# Patient Record
Sex: FEMALE | Race: BLACK OR AFRICAN AMERICAN | Employment: OTHER | URBAN - METROPOLITAN AREA
[De-identification: names, ages, dates, MRNs, and addresses within clinical notes are randomized per-mention and may not be internally consistent; named-entity substitution may affect disease eponyms.]

---

## 2017-06-27 ENCOUNTER — HOSPITAL ENCOUNTER (OUTPATIENT)
Dept: NUCLEAR MEDICINE | Age: 72
Discharge: HOME | End: 2017-06-27
Attending: INTERNAL MEDICINE
Payer: MEDICARE

## 2017-06-27 DIAGNOSIS — E03.9 ACQUIRED HYPOTHYROIDISM: ICD-10-CM

## 2017-06-27 PROCEDURE — 78014 THYROID IMAGING W/BLOOD FLOW: CPT

## 2017-06-28 ENCOUNTER — HOSPITAL ENCOUNTER (OUTPATIENT)
Dept: NUCLEAR MEDICINE | Age: 72
Discharge: HOME | End: 2017-06-28
Attending: INTERNAL MEDICINE
Payer: MEDICARE

## 2017-06-28 PROCEDURE — 78014 THYROID IMAGING W/BLOOD FLOW: CPT

## 2017-07-21 RX ORDER — LORAZEPAM 0.5 MG/1
0.5 TABLET ORAL
Qty: 90 TAB | Refills: 0 | Status: SHIPPED | OUTPATIENT
Start: 2017-07-21 | End: 2018-02-15 | Stop reason: SDUPTHER

## 2017-07-21 RX ORDER — TRAMADOL HYDROCHLORIDE 50 MG/1
50 TABLET ORAL AS NEEDED
Qty: 360 TAB | Refills: 0 | Status: SHIPPED | OUTPATIENT
Start: 2017-07-21 | End: 2017-07-24 | Stop reason: SDUPTHER

## 2017-07-24 RX ORDER — TRAMADOL HYDROCHLORIDE 50 MG/1
50 TABLET ORAL 4 TIMES DAILY
Qty: 360 TAB | Refills: 0 | Status: SHIPPED | OUTPATIENT
Start: 2017-07-24 | End: 2017-08-11 | Stop reason: SDUPTHER

## 2017-07-24 NOTE — TELEPHONE ENCOUNTER
Requested Prescriptions     Pending Prescriptions Disp Refills    traMADol (ULTRAM) 50 mg tablet 360 Tab 0     Sig: Take 1 Tab by mouth four (4) times daily. Max Daily Amount: 200 mg.  Indications: Pain       Last Refill: 5/12/17  Last visit:5/12/17

## 2017-08-11 RX ORDER — TRAMADOL HYDROCHLORIDE 50 MG/1
50 TABLET ORAL 4 TIMES DAILY
Qty: 360 TAB | Refills: 0 | Status: SHIPPED | OUTPATIENT
Start: 2017-08-11 | End: 2018-02-15 | Stop reason: SDUPTHER

## 2017-08-11 NOTE — TELEPHONE ENCOUNTER
Last Refill: 5/12/17  Last visit:5/12/17    Requested Prescriptions     Pending Prescriptions Disp Refills    traMADol (ULTRAM) 50 mg tablet 360 Tab 0     Sig: Take 1 Tab by mouth four (4) times daily. Max Daily Amount: 200 mg.  Indications: Pain

## 2017-08-17 ENCOUNTER — TELEPHONE ANTICOAG (OUTPATIENT)
Dept: INTERNAL MEDICINE CLINIC | Age: 72
End: 2017-08-17

## 2017-08-17 ENCOUNTER — LAB ONLY (OUTPATIENT)
Dept: INTERNAL MEDICINE CLINIC | Age: 72
End: 2017-08-17

## 2017-08-17 DIAGNOSIS — I48.0 PAF (PAROXYSMAL ATRIAL FIBRILLATION) (HCC): Primary | ICD-10-CM

## 2017-08-17 LAB
INR BLD: 3.7
PT POC: 46.2 SECONDS
VALID INTERNAL CONTROL?: YES

## 2017-08-17 NOTE — PROGRESS NOTES
Ms. Edith Andrade is here today for anticoagulation monitoring for the diagnosis of Atrial Fibrillation. Her INR goal is 2-3 and her current Coumadin dose is 2mg tabs- 1 1/2 tabs everyday, except take 2 tabs wed & sun. Today's findings include an INR of 3.7 (normal INR range 0.8-1.2). Considering Ms. Cuello's past history, todays findings, and per the coumadin policy/protocol, Ms. Buddy Holland was instructed to take Coumadin as follows,  Hold coumadin for 2 days, then restart current plan. Abby Mora She was also instructed to schedule an appointment in 4 weeks prior to leaving for an INR check. A full discussion of the nature of anticoagulants has been carried out. A full discussion of the need for frequent and regular monitoring, precise dosage adjustment and compliance was stressed. Side effects of potential bleeding were discussed and Ms. Buddy Holland was instructed to call [phone] if there are any signs of abnormal bleeding. Ms. Buddy Holland was instructed to avoid any OTC items containing aspirin or ibuprofen and prior to starting any new OTC products to consult with her physician or pharmacist to ensure no drug interactions are present. Ms. Buddy Holland was instructed to avoid any major changes in her general diet and to avoid alcohol consumption. .    Ms. Buddy Holland was provided a literature booklet, \"Treatment with Warfarin (Coumadin)\", that includes topics on understanding coumadin therapy, drug interaction considerations, vitamin K and coumadin use, interactions with foods and supplements containing vitamin K, and the use of herbal products. Ms. Buddy Holland verbalized her understanding of all instructions and will call the office with any questions, concerns, or signs of abnormal bleeding or blood clot.

## 2017-08-24 RX ORDER — HUMAN INSULIN 100 [IU]/ML
INJECTION, SUSPENSION SUBCUTANEOUS
Qty: 80 ML | Refills: 2 | Status: SHIPPED | OUTPATIENT
Start: 2017-08-24 | End: 2018-05-21 | Stop reason: SDUPTHER

## 2017-08-24 NOTE — TELEPHONE ENCOUNTER
Requested Prescriptions     Pending Prescriptions Disp Refills    NOVOLIN N 100 unit/mL injection [Pharmacy Med Name: Wilda FRIEDMAN INSULIN 100U/ML] 80 mL 2     Sig: INJECT 44 UNITS UNDER THE SKIN IN THE MORNING AND 36 UNITS IN THE EVENING       Last Refill: 5/17/17  Last visit:5/12/17

## 2017-09-08 RX ORDER — NAPROXEN SODIUM 220 MG
1 TABLET ORAL 2 TIMES DAILY
Qty: 180 SYRINGE | Refills: 3 | Status: SHIPPED | COMMUNITY
Start: 2017-09-08 | End: 2020-06-18 | Stop reason: SDUPTHER

## 2017-09-08 RX ORDER — CAPTOPRIL 25 MG/1
25 TABLET ORAL 2 TIMES DAILY
Qty: 180 TAB | Refills: 3 | Status: SHIPPED | COMMUNITY
Start: 2017-09-08 | End: 2018-06-15 | Stop reason: SDUPTHER

## 2017-09-21 ENCOUNTER — LAB ONLY (OUTPATIENT)
Dept: INTERNAL MEDICINE CLINIC | Age: 72
End: 2017-09-21

## 2017-09-21 DIAGNOSIS — I48.0 PAF (PAROXYSMAL ATRIAL FIBRILLATION) (HCC): Primary | ICD-10-CM

## 2017-09-21 LAB
INR BLD: 2.5
PT POC: 32.7 SECONDS
VALID INTERNAL CONTROL?: YES

## 2017-09-22 ENCOUNTER — TELEPHONE ANTICOAG (OUTPATIENT)
Dept: INTERNAL MEDICINE CLINIC | Age: 72
End: 2017-09-22

## 2017-09-22 DIAGNOSIS — I48.0 PAF (PAROXYSMAL ATRIAL FIBRILLATION) (HCC): Primary | ICD-10-CM

## 2017-09-22 NOTE — PROGRESS NOTES
Ms. Adelso Ferguson is here today for anticoagulation monitoring for the diagnosis of Atrial Fibrillation. Her INR goal is 2.0-3.0 and her current Coumadin dose is 4mg on sundays, 3mg all other days. .     Today's findings include an INR of 2.5 (normal INR range 0.8-1.2). inure    Considering Ms. Cuello's past history, todays findings, and per the coumadin policy/protocol, Ms. Jena Olmedo was instructed to take Coumadin as follows,  Continue same regimen. She was also instructed to schedule an appointment in 4 weeks prior to leaving for an INR check. A full discussion of the nature of anticoagulants has been carried out. A full discussion of the need for frequent and regular monitoring, precise dosage adjustment and compliance was stressed. Side effects of potential bleeding were discussed and Ms. Jena Olmedo was instructed to call [phone] if there are any signs of abnormal bleeding. Ms. Jena Olmedo was instructed to avoid any OTC items containing aspirin or ibuprofen and prior to starting any new OTC products to consult with her physician or pharmacist to ensure no drug interactions are present. Ms. Jena Olmedo was instructed to avoid any major changes in her general diet and to avoid alcohol consumption. .    Ms. Jena Olmedo was provided a literature booklet, \"Treatment with Warfarin (Coumadin)\", that includes topics on understanding coumadin therapy, drug interaction considerations, vitamin K and coumadin use, interactions with foods and supplements containing vitamin K, and the use of herbal products. Ms. Jena Olmedo verbalized her understanding of all instructions and will call the office with any questions, concerns, or signs of abnormal bleeding or blood clot.

## 2017-10-13 PROBLEM — L97.909 STASIS ULCER OF LOWER EXTREMITY (HCC): Status: ACTIVE | Noted: 2017-10-13

## 2017-10-13 PROBLEM — E78.5 DYSLIPIDEMIA: Status: ACTIVE | Noted: 2017-10-13

## 2017-10-13 PROBLEM — H40.9 GLAUCOMA: Status: ACTIVE | Noted: 2017-10-13

## 2017-10-13 PROBLEM — I63.9 CVA (CEREBRAL VASCULAR ACCIDENT) (HCC): Status: ACTIVE | Noted: 2017-10-13

## 2017-10-13 PROBLEM — R92.1 BREAST CALCIFICATION, LEFT: Status: ACTIVE | Noted: 2017-10-13

## 2017-10-13 PROBLEM — F41.9 ANXIETY: Status: ACTIVE | Noted: 2017-10-13

## 2017-10-13 PROBLEM — Z79.899 LONG-TERM USE OF HIGH-RISK MEDICATION: Status: ACTIVE | Noted: 2017-10-13

## 2017-10-13 PROBLEM — E05.90 HYPERTHYROIDISM: Status: ACTIVE | Noted: 2017-10-13

## 2017-10-13 PROBLEM — I83.009 STASIS ULCER OF LOWER EXTREMITY (HCC): Status: ACTIVE | Noted: 2017-10-13

## 2017-10-13 PROBLEM — I89.0 LYMPHEDEMA OF BOTH LOWER EXTREMITIES: Status: ACTIVE | Noted: 2017-10-13

## 2017-10-13 PROBLEM — M19.90 DJD (DEGENERATIVE JOINT DISEASE): Status: ACTIVE | Noted: 2017-10-13

## 2017-10-13 PROBLEM — L03.115 CELLULITIS OF BOTH LOWER EXTREMITIES: Status: ACTIVE | Noted: 2017-10-13

## 2017-10-13 PROBLEM — I48.91 ATRIAL FIBRILLATION (HCC): Status: ACTIVE | Noted: 2017-10-13

## 2017-10-13 PROBLEM — G89.4 CHRONIC PAIN SYNDROME: Status: ACTIVE | Noted: 2017-10-13

## 2017-10-13 PROBLEM — Z79.01 ANTICOAGULANT LONG-TERM USE: Status: ACTIVE | Noted: 2017-10-13

## 2017-10-13 PROBLEM — L03.116 CELLULITIS OF BOTH LOWER EXTREMITIES: Status: ACTIVE | Noted: 2017-10-13

## 2017-10-13 RX ORDER — LATANOPROST 50 UG/ML
1 SOLUTION/ DROPS OPHTHALMIC
COMMUNITY

## 2017-10-13 RX ORDER — WARFARIN 2 MG/1
2 TABLET ORAL DAILY
COMMUNITY
End: 2018-05-29 | Stop reason: SDUPTHER

## 2017-10-23 ENCOUNTER — LAB ONLY (OUTPATIENT)
Dept: INTERNAL MEDICINE CLINIC | Age: 72
End: 2017-10-23

## 2017-10-23 ENCOUNTER — TELEPHONE ANTICOAG (OUTPATIENT)
Dept: INTERNAL MEDICINE CLINIC | Age: 72
End: 2017-10-23

## 2017-10-23 DIAGNOSIS — I48.0 PAF (PAROXYSMAL ATRIAL FIBRILLATION) (HCC): Primary | ICD-10-CM

## 2017-10-23 LAB
INR BLD: 3.1
INR, EXTERNAL: 3.1
PT POC: 41.2 SECONDS
VALID INTERNAL CONTROL?: YES

## 2017-10-23 NOTE — PROGRESS NOTES
Ms. Jimenez Becker is here today for anticoagulation monitoring for the diagnosis of Atrial Fibrillation. Her INR goal is 2.0-3.0 and her current Coumadin dose is 3 mg sun and wed 4 mg all otherdays. Today's findings include an INR of 3.1     Considering Ms. Cuello's past history, todays findings, and per the coumadin policy/protocol, Ms. Roscoe Zimmerman was instructed to take Coumadin as follows,  No change. She was also instructed to schedule an appointment in 4 weeks prior to leaving for an INR check. A full discussion of the nature of anticoagulants has been carried out. A full discussion of the need for frequent and regular monitoring, precise dosage adjustment and compliance was stressed. Side effects of potential bleeding were discussed and Ms. Roscoe Zimmerman was instructed to call [phone] if there are any signs of abnormal bleeding. Ms. Roscoe Zimmerman was instructed to avoid any OTC items containing aspirin or ibuprofen and prior to starting any new OTC products to consult with her physician or pharmacist to ensure no drug interactions are present. Ms. Rosceo Zimmerman was instructed to avoid any major changes in her general diet and to avoid alcohol consumption. .    Ms. Roscoe Zimmerman was provided a literature booklet, \"Treatment with Warfarin (Coumadin)\", that includes topics on understanding coumadin therapy, drug interaction considerations, vitamin K and coumadin use, interactions with foods and supplements containing vitamin K, and the use of herbal products. Ms. Roscoe Zimmerman verbalized her understanding of all instructions and will call the office with any questions, concerns, or signs of abnormal bleeding or blood clot.

## 2017-11-15 ENCOUNTER — OFFICE VISIT (OUTPATIENT)
Dept: INTERNAL MEDICINE CLINIC | Age: 72
End: 2017-11-15

## 2017-11-15 VITALS
SYSTOLIC BLOOD PRESSURE: 126 MMHG | HEIGHT: 64 IN | OXYGEN SATURATION: 96 % | DIASTOLIC BLOOD PRESSURE: 80 MMHG | WEIGHT: 207 LBS | BODY MASS INDEX: 35.34 KG/M2 | HEART RATE: 72 BPM

## 2017-11-15 DIAGNOSIS — E03.9 ACQUIRED HYPOTHYROIDISM: ICD-10-CM

## 2017-11-15 DIAGNOSIS — Z23 ENCOUNTER FOR IMMUNIZATION: ICD-10-CM

## 2017-11-15 DIAGNOSIS — E11.3299 TYPE 2 DIABETES MELLITUS WITH BACKGROUND RETINOPATHY (HCC): Primary | ICD-10-CM

## 2017-11-15 DIAGNOSIS — N39.0 URINARY TRACT INFECTION WITHOUT HEMATURIA, SITE UNSPECIFIED: ICD-10-CM

## 2017-11-15 DIAGNOSIS — Z79.899 LONG-TERM USE OF HIGH-RISK MEDICATION: ICD-10-CM

## 2017-11-15 DIAGNOSIS — Z79.01 ANTICOAGULANT LONG-TERM USE: ICD-10-CM

## 2017-11-15 DIAGNOSIS — I10 ESSENTIAL HYPERTENSION, BENIGN: ICD-10-CM

## 2017-11-15 DIAGNOSIS — I63.9 CEREBROVASCULAR ACCIDENT (CVA), UNSPECIFIED MECHANISM (HCC): Chronic | ICD-10-CM

## 2017-11-15 DIAGNOSIS — E78.5 DYSLIPIDEMIA: ICD-10-CM

## 2017-11-15 DIAGNOSIS — Z79.891 CHRONIC PRESCRIPTION OPIATE USE: ICD-10-CM

## 2017-11-15 DIAGNOSIS — I48.0 PAF (PAROXYSMAL ATRIAL FIBRILLATION) (HCC): ICD-10-CM

## 2017-11-15 PROBLEM — I48.91 ATRIAL FIBRILLATION (HCC): Chronic | Status: ACTIVE | Noted: 2017-10-13

## 2017-11-15 LAB
ALBUMIN SERPL-MCNC: 3.6 G/DL (ref 3.9–5.4)
ALKALINE PHOS POC: 185 U/L (ref 38–126)
ALT SERPL-CCNC: 29 U/L (ref 9–52)
AST SERPL-CCNC: 23 U/L (ref 14–36)
BACTERIA UA POCT, BACTPOCT: ABNORMAL
BILIRUB UR QL STRIP: NEGATIVE
BUN BLD-MCNC: 17 MG/DL (ref 7–17)
CALCIUM BLD-MCNC: 9.4 MG/DL (ref 8.4–10.2)
CASTS UA POCT: ABNORMAL
CHLORIDE BLD-SCNC: 103 MMOL/L (ref 98–107)
CHOLEST SERPL-MCNC: 111 MG/DL (ref 0–200)
CK (CPK) POC: 119 U/L (ref 30–135)
CLUE CELLS, CLUEPOCT: NEGATIVE
CO2 POC: 27 MMOL/L (ref 22–32)
CREAT BLD-MCNC: 0.7 MG/DL (ref 0.7–1.2)
CRYSTALS UA POCT, CRYSPOCT: NEGATIVE
EGFR (POC): 86
EPITHELIAL CELLS POCT: ABNORMAL
GLUCOSE POC: 294 MG/DL (ref 65–105)
GLUCOSE UR-MCNC: ABNORMAL MG/DL
GRAN# POC: 2.8 K/UL (ref 2–7.8)
GRAN% POC: 61.5 % (ref 37–92)
HBA1C MFR BLD HPLC: 9 % (ref 4.5–5.7)
HCT VFR BLD CALC: 39.4 % (ref 37–51)
HDLC SERPL-MCNC: 26 MG/DL (ref 35–130)
HGB BLD-MCNC: 13.5 G/DL (ref 12–18)
INR BLD: 2.2
KETONES P FAST UR STRIP-MCNC: ABNORMAL MG/DL
LDL CHOLESTEROL POC: 66 MG/DL (ref 0–130)
LY# POC: 1.4 K/UL (ref 0.6–4.1)
LY% POC: 32.4 % (ref 10–58.5)
MCH RBC QN: 30.2 PG (ref 26–32)
MCHC RBC-ENTMCNC: 34.2 G/DL (ref 30–36)
MCV RBC: 88 FL (ref 80–97)
MICROALBUMIN UR TEST STR-MCNC: 50 MG/L (ref 0–20)
MID #, POC: 0.2 K/UL (ref 0–1.8)
MID% POC: 6.1 % (ref 0.1–24)
MUCUS UA POCT, MUCPOCT: ABNORMAL
PH UR STRIP: 6 [PH] (ref 5–7)
PLATELET # BLD: 199 K/UL (ref 140–440)
POTASSIUM SERPL-SCNC: 4.6 MMOL/L (ref 3.6–5)
PROT SERPL-MCNC: 8.4 G/DL (ref 6.3–8.2)
PROT UR QL STRIP: ABNORMAL
PT POC: 29.3 SECONDS
RBC # BLD: 4.45 M/UL (ref 4.2–6.3)
RBC UA POCT, RBCPOCT: ABNORMAL
SODIUM SERPL-SCNC: 143 MMOL/L (ref 137–145)
SP GR UR STRIP: 1.02 (ref 1.01–1.02)
T4 FREE SERPL-MCNC: 1.88 NG/DL (ref 0.71–1.85)
TCHOL/HDL RATIO (POC): 4.3 (ref 0–4)
TOTAL BILIRUBIN POC: 0.6 MG/DL (ref 0.2–1.3)
TRICH UA POCT, TRICHPOC: NEGATIVE
TRIGL SERPL-MCNC: 95 MG/DL (ref 0–200)
TSH BLD-ACNC: 0 UIU/ML (ref 0.4–4.2)
UA UROBILINOGEN AMB POC: NORMAL (ref 0.2–1)
URINALYSIS CLARITY POC: ABNORMAL
URINALYSIS COLOR POC: ABNORMAL
URINE BLOOD POC: ABNORMAL
URINE CULT COMMENT, POCT: ABNORMAL
URINE LEUKOCYTES POC: ABNORMAL
URINE NITRITES POC: NEGATIVE
VALID INTERNAL CONTROL?: YES
VLDLC SERPL CALC-MCNC: 19 MG/DL
WBC # BLD: 4.4 K/UL (ref 4.1–10.9)
WBC UA POCT, WBCPOCT: ABNORMAL
YEAST UA POCT, YEASTPOC: NEGATIVE

## 2017-11-15 RX ORDER — CLONIDINE HYDROCHLORIDE 0.1 MG/1
0.2 TABLET ORAL
COMMUNITY
End: 2018-01-07

## 2017-11-15 RX ORDER — NALOXONE HYDROCHLORIDE 4 MG/.1ML
SPRAY NASAL
Qty: 1 EACH | Refills: 0 | Status: ON HOLD | OUTPATIENT
Start: 2017-11-15 | End: 2022-07-29

## 2017-11-15 RX ORDER — CLONIDINE HYDROCHLORIDE 0.1 MG/1
0.1 TABLET ORAL
COMMUNITY
End: 2018-01-07

## 2017-11-15 NOTE — MR AVS SNAPSHOT
Visit Information Date & Time Provider Department Dept. Phone Encounter #  
 11/15/2017 11:00 AM Vince MinMaurice 84 [phone] [id number] Follow-up Instructions Return in about 3 months (around 2/15/2018). Your Appointments 11/27/2017 11:00 AM  
LAB with LAB ONLY  
[facility] ([facility]) Appt Note: pt  
 [address] [id number] [address] [facility] [address] Upcoming Health Maintenance Date Due HEMOGLOBIN A1C Q6M 1945 FOOT EXAM Q1 4/8/1954 MICROALBUMIN Q1 4/8/1954 EYE EXAM RETINAL OR DILATED Q1 4/8/1954 DTaP/Tdap/Td series (1 - Tdap) 4/8/1965 FOBT Q 1 YEAR AGE 50-75 4/8/1994 ZOSTER VACCINE AGE 60> 2/8/2004 GLAUCOMA SCREENING Q2Y 4/8/2009 OSTEOPOROSIS SCREENING (DEXA) 4/8/2009 MEDICARE YEARLY EXAM 4/8/2009 LIPID PANEL Q1 8/19/2013 BREAST CANCER SCRN MAMMOGRAM 11/4/2016 Influenza Age 5 to Adult 8/1/2017 Allergies as of 11/15/2017  Review Complete On: 11/15/2017 By: Vince Min MD  
  
 Severity Noted Reaction Type Reactions Betadine Prepstick  07/20/2010    Rash Current Immunizations  Reviewed on 8/8/2013 Name Date Influenza High Dose Vaccine PF  Incomplete Pneumococcal Conjugate (PCV-13) 11/12/2015 Pneumococcal Polysaccharide (PPSV-23) 10/1/2014 Not reviewed this visit You Were Diagnosed With   
  
 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 
ICD-9-CM: 401.1 Acquired hypothyroidism     ICD-10-CM: E03.9 ICD-9-CM: 359. 9 Dyslipidemia     ICD-10-CM: E78.5 ICD-9-CM: 272.4 PAF (paroxysmal atrial fibrillation) (HCC)     ICD-10-CM: I48.0 ICD-9-CM: 427.31   
 Long-term use of high-risk medication     ICD-10-CM: Z79.899 ICD-9-CM: V58.69 Anticoagulant long-term use     ICD-10-CM: Z79.01 
ICD-9-CM: V58.61 Cerebrovascular accident (CVA), unspecified mechanism ([facility]     ICD-10-CM: I63.9 ICD-9-CM: 434.91 Chronic prescription opiate use     ICD-10-CM: K44.797 ICD-9-CM: V58.69 Encounter for immunization     ICD-10-CM: S30 ICD-9-CM: V03.89 Vitals BP Pulse Height(growth percentile) Weight(growth percentile) SpO2 BMI  
 126/80 (BP 1 Location: Right arm, BP Patient Position: Sitting) 72 5' 4\" (1.626 m) 207 lb (93.9 kg) 96% 35.53 kg/m2 OB Status Smoking Status Hysterectomy Never Smoker BMI and BSA Data Body Mass Index Body Surface Area 35.53 kg/m 2 2.06 m 2 Preferred Pharmacy Pharmacy Name Phone [facility] [facility]  [place] [phone] Your Updated Medication List  
  
   
This list is accurate as of: 11/15/17 11:50 AM.  Always use your most recent med list. amLODIPine 10 mg tablet Commonly known as:  Mcneal Del Take 10 mg by mouth daily. aspirin 325 mg tablet Commonly known as:  ASPIRIN Take 1 Tab by mouth daily. captopril 25 mg tablet Commonly known as:  CAPOTEN Take 1 Tab by mouth two (2) times a day. * cloNIDine HCl 0.1 mg tablet Commonly known as:  CATAPRES Take 0.2 mg by mouth every morning. * cloNIDine HCl 0.1 mg tablet Commonly known as:  CATAPRES Take 0.1 mg by mouth nightly. COREG 12.5 mg tablet Generic drug:  carvedilol Take 12.5 mg by mouth two (2) times daily (with meals). COUMADIN 2 mg tablet Generic drug:  warfarin Take 2 mg by mouth daily. Indications: 1 1/2 tabs mon, tues, thurs, sat, and 2 tabs wed, sun Insulin Syringe-Needle U-100 0.5 mL 31 gauge x 5/16 Syrg Commonly known as:  BD INSULIN SYRINGE ULT-FINE II  
 1 Each by Does Not Apply route two (2) times a day. LASIX 40 mg tablet Generic drug:  furosemide Take 40 mg by mouth daily. LORazepam 0.5 mg tablet Commonly known as:  ATIVAN Take 1 Tab by mouth nightly as needed. Max Daily Amount: 0.5 mg.  
  
 naloxone 4 mg/actuation nasal spray Commonly known as:  [employer] Use 1 spray intranasally into 1 nostril. Indications: OPIATE-INDUCED RESPIRATORY DEPRESSION, Opioid Toxicity NovoLIN N 100 unit/mL injection Generic drug:  insulin NPH INJECT 44 UNITS UNDER THE SKIN IN THE MORNING AND 36 UNITS IN THE EVENING  
  
 pravastatin 80 mg tablet Commonly known as:  PRAVACHOL Take 1 Tab by mouth nightly. traMADol 50 mg tablet Commonly known as:  ULTRAM  
Take 1 Tab by mouth four (4) times daily. Max Daily Amount: 200 mg. Indications: Pain XALATAN 0.005 % ophthalmic solution Generic drug:  latanoprost  
Administer 1 Drop to both eyes nightly. * Notice: This list has 2 medication(s) that are the same as other medications prescribed for you. Read the directions carefully, and ask your doctor or other care provider to review them with you. Prescriptions Sent to Pharmacy Refills  
 naloxone (NARCAN) 4 mg/actuation nasal spray 0 Sig: Use 1 spray intranasally into 1 nostril. Indications: OPIATE-INDUCED RESPIRATORY DEPRESSION, Opioid Toxicity Class: Normal  
 Pharmacy: [facility] [address] #: [phone] We Performed the Following ADMIN INFLUENZA VIRUS VAC [ HCPCS] AMB POC CK (CPK) [20723 CPT(R)] AMB POC COMPLETE CBC,AUTOMATED ENTER [id number] CPT(R)] AMB POC COMPREHENSIVE METABOLIC PANEL [41014 CPT(R)] AMB POC HEMOGLOBIN A1C [37407 CPT(R)] AMB POC LIPID PROFILE [19280 CPT(R)] AMB POC PT/INR [93236 CPT(R)] AMB POC T4, FREE [id number] CPT(R)] AMB POC TSH [63787 CPT(R)] AMB POC URINALYSIS DIP STICK AUTO W/ MICRO  [28489 CPT(R)] AMB POC URINE, MICROALBUMIN, SEMIQUANT (1 RESULT) [93829 CPT(R)] INFLUENZA VIRUS VACCINE, HIGH DOSE SEASONAL, PRESERVATIVE FREE [76968 CPT(R)] MT COLLECTION VENOUS BLOOD,VENIPUNCTURE [id number] CPT(R)] Ambrocio Law 13 (MW) [[id number] Custom] Follow-up Instructions Return in about 3 months (around 2/15/2018). Patient Instructions Atrial Fibrillation: Care Instructions Your Care Instructions Atrial fibrillation is an irregular and often fast heartbeat. Treating this condition is important for several reasons. It can cause blood clots, which can travel from your heart to your brain and cause a stroke. If you have a fast heartbeat, you may feel lightheaded, dizzy, and weak. An irregular heartbeat can also increase your risk for heart failure. Atrial fibrillation is often the result of another heart condition, such as high blood pressure or coronary artery disease. Making changes to improve your heart condition will help you stay healthy and active. Follow-up care is a key part of your treatment and safety. Be sure to make and go to all appointments, and call your doctor if you are having problems. It's also a good idea to know your test results and keep a list of the medicines you take. How can you care for yourself at home? Medicines ? · Take your medicines exactly as prescribed. Call your doctor if you think you are having a problem with your medicine. You will get more details on the specific medicines your doctor prescribes. ? · If your doctor has given you a blood thinner to prevent a stroke, be sure you get instructions about how to take your medicine safely. Blood thinners can cause serious bleeding problems. ? · Do not take any vitamins, over-the-counter drugs, or herbal products without talking to your doctor first. ? Lifestyle changes ? · Do not smoke. Smoking can increase your chance of a stroke and heart attack. If you need help quitting, talk to your doctor about stop-smoking programs and medicines. These can increase your chances of quitting for good. ? · Eat a heart-healthy diet. ? · Stay at a healthy weight. Lose weight if you need to.  
? · Limit alcohol to 2 drinks a day for men and 1 drink a day for women. Too much alcohol can cause health problems. ? · Avoid colds and flu. Get a pneumococcal vaccine shot. If you have had one before, ask your doctor whether you need another dose. Get a flu shot every year. If you must be around people with colds or flu, wash your hands often. Activity ? · If your doctor recommends it, get more exercise. Walking is a good choice. Bit by bit, increase the amount you walk every day. Try for at least 30 minutes on most days of the week. You also may want to swim, bike, or do other activities. Your doctor may suggest that you join a cardiac rehabilitation program so that you can have help increasing your physical activity safely. ? · Start light exercise if your doctor says it is okay. Even a small amount will help you get stronger, have more energy, and manage stress. Walking is an easy way to get exercise. Start out by walking a little more than you did in the hospital. Gradually increase the amount you walk. ? · When you exercise, watch for signs that your heart is working too hard. You are pushing too hard if you cannot talk while you are exercising. If you become short of breath or dizzy or have chest pain, sit down and rest immediately. ? · Check your pulse regularly. Place two fingers on the artery at the palm side of your wrist, in line with your thumb. If your heartbeat seems uneven or fast, talk to your doctor. When should you call for help? Call 911 anytime you think you may need emergency care. For example, call if: 
? · You have symptoms of a heart attack. These may include: ¨ Chest pain or pressure, or a strange feeling in the chest. 
¨ Sweating. ¨ Shortness of breath. ¨ Nausea or vomiting. ¨ Pain, pressure, or a strange feeling in the back, neck, jaw, or upper belly or in one or both shoulders or arms. ¨ Lightheadedness or sudden weakness. ¨ A fast or irregular heartbeat. After you call 911, the  may tell you to chew 1 adult-strength or 2 to 4 low-dose aspirin. Wait for an ambulance. Do not try to drive yourself. ? · You have symptoms of a stroke. These may include: 
¨ Sudden numbness, tingling, weakness, or loss of movement in your face, arm, or leg, especially on only one side of your body. ¨ Sudden vision changes. ¨ Sudden trouble speaking. ¨ Sudden confusion or trouble understanding simple statements. ¨ Sudden problems with walking or balance. ¨ A sudden, severe headache that is different from past headaches. ? · You passed out (lost consciousness). ?Call your doctor now or seek immediate medical care if: 
? · You have new or increased shortness of breath. ? · You feel dizzy or lightheaded, or you feel like you may faint. ? · Your heart rate becomes irregular. ? · You can feel your heart flutter in your chest or skip heartbeats. Tell your doctor if these symptoms are new or worse. ? Watch closely for changes in your health, and be sure to contact your doctor if you have any problems. Where can you learn more? Go to http://aly-robert.info/. Enter U020 in the search box to learn more about \"Atrial Fibrillation: Care Instructions. \" Current as of: September 21, 2016 Content Version: 11.4 © 4280-6525 [employer]. Care instructions adapted under license by [employer] (which disclaims liability or warranty for this information).  If you have questions about a medical condition or this instruction, always ask your healthcare professional. [employer] disclaims any warranty or liability for your use of this information. Vaccine Information Statement Influenza (Flu) Vaccine (Inactivated or Recombinant): What you need to know Many Vaccine Information Statements are available in Sinhala and other languages. See www.immunize.org/vis Hojas de Información Sobre Vacunas están disponibles en Español y en muchos otros idiomas. Visite www.immunize.org/vis 1. Why get vaccinated? Influenza (flu) is a contagious disease that spreads around the [facility] every year, usually between October and May. Flu is caused by influenza viruses, and is spread mainly by coughing, sneezing, and close contact. Anyone can get flu. Flu strikes suddenly and can last several days. Symptoms vary by age, but can include: 
 fever/chills  sore throat  muscle aches  fatigue  cough  headache  runny or stuffy nose Flu can also lead to pneumonia and blood infections, and cause diarrhea and seizures in children. If you have a medical condition, such as heart or lung disease, flu can make it worse. Flu is more dangerous for some people. Infants and young children, people 72years of age and older, pregnant women, and people with certain health conditions or a weakened immune system are at greatest risk. Each year thousands of people in the [facility] die from flu, and many more are hospitalized. Flu vaccine can: 
 keep you from getting flu, 
 make flu less severe if you do get it, and 
 keep you from spreading flu to your family and other people. 2. Inactivated and recombinant flu vaccines A dose of flu vaccine is recommended every flu season. Children 6 months through 6years of age may need two doses during the same flu season. Everyone else needs only one dose each flu season.   
 
 
Some inactivated flu vaccines contain a very small amount of a mercury-based preservative called thimerosal. Studies have not shown thimerosal in vaccines to be harmful, but flu vaccines that do not contain thimerosal are available. There is no live flu virus in flu shots. They cannot cause the flu. There are many flu viruses, and they are always changing. Each year a new flu vaccine is made to protect against three or four viruses that are likely to cause disease in the upcoming flu season. But even when the vaccine doesnt exactly match these viruses, it may still provide some protection Flu vaccine cannot prevent: 
 flu that is caused by a virus not covered by the vaccine, or 
 illnesses that look like flu but are not. It takes about 2 weeks for protection to develop after vaccination, and protection lasts through the flu season. 3. Some people should not get this vaccine Tell the person who is giving you the vaccine:  If you have any severe, life-threatening allergies. If you ever had a life-threatening allergic reaction after a dose of flu vaccine, or have a severe allergy to any part of this vaccine, you may be advised not to get vaccinated. Most, but not all, types of flu vaccine contain a small amount of egg protein.  If you ever had Guillain-Barré Syndrome (also called GBS). Some people with a history of GBS should not get this vaccine. This should be discussed with your doctor.  If you are not feeling well. It is usually okay to get flu vaccine when you have a mild illness, but you might be asked to come back when you feel better. 4. Risks of a vaccine reaction With any medicine, including vaccines, there is a chance of reactions. These are usually mild and go away on their own, but serious reactions are also possible. Most people who get a flu shot do not have any problems with it. Minor problems following a flu shot include:  
 soreness, redness, or swelling where the shot was given  hoarseness  sore, red or itchy eyes  cough  fever  aches  headache  itching  fatigue If these problems occur, they usually begin soon after the shot and last 1 or 2 days. More serious problems following a flu shot can include the following:  There may be a small increased risk of Guillain-Barré Syndrome (GBS) after inactivated flu vaccine. This risk has been estimated at 1 or 2 additional cases per million people vaccinated. This is much lower than the risk of severe complications from flu, which can be prevented by flu vaccine.  Young children who get the flu shot along with pneumococcal vaccine (PCV13) and/or DTaP vaccine at the same time might be slightly more likely to have a seizure caused by fever. Ask your doctor for more information. Tell your doctor if a child who is getting flu vaccine has ever had a seizure. Problems that could happen after any injected vaccine:  People sometimes faint after a medical procedure, including vaccination. Sitting or lying down for about 15 minutes can help prevent fainting, and injuries caused by a fall. Tell your doctor if you feel dizzy, or have vision changes or ringing in the ears.  Some people get severe pain in the shoulder and have difficulty moving the arm where a shot was given. This happens very rarely.  Any medication can cause a severe allergic reaction. Such reactions from a vaccine are very rare, estimated at about 1 in a million doses, and would happen within a few minutes to a few hours after the vaccination. As with any medicine, there is a very remote chance of a vaccine causing a serious injury or death. The safety of vaccines is always being monitored. For more information, visit: www.cdc.gov/vaccinesafety/ 
 
5. What if there is a serious reaction? What should I look for?  Look for anything that concerns you, such as signs of a severe allergic reaction, very high fever, or unusual behavior.  
 
Signs of a severe allergic reaction can include hives, swelling of the face and throat, difficulty breathing, a fast heartbeat, dizziness, and weakness  usually within a few minutes to a few hours after the vaccination. What should I do?  If you think it is a severe allergic reaction or other emergency that cant wait, call 9-1-1 and get the person to the nearest hospital. Otherwise, call your doctor.  Reactions should be reported to the Vaccine Adverse Event Reporting System (VAERS). Your doctor should file this report, or you can do it yourself through  the VAERS web site at www.vaers. [facility].gov, or by calling 8-318.563.9851. VAERS does not give medical advice. 6. The National Vaccine Injury Compensation Program 
 
The [facility] Vaccine Injury Compensation Program (VICP) is a federal program that was created to compensate people who may have been injured by certain vaccines. Persons who believe they may have been injured by a vaccine can learn about the program and about filing a claim by calling 2-809.701.9780 or visiting the [address] website at www.[facility].gov/vaccinecompensation. There is a time limit to file a claim for compensation. 7. How can I learn more?  Ask your healthcare provider. He or she can give you the vaccine package insert or suggest other sources of information.  Call your local or state health department.  Contact the Centers for Disease Control and Prevention (CDC): 
- Call 2-538.705.1269 (1-800-CDC-INFO) or 
- Visit CDCs website at www.cdc.gov/flu Vaccine Information Statement Inactivated Influenza Vaccine 8/7/2015 
[address] SANAM Armando  [id number] [facility] and [employer] Centers for Disease Control and Prevention Office Use Only Introducing [facility]! Roge Burger introduces [employer] patient portal. Now you can access parts of your medical record, email your doctor's office, and request medication refills online. 1. In your internet browser, go to [email or web address] 2. Click on the First Time User? Click Here link in the Sign In box. You will see the New Member Sign Up page. 3. Enter your [facility] Access Code exactly as it appears below. You will not need to use this code after youve completed the sign-up process. If you do not sign up before the expiration date, you must request a new code. · [facility] Access Code: [email or web address] Expires: 12/20/2017  9:58 AM 
 
4. Enter the last four digits of your Social Security Number (xxxx) and Date of Birth (mm/dd/yyyy) as indicated and click Submit. You will be taken to the next sign-up page. 5. Create a [facility] ID. This will be your [facility] login ID and cannot be changed, so think of one that is secure and easy to remember. 6. Create a [facility] password. You can change your password at any time. 7. Enter your Password Reset Question and Answer. This can be used at a later time if you forget your password. 8. Enter your e-mail address. You will receive e-mail notification when new information is available in [facility]. 9. Click Sign Up. You can now view and download portions of your medical record. 10. Click the Download Summary menu link to download a portable copy of your medical information. If you have questions, please visit the Frequently Asked Questions section of the [employer] website. Remember, [facility] is NOT to be used for urgent needs. For medical emergencies, dial 911. Now available from your iPhone and Android! Please provide this summary of care documentation to your next provider. Your primary care clinician is listed as TIGRE Block 21. If you have any questions after today's visit, please call [phone].

## 2017-11-15 NOTE — PATIENT INSTRUCTIONS
Atrial Fibrillation: Care Instructions  Your Care Instructions    Atrial fibrillation is an irregular and often fast heartbeat. Treating this condition is important for several reasons. It can cause blood clots, which can travel from your heart to your brain and cause a stroke. If you have a fast heartbeat, you may feel lightheaded, dizzy, and weak. An irregular heartbeat can also increase your risk for heart failure. Atrial fibrillation is often the result of another heart condition, such as high blood pressure or coronary artery disease. Making changes to improve your heart condition will help you stay healthy and active. Follow-up care is a key part of your treatment and safety. Be sure to make and go to all appointments, and call your doctor if you are having problems. It's also a good idea to know your test results and keep a list of the medicines you take. How can you care for yourself at home? Medicines  ? · Take your medicines exactly as prescribed. Call your doctor if you think you are having a problem with your medicine. You will get more details on the specific medicines your doctor prescribes. ? · If your doctor has given you a blood thinner to prevent a stroke, be sure you get instructions about how to take your medicine safely. Blood thinners can cause serious bleeding problems. ? · Do not take any vitamins, over-the-counter drugs, or herbal products without talking to your doctor first.   ? Lifestyle changes  ? · Do not smoke. Smoking can increase your chance of a stroke and heart attack. If you need help quitting, talk to your doctor about stop-smoking programs and medicines. These can increase your chances of quitting for good. ? · Eat a heart-healthy diet. ? · Stay at a healthy weight. Lose weight if you need to.   ? · Limit alcohol to 2 drinks a day for men and 1 drink a day for women. Too much alcohol can cause health problems. ? · Avoid colds and flu.  Get a pneumococcal vaccine shot. If you have had one before, ask your doctor whether you need another dose. Get a flu shot every year. If you must be around people with colds or flu, wash your hands often. Activity  ? · If your doctor recommends it, get more exercise. Walking is a good choice. Bit by bit, increase the amount you walk every day. Try for at least 30 minutes on most days of the week. You also may want to swim, bike, or do other activities. Your doctor may suggest that you join a cardiac rehabilitation program so that you can have help increasing your physical activity safely. ? · Start light exercise if your doctor says it is okay. Even a small amount will help you get stronger, have more energy, and manage stress. Walking is an easy way to get exercise. Start out by walking a little more than you did in the hospital. Gradually increase the amount you walk. ? · When you exercise, watch for signs that your heart is working too hard. You are pushing too hard if you cannot talk while you are exercising. If you become short of breath or dizzy or have chest pain, sit down and rest immediately. ? · Check your pulse regularly. Place two fingers on the artery at the palm side of your wrist, in line with your thumb. If your heartbeat seems uneven or fast, talk to your doctor. When should you call for help? Call 911 anytime you think you may need emergency care. For example, call if:  ? · You have symptoms of a heart attack. These may include:  ¨ Chest pain or pressure, or a strange feeling in the chest.  ¨ Sweating. ¨ Shortness of breath. ¨ Nausea or vomiting. ¨ Pain, pressure, or a strange feeling in the back, neck, jaw, or upper belly or in one or both shoulders or arms. ¨ Lightheadedness or sudden weakness. ¨ A fast or irregular heartbeat. After you call 911, the  may tell you to chew 1 adult-strength or 2 to 4 low-dose aspirin. Wait for an ambulance. Do not try to drive yourself.    ? · You have symptoms of a stroke. These may include:  ¨ Sudden numbness, tingling, weakness, or loss of movement in your face, arm, or leg, especially on only one side of your body. ¨ Sudden vision changes. ¨ Sudden trouble speaking. ¨ Sudden confusion or trouble understanding simple statements. ¨ Sudden problems with walking or balance. ¨ A sudden, severe headache that is different from past headaches. ? · You passed out (lost consciousness). ?Call your doctor now or seek immediate medical care if:  ? · You have new or increased shortness of breath. ? · You feel dizzy or lightheaded, or you feel like you may faint. ? · Your heart rate becomes irregular. ? · You can feel your heart flutter in your chest or skip heartbeats. Tell your doctor if these symptoms are new or worse. ? Watch closely for changes in your health, and be sure to contact your doctor if you have any problems. Where can you learn more? Go to http://alyAPImetricsrobert.info/. Enter U020 in the search box to learn more about \"Atrial Fibrillation: Care Instructions. \"  Current as of: September 21, 2016  Content Version: 11.4  © 0243-2501 [employer]. Care instructions adapted under license by [employer] (which disclaims liability or warranty for this information). If you have questions about a medical condition or this instruction, always ask your healthcare professional. [address] any warranty or liability for your use of this information. Vaccine Information Statement    Influenza (Flu) Vaccine (Inactivated or Recombinant): What you need to know    Many Vaccine Information Statements are available in [place] and other languages. See www.immunize.org/vis  Hojas de Información Sobre Vacunas están disponibles en Español y en muchos otros idiomas. Visite www.immunize.org/vis    1. Why get vaccinated?     Influenza (flu) is a contagious disease that spreads around the [place] every year, usually between October and May. Flu is caused by influenza viruses, and is spread mainly by coughing, sneezing, and close contact. Anyone can get flu. Flu strikes suddenly and can last several days. Symptoms vary by age, but can include:   fever/chills   sore throat   muscle aches   fatigue   cough   headache    runny or stuffy nose    Flu can also lead to pneumonia and blood infections, and cause diarrhea and seizures in children. If you have a medical condition, such as heart or lung disease, flu can make it worse. Flu is more dangerous for some people. Infants and young children, people 72years of age and older, pregnant women, and people with certain health conditions or a weakened immune system are at greatest risk. Each year thousands of people in the [facility] die from flu, and many more are hospitalized. Flu vaccine can:   keep you from getting flu,   make flu less severe if you do get it, and   keep you from spreading flu to your family and other people. 2. Inactivated and recombinant flu vaccines    A dose of flu vaccine is recommended every flu season. Children 6 months through 6years of age may need two doses during the same flu season. Everyone else needs only one dose each flu season. Some inactivated flu vaccines contain a very small amount of a mercury-based preservative called thimerosal. Studies have not shown thimerosal in vaccines to be harmful, but flu vaccines that do not contain thimerosal are available. There is no live flu virus in flu shots. They cannot cause the flu. There are many flu viruses, and they are always changing. Each year a new flu vaccine is made to protect against three or four viruses that are likely to cause disease in the upcoming flu season.  But even when the vaccine doesnt exactly match these viruses, it may still provide some protection    Flu vaccine cannot prevent:   flu that is caused by a virus not covered by the vaccine, or   illnesses that look like flu but are not. It takes about 2 weeks for protection to develop after vaccination, and protection lasts through the flu season. 3. Some people should not get this vaccine    Tell the person who is giving you the vaccine:     If you have any severe, life-threatening allergies. If you ever had a life-threatening allergic reaction after a dose of flu vaccine, or have a severe allergy to any part of this vaccine, you may be advised not to get vaccinated. Most, but not all, types of flu vaccine contain a small amount of egg protein.  If you ever had Guillain-Barré Syndrome (also called GBS). Some people with a history of GBS should not get this vaccine. This should be discussed with your doctor.  If you are not feeling well. It is usually okay to get flu vaccine when you have a mild illness, but you might be asked to come back when you feel better. 4. Risks of a vaccine reaction    With any medicine, including vaccines, there is a chance of reactions. These are usually mild and go away on their own, but serious reactions are also possible. Most people who get a flu shot do not have any problems with it. Minor problems following a flu shot include:    soreness, redness, or swelling where the shot was given     hoarseness   sore, red or itchy eyes   cough   fever   aches   headache   itching   fatigue  If these problems occur, they usually begin soon after the shot and last 1 or 2 days. More serious problems following a flu shot can include the following:     There may be a small increased risk of Guillain-Barré Syndrome (GBS) after inactivated flu vaccine. This risk has been estimated at 1 or 2 additional cases per million people vaccinated. This is much lower than the risk of severe complications from flu, which can be prevented by flu vaccine.        Young children who get the flu shot along with pneumococcal vaccine (PCV13) and/or DTaP vaccine at the same time might be slightly more likely to have a seizure caused by fever. Ask your doctor for more information. Tell your doctor if a child who is getting flu vaccine has ever had a seizure. Problems that could happen after any injected vaccine:      People sometimes faint after a medical procedure, including vaccination. Sitting or lying down for about 15 minutes can help prevent fainting, and injuries caused by a fall. Tell your doctor if you feel dizzy, or have vision changes or ringing in the ears.  Some people get severe pain in the shoulder and have difficulty moving the arm where a shot was given. This happens very rarely.  Any medication can cause a severe allergic reaction. Such reactions from a vaccine are very rare, estimated at about 1 in a million doses, and would happen within a few minutes to a few hours after the vaccination. As with any medicine, there is a very remote chance of a vaccine causing a serious injury or death. The safety of vaccines is always being monitored. For more information, visit: www.cdc.gov/vaccinesafety/    5. What if there is a serious reaction? What should I look for?  Look for anything that concerns you, such as signs of a severe allergic reaction, very high fever, or unusual behavior. Signs of a severe allergic reaction can include hives, swelling of the face and throat, difficulty breathing, a fast heartbeat, dizziness, and weakness - usually within a few minutes to a few hours after the vaccination. What should I do?  If you think it is a severe allergic reaction or other emergency that cant wait, call 9-1-1 and get the person to the nearest hospital. Otherwise, call your doctor.  Reactions should be reported to the Vaccine Adverse Event Reporting System (VAERS). Your doctor should file this report, or you can do it yourself through  the VAERS web site at www.vaers. hhs.gov, or by calling 6-868-930-001-241-9468. [facility] does not give medical advice. 6. The National Vaccine Injury Compensation Program    The [facility] Vaccine Injury Compensation Program (VICP) is a federal program that was created to compensate people who may have been injured by certain vaccines. Persons who believe they may have been injured by a vaccine can learn about the program and about filing a claim by calling 6-898.932.3094 or visiting the [employer] website at www.[facility].gov/vaccinecompensation. There is a time limit to file a claim for compensation. 7. How can I learn more?  Ask your healthcare provider. He or she can give you the vaccine package insert or suggest other sources of information.  Call your local or state health department.  Contact the Centers for Disease Control and Prevention (CDC):  - Call 5-697.898.4641 (1-800-CDC-INFO) or  - Visit CDCs website at www.cdc.gov/flu    Vaccine Information Statement   Inactivated Influenza Vaccine   8/7/2015  42 SANAM Agustin [id number]    Department of Health and Human Services  Centers for Disease Control and Prevention    Office Use Only

## 2017-11-15 NOTE — PROGRESS NOTES
Romero Fulton is a 67 y.o. female presenting for Diabetes (6 mo fu)  . 1. Have you been to the ER, urgent care clinic since your last visit? Hospitalized since your last visit? No    2. Have you seen or consulted any other health care providers outside of the [address] [place] [address] since your last visit? Include any pap smears or colon screening. No    Fall Risk Assessment, last 12 mths 11/15/2017   Able to walk? Yes   Fall in past 12 months? No         Abuse Screening Questionnaire 11/15/2017   Do you ever feel afraid of your partner? N   Are you in a relationship with someone who physically or mentally threatens you? N   Is it safe for you to go home?  Y       PHQ over the last two weeks 11/15/2017   Little interest or pleasure in doing things Not at all   Feeling down, depressed or hopeless Not at all   Total Score PHQ 2 0       Medications Discontinued During This Encounter   Medication Reason    insulin NPH (NOVOLIN N) 100 unit/mL injection

## 2017-11-15 NOTE — PROGRESS NOTES
This note will not be viewable in [address] Leonid De La Torre is a 67 y.o. female and presents with Diabetes (6 mo fu)  . Subjective:  Mrs. Nicholas Horton presents to the office today in follow-up of multiple medical problems. Patient has type 2 diabetes mellitus for which she is on Novolin and insulin. She takes her insulin twice daily. She is not checking her blood sugars. She denies hypoglycemia. She has a history of background retinopathy and is seen by an eye specialist on a yearly basis. She denies numbness or burning in her feet. She has hypertension managed on clonidine captopril, amlodipine and Lasix and Coreg. She denies any dizziness, lower extremity edema, muscle cramping, headaches, numbness, tingling. She has had a previous stroke in the past and is hemiplegic on her left side. Patient has paroxysmal atrial fibrillation. Rate has been controlled with Coreg. She is on Coumadin anticoagulation for stroke prevention and is compliant with her monthly protimes. Last INR was 3.1. She has had no new stroke symptoms and denies any bleeding problems. Her dyslipidemia is currently managed on pravastatin therapy. She denies muscle soreness or GI upset. She does have a history of ASCVD but denies exertional chest pains or claudication. She has hypothyroidism currently on thyroid replacement. She denies heat or cold intolerance, changes in skin or hair, her weight has been stable. Patient has chronic pain in the left side of her body related to her previous stroke. She takes tramadol on a chronic basisonce or twice daily. She has had no problems with her medication as far as sedation and there is been no tolerance to the medication over time. The patient does have a benzodiazepine that she has available to take for anxiety but she tries to limit this. The patient is in need of an influenza vaccination. She is up-to-date on her pneumococcal vaccinations.     Past Medical History: Diagnosis Date    Anticoagulant long-term use 10/13/2017    Anxiety 10/13/2017    Atrial fibrillation ([facility] 10/13/2017    Breast calcification, left 10/13/2017    CAD (coronary artery disease)     Cellulitis of both lower extremities 10/13/2017    Chronic kidney disease     kidney stones    Chronic pain syndrome 10/13/2017    Chronic prescription opiate use 11/15/2017    CVA (cerebral vascular accident) ([facility] 10/13/2017    Diabetes ([facility]     DJD (degenerative joint disease) 10/13/2017    Dyslipidemia 10/13/2017    Glaucoma 10/13/2017    Hypertension     Hyperthyroidism 10/13/2017    Long-term use of high-risk medication 10/13/2017    Stasis ulcer of lower extremity ([address] 10/13/2017    Stroke ([facility]     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 ([facility] 8/18/2012    retinopathy      Past Surgical History:   Procedure Laterality Date    CARDIAC SURG PROCEDURE UNLIST      cagb    HX GYN      hysterectomty    HX ORTHOPAEDIC      back surgery     Allergies   Allergen Reactions    Betadine Prepstick Rash     Current Outpatient Prescriptions   Medication Sig Dispense Refill    cloNIDine HCl (CATAPRES) 0.1 mg tablet Take 0.2 mg by mouth every morning.  cloNIDine HCl (CATAPRES) 0.1 mg tablet Take 0.1 mg by mouth nightly.  naloxone (NARCAN) 4 mg/actuation nasal spray Use 1 spray intranasally into 1 nostril. Indications: OPIATE-INDUCED RESPIRATORY DEPRESSION, Opioid Toxicity 1 Each 0    warfarin (COUMADIN) 2 mg tablet Take 2 mg by mouth daily. Indications: 1 1/2 tabs mon, tues, thurs, sat, and 2 tabs wed, sun      latanoprost (XALATAN) 0.005 % ophthalmic solution Administer 1 Drop to both eyes nightly.  captopril (CAPOTEN) 25 mg tablet Take 1 Tab by mouth two (2) times a day. 180 Tab 3    Insulin Syringe-Needle U-100 (BD INSULIN SYRINGE ULT-FINE II) 0.5 mL 31 gauge x 5/16 syrg 1 Each by Does Not Apply route two (2) times a day.  180 Syringe 3    NOVOLIN N 100 unit/mL injection INJECT 44 UNITS UNDER THE SKIN IN THE MORNING AND 36 UNITS IN THE EVENING (Patient taking differently: INJECT 45 UNITS UNDER THE SKIN IN THE MORNING AND 40 UNITS IN THE EVENING) 80 mL 2    traMADol (ULTRAM) 50 mg tablet Take 1 Tab by mouth four (4) times daily. Max Daily Amount: 200 mg. Indications: Pain 360 Tab 0    LORazepam (ATIVAN) 0.5 mg tablet Take 1 Tab by mouth nightly as needed. Max Daily Amount: 0.5 mg. 90 Tab 0    amLODIPine (NORVASC) 10 mg tablet Take 10 mg by mouth daily.  aspirin (ASPIRIN) 325 mg tablet Take 1 Tab by mouth daily.  pravastatin (PRAVACHOL) 80 mg tablet Take 1 Tab by mouth nightly. 30 Tab 0    furosemide (LASIX) 40 mg tablet Take 40 mg by mouth daily.  carvedilol (COREG) 12.5 mg tablet Take 12.5 mg by mouth two (2) times daily (with meals).        Social History     Social History    Marital status:      Spouse name: N/A    Number of children: N/A    Years of education: N/A     Social History Main Topics    Smoking status: Never Smoker    Smokeless tobacco: Never Used    Alcohol use No    Drug use: No    Sexual activity: Not Asked     Other Topics Concern    None     Social History Narrative     Family History   Problem Relation Age of Onset    Heart Disease Father        Health Maintenance   Topic Date Due    HEMOGLOBIN A1C Q6M  1945    FOOT EXAM Q1  04/08/1954    MICROALBUMIN Q1  04/08/1954    EYE EXAM RETINAL OR DILATED Q1  04/08/1954    DTaP/Tdap/Td series (1 - Tdap) 04/08/1965    FOBT Q 1 YEAR AGE 50-75  04/08/1994    ZOSTER VACCINE AGE 60>  02/08/2004    GLAUCOMA SCREENING Q2Y  04/08/2009    OSTEOPOROSIS SCREENING (DEXA)  04/08/2009    MEDICARE YEARLY EXAM  04/08/2009    LIPID PANEL Q1  08/19/2013    BREAST CANCER SCRN MAMMOGRAM  11/04/2016    Influenza Age 9 to Adult  08/01/2017    Pneumococcal 65+ Low/Medium Risk  Completed        Review of Systems  Constitutional: negative for fevers, chills, anorexia and weight loss  Eyes:   negative for visual disturbance and irritation  ENT:   negative for tinnitus,sore throat,nasal congestion,ear pain,hoarseness  Respiratory:  negative for cough, hemoptysis, dyspnea,wheezing  CV:   negative for chest pain, palpitations, lower extremity edema  GI:   negative for nausea, vomiting, diarrhea, abdominal pain,melena  Endo:               negative for polyuria,polydipsia,polyphagia,heat intolerance  Genitourinary: negative for frequency, dysuria and hematuria  Integumentary: negative for rash and pruritus  Hematologic:  negative for easy bruising and gum/nose bleeding  Musculoskel: negative for myalgias, arthralgias, back pain, muscle weakness, joint pain  Neurological:  negative for headaches, dizziness, vertigo, memory problems. Positive for chronic left-sided weakness and gait abnormality requiring wheelchair. Behavl/Psych: negative for feelings of anxiety, depression, mood changes  ROS otherwise negative      Objective:  Visit Vitals    /80 (BP 1 Location: Right arm, BP Patient Position: Sitting)    Pulse 72    Ht 5' 4\" (1.626 m)    Wt 207 lb (93.9 kg)    SpO2 96%    BMI 35.53 kg/m2     Body mass index is 35.53 kg/(m^2). Physical Exam:   General appearance - alert, well appearing, and in no distress  Mental status - alert, oriented to person, place, and time  EYE-NCIK, EOMI,conjunctiva normal bilaterally, lids normal  ENT-ENT exam normal, no neck nodes or sinus tenderness  Nose - normal and patent, no erythema,  Or discharge   Mouth - mucous membranes moist, pharynx normal without lesions  Neck - supple, no significant adenopathy or bruit  Chest - clear to auscultation, no wheezes, rales or rhonchi.   Heart - normal rate, regular rhythm, normal S1, S2, no murmurs, rubs, clicks or gallops   Abdomen - soft, nontender, nondistended, no masses or organomegaly  Lymph- no adenopathy palpable  Ext-peripheral pulses normal, no pedal edema, no clubbing or cyanosis  Skin-Warm and dry. no hyperpigmentation, vitiligo, or suspicious lesions  Neuro -alert, oriented, normal speech, chronic left hemiplegia present without change  Assessment/Plan:  Diagnoses and all orders for this visit:    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 ([facility]  -     AMB POC HEMOGLOBIN A1C  -     [place] COLLECTION VENOUS BLOOD,VENIPUNCTURE  -     AMB POC URINE, MICROALBUMIN, SEMIQUANT (1 RESULT)    Essential hypertension, benign  -     AMB POC COMPLETE CBC,AUTOMATED ENTER  -     AMB POC COMPREHENSIVE METABOLIC PANEL  -     AMB POC URINALYSIS DIP STICK AUTO W/ MICRO     Acquired hypothyroidism  -     AMB POC T4, FREE  -     AMB POC TSH    Dyslipidemia  -     AMB POC HEMOGLOBIN A1C  -     AMB POC LIPID PROFILE    PAF (paroxysmal atrial fibrillation) (HCC)    Long-term use of high-risk medication  -     AMB POC CK (CPK)    Anticoagulant long-term use  -     AMB POC PT/INR    Cerebrovascular accident (CVA), unspecified mechanism (HCC)    Chronic prescription opiate use  -     TOXASSURE SELECT 13 (MW)  -     naloxone (NARCAN) 4 mg/actuation nasal spray; Use 1 spray intranasally into 1 nostril. Indications: OPIATE-INDUCED RESPIRATORY DEPRESSION, Opioid Toxicity, Normal, Disp-1 Each, R-0    Encounter for immunization  -     Influenza virus vaccine ([address]) 65 years and older ([address])  -     Administration fee () for Medicare insured patients        Other instructions: The patient's medications are reviewed and reconciled. No change in her current medical regimen is made. A prescription for Narcan is given due to the usage of tramadol and lorazepam.    A prudent, no added salt diet is encouraged    Await results of multiple labs    High-dose influenza vaccination is given    Narcotics contract previously presented to patient reviewed    Narcon prescription given    Urine drug screen obtained. [place] prescription drug management program was accessed during the office visit and appears appropriate. Morphine milligram equivalents (MME) = 20.    45 minute treatment today as a result of extra time explaining to patient recent laws governing opiate prescriptions, reviewing signed opiate contract, obtaining urine drug screen and accessing the pharmacy management program.    The patient understands she will need to be seen every 3 months to continue  their opiate refills. Follow-up Disposition:  Return in about 3 months (around 2/15/2018). I have reviewed with the patient details of the assessment and plan and all questions were answered. Relevent patient education was performed. The most recent lab findings were reviewed with the patient. An After Visit Summary was printed and given to the patient.     Ingrid Edward MD

## 2017-11-17 LAB — BACTERIA UR CULT: ABNORMAL

## 2017-11-21 LAB — DRUGS UR: NORMAL

## 2017-11-22 ENCOUNTER — TELEPHONE (OUTPATIENT)
Dept: INTERNAL MEDICINE CLINIC | Age: 72
End: 2017-11-22

## 2017-11-22 ENCOUNTER — TELEPHONE ANTICOAG (OUTPATIENT)
Dept: INTERNAL MEDICINE CLINIC | Age: 72
End: 2017-11-22

## 2017-11-22 DIAGNOSIS — I48.0 PAF (PAROXYSMAL ATRIAL FIBRILLATION) (HCC): Primary | ICD-10-CM

## 2017-11-22 RX ORDER — CIPROFLOXACIN 250 MG/1
250 TABLET, FILM COATED ORAL 2 TIMES DAILY
Qty: 14 TAB | Refills: 0 | Status: SHIPPED | OUTPATIENT
Start: 2017-11-22 | End: 2017-11-29

## 2017-11-22 RX ORDER — FUROSEMIDE 40 MG/1
TABLET ORAL
Qty: 90 TAB | Refills: 2 | Status: SHIPPED | OUTPATIENT
Start: 2017-11-22 | End: 2018-08-19 | Stop reason: SDUPTHER

## 2017-11-22 NOTE — PROGRESS NOTES
Has UTI - will send rx for cipro to her pharmacy  INR 2.2 - no change in coumadin. Recheck PT 1 month   with A1c of 9.0 - increase humulin N to 44 units BID and recheck FBS 2 weeks  Thyroid level high - make sure she is not taking any thyroid medication at home.  Needs nuclear medicine thyroid scan and uptake scheduled if she is agreeable to having this done

## 2017-11-22 NOTE — TELEPHONE ENCOUNTER
Patient states she stopped taking her thyroid medication because she ran out and she thought she was told to stop it.

## 2017-11-22 NOTE — TELEPHONE ENCOUNTER
----- Message from Amara Alonso MD sent at 11/22/2017 11:44 AM EST -----  Has UTI - will send rx for cipro to her pharmacy  INR 2.2 - no change in coumadin. Recheck PT 1 month   with A1c of 9.0 - increase humulin N to 44 units BID and recheck FBS 2 weeks  Thyroid level high - make sure she is not taking any thyroid medication at home.  Needs nuclear medicine thyroid scan and uptake scheduled if she is agreeable to having this done

## 2017-11-22 NOTE — PROGRESS NOTES
Patient notified of lab results, states she thought she was supposed to stop her thyroid med so she stopped she hasn't been taking it.

## 2017-11-22 NOTE — PROGRESS NOTES
Anticoagulation Episode Summary     Current INR goal 2.0-3.0   Next INR check 12/20/2017   INR from last check 2.2 (11/15/2017)   Weekly max dose    Target end date Indefinite   INR check location    Preferred lab    Send INR reminders to     Indications   PAF [facility]oxysmal [facility] fibrillation) ([facility] (Primary) [I48.0]           Comments          Anticoagulation Care Providers     Provider Role Specialty Phone number    Isreal Farai MD Responsible Internal Medicine [phone]        Patient was advised to continue same dose and re check protime in 1 month

## 2017-11-24 NOTE — TELEPHONE ENCOUNTER
Thyroid levels high - if she has been off it for some time and hasn't seen an endocrinologist, we need to schedule her to have this evaluated

## 2017-12-06 ENCOUNTER — LAB ONLY (OUTPATIENT)
Dept: INTERNAL MEDICINE CLINIC | Age: 72
End: 2017-12-06

## 2017-12-06 DIAGNOSIS — E11.3299 TYPE 2 DIABETES MELLITUS WITH BACKGROUND RETINOPATHY (HCC): Primary | Chronic | ICD-10-CM

## 2017-12-06 LAB — GLUCOSE POC: 94 MG/DL (ref 65–105)

## 2017-12-06 RX ORDER — PRAVASTATIN SODIUM 80 MG/1
80 TABLET ORAL
Qty: 90 TAB | Refills: 3 | Status: SHIPPED | COMMUNITY
Start: 2017-12-06 | End: 2018-11-13 | Stop reason: SDUPTHER

## 2017-12-06 NOTE — TELEPHONE ENCOUNTER
Requested Prescriptions     Pending Prescriptions Disp Refills    pravastatin (PRAVACHOL) 80 mg tablet 90 Tab 3     Sig: Take 1 Tab by mouth nightly.

## 2017-12-07 NOTE — PROGRESS NOTES
Fasting blood sugar was 94. No change in insulin dose.   Recheck fasting blood sugar and A1c in 3 months

## 2017-12-11 RX ORDER — CARVEDILOL 12.5 MG/1
TABLET ORAL
Qty: 180 TAB | Refills: 1 | Status: SHIPPED | OUTPATIENT
Start: 2017-12-11 | End: 2018-05-22 | Stop reason: SDUPTHER

## 2017-12-11 NOTE — TELEPHONE ENCOUNTER
Requested Prescriptions     Pending Prescriptions Disp Refills    carvedilol (COREG) 12.5 mg tablet [Pharmacy Med Name: CARVEDILOL TABS 12.5MG] 180 Tab 1     Sig: TAKE 1 TABLET TWICE A DAY       Last Refill: 9-13-17  Last visit:11/15/2017

## 2017-12-20 ENCOUNTER — LAB ONLY (OUTPATIENT)
Dept: INTERNAL MEDICINE CLINIC | Age: 72
End: 2017-12-20

## 2017-12-20 DIAGNOSIS — I48.91 ATRIAL FIBRILLATION, UNSPECIFIED TYPE (HCC): Primary | ICD-10-CM

## 2017-12-20 LAB
INR BLD: 2.3
INR, EXTERNAL: 2.3
INR, EXTERNAL: 2.3
PT POC: 31 SECONDS
VALID INTERNAL CONTROL?: YES

## 2017-12-21 ENCOUNTER — TELEPHONE ANTICOAG (OUTPATIENT)
Dept: INTERNAL MEDICINE CLINIC | Age: 72
End: 2017-12-21

## 2017-12-21 DIAGNOSIS — I48.0 PAF (PAROXYSMAL ATRIAL FIBRILLATION) (HCC): Primary | ICD-10-CM

## 2017-12-21 NOTE — PROGRESS NOTES
Anticoagulation Episode Summary     Current INR goal 2.0-3.0   Next INR check 1/22/2018   INR from last check 2.3 (12/20/2017)   Weekly max dose    Target end date Indefinite   INR check location    Preferred lab    Send INR reminders to     Indications   [facility] (Primary) [I48.0]           Comments          Anticoagulation Care Providers     Provider Role Specialty Phone number    Susan Greenfield MD Responsible Internal Medicine [phone]        Patient advised no change in coumadin and re check PT in 1 month

## 2018-01-07 ENCOUNTER — HOSPITAL ENCOUNTER (EMERGENCY)
Age: 73
Discharge: HOME | End: 2018-01-07
Attending: EMERGENCY MEDICINE
Payer: MEDICARE

## 2018-01-07 VITALS
WEIGHT: 205 LBS | HEIGHT: 64 IN | RESPIRATION RATE: 16 BRPM | SYSTOLIC BLOOD PRESSURE: 154 MMHG | DIASTOLIC BLOOD PRESSURE: 72 MMHG | BODY MASS INDEX: 35 KG/M2 | OXYGEN SATURATION: 98 %

## 2018-01-07 DIAGNOSIS — E16.2 HYPOGLYCEMIA: Primary | ICD-10-CM

## 2018-01-07 LAB
ANION GAP BLD CALC-SCNC: 16 MMOL/L (ref 5–15)
APPEARANCE UR: CLEAR
BACTERIA URNS QL MICRO: NEGATIVE /HPF
BILIRUB UR QL: NEGATIVE
BUN BLD-MCNC: 21 MG/DL (ref 9–20)
CA-I BLD-MCNC: 1.02 MMOL/L (ref 1.12–1.32)
CHLORIDE BLD-SCNC: 101 MMOL/L (ref 98–107)
CO2 BLD-SCNC: 28 MMOL/L (ref 21–32)
COLOR UR: ABNORMAL
CREAT BLD-MCNC: 0.7 MG/DL (ref 0.6–1.3)
EPITH CASTS URNS QL MICRO: ABNORMAL /LPF
GLUCOSE BLD STRIP.AUTO-MCNC: 120 MG/DL (ref 65–100)
GLUCOSE BLD-MCNC: 128 MG/DL (ref 65–100)
GLUCOSE UR STRIP.AUTO-MCNC: NEGATIVE MG/DL
HCT VFR BLD CALC: 42 % (ref 35–47)
HGB BLD-MCNC: 14.3 GM/DL (ref 11.5–16)
HGB UR QL STRIP: ABNORMAL
KETONES UR QL STRIP.AUTO: NEGATIVE MG/DL
LEUKOCYTE ESTERASE UR QL STRIP.AUTO: ABNORMAL
NITRITE UR QL STRIP.AUTO: NEGATIVE
PH UR STRIP: 8 [PH] (ref 5–8)
POTASSIUM BLD-SCNC: 5.3 MMOL/L (ref 3.5–5.1)
PROT UR STRIP-MCNC: 30 MG/DL
RBC #/AREA URNS HPF: ABNORMAL /HPF (ref 0–5)
SERVICE CMNT-IMP: ABNORMAL
SERVICE CMNT-IMP: ABNORMAL
SODIUM BLD-SCNC: 140 MMOL/L (ref 136–145)
SP GR UR REFRACTOMETRY: 1.01 (ref 1–1.03)
UA: UC IF INDICATED,UAUC: ABNORMAL
UROBILINOGEN UR QL STRIP.AUTO: 1 EU/DL (ref 0.2–1)
WBC URNS QL MICRO: ABNORMAL /HPF (ref 0–4)

## 2018-01-07 PROCEDURE — 80047 BASIC METABLC PNL IONIZED CA: CPT

## 2018-01-07 PROCEDURE — 99285 EMERGENCY DEPT VISIT HI MDM: CPT

## 2018-01-07 PROCEDURE — 82962 GLUCOSE BLOOD TEST: CPT

## 2018-01-07 PROCEDURE — 81001 URINALYSIS AUTO W/SCOPE: CPT | Performed by: EMERGENCY MEDICINE

## 2018-01-07 NOTE — ED TRIAGE NOTES
Pt found unresponive at home, family called EMS. Upon arrival pt BS 41. Pt only withdrawing from pain. 25 grams of D50 given. IV to (R) hand.

## 2018-01-07 NOTE — ED NOTES
Pt became alert and oriented x 4 upon transferring to bed. assisted on to bedpan. Family at bedside. Pt manages own insulin. Pt does not check her BS, reports she has a phobia of needles.

## 2018-01-07 NOTE — ED PROVIDER NOTES
EMERGENCY DEPARTMENT HISTORY AND PHYSICAL EXAM      Date: 1/7/2018  Patient Name: Dong Kennedy    History of Presenting Illness     Chief Complaint   Patient presents with   Vandana Mckeon     pt is a known diabetic, pt found lethargic/unrepsonsive. upon EMS arrival bs was 39    Low Blood Sugar       History Provided By: Patient, EMS and pt's family    HPI: Dong Kennedy, 67 y.o. female with PMHx significant for HTN, HLD, DM, CAD, afib, and CKD, presents via EMS to the ED for evaluation after being found unresponsive by her family PTA. Per family, the patient was found murmuring and mumbling upon EMS arrival. EMS states that they evaluated her BG, and it was 42. EMS states that the patient received half an amp of D50 en route, and is now communicative. The patient reports that she takes 45 units of Humalog in the morning and 40 units of Humalog at night. She notes that she ate a late breakfast this morning, but she denies eating lunch. She notes a history of chronic UTIs. She denies any recent changes to her medications since 11/24/2017 or recent illnesses. She specifically denies urinary symptoms, nausea, vomiting, diarrhea, chest pain, abdominal pain, or other complaints at this time. The patient's history is limited at this time secondary to her recent unresponsive episode. PCP: Valdemar Holder MD    Current Outpatient Prescriptions   Medication Sig Dispense Refill    carvedilol (COREG) 12.5 mg tablet TAKE 1 TABLET TWICE A  Tab 1    pravastatin (PRAVACHOL) 80 mg tablet Take 1 Tab by mouth nightly. 90 Tab 3    furosemide (LASIX) 40 mg tablet TAKE 1 TABLET DAILY 90 Tab 2    naloxone (NARCAN) 4 mg/actuation nasal spray Use 1 spray intranasally into 1 nostril. Indications: OPIATE-INDUCED RESPIRATORY DEPRESSION, Opioid Toxicity 1 Each 0    warfarin (COUMADIN) 2 mg tablet Take 2 mg by mouth daily.  Indications: 1 1/2 tabs mon, tues, wed,thurs, sat, and 2 tabs wed, sun      latanoprost (XALATAN) 0.005 % ophthalmic solution Administer 1 Drop to both eyes nightly.  captopril (CAPOTEN) 25 mg tablet Take 1 Tab by mouth two (2) times a day. 180 Tab 3    Insulin Syringe-Needle U-100 (BD INSULIN SYRINGE ULT-FINE II) 0.5 mL 31 gauge x 5/16 syrg 1 Each by Does Not Apply route two (2) times a day. 180 Syringe 3    NOVOLIN N 100 unit/mL injection INJECT 44 UNITS UNDER THE SKIN IN THE MORNING AND 36 UNITS IN THE EVENING (Patient taking differently: INJECT 45 UNITS UNDER THE SKIN IN THE MORNING AND 40 UNITS IN THE EVENING) 80 mL 2    traMADol (ULTRAM) 50 mg tablet Take 1 Tab by mouth four (4) times daily. Max Daily Amount: 200 mg. Indications: Pain 360 Tab 0    LORazepam (ATIVAN) 0.5 mg tablet Take 1 Tab by mouth nightly as needed. Max Daily Amount: 0.5 mg. 90 Tab 0    amLODIPine (NORVASC) 10 mg tablet Take 10 mg by mouth daily.  aspirin (ASPIRIN) 325 mg tablet Take 1 Tab by mouth daily.          Past History     Past Medical History:  Past Medical History:   Diagnosis Date    Anticoagulant long-term use 10/13/2017    Anxiety 10/13/2017    Atrial fibrillation ([address] 10/13/2017    Breast calcification, left 10/13/2017    CAD (coronary artery disease)     Cellulitis of both lower extremities 10/13/2017    Chronic kidney disease     kidney stones    Chronic pain syndrome 10/13/2017    Chronic prescription opiate use 11/15/2017    CVA (cerebral vascular accident) ([address] 10/13/2017    Diabetes ([address]     DJD (degenerative joint disease) 10/13/2017    Dyslipidemia 10/13/2017    Glaucoma 10/13/2017    Hypertension     Hyperthyroidism 10/13/2017    Long-term use of high-risk medication 10/13/2017    Stasis ulcer of lower extremity ([address] 10/13/2017    Stroke ([address]     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 ([address] 8/18/2012    retinopathy        Past Surgical History:  Past Surgical History:   Procedure Laterality Date    CARDIAC SURG PROCEDURE UNLIST      cagb    HX GYN hysterectomty    HX ORTHOPAEDIC      back surgery       Family History:  Family History   Problem Relation Age of Onset    Heart Disease Father        Social History:  Social History   Substance Use Topics    Smoking status: Never Smoker    Smokeless tobacco: Never Used    Alcohol use No       Allergies: Allergies   Allergen Reactions    Betadine Prepstick Rash       Review of Systems   Review of Systems   Unable to perform ROS: Patient unresponsive   The pt was found unresponsive by family PTA. Physical Exam   Physical Exam   Constitutional: She is oriented to person, place, and time. She appears well-developed and well-nourished. HENT:   Head: Normocephalic and atraumatic. Mouth/Throat: Mucous membranes are normal.   Eyes: EOM are normal. Pupils are equal, round, and reactive to light. Neck: Normal range of motion. No JVD present. No tracheal deviation present. Cardiovascular: Normal rate, regular rhythm, normal heart sounds and intact distal pulses. Exam reveals no gallop and no friction rub. No murmur heard. Pulmonary/Chest: Effort normal and breath sounds normal. No stridor. She has no wheezes. She has no rales. Abdominal: Soft. Bowel sounds are normal. She exhibits no distension and no mass. There is no tenderness. There is no guarding. Musculoskeletal: Normal range of motion. She exhibits no edema or tenderness. Neurological: She is alert and oriented to person, place, and time. Skin: Skin is warm and dry. No rash noted. Psychiatric: She has a normal mood and affect.  Her behavior is normal. Judgment and thought content normal.       Diagnostic Study Results   Labs -     Recent Results (from the past 12 hour(s))   URINALYSIS W/ REFLEX CULTURE    Collection Time: 01/07/18  6:27 PM   Result Value Ref Range    Color YELLOW/STRAW      Appearance CLEAR CLEAR      Specific gravity 1.009 1.003 - 1.030      pH (UA) 8.0 5.0 - 8.0      Protein 30 (A) NEG mg/dL    Glucose NEGATIVE  NEG mg/dL    Ketone NEGATIVE  NEG mg/dL    Bilirubin NEGATIVE  NEG      Blood LARGE (A) NEG      Urobilinogen 1.0 0.2 - 1.0 EU/dL    Nitrites NEGATIVE  NEG      Leukocyte Esterase SMALL (A) NEG      WBC 0-4 0 - 4 /hpf    RBC  0 - 5 /hpf    Epithelial cells MODERATE (A) FEW /lpf    Bacteria NEGATIVE  NEG /hpf    UA:UC IF INDICATED CULTURE NOT INDICATED BY UA RESULT CNI     POC CHEM8    Collection Time: 01/07/18  6:39 PM   Result Value Ref Range    Calcium, ionized (POC) 1.02 (L) 1.12 - 1.32 MMOL/L    Sodium (POC) 140 136 - 145 MMOL/L    Potassium (POC) 5.3 (H) 3.5 - 5.1 MMOL/L    Chloride (POC) 101 98 - 107 MMOL/L    CO2 (POC) 28 21 - 32 MMOL/L    Anion gap (POC) 16 (H) 5 - 15 mmol/L    Glucose (POC) 128 (H) 65 - 100 MG/DL    BUN (POC) 21 (H) 9 - 20 MG/DL    Creatinine (POC) 0.7 0.6 - 1.3 MG/DL    GFRAA, POC >60 >60 ml/min/1.73m2    GFRNA, POC >60 >60 ml/min/1.73m2    Hemoglobin (POC) 14.3 11.5 - 16.0 GM/DL    Hematocrit (POC) 42 35.0 - 47.0 %    Comment Comment Not Indicated. GLUCOSE, POC    Collection Time: 01/07/18  6:51 PM   Result Value Ref Range    Glucose (POC) 120 (H) 65 - 100 mg/dL    Performed by TIGRE Tom [address] Making   I am the first provider for this patient. I reviewed the vital signs, available nursing notes, past medical history, past surgical history, family history and social history. Vital Signs-Reviewed the patient's vital signs.   Patient Vitals for the past 12 hrs:   Temp Resp BP SpO2   01/07/18 1915 - - 154/72 98 %   01/07/18 1900 - - 153/60 97 %   01/07/18 1856 - - - 97 %   01/07/18 1847 - - 141/61 94 %   01/07/18 1830 - - 151/51 97 %   01/07/18 1815 - - 162/60 98 %   01/07/18 1800 - - 146/52 98 %   01/07/18 1755 - - - 99 %   01/07/18 1745 - - 177/69 97 %   01/07/18 1730 - - 164/63 98 %   01/07/18 1719 - 16 - -   01/07/18 1715 - - 154/78 98 %   01/07/18 1708 - - - 98 %   01/07/18 1700 - - 150/62 97 %   01/07/18 1650 - - 145/67 -       Records Reviewed: Nursing Notes, Old Medical Records and Previous Laboratory Studies    Provider Notes (Medical Decision Making):   DDx: hypoglycemia, decreased oral intake, electrolyte abnormality, increased exogenous insulin    ED Course:   Initial assessment performed. The patients presenting problems have been discussed, and they are in agreement with the care plan formulated and outlined with them. I have encouraged them to ask questions as they arise throughout their visit. Progress Note:  6:53 PM  The patient's BG is 120 at this time. Pt's lab work is negative int he ED. Pending UA at this time. Written by Zaria Cortes ED Scribe, as dictated by Maine Preciado DO. Progress Note:  7:30 PM  The patient was re-evaluated, and she states that she is ready for discharge. Pt was recommended to eat regular meals and evaluate her BG frequently. Pt's UA is negative. Written by MICAELA Bullockibe, as dictated by Maine Preciado DO. Critical Care Time: 0 minutes    Discharge Note:  7:16 PM  The pt is ready for discharge. The pt's signs, symptoms, diagnosis, and discharge instructions have been discussed and pt has conveyed their understanding. The pt is to follow up as recommended or return to ER should their symptoms worsen. Plan has been discussed and pt is in agreement. PLAN:  1. Current Discharge Medication List        2. Follow-up Information     Follow up With Details [id number] Saqib Santana MD   [address] [place] [address]  [phone]      [facility] EMERGENCY DEPT  As needed, If symptoms worsen [address] [facility] [address]  [phone]        Return to ED if worse     Diagnosis     Clinical Impression:   1. Hypoglycemia        Attestations: This note is prepared by Zaria Cortes, acting as a Scribe for Maine Preciado DO.     Maine Preciado DO: The scribe's documentation has been prepared under my direction and personally reviewed by me in its entirety. I confirm that the notes above accurately reflects all work, treatment, procedures, and medical decision making performed by me.

## 2018-01-08 NOTE — DISCHARGE INSTRUCTIONS
Learning About Low Blood Sugar (Hypoglycemia) in Diabetes  What is low blood sugar (hypoglycemia)? Hypoglycemia means that your blood sugar is low and your body (especially your brain) is not getting enough fuel. If you have diabetes, your blood sugar can go too low if you take too much of some diabetes medicines. It can also go too low if you miss a meal. And it can happen if you exercise too hard without eating enough food. Some medicines used to treat other health problems can cause low blood sugar too. What are the symptoms? Symptoms of low blood sugar can start quickly. It may take just 10 to 15 minutes. If you have had diabetes for many years, you may not realize that your blood sugar is low until it drops very low. · If your blood sugar level drops below 70 (mild low blood sugar), you may feel tired, anxious, dizzy, weak, shaky, or sweaty. You may have a fast heartbeat or blurry vision. · If your blood sugar level continues to drop (usually below 40), your behavior may change. You may feel more irritable. You may find it hard to concentrate or talk. And you may feel unsteady when you stand or walk. You may become too weak or confused to eat something with sugar to raise your blood sugar level. · If your blood sugar level drops very low (usually below 20), you may pass out (lose consciousness). Or you may have a seizure or stroke. If you have symptoms of severe low blood sugar, you need to get medical care right away. If you had a low blood sugar level during the night, you may wake up tired or with a headache. Or you may sweat so much during the night that your pajamas or sheets are damp when you wake up. How is low blood sugar treated? You can treat low blood sugar by eating or drinking something that has 15 grams of carbohydrate. These should be quick-sugar foods.  Check your blood sugar level again 15 minutes after having a quick-sugar food to make sure your level is getting back to your target range. Here are examples of quick-sugar foods that have 15 grams of carbohydrate:  · 3 to 4 glucose tablets  · 1 tube of glucose gel  · Hard candy (such as 3 Jolly Ranchers or 5 to 7 Life Savers)  · 1 tablespoon honey  · 2 tablespoons of raisins  · ½ cup to ¾ cup (4 to 6 ounces) of fruit juice or regular (not diet) soda  · 1 tablespoon of sugar  · 1 cup of fat-free milk  If you have problems with severe low blood sugar, someone else may have to give you a shot of glucagon. This is a hormone that raises blood sugar levels quickly. How can you prevent low blood sugar? You can take steps to prevent low blood sugar. · Follow your treatment plan. Take your insulin or other diabetes medicine exactly as your doctor prescribed it. Talk with your doctor if you're having low blood sugar often. Your medicine may need to be adjusted if it's causing your low blood sugar. · Check your blood sugar levels often. This helps you find early changes before an emergency happens. · Keep a quick-sugar food with you in case your blood sugar level drops low. · Eat small meals more often so that you don't get too hungry between meals. Don't skip meals. · Balance extra exercise with eating more. Check your blood sugar and learn how it changes after exercise. If your blood sugar stays at a normal level, you may not need to eat after you exercise. · Limit how much alcohol you drink. Alcohol can make low blood sugar go even lower. Don't drink alcohol if you have problems recognizing the early signs of low blood sugar. · Keep a diary of your symptoms. This helps you learn when changes in your body may signal low blood sugar. And keep track of how often you have low blood sugar, including when you last ate and what you ate. This will help you learn what causes your blood sugar to drop. · Learn about diabetes and low blood sugar.  Support groups or a diabetes education center can help you understand how medicines, diet, and exercise affect your blood sugar levels. Since low blood sugar levels can quickly become an emergency, be sure to wear medical alert jewelry, such as a medical alert bracelet. This is to let people know you have diabetes so they can get help for you. You can buy this at most drugstores. And make sure your family, friends, and coworkers know the symptoms of low blood sugar. Teach them what to do to get your sugar level up. Follow-up care is a key part of your treatment and safety. Be sure to make and go to all appointments, and call your doctor if you are having problems. It's also a good idea to know your test results and keep a list of the medicines you take. Where can you learn more? Go to http://aly-robert.info/. Enter M418 in the search box to learn more about \"Learning About Low Blood Sugar (Hypoglycemia) in Diabetes. \"  Current as of: March 13, 2017  Content Version: 11.4  © 0119-7375 Healthwise, Incorporated. Care instructions adapted under license by [employer] (which disclaims liability or warranty for this information). If you have questions about a medical condition or this instruction, always ask your healthcare professional. [address] any warranty or liability for your use of this information.

## 2018-01-10 DIAGNOSIS — E11.3299 DIABETES MELLITUS WITH BACKGROUND RETINOPATHY (HCC): Primary | ICD-10-CM

## 2018-01-11 RX ORDER — INSULIN PUMP SYRINGE, 3 ML
EACH MISCELLANEOUS
Qty: 1 KIT | Refills: 0 | Status: ON HOLD | OUTPATIENT
Start: 2018-01-11 | End: 2022-07-29

## 2018-01-11 RX ORDER — LANCETS 28 GAUGE
EACH MISCELLANEOUS
Qty: 100 LANCET | Refills: 3 | Status: SHIPPED | OUTPATIENT
Start: 2018-01-11 | End: 2020-10-05 | Stop reason: SDUPTHER

## 2018-01-11 NOTE — TELEPHONE ENCOUNTER
Diagnosis needs to be on script      Requested Prescriptions     Pending Prescriptions Disp Refills    Blood-Glucose Meter (FREESTYLE FREEDOM LITE) monitoring kit 1 Kit 0     Sig: Daily blood sugar checks    lancets (FREESTYLE LANCETS) 28 gauge misc 100 Lancet 3     Sig: Daily blood sugar checks

## 2018-02-15 ENCOUNTER — TELEPHONE ANTICOAG (OUTPATIENT)
Dept: INTERNAL MEDICINE CLINIC | Age: 73
End: 2018-02-15

## 2018-02-15 ENCOUNTER — OFFICE VISIT (OUTPATIENT)
Dept: INTERNAL MEDICINE CLINIC | Age: 73
End: 2018-02-15

## 2018-02-15 VITALS
DIASTOLIC BLOOD PRESSURE: 64 MMHG | HEART RATE: 68 BPM | SYSTOLIC BLOOD PRESSURE: 118 MMHG | HEIGHT: 64 IN | OXYGEN SATURATION: 94 %

## 2018-02-15 DIAGNOSIS — I63.9 CEREBROVASCULAR ACCIDENT (CVA), UNSPECIFIED MECHANISM (HCC): Primary | Chronic | ICD-10-CM

## 2018-02-15 DIAGNOSIS — Z79.891 CHRONIC PRESCRIPTION OPIATE USE: Chronic | ICD-10-CM

## 2018-02-15 DIAGNOSIS — I48.0 PAF (PAROXYSMAL ATRIAL FIBRILLATION) (HCC): Primary | ICD-10-CM

## 2018-02-15 DIAGNOSIS — Z79.01 ANTICOAGULANT LONG-TERM USE: Chronic | ICD-10-CM

## 2018-02-15 DIAGNOSIS — F41.9 ANXIETY: ICD-10-CM

## 2018-02-15 DIAGNOSIS — G89.4 CHRONIC PAIN SYNDROME: ICD-10-CM

## 2018-02-15 DIAGNOSIS — I48.0 PAF (PAROXYSMAL ATRIAL FIBRILLATION) (HCC): Chronic | ICD-10-CM

## 2018-02-15 LAB
INR BLD: 2.6
INR, EXTERNAL: 2.6
PT POC: 34.6 SECONDS
VALID INTERNAL CONTROL?: YES

## 2018-02-15 RX ORDER — TRAMADOL HYDROCHLORIDE 50 MG/1
50 TABLET ORAL 4 TIMES DAILY
Qty: 360 TAB | Refills: 0 | Status: SHIPPED | OUTPATIENT
Start: 2018-02-15 | End: 2018-05-15 | Stop reason: SDUPTHER

## 2018-02-15 RX ORDER — LORAZEPAM 0.5 MG/1
0.5 TABLET ORAL
Qty: 90 TAB | Refills: 0 | Status: SHIPPED | OUTPATIENT
Start: 2018-02-15 | End: 2018-05-15 | Stop reason: SDUPTHER

## 2018-02-15 NOTE — PROGRESS NOTES
Anticoagulation Episode Summary     Current INR goal 2.0-3.0   Next INR check 3/15/2018   INR from last check 2.6 (2/15/2018)   Weekly max dose    Target end date Indefinite   INR check location    Preferred lab    Send INR reminders to     Indications   PAF (paroxysmal atrial fibrillation) ([facility] (Primary) [I48.0]           Comments          Anticoagulation Care Providers     Provider Role Specialty Phone number    Eamon Dudley MD Responsible Internal Medicine [phone]        Patient notified no changes in her coumadin and advised to re check her protime in 1 month

## 2018-02-15 NOTE — MR AVS SNAPSHOT
[address] [place] [address] 70 [address] [address] [phone] Patient: Olamide Cantrell MRN: [id number] :1945 Visit Information Date & Time Provider Department Dept. Phone Encounter #  
 2/15/2018 11:00 AM Maurice Palacios [id number] [phone] [id number] Follow-up Instructions Return in about 3 months (around 5/15/2018). Upcoming Health Maintenance Date Due  
 FOOT EXAM Q1 1955 EYE EXAM RETINAL OR DILATED Q1 1954 DTaP/Tdap/Td series (1 - Tdap) 1965 FOBT Q 1 YEAR AGE 50-75 1994 ZOSTER VACCINE AGE 60> 2004 GLAUCOMA SCREENING Q2Y 2009 OSTEOPOROSIS SCREENING (DEXA) 2009 MEDICARE YEARLY EXAM 2009 BREAST CANCER SCRN MAMMOGRAM 2016 HEMOGLOBIN A1C Q6M 5/15/2018 MICROALBUMIN Q1 11/15/2018 LIPID PANEL Q1 11/15/2018 Allergies as of 2/15/2018  Review Complete On: 2/15/2018 By: Umm Swann MD  
  
 Severity Noted Reaction Type Reactions Betadine Prepstick  2010    Rash Current Immunizations  Reviewed on 2013 Name Date Influenza High Dose Vaccine PF 11/15/2017 Pneumococcal Conjugate (PCV-13) 2015 Pneumococcal Polysaccharide (PPSV-23) 10/1/2014 Not reviewed this visit You Were Diagnosed With   
  
 Codes Comments Cerebrovascular accident (CVA), unspecified mechanism ([facility]    -  Primary ICD-10-CM: I63.9 ICD-9-CM: 434.91 Chronic pain syndrome     ICD-10-CM: G89.4 ICD-9-CM: 338. 4 Chronic prescription opiate use     ICD-10-CM: Y49.549 ICD-9-CM: V58.69 PAF (paroxysmal atrial fibrillation) (HCC)     ICD-10-CM: I48.0 ICD-9-CM: 427.31 Anticoagulant long-term use     ICD-10-CM: Z79.01 
ICD-9-CM: V58.61 Anxiety     ICD-10-CM: F41.9 ICD-9-CM: 300.00 Vitals BP Pulse Height(growth percentile) SpO2 OB Status Smoking Status 118/64 (BP 1 Location: Right arm, BP Patient Position: Sitting) 68 5' 4\" (1.626 m) 94% Hysterectomy Never Smoker Preferred Pharmacy Pharmacy Name Phone [facility] [address], [address] [place] [address] Avenue [phone] Your Updated Medication List  
  
   
This list is accurate as of: 2/15/18 11:25 AM.  Always use your most recent med list. amLODIPine 10 mg tablet Commonly known as:  Shaka Free Take 10 mg by mouth daily. aspirin 325 mg tablet Commonly known as:  ASPIRIN Take 1 Tab by mouth daily. Blood-Glucose Meter monitoring kit Commonly known as:  FREESTYLE FREEDOM LITE Daily blood sugar checks  
  
 captopril 25 mg tablet Commonly known as:  CAPOTEN Take 1 Tab by mouth two (2) times a day. carvedilol 12.5 mg tablet Commonly known as:  COREG  
TAKE 1 TABLET TWICE A DAY  
  
 COUMADIN 2 mg tablet Generic drug:  warfarin Take 2 mg by mouth daily. Indications: 1 1/2 tabs mon, tues, wed,thurs, sat, and 2 tabs wed, sun  
  
 furosemide 40 mg tablet Commonly known as:  LASIX TAKE 1 TABLET DAILY  
  
 glucose blood VI test strips strip Commonly known as:  FREESTYLE TEST  
by Does Not Apply route daily. Use once daily for daily blood glucose checks Insulin Syringe-Needle U-100 0.5 mL 31 gauge x 5/16 Syrg Commonly known as:  BD INSULIN SYRINGE ULT-FINE II  
1 Each by Does Not Apply route two (2) times a day. lancets 28 gauge Misc Commonly known as:  FREESTYLE LANCETS Daily blood sugar checks LORazepam 0.5 mg tablet Commonly known as:  ATIVAN Take 1 Tab by mouth nightly as needed. Max Daily Amount: 0.5 mg.  
  
 naloxone 4 mg/actuation nasal spray Commonly known as:  [employer] Use 1 spray intranasally into 1 nostril. Indications: OPIATE-INDUCED RESPIRATORY DEPRESSION, Opioid Toxicity NovoLIN N NPH U-100 Insulin 100 unit/mL injection Generic drug:  insulin NPH  
 INJECT 44 UNITS UNDER THE SKIN IN THE MORNING AND 36 UNITS IN THE EVENING  
  
 pravastatin 80 mg tablet Commonly known as:  PRAVACHOL Take 1 Tab by mouth nightly. traMADol 50 mg tablet Commonly known as:  ULTRAM  
Take 1 Tab by mouth four (4) times daily. Max Daily Amount: 200 mg. Indications: Pain XALATAN 0.005 % ophthalmic solution Generic drug:  latanoprost  
Administer 1 Drop to both eyes nightly. Prescriptions Printed Refills  
 traMADol (ULTRAM) 50 mg tablet 0 Sig: Take 1 Tab by mouth four (4) times daily. Max Daily Amount: 200 mg. Indications: Pain Class: Print Route: Oral  
 LORazepam (ATIVAN) 0.5 mg tablet 0 Sig: Take 1 Tab by mouth nightly as needed. Max Daily Amount: 0.5 mg.  
 Class: Print Route: Oral  
  
We Performed the Following AMB POC PT/INR [87620 CPT(R)] COLLECTION VENOUS BLOOD,VENIPUNCTURE [id number] CPT(R)] Follow-up Instructions Return in about 3 months (around 5/15/2018). Introducing [facility]! Laverne Bass introduces RadioFrame patient portal. Now you can access parts of your medical record, email your doctor's office, and request medication refills online. 1. In your internet browser, go to [email or web address] 2. Click on the First Time User? Click Here link in the Sign In box. You will see the New Member Sign Up page. 3. Enter your RadioFrame Access Code exactly as it appears below. You will not need to use this code after youve completed the sign-up process. If you do not sign up before the expiration date, you must request a new code. · [employer] Access Code: [email or web address] Expires: 3/20/2018 11:05 AM 
 
4. Enter the last four digits of your Social Security Number (xxxx) and Date of Birth (mm/dd/yyyy) as indicated and click Submit. You will be taken to the next sign-up page. 5. Create a RadioFrame ID.  This will be your RadioFrame login ID and cannot be changed, so think of one that is secure and easy to remember. 6. Create a Activity Rocket password. You can change your password at any time. 7. Enter your Password Reset Question and Answer. This can be used at a later time if you forget your password. 8. Enter your e-mail address. You will receive e-mail notification when new information is available in [address] 9. Click Sign Up. You can now view and download portions of your medical record. 10. Click the Download Summary menu link to download a portable copy of your medical information. If you have questions, please visit the Frequently Asked Questions section of the Activity R[employer] website. Remember, Activity Rocket is NOT to be used for urgent needs. For medical emergencies, dial 911. Now available from your iPhone and Android! Please provide this summary of care documentation to your next provider. Your primary care clinician is listed as TIGRE Gonzales. If you have any questions after today's visit, please call [phone].

## 2018-02-15 NOTE — PROGRESS NOTES
This note will not be viewable in [address] Christiano Herndon is a 67 y.o. female and presents with Follow-up (3 mo fu)  . Subjective:  Mrs. Jaci Garvin returns to the office today in follow-up of her chronic pain syndrome. Patient remains on tramadol for this taking it up to 4 times a day. Most of the time she takes very few pills. Her pain as a result of a previous stroke for which she is hemiplegic on the left side. The pain is mostly localized in the legs. The patient had pain controlled with anti-inflammatory medication however had to be on Coumadin anticoagulation due to atrial fibrillation and a stroke. She now uses tramadol for the discomfort. The patient remains effective in regards to controlling her discomfort. In addition she does have pain and anxiety for which she takes as needed lorazepam but very sparingly. She denies any excessive fatigue or lethargy taking the medication.     Past Medical History:   Diagnosis Date    Anticoagulant long-term use 10/13/2017    Anxiety 10/13/2017    Atrial fibrillation ([address] 10/13/2017    Breast calcification, left 10/13/2017    CAD (coronary artery disease)     Cellulitis of both lower extremities 10/13/2017    Chronic kidney disease     kidney stones    Chronic pain syndrome 10/13/2017    Chronic prescription opiate use 11/15/2017    CVA (cerebral vascular accident) ([address] 10/13/2017    Diabetes ([address]     DJD (degenerative joint disease) 10/13/2017    Dyslipidemia 10/13/2017    Glaucoma 10/13/2017    Hypertension     Hyperthyroidism 10/13/2017    Long-term use of high-risk medication 10/13/2017    Stasis ulcer of lower extremity ([address] 10/13/2017    Stroke ([address]     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 ([address] 8/18/2012    retinopathy      Past Surgical History:   Procedure Laterality Date    CARDIAC SURG PROCEDURE UNLIST      cagb    HX GYN      hysterectomty    HX ORTHOPAEDIC      back surgery     Allergies   Allergen Reactions  Betadine Prepstick Rash     Current Outpatient Prescriptions   Medication Sig Dispense Refill    traMADol (ULTRAM) 50 mg tablet Take 1 Tab by mouth four (4) times daily. Max Daily Amount: 200 mg. Indications: Pain 360 Tab 0    LORazepam (ATIVAN) 0.5 mg tablet Take 1 Tab by mouth nightly as needed. Max Daily Amount: 0.5 mg. 90 Tab 0    Blood-Glucose Meter (FREESTYLE FREEDOM LITE) monitoring kit Daily blood sugar checks 1 Kit 0    lancets (FREESTYLE LANCETS) 28 gauge misc Daily blood sugar checks 100 Lancet 3    glucose blood VI test strips (FREESTYLE TEST) strip by Does Not Apply route daily. Use once daily for daily blood glucose checks 100 Strip 3    carvedilol (COREG) 12.5 mg tablet TAKE 1 TABLET TWICE A  Tab 1    pravastatin (PRAVACHOL) 80 mg tablet Take 1 Tab by mouth nightly. 90 Tab 3    furosemide (LASIX) 40 mg tablet TAKE 1 TABLET DAILY 90 Tab 2    naloxone (NARCAN) 4 mg/actuation nasal spray Use 1 spray intranasally into 1 nostril. Indications: OPIATE-INDUCED RESPIRATORY DEPRESSION, Opioid Toxicity 1 Each 0    warfarin (COUMADIN) 2 mg tablet Take 2 mg by mouth daily. Indications: 1 1/2 tabs mon, tues, wed,thurs, sat, and 2 tabs wed, sun      latanoprost (XALATAN) 0.005 % ophthalmic solution Administer 1 Drop to both eyes nightly.  captopril (CAPOTEN) 25 mg tablet Take 1 Tab by mouth two (2) times a day. 180 Tab 3    Insulin Syringe-Needle U-100 (BD INSULIN SYRINGE ULT-FINE II) 0.5 mL 31 gauge x 5/16 syrg 1 Each by Does Not Apply route two (2) times a day. 180 Syringe 3    NOVOLIN N 100 unit/mL injection INJECT 44 UNITS UNDER THE SKIN IN THE MORNING AND 36 UNITS IN THE EVENING (Patient taking differently: INJECT 45 UNITS UNDER THE SKIN IN THE MORNING AND 40 UNITS IN THE EVENING) 80 mL 2    amLODIPine (NORVASC) 10 mg tablet Take 10 mg by mouth daily.  aspirin (ASPIRIN) 325 mg tablet Take 1 Tab by mouth daily.        Social History     Social History    Marital status:      Spouse name: N/A    Number of children: N/A    Years of education: N/A     Social History Main Topics    Smoking status: Never Smoker    Smokeless tobacco: Never Used    Alcohol use No    Drug use: No    Sexual activity: Not Asked     Other Topics Concern    None     Social History Narrative     Family History   Problem Relation Age of Onset    Heart Disease Father        Health Maintenance   Topic Date Due    FOOT EXAM Q1  04/08/1955    EYE EXAM RETINAL OR DILATED Q1  [birth year]08/1955    DTaP/Tdap/Td series (1 - Tdap) 04/08/1965    FOBT Q 1 YEAR AGE 50-75  04/08/1994    ZOSTER VACCINE AGE 60>  02/08/2004    GLAUCOMA SCREENING Q2Y  04/08/2009    OSTEOPOROSIS SCREENING (DEXA)  04/08/2009    MEDICARE YEARLY EXAM  04/08/2009    BREAST CANCER SCRN MAMMOGRAM  11/04/2016    HEMOGLOBIN A1C Q6M  05/15/2018    MICROALBUMIN Q1  11/15/2018    LIPID PANEL Q1  11/15/2018    Pneumococcal 65+ Low/Medium Risk  Completed    Influenza Age 5 to Adult  Completed        Review of Systems  Constitutional: negative for fevers, chills, anorexia and weight loss  Eyes:   negative for visual disturbance and irritation  ENT:   negative for tinnitus,sore throat,nasal congestion,ear pain,hoarseness  Respiratory:  negative for cough, hemoptysis, dyspnea,wheezing  CV:   negative for chest pain, palpitations, lower extremity edema  GI:   negative for nausea, vomiting, diarrhea, abdominal pain,melena  Endo:               negative for polyuria,polydipsia,polyphagia,heat intolerance  Genitourinary: negative for frequency, dysuria and hematuria  Integumentary: negative for rash and pruritus  Hematologic:  negative for easy bruising and gum/nose bleeding  Musculoskel: Positive for chronic left sided muscular pain  Neurological:  Positive for left-sided weakness   Behavl/Psych: negative for feelings of anxiety, depression, mood changes  ROS otherwise negative      Objective:  Visit Vitals    /64 (BP 1 Location: Right arm, BP Patient Position: Sitting)    Pulse 68    Ht 5' 4\" (1.626 m)    SpO2 94%     There is no height or weight on file to calculate BMI. Physical Exam:   General appearance - alert, well appearing, and in no distress  Mental status - alert, oriented to person, place, and time  EYE-NICK, EOMI,conjunctiva normal bilaterally, lids normal  ENT-ENT exam normal, no neck nodes or sinus tenderness  Nose - normal and patent, no erythema,  Or discharge   Mouth - mucous membranes moist, pharynx normal without lesions  Neck - supple, no significant adenopathy or bruit  Chest - clear to auscultation, no wheezes, rales or rhonchi. Heart - normal rate, regular rhythm, normal S1, S2, no murmurs, rubs, clicks or gallops   Abdomen - soft, nontender, nondistended, no masses or organomegaly  Lymph- no adenopathy palpable  Ext-peripheral pulses normal, no pedal edema, no clubbing or cyanosis  Skin-Warm and dry. no hyperpigmentation, vitiligo, or suspicious lesions  Neuro -alert, oriented, normal speech. She is hemiplegic on the left side    Assessment/Plan:  Diagnoses and all orders for this visit:    Cerebrovascular accident (CVA), unspecified mechanism ([facility]    Chronic pain syndrome  -     traMADol (ULTRAM) 50 mg tablet; Take 1 Tab by mouth four (4) times daily. Max Daily Amount: 200 mg. Indications: Pain, Print, Disp-360 Tab, R-0    Chronic prescription opiate use    PAF (paroxysmal atrial fibrillation) ([facility])    Anticoagulant long-term use  -     AMB POC PT/INR  -     COLLECTION VENOUS BLOOD,VENIPUNCTURE    Anxiety  -     LORazepam (ATIVAN) 0.5 mg tablet; Take 1 Tab by mouth nightly as needed. Max Daily Amount: 0.5 mg., Print, Disp-90 Tab, R-0        Other instructions: The patient's medications are reviewed and reconciled. No change in her current medical regimen is made.   The patient is doing well on tramadol and cannot take anti-inflammatory medication for her pain due to her chronic Coumadin anticoagulation. Patient is overdue for follow-up pro time today. Narcotics contract previously presented to patient reviewed    Urine drug screen reviewed. [place] prescription drug management program was accessed during the office visit and appears appropriate. Morphine milligram equivalents (MME) = 20.    25 minute treatment today as a result of extra time explaining to patient recent laws governing opiate prescriptions, reviewing signed opiate contract, reviewing urine drug screen and accessing the pharmacy management program.    The patient understands she will need to be seen every 3 months to continue  their opiate refills. Follow-up Disposition:  Return in about 3 months (around 5/15/2018). I have reviewed with the patient details of the assessment and plan and all questions were answered. Relevent patient education was performed. The most recent lab findings were reviewed with the patient. An After Visit Summary was printed and given to the patient.     Maribel Carvajal MD

## 2018-02-15 NOTE — PATIENT INSTRUCTIONS
Atrial Fibrillation: Care Instructions  Your Care Instructions    Atrial fibrillation is an irregular and often fast heartbeat. Treating this condition is important for several reasons. It can cause blood clots, which can travel from your heart to your brain and cause a stroke. If you have a fast heartbeat, you may feel lightheaded, dizzy, and weak. An irregular heartbeat can also increase your risk for heart failure. Atrial fibrillation is often the result of another heart condition, such as high blood pressure or coronary artery disease. Making changes to improve your heart condition will help you stay healthy and active. Follow-up care is a key part of your treatment and safety. Be sure to make and go to all appointments, and call your doctor if you are having problems. It's also a good idea to know your test results and keep a list of the medicines you take. How can you care for yourself at home? Medicines  ? · Take your medicines exactly as prescribed. Call your doctor if you think you are having a problem with your medicine. You will get more details on the specific medicines your doctor prescribes. ? · If your doctor has given you a blood thinner to prevent a stroke, be sure you get instructions about how to take your medicine safely. Blood thinners can cause serious bleeding problems. ? · Do not take any vitamins, over-the-counter drugs, or herbal products without talking to your doctor first.   ? Lifestyle changes  ? · Do not smoke. Smoking can increase your chance of a stroke and heart attack. If you need help quitting, talk to your doctor about stop-smoking programs and medicines. These can increase your chances of quitting for good. ? · Eat a heart-healthy diet. ? · Stay at a healthy weight. Lose weight if you need to.   ? · Limit alcohol to 2 drinks a day for men and 1 drink a day for women. Too much alcohol can cause health problems. ? · Avoid colds and flu.  Get a pneumococcal vaccine shot. If you have had one before, ask your doctor whether you need another dose. Get a flu shot every year. If you must be around people with colds or flu, wash your hands often. Activity  ? · If your doctor recommends it, get more exercise. Walking is a good choice. Bit by bit, increase the amount you walk every day. Try for at least 30 minutes on most days of the week. You also may want to swim, bike, or do other activities. Your doctor may suggest that you join a cardiac rehabilitation program so that you can have help increasing your physical activity safely. ? · Start light exercise if your doctor says it is okay. Even a small amount will help you get stronger, have more energy, and manage stress. Walking is an easy way to get exercise. Start out by walking a little more than you did in the hospital. Gradually increase the amount you walk. ? · When you exercise, watch for signs that your heart is working too hard. You are pushing too hard if you cannot talk while you are exercising. If you become short of breath or dizzy or have chest pain, sit down and rest immediately. ? · Check your pulse regularly. Place two fingers on the artery at the palm side of your wrist, in line with your thumb. If your heartbeat seems uneven or fast, talk to your doctor. When should you call for help? Call 911 anytime you think you may need emergency care. For example, call if:  ? · You have symptoms of a heart attack. These may include:  ¨ Chest pain or pressure, or a strange feeling in the chest.  ¨ Sweating. ¨ Shortness of breath. ¨ Nausea or vomiting. ¨ Pain, pressure, or a strange feeling in the back, neck, jaw, or upper belly or in one or both shoulders or arms. ¨ Lightheadedness or sudden weakness. ¨ A fast or irregular heartbeat. After you call 911, the  may tell you to chew 1 adult-strength or 2 to 4 low-dose aspirin. Wait for an ambulance. Do not try to drive yourself.    ? · You have symptoms of a stroke. These may include:  ¨ Sudden numbness, tingling, weakness, or loss of movement in your face, arm, or leg, especially on only one side of your body. ¨ Sudden vision changes. ¨ Sudden trouble speaking. ¨ Sudden confusion or trouble understanding simple statements. ¨ Sudden problems with walking or balance. ¨ A sudden, severe headache that is different from past headaches. ? · You passed out (lost consciousness). ?Call your doctor now or seek immediate medical care if:  ? · You have new or increased shortness of breath. ? · You feel dizzy or lightheaded, or you feel like you may faint. ? · Your heart rate becomes irregular. ? · You can feel your heart flutter in your chest or skip heartbeats. Tell your doctor if these symptoms are new or worse. ? Watch closely for changes in your health, and be sure to contact your doctor if you have any problems. Where can you learn more? Go to http://aly-robert.info/. Enter U020 in the search box to learn more about \"Atrial Fibrillation: Care Instructions. \"  Current as of: September 21, 2016  Content Version: 11.4  © 5146-4648 [employer]. Care instructions adapted under license by [employer] (which disclaims liability or warranty for this information). If you have questions about a medical condition or this instruction, always ask your healthcare professional. [address] any warranty or liability for your use of this information.

## 2018-02-15 NOTE — PROGRESS NOTES
Christiano Herndon is a 67 y.o. female presenting for Follow-up (3 mo fu)  . 1. Have you been to the ER, urgent care clinic since your last visit? Hospitalized since your last visit? No    2. Have you seen or consulted any other health care providers outside of the [place] [id number] since your last visit? Include any pap smears or colon screening. No    Fall Risk Assessment, last 12 mths 11/15/2017   Able to walk? Yes   Fall in past 12 months? No         Abuse Screening Questionnaire 11/15/2017   Do you ever feel afraid of your partner? N   Are you in a relationship with someone who physically or mentally threatens you? N   Is it safe for you to go home? Y       PHQ over the last two weeks 11/15/2017   Little interest or pleasure in doing things Not at all   Feeling down, depressed or hopeless Not at all   Total Score PHQ 2 0       There are no discontinued medications.

## 2018-04-02 RX ORDER — CLONIDINE HYDROCHLORIDE 0.1 MG/1
TABLET ORAL
Qty: 270 TAB | Refills: 2 | Status: SHIPPED | OUTPATIENT
Start: 2018-04-02 | End: 2018-12-31 | Stop reason: SDUPTHER

## 2018-04-10 RX ORDER — AMLODIPINE BESYLATE 10 MG/1
TABLET ORAL
Qty: 90 TAB | Refills: 2 | Status: SHIPPED | OUTPATIENT
Start: 2018-04-10 | End: 2020-02-10 | Stop reason: SDUPTHER

## 2018-05-09 LAB
CREATININE, EXTERNAL: 1.03
HBA1C MFR BLD HPLC: 7.3 %
LDL-C, EXTERNAL: 70

## 2018-05-15 ENCOUNTER — TELEPHONE ANTICOAG (OUTPATIENT)
Dept: INTERNAL MEDICINE CLINIC | Age: 73
End: 2018-05-15

## 2018-05-15 ENCOUNTER — OFFICE VISIT (OUTPATIENT)
Dept: INTERNAL MEDICINE CLINIC | Age: 73
End: 2018-05-15

## 2018-05-15 VITALS
DIASTOLIC BLOOD PRESSURE: 70 MMHG | SYSTOLIC BLOOD PRESSURE: 130 MMHG | OXYGEN SATURATION: 97 % | BODY MASS INDEX: 35 KG/M2 | WEIGHT: 205 LBS | HEIGHT: 64 IN | HEART RATE: 74 BPM

## 2018-05-15 DIAGNOSIS — E11.3299 TYPE 2 DIABETES MELLITUS WITH BACKGROUND RETINOPATHY (HCC): Chronic | ICD-10-CM

## 2018-05-15 DIAGNOSIS — I63.9 CEREBROVASCULAR ACCIDENT (CVA), UNSPECIFIED MECHANISM (HCC): Chronic | ICD-10-CM

## 2018-05-15 DIAGNOSIS — E03.9 ACQUIRED HYPOTHYROIDISM: Chronic | ICD-10-CM

## 2018-05-15 DIAGNOSIS — I10 ESSENTIAL HYPERTENSION, BENIGN: Primary | Chronic | ICD-10-CM

## 2018-05-15 DIAGNOSIS — I48.0 PAF (PAROXYSMAL ATRIAL FIBRILLATION) (HCC): Primary | ICD-10-CM

## 2018-05-15 DIAGNOSIS — Z79.01 ANTICOAGULANT LONG-TERM USE: Chronic | ICD-10-CM

## 2018-05-15 DIAGNOSIS — Z79.891 CHRONIC PRESCRIPTION OPIATE USE: Chronic | ICD-10-CM

## 2018-05-15 DIAGNOSIS — G89.4 CHRONIC PAIN SYNDROME: ICD-10-CM

## 2018-05-15 DIAGNOSIS — Z79.899 LONG-TERM USE OF HIGH-RISK MEDICATION: Chronic | ICD-10-CM

## 2018-05-15 DIAGNOSIS — F41.9 ANXIETY: ICD-10-CM

## 2018-05-15 DIAGNOSIS — E78.5 DYSLIPIDEMIA: Chronic | ICD-10-CM

## 2018-05-15 DIAGNOSIS — I48.0 PAF (PAROXYSMAL ATRIAL FIBRILLATION) (HCC): Chronic | ICD-10-CM

## 2018-05-15 LAB
INR BLD: 1.7
PT POC: 22.2 SECONDS
VALID INTERNAL CONTROL?: YES

## 2018-05-15 RX ORDER — TRAMADOL HYDROCHLORIDE 50 MG/1
50 TABLET ORAL 4 TIMES DAILY
Qty: 360 TAB | Refills: 0 | Status: SHIPPED | OUTPATIENT
Start: 2018-05-15 | End: 2018-08-16 | Stop reason: ALTCHOICE

## 2018-05-15 RX ORDER — ERGOCALCIFEROL 1.25 MG/1
50000 CAPSULE ORAL
COMMUNITY

## 2018-05-15 RX ORDER — METHIMAZOLE 10 MG/1
TABLET ORAL DAILY
COMMUNITY
End: 2021-06-09 | Stop reason: ALTCHOICE

## 2018-05-15 RX ORDER — LORAZEPAM 0.5 MG/1
0.5 TABLET ORAL
Qty: 90 TAB | Refills: 0 | Status: SHIPPED | OUTPATIENT
Start: 2018-05-15 | End: 2018-08-07 | Stop reason: SDUPTHER

## 2018-05-15 NOTE — PROGRESS NOTES
This note will not be viewable in [address] Diya Fisher is a 75 y.o. female and presents with Follow-up (6 mo fu)  . Subjective:  Mrs. Stefano Fleischer returns to the office today in 6 month follow-up of multiple medical problems including her chronic prescription opiate management. 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 She is currently on Novolin N 44 units in the morning and 36 units in the evening. She checks her blood sugars once daily with average fasting blood sugar around 117. She does have her eyes checked on a yearly basis as she does suffer with retinopathy. She denies burning paresthesias of her feet and is had no hypoglycemia. Her blood pressure is currently managed on amlodipine, clonidine, carvedilol, Lasix, captopril. Her blood pressure has been hard to control. She tries to follow a no added salt diet. She has had no headaches, numbness, tingling or new focal neurological problems. She has a dyslipidemia on pravastatin therapy. She denies muscle soreness or GI upset. She does have underlying ASCVD but denies any exertional chest pains or claudication. She has paroxysmal atrial fibrillation for which she is on Coumadin anticoagulation. She does return monthly for pro times. She has had no bleeding problems, palpitations or new strokelike symptoms. She has hyperthyroidism for which she has been seen by endocrinology. She is currently on methimazole. She denies any symptoms of thyroid excess. The patient is status post CVA with left hemiplegia. She does have chronic pain on the left side of her body related to this and takes tramadol 4 times a day for this. She is unable to take anti-inflammatory medication due to her anticoagulation. The tramadol continues to work effectively without side effect.   Also has a result of her CVA she has had subsequent anxiety for which he takes lorazepam.  She takes a small amount which helps her to calm down and sleep. The medication has not lost its effectiveness and she is denies any excessive sedation. Past Medical History:   Diagnosis Date    Anticoagulant long-term use 10/13/2017    Anxiety 10/13/2017    Atrial fibrillation ([address] 10/13/2017    Breast calcification, left 10/13/2017    CAD (coronary artery disease)     Cellulitis of both lower extremities 10/13/2017    Chronic kidney disease     kidney stones    Chronic pain syndrome 10/13/2017    Chronic prescription opiate use 11/15/2017    CVA (cerebral vascular accident) ([address] 10/13/2017    Diabetes ([address]     DJD (degenerative joint disease) 10/13/2017    Dyslipidemia 10/13/2017    Glaucoma 10/13/2017    Hypertension     Hyperthyroidism 10/13/2017    Long-term use of high-risk medication 10/13/2017    Stasis ulcer of lower extremity ([address] 10/13/2017    Stroke [facility]     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 ([facility] 8/18/2012    retinopathy      Past Surgical History:   Procedure Laterality Date    CARDIAC SURG PROCEDURE UNLIST      cagb    HX GYN      hysterectomty    HX ORTHOPAEDIC      back surgery     Allergies   Allergen Reactions    Betadine Prepstick Rash     Current Outpatient Prescriptions   Medication Sig Dispense Refill    ergocalciferol (VITAMIN D2) 50,000 unit capsule Take 50,000 Units by mouth every seven (7) days.  methIMAzole (TAPAZOLE) 10 mg tablet Take  by mouth daily.  traMADol (ULTRAM) 50 mg tablet Take 1 Tab by mouth four (4) times daily. Max Daily Amount: 200 mg. Indications: Pain 360 Tab 0    LORazepam (ATIVAN) 0.5 mg tablet Take 1 Tab by mouth nightly as needed.  Max Daily Amount: 0.5 mg. 90 Tab 0    amLODIPine (NORVASC) 10 mg tablet TAKE 1 TABLET DAILY 90 Tab 2    cloNIDine HCl (CATAPRES) 0.1 mg tablet TAKE 1 TABLET THREE TIMES A  Tab 2    Blood-Glucose Meter (FREESTYLE FREEDOM LITE) monitoring kit Daily blood sugar checks 1 Kit 0    lancets (FREESTYLE LANCETS) 28 gauge misc Daily blood sugar checks 100 Lancet 3    glucose blood VI test strips (FREESTYLE TEST) strip by Does Not Apply route daily. Use once daily for daily blood glucose checks 100 Strip 3    carvedilol (COREG) 12.5 mg tablet TAKE 1 TABLET TWICE A  Tab 1    pravastatin (PRAVACHOL) 80 mg tablet Take 1 Tab by mouth nightly. 90 Tab 3    furosemide (LASIX) 40 mg tablet TAKE 1 TABLET DAILY 90 Tab 2    naloxone (NARCAN) 4 mg/actuation nasal spray Use 1 spray intranasally into 1 nostril. Indications: OPIATE-INDUCED RESPIRATORY DEPRESSION, Opioid Toxicity 1 Each 0    warfarin (COUMADIN) 2 mg tablet Take 2 mg by mouth daily. Indications: 1 1/2 tabs mon, tues, wed,thurs, sat, and 2 tabs wed, sun      latanoprost (XALATAN) 0.005 % ophthalmic solution Administer 1 Drop to both eyes nightly.  captopril (CAPOTEN) 25 mg tablet Take 1 Tab by mouth two (2) times a day. 180 Tab 3    Insulin Syringe-Needle U-100 (BD INSULIN SYRINGE ULT-FINE II) 0.5 mL 31 gauge x 5/16 syrg 1 Each by Does Not Apply route two (2) times a day. 180 Syringe 3    NOVOLIN N 100 unit/mL injection INJECT 44 UNITS UNDER THE SKIN IN THE MORNING AND 36 UNITS IN THE EVENING (Patient taking differently: INJECT 45 UNITS UNDER THE SKIN IN THE MORNING AND 40 UNITS IN THE EVENING) 80 mL 2    aspirin (ASPIRIN) 325 mg tablet Take 1 Tab by mouth daily.        Social History     Social History    Marital status:      Spouse name: N/A    Number of children: N/A    Years of education: N/A     Social History Main Topics    Smoking status: Never Smoker    Smokeless tobacco: Never Used    Alcohol use No    Drug use: No    Sexual activity: Not Asked     Other Topics Concern    None     Social History Narrative     Family History   Problem Relation Age of Onset    Heart Disease Father        Health Maintenance   Topic Date Due    FOOT EXAM Q1  04/08/1955    EYE EXAM RETINAL OR DILATED Q1  04/08/1954    DTaP/Tdap/Td series (1 - Tdap) 04/08/1965    FOBT Q 1 YEAR AGE 50-75  04/08/1994    ZOSTER VACCINE AGE 60>  02/08/2004    GLAUCOMA SCREENING Q2Y  04/08/2009    Bone Densitometry (Dexa) Screening  04/08/2009    BREAST CANCER SCRN MAMMOGRAM  11/04/2016    MEDICARE YEARLY EXAM  03/14/2018    Influenza Age 9 to Adult  08/01/2018    HEMOGLOBIN A1C Q6M  09/26/2018    MICROALBUMIN Q1  11/15/2018    LIPID PANEL Q1  03/26/2019    Pneumococcal 65+ Low/Medium Risk  Completed        Review of Systems  Constitutional: negative for fevers, chills, anorexia and weight loss  Eyes:   negative for visual disturbance and irritation  ENT:   negative for tinnitus,sore throat,nasal congestion,ear pain,hoarseness  Respiratory:  negative for cough, hemoptysis, dyspnea,wheezing  CV:   negative for chest pain, palpitations, lower extremity edema  GI:   negative for nausea, vomiting, diarrhea, abdominal pain,melena  Endo:               negative for polyuria,polydipsia,polyphagia,heat intolerance  Genitourinary: negative for frequency, dysuria and hematuria  Integumentary: negative for rash and pruritus  Hematologic:  negative for easy bruising and gum/nose bleeding  Musculoskel: negative for myalgias, arthralgias, back pain, muscle weakness, joint pain  Neurological:  Positive for left-sided weakness and inability to ambulate  Behavl/Psych: negative for feelings of anxiety, depression, mood changes  ROS otherwise negative      Objective:  Visit Vitals    /70 (BP 1 Location: Right arm, BP Patient Position: Sitting)    Pulse 74    Ht 5' 4\" (1.626 m)    Wt 205 lb (93 kg)    SpO2 97%    BMI 35.19 kg/m2     Body mass index is 35.19 kg/(m^2).     Physical Exam:   General appearance - alert, well appearing, and in no distress  Mental status - alert, oriented to person, place, and time  EYE-NICK, EOMI,conjunctiva normal bilaterally, lids normal  ENT-ENT exam normal, no neck nodes or sinus tenderness  Nose - normal and patent, no erythema,  Or discharge Mouth - mucous membranes moist, pharynx normal without lesions  Neck - supple, no significant adenopathy or bruit  Chest - clear to auscultation, no wheezes, rales or rhonchi. Heart - normal rate, regular rhythm, normal S1, S2, no murmurs, rubs, clicks or gallops   Abdomen - soft, nontender, nondistended, no masses or organomegaly  Lymph- no adenopathy palpable  Ext-peripheral pulses normal, no pedal edema, no clubbing or cyanosis  Skin-Warm and dry. no hyperpigmentation, vitiligo, or suspicious lesions  Neuro -alert, oriented, normal speech. Left hemiplegia is present and she is unable to ambulate. Assessment/Plan:  Diagnoses and all orders for this visit:    Essential hypertension, benign    Dyslipidemia    Long-term use of high-risk medication    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 (HCC)    PAF (paroxysmal atrial fibrillation) (HCC)    Anticoagulant long-term use  -     AMB POC PT/INR    Acquired hypothyroidism    Cerebrovascular accident (CVA), unspecified mechanism (HCC)    Chronic pain syndrome  -     traMADol (ULTRAM) 50 mg tablet; Take 1 Tab by mouth four (4) times daily. Max Daily Amount: 200 mg. Indications: Pain, Print, Disp-360 Tab, R-0    Chronic prescription opiate use  -     TOXASSURE SELECT 13 (MW)    Anxiety  -     LORazepam (ATIVAN) 0.5 mg tablet; Take 1 Tab by mouth nightly as needed. Max Daily Amount: 0.5 mg., Print, Disp-90 Tab, R-0        Other instructions: The patient's medications are reviewed and reconciled. No change in her current medical regimen is made.     Body mass index is 35 and dietary counseling along with printed patient education is given    Continue to check blood sugars once daily    Continued endocrinology follow-up of her hyperthyroidism    Her monthly pro time is obtained    Narcotics contract previously presented to patient reviewed    Urine drug screen is obtained today    [place] prescription drug management program was accessed during the office visit and appears appropriate. Morphine milligram equivalents (MME) = 20. In addition to tramadol, she has access to benzodiazepine for treatment of anxiety and Narcan prescription has previously been given    45 minute treatment today as a result of review of all medical problems and extra time explaining to patient recent laws governing opiate prescriptions, reviewing signed opiate contract, obtaining urine drug screen and accessing the pharmacy management program.    The patient understands she will need to be seen every 3 months to continue  their opiate refills. Follow-up Disposition:  Return in about 3 months (around 8/15/2018). I have reviewed with the patient details of the assessment and plan and all questions were answered. Relevent patient education was performed. The most recent lab findings were reviewed with the patient. An After Visit Summary was printed and given to the patient.     Louisa Graves MD

## 2018-05-15 NOTE — PROGRESS NOTES
Moncho Dunham is a 75 y.o. female presenting for Follow-up (6 mo fu)  . 1. Have you been to the ER, urgent care clinic since your last visit? Hospitalized since your last visit? No    2. Have you seen or consulted any other health care providers outside of the [address] [place] [address] since your last visit? Include any pap smears or colon screening. Yes When: 5/11/2018 Where: Dr. Elida Mackenzie Reason for visit: thyroid follow up    Fall Risk Assessment, last 12 mths 5/15/2018   Able to walk? No   Fall in past 12 months? -         Abuse Screening Questionnaire 5/15/2018   Do you ever feel afraid of your partner? N   Are you in a relationship with someone who physically or mentally threatens you? N   Is it safe for you to go home? Y       PHQ over the last two weeks 5/15/2018   Little interest or pleasure in doing things Not at all   Feeling down, depressed or hopeless Not at all   Total Score PHQ 2 0       There are no discontinued medications.

## 2018-05-15 NOTE — PROGRESS NOTES
INR was 1.7. Take extra 2.5 mg of Coumadin today and then resume usual regimen.   Recheck pro time in 1 month

## 2018-05-15 NOTE — MR AVS SNAPSHOT
[facility] 
 
 
 [address] [address] [phone] Patient: Evan Krishna MRN: [id number] :1945 Visit Information Date & Time Provider Department Dept. Phone Encounter #  
 5/15/2018 11:00 AM Maurice Valdez [phone] [id number] Follow-up Instructions Return in about 3 months (around 8/15/2018). Your Appointments 2018 11:00 AM  
FOLLOW UP 10 with MD Maurice Valdez 84 ([facility]) Appt Note: 6 mo fu; 6 mo fu  
 [address] [id number] [address] [facility] [address] Upcoming Health Maintenance Date Due  
 FOOT EXAM Q1 1955 EYE EXAM RETINAL OR DILATED Q1 1954 DTaP/Tdap/Td series (1 - Tdap) 1965 FOBT Q 1 YEAR AGE 50-75 1994 ZOSTER VACCINE AGE 60> 2004 GLAUCOMA SCREENING Q2Y 2009 Bone Densitometry (Dexa) Screening 2009 BREAST CANCER SCRN MAMMOGRAM 2016 MEDICARE YEARLY EXAM 3/14/2018 Influenza Age 5 to Adult 2018 HEMOGLOBIN A1C Q6M 2018 MICROALBUMIN Q1 11/15/2018 LIPID PANEL Q1 3/26/2019 Allergies as of 5/15/2018  Review Complete On: 5/15/2018 By: Natanael Littlejohn MD  
  
 Severity Noted Reaction Type Reactions Betadine Prepstick  2010    Rash Current Immunizations  Reviewed on 2013 Name Date Influenza High Dose Vaccine PF 11/15/2017 Pneumococcal Conjugate (PCV-13) 2015 Pneumococcal Polysaccharide (PPSV-23) 10/1/2014 Not reviewed this visit You Were Diagnosed With   
  
 Codes Comments Essential hypertension, benign    -  Primary ICD-10-CM: I10 
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   
 PAF (paroxysmal atrial fibrillation) (HCC)     ICD-10-CM: I48.0 ICD-9-CM: 427.31 Anticoagulant long-term use     ICD-10-CM: Z79.01 
ICD-9-CM: V58.61 Acquired hypothyroidism     ICD-10-CM: E03.9 ICD-9-CM: 244.9 Cerebrovascular accident (CVA), unspecified mechanism ([facility]     ICD-10-CM: I63.9 ICD-9-CM: 434.91 Chronic pain syndrome     ICD-10-CM: G89.4 ICD-9-CM: 338. 4 Chronic prescription opiate use     ICD-10-CM: R68.013 ICD-9-CM: V58.69 Anxiety     ICD-10-CM: F41.9 ICD-9-CM: 300.00 Vitals BP Pulse Height(growth percentile) Weight(growth percentile) SpO2 BMI  
 130/70 (BP 1 Location: Right arm, BP Patient Position: Sitting) 74 5' 4\" (1.626 m) 205 lb (93 kg) 97% 35.19 kg/m2 OB Status Smoking Status Hysterectomy Never Smoker BMI and BSA Data Body Mass Index Body Surface Area  
 35.19 kg/m 2 2.05 m 2 Preferred Pharmacy Pharmacy Name Phone [facility]nard, [facility] [phone] Your Updated Medication List  
  
   
This list is accurate as of 5/15/18 12:51 PM.  Always use your most recent med list. amLODIPine 10 mg tablet Commonly known as:  Burke Shield TAKE 1 TABLET DAILY  
  
 aspirin 325 mg tablet Commonly known as:  ASPIRIN Take 1 Tab by mouth daily. Blood-Glucose Meter monitoring kit Commonly known as:  FREESTYLE FREEDOM LITE Daily blood sugar checks  
  
 captopril 25 mg tablet Commonly known as:  CAPOTEN Take 1 Tab by mouth two (2) times a day. carvedilol 12.5 mg tablet Commonly known as:  COREG  
TAKE 1 TABLET TWICE A DAY  
  
 cloNIDine HCl 0.1 mg tablet Commonly known as:  CATAPRES  
TAKE 1 TABLET THREE TIMES A DAY  
  
 COUMADIN 2 mg tablet Generic drug:  warfarin Take 2 mg by mouth daily. Indications: 1 1/2 tabs mon, tues, wed,thurs, sat, and 2 tabs wed, sun  
  
 furosemide 40 mg tablet Commonly known as:  LASIX TAKE 1 TABLET DAILY  
  
 glucose blood VI test strips strip Commonly known as:  FREESTYLE TEST  
by Does Not Apply route daily. Use once daily for daily blood glucose checks Insulin Syringe-Needle U-100 0.5 mL 31 gauge x 5/16 Syrg Commonly known as:  BD INSULIN SYRINGE ULT-FINE II  
1 Each by Does Not Apply route two (2) times a day. lancets 28 gauge Misc Commonly known as:  FREESTYLE LANCETS Daily blood sugar checks LORazepam 0.5 mg tablet Commonly known as:  ATIVAN Take 1 Tab by mouth nightly as needed. Max Daily Amount: 0.5 mg.  
  
 methIMAzole 10 mg tablet Commonly known as:  TAPAZOLE Take  by mouth daily. naloxone 4 mg/actuation nasal spray Commonly known as:  [employer] Use 1 spray intranasally into 1 nostril. Indications: OPIATE-INDUCED RESPIRATORY DEPRESSION, Opioid Toxicity NovoLIN N NPH U-100 Insulin 100 unit/mL injection Generic drug:  insulin NPH INJECT 44 UNITS UNDER THE SKIN IN THE MORNING AND 36 UNITS IN THE EVENING  
  
 pravastatin 80 mg tablet Commonly known as:  PRAVACHOL Take 1 Tab by mouth nightly. traMADol 50 mg tablet Commonly known as:  ULTRAM  
Take 1 Tab by mouth four (4) times daily. Max Daily Amount: 200 mg. Indications: Pain VITAMIN D2 50,000 unit capsule Generic drug:  ergocalciferol Take 50,000 Units by mouth every seven (7) days. XALATAN 0.005 % ophthalmic solution Generic drug:  latanoprost  
Administer 1 Drop to both eyes nightly. Prescriptions Printed Refills  
 traMADol (ULTRAM) 50 mg tablet 0 Sig: Take 1 Tab by mouth four (4) times daily. Max Daily Amount: 200 mg. Indications: Pain Class: Print  Route: Oral  
 LORazepam (ATIVAN) 0.5 mg tablet 0  
 Sig: Take 1 Tab by mouth nightly as needed. Max Daily Amount: 0.5 mg.  
 Class: Print Route: Oral  
  
We Performed the Following AMB POC PT/INR [34650 CPT(R)] [address] (MW) [[id number] Custom] Follow-up Instructions Return in about 3 months (around 8/15/2018). Patient Instructions Learning About Cutting Calories How do calories affect your weight? Food gives your body energy. Energy from the food you eat is measured in calories. This energy keeps your heart beating, your brain active, and your muscles working. Your body needs a certain number of calories each day. After your body uses the calories it needs, it stores extra calories as fat. To lose weight safely, you have to eat fewer calories while eating in a healthy way. How many calories do you need each day? The more active you are, the more calories you need. When you are less active, you need fewer calories. How many calories you need each day also depends on several things, including your age and whether you are male or female. Here are some general guidelines for adults: 
· Less active women and older adults need 1,600 to 2,000 calories each day. · Active women and less active men need 2,000 to 2,400 calories each day. · Active men need 2,400 to 3,000 calories each day. How can you cut calories and eat healthy meals? Whole grains, vegetables and fruits, and dried beans are good lower-calorie foods. They give you lots of nutrients and fiber. And they fill you up. Sweets, energy drinks, and soda pop are high in calories. They give you few nutrients and no fiber. Try to limit soda pop, fruit juice, and energy drinks. Drink water instead. Some fats can be part of a healthy diet. But cutting back on fats from highly processed foods like fast foods and many snack foods is a good way to lower the calories in your diet.  Also, use smaller amounts of fats like butter, margarine, salad dressing, and mayonnaise. Add fresh garlic, lemon, or herbs to your meals to add flavor without adding fat. Meats and dairy products can be a big source of hidden fats. Try to choose lean or low-fat versions of these products. Fat-free cookies, candies, chips, and frozen treats can still be high in sugar and calories. Some fat-free foods have more calories than regular ones. Eat fat-free treats in moderation, as you would other foods. If your favorite foods are high in fat, salt, sugar, or calories, limit how often you eat them. Eat smaller servings, or look for healthy substitutes. Fill up on fruits, vegetables, and whole grains. Eating at home · Use meat as a side dish instead of as the main part of your meal. 
· Try main dishes that use whole wheat pasta, brown rice, dried beans, or vegetables. · Find ways to cook with little or no fat, such as broiling, steaming, or grilling. · Use cooking spray instead of oil. If you use oil, use a monounsaturated oil, such as canola or olive oil. · Trim fat from meats before you cook them. · Drain off fat after you brown the meat or while you roast it. · Chill soups and stews after you cook them. Then skim the fat off the top after it hardens. Eating out · Order foods that are broiled or poached rather than fried or breaded. · Cut back on the amount of butter or margarine that you use on bread. · Order sauces, gravies, and salad dressings on the side, and use only a little. · When you order pasta, choose tomato sauce rather than cream sauce. · Ask for salsa with your baked potato instead of sour cream, butter, cheese, or ward. · Order meals in a small size instead of upgrading to a large. · Share an entree, or take part of your food home to eat as another meal. 
· Share appetizers and desserts. Where can you learn more? Go to http://fabiana.info/. Enter [id number] in the search box to learn more about \"Learning About Cutting Calories. \" Current as of: May 12, 2017 Content Version: 11.4 © 5568-8654 Healthwise, [employer]. Care instructions adapted under license by [employer] (which disclaims liability or warranty for this information). If you have questions about a medical condition or this instruction, always ask your healthcare professional. [address] any warranty or liability for your use of this information. Introducing [facility]! Javi Bridges introduces Bownty patient portal. Now you can access parts of your medical record, email your doctor's office, and request medication refills online. 1. In your internet browser, go to [email or web address] 2. Click on the First Time User? Click Here link in the Sign In box. You will see the New Member Sign Up page. 3. Enter your Bownty Access Code exactly as it appears below. You will not need to use this code after youve completed the sign-up process. If you do not sign up before the expiration date, you must request a new code. · Bownty Access Code: [email or web address] Expires: 8/13/2018 11:22 AM 
 
4. Enter the last four digits of your Social Security Number (xxxx) and Date of Birth (mm/dd/yyyy) as indicated and click Submit. You will be taken to the next sign-up page. 5. Create a Bownty ID. This will be your Bownty login ID and cannot be changed, so think of one that is secure and easy to remember. 6. Create a Bownty password. You can change your password at any time. 7. Enter your Password Reset Question and Answer. This can be used at a later time if you forget your password. 8. Enter your e-mail address. You will receive e-mail notification when new information is available in [address] 9. Click Sign Up. You can now view and download portions of your medical record. 10. Click the Download Summary menu link to download a portable copy of your medical information. If you have questions, please visit the Frequently Asked Questions section of the Yones website. Remember, Yones is NOT to be used for urgent needs. For medical emergencies, dial 911. Now available from your iPhone and Android! Please provide this summary of care documentation to your next provider. Your primary care clinician is listed as TIGRE Gonzales. If you have any questions after today's visit, please call [phone].

## 2018-05-15 NOTE — PATIENT INSTRUCTIONS

## 2018-05-15 NOTE — PROGRESS NOTES
Anticoagulation Summary as of 5/15/2018     INR goal 2.0-3.0   Today's INR 1.7! Warfarin maintenance plan 4 mg (2 mg x 2) on Sun, Wed; 3 mg (2 mg x 1.5) all other days   Weekly warfarin total 23 mg   Plan last modified Marlyn Boogie (8/17/2017)   Next INR check 6/15/2018   Target end date Indefinite    Indications   PAF (paroxysmal atrial fibrillation) ([facility] (Primary) [I48.0]         Anticoagulation Episode Summary     INR check location     Preferred lab     Send INR reminders to     Comments       Anticoagulation Care Providers     Provider Role Specialty Phone number    Isabela Delgado MD Responsible Internal Medicine [phone]      Patient advised to take an extra 2.5mg of coumadin today, then resume usual regimen. Recheck 1 month.

## 2018-05-20 LAB — DRUGS UR: NORMAL

## 2018-05-21 ENCOUNTER — TELEPHONE (OUTPATIENT)
Dept: INTERNAL MEDICINE CLINIC | Age: 73
End: 2018-05-21

## 2018-05-21 DIAGNOSIS — R92.1 BREAST CALCIFICATIONS: Primary | ICD-10-CM

## 2018-05-21 RX ORDER — HUMAN INSULIN 100 [IU]/ML
INJECTION, SUSPENSION SUBCUTANEOUS
Qty: 80 ML | Refills: 2 | Status: SHIPPED | OUTPATIENT
Start: 2018-05-21 | End: 2019-02-15 | Stop reason: SDUPTHER

## 2018-05-21 NOTE — TELEPHONE ENCOUNTER
Phoned patient to advise she was due a mammogram and she states she will need a diagnostic mammogram done at [facility] due to past breast calcifications. Need an order for this.

## 2018-05-22 RX ORDER — CARVEDILOL 12.5 MG/1
TABLET ORAL
Qty: 180 TAB | Refills: 1 | Status: SHIPPED | OUTPATIENT
Start: 2018-05-22 | End: 2019-11-04 | Stop reason: SDUPTHER

## 2018-05-29 RX ORDER — WARFARIN 2 MG/1
TABLET ORAL
Qty: 135 TAB | Refills: 3 | Status: SHIPPED | OUTPATIENT
Start: 2018-05-29 | End: 2019-06-19 | Stop reason: SDUPTHER

## 2018-05-30 DIAGNOSIS — G89.4 CHRONIC PAIN SYNDROME: ICD-10-CM

## 2018-05-30 DIAGNOSIS — F41.9 ANXIETY: ICD-10-CM

## 2018-05-30 NOTE — TELEPHONE ENCOUNTER
A user error has taken place: encounter opened in error, closed for administrative reasons. Med order that was sent was recently done on 5/15/18. Not due for refill on Tramadol or Lorazapam at this time.

## 2018-06-05 ENCOUNTER — HOSPITAL ENCOUNTER (OUTPATIENT)
Dept: MAMMOGRAPHY | Age: 73
Discharge: HOME | End: 2018-06-05
Attending: INTERNAL MEDICINE
Payer: MEDICARE

## 2018-06-05 DIAGNOSIS — R92.1 BREAST CALCIFICATIONS: ICD-10-CM

## 2018-06-05 PROCEDURE — 77066 DX MAMMO INCL CAD BI: CPT

## 2018-06-15 RX ORDER — CAPTOPRIL 25 MG/1
TABLET ORAL
Qty: 180 TAB | Refills: 3 | Status: SHIPPED | OUTPATIENT
Start: 2018-06-15 | End: 2019-06-10 | Stop reason: SDUPTHER

## 2018-07-09 ENCOUNTER — LAB ONLY (OUTPATIENT)
Dept: INTERNAL MEDICINE CLINIC | Age: 73
End: 2018-07-09

## 2018-07-09 DIAGNOSIS — I48.0 PAF (PAROXYSMAL ATRIAL FIBRILLATION) (HCC): Primary | Chronic | ICD-10-CM

## 2018-07-10 ENCOUNTER — TELEPHONE ANTICOAG (OUTPATIENT)
Dept: INTERNAL MEDICINE CLINIC | Age: 73
End: 2018-07-10

## 2018-07-10 DIAGNOSIS — I48.0 PAF (PAROXYSMAL ATRIAL FIBRILLATION) (HCC): Primary | ICD-10-CM

## 2018-07-10 LAB
INR PPP: 1.5 (ref 0.8–1.2)
PROTHROMBIN TIME: 15.2 SEC (ref 9.1–12)

## 2018-07-10 NOTE — PROGRESS NOTES
Anticoagulation Summary as of 7/10/2018     INR goal 2.0-3.0   Today's INR 1.5! (7/9/2018)   Warfarin maintenance plan 4 mg (2 mg x 2) on Sun, Wed; 3 mg (2 mg x 1.5) all other days   Weekly warfarin total 23 mg   Plan last modified Christie Shayan (8/17/2017)   Next INR check 8/6/2018   Target end date Indefinite    Indications   PAF (paroxysmal atrial fibrillation) ([facility] [address] (Primary) [I48.0]         Anticoagulation Episode Summary     INR check location     Preferred lab     Send INR reminders to     Comments       Anticoagulation Care Providers     Provider Role Specialty Phone number    Amara Alonso MD Responsible Internal Medicine [phone]      Patient advised to take an additional coumadin today, then resume usual regimen, Recheck PT 1 month.

## 2018-07-10 NOTE — PROGRESS NOTES
INR is 1.5. Take extra 5 mg of Coumadin today and then resume usual regimen.   Recheck pro time 1 month

## 2018-08-07 DIAGNOSIS — F41.9 ANXIETY: ICD-10-CM

## 2018-08-07 RX ORDER — LORAZEPAM 0.5 MG/1
0.5 TABLET ORAL
Qty: 90 TAB | Refills: 0 | Status: SHIPPED | OUTPATIENT
Start: 2018-08-07 | End: 2022-01-26 | Stop reason: ALTCHOICE

## 2018-08-07 NOTE — TELEPHONE ENCOUNTER
Pharmacy on file verified  Requested Prescriptions     Pending Prescriptions Disp Refills    LORazepam (ATIVAN) 0.5 mg tablet 90 Tab 0     Sig: Take 1 Tab by mouth nightly as needed. Max Daily Amount: 0.5 mg.      LOV 5/15/2018  NOV 8/16/2018

## 2018-08-16 ENCOUNTER — OFFICE VISIT (OUTPATIENT)
Dept: INTERNAL MEDICINE CLINIC | Age: 73
End: 2018-08-16

## 2018-08-16 VITALS
DIASTOLIC BLOOD PRESSURE: 72 MMHG | SYSTOLIC BLOOD PRESSURE: 122 MMHG | WEIGHT: 205 LBS | OXYGEN SATURATION: 96 % | HEART RATE: 69 BPM | BODY MASS INDEX: 35 KG/M2 | HEIGHT: 64 IN

## 2018-08-16 DIAGNOSIS — Z79.01 ANTICOAGULANT LONG-TERM USE: Chronic | ICD-10-CM

## 2018-08-16 DIAGNOSIS — I63.9 CEREBROVASCULAR ACCIDENT (CVA), UNSPECIFIED MECHANISM (HCC): Chronic | ICD-10-CM

## 2018-08-16 DIAGNOSIS — E11.3299 TYPE 2 DIABETES MELLITUS WITH BACKGROUND RETINOPATHY (HCC): Chronic | ICD-10-CM

## 2018-08-16 DIAGNOSIS — I10 ESSENTIAL HYPERTENSION, BENIGN: Primary | Chronic | ICD-10-CM

## 2018-08-16 DIAGNOSIS — E03.9 ACQUIRED HYPOTHYROIDISM: Chronic | ICD-10-CM

## 2018-08-16 DIAGNOSIS — Z79.899 LONG-TERM USE OF HIGH-RISK MEDICATION: Chronic | ICD-10-CM

## 2018-08-16 DIAGNOSIS — I48.0 PAF (PAROXYSMAL ATRIAL FIBRILLATION) (HCC): Chronic | ICD-10-CM

## 2018-08-16 DIAGNOSIS — E78.5 DYSLIPIDEMIA: Chronic | ICD-10-CM

## 2018-08-16 LAB
INR BLD: 1.7
PT POC: 23.1 SECONDS
VALID INTERNAL CONTROL?: YES

## 2018-08-16 NOTE — MR AVS SNAPSHOT
[address] [id number] [phone] Patient: Trista Araya MRN: [id number] :1945 Visit Information Date & Time Provider Department Dept. Phone Encounter #  
 2018 11:00 AM Andria Cleveland [phone] [id number] Follow-up Instructions Return in about 3 months (around 2018). Upcoming Health Maintenance Date Due  
 FOOT EXAM Q1 1955 EYE EXAM RETINAL OR DILATED Q1 1954 DTaP/Tdap/Td series (1 - Tdap) 1965 FOBT Q 1 YEAR AGE 50-75 1994 ZOSTER VACCINE AGE 60> 2004 GLAUCOMA SCREENING Q2Y 2009 Bone Densitometry (Dexa) Screening 2009 MEDICARE YEARLY EXAM 3/14/2018 Influenza Age 5 to Adult 2018 HEMOGLOBIN A1C Q6M 2018 MICROALBUMIN Q1 11/15/2018 LIPID PANEL Q1 3/26/2019 BREAST CANCER SCRN MAMMOGRAM 2020 Allergies as of 2018  Review Complete On: 2018 By: Andria Cleveland MD  
  
 Severity Noted Reaction Type Reactions Betadine Prepstick  2010    Rash Current Immunizations  Reviewed on 2013 Name Date Influenza High Dose Vaccine PF 11/15/2017 Pneumococcal Conjugate (PCV-13) 2015 Pneumococcal Polysaccharide (PPSV-23) 10/1/2014 Not reviewed this visit You Were Diagnosed With   
  
 Codes Comments Essential hypertension, benign    -  Primary ICD-10-CM: I10 
ICD-9-CM: 401.1 Dyslipidemia     ICD-10-CM: E78.5 ICD-9-CM: 272.4 Long-term use of high-risk medication     ICD-10-CM: Z79.899 ICD-9-CM: V58.69 Acquired hypothyroidism     ICD-10-CM: E03.9 ICD-9-CM: 772. 9 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 ([facility] [address]     ICD-10-CM: G53.1407 ICD-9-CM: 250.50, 362.01   
 PAF (paroxysmal atrial fibrillation) (HCC)     ICD-10-CM: I48.0 ICD-9-CM: 427.31  Anticoagulant long-term use     ICD-10-CM: Z79.01 
 ICD-9-CM: V58.61 Cerebrovascular accident (CVA), unspecified mechanism ([facility] [address]     ICD-10-CM: I63.9 ICD-9-CM: 434.91 Vitals BP Pulse Height(growth percentile) Weight(growth percentile) SpO2 BMI  
 122/72 (BP 1 Location: Left arm, BP Patient Position: Sitting) 69 5' 4\" (1.626 m) 205 lb (93 kg) 96% 35.19 kg/m2 OB Status Smoking Status Hysterectomy Never Smoker BMI and BSA Data Body Mass Index Body Surface Area  
 35.19 kg/m 2 2.05 m 2 Preferred Pharmacy Pharmacy Name Phone Oneyda Maynard, [facility] [phone] Your Updated Medication List  
  
   
This list is accurate as of 8/16/18 11:29 AM.  Always use your most recent med list. amLODIPine 10 mg tablet Commonly known as:  Elyn Coffer TAKE 1 TABLET DAILY  
  
 aspirin 325 mg tablet Commonly known as:  ASPIRIN Take 1 Tab by mouth daily. Blood-Glucose Meter monitoring kit Commonly known as:  FREESTYLE FREEDOM LITE Daily blood sugar checks  
  
 captopril 25 mg tablet Commonly known as:  CAPOTEN  
TAKE 1 TABLET TWICE A DAY  
  
 carvedilol 12.5 mg tablet Commonly known as:  COREG  
TAKE 1 TABLET TWICE A DAY  
  
 cloNIDine HCl 0.1 mg tablet Commonly known as:  CATAPRES  
TAKE 1 TABLET THREE TIMES A DAY  
  
 furosemide 40 mg tablet Commonly known as:  LASIX TAKE 1 TABLET DAILY  
  
 glucose blood VI test strips strip Commonly known as:  FREESTYLE TEST  
by Does Not Apply route daily. Use once daily for daily blood glucose checks Insulin Syringe-Needle U-100 0.5 mL 31 gauge x 5/16 Syrg Commonly known as:  BD INSULIN SYRINGE ULT-FINE II  
1 Each by Does Not Apply route two (2) times a day. lancets 28 gauge Misc Commonly known as:  FREESTYLE LANCETS Daily blood sugar checks LORazepam 0.5 mg tablet Commonly known as:  ATIVAN Take 1 Tab by mouth nightly as needed.  Max Daily Amount: 0.5 mg.  
  
 methIMAzole 10 mg tablet Commonly known as:  TAPAZOLE Take  by mouth daily. naloxone 4 mg/actuation nasal spray Commonly known as:  [employer] Use 1 spray intranasally into 1 nostril. Indications: OPIATE-INDUCED RESPIRATORY DEPRESSION, Opioid Toxicity NovoLIN N NPH U-100 Insulin 100 unit/mL injection Generic drug:  insulin NPH INJECT 44 UNITS UNDER THE SKIN IN THE MORNING AND 36 UNITS IN THE EVENING  
  
 pravastatin 80 mg tablet Commonly known as:  PRAVACHOL Take 1 Tab by mouth nightly. VITAMIN D2 50,000 unit capsule Generic drug:  ergocalciferol Take 50,000 Units by mouth every seven (7) days. warfarin 2 mg tablet Commonly known as:  COUMADIN  
TAKE ONE AND ONE-HALF TABLETS DAILY FOR FIVE DAYS A WEEK AND 2 TABLETS ON WEDNESDAY AND SUNDAY  
  
 XALATAN 0.005 % ophthalmic solution Generic drug:  latanoprost  
Administer 1 Drop to both eyes nightly. We Performed the Following COLLECTION VENOUS BLOOD,VENIPUNCTURE V114932 CPT(R)] PROTHROMBIN TIME + INR [49474 CPT(R)] Follow-up Instructions Return in about 3 months (around 11/16/2018). Patient Instructions Advance Care Planning: Care Instructions Your Care Instructions It can be hard to live with an illness that cannot be cured. But if your health is getting worse, you may want to make decisions about end-of-life care. Planning for the end of your life does not mean that you are giving up. It is a way to make sure that your wishes are met. Clearly stating your wishes can make it easier for your loved ones. Making plans while you are still able may also ease your mind and make your final days less stressful and more meaningful. Follow-up care is a key part of your treatment and safety. Be sure to make and go to all appointments, and call your doctor if you are having problems. It's also a good idea to know your test results and keep a list of the medicines you take. What can you do to plan for the end of life? · You can bring these issues up with your doctor. You do not need to wait until your doctor starts the conversation. You might start with \"I would not be willing to live with . Rudy Quintero \" When you complete this sentence it helps your doctor understand your wishes. · Talk openly and honestly with your doctor. This is the best way to understand the decisions you will need to make as your health changes. Know that you can always change your mind. · Ask your doctor about commonly used life-support measures. These include tube feedings, breathing machines, and fluids given through a vein (IV). Understanding these treatments will help you decide whether you want them. · You may choose to have these life-supporting treatments for a limited time. This allows a trial period to see whether they will help you. You may also decide that you want your doctor to take only certain measures to keep you alive. It is important to spell out these conditions so that your doctor and family understand them. · Talk to your doctor about how long you are likely to live. He or she may be able to give you an idea of what usually happens with your specific illness. · Think about preparing papers that state your wishes. This way there will not be any confusion about what you want. You can change your instructions at any time. Which papers should you prepare? Advance directives are legal papers that tell doctors how you want to be cared for at the end of your life. You do not need a  to write these papers. Ask your doctor or your state health department for information on how to write your advance directives. They may have the forms for each of these types of papers. Make sure your doctor has a copy of these on file, and give a copy to a family member or close friend. · Consider a do-not-resuscitate order (DNR).  This order asks that no extra treatments be done if your heart stops or you stop breathing. Extra treatments may include cardiopulmonary resuscitation (CPR), electrical shock to restart your heart, or a machine to breathe for you. If you decide to have a DNR order, ask your doctor to explain and write it. Place the order in your home where everyone can easily see it. · Consider a living will. A living will explains your wishes about life support and other treatments at the end of your life if you become unable to speak for yourself. Living baker tell doctors to use or not use treatments that would keep you alive. You must have one or two witnesses or a notary present when you sign this form. · Consider a durable power of  for health care. This allows you to name a person to make decisions about your care if you are not able to. Most people ask a close friend or family member. Talk to this person about the kinds of treatments you want and those that you do not want. Make sure this person understands your wishes. These legal papers are simple to change. Tell your doctor what you want to change, and ask him or her to make a note in your medical file. Give your family updated copies of the papers. Where can you learn more? Go to http://aly-robert.info/. Enter P184 in the search box to learn more about \"Advance Care Planning: Care Instructions. \" Current as of: October 6, 2017 Content Version: 11.7 © 8593-4134 [employer]. Care instructions adapted under license by [employer] (which disclaims liability or warranty for this information). If you have questions about a medical condition or this instruction, always ask your healthcare professional. [address] any warranty or liability for your use of this information. Introducing [facility]!    
 Justine Guzman introduces [employer] patient portal. Now you can access parts of your medical record, email your doctor's office, and request medication refills online. 1. In your internet browser, go to [email or web address] 2. Click on the First Time User? Click Here link in the Sign In box. You will see the New Member Sign Up page. 3. Enter your [employer] Access Code exactly as it appears below. You will not need to use this code after youve completed the sign-up process. If you do not sign up before the expiration date, you must request a new code. · [employer] Access Code: [email or web address] Expires: 11/14/2018 10:50 AM 
 
4. Enter the last four digits of your Social Security Number (xxxx) and Date of Birth (mm/dd/yyyy) as indicated and click Submit. You will be taken to the next sign-up page. 5. Create a [employer] ID. This will be your [employer] login ID and cannot be changed, so think of one that is secure and easy to remember. 6. Create a [employer] password. You can change your password at any time. 7. Enter your Password Reset Question and Answer. This can be used at a later time if you forget your password. 8. Enter your e-mail address. You will receive e-mail notification when new information is available in [address] 9. Click Sign Up. You can now view and download portions of your medical record. 10. Click the Download Summary menu link to download a portable copy of your medical information. If you have questions, please visit the Frequently Asked Questions section of the [employer] website. Remember, [employer] is NOT to be used for urgent needs. For medical emergencies, dial 911. Now available from your iPhone and Android! Please provide this summary of care documentation to your next provider. Your primary care clinician is listed as TIGRE Gonzales. If you have any questions after today's visit, please call [phone].

## 2018-08-16 NOTE — PROGRESS NOTES
This note will not be viewable in [address] Tristen Crawford is a 75 y.o. female and presents with Diabetes (6 mo fu)  . Subjective:  Mrs. Roscoe Zimmerman presents to the office today in follow-up of multiple medical problems. The patient has type 2 diabetes mellitus with retinopathy. The patient is followed by Dr. Dimas Vilchis for this. She sees her every 3 months. She is currently on Novolin N insulin. The patient just saw Nessa Cabrera recently and had laboratory studies done which we have not yet received a report. The patient is seen on an every 6 month basis by Dr. Nato Dsouza and has every 6 month for examinations by Dr. Ana Peña. The patient denies hypoglycemia and she denies burning paresthesias of her feet. Blood pressure is currently managed on captopril, carvedilol, amlodipine, clonidine and Lasix. She denies any dizziness, weakness, muscle cramping. She does have some chronic lower extremity edema and is had cellulitis and ulcerations of her legs. She denies any headaches, numbness, tingling or focal neurological problems. She has hypercholesterolemia currently on statin therapy. The patient does have a history of ASCVD but denies exertional chest pains or claudication. She has had no muscle soreness or GI upset related to her medication. She is on Tapazole for thyroid disorder followed by her endocrinologist.  She recently had studies done which are pending. Paroxysmal atrial fibrillation is managed with her carvedilol. The patient is also on Coumadin anticoagulation and is due for follow-up pro time. She denies any bleeding problems related to her anticoagulation and is had no problems with any recurrent strokelike symptoms.     Past Medical History:   Diagnosis Date    Anticoagulant long-term use 10/13/2017    Anxiety 10/13/2017    Atrial fibrillation ([address] 10/13/2017    Breast calcification, left 10/13/2017    CAD (coronary artery disease)     Cellulitis of both lower extremities 10/13/2017  Chronic kidney disease     kidney stones    Chronic pain syndrome 10/13/2017    Chronic prescription opiate use 11/15/2017    CVA (cerebral vascular accident) ([facility] [address] 10/13/2017    [facility]     DJD (degenerative joint disease) 10/13/2017    Dyslipidemia 10/13/2017    Glaucoma 10/13/2017    Hypertension     Hyperthyroidism 10/13/2017    Long-term use of high-risk medication 10/13/2017    Stasis ulcer of lower extremity ([facility] [address] 10/13/2017    Stroke [facility]     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 ([facility] 8/18/2012    retinopathy      Past Surgical History:   Procedure Laterality Date    CARDIAC SURG PROCEDURE UNLIST      cagb    HX GYN      hysterectomty    HX ORTHOPAEDIC      back surgery     Allergies   Allergen Reactions    Betadine Prepstick Rash     Current Outpatient Prescriptions   Medication Sig Dispense Refill    LORazepam (ATIVAN) 0.5 mg tablet Take 1 Tab by mouth nightly as needed. Max Daily Amount: 0.5 mg. 90 Tab 0    captopril (CAPOTEN) 25 mg tablet TAKE 1 TABLET TWICE A  Tab 3    warfarin (COUMADIN) 2 mg tablet TAKE ONE AND ONE-HALF TABLETS DAILY FOR FIVE DAYS A WEEK AND 2 TABLETS ON WEDNESDAY AND SUNDAY 135 Tab 3    carvedilol (COREG) 12.5 mg tablet TAKE 1 TABLET TWICE A  Tab 1    NOVOLIN N NPH U-100 INSULIN 100 unit/mL injection INJECT 44 UNITS UNDER THE SKIN IN THE MORNING AND 36 UNITS IN THE EVENING (Patient taking differently: INJECT 41 UNITS UNDER THE SKIN IN THE MORNING AND 33 UNITS IN THE EVENING) 80 mL 2    ergocalciferol (VITAMIN D2) 50,000 unit capsule Take 50,000 Units by mouth every seven (7) days.  methIMAzole (TAPAZOLE) 10 mg tablet Take  by mouth daily.       amLODIPine (NORVASC) 10 mg tablet TAKE 1 TABLET DAILY 90 Tab 2    cloNIDine HCl (CATAPRES) 0.1 mg tablet TAKE 1 TABLET THREE TIMES A  Tab 2    Blood-Glucose Meter (FREESTYLE FREEDOM LITE) monitoring kit Daily blood sugar checks 1 Kit 0    lancets (FREESTYLE LANCETS) 28 gauge misc Daily blood sugar checks 100 Lancet 3    glucose blood VI test strips (FREESTYLE TEST) strip by Does Not Apply route daily. Use once daily for daily blood glucose checks 100 Strip 3    pravastatin (PRAVACHOL) 80 mg tablet Take 1 Tab by mouth nightly. 90 Tab 3    furosemide (LASIX) 40 mg tablet TAKE 1 TABLET DAILY 90 Tab 2    naloxone (NARCAN) 4 mg/actuation nasal spray Use 1 spray intranasally into 1 nostril. Indications: OPIATE-INDUCED RESPIRATORY DEPRESSION, Opioid Toxicity 1 Each 0    latanoprost (XALATAN) 0.005 % ophthalmic solution Administer 1 Drop to both eyes nightly.  Insulin Syringe-Needle U-100 (BD INSULIN SYRINGE ULT-FINE II) 0.5 mL 31 gauge x 5/16 syrg 1 Each by Does Not Apply route two (2) times a day. 180 Syringe 3    aspirin (ASPIRIN) 325 mg tablet Take 1 Tab by mouth daily.        Social History     Social History    Marital status:      Spouse name: N/A    Number of children: N/A    Years of education: N/A     Social History Main Topics    Smoking status: Never Smoker    Smokeless tobacco: Never Used    Alcohol use No    Drug use: No    Sexual activity: Not Asked     Other Topics Concern    None     Social History Narrative     Family History   Problem Relation Age of Onset    Heart Disease Father        Health Maintenance   Topic Date Due    FOOT EXAM Q1  04/08/1955    EYE EXAM RETINAL OR DILATED Q1  [birth year]08/1955    DTaP/Tdap/Td series (1 - Tdap) 04/08/1965    FOBT Q 1 YEAR AGE 50-75  04/08/1994    ZOSTER VACCINE AGE 60>  02/08/2004    GLAUCOMA SCREENING Q2Y  04/08/2009    Bone Densitometry (Dexa) Screening  04/08/2009    MEDICARE YEARLY EXAM  03/14/2018    Influenza Age 9 to Adult  08/01/2018    HEMOGLOBIN A1C Q6M  09/26/2018    MICROALBUMIN Q1  11/15/2018    LIPID PANEL Q1  03/26/2019    BREAST CANCER SCRN MAMMOGRAM  06/05/2020    Pneumococcal 65+ Low/Medium Risk  Completed        Review of Systems  Constitutional: negative for fevers, chills, anorexia and weight loss  Eyes:   negative for visual disturbance and irritation  ENT:   negative for tinnitus,sore throat,nasal congestion,ear pain,hoarseness  Respiratory:  negative for cough, hemoptysis, dyspnea,wheezing  CV:   negative for chest pain, palpitations, lower extremity edema  GI:   negative for nausea, vomiting, diarrhea, abdominal pain,melena  Endo:               negative for polyuria,polydipsia,polyphagia,heat intolerance  Genitourinary: negative for frequency, dysuria and hematuria  Integumentary: negative for rash and pruritus  Hematologic:  negative for easy bruising and gum/nose bleeding  Musculoskel: negative for myalgias, arthralgias, back pain, muscle weakness, joint pain  Neurological:  Positive for residual left-sided weakness related to previous stroke Behavl/Psych: negative for feelings of anxiety, depression, mood changes  ROS otherwise negative      Objective:  Visit Vitals    /72 (BP 1 Location: Left arm, BP Patient Position: Sitting)    Pulse 69    Ht 5' 4\" (1.626 m)    Wt 205 lb (93 kg)    SpO2 96%    BMI 35.19 kg/m2     Body mass index is 35.19 kg/(m^2). Physical Exam:   General appearance - alert, well appearing, and in no distress  Mental status - alert, oriented to person, place, and time  EYE-NICK, EOMI,conjunctiva normal bilaterally, lids normal  ENT-ENT exam normal, no neck nodes or sinus tenderness  Nose - normal and patent, no erythema,  Or discharge   Mouth - mucous membranes moist, pharynx normal without lesions  Neck - supple, no significant adenopathy or bruit  Chest - clear to auscultation, no wheezes, rales or rhonchi. Heart - normal rate, regular rhythm, normal S1, S2, no murmurs, rubs, clicks or gallops   Abdomen - soft, nontender, nondistended, no masses or organomegaly  Lymph- no adenopathy palpable  Ext-tonic lymphedema of both lower extremities. Pulses in the ankles were not felt  Skin-Warm and dry.  no hyperpigmentation, vitiligo, or suspicious lesions  Neuro -alert, oriented, normal speech. She does have residual hemiparesis on the left    Assessment/Plan:  Diagnoses and all orders for this visit:    Essential hypertension, benign    Dyslipidemia    Long-term use of high-risk medication    Acquired hypothyroidism    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 ([facility] [address]    PAF (paroxysmal atrial fibrillation) (HCC)    Anticoagulant long-term use  -     COLLECTION VENOUS BLOOD,VENIPUNCTURE  -     PROTHROMBIN TIME + INR    Cerebrovascular accident (CVA), unspecified mechanism ([address]        Other instructions: The patient's medications are reviewed and reconciled. No change in her current medical regimen is made. No added salt prudent diet is encouraged    Advanced care planning discussion occurred today    I have requested that she have copies of office notes from her eye specialist and foot specialist forwarded to us. We await results of her recent labs from her endocrinologist's office. Routine pro time is done today due to her chronic Coumadin usage    Follow-up for Medicare wellness check in 3 months    Follow-up Disposition:  Return in about 3 months (around 11/16/2018). I have reviewed with the patient details of the assessment and plan and all questions were answered. Relevent patient education was performed. The most recent lab findings were reviewed with the patient. An After Visit Summary was printed and given to the patient.     Ingrid Edward MD

## 2018-08-16 NOTE — PROGRESS NOTES
Nik Bernal is a 75 y.o. female presenting for Diabetes (6 mo fu)  . 1. Have you been to the ER, urgent care clinic since your last visit? Hospitalized since your last visit? No    2. Have you seen or consulted any other health care providers outside of the [facility] since your last visit? Include any pap smears or colon screening. Yes When: Yesterday Where: Dr Mary Craft Reason for visit: diabetic follow up. Fall Risk Assessment, last 12 mths 5/15/2018   Able to walk? No   Fall in past 12 months? -         Abuse Screening Questionnaire 5/15/2018   Do you ever feel afraid of your partner? N   Are you in a relationship with someone who physically or mentally threatens you? N   Is it safe for you to go home? Y       PHQ over the last two weeks 5/15/2018   Little interest or pleasure in doing things Not at all   Feeling down, depressed, irritable, or hopeless Not at all   Total Score PHQ 2 0       There are no discontinued medications.

## 2018-08-16 NOTE — PATIENT INSTRUCTIONS
Advance Care Planning: Care Instructions  Your Care Instructions    It can be hard to live with an illness that cannot be cured. But if your health is getting worse, you may want to make decisions about end-of-life care. Planning for the end of your life does not mean that you are giving up. It is a way to make sure that your wishes are met. Clearly stating your wishes can make it easier for your loved ones. Making plans while you are still able may also ease your mind and make your final days less stressful and more meaningful. Follow-up care is a key part of your treatment and safety. Be sure to make and go to all appointments, and call your doctor if you are having problems. It's also a good idea to know your test results and keep a list of the medicines you take. What can you do to plan for the end of life? · You can bring these issues up with your doctor. You do not need to wait until your doctor starts the conversation. You might start with \"I would not be willing to live with . [facility] \" When you complete this sentence it helps your doctor understand your wishes. · Talk openly and honestly with your doctor. This is the best way to understand the decisions you will need to make as your health changes. Know that you can always change your mind. · Ask your doctor about commonly used life-support measures. These include tube feedings, breathing machines, and fluids given through a vein (IV). Understanding these treatments will help you decide whether you want them. · You may choose to have these life-supporting treatments for a limited time. This allows a trial period to see whether they will help you. You may also decide that you want your doctor to take only certain measures to keep you alive. It is important to spell out these conditions so that your doctor and family understand them. · Talk to your doctor about how long you are likely to live.  He or she may be able to give you an idea of what usually happens with your specific illness. · Think about preparing papers that state your wishes. This way there will not be any confusion about what you want. You can change your instructions at any time. Which papers should you prepare? Advance directives are legal papers that tell doctors how you want to be cared for at the end of your life. You do not need a  to write these papers. Ask your doctor or your state health department for information on how to write your advance directives. They may have the forms for each of these types of papers. Make sure your doctor has a copy of these on file, and give a copy to a family member or close friend. · Consider a do-not-resuscitate order (DNR). This order asks that no extra treatments be done if your heart stops or you stop breathing. Extra treatments may include cardiopulmonary resuscitation (CPR), electrical shock to restart your heart, or a machine to breathe for you. If you decide to have a DNR order, ask your doctor to explain and write it. Place the order in your home where everyone can easily see it. · Consider a living will. A living will explains your wishes about life support and other treatments at the end of your life if you become unable to speak for yourself. Living baker tell doctors to use or not use treatments that would keep you alive. You must have one or two witnesses or a notary present when you sign this form. · Consider a durable power of  for health care. This allows you to name a person to make decisions about your care if you are not able to. Most people ask a close friend or family member. Talk to this person about the kinds of treatments you want and those that you do not want. Make sure this person understands your wishes. These legal papers are simple to change. Tell your doctor what you want to change, and ask him or her to make a note in your medical file. Give your family updated copies of the papers.   Where can you learn more?  Go to http://aly-robert.info/. Enter P184 in the search box to learn more about \"Advance Care Planning: Care Instructions. \"  Current as of: October 6, 2017  Content Version: 11.7  © 9217-5230 [employer]. Care instructions adapted under license by [employer] (which disclaims liability or warranty for this information). If you have questions about a medical condition or this instruction, always ask your healthcare professional. [address] any warranty or liability for your use of this information.

## 2018-08-17 ENCOUNTER — TELEPHONE ANTICOAG (OUTPATIENT)
Dept: INTERNAL MEDICINE CLINIC | Age: 73
End: 2018-08-17

## 2018-08-17 DIAGNOSIS — I48.0 PAF (PAROXYSMAL ATRIAL FIBRILLATION) (HCC): Primary | ICD-10-CM

## 2018-08-17 NOTE — TELEPHONE ENCOUNTER
Requested Prescriptions     Pending Prescriptions Disp Refills    flash glucose scanning reader (FREESTYLE JOSE LUIS READER) misc 1 Device 0     Sig: Use to check blood sugars daily  DX: E11.9    flash glucose sensor (FREESTYLE JOSE LUIS SENSOR) kit 1 Device 0     Sig: Use to check blood sugars daily  DX: E11.9   Patient called requesting to try this new device to check blood sugars.

## 2018-08-17 NOTE — PROGRESS NOTES
Anticoagulation Summary as of 8/17/2018     INR goal 2.0-3.0   Today's INR 1.7! (8/16/2018)   Warfarin maintenance plan 4 mg (2 mg x 2) on Sun, Wed; 3 mg (2 mg x 1.5) all other days   Weekly warfarin total 23 mg   Plan last modified Maryfrances Cord (8/17/2017)   Next INR check 9/17/2018   Target end date Indefinite    Indications   PAF (paroxysmal atrial fibrillation) ([facility] [address] (Primary) [I48.0]         Anticoagulation Episode Summary     INR check location     Preferred lab     Send INR reminders to     Comments       Anticoagulation Care Providers     Provider Role Specialty Phone number    Esa Barragan MD Responsible Internal Medicine [phone]      Patient advised to take an extra 2.5mg of coumadin today, then resume usual regimen and recheck PT in 1 month.

## 2018-08-19 RX ORDER — FUROSEMIDE 40 MG/1
TABLET ORAL
Qty: 90 TAB | Refills: 2 | Status: SHIPPED | OUTPATIENT
Start: 2018-08-19 | End: 2019-06-27 | Stop reason: SDUPTHER

## 2018-09-21 ENCOUNTER — LAB ONLY (OUTPATIENT)
Dept: INTERNAL MEDICINE CLINIC | Age: 73
End: 2018-09-21

## 2018-09-21 DIAGNOSIS — I48.0 PAF (PAROXYSMAL ATRIAL FIBRILLATION) (HCC): Primary | Chronic | ICD-10-CM

## 2018-09-21 LAB
INR BLD: 2
PT POC: 27 SECONDS
VALID INTERNAL CONTROL?: YES

## 2018-09-24 ENCOUNTER — TELEPHONE ANTICOAG (OUTPATIENT)
Dept: INTERNAL MEDICINE CLINIC | Age: 73
End: 2018-09-24

## 2018-09-24 DIAGNOSIS — I48.0 PAF (PAROXYSMAL ATRIAL FIBRILLATION) (HCC): Primary | ICD-10-CM

## 2018-09-24 NOTE — PROGRESS NOTES
Anticoagulation Episode Summary     Current INR goal 2.0-3.0   Next INR check 10/24/2018   INR from last check 2.0 (9/21/2018)   Weekly max warfarin dose    Target end date Indefinite   INR check location    Preferred lab    Send INR reminders to     Indications   PA[facility] (Primary) [I48.0]           Comments          Anticoagulation Care Providers     Provider Role Specialty Phone number    Bing Lemon MD Responsible Internal Medicine [phone]        Patient notified no change in coumadin dose and advised to re check protime in 1 month

## 2018-10-18 ENCOUNTER — LAB ONLY (OUTPATIENT)
Dept: INTERNAL MEDICINE CLINIC | Age: 73
End: 2018-10-18

## 2018-10-18 DIAGNOSIS — I48.0 PAF (PAROXYSMAL ATRIAL FIBRILLATION) (HCC): Primary | Chronic | ICD-10-CM

## 2018-10-18 LAB
INR BLD: 1.6
PT POC: 21.6 SECONDS
VALID INTERNAL CONTROL?: YES

## 2018-10-19 ENCOUNTER — TELEPHONE ANTICOAG (OUTPATIENT)
Dept: INTERNAL MEDICINE CLINIC | Age: 73
End: 2018-10-19

## 2018-10-19 DIAGNOSIS — I48.0 PAF (PAROXYSMAL ATRIAL FIBRILLATION) (HCC): Primary | ICD-10-CM

## 2018-10-19 NOTE — PROGRESS NOTES
Anticoagulation Episode Summary     Current INR goal:   2.0-3.0   TTR:   51.0 % (1.1 y)   Next INR check:   11/19/2018   INR from last check:   1.6! (10/18/2018)   Weekly max warfarin dose:      Target end date: Indefinite   INR check location:      Preferred lab:      Send INR reminders to:        Indications    PAF (paroxysmal atrial fibrillation) ([facility] [address] (Primary) [I48.0]           Comments:            Anticoagulation Care Providers     Provider Role Specialty Phone number    Jefferson Norman MD [facility] Internal Medicine [phone]        Patient advised to take an extra 2 mg of coumadin today and then resume usual dose and re check protime in 1 month

## 2018-10-19 NOTE — PROGRESS NOTES
Patient notified of lab results patient states she only has 2 mg tablets and they are to tiny to cut into a quarter she will take an extra 2 mg today instead of 2.5mg and re check in 1 month

## 2018-11-13 RX ORDER — PRAVASTATIN SODIUM 80 MG/1
TABLET ORAL
Qty: 90 TAB | Refills: 3 | Status: SHIPPED | OUTPATIENT
Start: 2018-11-13 | End: 2020-01-03

## 2018-11-21 ENCOUNTER — OFFICE VISIT (OUTPATIENT)
Dept: INTERNAL MEDICINE CLINIC | Age: 73
End: 2018-11-21

## 2018-11-21 VITALS
SYSTOLIC BLOOD PRESSURE: 110 MMHG | HEART RATE: 67 BPM | BODY MASS INDEX: 35 KG/M2 | DIASTOLIC BLOOD PRESSURE: 80 MMHG | OXYGEN SATURATION: 96 % | HEIGHT: 64 IN | WEIGHT: 205 LBS

## 2018-11-21 DIAGNOSIS — Z12.11 COLON CANCER SCREENING: ICD-10-CM

## 2018-11-21 DIAGNOSIS — Z00.00 INITIAL MEDICARE ANNUAL WELLNESS VISIT: Primary | ICD-10-CM

## 2018-11-21 DIAGNOSIS — Z23 ENCOUNTER FOR IMMUNIZATION: ICD-10-CM

## 2018-11-21 DIAGNOSIS — Z79.01 ANTICOAGULANT LONG-TERM USE: Chronic | ICD-10-CM

## 2018-11-21 NOTE — PROGRESS NOTES
Chief Complaint   Patient presents with    Annual Wellness Visit       Depression Risk Factor Screening:     PHQ over the last two weeks 11/21/2018   Little interest or pleasure in doing things Several days   Feeling down, depressed, irritable, or hopeless Several days   Total Score PHQ 2 2       Functional Ability and Level of Safety:     Activities of Daily Living  ADL Assessment 11/21/2018   Getting from bed to chair No Help Needed   Getting dressed Help Needed   Bathing or showering Help Needed   Walk across the room (includes cane/walker) Help Needed   Using the telphone No Help Needed   Taking your medications No Help Needed   Preparing meals Help Needed   Managing money (expenses/bills) Help Needed   Moderately strenuous housework (laundry) Help Needed   Shopping for personal items (toiletries/medicines) Help Needed   Shopping for groceries Help Needed   Driving Help Needed   Climbing a flight of stairs Help Needed   Getting to places beyond walking distances Help Needed       Fall Risk  Fall Risk Assessment, last 12 mths 11/21/2018   Able to walk? No   Fall in past 12 months? -       Abuse Screen  Abuse Screening Questionnaire 5/15/2018   Do you ever feel afraid of your partner? N   Are you in a relationship with someone who physically or mentally threatens you? N   Is it safe for you to go home?  Y         Patient Care Team   Patient Care Team:  Mason You MD as PCP - General (Internal Medicine)  Rachel Sun DPM (Podiatry)  Jessica Frey MD (Endocrinology)

## 2018-11-21 NOTE — PROGRESS NOTES
This is an Initial Medicare Annual Wellness Exam (AWV) (Performed 12 months after IPPE or effective date of Medicare Part B enrollment, Once in a lifetime)    I have reviewed the patient's medical history in detail and updated the computerized patient record. History     Past Medical History:   Diagnosis Date    Anticoagulant long-term use 10/13/2017    Anxiety 10/13/2017    Atrial fibrillation ([address] 10/13/2017    Breast calcification, left 10/13/2017    CAD (coronary artery disease)     Cellulitis of both lower extremities 10/13/2017    Chronic kidney disease     kidney stones    Chronic pain syndrome 10/13/2017    Chronic prescription opiate use 11/15/2017    CVA (cerebral vascular accident) ([address] 10/13/2017    Diabetes ([address]     DJD (degenerative joint disease) 10/13/2017    Dyslipidemia 10/13/2017    Glaucoma 10/13/2017    Hypertension     Hyperthyroidism 10/13/2017    Long-term use of high-risk medication 10/13/2017    Stasis ulcer of lower extremity ([address] 10/13/2017    Stroke ([address]     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 ([address] 8/18/2012    retinopathy       Past Surgical History:   Procedure Laterality Date    CARDIAC SURG PROCEDURE UNLIST      cagb    HX GYN      hysterectomty    HX ORTHOPAEDIC      back surgery     Current Outpatient Medications   Medication Sig Dispense Refill    pravastatin (PRAVACHOL) 80 mg tablet TAKE 1 TABLET NIGHTLY 90 Tab 3    furosemide (LASIX) 40 mg tablet TAKE 1 TABLET DAILY 90 Tab 2    flash glucose scanning reader (FREESTYLE JOSE LUIS READER) [facility] Use to check blood sugars daily  DX: E11.9 1 Device 0    flash glucose sensor (FREESTYLE JOSE LUIS SENSOR) kit Use to check blood sugars daily  DX: E11.9 1 Device 0    LORazepam (ATIVAN) 0.5 mg tablet Take 1 Tab by mouth nightly as needed.  Max Daily Amount: 0.5 mg. 90 Tab 0    captopril (CAPOTEN) 25 mg tablet TAKE 1 TABLET TWICE A  Tab 3    warfarin (COUMADIN) 2 mg tablet TAKE ONE AND ONE-HALF TABLETS DAILY FOR FIVE DAYS A WEEK AND 2 TABLETS ON WEDNESDAY AND SUNDAY 135 Tab 3    carvedilol (COREG) 12.5 mg tablet TAKE 1 TABLET TWICE A  Tab 1    NOVOLIN N NPH U-100 INSULIN 100 unit/mL injection INJECT 44 UNITS UNDER THE SKIN IN THE MORNING AND 36 UNITS IN THE EVENING (Patient taking differently: INJECT 41 UNITS UNDER THE SKIN IN THE MORNING AND 33 UNITS IN THE EVENING) 80 mL 2    ergocalciferol (VITAMIN D2) 50,000 unit capsule Take 50,000 Units by mouth every seven (7) days.  methIMAzole (TAPAZOLE) 10 mg tablet Take  by mouth daily.  amLODIPine (NORVASC) 10 mg tablet TAKE 1 TABLET DAILY 90 Tab 2    cloNIDine HCl (CATAPRES) 0.1 mg tablet TAKE 1 TABLET THREE TIMES A  Tab 2    Blood-Glucose Meter (FREESTYLE FREEDOM LITE) monitoring kit Daily blood sugar checks 1 Kit 0    lancets (FREESTYLE LANCETS) 28 gauge misc Daily blood sugar checks 100 Lancet 3    glucose blood VI test strips (FREESTYLE TEST) strip by Does Not Apply route daily. Use once daily for daily blood glucose checks 100 Strip 3    naloxone (NARCAN) 4 mg/actuation nasal spray Use 1 spray intranasally into 1 nostril. Indications: OPIATE-INDUCED RESPIRATORY DEPRESSION, Opioid Toxicity 1 Each 0    latanoprost (XALATAN) 0.005 % ophthalmic solution Administer 1 Drop to both eyes nightly.  Insulin Syringe-Needle U-100 (BD INSULIN SYRINGE ULT-FINE II) 0.5 mL 31 gauge x 5/16 syrg 1 Each by Does Not Apply route two (2) times a day. 180 Syringe 3    aspirin (ASPIRIN) 325 mg tablet Take 1 Tab by mouth daily.        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 ([facility] E11.3299    Essential hypertension, benign I10    Acquired hypothyroidism E03.9    ASCVD (arteriosclerotic cardiovascular disease) I25.10    PAF (paroxysmal atrial fibrillation) (HCC) I48.0    Vertebral artery aneurysm (HCC) I72.6    Cellulitis and abscess of leg, except foot L03.119, L02.419    Glaucoma H40.9    Hyperthyroidism E05.90    Chronic pain syndrome G89.4    Anticoagulant long-term use Z79.01    Lymphedema of both lower extremities I89.0    Long-term use of high-risk medication Z79.899    Dyslipidemia E78.5    Stasis ulcer of lower extremity (HCC) I83.009, L97.909    Anxiety F41.9    Cellulitis of both lower extremities L03.115, L03. 116    Breast calcification, left R92.1    CVA (cerebral vascular accident) ([facility] I63.9    DJD (degenerative joint disease) M19.90    Chronic prescription opiate use Z79.891       Depression Risk Factor Screening:     PHQ over the last two weeks 11/21/2018   Little interest or pleasure in doing things Several days   Feeling down, depressed, irritable, or hopeless Several days   Total Score PHQ 2 2     Alcohol Risk Factor Screening: You do not drink alcohol or very rarely. Functional Ability and Level of Safety:     Hearing Loss  Hearing is good. Activities of Daily Living  The home contains: handrails and grab bars  Patient needs help with:  bathing    Fall Risk  Fall Risk Assessment, last 12 mths 11/21/2018   Able to walk? No   Fall in past 12 months?  -       Abuse Screen  Patient is not abused    Cognitive Screening   Evaluation of Cognitive Function:  Has your family/caregiver stated any concerns about your memory: no  Normal    Patient Care Team   Patient Care Team:  Calos Potter MD as PCP - General (Internal Medicine)  Karla Fountain DPM (Podiatry)  Semaj Fontenot MD (Endocrinology)    Assessment/Plan   Education and counseling provided:  Are appropriate based on today's review and evaluation  Medical nutrition therapy for individuals with diabetes or renal disease    Diagnoses and all orders for this visit:    1. Initial Medicare annual wellness visit    2. Colon cancer screening    3. Anticoagulant long-term use    4. Encounter for immunization  -     ADMIN INFLUENZA VIRUS VAC  -     INFLUENZA VACCINE INACTIVATED (IIV), SUBUNIT, ADJUVANTED, IM         Health Maintenance Due   Topic Date Due    DTaP/Tdap/Td series (1 - Tdap) [birth year]/1965    Shingrix Vaccine Age 50> (1 of 2) 04/08/1994    FOBT Q 1 YEAR AGE 50-75  04/08/1994    Bone Densitometry (Dexa) Screening  04/08/2009    MEDICARE YEARLY EXAM  03/14/2018    Influenza Age 9 to Adult  08/01/2018    HEMOGLOBIN A1C Q6M  11/09/2018    MICROALBUMIN Q1  11/15/2018     The patient's medications were reviewed and reconciled. No change in her current medical regimen is made. Body mass index is 35.19 and dietary counseling along with printed patient education was given    Health maintenance issues were reviewed. Hemoglobin A1c and microalbumin will be obtained at her next scheduled appointment in 3 months. The patient states that she has had a bone density test done in the past and we will look for this record. She is in need of colorectal cancer screening and because of her anticoagulation we will have her perform the cologuard test.    Age-appropriate vaccinations were reviewed and she will receive an influenza vaccination today. I have recommended shingles and Tdap vaccination as well.     A PT/INR will be obtained today as she is due for this monthly    Follow-up in 3 months

## 2018-11-21 NOTE — PATIENT INSTRUCTIONS
The best way to stay healthy is to live a healthy lifestyle. A healthy lifestyle includes regular exercise, eating a well-balanced diet, keeping a healthy weight and not smoking. Regular physical exams and screening tests are another important way to take care of yourself. Preventive exams provided by health care providers can find health problems early when treatment works best and can keep you from getting certain diseases or illnesses. Preventive services include exams, lab tests, screenings, shots, monitoring and information to help you take care of your own health. All people over 65 should have a pneumonia shot. Pneumonia shots are usually only needed once in a lifetime unless your doctor decides differently. In addition to your physical exam, some screening tests are recommended: 
 
All people over 65 should have a yearly flu shot. People over 65 are at medium to high risk for Hepatitis B. Three shots are needed for complete protection. Bone mass measurement (dexa scan) is recommended every two years. Diabetes Mellitus screening is recommended every year. Glaucoma is an eye disease caused by high pressure in the eye. An eye exam is recommended every year. Cardiovascular screening tests that check your cholesterol and other blood fat (lipid) levels are recommended every five years. Colorectal Cancer screening tests help to find pre-cancerous polyps (growths in the colon) so they can be removed before they turn into cancer. Tests ordered for screening depend on your personal and family history risk factors. Prostate Cancer Screening (annually up to age 76) Screening for breast cancer is recommended yearly with a Mammogram. 
 
Screening for cervical and vaginal cancer is recommended with a pelvic and Pap test every two years.  However if you have had an abnormal pap in the past  three years or at high risk for cervical or vaginal cancer Medicare will cover a pap test and a pelvic exam every year. Here is a list of your current Health Maintenance items with a due date: 
Health Maintenance Due Topic Date Due  
 DTaP/Tdap/Td  (1 - Tdap) 04/08/1965  Shingles Vaccine (1 of 2) 04/08/1994  Stool testing for trace blood  04/08/1994  Bone Mineral Density   04/08/2009 [facility] Annual Well Visit  03/14/2018  Flu Vaccine  08/01/2018  Hemoglobin A1C    11/09/2018  Albumin Urine Test  11/15/2018 Medicare Wellness Visit, Female The best way to live healthy is to have a lifestyle where you eat a well-balanced diet, exercise regularly, limit alcohol use, and quit all forms of tobacco/nicotine, if applicable. Regular preventive services are another way to keep healthy. Preventive services (vaccines, screening tests, monitoring & exams) can help personalize your care plan, which helps you manage your own care. Screening tests can find health problems at the earliest stages, when they are easiest to treat. [facility] follows the current, evidence-based guidelines published by the [facility] States Giancarlo Norma (USPSTF) when recommending preventive services for our patients. Because we follow these guidelines, sometimes recommendations change over time as research supports it. (For example, mammograms used to be recommended annually. Even though Medicare will still pay for an annual mammogram, the newer guidelines recommend a mammogram every two years for women of average risk.) Of course, you and your doctor may decide to screen more often for some diseases, based on your risk and your health status. Preventive services for you include: - Medicare offers their members a free annual wellness visit, which is time for you and your primary care provider to discuss and plan for your preventive service needs.  Take advantage of this benefit every year! 
-All adults over the age of 72 should receive the recommended pneumonia vaccines. Current USPSTF guidelines recommend a series of two vaccines for the best pneumonia protection.  
-All adults should have a flu vaccine yearly and a tetanus vaccine every 10 years. All adults age 61 and older should receive a shingles vaccine once in their lifetime.   
-A bone mass density test is recommended when a woman turns 65 to screen for osteoporosis. This test is only recommended one time, as a screening. Some providers will use this same test as a disease monitoring tool if you already have osteoporosis. -All adults age 38-68 who are overweight should have a diabetes screening test once every three years.  
-Other screening tests and preventive services for persons with diabetes include: an eye exam to screen for diabetic retinopathy, a kidney function test, a foot exam, and stricter control over your cholesterol.  
-Cardiovascular screening for adults with routine risk involves an electrocardiogram (ECG) at intervals determined by your doctor.  
-Colorectal cancer screenings should be done for adults age 54-65 with no increased risk factors for colorectal cancer. There are a number of acceptable methods of screening for this type of cancer. Each test has its own benefits and drawbacks. Discuss with your doctor what is most appropriate for you during your annual wellness visit. The different tests include: colonoscopy (considered the best screening method), a fecal occult blood test, a fecal DNA test, and sigmoidoscopy. -Breast cancer screenings are recommended every other year for women of normal risk, age 54-69. 
-Cervical cancer screenings for women over age 72 are only recommended with certain risk factors.  
-All adults born between [facility] should be screened once for Hepatitis C. Here is a list of your current Health Maintenance items (your personalized list of preventive services) with a due date: 
Health Maintenance Due Topic Date Due  
  DTaP/Tdap/Td  (1 - Tdap) 04/08/1965  Shingles Vaccine (1 of 2) 04/08/1994  Stool testing for trace blood  04/08/1994  Bone Mineral Density   04/08/2009 [employer] Annual Well Visit  03/14/2018  Flu Vaccine  08/01/2018  Hemoglobin A1C    11/09/2018  Albumin Urine Test  11/15/2018 Vaccine Information Statement Influenza (Flu) Vaccine (Inactivated or Recombinant): What you need to know Many Vaccine Information Statements are available in [place] and other languages. See www.immunize.org/vis Hojas de Información Sobre Vacunas están disponibles en Español y en muchos otros idiomas. Visite www.immunize.org/vis 1. Why get vaccinated? Influenza (flu) is a contagious disease that spreads around the [place] every year, usually between October and May. Flu is caused by influenza viruses, and is spread mainly by coughing, sneezing, and close contact. Anyone can get flu. Flu strikes suddenly and can last several days. Symptoms vary by age, but can include: 
 fever/chills  sore throat  muscle aches  fatigue  cough  headache  runny or stuffy nose Flu can also lead to pneumonia and blood infections, and cause diarrhea and seizures in children. If you have a medical condition, such as heart or lung disease, flu can make it worse. Flu is more dangerous for some people. Infants and young children, people 72years of age and older, pregnant women, and people with certain health conditions or a weakened immune system are at greatest risk. Each year thousands of people in the [facility] die from flu, and many more are hospitalized. Flu vaccine can: 
 keep you from getting flu, 
 make flu less severe if you do get it, and 
 keep you from spreading flu to your family and other people. 2. Inactivated and recombinant flu vaccines A dose of flu vaccine is recommended every flu season.  Children 6 months through 6years of age may need two doses during the same flu season. Everyone else needs only one dose each flu season. Some inactivated flu vaccines contain a very small amount of a mercury-based preservative called thimerosal. Studies have not shown thimerosal in vaccines to be harmful, but flu vaccines that do not contain thimerosal are available. There is no live flu virus in flu shots. They cannot cause the flu. There are many flu viruses, and they are always changing. Each year a new flu vaccine is made to protect against three or four viruses that are likely to cause disease in the upcoming flu season. But even when the vaccine doesnt exactly match these viruses, it may still provide some protection Flu vaccine cannot prevent: 
 flu that is caused by a virus not covered by the vaccine, or 
 illnesses that look like flu but are not. It takes about 2 weeks for protection to develop after vaccination, and protection lasts through the flu season. 3. Some people should not get this vaccine Tell the person who is giving you the vaccine:  If you have any severe, life-threatening allergies. If you ever had a life-threatening allergic reaction after a dose of flu vaccine, or have a severe allergy to any part of this vaccine, you may be advised not to get vaccinated. Most, but not all, types of flu vaccine contain a small amount of egg protein.  If you ever had Guillain-Barré Syndrome (also called GBS). Some people with a history of GBS should not get this vaccine. This should be discussed with your doctor.  If you are not feeling well. It is usually okay to get flu vaccine when you have a mild illness, but you might be asked to come back when you feel better. 4. Risks of a vaccine reaction With any medicine, including vaccines, there is a chance of reactions. These are usually mild and go away on their own, but serious reactions are also possible. Most people who get a flu shot do not have any problems with it. Minor problems following a flu shot include:  
 soreness, redness, or swelling where the shot was given  hoarseness  sore, red or itchy eyes  cough  fever  aches  headache  itching  fatigue If these problems occur, they usually begin soon after the shot and last 1 or 2 days. More serious problems following a flu shot can include the following:  There may be a small increased risk of Guillain-Barré Syndrome (GBS) after inactivated flu vaccine. This risk has been estimated at 1 or 2 additional cases per million people vaccinated. This is much lower than the risk of severe complications from flu, which can be prevented by flu vaccine.  Young children who get the flu shot along with pneumococcal vaccine (PCV13) and/or DTaP vaccine at the same time might be slightly more likely to have a seizure caused by fever. Ask your doctor for more information. Tell your doctor if a child who is getting flu vaccine has ever had a seizure. Problems that could happen after any injected vaccine:  People sometimes faint after a medical procedure, including vaccination. Sitting or lying down for about 15 minutes can help prevent fainting, and injuries caused by a fall. Tell your doctor if you feel dizzy, or have vision changes or ringing in the ears.  Some people get severe pain in the shoulder and have difficulty moving the arm where a shot was given. This happens very rarely.  Any medication can cause a severe allergic reaction. Such reactions from a vaccine are very rare, estimated at about 1 in a million doses, and would happen within a few minutes to a few hours after the vaccination. As with any medicine, there is a very remote chance of a vaccine causing a serious injury or death. The safety of vaccines is always being monitored.  For more information, visit: www.cdc.gov/vaccinesafety/ 
 
 5. What if there is a serious reaction? What should I look for?  Look for anything that concerns you, such as signs of a severe allergic reaction, very high fever, or unusual behavior. Signs of a severe allergic reaction can include hives, swelling of the face and throat, difficulty breathing, a fast heartbeat, dizziness, and weakness  usually within a few minutes to a few hours after the vaccination. What should I do?  If you think it is a severe allergic reaction or other emergency that cant wait, call 9-1-1 and get the person to the nearest hospital. Otherwise, call your doctor.  Reactions should be reported to the Vaccine Adverse Event Reporting System (VAERS). Your doctor should file this report, or you can do it yourself through  the VAERS web site at www.vaers. [facility].gov, or by calling 1-514.384.8018. VAERS does not give medical advice. 6. The National Vaccine Injury Compensation Program 
 
The [facility] Vaccine Injury Compensation Program (VICP) is a federal program that was created to compensate people who may have been injured by certain vaccines. Persons who believe they may have been injured by a vaccine can learn about the program and about filing a claim by calling 5-989.869.1604 or visiting the [facility] [employer] website at www.[facility].gov/vaccinecompensation. There is a time limit to file a claim for compensation. 7. How can I learn more?  Ask your healthcare provider. He or she can give you the vaccine package insert or suggest other sources of information.  Call your local or state health department.  Contact the Centers for Disease Control and Prevention (CDC): 
- Call 9-964.403.4495 (1-800-CDC-INFO) or 
- Visit CDCs website at www.cdc.gov/flu Vaccine Information Statement Inactivated Influenza Vaccine 8/7/2015 
42 UGodron Herrmann [id number] Department of [facility] and [employer] Centers for Disease Control and Prevention Office Use Only Learning About Cutting Calories How do calories affect your weight? Food gives your body energy. Energy from the food you eat is measured in calories. This energy keeps your heart beating, your brain active, and your muscles working. Your body needs a certain number of calories each day. After your body uses the calories it needs, it stores extra calories as fat. To lose weight safely, you have to eat fewer calories while eating in a healthy way. How many calories do you need each day? The more active you are, the more calories you need. When you are less active, you need fewer calories. How many calories you need each day also depends on several things, including your age and whether you are male or female. Here are some general guidelines for adults: 
· Less active women and older adults need 1,600 to 2,000 calories each day. · Active women and less active men need 2,000 to 2,400 calories each day. · Active men need 2,400 to 3,000 calories each day. How can you cut calories and eat healthy meals? Whole grains, vegetables and fruits, and dried beans are good lower-calorie foods. They give you lots of nutrients and fiber. And they fill you up. Sweets, energy drinks, and soda pop are high in calories. They give you few nutrients and no fiber. Try to limit soda pop, fruit juice, and energy drinks. Drink water instead. Some fats can be part of a healthy diet. But cutting back on fats from highly processed foods like fast foods and many snack foods is a good way to lower the calories in your diet. Also, use smaller amounts of fats like butter, margarine, salad dressing, and mayonnaise. Add fresh garlic, lemon, or herbs to your meals to add flavor without adding fat. Meats and dairy products can be a big source of hidden fats. Try to choose lean or low-fat versions of these products.  
Fat-free cookies, candies, chips, and frozen treats can still be high in sugar and calories. Some fat-free foods have more calories than regular ones. Eat fat-free treats in moderation, as you would other foods. If your favorite foods are high in fat, salt, sugar, or calories, limit how often you eat them. Eat smaller servings, or look for healthy substitutes. Fill up on fruits, vegetables, and whole grains. Eating at home · Use meat as a side dish instead of as the main part of your meal. 
· Try main dishes that use whole wheat pasta, brown rice, dried beans, or vegetables. · Find ways to cook with little or no fat, such as broiling, steaming, or grilling. · Use cooking spray instead of oil. If you use oil, use a monounsaturated oil, such as canola or olive oil. · Trim fat from meats before you cook them. · Drain off fat after you brown the meat or while you roast it. · Chill soups and stews after you cook them. Then skim the fat off the top after it hardens. Eating out · Order foods that are broiled or poached rather than fried or breaded. · Cut back on the amount of butter or margarine that you use on bread. · Order sauces, gravies, and salad dressings on the side, and use only a little. · When you order pasta, choose tomato sauce rather than cream sauce. · Ask for salsa with your baked potato instead of sour cream, butter, cheese, or ward. · Order meals in a small size instead of upgrading to a large. · Share an entree, or take part of your food home to eat as another meal. 
· Share appetizers and desserts. Where can you learn more? Go to http://aly-robert.info/. Enter 99 [id number] in the search box to learn more about \"Learning About Cutting Calories. \" Current as of: March 29, 2018 Content Version: 11.8 © 3699-9368 [employer]wise[employer]. Care instructions adapted under license by [employer] (which disclaims liability or warranty for this information).  If you have questions about a medical condition or this instruction, always ask your healthcare professional. [place] [address] any warranty or liability for your use of this information.

## 2018-11-22 LAB
INR PPP: 1.5 (ref 0.8–1.2)
PROTHROMBIN TIME: 15.6 SEC (ref 9.1–12)

## 2018-11-28 ENCOUNTER — TELEPHONE ANTICOAG (OUTPATIENT)
Dept: INTERNAL MEDICINE CLINIC | Age: 73
End: 2018-11-28

## 2018-11-28 DIAGNOSIS — I48.0 PAF (PAROXYSMAL ATRIAL FIBRILLATION) (HCC): Primary | ICD-10-CM

## 2018-11-28 NOTE — PROGRESS NOTES
Anticoagulation Episode Summary     Current INR goal:   2.0-3.0   TTR:   47.2 % (1.2 y)   Next INR check:   12/28/2018   INR from last check:   1.5! (11/21/2018)   Weekly max warfarin dose:      Target end date: Indefinite   INR check location:      Preferred lab:      Send INR reminders to:        Indications    PAF (paroxysmal atrial fibrillation) ([facility] [address] (Primary) [I48.0]           Comments:            Anticoagulation Care Providers     Provider Role Specialty Phone number    Anthony Miner MD [facility] [phone]        Patient advised to take an extra 5 mg of coumadin today and then resume usual dose and recheck protime in 1 month

## 2018-12-12 ENCOUNTER — TELEPHONE (OUTPATIENT)
Dept: INTERNAL MEDICINE CLINIC | Age: 73
End: 2018-12-12

## 2018-12-12 DIAGNOSIS — R19.5 POSITIVE COLORECTAL CANCER SCREENING USING COLOGUARD TEST: Primary | ICD-10-CM

## 2018-12-12 LAB — COLOGUARD TEST, EXTERNAL: POSITIVE

## 2018-12-12 NOTE — TELEPHONE ENCOUNTER
Patient notified of positive cologuard result.    Needs a referral to a GI specialist   Teri Holstein to schedule

## 2018-12-31 RX ORDER — CLONIDINE HYDROCHLORIDE 0.1 MG/1
TABLET ORAL
Qty: 270 TAB | Refills: 2 | Status: SHIPPED | OUTPATIENT
Start: 2018-12-31 | End: 2019-09-27 | Stop reason: SDUPTHER

## 2019-01-30 ENCOUNTER — APPOINTMENT (OUTPATIENT)
Dept: CT IMAGING | Age: 74
DRG: 871 | End: 2019-01-30
Attending: EMERGENCY MEDICINE
Payer: MEDICARE

## 2019-01-30 ENCOUNTER — HOSPITAL ENCOUNTER (INPATIENT)
Age: 74
LOS: 5 days | Discharge: HOME HEALTH | DRG: 871 | End: 2019-02-04
Attending: EMERGENCY MEDICINE | Admitting: INTERNAL MEDICINE
Payer: MEDICARE

## 2019-01-30 ENCOUNTER — APPOINTMENT (OUTPATIENT)
Dept: GENERAL RADIOLOGY | Age: 74
DRG: 871 | End: 2019-01-30
Attending: EMERGENCY MEDICINE
Payer: MEDICARE

## 2019-01-30 DIAGNOSIS — E11.3299 TYPE 2 DIABETES MELLITUS WITH BACKGROUND RETINOPATHY (HCC): Chronic | ICD-10-CM

## 2019-01-30 DIAGNOSIS — E03.2 HYPOTHYROIDISM DUE TO MEDICATION: ICD-10-CM

## 2019-01-30 DIAGNOSIS — N39.0 URINARY TRACT INFECTION WITHOUT HEMATURIA, SITE UNSPECIFIED: ICD-10-CM

## 2019-01-30 DIAGNOSIS — N30.00 ACUTE CYSTITIS WITHOUT HEMATURIA: ICD-10-CM

## 2019-01-30 DIAGNOSIS — I10 ESSENTIAL HYPERTENSION, BENIGN: Chronic | ICD-10-CM

## 2019-01-30 DIAGNOSIS — E03.9 ACQUIRED HYPOTHYROIDISM: Chronic | ICD-10-CM

## 2019-01-30 DIAGNOSIS — E05.90 HYPERTHYROIDISM: ICD-10-CM

## 2019-01-30 DIAGNOSIS — T68.XXXA HYPOTHERMIA, INITIAL ENCOUNTER: Primary | ICD-10-CM

## 2019-01-30 DIAGNOSIS — E16.2 HYPOGLYCEMIA: ICD-10-CM

## 2019-01-30 DIAGNOSIS — A41.9 SEPSIS, DUE TO UNSPECIFIED ORGANISM: ICD-10-CM

## 2019-01-30 DIAGNOSIS — G93.41 METABOLIC ENCEPHALOPATHY: ICD-10-CM

## 2019-01-30 LAB
ALBUMIN SERPL-MCNC: 3.9 G/DL (ref 3.5–5)
ALBUMIN/GLOB SERPL: 0.6 {RATIO} (ref 1.1–2.2)
ALP SERPL-CCNC: 94 U/L (ref 45–117)
ALT SERPL-CCNC: 31 U/L (ref 12–78)
ANION GAP SERPL CALC-SCNC: 6 MMOL/L (ref 5–15)
APPEARANCE UR: ABNORMAL
APTT PPP: 29.9 SEC (ref 22.1–32)
AST SERPL-CCNC: 33 U/L (ref 15–37)
BACTERIA URNS QL MICRO: ABNORMAL /HPF
BASOPHILS # BLD: 0 K/UL (ref 0–0.1)
BASOPHILS NFR BLD: 0 % (ref 0–1)
BILIRUB SERPL-MCNC: 0.5 MG/DL (ref 0.2–1)
BILIRUB UR QL: NEGATIVE
BUN SERPL-MCNC: 14 MG/DL (ref 6–20)
BUN/CREAT SERPL: 13 (ref 12–20)
CALCIUM SERPL-MCNC: 8.9 MG/DL (ref 8.5–10.1)
CHLORIDE SERPL-SCNC: 104 MMOL/L (ref 97–108)
CO2 SERPL-SCNC: 27 MMOL/L (ref 21–32)
COLOR UR: ABNORMAL
CREAT SERPL-MCNC: 1.05 MG/DL (ref 0.55–1.02)
DIFFERENTIAL METHOD BLD: ABNORMAL
EOSINOPHIL # BLD: 0.1 K/UL (ref 0–0.4)
EOSINOPHIL NFR BLD: 1 % (ref 0–7)
EPITH CASTS URNS QL MICRO: ABNORMAL /LPF
ERYTHROCYTE [DISTWIDTH] IN BLOOD BY AUTOMATED COUNT: 12.2 % (ref 11.5–14.5)
GLOBULIN SER CALC-MCNC: 6.7 G/DL (ref 2–4)
GLUCOSE BLD STRIP.AUTO-MCNC: 112 MG/DL (ref 65–100)
GLUCOSE BLD STRIP.AUTO-MCNC: 128 MG/DL (ref 65–100)
GLUCOSE BLD STRIP.AUTO-MCNC: 144 MG/DL (ref 65–100)
GLUCOSE BLD STRIP.AUTO-MCNC: 189 MG/DL (ref 65–100)
GLUCOSE BLD STRIP.AUTO-MCNC: 64 MG/DL (ref 65–100)
GLUCOSE BLD STRIP.AUTO-MCNC: 64 MG/DL (ref 65–100)
GLUCOSE BLD STRIP.AUTO-MCNC: 75 MG/DL (ref 65–100)
GLUCOSE BLD STRIP.AUTO-MCNC: 75 MG/DL (ref 65–100)
GLUCOSE BLD STRIP.AUTO-MCNC: 76 MG/DL (ref 65–100)
GLUCOSE BLD STRIP.AUTO-MCNC: 76 MG/DL (ref 65–100)
GLUCOSE SERPL-MCNC: 94 MG/DL (ref 65–100)
GLUCOSE UR STRIP.AUTO-MCNC: 250 MG/DL
HCT VFR BLD AUTO: 49.1 % (ref 35–47)
HGB BLD-MCNC: 17.1 G/DL (ref 11.5–16)
HGB UR QL STRIP: NEGATIVE
HYALINE CASTS URNS QL MICRO: ABNORMAL /LPF (ref 0–5)
IMM GRANULOCYTES # BLD AUTO: 0 K/UL (ref 0–0.04)
IMM GRANULOCYTES NFR BLD AUTO: 0 % (ref 0–0.5)
INR PPP: 1.5 (ref 0.9–1.1)
KETONES UR QL STRIP.AUTO: NEGATIVE MG/DL
LACTATE BLD-SCNC: 1.33 MMOL/L (ref 0.4–2)
LEUKOCYTE ESTERASE UR QL STRIP.AUTO: ABNORMAL
LYMPHOCYTES # BLD: 1.1 K/UL (ref 0.8–3.5)
LYMPHOCYTES NFR BLD: 15 % (ref 12–49)
MAGNESIUM SERPL-MCNC: 2.2 MG/DL (ref 1.6–2.4)
MCH RBC QN AUTO: 32.6 PG (ref 26–34)
MCHC RBC AUTO-ENTMCNC: 34.8 G/DL (ref 30–36.5)
MCV RBC AUTO: 93.7 FL (ref 80–99)
MONOCYTES # BLD: 0.2 K/UL (ref 0–1)
MONOCYTES NFR BLD: 3 % (ref 5–13)
NEUTS SEG # BLD: 5.6 K/UL (ref 1.8–8)
NEUTS SEG NFR BLD: 81 % (ref 32–75)
NITRITE UR QL STRIP.AUTO: NEGATIVE
NRBC # BLD: 0 K/UL (ref 0–0.01)
NRBC BLD-RTO: 0 PER 100 WBC
PH UR STRIP: 6.5 [PH] (ref 5–8)
PLATELET # BLD AUTO: 144 K/UL (ref 150–400)
PMV BLD AUTO: 11.8 FL (ref 8.9–12.9)
POTASSIUM SERPL-SCNC: 3.6 MMOL/L (ref 3.5–5.1)
PROT SERPL-MCNC: 10.6 G/DL (ref 6.4–8.2)
PROT UR STRIP-MCNC: NEGATIVE MG/DL
PROTHROMBIN TIME: 14.6 SEC (ref 9–11.1)
RBC # BLD AUTO: 5.24 M/UL (ref 3.8–5.2)
RBC #/AREA URNS HPF: ABNORMAL /HPF (ref 0–5)
RBC MORPH BLD: ABNORMAL
SERVICE CMNT-IMP: ABNORMAL
SERVICE CMNT-IMP: NORMAL
SODIUM SERPL-SCNC: 137 MMOL/L (ref 136–145)
SP GR UR REFRACTOMETRY: 1.01 (ref 1–1.03)
T4 FREE SERPL-MCNC: 0.6 NG/DL (ref 0.8–1.5)
THERAPEUTIC RANGE,PTTT: NORMAL SECS (ref 58–77)
TROPONIN I SERPL-MCNC: <0.05 NG/ML
TSH SERPL DL<=0.05 MIU/L-ACNC: 12.8 UIU/ML (ref 0.36–3.74)
UA: UC IF INDICATED,UAUC: ABNORMAL
UROBILINOGEN UR QL STRIP.AUTO: 1 EU/DL (ref 0.2–1)
WBC # BLD AUTO: 7 K/UL (ref 3.6–11)
WBC URNS QL MICRO: ABNORMAL /HPF (ref 0–4)

## 2019-01-30 PROCEDURE — 82962 GLUCOSE BLOOD TEST: CPT

## 2019-01-30 PROCEDURE — 93005 ELECTROCARDIOGRAM TRACING: CPT

## 2019-01-30 PROCEDURE — 84443 ASSAY THYROID STIM HORMONE: CPT

## 2019-01-30 PROCEDURE — 83605 ASSAY OF LACTIC ACID: CPT

## 2019-01-30 PROCEDURE — 71045 X-RAY EXAM CHEST 1 VIEW: CPT

## 2019-01-30 PROCEDURE — 70450 CT HEAD/BRAIN W/O DYE: CPT

## 2019-01-30 PROCEDURE — 84480 ASSAY TRIIODOTHYRONINE (T3): CPT

## 2019-01-30 PROCEDURE — 85610 PROTHROMBIN TIME: CPT

## 2019-01-30 PROCEDURE — 83735 ASSAY OF MAGNESIUM: CPT

## 2019-01-30 PROCEDURE — 87077 CULTURE AEROBIC IDENTIFY: CPT

## 2019-01-30 PROCEDURE — 80053 COMPREHEN METABOLIC PANEL: CPT

## 2019-01-30 PROCEDURE — 85730 THROMBOPLASTIN TIME PARTIAL: CPT

## 2019-01-30 PROCEDURE — 87086 URINE CULTURE/COLONY COUNT: CPT

## 2019-01-30 PROCEDURE — 36415 COLL VENOUS BLD VENIPUNCTURE: CPT

## 2019-01-30 PROCEDURE — 74011250637 HC RX REV CODE- 250/637: Performed by: INTERNAL MEDICINE

## 2019-01-30 PROCEDURE — 87186 SC STD MICRODIL/AGAR DIL: CPT

## 2019-01-30 PROCEDURE — 74011250636 HC RX REV CODE- 250/636: Performed by: EMERGENCY MEDICINE

## 2019-01-30 PROCEDURE — 99285 EMERGENCY DEPT VISIT HI MDM: CPT

## 2019-01-30 PROCEDURE — 87040 BLOOD CULTURE FOR BACTERIA: CPT

## 2019-01-30 PROCEDURE — 81001 URINALYSIS AUTO W/SCOPE: CPT

## 2019-01-30 PROCEDURE — 94762 N-INVAS EAR/PLS OXIMTRY CONT: CPT

## 2019-01-30 PROCEDURE — 65660000000 HC RM CCU STEPDOWN

## 2019-01-30 PROCEDURE — 74011000258 HC RX REV CODE- 258: Performed by: EMERGENCY MEDICINE

## 2019-01-30 PROCEDURE — 84484 ASSAY OF TROPONIN QUANT: CPT

## 2019-01-30 PROCEDURE — 84439 ASSAY OF FREE THYROXINE: CPT

## 2019-01-30 PROCEDURE — 85025 COMPLETE CBC W/AUTO DIFF WBC: CPT

## 2019-01-30 RX ORDER — LORAZEPAM 0.5 MG/1
0.5 TABLET ORAL
Status: DISCONTINUED | OUTPATIENT
Start: 2019-01-30 | End: 2019-02-04 | Stop reason: HOSPADM

## 2019-01-30 RX ORDER — TRAMADOL HYDROCHLORIDE 50 MG/1
50 TABLET ORAL
COMMUNITY
End: 2021-06-09 | Stop reason: ALTCHOICE

## 2019-01-30 RX ORDER — DEXTROSE 50 % IN WATER (D50W) INTRAVENOUS SYRINGE
12.5-25 AS NEEDED
Status: DISCONTINUED | OUTPATIENT
Start: 2019-01-30 | End: 2019-02-04 | Stop reason: HOSPADM

## 2019-01-30 RX ORDER — CLONIDINE HYDROCHLORIDE 0.1 MG/1
0.1 TABLET ORAL 3 TIMES DAILY
Status: DISCONTINUED | OUTPATIENT
Start: 2019-01-30 | End: 2019-02-04 | Stop reason: HOSPADM

## 2019-01-30 RX ORDER — PRAVASTATIN SODIUM 40 MG/1
80 TABLET ORAL
Status: DISCONTINUED | OUTPATIENT
Start: 2019-01-30 | End: 2019-02-04 | Stop reason: HOSPADM

## 2019-01-30 RX ORDER — ACETAMINOPHEN 325 MG/1
650 TABLET ORAL
Status: DISCONTINUED | OUTPATIENT
Start: 2019-01-30 | End: 2019-02-04 | Stop reason: HOSPADM

## 2019-01-30 RX ORDER — INSULIN LISPRO 100 [IU]/ML
INJECTION, SOLUTION INTRAVENOUS; SUBCUTANEOUS
Status: DISCONTINUED | OUTPATIENT
Start: 2019-01-30 | End: 2019-02-04 | Stop reason: HOSPADM

## 2019-01-30 RX ORDER — DEXTROSE 50 % IN WATER (D50W) INTRAVENOUS SYRINGE
Status: DISPENSED
Start: 2019-01-30 | End: 2019-01-31

## 2019-01-30 RX ORDER — MAGNESIUM SULFATE 100 %
4 CRYSTALS MISCELLANEOUS AS NEEDED
Status: DISCONTINUED | OUTPATIENT
Start: 2019-01-30 | End: 2019-01-30 | Stop reason: SDUPTHER

## 2019-01-30 RX ORDER — ONDANSETRON 2 MG/ML
4 INJECTION INTRAMUSCULAR; INTRAVENOUS
Status: DISCONTINUED | OUTPATIENT
Start: 2019-01-30 | End: 2019-02-04 | Stop reason: HOSPADM

## 2019-01-30 RX ORDER — HYDRALAZINE HYDROCHLORIDE 20 MG/ML
10 INJECTION INTRAMUSCULAR; INTRAVENOUS
Status: DISCONTINUED | OUTPATIENT
Start: 2019-01-30 | End: 2019-02-04 | Stop reason: HOSPADM

## 2019-01-30 RX ORDER — DEXTROSE 50 % IN WATER (D50W) INTRAVENOUS SYRINGE
12.5-25 AS NEEDED
Status: DISCONTINUED | OUTPATIENT
Start: 2019-01-30 | End: 2019-01-30 | Stop reason: SDUPTHER

## 2019-01-30 RX ORDER — SODIUM CHLORIDE 0.9 % (FLUSH) 0.9 %
5-40 SYRINGE (ML) INJECTION EVERY 8 HOURS
Status: DISCONTINUED | OUTPATIENT
Start: 2019-01-30 | End: 2019-02-04 | Stop reason: HOSPADM

## 2019-01-30 RX ORDER — AMLODIPINE BESYLATE 5 MG/1
10 TABLET ORAL DAILY
Status: DISCONTINUED | OUTPATIENT
Start: 2019-01-31 | End: 2019-02-04 | Stop reason: HOSPADM

## 2019-01-30 RX ORDER — ASPIRIN 325 MG
325 TABLET ORAL DAILY
Status: DISCONTINUED | OUTPATIENT
Start: 2019-01-31 | End: 2019-02-04 | Stop reason: HOSPADM

## 2019-01-30 RX ORDER — LATANOPROST 50 UG/ML
1 SOLUTION/ DROPS OPHTHALMIC
Status: DISCONTINUED | OUTPATIENT
Start: 2019-01-30 | End: 2019-02-04 | Stop reason: HOSPADM

## 2019-01-30 RX ORDER — MAGNESIUM SULFATE 100 %
4 CRYSTALS MISCELLANEOUS AS NEEDED
Status: DISCONTINUED | OUTPATIENT
Start: 2019-01-30 | End: 2019-02-04 | Stop reason: HOSPADM

## 2019-01-30 RX ORDER — SODIUM CHLORIDE 9 MG/ML
75 INJECTION, SOLUTION INTRAVENOUS CONTINUOUS
Status: ACTIVE | OUTPATIENT
Start: 2019-01-30 | End: 2019-01-30

## 2019-01-30 RX ORDER — SODIUM CHLORIDE 0.9 % (FLUSH) 0.9 %
5-40 SYRINGE (ML) INJECTION AS NEEDED
Status: DISCONTINUED | OUTPATIENT
Start: 2019-01-30 | End: 2019-02-04 | Stop reason: HOSPADM

## 2019-01-30 RX ORDER — SODIUM CHLORIDE 9 MG/ML
75 INJECTION, SOLUTION INTRAVENOUS CONTINUOUS
Status: DISCONTINUED | OUTPATIENT
Start: 2019-01-30 | End: 2019-01-30

## 2019-01-30 RX ORDER — WARFARIN 2 MG/1
6 TABLET ORAL
Status: COMPLETED | OUTPATIENT
Start: 2019-01-31 | End: 2019-01-31

## 2019-01-30 RX ADMIN — CLONIDINE HYDROCHLORIDE 0.1 MG: 0.1 TABLET ORAL at 22:42

## 2019-01-30 RX ADMIN — PRAVASTATIN SODIUM 80 MG: 40 TABLET ORAL at 22:43

## 2019-01-30 RX ADMIN — Medication 10 ML: at 22:43

## 2019-01-30 RX ADMIN — CEFTRIAXONE SODIUM 1 G: 1 INJECTION, POWDER, FOR SOLUTION INTRAMUSCULAR; INTRAVENOUS at 19:12

## 2019-01-30 NOTE — ED PROVIDER NOTES
EMERGENCY DEPARTMENT HISTORY AND PHYSICAL EXAM      Date: 1/30/2019  Patient Name: Rj Thomas    History of Presenting Illness     Chief Complaint   Patient presents with    Altered mental status     since this morning when attempting to use the bathroom, pt reports becoming very weak. reports taking her insulin but not eating recently. hx of stroke, with left sided deficits    Low Blood Sugar     BG-40  upon EMS arrival, pt given 30mg of oral glucose en route. BG-75 upon arrival to ED       History Provided By: Patient and EMS    HPI: Rj Thomas, 75 y.o. female with PMHx significant for DM, HTN, stroke, CAD, afib, anxiety, presents via EMS to the ED with cc of a low blood sugar today. EMS reports associated symptoms of an altered mental status and pt reports generalized weakness. She states she is a diabetic and had checked her sugar this morning, which was 90. She denies eating breakfast this morning as she had forgotten. Pt had went to the bathroom and was sitting on the commode when she had felt weak and was unable to get up. Family had found the pt altered and called EMS. Upon EMS arrival, the pts blood sugar was 40. They had given the pt oral glucose, which initially improved her sugar to 75, but it had dropped to 60. The pt was then given 1 amp of D50. Pt states she feels fine now and notes she typically takes 40 units of Insulin in the morning, to which she had taken 40 units this morning. Pt additionally informs of a fall x 2 days ago due to a lost of balance. She further reports a h/o 3 strokes and is on Warfarin. She denies a h/o aneurysm. Social hx: (-) tobacco, (-) alcohol, (-) illicit drugs    There are no other complaints, changes, or physical findings at this time. PCP: Rj Alfaro MD    No current facility-administered medications on file prior to encounter.       Current Outpatient Medications on File Prior to Encounter   Medication Sig Dispense Refill    flash glucose scanning reader (FREESTYLE JOSE LUIS READER) [facility] [place] to check blood sugars daily  DX: E11.9 1 Device 0    flash glucose sensor (FREESTYLE JOSE LUIS SENSOR) kit Use to check blood sugars daily  DX: E11.9 1 Device 0    warfarin (COUMADIN) 2 mg tablet TAKE ONE AND ONE-HALF TABLETS DAILY FOR FIVE DAYS A WEEK AND 2 TABLETS ON WEDNESDAY AND SUNDAY 135 Tab 3    carvedilol (COREG) 12.5 mg tablet TAKE 1 TABLET TWICE A  Tab 1    NOVOLIN N NPH U-100 INSULIN 100 unit/mL injection INJECT 44 UNITS UNDER THE SKIN IN THE MORNING AND 36 UNITS IN THE EVENING (Patient taking differently: INJECT 41 UNITS UNDER THE SKIN IN THE MORNING AND 33 UNITS IN THE EVENING) 80 mL 2    amLODIPine (NORVASC) 10 mg tablet TAKE 1 TABLET DAILY 90 Tab 2    Blood-Glucose Meter (FREESTYLE FREEDOM LITE) monitoring kit Daily blood sugar checks 1 Kit 0    lancets (FREESTYLE LANCETS) 28 gauge misc Daily blood sugar checks 100 Lancet 3    glucose blood VI test strips (FREESTYLE TEST) strip by Does Not Apply route daily. Use once daily for daily blood glucose checks 100 Strip 3    cloNIDine HCl (CATAPRES) 0.1 mg tablet TAKE 1 TABLET THREE TIMES A  Tab 2    pravastatin (PRAVACHOL) 80 mg tablet TAKE 1 TABLET NIGHTLY 90 Tab 3    furosemide (LASIX) 40 mg tablet TAKE 1 TABLET DAILY 90 Tab 2    LORazepam (ATIVAN) 0.5 mg tablet Take 1 Tab by mouth nightly as needed. Max Daily Amount: 0.5 mg. 90 Tab 0    captopril (CAPOTEN) 25 mg tablet TAKE 1 TABLET TWICE A  Tab 3    ergocalciferol (VITAMIN D2) 50,000 unit capsule Take 50,000 Units by mouth every seven (7) days.  methIMAzole (TAPAZOLE) 10 mg tablet Take  by mouth daily.  naloxone (NARCAN) 4 mg/actuation nasal spray Use 1 spray intranasally into 1 nostril. Indications: OPIATE-INDUCED RESPIRATORY DEPRESSION, Opioid Toxicity 1 Each 0    latanoprost (XALATAN) 0.005 % ophthalmic solution Administer 1 Drop to both eyes nightly.       Insulin Syringe-Needle U-100 (BD INSULIN SYRINGE ULT-FINE II) 0.5 mL 31 gauge x 5/16 syrg 1 Each by Does Not Apply route two (2) times a day. 180 Syringe 3    aspirin (ASPIRIN) 325 mg tablet Take 1 Tab by mouth daily. Past History     Past Medical History:  Past Medical History:   Diagnosis Date    Anticoagulant long-term use 10/13/2017    Anxiety 10/13/2017    Atrial fibrillation ([address] 10/13/2017    Breast calcification, left 10/13/2017    CAD (coronary artery disease)     Cellulitis of both lower extremities 10/13/2017    Chronic kidney disease     kidney stones    Chronic pain syndrome 10/13/2017    Chronic prescription opiate use 11/15/2017    CVA (cerebral vascular accident) ([address] 10/13/2017    Diabetes ([address]     DJD (degenerative joint disease) 10/13/2017    Dyslipidemia 10/13/2017    Glaucoma 10/13/2017    Hypertension     Hyperthyroidism 10/13/2017    Long-term use of high-risk medication 10/13/2017    Stasis ulcer of lower extremity ([address] 10/13/2017    Stroke ([address]     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 ([address] 8/18/2012    retinopathy        Past Surgical History:  Past Surgical History:   Procedure Laterality Date    CARDIAC SURG PROCEDURE UNLIST      cagb    HX GYN      hysterectomty    HX ORTHOPAEDIC      back surgery       Family History:  Family History   Problem Relation Age of Onset    Heart Disease Father        Social History:  Social History     Tobacco Use    Smoking status: Never Smoker    Smokeless tobacco: Never Used   Substance Use Topics    Alcohol use: No    Drug use: No       Allergies: Allergies   Allergen Reactions    Betadine Prepstick Rash       Review of Systems   Review of Systems   Constitutional: Negative for chills and fever. HENT: Negative. Negative for congestion and rhinorrhea. Respiratory: Negative. Negative for cough, chest tightness and wheezing. Cardiovascular: Negative. Negative for chest pain and palpitations. Gastrointestinal: Negative.   Negative for abdominal pain, constipation, nausea and vomiting. Endocrine: Negative. Genitourinary: Negative. Negative for decreased urine volume, flank pain, hematuria and pelvic pain. Musculoskeletal: Negative. Negative for back pain and neck pain. Skin: Negative. Negative for color change, pallor and rash. Neurological: Positive for weakness. Negative for dizziness, seizures, numbness and headaches. +AMS   Hematological: Negative. Negative for adenopathy. Psychiatric/Behavioral: Negative. All other systems reviewed and are negative. Physical Exam   Physical Exam   Constitutional: She is oriented to person, place, and time. She appears well-developed and well-nourished. No distress. HENT:   Head: Normocephalic and atraumatic. Head is without abrasion. Right Ear: No hemotympanum. Left Ear: No hemotympanum. Mouth/Throat: No oropharyngeal exudate. No signs of trauma   Eyes: Conjunctivae are normal. Pupils are equal, round, and reactive to light. Right eye exhibits no discharge. Left eye exhibits no discharge. No scleral icterus. Neck: Normal range of motion. Neck supple. No JVD present. Cardiovascular: Normal rate, regular rhythm, normal heart sounds and intact distal pulses. Exam reveals no gallop and no friction rub. No murmur heard. Pulmonary/Chest: Effort normal and breath sounds normal. No stridor. No respiratory distress. She has no wheezes. She has no rales. She exhibits no tenderness. Abdominal: Soft. Bowel sounds are normal. She exhibits no distension and no mass. There is no tenderness. There is no rebound and no guarding. Neurological: She is alert and oriented to person, place, and time. She displays normal reflexes. No cranial nerve deficit or sensory deficit. She exhibits abnormal muscle tone. Coordination normal.   Weak on right side is baseline   Skin: Skin is warm. No rash noted. She is not diaphoretic. No pallor. Vitals reviewed.     Diagnostic Study Results Labs -  Recent Results (from the past 12 hour(s))   GLUCOSE, POC    Collection Time: 01/30/19  2:40 PM   Result Value Ref Range    Glucose (POC) 75 65 - 100 mg/dL    Performed by Stacia Lam, POC    Collection Time: 01/30/19  2:40 PM   Result Value Ref Range    Glucose (POC) 75 65 - 100 mg/dL    Performed by Stacia Lam, POC    Collection Time: 01/30/19  2:54 PM   Result Value Ref Range    Glucose (POC) 76 65 - 100 mg/dL    Performed by Liya LAGOS    GLUCOSE, POC    Collection Time: 01/30/19  2:54 PM   Result Value Ref Range    Glucose (POC) 76 65 - 100 mg/dL    Performed by Liya LAGOS    GLUCOSE, POC    Collection Time: 01/30/19  3:09 PM   Result Value Ref Range    Glucose (POC) 64 (L) 65 - 100 mg/dL    Performed by Liya LAGOS    GLUCOSE, POC    Collection Time: 01/30/19  3:09 PM   Result Value Ref Range    Glucose (POC) 64 (L) 65 - 100 mg/dL    Performed by Liya LAGOS    GLUCOSE, POC    Collection Time: 01/30/19  3:44 PM   Result Value Ref Range    Glucose (POC) 144 (H) 65 - 100 mg/dL    Performed by EDWIN LAGOS    CBC WITH AUTOMATED DIFF    Collection Time: 01/30/19  4:39 PM   Result Value Ref Range    WBC 7.0 3.6 - 11.0 K/uL    RBC 5.24 (H) 3.80 - 5.20 M/uL    HGB 17.1 (H) 11.5 - 16.0 g/dL    HCT 49.1 (H) 35.0 - 47.0 %    MCV 93.7 80.0 - 99.0 FL    MCH 32.6 26.0 - 34.0 PG    MCHC 34.8 30.0 - 36.5 g/dL    RDW 12.2 11.5 - 14.5 %    PLATELET 180 (L) 919 - 400 K/uL    MPV 11.8 8.9 - 12.9 FL    NRBC 0.0 0  WBC    ABSOLUTE NRBC 0.00 0.00 - 0.01 K/uL    NEUTROPHILS 81 (H) 32 - 75 %    LYMPHOCYTES 15 12 - 49 %    MONOCYTES 3 (L) 5 - 13 %    EOSINOPHILS 1 0 - 7 %    BASOPHILS 0 0 - 1 %    IMMATURE GRANULOCYTES 0 0.0 - 0.5 %    ABS. NEUTROPHILS 5.6 1.8 - 8.0 K/UL    ABS. LYMPHOCYTES 1.1 0.8 - 3.5 K/UL    ABS. MONOCYTES 0.2 0.0 - 1.0 K/UL    ABS. EOSINOPHILS 0.1 0.0 - 0.4 K/UL    ABS. BASOPHILS 0.0 0.0 - 0.1 K/UL    ABS. IMM.  GRANS. 0.0 0.00 - 0.04 K/UL    DF AUTOMATED      RBC COMMENTS NORMOCYTIC, NORMOCHROMIC     METABOLIC PANEL, COMPREHENSIVE    Collection Time: 01/30/19  4:39 PM   Result Value Ref Range    Sodium 137 136 - 145 mmol/L    Potassium 3.6 3.5 - 5.1 mmol/L    Chloride 104 97 - 108 mmol/L    CO2 27 21 - 32 mmol/L    Anion gap 6 5 - 15 mmol/L    Glucose 94 65 - 100 mg/dL    BUN 14 6 - 20 MG/DL    Creatinine 1.05 (H) 0.55 - 1.02 MG/DL    BUN/Creatinine ratio 13 12 - 20      GFR est AA >60 >60 ml/min/1.73m2    GFR est non-AA 51 (L) >60 ml/min/1.73m2    Calcium 8.9 8.5 - 10.1 MG/DL    Bilirubin, total 0.5 0.2 - 1.0 MG/DL    ALT (SGPT) 31 12 - 78 U/L    AST (SGOT) 33 15 - 37 U/L    Alk.  phosphatase 94 45 - 117 U/L    Protein, total 10.6 (H) 6.4 - 8.2 g/dL    Albumin 3.9 3.5 - 5.0 g/dL    Globulin 6.7 (H) 2.0 - 4.0 g/dL    A-G Ratio 0.6 (L) 1.1 - 2.2     PTT    Collection Time: 01/30/19  4:39 PM   Result Value Ref Range    aPTT 29.9 22.1 - 32.0 sec    aPTT, therapeutic range     58.0 - 77.0 SECS   PROTHROMBIN TIME + INR    Collection Time: 01/30/19  4:39 PM   Result Value Ref Range    INR 1.5 (H) 0.9 - 1.1      Prothrombin time 14.6 (H) 9.0 - 11.1 sec   URINALYSIS W/ REFLEX CULTURE    Collection Time: 01/30/19  4:49 PM   Result Value Ref Range    Color YELLOW/STRAW      Appearance CLOUDY (A) CLEAR      Specific gravity 1.010 1.003 - 1.030      pH (UA) 6.5 5.0 - 8.0      Protein NEGATIVE  NEG mg/dL    Glucose 250 (A) NEG mg/dL    Ketone NEGATIVE  NEG mg/dL    Bilirubin NEGATIVE  NEG      Blood NEGATIVE  NEG      Urobilinogen 1.0 0.2 - 1.0 EU/dL    Nitrites NEGATIVE  NEG      Leukocyte Esterase MODERATE (A) NEG      WBC 10-20 0 - 4 /hpf    RBC 0-5 0 - 5 /hpf    Epithelial cells FEW FEW /lpf    Bacteria 4+ (A) NEG /hpf    UA:UC IF INDICATED URINE CULTURE ORDERED (A) CNI      Hyaline cast 0-2 0 - 5 /lpf   GLUCOSE, POC    Collection Time: 01/30/19  4:54 PM   Result Value Ref Range    Glucose (POC) 112 (H) 65 - 100 mg/dL    Performed by Sharon Johansen Collection Time: 01/30/19  5:00 PM   Result Value Ref Range    Magnesium 2.2 1.6 - 2.4 mg/dL   TROPONIN I    Collection Time: 01/30/19  5:00 PM   Result Value Ref Range    Troponin-I, Qt. <0.05 <0.05 ng/mL   POC LACTIC ACID    Collection Time: 01/30/19  5:36 PM   Result Value Ref Range    Lactic Acid (POC) 1.33 0.40 - 2.00 mmol/L       Radiologic Studies -   CT Results  (Last 48 hours)               01/30/19 1613  CT HEAD WO CONT Final result    Impression:  IMPRESSION:    1. Left greater than right chronic appearing small vessel ischemic white matter   changes. 2. No acute intracranial abnormality demonstrated. Narrative:  EXAM:  CT HEAD WO CONT   INDICATION: Patient became weak this morning while trying to use the bathroom. Patient was found by family member with altered mental status. Previous CVA with   residual left-sided deficits. May have fallen and sustained head trauma. TECHNIQUE:   Thin axial images were obtained through the calvarium and saved with standard   and bone window algorithm. Coronal and sagittal reconstructions were generated. CT dose reduction was achieved through use of a standardized protocol tailored   for this examination and automatic exposure control for dose modulation. COMPARISON: CT head 8/19/12 and MRI 8/18/12 and CT 8/18/12. FINDINGS:   Jugular and sulcal prominence consistent with disc age-related volume loss   within normal limits. Patchy areas of decreased attenuation left greater than right cerebral white   matter probably related to chronic small vessel ischemic change. No CT findings of acute cortical infarction. No evidence of acute intracranial hemorrhage, mass or abnormal extra-axial fluid   collections. Dense chronic atherosclerotic calcification is noted in distal vertebral   arteries and carotid siphons. Other structures of the skull base including   paranasal sinuses and temporal bones are unremarkable.                CXR Results (Last 48 hours)               01/30/19 1744  XR CHEST PORT Final result    Impression:  IMPRESSION: No Acute Disease. Narrative:  EXAM: Portable CXR. 1732 hours. INDICATION: hypoglycemia       FINDINGS:   The lungs are clear. Heart is normal in size. There is prior CABG. There is no   overt pulmonary edema. There is no evident pneumothorax, adenopathy or pleural   effusion. Medical Decision Making   I am the first provider for this patient. I reviewed the vital signs, available nursing notes, past medical history, past surgical history, family history and social history. Vital Signs-Reviewed the patient's vital signs. Patient Vitals for the past 12 hrs:   Temp Pulse Resp BP SpO2   01/30/19 1823 (!) 94.4 °F (34.7 °C) (!) 56 13 -- 96 %   01/30/19 1800 -- (!) 57 13 126/57 96 %   01/30/19 1733 -- 62 13 140/66 97 %   01/30/19 1654 (!) 93.7 °F (34.3 °C) 63 14 148/77 97 %   01/30/19 1627 -- 68 17 -- 97 %   01/30/19 1530 -- 64 15 150/69 98 %   01/30/19 1453 96 °F (35.6 °C) (!) 58 18 145/62 98 %     Pulse Oximetry Analysis - 98% on RA    Cardiac Monitor:   Rate: 65 bpm  Rhythm: Normal Sinus Rhythm     EKG interpretation: (Preliminary): 1542  Rhythm: sinus bradycardia; and regular . Rate (approx.): 59; Axis: normal; MD interval: normal; QRS interval: normal ; ST/T wave: normal; Other findings: LVH with repolarization abnormality, inferior infarct. Written by Merline Som, ED Scribe, as dictated by Juan Marc MD.    Records Reviewed: Nursing Notes, Old Medical Records, Previous electrocardiograms, Previous Radiology Studies and Previous Laboratory Studies    Provider Notes (Medical Decision Making):   DDx: insulin reaction, CVA, CAD, UTI, ICH    Impression/Plan: h/o insulin dependent diabetes found in bathroom confused by family.  Took 40 units of novalog insulin with a sugar of 90 but did not eat breakfast. EMS noted sugar of 40 and given oral glucose and D50 with improvement of mental status. Pt did have fall 2 days prior and is on warfarin. Will obtain labs, head CT and follow blood sugar. ED Course:   Initial assessment performed. The patients presenting problems have been discussed, and they are in agreement with the care plan formulated and outlined with them. I have encouraged them to ask questions as they arise throughout their visit. PROGRESS NOTE:  3:13 PM  Attempted to evaluate the pt. Pt was not in her stretcher. Will return shortly to evaluate the pt. PROGRESS NOTE:  5:10 PM  RN informs that the pt's rectal temperature is 93.7F. She had placed the pt on a Eriberto hugger. She states the pt is eating and her last sugar check was 112. CONSULT NOTE:   6:28 PM  Neli Rojas MD spoke with Dr. Luh Hurtado,   Specialty: Hospitalist  Discussed pt's hx, disposition, and available diagnostic and imaging results. Reviewed care plans. Consultant will evaluate pt for admission. Written by Adán Wesley, ED Scribe, as dictated by Neli Rojas MD.    PROGRESS NOTE:  6:30 PM  RN informs that rectal temperature is 94.4F. CONSULT NOTE:  6:40 PM  Neli Rojas MD spoke with Dr. Dylon Palacios,  Specialty: PCP  Discussed patient's hx, disposition, and available diagnostic and imaging results. Reviewed care plans. Consultant agrees with plans as outlined. He is aware that Dr. Dinesh Perez pt will be admitted today and will inform Dr. Chico Ochoa.     Critical Care Time:   CRITICAL CARE NOTE :    6:32 PM    IMPENDING DETERIORATION -Airway, Respiratory, Cardiovascular, CNS, Metabolic and Hepatic  ASSOCIATED RISK FACTORS - Hypotension, Shock, Hypoxia, Metabolic changes, Vascular Compromise and CNS Decompensation  MANAGEMENT- Bedside Assessment and Supervision of Care  INTERPRETATION -  Xrays, CT Scan, ECG and Blood Pressure  INTERVENTIONS - hemodynamic mngmt and Metobolic interventions  CASE REVIEW - Hospitalist, Nursing and Family  TREATMENT RESPONSE -Stable  PERFORMED BY - Self    NOTES   :    I have spent 35 minutes of critical care time involved in lab review, consultations with specialist, family decision- making, bedside attention and documentation. During this entire length of time I was immediately available to the patient . Dilia Seay MD    Disposition:  PLAN:  1. Admit    ADMIT NOTE:  6:28 PM  Patient is being admitted to the hospital by Dr. Stacy Swanson. The results of their tests and reasons for their admission have been discussed with them and/or available family. They convey agreement and understanding for the need to be admitted and for their admission diagnosis. Consultation has been made with the inpatient physician specialist for hospitalization. Diagnosis     Clinical Impression:   1. Hypothermia, initial encounter    2. Hypoglycemia    3. Urinary tract infection without hematuria, site unspecified    4. Sepsis, due to unspecified organism ([address]    5. Acute cystitis without hematuria    6. Metabolic encephalopathy    7. 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 Acquired hypothyroidism    9. Hypothyroidism due to medication        Attestations: This note is prepared by Roslyn Gutierrez, acting as Scribe for Dilia Seay MD.    Dilia Seay MD: The scribe's documentation has been prepared under my direction and personally reviewed by me in its entirety. I confirm that the note above accurately reflects all work, treatment, procedures, and medical decision making performed by me. This note will not be viewable in [address]

## 2019-01-30 NOTE — ED NOTES
Pt arrives to ED by EMS c/o hypoglycemia and AMS after pt was found by family member while going to the bathroom. A/Ox4 upon arrival to ED. pt talking in full sentences.  Pt reports taking novolin NPH am and pm.    New medication for her thyroid and vitamin D

## 2019-01-30 NOTE — ED NOTES
Pts rectal temperature 93.7 at this time. Pt placed on bear gger, dr Lala Hernandez made aware. Will obtain blood cultures and lactic acid.

## 2019-01-31 PROBLEM — G93.41 METABOLIC ENCEPHALOPATHY: Status: ACTIVE | Noted: 2019-01-31

## 2019-01-31 LAB
ANION GAP SERPL CALC-SCNC: 7 MMOL/L (ref 5–15)
ATRIAL RATE: 59 BPM
BASOPHILS # BLD: 0 K/UL (ref 0–0.1)
BASOPHILS NFR BLD: 0 % (ref 0–1)
BUN SERPL-MCNC: 11 MG/DL (ref 6–20)
BUN/CREAT SERPL: 10 (ref 12–20)
CALCIUM SERPL-MCNC: 8.7 MG/DL (ref 8.5–10.1)
CALCULATED P AXIS, ECG09: 32 DEGREES
CALCULATED R AXIS, ECG10: -9 DEGREES
CALCULATED T AXIS, ECG11: 140 DEGREES
CHLORIDE SERPL-SCNC: 101 MMOL/L (ref 97–108)
CO2 SERPL-SCNC: 26 MMOL/L (ref 21–32)
CREAT SERPL-MCNC: 1.08 MG/DL (ref 0.55–1.02)
DIAGNOSIS, 93000: NORMAL
DIFFERENTIAL METHOD BLD: NORMAL
EOSINOPHIL # BLD: 0 K/UL (ref 0–0.4)
EOSINOPHIL NFR BLD: 0 % (ref 0–7)
ERYTHROCYTE [DISTWIDTH] IN BLOOD BY AUTOMATED COUNT: 12.1 % (ref 11.5–14.5)
EST. AVERAGE GLUCOSE BLD GHB EST-MCNC: 171 MG/DL
GLUCOSE BLD STRIP.AUTO-MCNC: 261 MG/DL (ref 65–100)
GLUCOSE BLD STRIP.AUTO-MCNC: 270 MG/DL (ref 65–100)
GLUCOSE BLD STRIP.AUTO-MCNC: 286 MG/DL (ref 65–100)
GLUCOSE BLD STRIP.AUTO-MCNC: 313 MG/DL (ref 65–100)
GLUCOSE BLD STRIP.AUTO-MCNC: 335 MG/DL (ref 65–100)
GLUCOSE BLD STRIP.AUTO-MCNC: 361 MG/DL (ref 65–100)
GLUCOSE SERPL-MCNC: 284 MG/DL (ref 65–100)
HBA1C MFR BLD: 7.6 % (ref 4.2–6.3)
HCT VFR BLD AUTO: 40.1 % (ref 35–47)
HGB BLD-MCNC: 14.2 G/DL (ref 11.5–16)
IMM GRANULOCYTES # BLD AUTO: 0 K/UL (ref 0–0.04)
IMM GRANULOCYTES NFR BLD AUTO: 0 % (ref 0–0.5)
INR PPP: 1.8 (ref 0.9–1.1)
LYMPHOCYTES # BLD: 1.3 K/UL (ref 0.8–3.5)
LYMPHOCYTES NFR BLD: 22 % (ref 12–49)
MCH RBC QN AUTO: 32.2 PG (ref 26–34)
MCHC RBC AUTO-ENTMCNC: 35.4 G/DL (ref 30–36.5)
MCV RBC AUTO: 90.9 FL (ref 80–99)
MONOCYTES # BLD: 0.6 K/UL (ref 0–1)
MONOCYTES NFR BLD: 10 % (ref 5–13)
NEUTS SEG # BLD: 4 K/UL (ref 1.8–8)
NEUTS SEG NFR BLD: 68 % (ref 32–75)
NRBC # BLD: 0 K/UL (ref 0–0.01)
NRBC BLD-RTO: 0 PER 100 WBC
P-R INTERVAL, ECG05: 146 MS
PLATELET # BLD AUTO: 191 K/UL (ref 150–400)
PMV BLD AUTO: 11.4 FL (ref 8.9–12.9)
POTASSIUM SERPL-SCNC: 3.7 MMOL/L (ref 3.5–5.1)
PROTHROMBIN TIME: 18.2 SEC (ref 9–11.1)
Q-T INTERVAL, ECG07: 464 MS
QRS DURATION, ECG06: 62 MS
QTC CALCULATION (BEZET), ECG08: 459 MS
RBC # BLD AUTO: 4.41 M/UL (ref 3.8–5.2)
SERVICE CMNT-IMP: ABNORMAL
SODIUM SERPL-SCNC: 134 MMOL/L (ref 136–145)
VENTRICULAR RATE, ECG03: 59 BPM
WBC # BLD AUTO: 5.9 K/UL (ref 3.6–11)

## 2019-01-31 PROCEDURE — 74011250636 HC RX REV CODE- 250/636: Performed by: INTERNAL MEDICINE

## 2019-01-31 PROCEDURE — 97535 SELF CARE MNGMENT TRAINING: CPT

## 2019-01-31 PROCEDURE — 97165 OT EVAL LOW COMPLEX 30 MIN: CPT

## 2019-01-31 PROCEDURE — 36415 COLL VENOUS BLD VENIPUNCTURE: CPT

## 2019-01-31 PROCEDURE — 97530 THERAPEUTIC ACTIVITIES: CPT

## 2019-01-31 PROCEDURE — 74011250637 HC RX REV CODE- 250/637: Performed by: INTERNAL MEDICINE

## 2019-01-31 PROCEDURE — 77030018719 HC DRSG PTCH ANTIMIC J&J -A

## 2019-01-31 PROCEDURE — 80048 BASIC METABOLIC PNL TOTAL CA: CPT

## 2019-01-31 PROCEDURE — 74011000258 HC RX REV CODE- 258: Performed by: INTERNAL MEDICINE

## 2019-01-31 PROCEDURE — 82962 GLUCOSE BLOOD TEST: CPT

## 2019-01-31 PROCEDURE — 74011636637 HC RX REV CODE- 636/637: Performed by: INTERNAL MEDICINE

## 2019-01-31 PROCEDURE — 74011000250 HC RX REV CODE- 250: Performed by: INTERNAL MEDICINE

## 2019-01-31 PROCEDURE — 76937 US GUIDE VASCULAR ACCESS: CPT

## 2019-01-31 PROCEDURE — 65660000000 HC RM CCU STEPDOWN

## 2019-01-31 PROCEDURE — 77030020186 HC BOOT HL PROTCT SAGE -B

## 2019-01-31 PROCEDURE — 83036 HEMOGLOBIN GLYCOSYLATED A1C: CPT

## 2019-01-31 PROCEDURE — 77030018786 HC NDL GD F/USND BARD -B

## 2019-01-31 PROCEDURE — 77030038269 HC DRN EXT URIN PURWCK BARD -A

## 2019-01-31 PROCEDURE — 85610 PROTHROMBIN TIME: CPT

## 2019-01-31 PROCEDURE — C1751 CATH, INF, PER/CENT/MIDLINE: HCPCS

## 2019-01-31 PROCEDURE — 97162 PT EVAL MOD COMPLEX 30 MIN: CPT

## 2019-01-31 PROCEDURE — 85025 COMPLETE CBC W/AUTO DIFF WBC: CPT

## 2019-01-31 RX ORDER — SODIUM CHLORIDE 0.9 % (FLUSH) 0.9 %
5-40 SYRINGE (ML) INJECTION AS NEEDED
Status: DISCONTINUED | OUTPATIENT
Start: 2019-01-31 | End: 2019-02-04 | Stop reason: HOSPADM

## 2019-01-31 RX ORDER — AMMONIUM LACTATE 12 G/100G
LOTION TOPICAL DAILY
Status: DISCONTINUED | OUTPATIENT
Start: 2019-02-01 | End: 2019-02-04 | Stop reason: HOSPADM

## 2019-01-31 RX ORDER — SODIUM CHLORIDE 0.9 % (FLUSH) 0.9 %
5-40 SYRINGE (ML) INJECTION EVERY 8 HOURS
Status: DISCONTINUED | OUTPATIENT
Start: 2019-01-31 | End: 2019-02-04 | Stop reason: HOSPADM

## 2019-01-31 RX ADMIN — WARFARIN SODIUM 3 MG: 2 TABLET ORAL at 17:47

## 2019-01-31 RX ADMIN — AMLODIPINE BESYLATE 10 MG: 5 TABLET ORAL at 10:44

## 2019-01-31 RX ADMIN — CLONIDINE HYDROCHLORIDE 0.1 MG: 0.1 TABLET ORAL at 22:52

## 2019-01-31 RX ADMIN — HYDRALAZINE HYDROCHLORIDE 10 MG: 20 INJECTION INTRAMUSCULAR; INTRAVENOUS at 11:52

## 2019-01-31 RX ADMIN — CLONIDINE HYDROCHLORIDE 0.1 MG: 0.1 TABLET ORAL at 16:44

## 2019-01-31 RX ADMIN — ASPIRIN 325 MG: 325 TABLET ORAL at 10:44

## 2019-01-31 RX ADMIN — Medication 10 ML: at 22:57

## 2019-01-31 RX ADMIN — LATANOPROST 1 DROP: 50 SOLUTION OPHTHALMIC at 23:56

## 2019-01-31 RX ADMIN — Medication 10 ML: at 22:56

## 2019-01-31 RX ADMIN — Medication 10 ML: at 14:14

## 2019-01-31 RX ADMIN — INSULIN LISPRO 2 UNITS: 100 INJECTION, SOLUTION INTRAVENOUS; SUBCUTANEOUS at 22:54

## 2019-01-31 RX ADMIN — HYDRALAZINE HYDROCHLORIDE 10 MG: 20 INJECTION INTRAMUSCULAR; INTRAVENOUS at 00:39

## 2019-01-31 RX ADMIN — CEFTRIAXONE SODIUM 1 G: 1 INJECTION, POWDER, FOR SOLUTION INTRAMUSCULAR; INTRAVENOUS at 17:41

## 2019-01-31 RX ADMIN — INSULIN LISPRO 3 UNITS: 100 INJECTION, SOLUTION INTRAVENOUS; SUBCUTANEOUS at 12:58

## 2019-01-31 RX ADMIN — WARFARIN SODIUM 4 MG: 2 TABLET ORAL at 00:43

## 2019-01-31 RX ADMIN — CLONIDINE HYDROCHLORIDE 0.1 MG: 0.1 TABLET ORAL at 10:44

## 2019-01-31 RX ADMIN — PRAVASTATIN SODIUM 80 MG: 40 TABLET ORAL at 22:52

## 2019-01-31 RX ADMIN — INSULIN LISPRO 3 UNITS: 100 INJECTION, SOLUTION INTRAVENOUS; SUBCUTANEOUS at 10:44

## 2019-01-31 RX ADMIN — INSULIN LISPRO 4 UNITS: 100 INJECTION, SOLUTION INTRAVENOUS; SUBCUTANEOUS at 16:42

## 2019-01-31 NOTE — PROGRESS NOTES
Problem: Mobility Impaired (Adult and Pediatric)  Goal: *Acute Goals and Plan of Care (Insert Text)  Physical Therapy Goals  Initiated 1/31/2019  1. Patient will move from supine to sit and sit to supine , scoot up and down and roll side to side in bed with minimal assistance/contact guard assist within 7 day(s). 2.  Patient will transfer from bed to chair and chair to bed with minimal assistance/contact guard assist using the least restrictive device within 7 day(s). 3.  Patient will perform sit to stand with minimal assistance/contact guard assist within 7 day(s). 4.  Patient will ambulate with minimal assistance/contact guard assist for 45 feet with the least restrictive device within 7 day(s). physical Therapy EVALUATION  Patient: Kristen Youngblood (46 y.o. 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 She was sitting up in bedside recliner when PT arrived. She agreed to therapy eval and was cleared by her nurse. PTA she lived on the 1st floor of a 2 story home with family members. 5 steps to enter and also a ramp available. She reports walking in the house room to room distances using furniture and walls for assistance. She has walkers in home but does not use them - unlikely she can due to LUE hand contracture with increased flexor tone of the elbow and shoulder. Currently needs mod a x 2 for sit to standing from chair. Attempted to take steps from the chair but the patient resisted due to fear of falling at this time. Tried to have her hold a walker, but her L arm/hand overly pronated from the increased flexor spasticity preventing her from grasping the handle.   She was left in the chair with all needs met and reviewed calling the nurse if needed to get up from chair. Recommend IP rehab upon discharge to help patient to regain her prior level of function as a household ambulator. Patient will benefit from skilled intervention to address the above impairments. Patients rehabilitation potential is considered to be Fair  Factors which may influence rehabilitation potential include:   []         None noted  []         Mental ability/status  [x]         Medical condition  []         Home/family situation and support systems  []         Safety awareness  []         Pain tolerance/management  []         Other:      PLAN :  Recommendations and Planned Interventions:  [x]           Bed Mobility Training             [x]    Neuromuscular Re-Education  [x]           Transfer Training                   []    Orthotic/Prosthetic Training  [x]           Gait Training                         []    Modalities  [x]           Therapeutic Exercises           []    Edema Management/Control  [x]           Therapeutic Activities            [x]    Patient and Family Training/Education  []           Other (comment):    Frequency/Duration: Patient will be followed by physical therapy  5 times a week to address goals. Discharge Recommendations: Inpatient Rehab  Further Equipment Recommendations for Discharge: TBD     SUBJECTIVE:   Patient stated I' really afraid of falling again.     OBJECTIVE DATA SUMMARY:   HISTORY:    Past Medical History:   Diagnosis Date    Anticoagulant long-term use 10/13/2017    Anxiety 10/13/2017    Atrial fibrillation ([address] 10/13/2017    Breast calcification, left 10/13/2017    CAD (coronary artery disease)     Cellulitis of both lower extremities 10/13/2017    Chronic kidney disease     kidney stones    Chronic pain syndrome 10/13/2017    Chronic prescription opiate use 11/15/2017    CVA (cerebral vascular accident) ([address] 10/13/2017    Diabetes ([address]     DJD (degenerative joint disease) 10/13/2017    Dyslipidemia 10/13/2017    Glaucoma 10/13/2017    Hypertension     Hyperthyroidism 10/13/2017    Long-term use of high-risk medication 10/13/2017    Stasis ulcer of lower extremity ([facility] [address] 10/13/2017    Stroke ([facility] [address]     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 ([facility] [address] 8/18/2012    retinopathy      Past Surgical History:   Procedure Laterality Date    CARDIAC SURG PROCEDURE UNLIST      cagb    HX GYN      hysterectomty    HX ORTHOPAEDIC      back surgery     Prior Level of Function/Home Situation:  she lived on the 1st floor of a 2 story home with family members. 5 steps to enter and also a ramp available. She reports walking in the house room to room distances using furniture and walls for assistance. She has walkers in home but does not use them - unlikely she can due to LUE hand contracture with increased flexor tone of the elbow and shoulder. Personal factors and/or comorbidities impacting plan of care: medical status, good support system    Home Situation  Home Environment: Private residence  # Steps to Enter: 5(ramp at backor used primarily)  Rails to Enter: Yes  Hand Rails : Bilateral  Wheelchair Ramp: Yes  One/Two Story Residence: Two story, live on 1st floor  Living Alone: No  Support Systems: Family member(s)  Patient Expects to be Discharged to[de-identified] Private residence  Current DME Used/Available at Home: Shower chair, Walker, rollator, Walker, rolling, Other (comment), Commode, bedside(built in shower seat)  Tub or Shower Type: Shower    EXAMINATION/PRESENTATION/DECISION MAKING:   Critical Behavior:  Neurologic State: Alert  Orientation Level: Oriented X4  Cognition: Appropriate decision making, Appropriate safety awareness, Follows commands  Safety/Judgement: Awareness of environment, Good awareness of safety precautions, Insight into deficits  Hearing:   Auditory  Auditory Impairment: None  Skin:    Edema:   Range Of Motion:  AROM: Generally decreased, functional           PROM: Generally decreased, functional           Strength:    Strength: Generally decreased, functional                    Tone & Sensation:   Tone: Abnormal(Abnormal LUE; Normal RUE)              Sensation: Intact(Slightly decreased in right hand)               Coordination:  Coordination: Within functional limits(RUE WFL; LUE Grossly decreased, nonfunctional)  Vision:      Functional Mobility:  Bed Mobility:  Rolling: Moderate assistance;Maximum assistance(x2)  Supine to Sit: Moderate assistance;Maximum assistance(x2)  Sit to Supine: Moderate assistance;Maximum assistance  Scooting: Moderate assistance;Maximum assistance  Transfers:  Sit to Stand: Moderate assistance(x2)  Stand to Sit: Moderate assistance(x2)        Bed to Chair: Moderate assistance(x2)              Balance:   Sitting: Intact; With support(Uses right hand to maintain sitting balance seated EOB)  Standing: Impaired; With support  Standing - Static: Constant support  Standing - Dynamic : Poor  Ambulation/Gait Training:              Gait Description (WDL): (deferred at this time due to patient fear)                                            Functional Measure:  Barthel Index:    Bathin  Bladder: 0  Bowels: 5  Groomin  Dressin  Feedin  Mobility: 0  Stairs: 0  Toilet Use: 5  Transfer (Bed to Chair and Back): 5  Total: 25         The Barthel ADL Index: Guidelines  1. The index should be used as a record of what a patient does, not as a record of what a patient could do. 2. The main aim is to establish degree of independence from any help, physical or verbal, however minor and for whatever reason. 3. The need for supervision renders the patient not independent. 4. A patient's performance should be established using the best available evidence. Asking the patient, friends/relatives and nurses are the usual sources, but direct observation and common sense are also important. However direct testing is not needed.   5. Usually the patient's performance over the preceding 24-48 hours is important, but occasionally longer periods will be relevant. 6. Middle categories imply that the patient supplies over 50 per cent of the effort. 7. Use of aids to be independent is allowed. Fayrene Back., Barthel, D.W. (8151). Functional evaluation: the Barthel Index. [address] [facility] (14)2. MEGAN Velazquez Sa, Evy Ojeda., Jenna Dennis., [place], [address] [place] [address] (1999). Measuring the change indisability after inpatient rehabilitation; comparison of the responsiveness of the Barthel Index and Functional [place] Measure. Journal of Neurology, Neurosurgery, and Psychiatry, 66(4), 857-602. Yeyo Aranda, NGordonJ.A, MIHIR Lee, & Bianca Leal M.A. (2004.) Assessment of post-stroke quality of life in cost-effectiveness studies: The usefulness of the Barthel Index and the EuroQoL-5D. Quality of Life Research, 15, 430-91        Physical Therapy Evaluation Charge Determination   History Examination Presentation Decision-Making   HIGH Complexity :3+ comorbidities / personal factors will impact the outcome/ POC  MEDIUM Complexity : 3 Standardized tests and measures addressing body structure, function, activity limitation and / or participation in recreation  MEDIUM Complexity : Evolving with changing characteristics  Other outcome measures Barthel  HIGH       Based on the above components, the patient evaluation is determined to be of the following complexity level: MEDIUM    Pain:  Pain Scale 1: Numeric (0 - 10)  Pain Intensity 1: 0              Activity Tolerance:   Limited. Please refer to the flowsheet for vital signs taken during this treatment.   After treatment:   [x]         Patient left in no apparent distress sitting up in chair  []         Patient left in no apparent distress in bed  [x]         Call bell left within reach  [x]         Nursing notified  []         Caregiver present  [x]         Bed alarm activated    COMMUNICATION/EDUCATION:   The patients plan of care was discussed with: Occupational Therapist, Registered Nurse, Physician and . [x]         Fall prevention education was provided and the patient/caregiver indicated understanding. [x]         Patient/family have participated as able in goal setting and plan of care. [x]         Patient/family agree to work toward stated goals and plan of care. []         Patient understands intent and goals of therapy, but is neutral about his/her participation. []         Patient is unable to participate in goal setting and plan of care.     Thank you for this referral.  Kyle Sotomayor, PT   Time Calculation: 21 mins

## 2019-01-31 NOTE — ROUTINE PROCESS
Bedside, Verbal and Written shift change report given to Myah Red RN (oncoming nurse) by Gary Licona RN (offgoing nurse). Report included the following information SBAR, Kardex, MAR and Recent Results.

## 2019-01-31 NOTE — ROUTINE PROCESS
TRANSFER - IN REPORT:    Verbal report received from [facility] on [place] [place]  being received from ED for routine progression of care      Report consisted of patients Situation, Background, Assessment and   Recommendations(SBAR). Information from the following report(s) SBAR, Kardex, ED Summary, Procedure Summary, MAR, Recent Results and Cardiac Rhythm SR was reviewed with the receiving nurse. Opportunity for questions and clarification was provided. Assessment completed upon patients arrival to unit and care assumed.

## 2019-01-31 NOTE — PROGRESS NOTES
Pharmacy Daily Dosing of Warfarin    Indication: afib    Goal INR: 2-3    PTA Warfarin Dose: 3 mg all days of week except 4 mg on Wed/Sun    Concurrent anticoagulants:     Concurrent antiplatelet: asa 741 mg daily    Major Interacting Medications    Drugs that may increase INR:    Drugs that may decrease INR:     Conditions that may increase/decrease INR (CHF, C. diff, cirrhosis, thyroid disorder, hypoalbuminemia):     Labs:  Recent Labs     01/30/19  1639   INR 1.5*   HGB 17.1*   *   SGOT 33   TBILI 0.5   ALB 3.9       Impression/Plan:   · Will order warfarin 6 mg PO x 1 dose. · Daily INR  · CBC w/o diff QOD     Pharmacy will follow daily and adjust the dose as appropriate. Thanks  Javi Taylor PHARMD      [email or web address]

## 2019-01-31 NOTE — H&P
Hospitalist Admission Note    NAME: Anel Edwards   :  1945   MRN:  [id number]     Date/Time:  2019 7:21 PM    Patient PCP: Dayna Hurd MD  ______________________________________________________________________  Given the patient's current clinical presentation, I have a high level of concern for decompensation if discharged from the emergency department. Complex decision making was performed, which includes reviewing the patient's available past medical records, laboratory results, and x-ray films. My assessment of this patient's clinical condition and my plan of care is as follows. Assessment / Plan:  Sepsis, POA (hypothermia/bradycardia)  UTI  Acute encephalopathy, resolved  -UA suggestive of UTI, f/u with Ucx and Bcx obtained  -CXR neg for acute disease  -head CT significant for Left greater than right chronic appearing small vessel ischemic white matter changes. No acute intracranial abnormality demonstrated. -lactic acid wnl  -empiric rocephin  -gentle IVF  x24 hrs    Hypothermia/bradycardia  Hyperthyroidism  -could likely be related to sepsis vs hypothyroidism in the setting of recent increase in methimazole for hyperthyroidism  -will check TSH, FT4   -hold methimazole for now  -temp improving with bare hugger    Hx of CVA  Hx of PAF  HTN  CAD  -resume ASA, statin, pharmacy consulted for coumadin dosing  -cont' clonidine, amlodipine  -hold lasix while hydrating  -IV hydralazine prn    T2DM with hypoglycemia  -check A1C  -hold home insulin due to hypoglycemia with BG ~40, requiring treatment  -SSI    Chronic pain syndrome  -stable. Denies acute pain    Code Status: Full  Surrogate Decision Maker: pt's   DVT Prophylaxis: on coumadin  GI Prophylaxis: not indicated  Baseline: independent      Subjective:   CHIEF COMPLAINT: altered mental status, weakness    HISTORY OF PRESENT ILLNESS:     Barrie Garrison is a 75 y.o.    female with PMHx significant for HTN, afib, CAD, T2DM, hyperthyroidism, present to the ER for evaluation of weakness and altered mental status. Pt's mentation is back to baseline during my encounter. Per pt, she suffered a fall 2 days ago, but did not seek medical attention. She started feeling generalized weakness yesterday, and this AM, when she was on the toilet, she was unable to get up and family noted altered mental status, leading her to the ER for evaluation. Pt also states she took 41 units NPH this AM and drank chicken broth today. She denies eating appropriate meal throughout the day. Pt denies association to chest pain, sob, palpitations, n/v/d. She follows with Dr Kizzy De La Cruz for her hyperthyroidism and states her methimazole dose was increased about 4 days ago. Pt also states she has been feeling abnormally cold all week despite heat turned up high. In the ER, vitals; T96, P58, /62, SpO2 98% on RA  Labs: UA suggestive of UTI, hgb 17, , lactate wnl. INR 1.5,l trop wnl  We were asked to admit for work up and evaluation of the above problems.      Past Medical History:   Diagnosis Date    Anticoagulant long-term use 10/13/2017    Anxiety 10/13/2017    Atrial fibrillation ([address] 10/13/2017    Breast calcification, left 10/13/2017    CAD (coronary artery disease)     Cellulitis of both lower extremities 10/13/2017    Chronic kidney disease     kidney stones    Chronic pain syndrome 10/13/2017    Chronic prescription opiate use 11/15/2017    CVA (cerebral vascular accident) ([address] 10/13/2017    Diabetes ([address]     DJD (degenerative joint disease) 10/13/2017    Dyslipidemia 10/13/2017    Glaucoma 10/13/2017    Hypertension     Hyperthyroidism 10/13/2017    Long-term use of high-risk medication 10/13/2017    Stasis ulcer of lower extremity ([address] 10/13/2017    Stroke ([address]     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 ([address] 8/18/2012    retinopathy         Past Surgical History:   Procedure Laterality Date    CARDIAC SURG PROCEDURE UNLIST      cagb    HX GYN      hysterectomty    HX ORTHOPAEDIC      back surgery       Social History     Tobacco Use    Smoking status: Never Smoker    Smokeless tobacco: Never Used   Substance Use Topics    Alcohol use: No        Family History   Problem Relation Age of Onset    Heart Disease Father      Allergies   Allergen Reactions    Betadine Prepstick Rash        Prior to Admission medications    Medication Sig Start Date End Date Taking? Authorizing Provider   flash glucose scanning reader (FREESTYLE JOSE LUIS READER) [facility] Use to check blood sugars daily  DX: E11.9 8/17/18  Yes Vidhi Hayes MD   flash glucose sensor (FREESTYLE JOSE LUIS SENSOR) kit Use to check blood sugars daily  DX: E11.9 8/17/18  Yes Vidhi Hayes MD   warfarin (COUMADIN) 2 mg tablet TAKE ONE AND ONE-HALF TABLETS DAILY FOR FIVE DAYS A WEEK AND 2 TABLETS ON [facility] AND SUNDAY 5/29/18  Yes Vidhi Hayes MD   carvedilol (COREG) 12.5 mg tablet TAKE 1 TABLET TWICE A DAY 5/22/18  Yes Vidhi Hayes MD   NOVOLIN N NPH U-100 INSULIN 100 unit/mL injection INJECT 44 UNITS UNDER THE SKIN IN THE MORNING AND 36 UNITS IN THE EVENING  Patient taking differently: INJECT 41 UNITS UNDER THE SKIN IN THE MORNING AND 33 UNITS IN THE EVENING 5/21/18  Yes Vidhi Hayes MD   amLODIPine (NORVASC) 10 mg tablet TAKE 1 TABLET DAILY 4/10/18  Yes Vidhi Hayes MD   Blood-Glucose Meter (FREESTYLE FREEDOM LITE) monitoring kit Daily blood sugar checks 1/11/18  Yes Vidhi Hayes MD   lancets (FREESTYLE LANCETS) 28 gauge misc Daily blood sugar checks 1/11/18  Yes Vidhi Hayes MD   glucose blood VI test strips (FREESTYLE TEST) strip by Does Not Apply route daily.  Use once daily for daily blood glucose checks 1/10/18  Yes Vidhi Hayes MD   cloNIDine HCl (CATAPRES) 0.1 mg tablet TAKE 1 TABLET THREE TIMES A DAY 12/31/18   Vidhi Hayes MD   pravastatin (PRAVACHOL) 80 mg tablet TAKE 1 TABLET NIGHTLY 11/13/18   Presley Lozano MD   furosemide (LASIX) 40 mg tablet TAKE 1 TABLET DAILY 8/19/18   Rj Alfaro MD   LORazepam (ATIVAN) 0.5 mg tablet Take 1 Tab by mouth nightly as needed. Max Daily Amount: 0.5 mg. 8/7/18   Rj Alfaro MD   captopril (CAPOTEN) 25 mg tablet TAKE 1 TABLET TWICE A DAY 6/15/18   Rj Alfaro MD   ergocalciferol (VITAMIN D2) 50,000 unit capsule Take 50,000 Units by mouth every seven (7) days. Provider, Historical   methIMAzole (TAPAZOLE) 10 mg tablet Take  by mouth daily. Provider, Historical   naloxone (NARCAN) 4 mg/actuation nasal spray Use 1 spray intranasally into 1 nostril. Indications: OPIATE-INDUCED RESPIRATORY DEPRESSION, Opioid Toxicity 11/15/17   Rj Alfaro MD   latanoprost (XALATAN) 0.005 % ophthalmic solution Administer 1 Drop to both eyes nightly. Provider, Historical   Insulin Syringe-Needle U-100 (BD INSULIN SYRINGE ULT-FINE II) 0.5 mL 31 gauge x 5/16 syrg 1 Each by Does Not Apply route two (2) times a day. 9/8/17   Rj Alfaro MD   aspirin (ASPIRIN) 325 mg tablet Take 1 Tab by mouth daily. 8/22/12   Rj Alfaro MD       REVIEW OF SYSTEMS:     I am not able to complete the review of systems because:    The patient is intubated and sedated    The patient has altered mental status due to his acute medical problems    The patient has baseline aphasia from prior stroke(s)    The patient has baseline dementia and is not reliable historian    The patient is in acute medical distress and unable to provide information           Total of 12 systems reviewed as follows:       POSITIVE= BOLD text  Negative = text not BOLD  General:  fever, chills, sweats, generalized weakness, weight loss/gain,      loss of appetite   Eyes:    blurred vision, eye pain, loss of vision, double vision  ENT:    rhinorrhea, pharyngitis   Respiratory:   cough, sputum production, SOB, MARTINEZ, wheezing, pleuritic pain   Cardiology:   chest pain, palpitations, orthopnea, PND, edema, syncope   Gastrointestinal:  abdominal pain , N/V, diarrhea, dysphagia, constipation, bleeding   Genitourinary:  frequency, urgency, dysuria, hematuria, incontinence   Muskuloskeletal :  arthralgia, myalgia, back pain  Hematology:  easy bruising, nose or gum bleeding, lymphadenopathy   Dermatological: rash, ulceration, pruritis, color change / jaundice  Endocrine:   hot flashes or polydipsia   Neurological:  Headache, fall, dizziness, confusion, focal weakness, paresthesia,     Speech difficulties, memory loss, gait difficulty  Psychological: Feelings of anxiety, depression, agitation    Objective:   VITALS:    Visit Vitals  /57   Pulse 63   Temp (!) 94.4 °F (34.7 °C)   Resp 14   Ht 5' 4\" (1.626 m)   Wt 93 kg (205 lb)   SpO2 97%   BMI 35.19 kg/m²       PHYSICAL EXAM:    General:    Alert, cooperative, no distress, appears stated age. HEENT: Atraumatic, anicteric sclerae, pink conjunctivae     No oral ulcers, mucosa moist, throat clear  Neck:  Supple, symmetrical,  thyroid: non tender  Lungs:   CTA b/l. No wheezing or Rhonchi. No rales. Chest wall:  No tenderness. No accessory muscle use. Heart:   Regular  rhythm,  No  Murmur. No edema  Abdomen:   Soft, NT. ND  BS+  Extremities: No cyanosis. No clubbing,      Skin turgor normal, Radial dial pulse 2+. Capillary refill normal  Skin:     Not pale. Not Jaundiced  No rashes   Psych:  Not depressed. Not anxious or agitated. Neurologic: No facial asymmetry. No aphasia or slurred speech. Symmetrical strength, Sensation grossly intact.  AAOx4.     _______________________________________________________________________  Care Plan discussed with:    Comments   Patient x    Family      RN x    Care Manager                    Consultant:  x ED physician   _______________________________________________________________________  Expected  Disposition:   Home with Family HH/PT/OT/RN x   SNF/LTC    SAM[facility]    ________________________________________________________________________  TOTAL TIME:  65 Minutes    Critical Care Provided     Minutes non procedure based      Comments    x Reviewed previous records   >50% of visit spent in counseling and coordination of care x Discussion with patient and/or family and questions answered       ________________________________________________________________________  Signed: Romina Wells MD    Procedures: see electronic medical records for all procedures/Xrays and details which were not copied into this note but were reviewed prior to creation of Plan.     LAB DATA REVIEWED:    Recent Results (from the past 24 hour(s))   GLUCOSE, POC    Collection Time: 01/30/19  2:40 PM   Result Value Ref Range    Glucose (POC) 75 65 - 100 mg/dL    Performed by Stacia Lam, POC    Collection Time: 01/30/19  2:40 PM   Result Value Ref Range    Glucose (POC) 75 65 - 100 mg/dL    Performed by Stacia Lam, POC    Collection Time: 01/30/19  2:54 PM   Result Value Ref Range    Glucose (POC) 76 65 - 100 mg/dL    Performed by Stacia Lam, POC    Collection Time: 01/30/19  2:54 PM   Result Value Ref Range    Glucose (POC) 76 65 - 100 mg/dL    Performed by Stacia Lam, POC    Collection Time: 01/30/19  3:09 PM   Result Value Ref Range    Glucose (POC) 64 (L) 65 - 100 mg/dL    Performed by Stacia Lam, POC    Collection Time: 01/30/19  3:09 PM   Result Value Ref Range    Glucose (POC) 64 (L) 65 - 100 mg/dL    Performed by Stacia Lam, POC    Collection Time: 01/30/19  3:44 PM   Result Value Ref Range    Glucose (POC) 144 (H) 65 - 100 mg/dL    Performed by EDWIN LYN WITH AUTOMATED DIFF    Collection Time: 01/30/19  4:39 PM   Result Value Ref Range    WBC 7.0 3.6 - 11.0 K/uL    RBC 5.24 (H) 3.80 - 5.20 M/uL    HGB 17.1 (H) 11.5 - 16.0 g/dL    HCT 49.1 (H) 35.0 - 47.0 %    MCV 93.7 80.0 - 99.0 FL    MCH 32.6 26.0 - 34.0 PG    MCHC 34.8 30.0 - 36.5 g/dL    RDW 12.2 11.5 - 14.5 %    PLATELET 479 (L) 149 - 400 K/uL    MPV 11.8 8.9 - 12.9 FL    NRBC 0.0 0  WBC    ABSOLUTE NRBC 0.00 0.00 - 0.01 K/uL    NEUTROPHILS 81 (H) 32 - 75 %    LYMPHOCYTES 15 12 - 49 %    MONOCYTES 3 (L) 5 - 13 %    EOSINOPHILS 1 0 - 7 %    BASOPHILS 0 0 - 1 %    IMMATURE GRANULOCYTES 0 0.0 - 0.5 %    ABS. NEUTROPHILS 5.6 1.8 - 8.0 K/UL    ABS. LYMPHOCYTES 1.1 0.8 - 3.5 K/UL    ABS. MONOCYTES 0.2 0.0 - 1.0 K/UL    ABS. EOSINOPHILS 0.1 0.0 - 0.4 K/UL    ABS. BASOPHILS 0.0 0.0 - 0.1 K/UL    ABS. IMM. GRANS. 0.0 0.00 - 0.04 K/UL    DF AUTOMATED      RBC COMMENTS NORMOCYTIC, NORMOCHROMIC     METABOLIC PANEL, COMPREHENSIVE    Collection Time: 01/30/19  4:39 PM   Result Value Ref Range    Sodium 137 136 - 145 mmol/L    Potassium 3.6 3.5 - 5.1 mmol/L    Chloride 104 97 - 108 mmol/L    CO2 27 21 - 32 mmol/L    Anion gap 6 5 - 15 mmol/L    Glucose 94 65 - 100 mg/dL    BUN 14 6 - 20 MG/DL    Creatinine 1.05 (H) 0.55 - 1.02 MG/DL    BUN/Creatinine ratio 13 12 - 20      GFR est AA >60 >60 ml/min/1.73m2    GFR est non-AA 51 (L) >60 ml/min/1.73m2    Calcium 8.9 8.5 - 10.1 MG/DL    Bilirubin, total 0.5 0.2 - 1.0 MG/DL    ALT (SGPT) 31 12 - 78 U/L    AST (SGOT) 33 15 - 37 U/L    Alk.  phosphatase 94 45 - 117 U/L    Protein, total 10.6 (H) 6.4 - 8.2 g/dL    Albumin 3.9 3.5 - 5.0 g/dL    Globulin 6.7 (H) 2.0 - 4.0 g/dL    A-G Ratio 0.6 (L) 1.1 - 2.2     PTT    Collection Time: 01/30/19  4:39 PM   Result Value Ref Range    aPTT 29.9 22.1 - 32.0 sec    aPTT, therapeutic range     58.0 - 77.0 SECS   PROTHROMBIN TIME + INR    Collection Time: 01/30/19  4:39 PM   Result Value Ref Range    INR 1.5 (H) 0.9 - 1.1      Prothrombin time 14.6 (H) 9.0 - 11.1 sec   URINALYSIS W/ REFLEX CULTURE    Collection Time: 01/30/19  4:49 PM   Result Value Ref Range    Color YELLOW/STRAW      Appearance CLOUDY (A) CLEAR      Specific gravity 1.010 1.003 - 1.030 pH (UA) 6.5 5.0 - 8.0      Protein NEGATIVE  NEG mg/dL    Glucose 250 (A) NEG mg/dL    Ketone NEGATIVE  NEG mg/dL    Bilirubin NEGATIVE  NEG      Blood NEGATIVE  NEG      Urobilinogen 1.0 0.2 - 1.0 EU/dL    Nitrites NEGATIVE  NEG      Leukocyte Esterase MODERATE (A) NEG      WBC 10-20 0 - 4 /hpf    RBC 0-5 0 - 5 /hpf    Epithelial cells FEW FEW /lpf    Bacteria 4+ (A) NEG /hpf    UA:UC IF INDICATED URINE CULTURE ORDERED (A) CNI      Hyaline cast 0-2 0 - 5 /lpf   GLUCOSE, POC    Collection Time: 01/30/19  4:54 PM   Result Value Ref Range    Glucose (POC) 112 (H) 65 - 100 mg/dL    Performed by Paulo Sampson    MAGNESIUM    Collection Time: 01/30/19  5:00 PM   Result Value Ref Range    Magnesium 2.2 1.6 - 2.4 mg/dL   TROPONIN I    Collection Time: 01/30/19  5:00 PM   Result Value Ref Range    Troponin-I, Qt. <0.05 <0.05 ng/mL   POC LACTIC ACID    Collection Time: 01/30/19  5:36 PM   Result Value Ref Range    Lactic Acid (POC) 1.33 0.40 - 2.00 mmol/L

## 2019-01-31 NOTE — PROGRESS NOTES
Problem: Falls - Risk of  Goal: *Absence of Falls  Document Piyush Fall Risk and appropriate interventions in the flowsheet.   Outcome: Progressing Towards Goal  Fall Risk Interventions:  Mobility Interventions: Communicate number of staff needed for ambulation/transfer, Patient to call before getting OOB         Medication Interventions: Bed/chair exit alarm, Evaluate medications/consider consulting pharmacy    Elimination Interventions: Bed/chair exit alarm, Call light in reach, Toileting schedule/hourly rounds    History of Falls Interventions: Bed/chair exit alarm, Evaluate medications/consider consulting pharmacy

## 2019-01-31 NOTE — WOUND CARE
Wound care assessment for the legs and heels: Chart reviewed and patient assessed for her legs and heels today. Pt. Has struggled with chronic leg edema for about 6 years. I saw her back in 2013 for the same and at that time she had wounds. Today the legs are clear of any wounds and the heels are also clear. She does have some mild edema in both posterior heels and ankles with callouses that are dry and her skin on the legs is very dry. The nursing staff is preventing any problems with heels by using the heel suspension boots - Prevalon. Recommendation: Lac-Hydrin lotion to be applied to the lower legs after cleansing daily. This was ordered from the pharmacy and discussed with her nurse, Tyrell Maguire.    Sivan Devlin, RN, BSN, [employer]

## 2019-01-31 NOTE — PROGRESS NOTES
[address] [place] [address]  [phone]      Medical Progress Note      NAME: Kristen Youngblood   :  1945  MRM:  [id number]    Date/Time: 2019  5:43 AM         Subjective:     76 y/o female with history of previous stroke with left hemiplegia, DM, hyperthyroidism on MMI, chronic pain admitted with sepsis, UTI, encephalopathy, hypothermia, hypoglycemia, bradycardia and elevated TSH. Cultured and started on IV rocephin. MMI held and temp back to normal with use of Eriberto Hugger.     Patient alert with stable VS's and mentation/orientation back to normal.    Past Medical History:   Diagnosis Date    Anticoagulant long-term use 10/13/2017    Anxiety 10/13/2017    Atrial fibrillation ([address] 10/13/2017    Breast calcification, left 10/13/2017    CAD (coronary artery disease)     Cellulitis of both lower extremities 10/13/2017    Chronic kidney disease     kidney stones    Chronic pain syndrome 10/13/2017    Chronic prescription opiate use 11/15/2017    CVA (cerebral vascular accident) ([address] 10/13/2017    Diabetes ([address]     DJD (degenerative joint disease) 10/13/2017    Dyslipidemia 10/13/2017    Glaucoma 10/13/2017    Hypertension     Hyperthyroidism 10/13/2017    Long-term use of high-risk medication 10/13/2017    Stasis ulcer of lower extremity ([address] 10/13/2017    Stroke ([address]     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 ([address] 2012    retinopathy        ROS:  General: negative for fever, chills, sweats, weakness  Ear Nose and Throat: negative for rhinorrhea, pharyngitis, otalgia, tinnitus, speech or swallowing difficulties  Respiratory:  negative for cough, sputum production, SOB, wheezing, MARTINEZ, pleuritic pain  Cardiology:  negative for chest pain, palpitations, orthopnea, PND, edema, syncope   Gastrointestinal: negative for abdominal pain, N/V, dysphagia, change in bowel habits, bleeding  Genitourinary: negative for frequency, urgency, dysuria, hematuria, incontinence  Muskuloskeletal : negative for arthralgia, myalgia  Dermatological: negative for rash, ulceration, mole change, new lesion  Neurological: positive for chronic left hemiplegia  Psychological: negative for anxiety, depression, agitation  Review of systems otherwise negative         Objective:       Vitals:        Last 24hrs VS reviewed since prior progress note. Most recent are:    Visit Vitals  /77 (BP 1 Location: Right arm, BP Patient Position: At rest)   Pulse 84   Temp 98.2 °F (36.8 °C)   Resp 18   Ht 5' 4\" (1.626 m)   Wt 205 lb (93 kg)   SpO2 98%   BMI 35.19 kg/m²     SpO2 Readings from Last 6 Encounters:   01/31/19 98%   11/21/18 96%   08/16/18 96%   05/15/18 97%   02/15/18 94%   01/07/18 98%            Intake/Output Summary (Last 24 hours) at 1/31/2019 0543  Last data filed at 1/31/2019 0530  Gross per 24 hour   Intake --   Output 1000 ml   Net -1000 ml        Telemetry reviewed:   normal sinus rhythm  Tubes:   N/A  X-Ray:  Chest xray - no acute process    CT head - 1. Left greater than right chronic appearing small vessel ischemic white matter  changes. 2. No acute intracranial abnormality demonstrated    Procedures:   N/A    Exam:     General   well developed, well nourished, appears stated age, in no acute distress  EENT  Normocephalic, Atraumatic, PERRLA, EOMI, sclera clear, nares clear, pharynx normal  Respiratory   Clear To Auscultation bilaterally - no wheezes, rales, rhonchi, or crackles  Cardiology  Regular Rate and Rythmn  - no murmurs, rubs or gallops  Abdominal  Soft, non-tender, non-distended, positive bowel sounds, no hepatosplenomegaly  Extremities  No clubbing, cyanosis, or edema. Pulses intact. Skin  Normal skin turgor. No rashes or skin ulcers noted  Neurological  Chronic left hemiplegia present  Psychological  Oriented x 3. Normal affect.   Exam otherwise negative     Lab Data Reviewed: (see below)    Medications Reviewed: (see below)    ______________________________________________________________________    Medications:     Current Facility-Administered Medications   Medication Dose Route Frequency    sodium chloride (NS) flush 5-40 mL  5-40 mL IntraVENous Q8H    sodium chloride (NS) flush 5-40 mL  5-40 mL IntraVENous PRN    acetaminophen (TYLENOL) tablet 650 mg  650 mg Oral Q4H PRN    ondansetron (ZOFRAN) injection 4 mg  4 mg IntraVENous Q4H PRN    insulin lispro (HUMALOG) injection   SubCUTAneous AC&HS    glucose chewable tablet 16 g  4 Tab Oral PRN    glucagon (GLUCAGEN) injection 1 mg  1 mg IntraMUSCular PRN    cefTRIAXone (ROCEPHIN) 1 g in 0.9% sodium chloride (MBP/ADV) 50 mL  1 g IntraVENous Q24H    amLODIPine (NORVASC) tablet 10 mg  10 mg Oral DAILY    aspirin tablet 325 mg  325 mg Oral DAILY    cloNIDine HCl (CATAPRES) tablet 0.1 mg  0.1 mg Oral TID    latanoprost (XALATAN) 0.005 % ophthalmic solution 1 Drop  1 Drop Both Eyes QHS    LORazepam (ATIVAN) tablet 0.5 mg  0.5 mg Oral QHS PRN    pravastatin (PRAVACHOL) tablet 80 mg  80 mg Oral QHS    hydrALAZINE (APRESOLINE) 20 mg/mL injection 10 mg  10 mg IntraVENous Q6H PRN    dextrose (D50W) injection syrg 12.5-25 g  12.5-25 g IntraVENous PRN    WARFARIN INFORMATION NOTE (COUMADIN)   Other QPM            Lab Review:     Recent Labs     01/30/19  1639   WBC 7.0   HGB 17.1*   HCT 49.1*   *     Recent Labs     01/30/19  1700 01/30/19  1639   NA  --  137   K  --  3.6   CL  --  104   CO2  --  27   GLU  --  94   BUN  --  14   CREA  --  1.05*   CA  --  8.9   MG 2.2  --    ALB  --  3.9   TBILI  --  0.5   SGOT  --  33   ALT  --  31   INR  --  1.5*     Lab Results   Component Value Date/Time    Glucose (POC) 189 (H) 01/30/2019 09:37 PM    Glucose (POC) 128 (H) 01/30/2019 07:48 PM    Glucose (POC) 112 (H) 01/30/2019 04:54 PM    Glucose (POC) 144 (H) 01/30/2019 03:44 PM    Glucose (POC) 64 (L) 01/30/2019 03:09 PM    Glucose (POC) 64 (L) 01/30/2019 03:09 PM     No results for input(s): PH, PCO2, PO2, HCO3, FIO2 in the last 72 hours. Recent Labs     01/30/19  1639   INR 1.5*            Assessment:     Principal Problem:    Sepsis ([facility] (1/30/2019)    Active Problems:    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 ([facility] (8/18/2012)      Overview: retinopathy      Acquired hypothyroidism (8/18/2012)      Hyperthyroidism (10/13/2017)      UTI (urinary tract infection) (8/40/6461)      Metabolic encephalopathy (3/02/4476)           Plan:     Risk of deterioration: medium             1. Continue IV rocephin  2. Await culture results  3. Continue to hold MMI - normally managed by Dr Marimar Barillas  4. SSI coverage  5. PT/OT eval  6. Venous access a problem and will need PICC placement    7.  Continue coumadin - INR 1.5 yesterday                     Care Plan discussed with: Patient and Nursing Staff    Discussed:  Care Plan    Prophylaxis:  Coumadin and H2B/PPI    Disposition:  Home w/Family           ___________________________________________________    Attending Physician: Sanju Quesada MD

## 2019-01-31 NOTE — DIABETES MGMT
DTC Progress Note    Recommendations/ Comments:Please consider resuming NPH at 8 units Q12hrs. Noted pt admitted with hypoglycemia on home dose of NPH. Current hospital DM medication: humalog correction    Chart reviewed on Mon[facility] Dunham. Patient is a 75 y.o. female with known Type 2 Diabetes on NPH 41 units Qam and 33 units Qevening at home. A1c:   Lab Results   Component Value Date/Time    Hemoglobin A1c 7.6 (H) 01/31/2019 10:30 AM    Hemoglobin A1c, External 7.3 05/09/2018       Recent Glucose Results:   Lab Results   Component Value Date/Time     (H) 01/31/2019 10:30 AM    GLUCPOC 335 (H) 01/31/2019 04:02 PM    GLUCPOC 270 (H) 01/31/2019 12:51 PM    GLUCPOC 313 (H) 01/31/2019 11:14 AM        Lab Results   Component Value Date/Time    Creatinine 1.08 (H) 01/31/2019 10:30 AM     Estimated Creatinine Clearance: 50.5 mL/min (A) (based on SCr of 1.08 mg/dL (H)). Active Orders   Diet    DIET DIABETIC CONSISTENT CARB Regular        PO intake: No data found. Will continue to follow as needed.     Thank you    SHANE Lara, RN, [address]        Time spent: 5 min

## 2019-01-31 NOTE — ROUTINE PROCESS
Bedside and Verbal shift change report given to Ebony HUITRON (oncoming nurse) by Fernanda Signs nurse). Report included the following information SBAR, Kardex, Recent Results, Med Rec Status .      Zone Phone:   [phone]          Significant changes during shift:  New Admit    Patient Information      75 yr old, admitted on 1/30/19, patient of Dr. Raya Howard, admitted from home.      Problem List         Past Medical History:   Diagnosis Date    Anticoagulant long-term use 10/13/2017    Anxiety 10/13/2017    Atrial fibrillation ([address] 10/13/2017    Breast calcification, left 10/13/2017    CAD (coronary artery disease)      Cellulitis of both lower extremities 10/13/2017    Chronic kidney disease       kidney stones    Chronic pain syndrome 10/13/2017    Chronic prescription opiate use 11/15/2017    CVA (cerebral vascular accident) ([address] 10/13/2017    Diabetes ([address]      DJD (degenerative joint disease) 10/13/2017    Dyslipidemia 10/13/2017    Glaucoma 10/13/2017    Hypertension      Hyperthyroidism 10/13/2017    Long-term use of high-risk medication 10/13/2017    Stasis ulcer of lower extremity ([address] 10/13/2017    Stroke ([address]      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 ([address] 8/18/2012     retinopathy                Past Surgical History:   Procedure Laterality Date    CARDIAC SURG PROCEDURE UNLIST         cagb    HX GYN         hysterectomty    HX ORTHOPAEDIC         back surgery         Social History      Tobacco Use    Smoking status: Never Smoker    Smokeless tobacco: Never Used   Substance Use Topics    Alcohol use:  No               Family History   Problem Relation Age of Onset    Heart Disease Father             Allergies   Allergen Reactions    Betadine Prepstick Rash                Activity Status:      OOB to Chair:  No  Ambulated this shift No  Bed Rest No      Supplemental C7: (JT Applicable)      Room air          LINES AND DRAINS:      PIV        DVT prophylaxis:      DVT prophylaxis Med- Yes; Coumadin  DVT prophylaxis SCD or TAMIKA- N       Wounds: (If Applicable)      Wounds- R heel boggy   Location      Patient Safety:      Falls Score Total Score:  3  Safety Level_______  Bed Alarm On? Yes  Sitter?  No      Plan for upcoming shift: Labs, wound care, PICC insertion, PT/OT      Discharge Plan: TBD      Active Consults:  Wound care

## 2019-01-31 NOTE — PROGRESS NOTES
Occupational Therapy Goals  Initiated 1/31/2019  1. Patient will perform grooming with supervision/set-up within 7 day(s). 2.  Patient will perform bathing with minimal assistance/contact guard assist within 7 day(s). 3.  Patient will perform toileting with moderate assistance  within 7 day(s). 4.  Patient will perform toilet transfers with moderate assistance  within 7 day(s). 5.  Patient will perform all aspects of toileting with moderate assistance  within 7 day(s). 6.  Patient will participate in upper extremity therapeutic exercise/activities with modified independence for 5 minutes within 10 day(s). Occupational Therapy EVALUATION  Patient: Yuli Wallace (57 y.o. female)  Date: 1/31/2019  Primary Diagnosis: UTI (urinary tract infection)  Sepsis ([facility]       Precautions: Fall Risk;  Contact (MRSA)    ASSESSMENT :  Based on the objective data described below, the patient presents with generalized weakness, decreased functional mobility, decreased functional activity tolerance and significant LE edema d/t prolonged period of immobility following recent fall in home. Patient also presents with residual weakness and left hemiplegia from prior CVA. Patient A&Ox4 upon arrival. Patient reports living with family and that she relies on their help to complete the majority of her self-care routine at baseline. Lives on first floor of home and typically furniture walks. Reports she was furniture walking and her right hand slipped on pillow that was on a tabletop surface during recent fall. Patient verbalized anxiety regarding this recent period of immobility and required Mod reassurance in order to participate in bed mobility, sitting EOB and transferring to chair with Mod x2 assist. Mod A x2 for bed mobility/transfers. Grooming Min-Mod. Bathing Max-Total A. Recommend inpatient rehab at discharge. Patient will benefit from skilled intervention to address the above impairments.   Patients rehabilitation potential is considered to be Excellent    Factors which may influence rehabilitation potential include:   []             None noted  []             Mental ability/status  [x]             Medical condition  [x]             Home/family situation and support systems  []             Safety awareness  []             Pain tolerance/management  []             Other:      PLAN :  Recommendations and Planned Interventions:  [x]               Self Care Training                  [x]        Therapeutic Activities  [x]               Functional Mobility Training    []        Cognitive Retraining  []               Therapeutic Exercises           [x]        Endurance Activities  []               Balance Training                   []        Neuromuscular Re-Education  []               Visual/Perceptual Training     [x]   Home Safety Training  [x]               Patient Education                 []        Family Training/Education  []               Other (comment):    Frequency/Duration: Patient will be followed by occupational therapy 5 times a week to address goals. Discharge Recommendations: Inpatient Rehab (backup SNF) depending on progress  Further Equipment Recommendations for Discharge: AE for bathing/dressing/item retrieval     SUBJECTIVE:   Patient stated I just don't know how you all are Donnice Jeang get me moving again. \"    OBJECTIVE DATA SUMMARY:   HISTORY:   Past Medical History:   Diagnosis Date    Anticoagulant long-term use 10/13/2017    Anxiety 10/13/2017    Atrial fibrillation ([address] 10/13/2017    Breast calcification, left 10/13/2017    CAD (coronary artery disease)     Cellulitis of both lower extremities 10/13/2017    Chronic kidney disease     kidney stones    Chronic pain syndrome 10/13/2017    Chronic prescription opiate use 11/15/2017    CVA (cerebral vascular accident) ([address] 10/13/2017    Diabetes ([facility]     DJD (degenerative joint disease) 10/13/2017    Dyslipidemia 10/13/2017    Glaucoma 10/13/2017  Hypertension     Hyperthyroidism 10/13/2017    Long-term use of high-risk medication 10/13/2017    Stasis ulcer of lower extremity ([facility] 10/13/2017    Stroke ([facility]     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 ([facility] 8/18/2012    retinopathy      Past Surgical History:   Procedure Laterality Date    CARDIAC SURG PROCEDURE UNLIST      cagb    HX GYN      hysterectomty    HX ORTHOPAEDIC      back surgery     Expanded or extensive additional review of patient history: see assessment for details    Home Situation  Home Environment: Private residence  # Steps to Enter: 5(ramp at backdoor used primarily)  Rails to Enter: Yes  Hand Rails : Bilateral  Wheelchair Ramp: Yes  One/Two Story Residence: Two story, live on 1st floor  Living Alone: No  Support Systems: Family member(s)  Patient Expects to be Discharged to[de-identified] Private residence  Current DME Used/Available at Home: Shower chair, Walker, rollator, Walker, rolling, Other (comment), Commode, bedside(built in shower seat)  Tub or Shower Type: Shower    Hand dominance: Right    EXAMINATION OF PERFORMANCE DEFICITS:  Cognitive/Behavioral Status:  Neurologic State: Alert  Orientation Level: Oriented X4  Cognition: Appropriate decision making; Appropriate safety awareness; Follows commands  Perception: Appears intact  Perseveration: No perseveration noted  Safety/Judgement: Awareness of environment;Good awareness of safety precautions; Insight into deficits    Skin: Pressure ulcer left foot    Edema: Significant edema in BLEs    Hearing:   Auditory  Auditory Impairment: None    Range of Motion:    AROM: Generally decreased, functional  PROM: Generally decreased, functional     Strength:    Strength: Generally decreased, functional       Coordination:  Coordination: Within functional limits(RUE WFL; LUE Grossly decreased, nonfunctional)  Fine Motor Skills-Upper: Left Impaired;Right Intact    Gross Motor Skills-Upper: Left Impaired;Right Intact    Tone & Sensation:    Tone: Abnormal(Abnormal LUE; Normal RUE)  Sensation: Intact(Slightly decreased in right hand)                      Balance:  Sitting: Intact; With support(Uses right hand to maintain sitting balance seated EOB)  Standing: Impaired; With support  Standing - Static: Constant support  Standing - Dynamic : Poor    Functional Mobility and Transfers for ADLs:  Bed Mobility:  Rolling: Moderate assistance;Maximum assistance(x2)  Supine to Sit: Moderate assistance;Maximum assistance(x2)  Sit to Supine: Moderate assistance;Maximum assistance  Scooting: Moderate assistance;Maximum assistance    Transfers:  Sit to Stand: Moderate assistance(x2)  Stand to Sit: Moderate assistance(x2)  Bed to Chair: Moderate assistance(x2)  Bathroom Mobility: Moderate assistance;Maximum assistance  Toilet Transfer : Maximum assistance    ADL Assessment:  Feeding: Modified independent(Requires increased time using right hand only)    Oral Facial Hygiene/Grooming: Minimum assistance; Moderate assistance    Bathing: Maximum assistance; Total assistance    Upper Body Dressing: Moderate assistance;Maximum assistance    Lower Body Dressing: Total assistance    Toileting: Maximum assistance; Total assistance          ADL Intervention and task modifications:      See assessment above. Cognitive Retraining  Safety/Judgement: Awareness of environment;Good awareness of safety precautions; Insight into deficits    Functional Measure:    Barthel Index:    Bathin  Bladder: 0  Bowels: 5  Groomin  Dressin  Feedin  Mobility: 0  Stairs: 0  Toilet Use: 5  Transfer (Bed to Chair and Back): 5  Total: 25        The Barthel ADL Index: Guidelines  1. The index should be used as a record of what a patient does, not as a record of what a patient could do. 2. The main aim is to establish degree of independence from any help, physical or verbal, however minor and for whatever reason.   3. The need for supervision renders the patient not independent. 4. A patient's performance should be established using the best available evidence. Asking the patient, friends/relatives and nurses are the usual sources, but direct observation and common sense are also important. However direct testing is not needed. 5. Usually the patient's performance over the preceding 24-48 hours is important, but occasionally longer periods will be relevant. 6. Middle categories imply that the patient supplies over 50 per cent of the effort. 7. Use of aids to be independent is allowed. Radha Kee, Barthel, DGordonW. ([id number]). Functional evaluation: the Barthel Index. [address] [facility] (14)2. Pete Licona charlie ALEJA CorralF, Olimpia Mauricio., Kevan Sprague., Isaac, [address] (1999). Measuring the change indisability after inpatient rehabilitation; comparison of the responsiveness of the Barthel Index and Functional [place] Measure. Journal of Neurology, Neurosurgery, and Psychiatry, 66(4), 300-921. Romina Garcia, N.J.A, MIHIR Lee, & Melissa Segura MCHAPINCITO. (2004.) Assessment of post-stroke quality of life in cost-effectiveness studies: The usefulness of the Barthel Index and the EuroQoL-5D. Quality of Life Research, 15, 123-66       Occupational Therapy Evaluation Charge Determination   History Examination Decision-Making   LOW Complexity : Brief history review  LOW Complexity : 1-3 performance deficits relating to physical, cognitive , or psychosocial skils that result in activity limitations and / or participation restrictions  MEDIUM Complexity : Patient may present with comorbidities that affect occupational performnce.  Miniml to moderate modification of tasks or assistance (eg, physical or verbal ) with assesment(s) is necessary to enable patient to complete evaluation       Based on the above components, the patient evaluation is determined to be of the following complexity level: MEDIUM  Pain:  Pain Scale 1: Numeric (0 - 10)  Pain Intensity 1: 0     Activity Tolerance:   See assessment above. Please refer to the flowsheet for vital signs taken during this treatment. After treatment:   [x] Patient left in no apparent distress sitting up in chair  [] Patient left in no apparent distress in bed  [x] Call bell left within reach  [] Nursing notified  [] Caregiver present  [] Bed alarm activated    COMMUNICATION/EDUCATION:     The patients plan of care was discussed with: Physical Therapist and patient. [] Home safety education was provided and the patient/caregiver indicated understanding. [x] Patient/family have participated as able in goal setting and plan of care. [x] Patient/family agree to work toward stated goals and plan of care. [] Patient understands intent and goals of therapy, but is neutral about his/her participation. [] Patient is unable to participate in goal setting and plan of care.     Thank you for this referral.  Purvi Smith M.S. OTR  Time Calculation: 36 mins

## 2019-01-31 NOTE — PROGRESS NOTES
Problem: Falls - Risk of  Goal: *Absence of Falls  Document Piyush Fall Risk and appropriate interventions in the flowsheet.   Outcome: Progressing Towards Goal  Fall Risk Interventions:  Mobility Interventions: Communicate number of staff needed for ambulation/transfer, Patient to call before getting OOB, PT Consult for mobility concerns, PT Consult for assist device competence, Utilize walker, cane, or other assistive device         Medication Interventions: Bed/chair exit alarm, Evaluate medications/consider consulting pharmacy, Patient to call before getting OOB, Teach patient to arise slowly, Utilize gait belt for transfers/ambulation    Elimination Interventions: Bed/chair exit alarm, Call light in reach, Patient to call for help with toileting needs, Toileting schedule/hourly rounds    History of Falls Interventions: Bed/chair exit alarm, Consult care management for discharge planning, Door open when patient unattended, Evaluate medications/consider consulting pharmacy

## 2019-01-31 NOTE — PROGRESS NOTES
Midline insertion procedure note:  Pt has very limited vascular access. Procedure explained to patient along with risks and benefits. Procedure teaching completed. Pre procedure assessment done. Patient denies questions or concerns at this time. Midline information booklet left at bedside with patient. Spoke with Tyrell Maguire, primary RN and Dr. Samira Ritter regarding PICC order and patient only receiving Rocephine IV Q24hrs. All parities in agreement midline would be appropriate line for patient at this time. Midline sign over the [facility]. Maximum sterile barrier precautions observed throughout procedure. Lidocaine 1% 3ml sc injected to site prior to access the vein. Cannulated right cephalic vein using ultrasound guidance. Inserted 3 Fr. single lumen midline to right arm. Blood return verified and flushed with 20ml normal saline. Sterile dressing applied with Biopatch, StatLock and occlusive dressing as per protocol. Curos caps applied to port. Patient tolerated procedure well with minimal blood loss. Reason for access : Reliable IV access  Complications related to insertion : none  This midline to be removed on or before 3/1/19   See nursing message on [employer] for midline reminders. Midline is CT and MRI compatible. Inserted by : Chris Prader, RN [facility] / Vascular Access Nurse  Assisted by : Gema Engel RN, [facility] / Vascular Access Nurse  Catheter Length : 12 cm   External Length : 0 cm   Right arm circumference : 35 cm  Catheter occupies 21% of vein. Type of Midline: BARD  Ref#: [id number]  Lot#: [id number]  Expiration Date: 2019-11-30  Informed primary nurse Tyrell Maguire RN midline is ready for use and to hang new infusion tubing prior to use.     Chris Prader, RN [facility]   Vascular Access Nurse

## 2019-01-31 NOTE — CDMP QUERY
1.  
The [address] [place] [address] has issued a statement indicating that, \"Individuals who are overweight, obese, or morbidly obese are at an increased risk for certain medical conditions when compared to persons of normal weight. Therefore, these conditions are always clinically significant and reportable when documented by the provider. \" Review of the documentation for this patient demonstrates the clinical indicators of a BMI of 35.2 (height   of  5  4  with weight of 92.8   kg ). Please clarify if the patient has a diagnosis associated with those findings: 
_____  Obesity (BMI of 30-39. 9)  
_____ Morbid Obesity (BMI 40 or >) 
______Overweight (BMI 25-29. 9) 
_____  Other weight status (specify  status) 
_____  Unable to determine Thanks for your time. Mike Aguayo RN, BSN 
[phone]

## 2019-01-31 NOTE — PROGRESS NOTES
Marguerite at [facility] lab called and stated blood cultures with gram positive cocci in 1 of 2 bottles drawn from rt hand. Dr. Zane Timmons called , awaiting his return call.

## 2019-02-01 LAB
ANION GAP SERPL CALC-SCNC: 8 MMOL/L (ref 5–15)
BACTERIA SPEC CULT: ABNORMAL
BUN SERPL-MCNC: 14 MG/DL (ref 6–20)
BUN/CREAT SERPL: 11 (ref 12–20)
CALCIUM SERPL-MCNC: 8.1 MG/DL (ref 8.5–10.1)
CC UR VC: ABNORMAL
CHLORIDE SERPL-SCNC: 104 MMOL/L (ref 97–108)
CO2 SERPL-SCNC: 25 MMOL/L (ref 21–32)
COMMENT, HOLDF: NORMAL
CREAT SERPL-MCNC: 1.24 MG/DL (ref 0.55–1.02)
ERYTHROCYTE [DISTWIDTH] IN BLOOD BY AUTOMATED COUNT: 12.1 % (ref 11.5–14.5)
GLUCOSE BLD STRIP.AUTO-MCNC: 275 MG/DL (ref 65–100)
GLUCOSE BLD STRIP.AUTO-MCNC: 280 MG/DL (ref 65–100)
GLUCOSE BLD STRIP.AUTO-MCNC: 285 MG/DL (ref 65–100)
GLUCOSE BLD STRIP.AUTO-MCNC: 303 MG/DL (ref 65–100)
GLUCOSE SERPL-MCNC: 262 MG/DL (ref 65–100)
HCT VFR BLD AUTO: 37 % (ref 35–47)
HGB BLD-MCNC: 13.2 G/DL (ref 11.5–16)
INR PPP: 1.7 (ref 0.9–1.1)
MCH RBC QN AUTO: 32.6 PG (ref 26–34)
MCHC RBC AUTO-ENTMCNC: 35.7 G/DL (ref 30–36.5)
MCV RBC AUTO: 91.4 FL (ref 80–99)
NRBC # BLD: 0 K/UL (ref 0–0.01)
NRBC BLD-RTO: 0 PER 100 WBC
PLATELET # BLD AUTO: 174 K/UL (ref 150–400)
PMV BLD AUTO: 11.9 FL (ref 8.9–12.9)
POTASSIUM SERPL-SCNC: 3.9 MMOL/L (ref 3.5–5.1)
PROTHROMBIN TIME: 16.5 SEC (ref 9–11.1)
RBC # BLD AUTO: 4.05 M/UL (ref 3.8–5.2)
SAMPLES BEING HELD,HOLD: NORMAL
SERVICE CMNT-IMP: ABNORMAL
SODIUM SERPL-SCNC: 137 MMOL/L (ref 136–145)
T3 SERPL-MCNC: 60 NG/DL (ref 71–180)
WBC # BLD AUTO: 5.8 K/UL (ref 3.6–11)

## 2019-02-01 PROCEDURE — 82962 GLUCOSE BLOOD TEST: CPT

## 2019-02-01 PROCEDURE — 97535 SELF CARE MNGMENT TRAINING: CPT

## 2019-02-01 PROCEDURE — 85027 COMPLETE CBC AUTOMATED: CPT

## 2019-02-01 PROCEDURE — 74011636637 HC RX REV CODE- 636/637: Performed by: INTERNAL MEDICINE

## 2019-02-01 PROCEDURE — 74011250637 HC RX REV CODE- 250/637

## 2019-02-01 PROCEDURE — 74011250637 HC RX REV CODE- 250/637: Performed by: INTERNAL MEDICINE

## 2019-02-01 PROCEDURE — 74011250636 HC RX REV CODE- 250/636: Performed by: INTERNAL MEDICINE

## 2019-02-01 PROCEDURE — 36415 COLL VENOUS BLD VENIPUNCTURE: CPT

## 2019-02-01 PROCEDURE — 77030038269 HC DRN EXT URIN PURWCK BARD -A

## 2019-02-01 PROCEDURE — 97530 THERAPEUTIC ACTIVITIES: CPT

## 2019-02-01 PROCEDURE — 85610 PROTHROMBIN TIME: CPT

## 2019-02-01 PROCEDURE — 65660000000 HC RM CCU STEPDOWN

## 2019-02-01 PROCEDURE — 80048 BASIC METABOLIC PNL TOTAL CA: CPT

## 2019-02-01 PROCEDURE — 97110 THERAPEUTIC EXERCISES: CPT

## 2019-02-01 PROCEDURE — 94760 N-INVAS EAR/PLS OXIMETRY 1: CPT

## 2019-02-01 PROCEDURE — 74011000258 HC RX REV CODE- 258: Performed by: INTERNAL MEDICINE

## 2019-02-01 RX ORDER — CARVEDILOL 12.5 MG/1
12.5 TABLET ORAL 2 TIMES DAILY WITH MEALS
Status: DISCONTINUED | OUTPATIENT
Start: 2019-02-01 | End: 2019-02-04 | Stop reason: HOSPADM

## 2019-02-01 RX ADMIN — CEFTRIAXONE SODIUM 1 G: 1 INJECTION, POWDER, FOR SOLUTION INTRAMUSCULAR; INTRAVENOUS at 17:27

## 2019-02-01 RX ADMIN — CARVEDILOL 12.5 MG: 12.5 TABLET, FILM COATED ORAL at 17:27

## 2019-02-01 RX ADMIN — AMLODIPINE BESYLATE 10 MG: 5 TABLET ORAL at 09:16

## 2019-02-01 RX ADMIN — LATANOPROST 1 DROP: 50 SOLUTION OPHTHALMIC at 22:15

## 2019-02-01 RX ADMIN — ASPIRIN 325 MG: 325 TABLET ORAL at 08:25

## 2019-02-01 RX ADMIN — CARVEDILOL 12.5 MG: 12.5 TABLET, FILM COATED ORAL at 09:17

## 2019-02-01 RX ADMIN — CLONIDINE HYDROCHLORIDE 0.1 MG: 0.1 TABLET ORAL at 09:17

## 2019-02-01 RX ADMIN — INSULIN LISPRO 3 UNITS: 100 INJECTION, SOLUTION INTRAVENOUS; SUBCUTANEOUS at 17:27

## 2019-02-01 RX ADMIN — INSULIN HUMAN 20 UNITS: 100 INJECTION, SUSPENSION SUBCUTANEOUS at 08:22

## 2019-02-01 RX ADMIN — CLONIDINE HYDROCHLORIDE 0.1 MG: 0.1 TABLET ORAL at 22:11

## 2019-02-01 RX ADMIN — AMMONIUM LACTATE: 12 LOTION TOPICAL at 08:25

## 2019-02-01 RX ADMIN — INSULIN HUMAN 20 UNITS: 100 INJECTION, SUSPENSION SUBCUTANEOUS at 17:26

## 2019-02-01 RX ADMIN — Medication 10 ML: at 22:16

## 2019-02-01 RX ADMIN — CLONIDINE HYDROCHLORIDE 0.1 MG: 0.1 TABLET ORAL at 17:27

## 2019-02-01 RX ADMIN — INSULIN LISPRO 2 UNITS: 100 INJECTION, SOLUTION INTRAVENOUS; SUBCUTANEOUS at 22:08

## 2019-02-01 RX ADMIN — INSULIN LISPRO 3 UNITS: 100 INJECTION, SOLUTION INTRAVENOUS; SUBCUTANEOUS at 11:59

## 2019-02-01 RX ADMIN — INSULIN LISPRO 3 UNITS: 100 INJECTION, SOLUTION INTRAVENOUS; SUBCUTANEOUS at 08:22

## 2019-02-01 RX ADMIN — WARFARIN SODIUM 3 MG: 1 TABLET ORAL at 18:01

## 2019-02-01 RX ADMIN — Medication 10 ML: at 17:26

## 2019-02-01 RX ADMIN — PRAVASTATIN SODIUM 80 MG: 40 TABLET ORAL at 22:12

## 2019-02-01 RX ADMIN — Medication 10 ML: at 22:13

## 2019-02-01 RX ADMIN — Medication 10 ML: at 04:08

## 2019-02-01 RX ADMIN — Medication 10 ML: at 15:37

## 2019-02-01 RX ADMIN — HYDRALAZINE HYDROCHLORIDE 10 MG: 20 INJECTION INTRAMUSCULAR; INTRAVENOUS at 17:26

## 2019-02-01 NOTE — PROGRESS NOTES
[address] [place] [address]  [phone]      Medical Progress Note      NAME: Shanon Tam   :  1945  MRM:  [id number]    Date/Time: 2019  5:20 AM         Subjective:     74 y/o female with history of previous stroke with left hemiplegia, DM, hyperthyroidism on MMI, chronic pain admitted with sepsis, UTI, encephalopathy, hypothermia, hypoglycemia, bradycardia and elevated TSH. Cultured and started on IV rocephin. MMI held and temp back to normal with use of Eriberto Hugger and HR normal.Receiving IV rocephin. Urine positive for GNR's and blood culture positive for GPC. Patient alert with stable VS's and mentation/orientation back to normal. BS's 200-300's.     Past Medical History:   Diagnosis Date    Anticoagulant long-term use 10/13/2017    Anxiety 10/13/2017    Atrial fibrillation ([address] 10/13/2017    Breast calcification, left 10/13/2017    CAD (coronary artery disease)     Cellulitis of both lower extremities 10/13/2017    Chronic kidney disease     kidney stones    Chronic pain syndrome 10/13/2017    Chronic prescription opiate use 11/15/2017    CVA (cerebral vascular accident) ([address] 10/13/2017    Diabetes ([address]     DJD (degenerative joint disease) 10/13/2017    Dyslipidemia 10/13/2017    Glaucoma 10/13/2017    Hypertension     Hyperthyroidism 10/13/2017    Long-term use of high-risk medication 10/13/2017    Stasis ulcer of lower extremity ([address] 10/13/2017    Stroke ([address]     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 ([address] 2012    retinopathy        ROS:  General: negative for fever, chills, sweats, weakness  Ear Nose and Throat: negative for rhinorrhea, pharyngitis, otalgia, tinnitus, speech or swallowing difficulties  Respiratory:  negative for cough, sputum production, SOB, wheezing, MARTINEZ, pleuritic pain  Cardiology:  negative for chest pain, palpitations, orthopnea, PND, edema, syncope   Gastrointestinal: negative for abdominal pain, N/V, dysphagia, change in bowel habits, bleeding  Genitourinary: negative for frequency, urgency, dysuria, hematuria, incontinence  Muskuloskeletal : negative for arthralgia, myalgia  Dermatological: negative for rash, ulceration, mole change, new lesion  Neurological: positive for chronic left hemiplegia  Psychological: negative for anxiety, depression, agitation  Review of systems otherwise negative         Objective:       Vitals:        Last 24hrs VS reviewed since prior progress note. Most recent are:    Visit Vitals  /71   Pulse 82   Temp 98.5 °F (36.9 °C)   Resp 18   Ht 5' 4\" (1.626 m)   Wt 205 lb (93 kg)   SpO2 98%   BMI 35.19 kg/m²     SpO2 Readings from Last 6 Encounters:   02/01/19 98%   11/21/18 96%   08/16/18 96%   05/15/18 97%   02/15/18 94%   01/07/18 98%            Intake/Output Summary (Last 24 hours) at 2/1/2019 0520  Last data filed at 2/1/2019 0410  Gross per 24 hour   Intake --   Output 1650 ml   Net -1650 ml        Telemetry reviewed:   normal sinus rhythm  Tubes:   N/A  X-Ray:  Chest xray - no acute process    CT head - 1. Left greater than right chronic appearing small vessel ischemic white matter  changes. 2. No acute intracranial abnormality demonstrated    Procedures:   N/A    Exam:     General   well developed, well nourished, appears stated age, in no acute distress  EENT  Normocephalic, Atraumatic, PERRLA, EOMI, sclera clear, nares clear, pharynx normal  Respiratory   Clear To Auscultation bilaterally - no wheezes, rales, rhonchi, or crackles  Cardiology  Regular Rate and Rythmn  - no murmurs, rubs or gallops  Abdominal  Soft, non-tender, non-distended, positive bowel sounds, no hepatosplenomegaly  Extremities  No clubbing, cyanosis, or edema. Pulses intact. Skin  Normal skin turgor. No rashes or skin ulcers noted  Neurological  Chronic left hemiplegia present  Psychological  Oriented x 3. Normal affect.   Exam otherwise negative     Lab Data Reviewed: (see below)    Medications Reviewed: (see below)    ______________________________________________________________________    Medications:     Current Facility-Administered Medications   Medication Dose Route Frequency    sodium chloride (NS) flush 5-40 mL  5-40 mL IntraVENous Q8H    sodium chloride (NS) flush 5-40 mL  5-40 mL IntraVENous PRN    ammonium lactate (LAC-HYDRIN) 12 % lotion   Topical DAILY    sodium chloride (NS) flush 5-40 mL  5-40 mL IntraVENous Q8H    sodium chloride (NS) flush 5-40 mL  5-40 mL IntraVENous PRN    acetaminophen (TYLENOL) tablet 650 mg  650 mg Oral Q4H PRN    ondansetron (ZOFRAN) injection 4 mg  4 mg IntraVENous Q4H PRN    insulin lispro (HUMALOG) injection   SubCUTAneous AC&HS    glucose chewable tablet 16 g  4 Tab Oral PRN    glucagon (GLUCAGEN) injection 1 mg  1 mg IntraMUSCular PRN    cefTRIAXone (ROCEPHIN) 1 g in 0.9% sodium chloride (MBP/ADV) 50 mL  1 g IntraVENous Q24H    amLODIPine (NORVASC) tablet 10 mg  10 mg Oral DAILY    aspirin tablet 325 mg  325 mg Oral DAILY    cloNIDine HCl (CATAPRES) tablet 0.1 mg  0.1 mg Oral TID    latanoprost (XALATAN) 0.005 % ophthalmic solution 1 Drop  1 Drop Both Eyes QHS    LORazepam (ATIVAN) tablet 0.5 mg  0.5 mg Oral QHS PRN    pravastatin (PRAVACHOL) tablet 80 mg  80 mg Oral QHS    hydrALAZINE (APRESOLINE) 20 mg/mL injection 10 mg  10 mg IntraVENous Q6H PRN    dextrose (D50W) injection syrg 12.5-25 g  12.5-25 g IntraVENous PRN    WARFARIN INFORMATION NOTE (COUMADIN)   Other QPM            Lab Review:     Recent Labs     01/31/19  1030 01/30/19  1639   WBC 5.9 7.0   HGB 14.2 17.1*   HCT 40.1 49.1*    144*     Recent Labs     01/31/19  1030 01/30/19  1700 01/30/19  1639   *  --  137   K 3.7  --  3.6     --  104   CO2 26  --  27   *  --  94   BUN 11  --  14   CREA 1.08*  --  1.05*   CA 8.7  --  8.9   MG  --  2.2  --    ALB  --   --  3.9   TBILI  --   --  0.5   SGOT  --   --  33   ALT  -- --  31   INR 1.8*  --  1.5*     Lab Results   Component Value Date/Time    Glucose (POC) 286 (H) 01/31/2019 09:38 PM    Glucose (POC) 335 (H) 01/31/2019 04:02 PM    Glucose (POC) 270 (H) 01/31/2019 12:51 PM    Glucose (POC) 313 (H) 01/31/2019 11:14 AM    Glucose (POC) 361 (H) 01/31/2019 11:13 AM     No results for input(s): PH, PCO2, PO2, HCO3, FIO2 in the last 72 hours. 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 Continue IV rocephin  2. Await culture results  3. Continue to hold MMI - normally managed by Dr Kizzy De La Cruz  4. SSI coverage. BS's elevated and will restart on lower dose of novolin N  5. BP elevated and HR normal - will restart coreg  6. PT/OT    7. Continue coumadin   8. AM labs pending  9.  Note to CDMP query: Obesity POA                     Care Plan discussed with: Patient    Discussed:  Care Plan    Prophylaxis:  Coumadin and H2B/PPI    Disposition:  Home w/Family           ___________________________________________________    Attending Physician: Drea Silver MD

## 2019-02-01 NOTE — PROGRESS NOTES
* No surgery found *  * No surgery found *  Bedside and Verbal shift change report given to Gi RN (oncoming nurse) by [facility] RN (offgoing nurse).  Report included the following information SBAR, Kardex, MAR and Recent Results.     Zone Phone:   [phone]        Significant changes during shift:  none          Patient Information     Galileo Ramirez  75 y.o.  1/30/2019  2:33 PM by Radha Tadeo MD. Galileo Ramirez was admitted from Home     Problem List           Patient Active Problem List     Diagnosis Date Noted    Chronic prescription opiate use 11/15/2017       Priority: 1 - One    Metabolic encephalopathy 70/31/2514    UTI (urinary tract infection) 01/30/2019    Sepsis ([address] 01/30/2019    Glaucoma 10/13/2017    Hyperthyroidism 10/13/2017    Chronic pain syndrome 10/13/2017    Anticoagulant long-term use 10/13/2017    Lymphedema of both lower extremities 10/13/2017    Long-term use of high-risk medication 10/13/2017    Dyslipidemia 10/13/2017    Stasis ulcer of lower extremity ([address] 10/13/2017    Anxiety 10/13/2017    Cellulitis of both lower extremities 10/13/2017    Breast calcification, left 10/13/2017    CVA (cerebral vascular accident) ([address] 10/13/2017    DJD (degenerative joint disease) 10/13/2017    Cellulitis and abscess of leg, except foot 08/06/2013    Vertebral artery aneurysm ([address] 08/20/2012    CVA (cerebral infarction) 08/18/2012    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 ([address] 08/18/2012    Essential hypertension, benign 08/18/2012    Acquired hypothyroidism 08/18/2012    ASCVD (arteriosclerotic cardiovascular disease) 08/18/2012    PAF (paroxysmal atrial fibrillation) ([address] 08/18/2012           Past Medical History:   Diagnosis Date    Anticoagulant long-term use 10/13/2017    Anxiety 10/13/2017    Atrial fibrillation ([address] 10/13/2017    Breast calcification, left 10/13/2017    CAD (coronary artery disease)      Cellulitis of both lower extremities 10/13/2017    Chronic kidney disease       kidney stones    Chronic pain syndrome 10/13/2017    Chronic prescription opiate use 11/15/2017    [facility]A [facility]ce[facility]) ([facility] 10/13/2017    Diabetes ([facility]      DJD (degenerative joint disease) 10/13/2017    Dyslipidemia 10/13/2017    Glaucoma 10/13/2017    Hypertension      Hyperthyroidism 10/13/2017    Long-term use of high-risk medication 10/13/2017    Stasis ulcer of lower extremity ([facility] 10/13/2017    Stroke ([facility]      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 ([facility] 8/18/2012     retinopathy             Core Measures:     CVA: No Not applicable  CHF:No Not applicable  PNA:No Not applicable           Activity Status:     OOB to Chair yes  Ambulated this shift No   Bed Rest No     Supplemental O2: (If Applicable)     NC No  NRB No  Venti-mask No  On room air Liters/min        LINES AND DRAINS:     Midline right upper arm (right cephalic)     DVT prophylaxis:     DVT prophylaxis Med- No  DVT prophylaxis SCD or TAMIKA- No      Wounds: (If Applicable)     Wounds- No     Location none  ** but lower legs flakey- apply lac-hydin daily     Patient Safety:     Falls Score Total Score: 4  Safety Level_______  Bed Alarm On? Yes  Sitter?  No     Plan for upcoming shift: safety, PT, OT           Discharge Plan: Yes CM following, attempting to contact family to choose SNF for rehab     Active Consults:  IP CONSULT TO PRIMARY CARE PROVIDER

## 2019-02-01 NOTE — PROGRESS NOTES
Pharmacy Daily Dosing of Warfarin  Indication: Afib    Goal INR: 2-3  PTA Warfarin Dose: 3 mg all days of week except 4 mg on Wed/Sun  Concurrent anticoagulants:   Concurrent antiplatelet: asa 200 mg daily    Major Interacting Medications    Drugs that may increase INR:    Drugs that may decrease INR:     Conditions that may increase/decrease INR (CHF, C. diff, cirrhosis, thyroid disorder, hypoalbuminemia):     Labs:  Recent Labs     02/01/19  1215 01/31/19  1030 01/30/19  1639   INR 1.7* 1.8* 1.5*   HGB 13.2 14.2 17.1*    191 144*   SGOT  --   --  33   TBILI  --   --  0.5   ALB  --   --  3.9       Impression/Plan:   · Patient refused the 6 mg ordered very early 1/30, took only 4 mg, then got 3 mg as regular ordered dose on 1/31. (It seems pt is trying to force adherence  his home regimen here despite low INR)  · Will order warfarin 3 mg PO x 1 dose (as would be due per PTA scheduled anyway)  · Daily INR  · CBC w/o diff Daily (MD ordered)     Pharmacy will follow daily and adjust the dose as appropriate.     Thanks  Kevin Stuart, [facility]

## 2019-02-01 NOTE — PROGRESS NOTES
Patient refused AM lab draw. RN tried to draw labs from midline but got no blood return and told patient that she would need to try to draw blood from the other arm. Patient stated:\" I am a hard stick, that's why the doctor said to put the PICC in. Why can't you take blood from that. \"  RN told her that she had a midline,  that it was not always possible to draw blood from a midline IV, and that while hers was working for infusions, it was not returning blood. Patient said \"I don't know why they put me to all that trouble for a PICC then. \"  Patient again refused.

## 2019-02-01 NOTE — PROGRESS NOTES
Problem: Self Care Deficits Care Plan (Adult)  Goal: *Acute Goals and Plan of Care (Insert Text)  Occupational Therapy Goals  Initiated 1/31/2019  1. Patient will perform grooming with supervision/set-up within 7 day(s). 2.  Patient will perform bathing with minimal assistance/contact guard assist within 7 day(s). 3.  Patient will perform toileting with moderate assistance  within 7 day(s). 4.  Patient will perform toilet transfers with moderate assistance  within 7 day(s). 5.  Patient will perform all aspects of toileting with moderate assistance  within 7 day(s). 6.  Patient will participate in upper extremity therapeutic exercise/activities with modified independence for 5 minutes within 10 day(s). Occupational Therapy TREATMENT  Patient: Rani Petty (89 y.o. female)  Date: 2/1/2019  Diagnosis: UTI (urinary tract infection)  Sepsis ([facility] Sepsis ([facility]      Precautions: Contact, Fall(MRSA)  Chart, occupational therapy assessment, plan of care, and goals were reviewed. ASSESSMENT:  Patient was cleared for OT by nursing staff. Patient was received supine in bed and A&Ox4 on arrival. Remains Mod-Max A for bed mobility yet progressed to Mod A for rolling today. Functional activity tolerance improving over yesterday, evidenced by patient tolerating unsupported sitting EOB for 20 minutes during functional ADL task performance. Guided patient through performance of UB bathing using wash bin to cleanse her arms/hands upon patient's request, challenging Pt's unsupported and dynamic sitting balance at EOB. Facilitated PROM to left hand/digits in preparation for patient performing UB bathing and hand washing using wash bin. Patient educated on necessity of performing daily skin checks and bathing/cleansing of LUE/left hand d/t impaired sensation and significantly reduced volitional movement of the extremity d/t prior CVA. Verbalized understanding.  and affirmed she does this periodically but has not been implementing daily skin checks but plans to do so moving forward. Progression toward goals:  [x]       Improving appropriately and progressing toward goals  []       Improving slowly and progressing toward goals  []       Not making progress toward goals and plan of care will be adjusted     PLAN:  Patient continues to benefit from skilled intervention to address the above impairments. Continue treatment per established plan of care. Discharge Recommendations:  Inpatient Rehab (SNF as backup option)  Further Equipment Recommendations for Discharge:  None from OT     SUBJECTIVE:   Patient stated I still haven't had a bowel movement.     OBJECTIVE DATA SUMMARY:   Cognitive/Behavioral Status:  Neurologic State: Alert  Orientation Level: Oriented X4  Cognition: Appropriate decision making; Follows commands;Recognition of people/places  Perception: Appears intact  Perseveration: No perseveration noted  Safety/Judgement: Awareness of environment; Insight into deficits    Functional Mobility and Transfers for ADLs:  Bed Mobility:  Rolling: Moderate assistance  Supine to Sit: Moderate assistance;Maximum assistance  Sit to Supine: Moderate assistance;Maximum assistance    Balance:  Sitting: Intact; With support(Uses RUE PRN to maintain upright sitting during dyn. tasks)    ADL Intervention:     Grooming  Washing Hands: Minimum assistance; Moderate assistance    Upper Body Bathing  Bathing Assistance: Minimum assistance; Moderate assistance  Position Performed: Seated edge of bed  Cues: Verbal cues provided; Tactile cues provided     Activity Tolerance:   See assessment above. Please refer to the flowsheet for vital signs taken during this treatment.   After treatment:   [] Patient left in no apparent distress sitting up in chair  [x] Patient left in no apparent distress in bed  [x] Call bell left within reach  [x] Nursing notified  [] Caregiver present  [x] Bed alarm activated    COMMUNICATION/COLLABORATION:   The patients plan of care was discussed with: Physical Therapist, Registered Nurse and patient    Pedro Siddiqi M.S., OTR  Time Calculation: 39 mins                  Problem: Patient Education: Go to Patient Education Activity  Goal: Patient/Family Education  Outcome: Progressing Towards Goal  Patient educated on importance of maintaining available AROM and PROM in left hand via digit flexion/extension exercises daily, using right hand to assist with left hand PROM. Demonstrated understanding and provided good return demonstration. Continue to recommend IRF with SNF as backup option due to patient's improving functional activity tolerance and willingness to participate in therapy interventions.

## 2019-02-01 NOTE — PROGRESS NOTES
Reason for Admission:   Patient came to ed with low blood sugar with altered mental status and generalized weakness. She has had a previous stroke on left side. RRAT Score: 26                 Resources/supports as identified by patient/family:   Patient has supportive family at home. Top Challenges facing patient (as identified by patient/family and CM): Finances/Medication cost?     Patient states she does not have problems obtaining her medications. Transportation? She states her family transports her to appointments. Support system or lack thereof? She has supportive family. Living arrangements? She lives in two story home and they are on the first floor. She states her  and son live with her. Her son and  assists her at home. Self-care/ADLs/Cognition? Patient states she was independent prior to coming to the hospital.           Current Advanced Directive/Advance Care Plan:  Not on file. Plan for utilizing home health:    She has used home health services and [facility] in the past.                       Likelihood of readmission: Due to comorbidities--high                 Transition of Care Plan:     Discussed dcp with patient and she does not want to go to rehab or does she want to have home health this time. She states she has done this already and she knows what to do at home . She states her family will assist her. Will continue to monitor her progress. Care Management Interventions  PCP Verified by CM:  Yes  Transition of Care Consult (CM Consult): Discharge Planning  Discharge Durable Medical Equipment: (Patient has walker, wheelchair and cane at home. )  Physical Therapy Consult: Yes  Occupational Therapy Consult: Yes  Current Support Network: Lives with Spouse  Confirm Follow Up Transport: Family  Plan discussed with Pt/Family/Caregiver: Yes  Discharge Location  Discharge Placement: Home

## 2019-02-01 NOTE — PROGRESS NOTES
Problem: Mobility Impaired (Adult and Pediatric)  Goal: *Acute Goals and Plan of Care (Insert Text)  Physical Therapy Goals  Initiated 1/31/2019  1. Patient will move from supine to sit and sit to supine , scoot up and down and roll side to side in bed with minimal assistance/contact guard assist within 7 day(s). 2.  Patient will transfer from bed to chair and chair to bed with minimal assistance/contact guard assist using the least restrictive device within 7 day(s). 3.  Patient will perform sit to stand with minimal assistance/contact guard assist within 7 day(s). 4.  Patient will ambulate with minimal assistance/contact guard assist for 45 feet with the least restrictive device within 7 day(s). physical Therapy TREATMENT  Patient: Dejuan Zamudio (30 y.o. female)  Date: 2/1/2019  Diagnosis: UTI (urinary tract infection)  Sepsis ([facility] Sepsis ([facility]      Precautions: Contact, Fall(MRSA)  Chart, physical therapy assessment, plan of care and goals were reviewed. ASSESSMENT:  Patient was lying in bed when PT arrived. Agreed to work on mobility. Provided aarom and arom to all extremities in major planes of motion with good tolerance. LLE: 1/5 ankle df, L knee 3/5, L hip 3-/5; RLE grossly 4-/5. She was transferred supine to sit with mod a x 1 using log roll technique. Sit to stand needed min/mod a x 2. She was able to ambulate with mod a x 2 for 8 feet from the bed to the chair using a gait belt and bilateral arm hold assistance. Her LLE needed intermittent assistance due to L foot drop. She was very unsteady and fearful of falling. She was positioned comfortably in the bed with all needs met when the session ended. Recommend IP Rehab vs SNF depending on progress.     Progression toward goals:  []    Improving appropriately and progressing toward goals  [x]    Improving slowly and progressing toward goals  []    Not making progress toward goals and plan of care will be adjusted     PLAN:  Patient continues to benefit from skilled intervention to address the above impairments. Continue treatment per established plan of care. Discharge Recommendations:  Inpatient Rehab vs Skilled Nursing Facility  Further Equipment Recommendations for Discharge:  TBD     SUBJECTIVE:   Patient stated You guys are so good.     OBJECTIVE DATA SUMMARY:   Critical Behavior:  Neurologic State: Alert  Orientation Level: Oriented X4  Cognition: Appropriate decision making, Follows commands, Recognition of people/places  Safety/Judgement: Awareness of environment, Insight into deficits  Functional Mobility Training:  Bed Mobility:  Rolling: Moderate assistance;Assist x1  Supine to Sit: Moderate assistance;Assist x1  Sit to Supine: Moderate assistance;Maximum assistance  Scooting: Minimum assistance;Assist x1        Transfers:  Sit to Stand: Moderate assistance;Minimum assistance;Assist x2  Stand to Sit: Moderate assistance;Minimum assistance;Assist x1        Bed to Chair: Moderate assistance;Assist x2                    Balance:  Sitting: Intact; With support  Standing: Impaired  Standing - Static: Fair;Constant support  Standing - Dynamic : Poor  Ambulation/Gait Training:  Distance (ft): 8 Feet (ft)  Assistive Device: Gait belt  Ambulation - Level of Assistance: Moderate assistance;Assist x2     Gait Description (WDL): Exceptions to WDL  Gait Abnormalities: Hemiplegic;Decreased step clearance; Step to gait; Path deviations;Trunk sway increased        Base of Support: Widened     Speed/Nay: Shuffled; Slow  Step Length: Right shortened;Left shortened  Swing Pattern: Left asymmetrical              Pain:  Pain Scale 1: Numeric (0 - 10)  Pain Intensity 1: 0              Activity Tolerance:   Fair  Please refer to the flowsheet for vital signs taken during this treatment.   After treatment:   [x]    Patient left in no apparent distress sitting up in chair  []    Patient left in no apparent distress in bed  [x]    Call bell left within reach  [x] Nursing notified  []    Caregiver present  []    Bed alarm activated    COMMUNICATION/COLLABORATION:   The patients plan of care was discussed with: Occupational Therapist, Registered Nurse,  and Rehabilitation Attendant    Mishel Flores PT   Time Calculation: 24 mins

## 2019-02-01 NOTE — ROUTINE PROCESS
* No surgery found *  * No surgery found *  Bedside and Verbal shift change report given to [address] (oncoming nurse) by Michelle Griffin RN (offgoing nurse). Report included the following information SBAR, Kardex, MAR and Recent Results.     Zone Phone:   [phone]      Significant changes during shift:    1) patient refused AM lab draw      Patient Information    Anel Edwards  75 y.o.  1/30/2019  2:33 PM by Marcela Rasmussen MD. Anel Edwards was admitted from Home    Problem List    Patient Active Problem List    Diagnosis Date Noted    Chronic prescription opiate use 11/15/2017     Priority: 1 - One    Metabolic encephalopathy 68/03/0506    UTI (urinary tract infection) 01/30/2019    Sepsis ([address] 01/30/2019    Glaucoma 10/13/2017    Hyperthyroidism 10/13/2017    Chronic pain syndrome 10/13/2017    Anticoagulant long-term use 10/13/2017    Lymphedema of both lower extremities 10/13/2017    Long-term use of high-risk medication 10/13/2017    Dyslipidemia 10/13/2017    Stasis ulcer of lower extremity ([address] 10/13/2017    Anxiety 10/13/2017    Cellulitis of both lower extremities 10/13/2017    Breast calcification, left 10/13/2017    CVA (cerebral vascular accident) ([address] 10/13/2017    DJD (degenerative joint disease) 10/13/2017    Cellulitis and abscess of leg, except foot 08/06/2013    Vertebral artery aneurysm ([address] 08/20/2012    CVA (cerebral infarction) 08/18/2012    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 ([address] 08/18/2012    Essential hypertension, benign 08/18/2012    Acquired hypothyroidism 08/18/2012    ASCVD (arteriosclerotic cardiovascular disease) 08/18/2012    PAF (paroxysmal atrial fibrillation) ([address] 08/18/2012     Past Medical History:   Diagnosis Date    Anticoagulant long-term use 10/13/2017    Anxiety 10/13/2017    Atrial fibrillation ([address] 10/13/2017    Breast calcification, left 10/13/2017    CAD (coronary artery disease)     Cellulitis of both lower extremities 10/13/2017    Chronic kidney disease     kidney stones    Chronic pain syndrome 10/13/2017    Chronic prescription opiate use 11/15/2017    [facility] 10/13/2017    Diabetes ([facility]     DJD (degenerative joint disease) 10/13/2017    Dyslipidemia 10/13/2017    Glaucoma 10/13/2017    Hypertension     Hyperthyroidism 10/13/2017    Long-term use of high-risk medication 10/13/2017    Stasis ulcer of lower extremity ([facility] 10/13/2017    Stroke ([facility]     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 ([facility] 8/18/2012    retinopathy          Core Measures:    CVA: No Not applicable  CHF:No Not applicable  PNA:No Not applicable        Activity Status:    OOB to Chair No  Ambulated this shift No   Bed Rest No    Supplemental O2: (If Applicable)    NC No  NRB No  Venti-mask No  On room air Liters/min      LINES AND DRAINS:    Midline right upper arm (right cephalic)    DVT prophylaxis:    DVT prophylaxis Med- No  DVT prophylaxis SCD or TAMIKA- No     Wounds: (If Applicable)    Wounds- No    Location none  ** but lower legs flakey- apply lac-hydin daily    Patient Safety:    Falls Score Total Score: 4  Safety Level_______  Bed Alarm On? Yes  Sitter?  No    Plan for upcoming shift: safety, PT, OT        Discharge Plan: Yes CM following, attempting to contact family to choose SNF for rehab    Active Consults:  IP CONSULT TO PRIMARY CARE PROVIDER

## 2019-02-01 NOTE — PROGRESS NOTES
Cm outreached to pt's spouse to discuss pt's JANAY after PT reccommended for  IP rehab. Family member picked up phone introduced he is pt's son and he requested CM to call Gordon Derek Da tomorrow. Notified floor CM to follow up.     José Miguel Porter [facility]  ED Case Manager   Ext [phone]

## 2019-02-02 LAB
ANION GAP SERPL CALC-SCNC: 8 MMOL/L (ref 5–15)
BUN SERPL-MCNC: 18 MG/DL (ref 6–20)
BUN/CREAT SERPL: 16 (ref 12–20)
CALCIUM SERPL-MCNC: 8.4 MG/DL (ref 8.5–10.1)
CHLORIDE SERPL-SCNC: 104 MMOL/L (ref 97–108)
CO2 SERPL-SCNC: 24 MMOL/L (ref 21–32)
CREAT SERPL-MCNC: 1.13 MG/DL (ref 0.55–1.02)
ERYTHROCYTE [DISTWIDTH] IN BLOOD BY AUTOMATED COUNT: 12.1 % (ref 11.5–14.5)
GLUCOSE BLD STRIP.AUTO-MCNC: 194 MG/DL (ref 65–100)
GLUCOSE BLD STRIP.AUTO-MCNC: 242 MG/DL (ref 65–100)
GLUCOSE BLD STRIP.AUTO-MCNC: 269 MG/DL (ref 65–100)
GLUCOSE BLD STRIP.AUTO-MCNC: 291 MG/DL (ref 65–100)
GLUCOSE SERPL-MCNC: 184 MG/DL (ref 65–100)
HCT VFR BLD AUTO: 37 % (ref 35–47)
HGB BLD-MCNC: 12.9 G/DL (ref 11.5–16)
INR PPP: 1.6 (ref 0.9–1.1)
MCH RBC QN AUTO: 32.4 PG (ref 26–34)
MCHC RBC AUTO-ENTMCNC: 34.9 G/DL (ref 30–36.5)
MCV RBC AUTO: 93 FL (ref 80–99)
NRBC # BLD: 0 K/UL (ref 0–0.01)
NRBC BLD-RTO: 0 PER 100 WBC
PLATELET # BLD AUTO: 157 K/UL (ref 150–400)
PMV BLD AUTO: 11.6 FL (ref 8.9–12.9)
POTASSIUM SERPL-SCNC: 3.6 MMOL/L (ref 3.5–5.1)
PROTHROMBIN TIME: 16.4 SEC (ref 9–11.1)
RBC # BLD AUTO: 3.98 M/UL (ref 3.8–5.2)
SERVICE CMNT-IMP: ABNORMAL
SODIUM SERPL-SCNC: 136 MMOL/L (ref 136–145)
WBC # BLD AUTO: 6.8 K/UL (ref 3.6–11)

## 2019-02-02 PROCEDURE — 80048 BASIC METABOLIC PNL TOTAL CA: CPT

## 2019-02-02 PROCEDURE — 65660000000 HC RM CCU STEPDOWN

## 2019-02-02 PROCEDURE — 74011250637 HC RX REV CODE- 250/637: Performed by: INTERNAL MEDICINE

## 2019-02-02 PROCEDURE — 85610 PROTHROMBIN TIME: CPT

## 2019-02-02 PROCEDURE — 94760 N-INVAS EAR/PLS OXIMETRY 1: CPT

## 2019-02-02 PROCEDURE — 74011636637 HC RX REV CODE- 636/637: Performed by: INTERNAL MEDICINE

## 2019-02-02 PROCEDURE — 85027 COMPLETE CBC AUTOMATED: CPT

## 2019-02-02 PROCEDURE — 74011250636 HC RX REV CODE- 250/636: Performed by: INTERNAL MEDICINE

## 2019-02-02 PROCEDURE — 82962 GLUCOSE BLOOD TEST: CPT

## 2019-02-02 PROCEDURE — 36415 COLL VENOUS BLD VENIPUNCTURE: CPT

## 2019-02-02 PROCEDURE — 74011000258 HC RX REV CODE- 258: Performed by: INTERNAL MEDICINE

## 2019-02-02 RX ORDER — WARFARIN SODIUM 5 MG/1
5 TABLET ORAL ONCE
Status: COMPLETED | OUTPATIENT
Start: 2019-02-02 | End: 2019-02-02

## 2019-02-02 RX ADMIN — INSULIN HUMAN 20 UNITS: 100 INJECTION, SUSPENSION SUBCUTANEOUS at 17:20

## 2019-02-02 RX ADMIN — Medication 10 ML: at 03:19

## 2019-02-02 RX ADMIN — CARVEDILOL 12.5 MG: 12.5 TABLET, FILM COATED ORAL at 09:25

## 2019-02-02 RX ADMIN — CLONIDINE HYDROCHLORIDE 0.1 MG: 0.1 TABLET ORAL at 17:20

## 2019-02-02 RX ADMIN — LATANOPROST 1 DROP: 50 SOLUTION OPHTHALMIC at 22:12

## 2019-02-02 RX ADMIN — HYDRALAZINE HYDROCHLORIDE 10 MG: 20 INJECTION INTRAMUSCULAR; INTRAVENOUS at 12:54

## 2019-02-02 RX ADMIN — INSULIN LISPRO 2 UNITS: 100 INJECTION, SOLUTION INTRAVENOUS; SUBCUTANEOUS at 17:19

## 2019-02-02 RX ADMIN — PRAVASTATIN SODIUM 80 MG: 40 TABLET ORAL at 20:47

## 2019-02-02 RX ADMIN — CLONIDINE HYDROCHLORIDE 0.1 MG: 0.1 TABLET ORAL at 09:25

## 2019-02-02 RX ADMIN — AMLODIPINE BESYLATE 10 MG: 5 TABLET ORAL at 09:25

## 2019-02-02 RX ADMIN — CARVEDILOL 12.5 MG: 12.5 TABLET, FILM COATED ORAL at 17:20

## 2019-02-02 RX ADMIN — Medication 10 ML: at 20:48

## 2019-02-02 RX ADMIN — INSULIN HUMAN 20 UNITS: 100 INJECTION, SUSPENSION SUBCUTANEOUS at 09:26

## 2019-02-02 RX ADMIN — WARFARIN SODIUM 5 MG: 5 TABLET ORAL at 17:20

## 2019-02-02 RX ADMIN — INSULIN LISPRO 3 UNITS: 100 INJECTION, SOLUTION INTRAVENOUS; SUBCUTANEOUS at 12:00

## 2019-02-02 RX ADMIN — CLONIDINE HYDROCHLORIDE 0.1 MG: 0.1 TABLET ORAL at 20:46

## 2019-02-02 RX ADMIN — INSULIN LISPRO 2 UNITS: 100 INJECTION, SOLUTION INTRAVENOUS; SUBCUTANEOUS at 22:00

## 2019-02-02 RX ADMIN — ASPIRIN 325 MG: 325 TABLET ORAL at 09:25

## 2019-02-02 RX ADMIN — AMMONIUM LACTATE: 12 LOTION TOPICAL at 09:24

## 2019-02-02 RX ADMIN — Medication 10 ML: at 20:47

## 2019-02-02 RX ADMIN — CEFTRIAXONE SODIUM 1 G: 1 INJECTION, POWDER, FOR SOLUTION INTRAMUSCULAR; INTRAVENOUS at 17:23

## 2019-02-02 RX ADMIN — Medication 10 ML: at 12:54

## 2019-02-02 NOTE — PROGRESS NOTES
* No surgery found *  * No surgery found *  Bedside and Verbal shift change report given to Zachery  RN (oncoming nurse) by William Gaytan RN (offgoing nurse).  Report included the following information SBAR, Kardex, MAR and Recent Results.     Zone Phone:   [phone]        Significant changes during shift:  none           Patient Information     Angie Rome  75 y.o.  1/30/2019  2:33 PM by Santos Gallagher MD. Vandana Cuello was admitted from Home     Problem List               Patient Active Problem List     Diagnosis Date Noted    Chronic prescription opiate use 11/15/2017       Priority: 1 - One    Metabolic encephalopathy 78/21/6620    UTI (urinary tract infection) 01/30/2019    Sepsis ([address] 01/30/2019    Glaucoma 10/13/2017    Hyperthyroidism 10/13/2017    Chronic pain syndrome 10/13/2017    Anticoagulant long-term use 10/13/2017    Lymphedema of both lower extremities 10/13/2017    Long-term use of high-risk medication 10/13/2017    Dyslipidemia 10/13/2017    Stasis ulcer of lower extremity ([address] 10/13/2017    Anxiety 10/13/2017    Cellulitis of both lower extremities 10/13/2017    Breast calcification, left 10/13/2017    CVA (cerebral vascular accident) ([address] 10/13/2017    DJD (degenerative joint disease) 10/13/2017    Cellulitis and abscess of leg, except foot 08/06/2013    Vertebral artery aneurysm ([address] 08/20/2012    CVA (cerebral infarction) 08/18/2012    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 ([address] 08/18/2012    Essential hypertension, benign 08/18/2012    Acquired hypothyroidism 08/18/2012    ASCVD (arteriosclerotic cardiovascular disease) 08/18/2012    PAF (paroxysmal atrial fibrillation) ([address] 08/18/2012              Past Medical History:   Diagnosis Date    Anticoagulant long-term use 10/13/2017    Anxiety 10/13/2017    Atrial fibrillation ([address] 10/13/2017    Breast calcification, left 10/13/2017    CAD (coronary artery disease)      Cellulitis of both lower extremities 10/13/2017    Chronic kidney disease       kidney stones    Chronic pain syndrome 10/13/2017    Chronic prescription opiate use 11/15/2017    [facility]A (cere[facility]) [facility] 10/13/2017    Diabetes ([facility]      DJD (degenerative joint disease) 10/13/2017    Dyslipidemia 10/13/2017    Glaucoma 10/13/2017    Hypertension      Hyperthyroidism 10/13/2017    Long-term use of high-risk medication 10/13/2017    Stasis ulcer of lower extremity ([facility] 10/13/2017    Stroke ([facility]      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 ([facility] 8/18/2012     retinopathy             Core Measures:     CVA: No Not applicable  CHF:No Not applicable  PNA:No Not applicable           Activity Status:     OOB to Chair yes  Ambulated this shift No   Bed Rest No     Supplemental E3: (AA Applicable)     NC No  NRB No  Venti-mask No  On room air Liters/min        LINES AND DRAINS:     Midline right upper arm (right cephalic)     DVT prophylaxis:     DVT prophylaxis Med- No  DVT prophylaxis SCD or TAMIKA- No      Wounds: (If Applicable)     Wounds- No     Location none  ** but lower legs flakey- apply lac-hydin daily     Patient Safety:     Falls Score Total Score: 4  Safety Level_______  Bed Alarm On? Yes  Sitter? No     Plan for upcoming shift: safety, PT, OT           Discharge Plan: Yes CM following, attempting to contact family to choose SNF for rehab     Active Consults:  IP CONSULT TO PRIMARY CARE PROVIDER

## 2019-02-02 NOTE — PROGRESS NOTES
Pt. has been complaining of constipation,she tried prune juice but ineffective. Placed a call to Dr. Oc Meadows.

## 2019-02-02 NOTE — PROGRESS NOTES
[address] [place] [address]  [phone]      Medical Progress Note      NAME: Dejuan Zamudio   :  1945  MRM:  [id number]    Date/Time: 2019  9:37 AM         Subjective:     Chart reviewed and patient seen and examined this AM and D/W her nurse and all events noted. She is a 76 y/o female with history of previous stroke with left hemiplegia, DM, hyperthyroidism on MMI, chronic pain admitted with sepsis, UTI, encephalopathy, hypothermia, hypoglycemia, bradycardia and elevated TSH. On admission she was cultured and started on IV Rocephin. MMI held and temp back to normal with use of Eriberto Hugger and HR normal. Urine positive for GNR's and blood culture positive for GPC. Patient alert with stable VS's and mentation/orientation back to normal. BS's 200-300's. She denies any pain this AM. She does have some L sided weakness which she says is improving although Leg weaker than arm. She denies other neurologic c/o. There are no cardio/respiratory c/o. She has no GI/ c/o and no other c/o on complete ROS.     Past Medical History:   Diagnosis Date    Anticoagulant long-term use 10/13/2017    Anxiety 10/13/2017    Atrial fibrillation ([address] 10/13/2017    Breast calcification, left 10/13/2017    CAD (coronary artery disease)     Cellulitis of both lower extremities 10/13/2017    Chronic kidney disease     kidney stones    Chronic pain syndrome 10/13/2017    Chronic prescription opiate use 11/15/2017    CVA (cerebral vascular accident) ([address] 10/13/2017    Diabetes ([address]     DJD (degenerative joint disease) 10/13/2017    Dyslipidemia 10/13/2017    Glaucoma 10/13/2017    Hypertension     Hyperthyroidism 10/13/2017    Long-term use of high-risk medication 10/13/2017    Stasis ulcer of lower extremity ([address] 10/13/2017    Stroke ([address]     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 ([address] 2012    retinopathy        ROS:  General: negative for fever, chills, sweats, weakness   Eyes: no visual changes, neg blurred or double vision  Ear Nose and Throat: negative for rhinorrhea, pharyngitis, otalgia, tinnitus, speech or swallowing difficulties  Respiratory:  negative for cough, sputum production, SOB, wheezing, MARTINEZ, pleuritic pain  Cardiology:  negative for chest pain, palpitations, orthopnea, PND, edema, syncope   Gastrointestinal: negative for abdominal pain, N/V, dysphagia, change in bowel habits, bleeding  Genitourinary: negative for frequency, urgency, dysuria, hematuria, incontinence  Muskuloskeletal : negative for arthralgia, myalgia  Dermatological: negative for rash, ulceration, mole change, new lesion  Neurological: positive for chronic left hemiplegia  Psychological: negative for anxiety, depression, agitation  Review of systems otherwise negative         Objective:       Vitals:        Last 24hrs VS reviewed since prior progress note. Most recent are:    Visit Vitals  /73   Pulse 64   Temp 98.6 °F (37 °C)   Resp 20   Ht 5' 4\" (1.626 m)   Wt 205 lb (93 kg)   SpO2 97%   BMI 35.19 kg/m²     SpO2 Readings from Last 6 Encounters:   02/02/19 97%   11/21/18 96%   08/16/18 96%   05/15/18 97%   02/15/18 94%   01/07/18 98%            Intake/Output Summary (Last 24 hours) at 2/2/2019 4951  Last data filed at 2/2/2019 0656  Gross per 24 hour   Intake 690 ml   Output 1350 ml   Net -660 ml        Telemetry reviewed:   normal sinus rhythm  Tubes:   N/A  X-Ray:  Chest xray - no acute process    CT head - 1. Left greater than right chronic appearing small vessel ischemic white matter  changes.   2. No acute intracranial abnormality demonstrated    Procedures:   N/A    Exam:     General   well developed, well nourished, appears stated age, in no acute distress  Eyes: Anicteric  ENT:  Normocephalic, Atraumatic, PERRLA, EOMI, sclera clear, nares clear, pharynx normal  Respiratory   Clear To Auscultation bilaterally - no wheezes, rales, rhonchi, or crackles  Cardiology  Regular Rate and Rythmn  - no murmurs, rubs or gallops  Abdominal  Soft, non-tender, non-distended, positive bowel sounds, no hepatosplenomegaly  Extremities  No clubbing, cyanosis, or edema. Pulses intact. Skin  Normal skin turgor. No rashes or skin ulcers noted  Neurological  Chronic left hemiplegia present  Psychological  Oriented x 3. Normal affect.   Exam otherwise negative     Lab Data Reviewed: (see below)    Medications Reviewed: (see below)    ______________________________________________________________________    Medications:     Current Facility-Administered Medications   Medication Dose Route Frequency    carvedilol (COREG) tablet 12.5 mg  12.5 mg Oral BID WITH MEALS    insulin NPH (NOVOLIN N, HUMULIN N) injection 20 Units  20 Units SubCUTAneous ACB&D    sodium chloride (NS) flush 5-40 mL  5-40 mL IntraVENous Q8H    sodium chloride (NS) flush 5-40 mL  5-40 mL IntraVENous PRN    ammonium lactate (LAC-HYDRIN) 12 % lotion   Topical DAILY    sodium chloride (NS) flush 5-40 mL  5-40 mL IntraVENous Q8H    sodium chloride (NS) flush 5-40 mL  5-40 mL IntraVENous PRN    acetaminophen (TYLENOL) tablet 650 mg  650 mg Oral Q4H PRN    ondansetron (ZOFRAN) injection 4 mg  4 mg IntraVENous Q4H PRN    insulin lispro (HUMALOG) injection   SubCUTAneous AC&HS    glucose chewable tablet 16 g  4 Tab Oral PRN    glucagon (GLUCAGEN) injection 1 mg  1 mg IntraMUSCular PRN    cefTRIAXone (ROCEPHIN) 1 g in 0.9% sodium chloride (MBP/ADV) 50 mL  1 g IntraVENous Q24H    amLODIPine (NORVASC) tablet 10 mg  10 mg Oral DAILY    aspirin tablet 325 mg  325 mg Oral DAILY    cloNIDine HCl (CATAPRES) tablet 0.1 mg  0.1 mg Oral TID    latanoprost (XALATAN) 0.005 % ophthalmic solution 1 Drop  1 Drop Both Eyes QHS    LORazepam (ATIVAN) tablet 0.5 mg  0.5 mg Oral QHS PRN    pravastatin (PRAVACHOL) tablet 80 mg  80 mg Oral QHS    hydrALAZINE (APRESOLINE) 20 mg/mL injection 10 mg  10 mg IntraVENous Q6H PRN    dextrose (D50W) injection syrg 12.5-25 g  12.5-25 g IntraVENous PRN    WARFARIN INFORMATION NOTE (COUMADIN)   Other QPM            Lab Review:     Recent Labs     02/02/19  0320 02/01/19  1215 01/31/19  1030   WBC 6.8 5.8 5.9   HGB 12.9 13.2 14.2   HCT 37.0 37.0 40.1    174 191     Recent Labs     02/02/19  0320 02/01/19  1215 01/31/19  1030 01/30/19  1700 01/30/19  1639    137 134*  --  137   K 3.6 3.9 3.7  --  3.6    104 101  --  104   CO2 24 25 26  --  27   * 262* 284*  --  94   BUN 18 14 11  --  14   CREA 1.13* 1.24* 1.08*  --  1.05*   CA 8.4* 8.1* 8.7  --  8.9   MG  --   --   --  2.2  --    ALB  --   --   --   --  3.9   TBILI  --   --   --   --  0.5   SGOT  --   --   --   --  33   ALT  --   --   --   --  31   INR 1.6* 1.7* 1.8*  --  1.5*     Lab Results   Component Value Date/Time    Glucose (POC) 194 (H) 02/02/2019 06:35 AM    Glucose (POC) 303 (H) 02/01/2019 09:30 PM    Glucose (POC) 275 (H) 02/01/2019 04:39 PM    Glucose (POC) 285 (H) 02/01/2019 11:17 AM    Glucose (POC) 280 (H) 02/01/2019 06:47 AM     No results for input(s): PH, PCO2, PO2, HCO3, FIO2 in the last 72 hours. Recent Labs     02/02/19  0320 02/01/19  1215 01/31/19  1030   INR 1.6* 1.7* 1.8*            Assessment:     Principal Problem:    Sepsis ([facility] (1/30/2019)    Active Problems:    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 ([facility] (8/18/2012)      Overview: retinopathy      Acquired hypothyroidism (8/18/2012)      Hyperthyroidism (10/13/2017)      UTI (urinary tract infection) (0/03/2111)      Metabolic encephalopathy (0/89/8774)           Plan:     Risk of deterioration: medium             1. Continue IV Rocephin  2. Await culture results  3. Continue to hold MMI - normally managed by Dr Enoc Zhang  4. SSI coverage. BS's elevated and restarted on lower dose of novolin N  5. BP elevated and HR normal - so restarted coreg and BP improved  6. PT/OT    7. Continue Coumadin   8.  Follow labs, K 3.6 follow  9.  Note to CDMP query: Obesity POA                     Care Plan discussed with: Patient    Discussed:  Care Plan    Prophylaxis:  Coumadin and H2B/PPI    Disposition:  Home w/Family           ___________________________________________________    Attending Physician: Davi Bryan MD

## 2019-02-02 NOTE — ROUTINE PROCESS
* No surgery found *  * No surgery found *  Bedside and Verbal shift change report given to [address] (oncoming nurse) by Damion Villagomez RN (offgoing nurse). Report included the following information SBAR, Kardex, MAR and Recent Results.     [facility] Phone:   [phone]      Significant changes during shift:  none        Patient Information    Pearl Araujo  75 y.o.  1/30/2019  2:33 PM by Natalee Newman MD. Pearl Araujo was admitted from Home    Problem List    Patient Active Problem List    Diagnosis Date Noted    Chronic prescription opiate use 11/15/2017     Priority: 1 - One    Metabolic encephalopathy 84/65/9417    UTI (urinary tract infection) 01/30/2019    Sepsis ([address] 01/30/2019    Glaucoma 10/13/2017    Hyperthyroidism 10/13/2017    Chronic pain syndrome 10/13/2017    Anticoagulant long-term use 10/13/2017    Lymphedema of both lower extremities 10/13/2017    Long-term use of high-risk medication 10/13/2017    Dyslipidemia 10/13/2017    Stasis ulcer of lower extremity ([address] 10/13/2017    Anxiety 10/13/2017    Cellulitis of both lower extremities 10/13/2017    Breast calcification, left 10/13/2017    CVA (cerebral vascular accident) ([address] 10/13/2017    DJD (degenerative joint disease) 10/13/2017    Cellulitis and abscess of leg, except foot 08/06/2013    Vertebral artery aneurysm ([address] 08/20/2012    CVA (cerebral infarction) 08/18/2012    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 ([address] 08/18/2012    Essential hypertension, benign 08/18/2012    Acquired hypothyroidism 08/18/2012    ASCVD (arteriosclerotic cardiovascular disease) 08/18/2012    PAF (paroxysmal atrial fibrillation) ([address] 08/18/2012     Past Medical History:   Diagnosis Date    Anticoagulant long-term use 10/13/2017    Anxiety 10/13/2017    Atrial fibrillation ([address] 10/13/2017    Breast calcification, left 10/13/2017    CAD (coronary artery disease)     Cellulitis of both lower extremities 10/13/2017    Chronic kidney disease kidney stones    Chronic pain syndrome 10/13/2017    Chronic prescription opiate use 11/15/2017    [facility] 10/13/2017    Diabetes ([facility]     DJD (degenerative joint disease) 10/13/2017    Dyslipidemia 10/13/2017    Glaucoma 10/13/2017    Hypertension     Hyperthyroidism 10/13/2017    Long-term use of high-risk medication 10/13/2017    Stasis ulcer of lower extremity ([facility] 10/13/2017    Stroke ([facility]     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 ([facility] 8/18/2012    retinopathy          Core Measures:    CVA: No Not applicable  CHF:No Not applicable  PNA:No Not applicable    Activity Status:    OOB to Chair Yes  Ambulated this shift No   Bed Rest No    Supplemental O2: (If Applicable)    NC No  NRB No  Venti-mask No  On room air Liters/min      LINES AND DRAINS:  Midline  purewick    DVT prophylaxis:    DVT prophylaxis Med- Yes  DVT prophylaxis SCD or TAMIKA- No     Wounds: (If Applicable)    Wounds- No    Location none     Patient Safety:    Falls Score Total Score: 4  Safety Level_______  Bed Alarm On? Yes  Sitter?  No    Plan for upcoming shift: antibiotics, PT, OT, safety        Discharge Plan: Yes CM following, patient states she does not want rehab or home health    Active Consults:  IP CONSULT TO PRIMARY CARE PROVIDER

## 2019-02-02 NOTE — PROGRESS NOTES
Pharmacy Daily Dosing of Warfarin  Indication: Afib    Goal INR: 2-3  PTA Warfarin Dose: 3 mg all days of week except 4 mg on Wed/Sun  Concurrent anticoagulants:   Concurrent antiplatelet: asa 218 mg daily    Major Interacting Medications    Drugs that may increase INR:    Drugs that may decrease INR:     Conditions that may increase/decrease INR (CHF, C. diff, cirrhosis, thyroid disorder, hypoalbuminemia):     Recent Labs     02/02/19  0320 02/01/19  1215 01/31/19  1030 01/30/19  1639   INR 1.6* 1.7* 1.8* 1.5*   HGB 12.9 13.2 14.2 17.1*    174 191 144*   SGOT  --   --   --  33   TBILI  --   --   --  0.5   ALB  --   --   --  3.9       Impression/Plan:   · Patient took 3 mg last night. · Give 5 mg tonight. Pharmacy will follow daily and adjust the dose as appropriate. Thanks  Gerardo Ortiz PHARMD      [email or web address]. pdf

## 2019-02-03 LAB
ANION GAP SERPL CALC-SCNC: 8 MMOL/L (ref 5–15)
BACTERIA SPEC CULT: ABNORMAL
BACTERIA SPEC CULT: ABNORMAL
BACTERIA SPEC CULT: NORMAL
BACTERIA SPEC CULT: NORMAL
BUN SERPL-MCNC: 15 MG/DL (ref 6–20)
BUN/CREAT SERPL: 15 (ref 12–20)
CALCIUM SERPL-MCNC: 8.2 MG/DL (ref 8.5–10.1)
CHLORIDE SERPL-SCNC: 104 MMOL/L (ref 97–108)
CO2 SERPL-SCNC: 25 MMOL/L (ref 21–32)
CREAT SERPL-MCNC: 1.03 MG/DL (ref 0.55–1.02)
ERYTHROCYTE [DISTWIDTH] IN BLOOD BY AUTOMATED COUNT: 12.1 % (ref 11.5–14.5)
GLUCOSE BLD STRIP.AUTO-MCNC: 155 MG/DL (ref 65–100)
GLUCOSE BLD STRIP.AUTO-MCNC: 170 MG/DL (ref 65–100)
GLUCOSE BLD STRIP.AUTO-MCNC: 186 MG/DL (ref 65–100)
GLUCOSE BLD STRIP.AUTO-MCNC: 248 MG/DL (ref 65–100)
GLUCOSE SERPL-MCNC: 247 MG/DL (ref 65–100)
HCT VFR BLD AUTO: 36.2 % (ref 35–47)
HGB BLD-MCNC: 12.9 G/DL (ref 11.5–16)
INR PPP: 1.6 (ref 0.9–1.1)
MCH RBC QN AUTO: 32.8 PG (ref 26–34)
MCHC RBC AUTO-ENTMCNC: 35.6 G/DL (ref 30–36.5)
MCV RBC AUTO: 92.1 FL (ref 80–99)
NRBC # BLD: 0 K/UL (ref 0–0.01)
NRBC BLD-RTO: 0 PER 100 WBC
PLATELET # BLD AUTO: 159 K/UL (ref 150–400)
PMV BLD AUTO: 11.6 FL (ref 8.9–12.9)
POTASSIUM SERPL-SCNC: 3.9 MMOL/L (ref 3.5–5.1)
PROTHROMBIN TIME: 16 SEC (ref 9–11.1)
RBC # BLD AUTO: 3.93 M/UL (ref 3.8–5.2)
SERVICE CMNT-IMP: ABNORMAL
SERVICE CMNT-IMP: NORMAL
SODIUM SERPL-SCNC: 137 MMOL/L (ref 136–145)
WBC # BLD AUTO: 6.2 K/UL (ref 3.6–11)

## 2019-02-03 PROCEDURE — 85027 COMPLETE CBC AUTOMATED: CPT

## 2019-02-03 PROCEDURE — 74011250637 HC RX REV CODE- 250/637: Performed by: INTERNAL MEDICINE

## 2019-02-03 PROCEDURE — 85610 PROTHROMBIN TIME: CPT

## 2019-02-03 PROCEDURE — 74011000258 HC RX REV CODE- 258: Performed by: INTERNAL MEDICINE

## 2019-02-03 PROCEDURE — 36415 COLL VENOUS BLD VENIPUNCTURE: CPT

## 2019-02-03 PROCEDURE — 74011250636 HC RX REV CODE- 250/636: Performed by: INTERNAL MEDICINE

## 2019-02-03 PROCEDURE — 74011636637 HC RX REV CODE- 636/637: Performed by: INTERNAL MEDICINE

## 2019-02-03 PROCEDURE — 82962 GLUCOSE BLOOD TEST: CPT

## 2019-02-03 PROCEDURE — 80048 BASIC METABOLIC PNL TOTAL CA: CPT

## 2019-02-03 PROCEDURE — 94760 N-INVAS EAR/PLS OXIMETRY 1: CPT

## 2019-02-03 PROCEDURE — 65660000000 HC RM CCU STEPDOWN

## 2019-02-03 PROCEDURE — 77030038269 HC DRN EXT URIN PURWCK BARD -A

## 2019-02-03 RX ORDER — WARFARIN SODIUM 5 MG/1
5 TABLET ORAL ONCE
Status: COMPLETED | OUTPATIENT
Start: 2019-02-03 | End: 2019-02-03

## 2019-02-03 RX ORDER — POLYETHYLENE GLYCOL 3350 17 G/17G
17 POWDER, FOR SOLUTION ORAL DAILY
Status: DISCONTINUED | OUTPATIENT
Start: 2019-02-03 | End: 2019-02-04 | Stop reason: HOSPADM

## 2019-02-03 RX ADMIN — INSULIN LISPRO 2 UNITS: 100 INJECTION, SOLUTION INTRAVENOUS; SUBCUTANEOUS at 12:03

## 2019-02-03 RX ADMIN — CARVEDILOL 12.5 MG: 12.5 TABLET, FILM COATED ORAL at 18:05

## 2019-02-03 RX ADMIN — POLYETHYLENE GLYCOL 3350 17 G: 17 POWDER, FOR SOLUTION ORAL at 14:44

## 2019-02-03 RX ADMIN — AMLODIPINE BESYLATE 10 MG: 5 TABLET ORAL at 08:40

## 2019-02-03 RX ADMIN — PRAVASTATIN SODIUM 80 MG: 40 TABLET ORAL at 21:00

## 2019-02-03 RX ADMIN — CLONIDINE HYDROCHLORIDE 0.1 MG: 0.1 TABLET ORAL at 08:40

## 2019-02-03 RX ADMIN — Medication 10 ML: at 12:16

## 2019-02-03 RX ADMIN — ASPIRIN 325 MG: 325 TABLET ORAL at 08:40

## 2019-02-03 RX ADMIN — WARFARIN SODIUM 5 MG: 5 TABLET ORAL at 18:05

## 2019-02-03 RX ADMIN — CEFTRIAXONE SODIUM 1 G: 1 INJECTION, POWDER, FOR SOLUTION INTRAMUSCULAR; INTRAVENOUS at 18:04

## 2019-02-03 RX ADMIN — INSULIN HUMAN 20 UNITS: 100 INJECTION, SUSPENSION SUBCUTANEOUS at 08:39

## 2019-02-03 RX ADMIN — LATANOPROST 1 DROP: 50 SOLUTION OPHTHALMIC at 21:03

## 2019-02-03 RX ADMIN — CARVEDILOL 12.5 MG: 12.5 TABLET, FILM COATED ORAL at 08:40

## 2019-02-03 RX ADMIN — ONDANSETRON 4 MG: 2 INJECTION INTRAMUSCULAR; INTRAVENOUS at 12:15

## 2019-02-03 RX ADMIN — CLONIDINE HYDROCHLORIDE 0.1 MG: 0.1 TABLET ORAL at 18:05

## 2019-02-03 RX ADMIN — INSULIN HUMAN 28 UNITS: 100 INJECTION, SUSPENSION SUBCUTANEOUS at 18:07

## 2019-02-03 RX ADMIN — Medication 10 ML: at 21:01

## 2019-02-03 RX ADMIN — CLONIDINE HYDROCHLORIDE 0.1 MG: 0.1 TABLET ORAL at 21:01

## 2019-02-03 RX ADMIN — Medication 10 ML: at 12:05

## 2019-02-03 RX ADMIN — AMMONIUM LACTATE: 12 LOTION TOPICAL at 08:40

## 2019-02-03 NOTE — PROGRESS NOTES
Problem: Falls - Risk of  Goal: *Absence of Falls  Document Piyush Fall Risk and appropriate interventions in the flowsheet.   Outcome: Progressing Towards Goal  Fall Risk Interventions:  Mobility Interventions: Patient to call before getting OOB         Medication Interventions: Patient to call before getting OOB    Elimination Interventions: Call light in reach    History of Falls Interventions: Bed/chair exit alarm, Consult care management for discharge planning, Door open when patient unattended, Evaluate medications/consider consulting pharmacy

## 2019-02-03 NOTE — PROGRESS NOTES
Dr. Sonia Cardenas has seen patient around 9:00 am  but I have not seen him,as per patient she asked for stool softner and Dr. Nydia Mccrary said,he will start [address] [place] [address] waiting for the order.

## 2019-02-03 NOTE — PROGRESS NOTES
* No surgery found *  * No surgery found *  Bedside and Verbal shift change report given to Cain) by Roxann HUITRON (offgoing nurse).  Report included the following information SBAR, Kardex, MAR and Recent Results.     Zone Phone:   [phone]        Significant changes during shift:  none           Patient Information     Dyia Fisher  75 y.o.  1/30/2019  2:33 PM by Romina Wells MD. Vandana Cuello was admitted from Home     Problem List               Patient Active Problem List     Diagnosis Date Noted    Chronic prescription opiate use 11/15/2017       Priority: 1 - One    Metabolic encephalopathy 79/17/9745    UTI (urinary tract infection) 01/30/2019    Sepsis ([address] 01/30/2019    Glaucoma 10/13/2017    Hyperthyroidism 10/13/2017    Chronic pain syndrome 10/13/2017    Anticoagulant long-term use 10/13/2017    Lymphedema of both lower extremities 10/13/2017    Long-term use of high-risk medication 10/13/2017    Dyslipidemia 10/13/2017    Stasis ulcer of lower extremity ([address] 10/13/2017    Anxiety 10/13/2017    Cellulitis of both lower extremities 10/13/2017    Breast calcification, left 10/13/2017    CVA (cerebral vascular accident) ([address] 10/13/2017    DJD (degenerative joint disease) 10/13/2017    Cellulitis and abscess of leg, except foot 08/06/2013    Vertebral artery aneurysm ([address] 08/20/2012    CVA (cerebral infarction) 08/18/2012    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 ([address] 08/18/2012    Essential hypertension, benign 08/18/2012    Acquired hypothyroidism 08/18/2012    ASCVD (arteriosclerotic cardiovascular disease) 08/18/2012    PAF (paroxysmal atrial fibrillation) ([address] 08/18/2012              Past Medical History:   Diagnosis Date    Anticoagulant long-term use 10/13/2017    Anxiety 10/13/2017    Atrial fibrillation ([address] 10/13/2017    Breast calcification, left 10/13/2017    CAD (coronary artery disease)      Cellulitis of both lower extremities 10/13/2017    Chronic kidney disease       kidney stones    Chronic pain syndrome 10/13/2017    Chronic prescription opiate use 11/15/2017    CVA [facility]cere[facility] vascular accident) [facility] 10/13/2017    Diabetes ([facility]      DJD (degenerative joint disease) 10/13/2017    Dyslipidemia 10/13/2017    Glaucoma 10/13/2017    Hypertension      Hyperthyroidism 10/13/2017    Long-term use of high-risk medication 10/13/2017    Stasis ulcer of lower extremity ([facility] 10/13/2017    Stroke ([facility]      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 ([facility] 8/18/2012     retinopathy             Core Measures:     CVA: No Not applicable  CHF:No Not applicable  PNA:No Not applicable           Activity Status:     OOB to Chair yes  Ambulated this shift No   Bed Rest No     Supplemental R8: (BT Applicable)     NC No  NRB No  Venti-mask No  On room air Liters/min        LINES AND DRAINS:     Midline right upper arm (right cephalic)     DVT prophylaxis:     DVT prophylaxis Med- No  DVT prophylaxis SCD or TAMIKA- No      Wounds: (If Applicable)     Wounds- No     Location none  ** but lower legs flakey- apply lac-hydin daily     Patient Safety:     Falls Score Total Score: 4  Safety Level_______  Bed Alarm On? Yes  Sitter? No     Plan for upcoming shift: safety, PT, OT           Discharge Plan: Yes CM following, attempting to contact family to choose SNF for rehab     Active Consults:  IP CONSULT TO PRIMARY CARE PROVIDER

## 2019-02-03 NOTE — PROGRESS NOTES
[address] [place] [address] [address], [address] [facility]  [phone]      Medical Progress Note      NAME: Shanon Tam   :  1945  MRM:  [id number]    Date/Time: 2/3/2019  11:59 AM         Subjective:     Chart reviewed and patient seen and examined this AM and D/W her nurse and all events noted. She has a history of previous stroke with left hemiplegia, DM, hyperthyroidism on MMI, chronic pain admitted with sepsis, UTI, encephalopathy, hypothermia, hypoglycemia, bradycardia and elevated TSH. On admission she was cultured and started on IV Rocephin. MMI held and temp back to normal with use of Eriberto Hugger and HR normal. Urine positive for GNR's and blood culture positive for GPC. Patient alert with stable VS's and mentation/orientation back to normal. BS's 200-300's. She denies any pain this AM. She does have some L sided weakness which she says is improving although Leg weaker than arm. She denies other neurologic c/o. There are no cardio/respiratory c/o. She has no GI/ c/o and no other c/o on complete ROS.     Past Medical History:   Diagnosis Date    Anticoagulant long-term use 10/13/2017    Anxiety 10/13/2017    Atrial fibrillation ([address] 10/13/2017    Breast calcification, left 10/13/2017    CAD (coronary artery disease)     Cellulitis of both lower extremities 10/13/2017    Chronic kidney disease     kidney stones    Chronic pain syndrome 10/13/2017    Chronic prescription opiate use 11/15/2017    CVA (cerebral vascular accident) ([address] 10/13/2017    Diabetes ([address]     DJD (degenerative joint disease) 10/13/2017    Dyslipidemia 10/13/2017    Glaucoma 10/13/2017    Hypertension     Hyperthyroidism 10/13/2017    Long-term use of high-risk medication 10/13/2017    Stasis ulcer of lower extremity ([address] 10/13/2017    Stroke ([address]     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 ([address] 2012    retinopathy        ROS:  General: negative for fever, chills, sweats, weakness   Eyes: no visual changes, neg blurred or double vision  Ear Nose and Throat: negative for rhinorrhea, pharyngitis, otalgia, tinnitus, speech or swallowing difficulties  Respiratory:  negative for cough, sputum production, SOB, wheezing, MARTINEZ, pleuritic pain  Cardiology:  negative for chest pain, palpitations, orthopnea, PND, edema, syncope   Gastrointestinal: negative for abdominal pain, N/V, dysphagia, change in bowel habits, bleeding  Genitourinary: negative for frequency, urgency, dysuria, hematuria, incontinence  Muskuloskeletal : negative for arthralgia, myalgia  Dermatological: negative for rash, ulceration, mole change, new lesion  Neurological: positive for chronic left hemiplegia  Psychological: negative for anxiety, depression, agitation  Review of systems otherwise negative         Objective:       Vitals:        Last 24hrs VS reviewed since prior progress note. Most recent are:    Visit Vitals  /87   Pulse 83   Temp 98.2 °F (36.8 °C)   Resp 18   Ht 5' 4\" (1.626 m)   Wt 205 lb (93 kg)   SpO2 94%   BMI 35.19 kg/m²     SpO2 Readings from Last 6 Encounters:   02/03/19 94%   11/21/18 96%   08/16/18 96%   05/15/18 97%   02/15/18 94%   01/07/18 98%            Intake/Output Summary (Last 24 hours) at 2/3/2019 1201  Last data filed at 2/3/2019 1059  Gross per 24 hour   Intake 740 ml   Output 1650 ml   Net -910 ml        Telemetry reviewed:   normal sinus rhythm  Tubes:   N/A  X-Ray:  Chest xray - no acute process    CT head - 1. Left greater than right chronic appearing small vessel ischemic white matter  changes.   2. No acute intracranial abnormality demonstrated    Procedures:   N/A    Exam:     General   well developed, well nourished, appears stated age, in no acute distress  Eyes: Anicteric  ENT:  Normocephalic, Atraumatic, PERRLA, EOMI, sclera clear, nares clear, pharynx normal  Respiratory   Clear To Auscultation bilaterally - no wheezes, rales, rhonchi, or crackles  Cardiology  Regular Rate and Rythmn  - no murmurs, rubs or gallops  Abdominal  Soft, non-tender, non-distended, positive bowel sounds, no hepatosplenomegaly  Extremities  No clubbing, cyanosis, or edema. Pulses intact. Skin  Normal skin turgor. No rashes or skin ulcers noted  Neurological  Chronic left hemiplegia present  Psychological  Oriented x 3. Normal affect.   Exam otherwise negative     Lab Data Reviewed: (see below)    Medications Reviewed: (see below)    ______________________________________________________________________    Medications:     Current Facility-Administered Medications   Medication Dose Route Frequency    INR - Please Draw  1 Each Other ONCE    carvedilol (COREG) tablet 12.5 mg  12.5 mg Oral BID WITH MEALS    insulin NPH (NOVOLIN N, HUMULIN N) injection 20 Units  20 Units SubCUTAneous ACB&D    sodium chloride (NS) flush 5-40 mL  5-40 mL IntraVENous Q8H    sodium chloride (NS) flush 5-40 mL  5-40 mL IntraVENous PRN    ammonium lactate (LAC-HYDRIN) 12 % lotion   Topical DAILY    sodium chloride (NS) flush 5-40 mL  5-40 mL IntraVENous Q8H    sodium chloride (NS) flush 5-40 mL  5-40 mL IntraVENous PRN    acetaminophen (TYLENOL) tablet 650 mg  650 mg Oral Q4H PRN    ondansetron (ZOFRAN) injection 4 mg  4 mg IntraVENous Q4H PRN    insulin lispro (HUMALOG) injection   SubCUTAneous AC&HS    glucose chewable tablet 16 g  4 Tab Oral PRN    glucagon (GLUCAGEN) injection 1 mg  1 mg IntraMUSCular PRN    cefTRIAXone (ROCEPHIN) 1 g in 0.9% sodium chloride (MBP/ADV) 50 mL  1 g IntraVENous Q24H    amLODIPine (NORVASC) tablet 10 mg  10 mg Oral DAILY    aspirin tablet 325 mg  325 mg Oral DAILY    cloNIDine HCl (CATAPRES) tablet 0.1 mg  0.1 mg Oral TID    latanoprost (XALATAN) 0.005 % ophthalmic solution 1 Drop  1 Drop Both Eyes QHS    LORazepam (ATIVAN) tablet 0.5 mg  0.5 mg Oral QHS PRN    pravastatin (PRAVACHOL) tablet 80 mg  80 mg Oral QHS    hydrALAZINE (APRESOLINE) 20 mg/mL injection 10 mg  10 mg IntraVENous Q6H PRN    dextrose (D50W) injection syrg 12.5-25 g  12.5-25 g IntraVENous PRN    WARFARIN INFORMATION NOTE (COUMADIN)   Other QPM            Lab Review:     Recent Labs     02/03/19  1130 02/02/19  0320 02/01/19  1215   WBC 6.2 6.8 5.8   HGB 12.9 12.9 13.2   HCT 36.2 37.0 37.0    157 174     Recent Labs     02/02/19  0320 02/01/19  1215    137   K 3.6 3.9    104   CO2 24 25   * 262*   BUN 18 14   CREA 1.13* 1.24*   CA 8.4* 8.1*   INR 1.6* 1.7*     Lab Results   Component Value Date/Time    Glucose (POC) 248 (H) 02/03/2019 11:21 AM    Glucose (POC) 186 (H) 02/03/2019 06:55 AM    Glucose (POC) 291 (H) 02/02/2019 09:00 PM    Glucose (POC) 242 (H) 02/02/2019 04:30 PM    Glucose (POC) 269 (H) 02/02/2019 11:16 AM     No results for input(s): PH, PCO2, PO2, HCO3, FIO2 in the last 72 hours. Recent Labs     02/02/19  0320 02/01/19  1215   INR 1.6* 1.7*            Assessment:     Principal Problem:    Sepsis ([facility] (1/30/2019)    Active Problems:    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 ([facility] (8/18/2012)      Overview: retinopathy      Acquired hypothyroidism (8/18/2012)      Hyperthyroidism (10/13/2017)      UTI (urinary tract infection) (4/80/6079)      Metabolic encephalopathy (0/48/2842)           Plan:     Risk of deterioration: medium             1. Continue IV Rocephin  2. Await culture results, Bl Cx S. Intermedius and Ur cx Klebsiella, Sens pending  3. Continue to hold MMI - normally managed by Dr Demond Shah  4. SSI coverage. BS's elevated and restarted on lower dose of novolin N, increase dose a little today  5. BP elevated and HR normal - so restarted coreg and BP improved  6. PT/OT    7. Continue Coumadin   8. Follow labs, K 3.6 follow  9.  Note to CDMP query: Obesity POA                     Care Plan discussed with: Patient    Discussed:  Care Plan    Prophylaxis:  Coumadin and H2B/PPI    Disposition:  Home w/Family           ___________________________________________________    Attending Physician: Mega Lange MD

## 2019-02-03 NOTE — PROGRESS NOTES
Pharmacy Daily Dosing of Warfarin  Indication: Afib    Goal INR: 2-3  PTA Warfarin Dose: 3 mg all days of week except 4 mg on Wed/Sun  Concurrent anticoagulants:   Concurrent antiplatelet: asa 793 mg daily    Major Interacting Medications    Drugs that may increase INR:    Drugs that may decrease INR:     Conditions that may increase/decrease INR (CHF, C. diff, cirrhosis, thyroid disorder, hypoalbuminemia):     Recent Labs     02/03/19  1130 02/02/19  0320 02/01/19  1215   INR 1.6* 1.6* 1.7*   HGB 12.9 12.9 13.2    157 174       Impression/Plan:     · Give 5 mg tonight. Pharmacy will follow daily and adjust the dose as appropriate. Thanks  JEANNETTE BeD      [email or web address]. [email or web address]

## 2019-02-03 NOTE — PROGRESS NOTES
Pt. refused to be turned,she said she will not have skin breakdown. Educate her about the importance of turning and positioning.

## 2019-02-04 ENCOUNTER — HOME HEALTH ADMISSION (OUTPATIENT)
Dept: HOME HEALTH SERVICES | Facility: HOME HEALTH | Age: 74
End: 2019-02-04

## 2019-02-04 VITALS
WEIGHT: 205 LBS | HEART RATE: 73 BPM | TEMPERATURE: 98.5 F | BODY MASS INDEX: 35 KG/M2 | HEIGHT: 64 IN | DIASTOLIC BLOOD PRESSURE: 71 MMHG | SYSTOLIC BLOOD PRESSURE: 142 MMHG | OXYGEN SATURATION: 94 % | RESPIRATION RATE: 18 BRPM

## 2019-02-04 LAB
ANION GAP SERPL CALC-SCNC: 5 MMOL/L (ref 5–15)
BASOPHILS # BLD: 0 K/UL (ref 0–0.1)
BASOPHILS NFR BLD: 0 % (ref 0–1)
BUN SERPL-MCNC: 14 MG/DL (ref 6–20)
BUN/CREAT SERPL: 15 (ref 12–20)
CALCIUM SERPL-MCNC: 8.3 MG/DL (ref 8.5–10.1)
CHLORIDE SERPL-SCNC: 106 MMOL/L (ref 97–108)
CO2 SERPL-SCNC: 26 MMOL/L (ref 21–32)
CREAT SERPL-MCNC: 0.95 MG/DL (ref 0.55–1.02)
DIFFERENTIAL METHOD BLD: ABNORMAL
EOSINOPHIL # BLD: 0.2 K/UL (ref 0–0.4)
EOSINOPHIL NFR BLD: 4 % (ref 0–7)
ERYTHROCYTE [DISTWIDTH] IN BLOOD BY AUTOMATED COUNT: 12 % (ref 11.5–14.5)
GLUCOSE BLD STRIP.AUTO-MCNC: 143 MG/DL (ref 65–100)
GLUCOSE BLD STRIP.AUTO-MCNC: 222 MG/DL (ref 65–100)
GLUCOSE SERPL-MCNC: 170 MG/DL (ref 65–100)
HCT VFR BLD AUTO: 36.8 % (ref 35–47)
HGB BLD-MCNC: 12.6 G/DL (ref 11.5–16)
IMM GRANULOCYTES # BLD AUTO: 0 K/UL (ref 0–0.04)
IMM GRANULOCYTES NFR BLD AUTO: 0 % (ref 0–0.5)
INR PPP: 1.8 (ref 0.9–1.1)
LYMPHOCYTES # BLD: 1.5 K/UL (ref 0.8–3.5)
LYMPHOCYTES NFR BLD: 29 % (ref 12–49)
MCH RBC QN AUTO: 32.2 PG (ref 26–34)
MCHC RBC AUTO-ENTMCNC: 34.2 G/DL (ref 30–36.5)
MCV RBC AUTO: 94.1 FL (ref 80–99)
MONOCYTES # BLD: 0.6 K/UL (ref 0–1)
MONOCYTES NFR BLD: 10 % (ref 5–13)
NEUTS SEG # BLD: 3 K/UL (ref 1.8–8)
NEUTS SEG NFR BLD: 56 % (ref 32–75)
NRBC # BLD: 0 K/UL (ref 0–0.01)
NRBC BLD-RTO: 0 PER 100 WBC
PLATELET # BLD AUTO: 149 K/UL (ref 150–400)
PMV BLD AUTO: 11.3 FL (ref 8.9–12.9)
POTASSIUM SERPL-SCNC: 4.1 MMOL/L (ref 3.5–5.1)
PROTHROMBIN TIME: 17.7 SEC (ref 9–11.1)
RBC # BLD AUTO: 3.91 M/UL (ref 3.8–5.2)
SERVICE CMNT-IMP: ABNORMAL
SERVICE CMNT-IMP: ABNORMAL
SODIUM SERPL-SCNC: 137 MMOL/L (ref 136–145)
WBC # BLD AUTO: 5.3 K/UL (ref 3.6–11)

## 2019-02-04 PROCEDURE — 82962 GLUCOSE BLOOD TEST: CPT

## 2019-02-04 PROCEDURE — 94760 N-INVAS EAR/PLS OXIMETRY 1: CPT

## 2019-02-04 PROCEDURE — 74011250637 HC RX REV CODE- 250/637: Performed by: INTERNAL MEDICINE

## 2019-02-04 PROCEDURE — 74011636637 HC RX REV CODE- 636/637: Performed by: INTERNAL MEDICINE

## 2019-02-04 PROCEDURE — 36415 COLL VENOUS BLD VENIPUNCTURE: CPT

## 2019-02-04 PROCEDURE — 85025 COMPLETE CBC W/AUTO DIFF WBC: CPT

## 2019-02-04 PROCEDURE — 80048 BASIC METABOLIC PNL TOTAL CA: CPT

## 2019-02-04 PROCEDURE — 85610 PROTHROMBIN TIME: CPT

## 2019-02-04 RX ORDER — SULFAMETHOXAZOLE AND TRIMETHOPRIM 400; 80 MG/1; MG/1
1 TABLET ORAL 2 TIMES DAILY
Qty: 14 TAB | Refills: 0 | Status: SHIPPED | OUTPATIENT
Start: 2019-02-04 | End: 2019-02-11

## 2019-02-04 RX ORDER — WARFARIN SODIUM 5 MG/1
5 TABLET ORAL ONCE
Status: DISCONTINUED | OUTPATIENT
Start: 2019-02-04 | End: 2019-02-04 | Stop reason: HOSPADM

## 2019-02-04 RX ADMIN — INSULIN LISPRO 2 UNITS: 100 INJECTION, SOLUTION INTRAVENOUS; SUBCUTANEOUS at 11:56

## 2019-02-04 RX ADMIN — INSULIN HUMAN 28 UNITS: 100 INJECTION, SUSPENSION SUBCUTANEOUS at 10:01

## 2019-02-04 RX ADMIN — AMLODIPINE BESYLATE 10 MG: 5 TABLET ORAL at 10:01

## 2019-02-04 RX ADMIN — POLYETHYLENE GLYCOL 3350 17 G: 17 POWDER, FOR SOLUTION ORAL at 10:00

## 2019-02-04 RX ADMIN — Medication 40 ML: at 05:32

## 2019-02-04 RX ADMIN — CLONIDINE HYDROCHLORIDE 0.1 MG: 0.1 TABLET ORAL at 10:07

## 2019-02-04 RX ADMIN — ASPIRIN 325 MG: 325 TABLET ORAL at 10:07

## 2019-02-04 RX ADMIN — AMMONIUM LACTATE: 12 LOTION TOPICAL at 10:08

## 2019-02-04 RX ADMIN — CARVEDILOL 12.5 MG: 12.5 TABLET, FILM COATED ORAL at 10:01

## 2019-02-04 NOTE — ROUTINE PROCESS
I have reviewed discharge instructions with the patient and spouse. The patient and spouse verbalized understanding. Patient left floor in a personal wheelchair with two nurses accompanying patient off the floor.

## 2019-02-04 NOTE — PROGRESS NOTES
Pharmacy Daily Dosing of Warfarin  Indication: Afib    Goal INR: 2-3  PTA Warfarin Dose: 3 mg all days of week except 4 mg on Wed/Sun  Concurrent anticoagulants:   Concurrent antiplatelet: asa 590 mg daily    Major Interacting Medications    Drugs that may increase INR: ceftriaxone   Drugs that may decrease INR: no major    Conditions that may increase/decrease INR (CHF, C. diff, cirrhosis, thyroid disorder, hypoalbuminemia):     Recent Labs     02/04/19  0530 02/03/19  1130 02/02/19  0320   INR 1.8* 1.6* 1.6*   HGB 12.6 12.9 12.9   * 159 157       Impression/Plan:   · Will continue warfarin 5 mg today if pt is still here for dose. Pharmacy will follow daily and adjust the dose as appropriate.     Thanks  Cody Erwin, PHARMD

## 2019-02-04 NOTE — PROGRESS NOTES
Spiritual Care Assessment/Progress Note  [facility]      NAME: Diya Fisher      MRN: [id number]  AGE: 75 y.o.  SEX: female  Anglican Affiliation: Presybeterian   Language: English     2/4/2019     Total Time (in minutes): 15     Spiritual Assessment begun in [facility] through conversation with:         []Patient        [] Family    [] Friend(s)        Reason for Consult: Initial/Spiritual assessment, patient floor, Initial visit     Spiritual beliefs: (Please include comment if needed)     [x] Identifies with a briseyda tradition:         [] Supported by a briseyda community:            [] Claims no spiritual orientation:           [] Seeking spiritual identity:                [] Adheres to an individual form of spirituality:           [] Not able to assess:                           Identified resources for coping:      [x] Prayer                               [] Music                  [] Guided Imagery     [x] Family/friends                 [] Pet visits     [] Devotional reading                         [] Unknown     [] Other:                                              Interventions offered during this visit: (See comments for more details)    Patient Interventions: Prayer (assurance of), Initial visit, Initial/Spiritual assessment, patient floor           Plan of Care:     [] Support spiritual and/or cultural needs    [] Support AMD and/or advance care planning process      [] Support grieving process   [] Coordinate Rites and/or Rituals    [] Coordination with community clergy   [] No spiritual needs identified at this time   [] Detailed Plan of Care below (See Comments)  [] Make referral to Music Therapy  [] Make referral to Pet Therapy     [] Make referral to Addiction services  [] Make referral to [facility]  [] Make referral to Spiritual Care Partner  [] No future visits requested        [x] Follow up visits as needed     Comments:  made visit to patients room in Telemetry. Patient informed  that she was being discharged today to home and that her  was on his way to take her home. Patient shared she had a [place] community that was imprtant to her and that her family also helps in her care. Patient was ready to go home and relax.  provided pastoral comfort and care to the patient.      José Miguel Sanders  Pager: [phone]

## 2019-02-04 NOTE — PROGRESS NOTES
Bedside and Verbal shift change report given to Jaky Patiño (oncoming nurse) by Cynthia Pruitt (offgoing nurse). Report included the following information SBAR, Kardex, MAR and Recent Results.

## 2019-02-04 NOTE — PROGRESS NOTES
Patient is being discharged to home today. She states her  will be taking her home. She is agreeable to hospital to home from [facility]. Referral sent to them and they will see the patient when discharged.

## 2019-02-04 NOTE — DISCHARGE INSTRUCTIONS
[address]  [address] [place] [address]  [phone]      Patient Discharge Instructions    Shanon Tam / [id number] : 1945    Admitted 2019 Discharged: 2019     Take Home Medications      · It is important that you take the medication exactly as they are prescribed. · Keep your medication in the bottles provided by the pharmacist and keep a list of the medication names, dosages, and times to be taken in your wallet. · Do not take other medications without consulting your doctor. What to do at Home    Recommended diet: Diabetic Diet,     Recommended activity: Activity as tolerated,     Follow-up with Dr Alma Brian in 1 week        Information obtained by :  I understand that if any problems occur once I am at home I am to contact my physician. I understand and acknowledge receipt of the instructions indicated above.                                                                                                                                            Physician's or R.N.'s Signature                                                                  Date/Time                                                                                                                                              Patient or Representative Signature                                                          Date/Time

## 2019-02-04 NOTE — PROGRESS NOTES
[address] [place] [address]  [phone]      Medical Progress Note      NAME: Pearl Araujo   :  1945  MRM:  [id number]    Date/Time: 2019  5:20 AM         Subjective:     76 y/o female with history of previous stroke with left hemiplegia, DM, hyperthyroidism on MMI, chronic pain admitted with sepsis, UTI, encephalopathy, hypothermia, hypoglycemia, bradycardia and elevated TSH. Cultured and started on IV rocephin. MMI held and temp back to normal with use of Eriberto Hugger and HR normal.Receiving IV rocephin. Urine positive for Klebsiella and blood culture positive for Staph. Patient alert with stable VS's and mentation/orientation back to normal. BS's <200.     Past Medical History:   Diagnosis Date    Anticoagulant long-term use 10/13/2017    Anxiety 10/13/2017    Atrial fibrillation ([address] 10/13/2017    Breast calcification, left 10/13/2017    CAD (coronary artery disease)     Cellulitis of both lower extremities 10/13/2017    Chronic kidney disease     kidney stones    Chronic pain syndrome 10/13/2017    Chronic prescription opiate use 11/15/2017    CVA (cerebral vascular accident) ([address] 10/13/2017    Diabetes ([address]     DJD (degenerative joint disease) 10/13/2017    Dyslipidemia 10/13/2017    Glaucoma 10/13/2017    Hypertension     Hyperthyroidism 10/13/2017    Long-term use of high-risk medication 10/13/2017    Stasis ulcer of lower extremity ([address] 10/13/2017    Stroke ([address]     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 ([address] 2012    retinopathy        ROS:  General: negative for fever, chills, sweats, weakness  Ear Nose and Throat: negative for rhinorrhea, pharyngitis, otalgia, tinnitus, speech or swallowing difficulties  Respiratory:  negative for cough, sputum production, SOB, wheezing, MARTINEZ, pleuritic pain  Cardiology:  negative for chest pain, palpitations, orthopnea, PND, edema, syncope   Gastrointestinal: negative for abdominal pain, N/V, dysphagia, change in bowel habits, bleeding  Genitourinary: negative for frequency, urgency, dysuria, hematuria, incontinence  Muskuloskeletal : negative for arthralgia, myalgia  Dermatological: negative for rash, ulceration, mole change, new lesion  Neurological: positive for chronic left hemiplegia  Psychological: negative for anxiety, depression, agitation  Review of systems otherwise negative         Objective:       Vitals:        Last 24hrs VS reviewed since prior progress note. Most recent are:    Visit Vitals  /65   Pulse 81   Temp 98.6 °F (37 °C)   Resp 18   Ht 5' 4\" (1.626 m)   Wt 205 lb (93 kg)   SpO2 94%   BMI 35.19 kg/m²     SpO2 Readings from Last 6 Encounters:   02/03/19 94%   11/21/18 96%   08/16/18 96%   05/15/18 97%   02/15/18 94%   01/07/18 98%            Intake/Output Summary (Last 24 hours) at 2/4/2019 0533  Last data filed at 2/3/2019 1933  Gross per 24 hour   Intake 480 ml   Output 575 ml   Net -95 ml        Telemetry reviewed:   normal sinus rhythm  Tubes:   N/A  X-Ray:  Chest xray - no acute process    CT head - 1. Left greater than right chronic appearing small vessel ischemic white matter  changes.   2. No acute intracranial abnormality demonstrated    Procedures:   N/A    Urine culture:   Klebsiella pneumoniae     Antibiotic Interpretation Value Method Comment   Amikacin ($) Susceptible <=16 ug/mL FAUZIA    Ampicillin/sulbactam ($) Susceptible <=8/4 ug/mL FAUZIA    Aztreonam ($$$$) Susceptible <=4 ug/mL FAUZIA    Cefazolin ($) Susceptible <=8 ug/mL FAUZIA    Cefepime ($$) Susceptible <=4 ug/mL FAUZIA    Cefotaxime Susceptible <=2 ug/mL FAUZIA    Ceftazidime ($) Susceptible <=1 ug/mL FAUZIA    Ceftriaxone ($) Susceptible <=1 ug/mL FAUZIA    Cefuroxime ($) Susceptible <=4 ug/mL FAUZIA    Ciprofloxacin ($) Susceptible <=1 ug/mL FAUZIA    Gentamicin ($) Susceptible <=4 ug/mL FAUZIA    Imipenem Susceptible <=1 ug/mL FAUZIA    Levofloxacin ($) Susceptible <=2 ug/mL FAUZIA    Meropenem ($$) Susceptible <=1 ug/mL FAUZIA    Nitrofurantoin Susceptible <=32 ug/mL FAUZIA    Piperacillin/Tazobac ($) Susceptible <=16 ug/mL FAUZIA    Tobramycin ($) Susceptible <=4 ug/mL FAUZIA    Trimeth/Sulfa Susceptible <=2/38 ug/mL FAUZIA      Blood culture:   Staphylococcus intermedius     Antibiotic Interpretation Value Method Comment   Ampicillin ($) Resistant <=2 ug/mL FAUZIA    Ampicillin/sulbactam ($) Susceptible <=8/4 ug/mL FAUZIA    Clindamycin ($) Susceptible <=0.5 ug/mL FAUZIA    Cefazolin ($) Susceptible <=4 ug/mL FAUZIA    Ciprofloxacin ($) Susceptible <=1 ug/mL FAUZIA    Daptomycin ($$$$$) Susceptible <=0.5 ug/mL FAUZIA    Erythromycin ($$$$) Susceptible <=0.5 ug/mL FAUZIA    Gentamicin ($) Susceptible <=4 ug/mL FAUZIA    Levofloxacin ($) Susceptible <=1 ug/mL FAUZIA    Linezolid ($$$$$) Susceptible <=1 ug/mL FAUZIA    Oxacillin Susceptible <=0.25 ug/mL FAUZIA    Penicillin G ($$) Resistant 8 ug/mL FAUZIA    Rifampin ($$$$) Susceptible <=1 ug/mL FAUZIA Rifampin is not to be used for mono-therapy. Tetracycline Susceptible <=4 ug/mL FAUIZA    Trimeth/Sulfa Susceptible <=0.5/9.5 ug/mL FAUZIA    Vancomycin ($) Susceptible 1 ug/mL FAUZIA          Exam:     General   well developed, well nourished, appears stated age, in no acute distress  EENT  Normocephalic, Atraumatic, PERRLA, EOMI, sclera clear, nares clear, pharynx normal  Respiratory   Clear To Auscultation bilaterally - no wheezes, rales, rhonchi, or crackles  Cardiology  Regular Rate and Rythmn  - no murmurs, rubs or gallops  Abdominal  Soft, non-tender, non-distended, positive bowel sounds, no hepatosplenomegaly  Extremities  No clubbing, cyanosis, or edema. Pulses intact. Skin  Normal skin turgor. No rashes or skin ulcers noted  Neurological  Chronic left hemiplegia present  Psychological  Oriented x 3. Normal affect.   Exam otherwise negative     Lab Data Reviewed: (see below)    Medications Reviewed: (see below)    ______________________________________________________________________    Medications:     Current Facility-Administered Medications   Medication Dose Route Frequency    insulin NPH (NOVOLIN N, HUMULIN N) injection 28 Units  28 Units SubCUTAneous ACB&D    polyethylene glycol (MIRALAX) packet 17 g  17 g Oral DAILY    carvedilol (COREG) tablet 12.5 mg  12.5 mg Oral BID WITH MEALS    sodium chloride (NS) flush 5-40 mL  5-40 mL IntraVENous Q8H    sodium chloride (NS) flush 5-40 mL  5-40 mL IntraVENous PRN    ammonium lactate (LAC-HYDRIN) 12 % lotion   Topical DAILY    sodium chloride (NS) flush 5-40 mL  5-40 mL IntraVENous Q8H    sodium chloride (NS) flush 5-40 mL  5-40 mL IntraVENous PRN    acetaminophen (TYLENOL) tablet 650 mg  650 mg Oral Q4H PRN    ondansetron (ZOFRAN) injection 4 mg  4 mg IntraVENous Q4H PRN    insulin lispro (HUMALOG) injection   SubCUTAneous AC&HS    glucose chewable tablet 16 g  4 Tab Oral PRN    glucagon (GLUCAGEN) injection 1 mg  1 mg IntraMUSCular PRN    cefTRIAXone (ROCEPHIN) 1 g in 0.9% sodium chloride (MBP/ADV) 50 mL  1 g IntraVENous Q24H    amLODIPine (NORVASC) tablet 10 mg  10 mg Oral DAILY    aspirin tablet 325 mg  325 mg Oral DAILY    cloNIDine HCl (CATAPRES) tablet 0.1 mg  0.1 mg Oral TID    latanoprost (XALATAN) 0.005 % ophthalmic solution 1 Drop  1 Drop Both Eyes QHS    LORazepam (ATIVAN) tablet 0.5 mg  0.5 mg Oral QHS PRN    pravastatin (PRAVACHOL) tablet 80 mg  80 mg Oral QHS    hydrALAZINE (APRESOLINE) 20 mg/mL injection 10 mg  10 mg IntraVENous Q6H PRN    dextrose (D50W) injection syrg 12.5-25 g  12.5-25 g IntraVENous PRN    WARFARIN INFORMATION NOTE (COUMADIN)   Other QPM            Lab Review:     Recent Labs     02/03/19  1130 02/02/19  0320 02/01/19  1215   WBC 6.2 6.8 5.8   HGB 12.9 12.9 13.2   HCT 36.2 37.0 37.0    157 174     Recent Labs     02/03/19  1130 02/02/19  0320 02/01/19  1215    136 137   K 3.9 3.6 3.9    104 104   CO2 25 24 25   * 184* 262*   BUN 15 18 14   CREA 1.03* 1.13* 1.24*   CA 8.2* 8.4* 8.1*   INR 1.6* 1. 6* 1.7*     Lab Results   Component Value Date/Time    Glucose (POC) 170 (H) 02/03/2019 09:40 PM    Glucose (POC) 155 (H) 02/03/2019 05:11 PM    Glucose (POC) 248 (H) 02/03/2019 11:21 AM    Glucose (POC) 186 (H) 02/03/2019 06:55 AM    Glucose (POC) 291 (H) 02/02/2019 09:00 PM     No results for input(s): PH, PCO2, PO2, HCO3, FIO2 in the last 72 hours. 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 Back to baseline  2. Culture sensitivities reviewed and will treat with bactrim as outpatient  3.  Home today                     Care Plan discussed with: Patient    Discussed:  Care Plan    Prophylaxis:  Coumadin and H2B/PPI    Disposition:  Home w/Family           ___________________________________________________    Attending Physician: Valente Boykin MD

## 2019-02-04 NOTE — DISCHARGE SUMMARY
[address]  [phone]    Hospital Discharge Summary        Patient ID:  Huan Quesada  [id number]  89 y.o.  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 ([address] (8/18/2012)      Overview: retinopathy      Acquired hypothyroidism (8/18/2012)      Hyperthyroidism (10/13/2017)      UTI (urinary tract infection) (7/23/4832)      Metabolic encephalopathy (9/30/6427)         Past Medical History:   Diagnosis Date    Anticoagulant long-term use 10/13/2017    Anxiety 10/13/2017    Atrial fibrillation ([address] 10/13/2017    Breast calcification, left 10/13/2017    CAD (coronary artery disease)     Cellulitis of both lower extremities 10/13/2017    Chronic kidney disease     kidney stones    Chronic pain syndrome 10/13/2017    Chronic prescription opiate use 11/15/2017    CVA (cerebral vascular accident) ([address] 10/13/2017    Diabetes ([address]     DJD (degenerative joint disease) 10/13/2017    Dyslipidemia 10/13/2017    Glaucoma 10/13/2017    Hypertension     Hyperthyroidism 10/13/2017    Long-term use of high-risk medication 10/13/2017    Stasis ulcer of lower extremity ([address] 10/13/2017    Stroke ([address]     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  female with PMHx significant for HTN, afib, CAD, T2DM, hyperthyroidism, present to the ER for evaluation of weakness and altered mental status. Pt's mentation is back to baseline during my encounter.   Per pt, she suffered a fall 2 days ago, but did not seek medical attention. She started feeling generalized weakness yesterday, and this AM, when she was on the toilet, she was unable to get up and family noted altered mental status, leading her to the ER for evaluation. Pt also states she took 41 units NPH this AM and drank chicken broth today. She denies eating appropriate meal throughout the day. Pt denies association to chest pain, sob, palpitations, n/v/d. She follows with Dr Daniel Hooper for her hyperthyroidism and states her methimazole dose was increased about 4 days ago. Pt also states she has been feeling abnormally cold all week despite heat turned up high. In the ER, vitals; T96, P58, /62, SpO2 98% on RA  Labs: UA suggestive of UTI, hgb 17, , lactate wnl. INR 1.5,l trop wnl  We were asked to admit for work up and evaluation of the above problems. Admission Physical Exam: Per hospitalist -   VITALS:    Visit Vitals  /57   Pulse 63   Temp (!) 94.4 °F (34.7 °C)   Resp 14   Ht 5' 4\" (1.626 m)   Wt 93 kg (205 lb)   SpO2 97%   BMI 35.19 kg/m²         PHYSICAL EXAM:     General:          Alert, cooperative, no distress, appears stated age. HEENT:           Atraumatic, anicteric sclerae, pink conjunctivae                          No oral ulcers, mucosa moist, throat clear  Neck:               Supple, symmetrical,  thyroid: non tender  Lungs:             CTA b/l. No wheezing or Rhonchi. No rales. Chest wall:      No tenderness. No accessory muscle use. Heart:              Regular  rhythm,  No  Murmur. No edema  Abdomen:        Soft, NT. ND  BS+  Extremities:     No cyanosis. No clubbing,                            Skin turgor normal, Radial dial pulse 2+. Capillary refill normal  Skin:                Not pale. Not Jaundiced  No rashes   Psych:             Not depressed. Not anxious or agitated. Neurologic:      No facial asymmetry. No aphasia or slurred speech. Symmetrical strength, Sensation grossly intact. AAOx4.      Admission Labs:  CBC: 1/30/2019: HCT 49.1 %* (Ref range: 35.0 - 47.0 %); HGB 17.1 g/dL* (Ref range: 11.5 - 16.0 g/dL); PLATELET 882 K/uL* (Ref range: 150 - 400 K/uL); RBC 5.24 M/uL* (Ref range: 3.80 - 5.20 M/uL); WBC 7.0 K/uL (Ref range: 3.6 - 11.0 K/uL)  1/31/2019: HCT 40.1 % (Ref range: 35.0 - 47.0 %); HGB 14.2 g/dL (Ref range: 11.5 - 16.0 g/dL); PLATELET 138 K/uL (Ref range: 150 - 400 K/uL); RBC 4.41 M/uL (Ref range: 3.80 - 5.20 M/uL); WBC 5.9 K/uL (Ref range: 3.6 - 11.0 K/uL)   BMP: 1/30/2019: BUN 14 MG/DL (Ref range: 6 - 20 MG/DL); Calcium 8.9 MG/DL (Ref range: 8.5 - 10.1 MG/DL); Chloride 104 mmol/L (Ref range: 97 - 108 mmol/L); CO2 27 mmol/L (Ref range: 21 - 32 mmol/L); Creatinine 1.05 MG/DL* (Ref range: 0.55 - 1.02 MG/DL); Glucose 94 mg/dL (Ref range: 65 - 100 mg/dL); Potassium 3.6 mmol/L (Ref range: 3.5 - 5.1 mmol/L); Sodium 137 mmol/L (Ref range: 136 - 145 mmol/L)  1/31/2019: BUN 11 MG/DL (Ref range: 6 - 20 MG/DL); Calcium 8.7 MG/DL (Ref range: 8.5 - 10.1 MG/DL); Chloride 101 mmol/L (Ref range: 97 - 108 mmol/L); CO2 26 mmol/L (Ref range: 21 - 32 mmol/L); Creatinine 1.08 MG/DL* (Ref range: 0.55 - 1.02 MG/DL); Glucose 284 mg/dL* (Ref range: 65 - 100 mg/dL); Potassium 3.7 mmol/L (Ref range: 3.5 - 5.1 mmol/L); Sodium 134 mmol/L* (Ref range: 136 - 145 mmol/L)  Coagulation: 1/30/2019: aPTT 29.9 sec (Ref range: 22.1 - 32.0 sec); INR 1.5  * (Ref range: 0.9 - 1.1  ); Prothrombin time 14.6 sec* (Ref range: 9.0 - 11.1 sec)  1/31/2019: INR 1.8  * (Ref range: 0.9 - 1.1  ); Prothrombin time 18.2 sec* (Ref range: 9.0 - 11.1 sec)  Cardiac markers: No results found for requested labs within first 48 hours of the last admission day. Liver: 1/30/2019: Albumin 3.9 g/dL (Ref range: 3.5 - 5.0 g/dL); Alk. phosphatase 94 U/L (Ref range: 45 - 117 U/L); AST (SGOT) 33 U/L (Ref range: 15 - 37 U/L);  Protein, total 10.6 g/dL* (Ref range: 6.4 - 8.2 g/dL)    Discharge Labs:  Recent Results (from the past 24 hour(s))   GLUCOSE, POC Collection Time: 02/03/19  6:55 AM   Result Value Ref Range    Glucose (POC) 186 (H) 65 - 100 mg/dL    Performed by Lex Hancock (PCT)    GLUCOSE, POC    Collection Time: 02/03/19 11:21 AM   Result Value Ref Range    Glucose (POC) 248 (H) 65 - 100 mg/dL    Performed by Tomy Fuller    PROTHROMBIN TIME + INR    Collection Time: 02/03/19 11:30 AM   Result Value Ref Range    INR 1.6 (H) 0.9 - 1.1      Prothrombin time 16.0 (H) 9.0 - 04.9 sec   METABOLIC PANEL, BASIC    Collection Time: 02/03/19 11:30 AM   Result Value Ref Range    Sodium 137 136 - 145 mmol/L    Potassium 3.9 3.5 - 5.1 mmol/L    Chloride 104 97 - 108 mmol/L    CO2 25 21 - 32 mmol/L    Anion gap 8 5 - 15 mmol/L    Glucose 247 (H) 65 - 100 mg/dL    BUN 15 6 - 20 MG/DL    Creatinine 1.03 (H) 0.55 - 1.02 MG/DL    BUN/Creatinine ratio 15 12 - 20      GFR est AA >60 >60 ml/min/1.73m2    GFR est non-AA 52 (L) >60 ml/min/1.73m2    Calcium 8.2 (L) 8.5 - 10.1 MG/DL   CBC W/O DIFF    Collection Time: 02/03/19 11:30 AM   Result Value Ref Range    WBC 6.2 3.6 - 11.0 K/uL    RBC 3.93 3.80 - 5.20 M/uL    HGB 12.9 11.5 - 16.0 g/dL    HCT 36.2 35.0 - 47.0 %    MCV 92.1 80.0 - 99.0 FL    MCH 32.8 26.0 - 34.0 PG    MCHC 35.6 30.0 - 36.5 g/dL    RDW 12.1 11.5 - 14.5 %    PLATELET 547 430 - 259 K/uL    MPV 11.6 8.9 - 12.9 FL    NRBC 0.0 0  WBC    ABSOLUTE NRBC 0.00 0.00 - 0.01 K/uL   GLUCOSE, POC    Collection Time: 02/03/19  5:11 PM   Result Value Ref Range    Glucose (POC) 155 (H) 65 - 100 mg/dL    Performed by Josh Patterson (PCT)    GLUCOSE, POC    Collection Time: 02/03/19  9:40 PM   Result Value Ref Range    Glucose (POC) 170 (H) 65 - 100 mg/dL    Performed by Derick Chang (PCT)        Significant Diagnostic Studies:   X-Ray:  Chest xray - no acute process     CT head - 1. Left greater than right chronic appearing small vessel ischemic white matter  changes.   2. No acute intracranial abnormality demonstrated     Procedures:   N/A     Urine culture: Klebsiella pneumoniae              Antibiotic Interpretation Value Method Comment   Amikacin ($) Susceptible <=16 ug/mL FAUZIA     Ampicillin/sulbactam ($) Susceptible <=8/4 ug/mL FAUZIA     Aztreonam ($$$$) Susceptible <=4 ug/mL FAUZIA     Cefazolin ($) Susceptible <=8 ug/mL FAUZIA     Cefepime ($$) Susceptible <=4 ug/mL FAUZIA     Cefotaxime Susceptible <=2 ug/mL FAUZIA     Ceftazidime ($) Susceptible <=1 ug/mL FAUZIA     Ceftriaxone ($) Susceptible <=1 ug/mL FAUZIA     Cefuroxime ($) Susceptible <=4 ug/mL FAUIZA     Ciprofloxacin ($) Susceptible <=1 ug/mL FAUZIA     Gentamicin ($) Susceptible <=4 ug/mL FAUZIA     Imipenem Susceptible <=1 ug/mL FAUZIA     Levofloxacin ($) Susceptible <=2 ug/mL FAUZIA     Meropenem ($$) Susceptible <=1 ug/mL FAUZIA     Nitrofurantoin Susceptible <=32 ug/mL FAUZIA     Piperacillin/Tazobac ($) Susceptible <=16 ug/mL FAUZIA     Tobramycin ($) Susceptible <=4 ug/mL FAUZIA     Trimeth/Sulfa Susceptible <=2/38 ug/mL FAUZIA        Blood culture:   Staphylococcus intermedius              Antibiotic Interpretation Value Method Comment   Ampicillin ($) Resistant <=2 ug/mL FAUZIA     Ampicillin/sulbactam ($) Susceptible <=8/4 ug/mL FAUZIA     Clindamycin ($) Susceptible <=0.5 ug/mL FAUZIA     Cefazolin ($) Susceptible <=4 ug/mL FAUZIA     Ciprofloxacin ($) Susceptible <=1 ug/mL FAUZIA     Daptomycin ($$$$$) Susceptible <=0.5 ug/mL FAUZIA     Erythromycin ($$$$) Susceptible <=0.5 ug/mL FAUZIA     Gentamicin ($) Susceptible <=4 ug/mL FAUZIA     Levofloxacin ($) Susceptible <=1 ug/mL FAUZIA     Linezolid ($$$$$) Susceptible <=1 ug/mL FAUZIA     Oxacillin Susceptible <=0.25 ug/mL FAUZIA     Penicillin G ($$) Resistant 8 ug/mL FAUZIA     Rifampin ($$$$) Susceptible <=1 ug/mL FAUZIA Rifampin is not to be used for mono-therapy.    Tetracycline Susceptible <=4 ug/mL FAUZIA     Trimeth/Sulfa Susceptible <=0.5/9.5 ug/mL FAUZIA     Vancomycin ($) Susceptible 1 ug/mL FAUZIA                Hospital Course: 74 y/o female with history of previous stroke with left hemiplegia, DM, hyperthyroidism on MMI, chronic pain admitted with sepsis, UTI, encephalopathy, hypothermia, hypoglycemia, bradycardia and elevated TSH. Cultured and started on IV rocephin. MMI held and temp back to normal with use of Eriberto Hugger and HR normal.Received IV rocephin. Urine positive for Klebsiella and blood culture positive for Staph. Patient quickly returned to normal mentation/orientation. Discharged once back to baseline and culture ID's resulted for continued outpatient treatment        Disposition: home    Activity: as tolerated     Diet: diabetic    Follow up appointment: Dr Amalia Treadwell in 1 week     Patient Instructions:   Current Discharge Medication List      START taking these medications    Details   trimethoprim-sulfamethoxazole (BACTRIM, SEPTRA)  mg per tablet Take 1 Tab by mouth two (2) times a day for 7 days. Qty: 14 Tab, Refills: 0         CONTINUE these medications which have NOT CHANGED    Details   traMADol (ULTRAM) 50 mg tablet Take 50 mg by mouth four (4) times daily as needed for Pain.       flash glucose scanning reader (FREESTYLE JOSE LUIS READER) [facility] Use to check blood sugars daily  DX: E11.9  Qty: 1 Device, Refills: 0      flash glucose sensor (FREESTYLE JOSE LUIS SENSOR) kit Use to check blood sugars daily  DX: E11.9  Qty: 1 Device, Refills: 0      warfarin (COUMADIN) 2 mg tablet TAKE ONE AND ONE-HALF TABLETS DAILY FOR FIVE DAYS A WEEK AND 2 TABLETS ON WEDNESDAY AND SUNDAY  Qty: 135 Tab, Refills: 3      carvedilol (COREG) 12.5 mg tablet TAKE 1 TABLET TWICE A DAY  Qty: 180 Tab, Refills: 1      NOVOLIN N NPH U-100 INSULIN 100 unit/mL injection INJECT 44 UNITS UNDER THE SKIN IN THE MORNING AND 36 UNITS IN THE EVENING  Qty: 80 mL, Refills: 2      amLODIPine (NORVASC) 10 mg tablet TAKE 1 TABLET DAILY  Qty: 90 Tab, Refills: 2      Blood-Glucose Meter (FREESTYLE FREEDOM LITE) monitoring kit Daily blood sugar checks  Qty: 1 Kit, Refills: 0      lancets (FREESTYLE LANCETS) 28 gauge misc Daily blood sugar checks  Qty: 100 Lancet, Refills: 3      glucose blood VI test strips (FREESTYLE TEST) strip by Does Not Apply route daily. Use once daily for daily blood glucose checks  Qty: 100 Strip, Refills: 3    Comments: Type II Diabetes mellitus  E11.3299  Associated Diagnoses: Diabetes mellitus with background retinopathy (HCC)      cloNIDine HCl (CATAPRES) 0.1 mg tablet TAKE 1 TABLET THREE TIMES A DAY  Qty: 270 Tab, Refills: 2      pravastatin (PRAVACHOL) 80 mg tablet TAKE 1 TABLET NIGHTLY  Qty: 90 Tab, Refills: 3      furosemide (LASIX) 40 mg tablet TAKE 1 TABLET DAILY  Qty: 90 Tab, Refills: 2      LORazepam (ATIVAN) 0.5 mg tablet Take 1 Tab by mouth nightly as needed. Max Daily Amount: 0.5 mg.  Qty: 90 Tab, Refills: 0    Associated Diagnoses: Anxiety      captopril (CAPOTEN) 25 mg tablet TAKE 1 TABLET TWICE A DAY  Qty: 180 Tab, Refills: 3      ergocalciferol (VITAMIN D2) 50,000 unit capsule Take 50,000 Units by mouth every seven (7) days. methIMAzole (TAPAZOLE) 10 mg tablet Take  by mouth daily. naloxone (NARCAN) 4 mg/actuation nasal spray Use 1 spray intranasally into 1 nostril. Indications: OPIATE-INDUCED RESPIRATORY DEPRESSION, Opioid Toxicity  Qty: 1 Each, Refills: 0    Associated Diagnoses: Chronic prescription opiate use      latanoprost (XALATAN) 0.005 % ophthalmic solution Administer 1 Drop to both eyes nightly. Insulin Syringe-Needle U-100 (BD INSULIN SYRINGE ULT-FINE II) 0.5 mL 31 gauge x 5/16 syrg 1 Each by Does Not Apply route two (2) times a day. Qty: 180 Syringe, Refills: 3      aspirin (ASPIRIN) 325 mg tablet Take 1 Tab by mouth daily.              Wound Care: None needed    Signed:  Jorge Rausch MD  2/4/2019  5:49 AM

## 2019-02-05 ENCOUNTER — HOME CARE VISIT (OUTPATIENT)
Dept: HOME HEALTH SERVICES | Facility: HOME HEALTH | Age: 74
End: 2019-02-05

## 2019-02-05 ENCOUNTER — PATIENT OUTREACH (OUTPATIENT)
Dept: INTERNAL MEDICINE CLINIC | Age: 74
End: 2019-02-05

## 2019-02-05 NOTE — PROGRESS NOTES
Hospital Discharge Follow-Up      Date/Time:  2019 12:47 PM    Patient was admitted to [facility] on 19 and discharged on 19 for UTI/sepsis. The physician discharge summary was available at the time of outreach. Patient was contacted within two business days of discharge. Top Challenges reviewed with the provider   No ACP on file         Method of communication with provider :chart routing    Inpatient RRAT score: 32  Was this a readmission? no   Patient stated reason for the readmission: n/a    Nurse Navigator (NN) contacted the patient by telephone to perform post hospital discharge assessment. Verified name and  with patient as identifiers. Provided introduction to self, and explanation of the Nurse Navigator role. Reviewed discharge instructions and red flags with patient who verbalized understanding. Patient given an opportunity to ask questions and does not have any further questions or concerns at this time. The patient agrees to contact the PCP office for questions related to their healthcare. NN provided contact information for future reference. Disease Specific:   Sepsis  Sepsis Note     Most recent vital signs: T96, P58, /62, SpO2 98% on RA    Source of Infection:  Urinary tract infection     Antibiotic prescribed at discharge: Bactrim DS Length of remaining treatment: 7 days  Are you taking your antibiotic as prescribed? yes     Infectious Disease Consult during admission: no     In the last 24 hour have you experienced;     Fever no    Low body temperature no    Chills or shaking no    Sweating no    Fast heart rate no    Fast breathing no    Dizziness/lightheadedness no    Confusion or unusual change in mental status no    Diarrhea no    Nausea no    Vomiting no    Shortness of breath or difficulty breathing no    Less urine output no    Cold, clammy, and pale skin no     Skin rash or skin color changes no     If the patients has two or more symptoms present notify the primary care provider of the concern for sepsis. Summary of patient's top problems:  1. Recent hospital stay for UTI/sepsis. Presented to ER with weakness and altered mental status> Urine showed Klebsiella pneumoniae, and blood culture - staph with Gm+ cocci. Symptoms resolved with IV rocephin. Bactrim DS x 7 days as outpatient. 2. Hypothyroid - followed by endocrinologist.   3. Type 2 DM    Home Health orders at discharge: [address] [facility]: [facility]  Date of initial visit: 2/6/19    Durable Medical Equipment ordered/company: n/a  Durable Medical Equipment received: n/a    Barriers to care? none    Advance Care Planning:   Does patient have an Advance Directive:  not on file; education provided     Medication(s):   New Medications at Discharge: bactrim  Changed Medications at Discharge: n/a  Discontinued Medications at Discharge: n/a    Medication reconciliation was performed with patient, who verbalizes understanding of administration of home medications. There were no barriers to obtaining medications identified at this time. Referral to Pharm D needed: no     Current Outpatient Medications   Medication Sig    trimethoprim-sulfamethoxazole (BACTRIM, SEPTRA)  mg per tablet Take 1 Tab by mouth two (2) times a day for 7 days.  cloNIDine HCl (CATAPRES) 0.1 mg tablet TAKE 1 TABLET THREE TIMES A DAY    pravastatin (PRAVACHOL) 80 mg tablet TAKE 1 TABLET NIGHTLY    furosemide (LASIX) 40 mg tablet TAKE 1 TABLET DAILY    flash glucose scanning reader (FREESTYLE JOSE LUIS READER) [facility] Use to check blood sugars daily  DX: E11.9    flash glucose sensor (FREESTYLE JOSE LUIS SENSOR) kit Use to check blood sugars daily  DX: E11.9    LORazepam (ATIVAN) 0.5 mg tablet Take 1 Tab by mouth nightly as needed.  Max Daily Amount: 0.5 mg.    captopril (CAPOTEN) 25 mg tablet TAKE 1 TABLET TWICE A DAY    warfarin (COUMADIN) 2 mg tablet TAKE ONE AND ONE-HALF TABLETS DAILY FOR FIVE DAYS A WEEK AND 2 TABLETS ON WEDNESDAY AND SUNDAY    carvedilol (COREG) 12.5 mg tablet TAKE 1 TABLET TWICE A DAY    NOVOLIN N NPH U-100 INSULIN 100 unit/mL injection INJECT 44 UNITS UNDER THE SKIN IN THE MORNING AND 36 UNITS IN THE EVENING (Patient taking differently: INJECT 41 UNITS UNDER THE SKIN IN THE MORNING AND 33 UNITS IN THE EVENING)    ergocalciferol (VITAMIN D2) 50,000 unit capsule Take 50,000 Units by mouth every seven (7) days.  methIMAzole (TAPAZOLE) 10 mg tablet Take  by mouth daily.  amLODIPine (NORVASC) 10 mg tablet TAKE 1 TABLET DAILY    Blood-Glucose Meter (FREESTYLE FREEDOM LITE) monitoring kit Daily blood sugar checks    lancets (FREESTYLE LANCETS) 28 gauge misc Daily blood sugar checks    glucose blood VI test strips (FREESTYLE TEST) strip by Does Not Apply route daily. Use once daily for daily blood glucose checks    latanoprost (XALATAN) 0.005 % ophthalmic solution Administer 1 Drop to both eyes nightly.  Insulin Syringe-Needle U-100 (BD INSULIN SYRINGE ULT-FINE II) 0.5 mL 31 gauge x 5/16 syrg 1 Each by Does Not Apply route two (2) times a day.  aspirin (ASPIRIN) 325 mg tablet Take 1 Tab by mouth daily.  traMADol (ULTRAM) 50 mg tablet Take 50 mg by mouth four (4) times daily as needed for Pain.  naloxone (NARCAN) 4 mg/actuation nasal spray Use 1 spray intranasally into 1 nostril. Indications: OPIATE-INDUCED RESPIRATORY DEPRESSION, Opioid Toxicity     No current facility-administered medications for this visit. There are no discontinued medications. [facility] follow up appointment(s):   Future Appointments   Date Time Provider Aaron Mccormack   2/15/2019 10:30 AM Colton Lozano MD [address] [facility]   2/20/2019 10:00 AM Richard Lozano MD [address] [facility]      Non-[facility] follow up appointment(s): n/a  [facility]:  n/a       Goals      Understands red flags post discharge. 2/5/19-NN spoke with patient re: recent hospital stay for UTI/sepsis. Reviewed red flags with patient to be aware of for return of infection, such as fever, pain or increased frequency of urination, confusion, fatigue. Patient voiced understanding and will report any such symptoms to MD over the next week should they occur. NN gave patient contact information to have on hand should she have any questions. JANAY visit will occur next week with PCP.  NN will f/u with patient after visit./ vs

## 2019-02-07 ENCOUNTER — HOME CARE VISIT (OUTPATIENT)
Dept: HOME HEALTH SERVICES | Facility: HOME HEALTH | Age: 74
End: 2019-02-07

## 2019-02-08 ENCOUNTER — HOME CARE VISIT (OUTPATIENT)
Dept: SCHEDULING | Facility: HOME HEALTH | Age: 74
End: 2019-02-08

## 2019-02-08 PROCEDURE — G0495 RN CARE TRAIN/EDU IN HH: HCPCS

## 2019-02-15 ENCOUNTER — OFFICE VISIT (OUTPATIENT)
Dept: INTERNAL MEDICINE CLINIC | Age: 74
End: 2019-02-15

## 2019-02-15 VITALS
BODY MASS INDEX: 35 KG/M2 | OXYGEN SATURATION: 97 % | RESPIRATION RATE: 21 BRPM | TEMPERATURE: 98 F | SYSTOLIC BLOOD PRESSURE: 128 MMHG | DIASTOLIC BLOOD PRESSURE: 68 MMHG | HEART RATE: 67 BPM | WEIGHT: 205 LBS | HEIGHT: 64 IN

## 2019-02-15 DIAGNOSIS — A41.9 SEPSIS, DUE TO UNSPECIFIED ORGANISM: Primary | ICD-10-CM

## 2019-02-15 DIAGNOSIS — E05.90 HYPERTHYROIDISM: ICD-10-CM

## 2019-02-15 DIAGNOSIS — G93.41 METABOLIC ENCEPHALOPATHY: ICD-10-CM

## 2019-02-15 DIAGNOSIS — N30.00 ACUTE CYSTITIS WITHOUT HEMATURIA: ICD-10-CM

## 2019-02-15 DIAGNOSIS — I10 ESSENTIAL HYPERTENSION, BENIGN: Chronic | ICD-10-CM

## 2019-02-15 LAB
BACTERIA,BACTU: ABNORMAL
BILIRUB UR QL: NEGATIVE
CLARITY: ABNORMAL
COLOR UR: ABNORMAL
GLUCOSE 24H UR-MRATE: ABNORMAL G/(24.H)
HGB UR QL STRIP: NEGATIVE
KETONES UR QL STRIP.AUTO: NEGATIVE
LEUKOCYTE ESTERASE: ABNORMAL
NITRITE UR QL STRIP.AUTO: NEGATIVE
PH UR STRIP: 6.5 [PH] (ref 5–7)
PROT UR STRIP-MCNC: ABNORMAL MG/DL
RBC #/AREA URNS HPF: ABNORMAL #/HPF
SP GR UR REFRACTOMETRY: 1.01 (ref 1–1.03)
SQUAMOUS EPITHELIAL CELLS: ABNORMAL
UROBILINOGEN UR QL STRIP.AUTO: NEGATIVE
WBC URNS QL MICRO: ABNORMAL #/HPF

## 2019-02-15 RX ORDER — HUMAN INSULIN 100 [IU]/ML
INJECTION, SUSPENSION SUBCUTANEOUS
Qty: 80 ML | Refills: 2 | Status: SHIPPED | OUTPATIENT
Start: 2019-02-15 | End: 2019-12-08 | Stop reason: SDUPTHER

## 2019-02-15 NOTE — PROGRESS NOTES
This note will not be viewable in [address] Rani Petty is a 75 y.o. female and presents with Transitions Of Care  . Subjective:  Mrs. Shaila Santos presents to the office today and transition of care subsequent to a hospitalization at [facility] from 1/30 until 2/4 when she was admitted with sepsis due to urinary tract infection associated with metabolic encephalopathy, bradycardia and hypothermia. The patient was admitted and cultured and started immediately on IV Rocephin and hydrated. Her endocrinologist had recently increased her methimazole and this was held. Her temperature was normalized and her heart rate improved. Her urine was positive for Klebsiella and blood was positive for staph. The patient improved hemodynamically and from a mentation standpoint. After her urines were resulted and sensitivities reviewed she was discharged home on Bactrim DS which she took twice a day for 7 more days. Since that time the patient has been doing well and has been feeling better although she has been weak. She notes that she is not reacquired control of her urine that she had prior to admission. 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 The patient notes that her blood sugars have been doing well and she has had no fevers no dizzy spells no extreme fatigue. She denies abdominal pain or back pain.     Past Medical History:   Diagnosis Date    Anticoagulant long-term use 10/13/2017    Anxiety 10/13/2017    Atrial fibrillation ([address] 10/13/2017    Breast calcification, left 10/13/2017    CAD (coronary artery disease)     Cellulitis of both lower extremities 10/13/2017    Chronic kidney disease     kidney stones    Chronic pain syndrome 10/13/2017    Chronic prescription opiate use 11/15/2017    CVA (cerebral vascular accident) ([address] 10/13/2017    Diabetes ([address]     DJD (degenerative joint disease) 10/13/2017    Dyslipidemia 10/13/2017  Glaucoma 10/13/2017    Hypertension     Hyperthyroidism 10/13/2017    Long-term use of high-risk medication 10/13/2017    Stasis ulcer of lower extremity ([facility] 10/13/2017    Stroke ([facility]     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 8/18/2012    retinopathy      Past Surgical History:   Procedure Laterality Date    CARDIAC SURG PROCEDURE UNLIST      cagb    HX GYN      hysterectomty    HX ORTHOPAEDIC      back surgery     Allergies   Allergen Reactions    Betadine Prepstick Rash    Keflex [Cephalexin] Hives     Pt breaks out in hives     Current Outpatient Medications   Medication Sig Dispense Refill    NOVOLIN N NPH U-100 INSULIN 100 unit/mL injection INJECT 44 UNITS UNDER THE SKIN IN THE MORNING AND 36 UNITS IN THE EVENING 80 mL 2    traMADol (ULTRAM) 50 mg tablet Take 50 mg by mouth four (4) times daily as needed for Pain.  cloNIDine HCl (CATAPRES) 0.1 mg tablet TAKE 1 TABLET THREE TIMES A  Tab 2    pravastatin (PRAVACHOL) 80 mg tablet TAKE 1 TABLET NIGHTLY 90 Tab 3    furosemide (LASIX) 40 mg tablet TAKE 1 TABLET DAILY 90 Tab 2    flash glucose scanning reader (FREESTYLE JOSE LUIS READER) [facility] Use to check blood sugars daily  DX: E11.9 1 Device 0    flash glucose sensor (FREESTYLE JOSE LUIS SENSOR) kit Use to check blood sugars daily  DX: E11.9 1 Device 0    LORazepam (ATIVAN) 0.5 mg tablet Take 1 Tab by mouth nightly as needed. Max Daily Amount: 0.5 mg. 90 Tab 0    captopril (CAPOTEN) 25 mg tablet TAKE 1 TABLET TWICE A  Tab 3    warfarin (COUMADIN) 2 mg tablet TAKE ONE AND ONE-HALF TABLETS DAILY FOR FIVE DAYS A WEEK AND 2 TABLETS ON WEDNESDAY AND SUNDAY 135 Tab 3    carvedilol (COREG) 12.5 mg tablet TAKE 1 TABLET TWICE A  Tab 1    ergocalciferol (VITAMIN D2) 50,000 unit capsule Take 50,000 Units by mouth every seven (7) days.  methIMAzole (TAPAZOLE) 10 mg tablet Take  by mouth daily.       amLODIPine (NORVASC) 10 mg tablet TAKE 1 TABLET DAILY 90 Tab 2    Blood-Glucose Meter (FREESTYLE FREEDOM LITE) monitoring kit Daily blood sugar checks 1 Kit 0    lancets (FREESTYLE LANCETS) 28 gauge misc Daily blood sugar checks 100 Lancet 3    glucose blood VI test strips (FREESTYLE TEST) strip by Does Not Apply route daily. Use once daily for daily blood glucose checks 100 Strip 3    naloxone (NARCAN) 4 mg/actuation nasal spray Use 1 spray intranasally into 1 nostril. Indications: OPIATE-INDUCED RESPIRATORY DEPRESSION, Opioid Toxicity 1 Each 0    latanoprost (XALATAN) 0.005 % ophthalmic solution Administer 1 Drop to both eyes nightly.  Insulin Syringe-Needle U-100 (BD INSULIN SYRINGE ULT-FINE II) 0.5 mL 31 gauge x 5/16 syrg 1 Each by Does Not Apply route two (2) times a day. 180 Syringe 3    aspirin (ASPIRIN) 325 mg tablet Take 1 Tab by mouth daily.        Social History     Socioeconomic History    Marital status:      Spouse name: Not on file    Number of children: Not on file    Years of education: Not on file    Highest education level: Not on file   Tobacco Use    Smoking status: Never Smoker    Smokeless tobacco: Never Used   Substance and Sexual Activity    Alcohol use: No    Drug use: No     Family History   Problem Relation Age of Onset    Heart Disease Father        Health Maintenance   Topic Date Due    DTaP/Tdap/Td series (1 - Tdap) 04/08/1965    Shingrix Vaccine Age 50> (1 of 2) 04/08/1994    Bone Densitometry (Dexa) Screening  04/08/2009    MICROALBUMIN Q1  11/15/2018    LIPID PANEL Q1  05/09/2019    FOOT EXAM Q1  07/23/2019    HEMOGLOBIN A1C Q6M  07/31/2019    MEDICARE YEARLY EXAM  11/22/2019    GLAUCOMA SCREENING Q2Y  04/19/2020    EYE EXAM RETINAL OR DILATED  04/19/2020    BREAST CANCER SCRN MAMMOGRAM  06/05/2020    Cologuard Q 3 Years  12/02/2021    Pneumococcal 65+ Low/Medium Risk  Completed    Influenza Age 5 to Adult  Completed        Review of Systems  Constitutional: negative for fevers, chills, anorexia and weight loss  Eyes:   negative for visual disturbance and irritation  ENT:   negative for tinnitus,sore throat,nasal congestion,ear pain,hoarseness  Respiratory:  negative for cough, hemoptysis, dyspnea,wheezing  CV:   negative for chest pain, palpitations, lower extremity edema  GI:   negative for nausea, vomiting, diarrhea, abdominal pain,melena  Endo:               negative for polyuria,polydipsia,polyphagia,heat intolerance  Genitourinary: negative for frequency, dysuria and hematuria  Integumentary: negative for rash and pruritus  Hematologic:  negative for easy bruising and gum/nose bleeding  Musculoskel: negative for myalgias, arthralgias, back pain, muscle weakness, joint pain  Neurological:  Positive for chronic left-sided weakness  Behavl/Psych: negative for feelings of anxiety, depression, mood changes  ROS otherwise negative      Objective:  Visit Vitals  /68 (BP 1 Location: Left arm, BP Patient Position: Sitting)   Pulse 67   Temp 98 °F (36.7 °C) (Oral)   Resp 21   Ht 5' 4\" (1.626 m)   Wt 205 lb (93 kg)   SpO2 97%   BMI 35.19 kg/m²     Body mass index is 35.19 kg/m². Physical Exam:   General appearance - alert, well appearing, and in no distress  Mental status - alert, oriented to person, place, and time  EYE-NICK, EOMI,conjunctiva normal bilaterally, lids normal  ENT-ENT exam normal, no neck nodes or sinus tenderness  Nose - normal and patent, no erythema,  Or discharge   Mouth - mucous membranes moist, pharynx normal without lesions  Neck - supple, no significant adenopathy or bruit  Chest - clear to auscultation, no wheezes, rales or rhonchi. Heart - normal rate, regular rhythm, normal S1, S2, no murmurs, rubs, clicks or gallops   Abdomen - soft, nontender, nondistended, no masses or organomegaly  Lymph- no adenopathy palpable  Ext-peripheral pulses normal, no pedal edema, no clubbing or cyanosis  Skin-Warm and dry.  no hyperpigmentation, vitiligo, or suspicious lesions  Neuro -alert, oriented, normal speech. Positive for chronic left hemiplegia    Assessment/Plan:  Diagnoses and all orders for this visit:    Sepsis, due to unspecified organism ([address]  -     URINALYSIS W/MICROSCOPIC  -     CULTURE, URINE    Acute cystitis without hematuria  -     URINALYSIS W/MICROSCOPIC  -     CULTURE, URINE    Metabolic encephalopathy    Hyperthyroidism    Essential hypertension, benign        Other instructions: The patient's medications were reviewed and reconciled. No change in her current medical regimen is made. We will obtain a follow-up urinalysis and urine culture. Hospital records from 1/30 until 2/4 were reviewed during the course of this office visit    Follow-up in 6 weeks time    Follow-up Disposition:  Return for As previously scheduled. I have reviewed with the patient details of the assessment and plan and all questions were answered. Relevent patient education was performed. The most recent lab findings were reviewed with the patient. An After Visit Summary was printed and given to the patient.     Rohan Gómez MD

## 2019-02-15 NOTE — PROGRESS NOTES
Pili Little is a 75 y.o. female presenting for Transitions Of Care  . 1. Have you been to the ER, urgent care clinic since your last visit? Hospitalized since your last visit? Yes When: [facility]30[facility]2/4/19 [facility] Reason for visit: U[facility]/sepsis. 2. Have you seen or consulted any other health care providers outside of the [address] [place] [address] since your last visit? Include any pap smears or colon screening. No    Fall Risk Assessment, last 12 mths 2/15/2019   Able to walk? Yes   Fall in past 12 months? No         Abuse Screening Questionnaire 2/15/2019   Do you ever feel afraid of your partner? N   Are you in a relationship with someone who physically or mentally threatens you? N   Is it safe for you to go home? Y       3 most recent PHQ Screens 2/15/2019   Little interest or pleasure in doing things Not at all   Feeling down, depressed, irritable, or hopeless Not at all   Total Score PHQ 2 0       There are no discontinued medications.

## 2019-02-15 NOTE — PATIENT INSTRUCTIONS
DASH Diet: Care Instructions  Your Care Instructions    The DASH diet is an eating plan that can help lower your blood pressure. DASH stands for Dietary Approaches to Stop Hypertension. Hypertension is high blood pressure. The DASH diet focuses on eating foods that are high in calcium, potassium, and magnesium. These nutrients can lower blood pressure. The foods that are highest in these nutrients are fruits, vegetables, low-fat dairy products, nuts, seeds, and legumes. But taking calcium, potassium, and magnesium supplements instead of eating foods that are high in those nutrients does not have the same effect. The DASH diet also includes whole grains, fish, and poultry. The DASH diet is one of several lifestyle changes your doctor may recommend to lower your high blood pressure. Your doctor may also want you to decrease the amount of sodium in your diet. Lowering sodium while following the DASH diet can lower blood pressure even further than just the DASH diet alone. Follow-up care is a key part of your treatment and safety. Be sure to make and go to all appointments, and call your doctor if you are having problems. It's also a good idea to know your test results and keep a list of the medicines you take. How can you care for yourself at home? Following the DASH diet  · Eat 4 to 5 servings of fruit each day. A serving is 1 medium-sized piece of fruit, ½ cup chopped or canned fruit, 1/4 cup dried fruit, or 4 ounces (½ cup) of fruit juice. Choose fruit more often than fruit juice. · Eat 4 to 5 servings of vegetables each day. A serving is 1 cup of lettuce or raw leafy vegetables, ½ cup of chopped or cooked vegetables, or 4 ounces (½ cup) of vegetable juice. Choose vegetables more often than vegetable juice. · Get 2 to 3 servings of low-fat and fat-free dairy each day. A serving is 8 ounces of milk, 1 cup of yogurt, or 1 ½ ounces of cheese. · Eat 6 to 8 servings of grains each day.  A serving is 1 slice of bread, 1 ounce of dry cereal, or ½ cup of cooked rice, pasta, or cooked cereal. Try to choose whole-grain products as much as possible. · Limit lean meat, poultry, and fish to 2 servings each day. A serving is 3 ounces, about the size of a deck of cards. · Eat 4 to 5 servings of nuts, seeds, and legumes (cooked dried beans, lentils, and split peas) each week. A serving is 1/3 cup of nuts, 2 tablespoons of seeds, or ½ cup of cooked beans or peas. · Limit fats and oils to 2 to 3 servings each day. A serving is 1 teaspoon of vegetable oil or 2 tablespoons of salad dressing. · Limit sweets and added sugars to 5 servings or less a week. A serving is 1 tablespoon jelly or jam, ½ cup sorbet, or 1 cup of lemonade. · Eat less than 2,300 milligrams (mg) of sodium a day. If you limit your sodium to 1,500 mg a day, you can lower your blood pressure even more. Tips for success  · Start small. Do not try to make dramatic changes to your diet all at once. You might feel that you are missing out on your favorite foods and then be more likely to not follow the plan. Make small changes, and stick with them. Once those changes become habit, add a few more changes. · Try some of the following:  ? Make it a goal to eat a fruit or vegetable at every meal and at snacks. This will make it easy to get the recommended amount of fruits and vegetables each day. ? Try yogurt topped with fruit and nuts for a snack or healthy dessert. ? Add lettuce, tomato, cucumber, and onion to sandwiches. ? Combine a ready-made pizza crust with low-fat mozzarella cheese and lots of vegetable toppings. Try using tomatoes, squash, spinach, broccoli, carrots, cauliflower, and onions. ? Have a variety of cut-up vegetables with a low-fat dip as an appetizer instead of chips and dip. ? Sprinkle sunflower seeds or chopped almonds over salads. Or try adding chopped walnuts or almonds to cooked vegetables.   ? Try some vegetarian meals using beans and peas. Add garbanzo or kidney beans to salads. Make burritos and tacos with mashed garcia beans or black beans. Where can you learn more? Go to http://aly-robert.info/. Enter U149 in the search box to learn more about \"DASH Diet: Care Instructions. \"  Current as of: July 22, 2018  Content Version: 11.9  © 6568-7836 [employer]. Care instructions adapted under license by [employer] (which disclaims liability or warranty for this information). If you have questions about a medical condition or this instruction, always ask your healthcare professional. [address] any warranty or liability for your use of this information.

## 2019-02-19 ENCOUNTER — PATIENT OUTREACH (OUTPATIENT)
Dept: INTERNAL MEDICINE CLINIC | Age: 74
End: 2019-02-19

## 2019-02-20 LAB — BACTERIA UR CULT: NORMAL

## 2019-02-22 ENCOUNTER — PATIENT OUTREACH (OUTPATIENT)
Dept: INTERNAL MEDICINE CLINIC | Age: 74
End: 2019-02-22

## 2019-03-04 ENCOUNTER — PATIENT OUTREACH (OUTPATIENT)
Dept: INTERNAL MEDICINE CLINIC | Age: 74
End: 2019-03-04

## 2019-03-04 NOTE — PROGRESS NOTES
Patient has graduated from the Transitions of Care Coordination  program on 3/4/19. Patient's symptoms are stable at this time. Patient/family has the ability to self-manage. Care management goals have been completed at this time. No further nurse navigator follow up scheduled. Goals Addressed                 This Visit's Progress     COMPLETED: Understands red flags post discharge. On track     2/22/19-NN spoke to patient. She states she has had no more evidence of fever or symptoms which might indicate UTI. Her Urine culture of 2/15/19 did not show any evidence of infection. She is trying to drink more water as instructed. She will f/u with PCP at the end of March. NN provided contact information should she have questions/concerns. / vs    2/19/19-NN attempted to call patient to see how she is doing. No answer at her phone number. Will attempt to call patient in several days. / vs     2/5/19-NN spoke with patient re: recent hospital stay for UTI/sepsis. Reviewed red flags with patient to be aware of for return of infection, such as fever, pain or increased frequency of urination, confusion, fatigue. Patient voiced understanding and will report any such symptoms to MD over the next week should they occur. NN gave patient contact information to have on hand should she have any questions. JANAY visit will occur next week with PCP. NN will f/u with patient after visit./ vs            Pt has nurse navigator's contact information for any further questions, concerns, or needs.   Patients upcoming visits:    Future Appointments   Date Time Provider Aaron Mccormack   3/29/2019 11:00 AM Windell Babinski, MD 3 [facility]

## 2019-03-27 DIAGNOSIS — E11.3299 DIABETES MELLITUS WITH BACKGROUND RETINOPATHY (HCC): ICD-10-CM

## 2019-03-27 RX ORDER — BLOOD-GLUCOSE METER
KIT MISCELLANEOUS
Qty: 90 STRIP | Refills: 3 | Status: SHIPPED | OUTPATIENT
Start: 2019-03-27 | End: 2019-09-30 | Stop reason: SDUPTHER

## 2019-03-29 ENCOUNTER — OFFICE VISIT (OUTPATIENT)
Dept: INTERNAL MEDICINE CLINIC | Age: 74
End: 2019-03-29

## 2019-03-29 VITALS
TEMPERATURE: 97.7 F | BODY MASS INDEX: 35 KG/M2 | DIASTOLIC BLOOD PRESSURE: 84 MMHG | HEIGHT: 64 IN | WEIGHT: 205 LBS | OXYGEN SATURATION: 97 % | SYSTOLIC BLOOD PRESSURE: 148 MMHG | HEART RATE: 83 BPM | RESPIRATION RATE: 22 BRPM

## 2019-03-29 DIAGNOSIS — I10 ESSENTIAL HYPERTENSION, BENIGN: Primary | Chronic | ICD-10-CM

## 2019-03-29 DIAGNOSIS — I63.9 CEREBROVASCULAR ACCIDENT (CVA), UNSPECIFIED MECHANISM (HCC): Chronic | ICD-10-CM

## 2019-03-29 DIAGNOSIS — G89.4 CHRONIC PAIN SYNDROME: ICD-10-CM

## 2019-03-29 DIAGNOSIS — F41.9 ANXIETY: ICD-10-CM

## 2019-03-29 DIAGNOSIS — N30.00 ACUTE CYSTITIS WITHOUT HEMATURIA: ICD-10-CM

## 2019-03-29 LAB
BACTERIA,BACTU: ABNORMAL
BILIRUB UR QL: NEGATIVE
CLARITY: ABNORMAL
COLOR UR: ABNORMAL
GLUCOSE 24H UR-MRATE: NEGATIVE G/(24.H)
HGB UR QL STRIP: ABNORMAL
KETONES UR QL STRIP.AUTO: NEGATIVE
LEUKOCYTE ESTERASE: ABNORMAL
NITRITE UR QL STRIP.AUTO: NEGATIVE
PH UR STRIP: 7 [PH] (ref 5–7)
PROT UR STRIP-MCNC: ABNORMAL MG/DL
RBC #/AREA URNS HPF: ABNORMAL #/HPF
SP GR UR REFRACTOMETRY: 1.01 (ref 1–1.03)
UROBILINOGEN UR QL STRIP.AUTO: NEGATIVE
WBC URNS QL MICRO: ABNORMAL #/HPF

## 2019-03-29 NOTE — PATIENT INSTRUCTIONS
Learning About Long-Term Care for Stroke  What is long-term care for stroke? Long-term care for stroke is care for people who are leaving the hospital after a stroke but who still need some medical care or other help while they work on getting better. Choosing the right type of long-term care is a very personal decision. It's important to look carefully at your options. You want to be sure that the level of care is right and that you will feel comfortable. Your doctor or other members of your medical team can help you find which type of long-term care would be best for you. What are the types of long-term care for stroke patients? Each type of care center offers a different level of care. These centers may have shared or private rooms. An assisted living center or residential care center:  · Has a range of services. These may include meals, cleaning, and laundry. And they may offer help with personal needs like bathing, grooming, and dressing. · Often includes oversight by a nurse. You may be able to get help with basic care, such as getting medicines. A skilled nursing center:  · Offers nursing care up to 24 hours a day. · Provides meals and laundry. · Provides help with dressing, bathing, using the toilet, and other daily tasks. · Offers rehab therapy. A long-term acute care hospital:  · Is for stroke patients who have special medical problems. This may include things like chronic pain or not being able to breathe on your own. Follow-up care is a key part of your treatment and safety. Be sure to make and go to all appointments, and call your doctor if you are having problems. It's also a good idea to know your test results and keep a list of the medicines you take. Where can you learn more? Go to http://aly-robert.info/. Enter R086 in the search box to learn more about \"Learning About Long-Term Care for Stroke. \"  Current as of: September 26, 2018  Content Version: 11.9  © 3928-6440 [employer]. Care instructions adapted under license by [employer] (which disclaims liability or warranty for this information). If you have questions about a medical condition or this instruction, always ask your healthcare professional. [place] [address] any warranty or liability for your use of this information.

## 2019-03-29 NOTE — PROGRESS NOTES
This note will not be viewable in [address] Keyla Allen is a 75 y.o. female and presents with Hypertension (6 week fu)  . Subjective: The patient is status post CVA with left hemiplegia. She does have chronic pain on the left side of her body related to this and takes tramadol up to 4 times a day for this although in the last several months she has been decreasing her tramadol to just as needed. She was last prescribed tramadol almost a year ago and states that she still has about 100 tablets left which she uses very sparingly. She is unable to take anti-inflammatory medication due to her anticoagulation. The tramadol continues to work effectively without side effect. Also has a result of her CVA she has had subsequent anxiety for which he takes lorazepam.  She notes that she has not been taking this as much either and is now using melatonin at night to help her sleep. She takes a small amount which helps her to calm down and sleep. The medication has not lost its effectiveness and she is denies any excessive sedation. The patient's hypertension is currently managed on clonidine, furosemide, captopril, carvedilol, amlodipine. She denies any dizziness. She does have some lower extremity edema. She denies headaches, numbness, tingling or new focal neurological deficits.       Past Medical History:   Diagnosis Date    Anticoagulant long-term use 10/13/2017    Anxiety 10/13/2017    Atrial fibrillation ([address] 10/13/2017    Breast calcification, left 10/13/2017    CAD (coronary artery disease)     Cellulitis of both lower extremities 10/13/2017    Chronic kidney disease     kidney stones    Chronic pain syndrome 10/13/2017    Chronic prescription opiate use 11/15/2017    CVA (cerebral vascular accident) ([address] 10/13/2017    Diabetes ([address]     DJD (degenerative joint disease) 10/13/2017    Dyslipidemia 10/13/2017    Glaucoma 10/13/2017    Hypertension     Hyperthyroidism 10/13/2017    Long-term use of high-risk medication 10/13/2017    Stasis ulcer of lower extremity [facility] 10/13/2017    St[facility]     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 ([facility] 8/18/2012    retinopathy      Past Surgical History:   Procedure Laterality Date    CARDIAC SURG PROCEDURE UNLIST      cagb    HX GYN      hysterectomty    HX ORTHOPAEDIC      back surgery     Allergies   Allergen Reactions    Betadine Prepstick Rash    Keflex [Cephalexin] Hives     Pt breaks out in hives     Current Outpatient Medications   Medication Sig Dispense Refill    FREESTYLE LITE STRIPS strip USE ONE STRIP TO CHECK GLUCOSE ONCE DAILY 90 Strip 3    NOVOLIN N NPH U-100 INSULIN 100 unit/mL injection INJECT 44 UNITS UNDER THE SKIN IN THE MORNING AND 36 UNITS IN THE EVENING 80 mL 2    traMADol (ULTRAM) 50 mg tablet Take 50 mg by mouth four (4) times daily as needed for Pain.  cloNIDine HCl (CATAPRES) 0.1 mg tablet TAKE 1 TABLET THREE TIMES A  Tab 2    pravastatin (PRAVACHOL) 80 mg tablet TAKE 1 TABLET NIGHTLY 90 Tab 3    furosemide (LASIX) 40 mg tablet TAKE 1 TABLET DAILY 90 Tab 2    flash glucose scanning reader (FREESTYLE JOSE LUIS READER) [facility] Use to check blood sugars daily  DX: E11.9 1 Device 0    flash glucose sensor (FREESTYLE JOSE LUIS SENSOR) kit Use to check blood sugars daily  DX: E11.9 1 Device 0    LORazepam (ATIVAN) 0.5 mg tablet Take 1 Tab by mouth nightly as needed. Max Daily Amount: 0.5 mg. 90 Tab 0    captopril (CAPOTEN) 25 mg tablet TAKE 1 TABLET TWICE A  Tab 3    warfarin (COUMADIN) 2 mg tablet TAKE ONE AND ONE-HALF TABLETS DAILY FOR FIVE DAYS A WEEK AND 2 TABLETS ON WEDNESDAY AND SUNDAY 135 Tab 3    carvedilol (COREG) 12.5 mg tablet TAKE 1 TABLET TWICE A  Tab 1    ergocalciferol (VITAMIN D2) 50,000 unit capsule Take 50,000 Units by mouth every seven (7) days.  methIMAzole (TAPAZOLE) 10 mg tablet Take  by mouth daily.       amLODIPine (NORVASC) 10 mg tablet TAKE 1 TABLET DAILY 90 Tab 2    Blood-Glucose Meter (FREESTYLE FREEDOM LITE) monitoring kit Daily blood sugar checks 1 Kit 0    lancets (FREESTYLE LANCETS) 28 gauge misc Daily blood sugar checks 100 Lancet 3    naloxone (NARCAN) 4 mg/actuation nasal spray Use 1 spray intranasally into 1 nostril. Indications: OPIATE-INDUCED RESPIRATORY DEPRESSION, Opioid Toxicity 1 Each 0    latanoprost (XALATAN) 0.005 % ophthalmic solution Administer 1 Drop to both eyes nightly.  Insulin Syringe-Needle U-100 (BD INSULIN SYRINGE ULT-FINE II) 0.5 mL 31 gauge x 5/16 syrg 1 Each by Does Not Apply route two (2) times a day. 180 Syringe 3    aspirin (ASPIRIN) 325 mg tablet Take 1 Tab by mouth daily.        Social History     Socioeconomic History    Marital status:      Spouse name: Not on file    Number of children: Not on file    Years of education: Not on file    Highest education level: Not on file   Tobacco Use    Smoking status: Never Smoker    Smokeless tobacco: Never Used   Substance and Sexual Activity    Alcohol use: No    Drug use: No     Family History   Problem Relation Age of Onset    Heart Disease Father        Health Maintenance   Topic Date Due    DTaP/Tdap/Td series (1 - Tdap) 04/08/1965    Shingrix Vaccine Age 50> (1 of 2) 04/08/1994    Bone Densitometry (Dexa) Screening  04/08/2009    MICROALBUMIN Q1  11/15/2018    LIPID PANEL Q1  05/09/2019    FOOT EXAM Q1  07/23/2019    HEMOGLOBIN A1C Q6M  07/31/2019    MEDICARE YEARLY EXAM  11/22/2019    GLAUCOMA SCREENING Q2Y  04/19/2020    EYE EXAM RETINAL OR DILATED  04/19/2020    BREAST CANCER SCRN MAMMOGRAM  06/05/2020    Cologuard Q 3 Years  12/02/2021    Influenza Age 9 to Adult  Completed    Pneumococcal 65+ years  Completed        Review of Systems  Constitutional: negative for fevers, chills, anorexia and weight loss  Eyes:   negative for visual disturbance and irritation  ENT:   negative for tinnitus,sore throat,nasal congestion,ear pain,hoarseness  Respiratory:  negative for cough, hemoptysis, dyspnea,wheezing  CV:   negative for chest pain, palpitations. Positive for chronic lower extremity edema  GI:   negative for nausea, vomiting, diarrhea, abdominal pain,melena  Endo:               negative for polyuria,polydipsia,polyphagia,heat intolerance  Genitourinary: negative for frequency, dysuria and hematuria  Integumentary: negative for rash and pruritus  Hematologic:  negative for easy bruising and gum/nose bleeding  Musculoskel: Positive for chronic left-sided muscular pain related to stroke   neurological:  Positive for left-sided hemiplegia related to stroke  Behavl/Psych: negative for feelings of anxiety, depression, mood changes  ROS otherwise negative      Objective:  Visit Vitals  /84 (BP 1 Location: Right arm, BP Patient Position: Sitting)   Pulse 83   Temp 97.7 °F (36.5 °C) (Oral)   Resp 22   Ht 5' 4\" (1.626 m)   Wt 205 lb (93 kg)   SpO2 97%   BMI 35.19 kg/m²     Body mass index is 35.19 kg/m². Physical Exam:   General appearance - alert, well appearing, and in no distress  Mental status - alert, oriented to person, place, and time  EYE-NICK, EOMI,conjunctiva normal bilaterally, lids normal  ENT-ENT exam normal, no neck nodes or sinus tenderness  Nose - normal and patent, no erythema,  Or discharge   Mouth - mucous membranes moist, pharynx normal without lesions  Neck - supple, no significant adenopathy or bruit  Chest - clear to auscultation, no wheezes, rales or rhonchi. Heart - normal rate, regular rhythm, normal S1, S2, no murmurs, rubs, clicks or gallops   Abdomen - soft, nontender, nondistended, no masses or organomegaly  Lymph- no adenopathy palpable  Ext-positive for 2+ lower extremity edema skin-Warm and dry. no hyperpigmentation, vitiligo, or suspicious lesions  Neuro -alert, oriented, normal speech.   Positive for left-sided hemiplegia      Assessment/Plan:  Diagnoses and all orders for this visit:    Essential hypertension, benign    Cerebrovascular accident (CVA), unspecified mechanism ([facility]    Chronic pain syndrome  -     TOXASSURE SELECT 15 (MW)    Anxiety    Acute cystitis without hematuria  -     URINALYSIS W/O MICRO  -     CULTURE, URINE        Other instructions: The patient's medications were reviewed and reconciled. No change in her current medical regimen is made. A no added salt diet has been encouraged    We will obtain follow-up urinalysis and urine culture due to over recent problems with urinary tract infection and sepsis for which she was admitted to the hospital    Narcotics contract previously presented to patient reviewed    Urine drug screen obtained    [place] prescription drug management program was accessed during the office visit and appears appropriate. Morphine milligram equivalents (MME) = 0. Patient notes that the tramadol that she takes is now as needed and does not take it on a regular basis    25 minute office appointment occurred today with greater than 50% of the time spent in counseling and coordination of care as a result of extra time explaining to patient recent laws governing opiate prescriptions, reviewing signed opiate contract, obtaining urine drug screen and accessing the pharmacy management program.    The patient understands she will need to be seen every 3 months to continue her opiate refills. Follow-up and Dispositions    · Return in about 3 months (around 6/29/2019). I have reviewed with the patient details of the assessment and plan and all questions were answered. Relevent patient education was performed. The most recent lab findings were reviewed with the patient. An After Visit Summary was printed and given to the patient.     Jose Osuna MD

## 2019-03-29 NOTE — PROGRESS NOTES
Angie Rome is a 75 y.o. female presenting for Hypertension (6 week fu)  . 1. Have you been to the ER, urgent care clinic since your last visit? Hospitalized since your last visit? No    2. Have you seen or consulted any other health care providers outside of the [address] [place] [address] since your last visit? Include any pap smears or colon screening. No    Fall Risk Assessment, last 12 mths 2/15/2019   Able to walk? Yes   Fall in past 12 months? No         Abuse Screening Questionnaire 2/15/2019   Do you ever feel afraid of your partner? N   Are you in a relationship with someone who physically or mentally threatens you? N   Is it safe for you to go home? Y       3 most recent PHQ Screens 2/15/2019   Little interest or pleasure in doing things Not at all   Feeling down, depressed, irritable, or hopeless Not at all   Total Score PHQ 2 0       There are no discontinued medications.

## 2019-04-02 LAB
BACTERIA UR CULT: NORMAL
BACTERIA UR CULT: NORMAL

## 2019-04-02 RX ORDER — LEVOFLOXACIN 250 MG/1
250 TABLET ORAL DAILY
Qty: 7 TAB | Refills: 0 | Status: SHIPPED | OUTPATIENT
Start: 2019-04-02 | End: 2019-06-21 | Stop reason: ALTCHOICE

## 2019-04-02 NOTE — TELEPHONE ENCOUNTER
----- Message from Lorenza Litten, MD sent at 4/2/2019  7:48 AM EDT -----  Urine is infected.   Rx Levaquin 250 mg daily #7

## 2019-04-02 NOTE — TELEPHONE ENCOUNTER
Requested Prescriptions     Pending Prescriptions Disp Refills    levoFLOXacin (LEVAQUIN) 250 mg tablet 7 Tab 0     Sig: Take 1 Tab by mouth daily.

## 2019-04-04 LAB — DRUGS UR: NORMAL

## 2019-05-31 ENCOUNTER — LAB ONLY (OUTPATIENT)
Dept: INTERNAL MEDICINE CLINIC | Age: 74
End: 2019-05-31

## 2019-05-31 DIAGNOSIS — Z79.01 ANTICOAGULANT LONG-TERM USE: Primary | Chronic | ICD-10-CM

## 2019-06-01 LAB
INR PPP: 1.5 (ref 0.8–1.2)
PROTHROMBIN TIME: 15.1 SEC (ref 9.1–12)

## 2019-06-03 ENCOUNTER — TELEPHONE ANTICOAG (OUTPATIENT)
Dept: INTERNAL MEDICINE CLINIC | Age: 74
End: 2019-06-03

## 2019-06-03 DIAGNOSIS — I48.0 PAF (PAROXYSMAL ATRIAL FIBRILLATION) (HCC): Primary | ICD-10-CM

## 2019-06-03 NOTE — PROGRESS NOTES
Anticoagulation Episode Summary     Current INR goal:   2.0-3.0   TTR:   40.8 % (1.4 y)   Next INR check:   7/3/2019   INR from last check:   1.5! (5/31/2019)   Weekly max warfarin dose:      Target end date: Indefinite   INR check location:      Preferred lab:      Send INR reminders to:        Indications    PAF ([facility]oxysmal atrial fibrillation) ([facility] (Primary) [I48.0]           Comments:            Anticoagulation Care Providers     Provider Role Specialty Phone number    Mahsa Tierney MD [facility] Internal Medicine [phone]        patient advised to take an extra 2.5mg of coumadin for 2 days then resume usual dose and re check protime in 1 month

## 2019-06-10 RX ORDER — CAPTOPRIL 25 MG/1
TABLET ORAL
Qty: 180 TAB | Refills: 3 | Status: SHIPPED | OUTPATIENT
Start: 2019-06-10 | End: 2020-06-04

## 2019-06-11 ENCOUNTER — HOSPITAL ENCOUNTER (OUTPATIENT)
Dept: MAMMOGRAPHY | Age: 74
Discharge: HOME | End: 2019-06-11
Attending: INTERNAL MEDICINE
Payer: MEDICARE

## 2019-06-11 DIAGNOSIS — Z12.39 SCREENING BREAST EXAMINATION: ICD-10-CM

## 2019-06-11 PROCEDURE — 77062 BREAST TOMOSYNTHESIS BI: CPT

## 2019-06-11 PROCEDURE — 77067 SCR MAMMO BI INCL CAD: CPT

## 2019-06-11 PROCEDURE — 77063 BREAST TOMOSYNTHESIS BI: CPT

## 2019-06-19 RX ORDER — WARFARIN 2 MG/1
TABLET ORAL
Qty: 135 TAB | Refills: 3 | Status: SHIPPED | OUTPATIENT
Start: 2019-06-19 | End: 2020-03-21 | Stop reason: SDUPTHER

## 2019-06-21 ENCOUNTER — OFFICE VISIT (OUTPATIENT)
Dept: INTERNAL MEDICINE CLINIC | Age: 74
End: 2019-06-21

## 2019-06-21 VITALS
OXYGEN SATURATION: 97 % | BODY MASS INDEX: 36.19 KG/M2 | HEIGHT: 64 IN | RESPIRATION RATE: 20 BRPM | WEIGHT: 212 LBS | SYSTOLIC BLOOD PRESSURE: 100 MMHG | DIASTOLIC BLOOD PRESSURE: 58 MMHG | HEART RATE: 68 BPM | TEMPERATURE: 98.2 F

## 2019-06-21 DIAGNOSIS — Z79.899 LONG-TERM USE OF HIGH-RISK MEDICATION: Chronic | ICD-10-CM

## 2019-06-21 DIAGNOSIS — N39.0 URINARY TRACT INFECTION WITHOUT HEMATURIA, SITE UNSPECIFIED: ICD-10-CM

## 2019-06-21 DIAGNOSIS — E03.9 ACQUIRED HYPOTHYROIDISM: Chronic | ICD-10-CM

## 2019-06-21 DIAGNOSIS — I10 ESSENTIAL HYPERTENSION, BENIGN: Chronic | ICD-10-CM

## 2019-06-21 DIAGNOSIS — Z79.01 ANTICOAGULANT LONG-TERM USE: Chronic | ICD-10-CM

## 2019-06-21 DIAGNOSIS — E11.3299 TYPE 2 DIABETES MELLITUS WITH BACKGROUND RETINOPATHY (HCC): Primary | Chronic | ICD-10-CM

## 2019-06-21 DIAGNOSIS — E11.21 TYPE 2 DIABETES WITH NEPHROPATHY (HCC): ICD-10-CM

## 2019-06-21 DIAGNOSIS — Z79.891 CHRONIC PRESCRIPTION OPIATE USE: Chronic | ICD-10-CM

## 2019-06-21 DIAGNOSIS — E78.5 DYSLIPIDEMIA: Chronic | ICD-10-CM

## 2019-06-21 DIAGNOSIS — I63.9 CEREBROVASCULAR ACCIDENT (CVA), UNSPECIFIED MECHANISM (HCC): Chronic | ICD-10-CM

## 2019-06-21 DIAGNOSIS — I48.0 PAF (PAROXYSMAL ATRIAL FIBRILLATION) (HCC): Chronic | ICD-10-CM

## 2019-06-21 LAB
A-G RATIO,AGRAT: 0.9 RATIO
ALBUMIN SERPL-MCNC: 4.1 G/DL (ref 3.9–5.4)
ALP SERPL-CCNC: 92 U/L (ref 38–126)
ALT SERPL-CCNC: 20 U/L (ref 9–52)
ANION GAP SERPL CALC-SCNC: 10 MMOL/L
AST SERPL W P-5'-P-CCNC: 27 U/L (ref 14–36)
BILIRUB SERPL-MCNC: 0.5 MG/DL (ref 0.2–1.3)
BUN SERPL-MCNC: 16 MG/DL (ref 7–17)
BUN/CREATININE RATIO,BUCR: 13 RATIO
CALCIUM SERPL-MCNC: 9.1 MG/DL (ref 8.4–10.2)
CHLORIDE SERPL-SCNC: 101 MMOL/L (ref 98–107)
CHOL/HDL RATIO,CHHD: 3 RATIO (ref 0–4)
CHOLEST SERPL-MCNC: 139 MG/DL (ref 0–200)
CK SERPL-CCNC: 296 U/L (ref 30–135)
CO2 SERPL-SCNC: 32 MMOL/L (ref 22–32)
CREAT SERPL-MCNC: 1.2 MG/DL (ref 0.7–1.2)
ERYTHROCYTE [DISTWIDTH] IN BLOOD BY AUTOMATED COUNT: 14.7 %
GLOBULIN,GLOB: 4.4
GLUCOSE SERPL-MCNC: 130 MG/DL (ref 65–105)
HCT VFR BLD AUTO: 43.3 % (ref 37–51)
HDLC SERPL-MCNC: 40 MG/DL (ref 35–130)
HGB BLD-MCNC: 14.3 G/DL (ref 12–18)
LDL/HDL RATIO,LDHD: 2 RATIO
LDLC SERPL CALC-MCNC: 78 MG/DL (ref 0–130)
LYMPHOCYTES ABSOLUTE: 1.6 K/UL (ref 0.6–4.1)
LYMPHOCYTES NFR BLD: 39.4 % (ref 10–58.5)
MCH RBC QN AUTO: 32.4 PG (ref 26–32)
MCHC RBC AUTO-ENTMCNC: 33 G/DL (ref 30–36)
MCV RBC AUTO: 98.2 FL (ref 80–97)
MONOCYTES ABS-DIF,2141: 0.4 K/UL (ref 0–1.8)
MONOCYTES NFR BLD: 9.5 % (ref 0.1–24)
NEUTROPHILS # BLD: 51.1 % (ref 37–92)
NEUTROPHILS ABS,2156: 2 K/UL (ref 2–7.8)
PLATELET # BLD AUTO: 209 K/UL (ref 140–440)
PMV BLD AUTO: 9 FL
POTASSIUM SERPL-SCNC: 4 MMOL/L (ref 3.6–5)
PROT SERPL-MCNC: 8.5 G/DL (ref 6.3–8.2)
RBC # BLD AUTO: 4.41 M/UL (ref 4.2–6.3)
SODIUM SERPL-SCNC: 143 MMOL/L (ref 137–145)
TRIGL SERPL-MCNC: 105 MG/DL (ref 0–200)
VLDLC SERPL CALC-MCNC: 21 MG/DL
WBC # BLD AUTO: 4 K/UL (ref 4.1–10.9)

## 2019-06-21 NOTE — PROGRESS NOTES
Chief Complaint   Patient presents with    Hypertension     3 month follow up    Diabetes     1. Have you been to the ER, urgent care clinic since your last visit? No    Hospitalized since your last visit? No     2. Have you seen or consulted any other health care providers outside of the [address] [place] [address] since your last visit?   No

## 2019-06-21 NOTE — PATIENT INSTRUCTIONS

## 2019-06-21 NOTE — PROGRESS NOTES
This note will not be viewable in [address] Jenn Sanders is a 75 y.o. female and presents with Hypertension (3 month follow up) and Diabetes  . Subjective:  Mrs. Oren Sanford returns to the office today in follow-up of multiple medical problems. The patient has type 2 diabetes mellitus for which she is on Novolin and. The patient checks her blood sugars once daily with average fasting blood sugars around 80 she denies hypoglycemia. She denies any burning paresthesias of her feet. She tries to follow a prudent diet. She has a history of atrial fibrillation and is currently on Coumadin anticoagulation. She remains on a number of other medications for rate control. She denies any palpitations, bleeding problems and has had no strokelike symptoms. Patient has had a previous right-sided CVA with left-sided hemiplegia but has had no exacerbation of this problem. Blood pressure is currently controlled with captopril, clonidine, Lasix, carvedilol, amlodipine. She denies any cough, rash, fatigue, muscle cramping or orthostatic dizziness, palpitations. She has had no headaches, numbness, tingling or focal neurological problems. She is hypercholesterolemia currently on statin therapy. She denies muscle soreness or GI upset. She denies any exertional chest pains or claudication. Patient has as noted suffered with a right-sided CVA with left hemiplegia and has a history of chronic pain of the left side of her body. She does take tramadol on a as needed basis and has reduced her dose over time such that she is only taking it a couple times a week. She was last prescribed tramadol last summer. She is unable to take anti-inflammatory medication due to her anticoagulation. The patient continues to find the medication effective and she has had no tolerance to the medication. She notes that she is only taken it twice within the last week.   She does have a history of anxiety and difficulty with sleep and does take lorazepam on a as needed basis. As a result of her tramadol use and lorazepam she has received a prescription for Narcan in the past.    Past Medical History:   Diagnosis Date    Anticoagulant long-term use 10/13/2017    Anxiety 10/13/2017    Atrial fibrillation ([address] 10/13/2017    Breast calcification, left 10/13/2017    CAD (coronary artery disease)     Cellulitis of both lower extremities 10/13/2017    Chronic kidney disease     kidney stones    Chronic pain syndrome 10/13/2017    Chronic prescription opiate use 11/15/2017    CVA (cerebral vascular accident) ([address] 10/13/2017    Diabetes ([address]     DJD (degenerative joint disease) 10/13/2017    Dyslipidemia 10/13/2017    Glaucoma 10/13/2017    Hypertension     Hyperthyroidism 10/13/2017    Long-term use of high-risk medication 10/13/2017    Stasis ulcer of lower extremity ([address] 10/13/2017    Stroke ([address]     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 ([address] 8/18/2012    retinopathy      Past Surgical History:   Procedure Laterality Date    CARDIAC SURG PROCEDURE UNLIST      cagb    HX GYN      hysterectomty    HX ORTHOPAEDIC      back surgery     Allergies   Allergen Reactions    Betadine Prepstick Rash    Keflex [Cephalexin] Hives     Pt breaks out in hives     Current Outpatient Medications   Medication Sig Dispense Refill    warfarin (COUMADIN) 2 mg tablet TAKE ONE AND ONE-HALF TABLETS DAILY FOR FIVE DAYS A WEEK AND 2 TABLETS ON WEDNESDAY AND SUNDAY 135 Tab 3    captopril (CAPOTEN) 25 mg tablet TAKE 1 TABLET TWICE A  Tab 3    FREESTYLE LITE STRIPS strip USE ONE STRIP TO CHECK GLUCOSE ONCE DAILY 90 Strip 3    NOVOLIN N NPH U-100 INSULIN 100 unit/mL injection INJECT 44 UNITS UNDER THE SKIN IN THE MORNING AND 36 UNITS IN THE EVENING 80 mL 2    traMADol (ULTRAM) 50 mg tablet Take 50 mg by mouth four (4) times daily as needed for Pain.       cloNIDine HCl (CATAPRES) 0.1 mg tablet TAKE 1 TABLET THREE TIMES A DAY 270 Tab 2    pravastatin (PRAVACHOL) 80 mg tablet TAKE 1 TABLET NIGHTLY 90 Tab 3    furosemide (LASIX) 40 mg tablet TAKE 1 TABLET DAILY 90 Tab 2    flash glucose scanning reader (FREESTYLE JOSE LUIS READER) [facility] Use to check blood sugars daily  DX: E11.9 1 Device 0    flash glucose sensor (FREESTYLE JOSE LUIS SENSOR) kit Use to check blood sugars daily  DX: E11.9 1 Device 0    LORazepam (ATIVAN) 0.5 mg tablet Take 1 Tab by mouth nightly as needed. Max Daily Amount: 0.5 mg. 90 Tab 0    carvedilol (COREG) 12.5 mg tablet TAKE 1 TABLET TWICE A  Tab 1    ergocalciferol (VITAMIN D2) 50,000 unit capsule Take 50,000 Units by mouth every seven (7) days.  methIMAzole (TAPAZOLE) 10 mg tablet Take  by mouth daily.  amLODIPine (NORVASC) 10 mg tablet TAKE 1 TABLET DAILY 90 Tab 2    Blood-Glucose Meter (FREESTYLE FREEDOM LITE) monitoring kit Daily blood sugar checks 1 Kit 0    lancets (FREESTYLE LANCETS) 28 gauge misc Daily blood sugar checks 100 Lancet 3    naloxone (NARCAN) 4 mg/actuation nasal spray Use 1 spray intranasally into 1 nostril. Indications: OPIATE-INDUCED RESPIRATORY DEPRESSION, Opioid Toxicity 1 Each 0    latanoprost (XALATAN) 0.005 % ophthalmic solution Administer 1 Drop to both eyes nightly.  Insulin Syringe-Needle U-100 (BD INSULIN SYRINGE ULT-FINE II) 0.5 mL 31 gauge x 5/16 syrg 1 Each by Does Not Apply route two (2) times a day. 180 Syringe 3    aspirin (ASPIRIN) 325 mg tablet Take 1 Tab by mouth daily.        Social History     Socioeconomic History    Marital status:      Spouse name: Not on file    Number of children: Not on file    Years of education: Not on file    Highest education level: Not on file   Tobacco Use    Smoking status: Never Smoker    Smokeless tobacco: Never Used   Substance and Sexual Activity    Alcohol use: No    Drug use: No     Family History   Problem Relation Age of Onset    Heart Disease Father        Health Maintenance   Topic Date Due    DTaP/Tdap/Td series (1 - Tdap) 04/08/1965    Shingrix Vaccine Age 50> (1 of 2) 04/08/1994    Bone Densitometry (Dexa) Screening  04/08/2009    MICROALBUMIN Q1  11/15/2018    LIPID PANEL Q1  05/09/2019    FOOT EXAM Q1  07/23/2019    HEMOGLOBIN A1C Q6M  07/31/2019    Influenza Age 9 to Adult  08/01/2019    MEDICARE YEARLY EXAM  11/22/2019    GLAUCOMA SCREENING Q2Y  04/19/2020    EYE EXAM RETINAL OR DILATED  04/19/2020    Cologuard Q 3 Years  12/02/2021    Pneumococcal 65+ years  Completed        Review of Systems  Constitutional: negative for fevers, chills, anorexia and weight loss  Eyes:   negative for visual disturbance and irritation  ENT:   negative for tinnitus,sore throat,nasal congestion,ear pain,hoarseness  Respiratory:  negative for cough, hemoptysis, dyspnea,wheezing  CV:   negative for chest pain, palpitations. Positive for chronic lower extremity edema  GI:   negative for nausea, vomiting, diarrhea, abdominal pain,melena  Endo:               negative for polyuria,polydipsia,polyphagia,heat intolerance  Genitourinary: negative for frequency, dysuria and hematuria  Integumentary: negative for rash and pruritus  Hematologic:  negative for easy bruising and gum/nose bleeding  Musculoskel: Positive for chronic left-sided pain related to stroke   neurological:  Positive for left-sided hemiplegia with difficulty with standing and transfers Behavl/Psych: negative for feelings of anxiety, depression, mood changes  ROS otherwise negative      Objective:  Visit Vitals  /58 (BP 1 Location: Right arm, BP Patient Position: Sitting)   Pulse 68   Temp 98.2 °F (36.8 °C) (Oral)   Resp 20   Ht 5' 4\" (1.626 m)   Wt 212 lb (96.2 kg) Comment: Per patient/wheelchair bound   SpO2 97%   BMI 36.39 kg/m²     Body mass index is 36.39 kg/m².     Physical Exam:   General appearance - alert, well appearing, and in no distress  Mental status - alert, oriented to person, place, and time  EYE-NICK, EOMI,conjunctiva normal bilaterally, lids normal  ENT-ENT exam normal, no neck nodes or sinus tenderness  Nose - normal and patent, no erythema,  Or discharge   Mouth - mucous membranes moist, pharynx normal without lesions  Neck - supple, no significant adenopathy or bruit  Chest - clear to auscultation, no wheezes, rales or rhonchi. Heart - normal rate, regular rhythm, normal S1, S2, no murmurs, rubs, clicks or gallops   Abdomen - soft, nontender, nondistended, no masses or organomegaly  Lymph- no adenopathy palpable  Ext-there is 3-4+ lower extremity edema present  Skin-Warm and dry. no hyperpigmentation, vitiligo, or suspicious lesions  Neuro -alert, oriented, normal speech. She is hemiplegic on the left    Assessment/Plan:  Diagnoses and all orders for this visit:    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 ([facility]  -     URINE, MICROALBUMIN, SEMIQUANTITATIVE    Type 2 diabetes with nephropathy (HCC)  -     HEMOGLOBIN A1C W/O EAG    Essential hypertension, benign  -     COLLECTION VENOUS BLOOD,VENIPUNCTURE  -     CBC WITH AUTOMATED DIFF  -     METABOLIC PANEL, COMPREHENSIVE  -     URINALYSIS W/MICROSCOPIC    Acquired hypothyroidism  -     TSH 3RD GENERATION    Dyslipidemia  -     LIPID PANEL    Long-term use of high-risk medication  -     CK    PAF (paroxysmal atrial fibrillation) (HCC)  -     PROTHROMBIN TIME + INR    Anticoagulant long-term use  -     PROTHROMBIN TIME + INR    Cerebrovascular accident (CVA), unspecified mechanism ([facility]    Chronic prescription opiate use        Other instructions: The patient's medications were reviewed and reconciled. No change in her current medications are made. Body mass index is 36.4 and dietary counseling along with printed patient education is given    Continue to check blood sugars once or twice daily. Await results of multiple labs    Narcotics contract previously presented to patient reviewed    Urine drug screen reviewed.     [place] prescription drug management program was accessed during the office visit and appears appropriate. Morphine milligram equivalents (MME) = 0. The patient takes tramadol infrequently and is only taken it twice within the last week    25 minute office appointment occurred today with greater than 50% of the time spent in counseling and coordination of care as a result of extra time explaining to patient recent laws governing opiate prescriptions, reviewing signed opiate contract, reviewing urine drug screen and accessing the pharmacy management program.    The patient understands she will need to be seen every 3 months to continue her opiate refills. Follow-up and Dispositions    · Return in about 3 months (around 9/21/2019). I have reviewed with the patient details of the assessment and plan and all questions were answered. Relevent patient education was performed. The most recent lab findings were reviewed with the patient. An After Visit Summary was printed and given to the patient.     Elliot Mooney MD

## 2019-06-22 LAB
HBA1C MFR BLD: 8.2 % (ref 4.8–5.6)
INR PPP: 1.4 (ref 0.8–1.2)
PROTHROMBIN TIME: 14.4 SEC (ref 9.1–12)

## 2019-06-25 LAB — TSH SERPL DL<=0.05 MIU/L-ACNC: 28.69 UIU/ML (ref 0.34–5.6)

## 2019-06-26 LAB
BACTERIA,BACTU: ABNORMAL
BILIRUB UR QL: NEGATIVE
CLARITY: ABNORMAL
COLOR UR: ABNORMAL
GLUCOSE 24H UR-MRATE: NEGATIVE G/(24.H)
HGB UR QL STRIP: ABNORMAL
KETONES UR QL STRIP.AUTO: NEGATIVE
LEUKOCYTE ESTERASE: ABNORMAL
MICROALBUMIN, URINE: 100 MG/L (ref 0–20)
NITRITE UR QL STRIP.AUTO: NEGATIVE
PH UR STRIP: 6 [PH] (ref 5–7)
PROT UR STRIP-MCNC: ABNORMAL MG/DL
RBC #/AREA URNS HPF: ABNORMAL #/HPF
SP GR UR REFRACTOMETRY: 1.02 (ref 1–1.03)
UROBILINOGEN UR QL STRIP.AUTO: NEGATIVE
WBC URNS QL MICRO: ABNORMAL #/HPF

## 2019-06-27 LAB — BACTERIA UR CULT: NORMAL

## 2019-06-27 RX ORDER — FUROSEMIDE 40 MG/1
TABLET ORAL
Qty: 90 TAB | Refills: 2 | Status: SHIPPED | OUTPATIENT
Start: 2019-06-27 | End: 2020-03-06

## 2019-06-28 ENCOUNTER — TELEPHONE (OUTPATIENT)
Dept: INTERNAL MEDICINE CLINIC | Age: 74
End: 2019-06-28

## 2019-06-28 ENCOUNTER — TELEPHONE ANTICOAG (OUTPATIENT)
Dept: INTERNAL MEDICINE CLINIC | Age: 74
End: 2019-06-28

## 2019-06-28 DIAGNOSIS — I48.0 PAF (PAROXYSMAL ATRIAL FIBRILLATION) (HCC): Primary | ICD-10-CM

## 2019-06-28 RX ORDER — INSULIN LISPRO 100 [IU]/ML
INJECTION, SOLUTION INTRAVENOUS; SUBCUTANEOUS
Qty: 1 VIAL | Refills: 3 | Status: CANCELLED
Start: 2019-06-28

## 2019-06-28 NOTE — TELEPHONE ENCOUNTER
Requested Prescriptions     Pending Prescriptions Disp Refills    insulin lispro (HUMALOG U-100 INSULIN) 100 unit/mL injection 1 Vial 3     Si units with breakfast and supper

## 2019-06-28 NOTE — TELEPHONE ENCOUNTER
----- Message from Lina Krabbe, MD sent at 6/28/2019  5:53 AM EDT -----  TSH is high and she will need to be seen by her endocrinologist regarding her methimazole dosage  INR 1.4 - change coumadin to 2 tabs on MWF and 1 1/2 tabs on other days - recheck protime in 2 weeks  BS is 130 with A1c increasing from 7.6 to 8.2 - continue current humulin N; add humalog insulin 5 units with breakfast and supper and recheck FBS 2 weeks

## 2019-06-28 NOTE — PROGRESS NOTES
TSH is high and she will need to be seen by her endocrinologist regarding her methimazole dosage  INR 1.4 - change coumadin to 2 tabs on MWF and 1 1/2 tabs on other days - recheck protime in 2 weeks  BS is 130 with A1c increasing from 7.6 to 8.2 - continue current humulin N; add humalog insulin 5 units with breakfast and supper and recheck FBS 2 weeks

## 2019-06-28 NOTE — PROGRESS NOTES
Anticoagulation Episode Summary     Current INR goal:   2.0-3.0   TTR:   39.2 % (1.5 y)   Next INR check:   7/12/2019   INR from last check:   1.4! (6/21/2019)   Weekly max warfarin dose:      Target end date: Indefinite   INR check location:      Preferred lab:      Send INR reminders to: Indications    PAF (paroxysmal atrial fibrillation) ([facility] (Primary) [I48.0]           Comments:            Anticoagulation Care Providers     Provider Role Specialty Phone number    Yesenia Puga MD Responsible Internal Medicine [phone]        Patient advised change dose to 2 tabs M-W-F and 11/2 tabs all other day.  Will need to re check protime in 2 weeks

## 2019-07-01 NOTE — TELEPHONE ENCOUNTER
Patient states she does not want to start Humalog that she has not been eating right and she is going to adjust her diet.

## 2019-07-19 ENCOUNTER — LAB ONLY (OUTPATIENT)
Dept: INTERNAL MEDICINE CLINIC | Age: 74
End: 2019-07-19

## 2019-07-19 DIAGNOSIS — E11.3299 TYPE 2 DIABETES MELLITUS WITH BACKGROUND RETINOPATHY (HCC): Primary | Chronic | ICD-10-CM

## 2019-07-19 DIAGNOSIS — I48.0 PAF (PAROXYSMAL ATRIAL FIBRILLATION) (HCC): Chronic | ICD-10-CM

## 2019-07-20 LAB
GLUCOSE SERPL-MCNC: 179 MG/DL (ref 65–99)
INR PPP: 1.5 (ref 0.8–1.2)
PROTHROMBIN TIME: 15 SEC (ref 9.1–12)

## 2019-07-22 ENCOUNTER — TELEPHONE ANTICOAG (OUTPATIENT)
Dept: INTERNAL MEDICINE CLINIC | Age: 74
End: 2019-07-22

## 2019-07-22 DIAGNOSIS — I48.0 PAF (PAROXYSMAL ATRIAL FIBRILLATION) (HCC): Primary | ICD-10-CM

## 2019-07-22 NOTE — PROGRESS NOTES
Patient notified of lab results patient states she is already taking it at this dose notified Dr. Tereza Almeida and per his instructions patient is to take 2 tabs Monday Wednesday Friday and Saturday and 11/2 tabs all other days.  Re check in 1 month

## 2019-07-22 NOTE — PROGRESS NOTES
Anticoagulation Episode Summary     Current INR goal:   2.0-3.0   TTR:   37.3 % (1.6 y)   Next INR check:   8/22/2019   INR from last check:   1.5! (7/19/2019)   Weekly max warfarin dose:      Target end date: Indefinite   INR check location:      Preferred lab:      Send INR reminders to:        Indications    PAF ([facility]oxysmal [facility] fibrillation) [facility] (Primary) [I48.0]           Comments:            Anticoagulation Care Providers     Provider Role Specialty Phone number    Trev Domínguez MD [facility] Internal Medicine [phone]        Patient advised take 2 tabs Monday Wednesday Friday and Saturday and 11/2 tabs all other days and re check protime in 1 month

## 2019-08-08 ENCOUNTER — LAB ONLY (OUTPATIENT)
Dept: INTERNAL MEDICINE CLINIC | Age: 74
End: 2019-08-08

## 2019-08-08 DIAGNOSIS — I48.0 PAF (PAROXYSMAL ATRIAL FIBRILLATION) (HCC): Primary | Chronic | ICD-10-CM

## 2019-08-09 ENCOUNTER — TELEPHONE ANTICOAG (OUTPATIENT)
Dept: INTERNAL MEDICINE CLINIC | Age: 74
End: 2019-08-09

## 2019-08-09 DIAGNOSIS — I48.0 PAF (PAROXYSMAL ATRIAL FIBRILLATION) (HCC): Primary | ICD-10-CM

## 2019-08-09 LAB
INR PPP: 1.5 (ref 0.8–1.2)
PROTHROMBIN TIME: 15.1 SEC (ref 9.1–12)

## 2019-08-09 NOTE — PROGRESS NOTES
Anticoagulation Episode Summary     Current INR goal:   2.0-3.0   TTR:   36.0 % (1.6 y)   Next INR check:   9/9/2019   INR from last check:   1.5! (8/8/2019)   Weekly max warfarin dose:      Target end date: Indefinite   INR check location:      Preferred lab:      Send INR reminders to:        Indications    PA[facility]) [facility] (Primary) [I48.0]           Comments:            Anticoagulation Care Providers     Provider Role Specialty Phone number    Luci Hill MD Responsible Internal Medicine [phone]        Patient was advised to take 2 tabs Monday through Friday and 11/2 tabs all other days per Dr. Adán Goldberg

## 2019-08-09 NOTE — PROGRESS NOTES
INR is still low. Change Coumadin to 2 tabs Monday Wednesday Friday and Saturday. 1-1/2 tabs other days.   Recheck PT/INR 1 month

## 2019-08-09 NOTE — PROGRESS NOTES
Patient notified of lab results and states she is already on this regimen Dr. Mireya Cosby advised to have her take 2 tabs Monday through Friday and 11/2 tabs all other days this is a change from his previous order.

## 2019-09-09 ENCOUNTER — LAB ONLY (OUTPATIENT)
Dept: INTERNAL MEDICINE CLINIC | Age: 74
End: 2019-09-09

## 2019-09-09 DIAGNOSIS — I48.0 PAF (PAROXYSMAL ATRIAL FIBRILLATION) (HCC): Primary | Chronic | ICD-10-CM

## 2019-09-10 ENCOUNTER — TELEPHONE ANTICOAG (OUTPATIENT)
Dept: INTERNAL MEDICINE CLINIC | Age: 74
End: 2019-09-10

## 2019-09-10 DIAGNOSIS — I48.0 PAF (PAROXYSMAL ATRIAL FIBRILLATION) (HCC): Primary | ICD-10-CM

## 2019-09-10 LAB
INR PPP: 1.9 (ref 0.8–1.2)
PROTHROMBIN TIME: 18.5 SEC (ref 9.1–12)

## 2019-09-10 NOTE — PROGRESS NOTES
Anticoagulation Episode Summary     Current INR goal:   2.0-3.0   TTR:   34.2 % (1.7 y)   Next INR check:   10/10/2019   INR from last check:   1.9! (9/9/2019)   Weekly max warfarin dose:      Target end date: Indefinite   INR check location:      Preferred lab:      Send INR reminders to:        Indications    PAF (paroxysmal atrial fibrillation) ([facility] (Primary) [I48.0]           Comments:            Anticoagulation Care Providers     Provider Role Specialty Phone number    Natalie Fletcher MD Responsible Internal Medicine [phone]        INR is 1.9.  No change in Coumadin.  Recheck pro time 1 month

## 2019-09-30 DIAGNOSIS — E11.3299 DIABETES MELLITUS WITH BACKGROUND RETINOPATHY (HCC): ICD-10-CM

## 2019-09-30 NOTE — TELEPHONE ENCOUNTER
hTao Cardoza   Requested Prescriptions     Pending Prescriptions Disp Refills    glucose blood VI test strips (FREESTYLE LITE STRIPS) strip 90 Strip 3

## 2019-09-30 NOTE — TELEPHONE ENCOUNTER
Last Refill: 3/27/2019  Last visit:6/21/2019      Requested Prescriptions     Pending Prescriptions Disp Refills    glucose blood VI test strips (FREESTYLE LITE STRIPS) strip 100 Strip 3     Sig: USE ONE STRIP TO CHECK GLUCOSE ONCE DAILY  Dx E11.9

## 2019-11-05 RX ORDER — CARVEDILOL 12.5 MG/1
TABLET ORAL
Qty: 180 TAB | Refills: 4 | Status: SHIPPED | OUTPATIENT
Start: 2019-11-05 | End: 2021-02-10

## 2019-12-08 RX ORDER — HUMAN INSULIN 100 [IU]/ML
INJECTION, SUSPENSION SUBCUTANEOUS
Qty: 80 ML | Refills: 3 | Status: SHIPPED | OUTPATIENT
Start: 2019-12-08 | End: 2020-12-02

## 2020-01-02 ENCOUNTER — OFFICE VISIT (OUTPATIENT)
Dept: INTERNAL MEDICINE CLINIC | Age: 75
End: 2020-01-02

## 2020-01-02 VITALS
SYSTOLIC BLOOD PRESSURE: 122 MMHG | TEMPERATURE: 98.1 F | OXYGEN SATURATION: 96 % | DIASTOLIC BLOOD PRESSURE: 68 MMHG | RESPIRATION RATE: 18 BRPM | HEART RATE: 68 BPM

## 2020-01-02 DIAGNOSIS — Z79.891 CHRONIC PRESCRIPTION OPIATE USE: Chronic | ICD-10-CM

## 2020-01-02 DIAGNOSIS — I48.0 PAF (PAROXYSMAL ATRIAL FIBRILLATION) (HCC): Chronic | ICD-10-CM

## 2020-01-02 DIAGNOSIS — E03.9 ACQUIRED HYPOTHYROIDISM: Chronic | ICD-10-CM

## 2020-01-02 DIAGNOSIS — I10 ESSENTIAL HYPERTENSION, BENIGN: Chronic | ICD-10-CM

## 2020-01-02 DIAGNOSIS — N39.0 URINARY TRACT INFECTION WITH HEMATURIA, SITE UNSPECIFIED: ICD-10-CM

## 2020-01-02 DIAGNOSIS — Z79.01 ANTICOAGULANT LONG-TERM USE: Chronic | ICD-10-CM

## 2020-01-02 DIAGNOSIS — E11.3299 TYPE 2 DIABETES MELLITUS WITH BACKGROUND RETINOPATHY (HCC): Chronic | ICD-10-CM

## 2020-01-02 DIAGNOSIS — I63.9 CEREBROVASCULAR ACCIDENT (CVA), UNSPECIFIED MECHANISM (HCC): Chronic | ICD-10-CM

## 2020-01-02 DIAGNOSIS — Z79.899 LONG-TERM USE OF HIGH-RISK MEDICATION: Chronic | ICD-10-CM

## 2020-01-02 DIAGNOSIS — E78.5 DYSLIPIDEMIA: Chronic | ICD-10-CM

## 2020-01-02 DIAGNOSIS — R31.9 URINARY TRACT INFECTION WITH HEMATURIA, SITE UNSPECIFIED: ICD-10-CM

## 2020-01-02 DIAGNOSIS — Z00.00 MEDICARE ANNUAL WELLNESS VISIT, SUBSEQUENT: Primary | ICD-10-CM

## 2020-01-02 DIAGNOSIS — Z23 ENCOUNTER FOR IMMUNIZATION: ICD-10-CM

## 2020-01-02 LAB
A-G RATIO,AGRAT: 0.9 RATIO
ALBUMIN SERPL-MCNC: 4.1 G/DL (ref 3.9–5.4)
ALP SERPL-CCNC: 98 U/L (ref 38–126)
ALT SERPL-CCNC: 28 U/L (ref 0–35)
ANION GAP SERPL CALC-SCNC: 11 MMOL/L
AST SERPL W P-5'-P-CCNC: 27 U/L (ref 14–36)
BACTERIA,BACTU: ABNORMAL
BILIRUB SERPL-MCNC: 0.5 MG/DL (ref 0.2–1.3)
BILIRUB UR QL: NEGATIVE
BUN SERPL-MCNC: 16 MG/DL (ref 7–17)
BUN/CREATININE RATIO,BUCR: 20 RATIO
CALCIUM SERPL-MCNC: 9.2 MG/DL (ref 8.4–10.2)
CHLORIDE SERPL-SCNC: 102 MMOL/L (ref 98–107)
CHOL/HDL RATIO,CHHD: 4 RATIO (ref 0–4)
CHOLEST SERPL-MCNC: 114 MG/DL (ref 0–200)
CK SERPL-CCNC: 269 U/L (ref 30–135)
CLARITY: ABNORMAL
CO2 SERPL-SCNC: 26 MMOL/L (ref 22–32)
COLOR UR: ABNORMAL
CREAT SERPL-MCNC: 0.8 MG/DL (ref 0.7–1.2)
GLOBULIN,GLOB: 4.6
GLUCOSE 24H UR-MRATE: ABNORMAL G/(24.H)
GLUCOSE SERPL-MCNC: 292 MG/DL (ref 65–105)
HDLC SERPL-MCNC: 27 MG/DL (ref 35–130)
HGB UR QL STRIP: ABNORMAL
KETONES UR QL STRIP.AUTO: NEGATIVE
LDL/HDL RATIO,LDHD: 2 RATIO
LDLC SERPL CALC-MCNC: 63 MG/DL (ref 0–130)
LEUKOCYTE ESTERASE: ABNORMAL
MICROALBUMIN, URINE: 100 MG/L (ref 0–20)
NITRITE UR QL STRIP.AUTO: NEGATIVE
PH UR STRIP: 6 [PH] (ref 5–7)
POTASSIUM SERPL-SCNC: 4.3 MMOL/L (ref 3.6–5)
PROT SERPL-MCNC: 8.7 G/DL (ref 6.3–8.2)
PROT UR STRIP-MCNC: ABNORMAL MG/DL
RBC #/AREA URNS HPF: ABNORMAL #/HPF
SODIUM SERPL-SCNC: 139 MMOL/L (ref 137–145)
SP GR UR REFRACTOMETRY: 1.02 (ref 1–1.03)
TRIGL SERPL-MCNC: 122 MG/DL (ref 0–200)
TSH SERPL DL<=0.05 MIU/L-ACNC: 9.01 UIU/ML (ref 0.34–5.6)
UROBILINOGEN UR QL STRIP.AUTO: NEGATIVE
VLDLC SERPL CALC-MCNC: 24 MG/DL
WBC URNS QL MICRO: ABNORMAL #/HPF

## 2020-01-02 NOTE — PROGRESS NOTES
Chief Complaint   Patient presents with    Annual Wellness Visit    Hypertension     6 mo fu    Diabetes    Hypothyroidism       Depression Risk Factor Screening:     3 most recent PHQ Screens 1/2/2020   Little interest or pleasure in doing things Not at all   Feeling down, depressed, irritable, or hopeless Not at all   Total Score PHQ 2 0       Functional Ability and Level of Safety:     Activities of Daily Living  ADL Assessment 1/2/2020   Feeding yourself No Help Needed   Getting from bed to chair Help Needed   Getting dressed Help Needed   Bathing or showering Help Needed   Walk across the room (includes cane/walker) Help Needed   Using the telphone No Help Needed   Taking your medications No Help Needed   Preparing meals Help Needed   Managing money (expenses/bills) Help Needed   Moderately strenuous housework (laundry) Help Needed   Shopping for personal items (toiletries/medicines) Help Needed   Shopping for groceries Help Needed   Driving Help Needed   Climbing a flight of stairs Help Needed   Getting to places beyond walking distances Help Needed       Fall Risk  Fall Risk Assessment, last 12 mths 1/2/2020   Able to walk? No   Fall in past 12 months? -       Abuse Screen  Abuse Screening Questionnaire 1/2/2020   Do you ever feel afraid of your partner? N   Are you in a relationship with someone who physically or mentally threatens you? N   Is it safe for you to go home?  Y         Patient Care Team   Patient Care Team:  Marilee Fine MD as PCP - General (Internal Medicine)  Marilee Fine MD as PCP - [facility] Empaneled Provider  Prisca Harrison DPM (Podiatry)  Gloria Pate MD (Endocrinology)

## 2020-01-02 NOTE — PATIENT INSTRUCTIONS
Vaccine Information Statement    Influenza (Flu) Vaccine (Inactivated or Recombinant): What You Need to Know    Many Vaccine Information Statements are available in [place] and other languages. See www.immunize.org/vis  Hojas de información sobre vacunas están disponibles en español y en muchos otros idiomas. Visite www.immunize.org/vis    1. Why get vaccinated? Influenza vaccine can prevent influenza (flu). Flu is a contagious disease that spreads around the [place] [facility] every year, usually between October and May. Anyone can get the flu, but it is more dangerous for some people. Infants and young children, people 72years of age and older, pregnant women, and people with certain health conditions or a weakened immune system are at greatest risk of flu complications. Pneumonia, bronchitis, sinus infections and ear infections are examples of flu-related complications. If you have a medical condition, such as heart disease, cancer or diabetes, flu can make it worse. Flu can cause fever and chills, sore throat, muscle aches, fatigue, cough, headache, and runny or stuffy nose. Some people may have vomiting and diarrhea, though this is more common in children than adults. Each year thousands of people in the [facility] die from flu, and many more are hospitalized. Flu vaccine prevents millions of illnesses and flu-related visits to the doctor each year. 2. Influenza vaccines     CDC recommends everyone 10months of age and older get vaccinated every flu season. Children 6 months through 6years of age may need 2 doses during a single flu season. Everyone else needs only 1 dose each flu season. It takes about 2 weeks for protection to develop after vaccination. There are many flu viruses, and they are always changing. Each year a new flu vaccine is made to protect against three or four viruses that are likely to cause disease in the upcoming flu season.  Even when the vaccine doesnt exactly match these viruses, it may still provide some protection. Influenza vaccine does not cause flu. Influenza vaccine may be given at the same time as other vaccines. 3. Talk with your health care provider    Tell your vaccine provider if the person getting the vaccine:   Has had an allergic reaction after a previous dose of influenza vaccine, or has any severe, life-threatening allergies.  Has ever had Guillain-Barré Syndrome (also called GBS). In some cases, your health care provider may decide to postpone influenza vaccination to a future visit. People with minor illnesses, such as a cold, may be vaccinated. People who are moderately or severely ill should usually wait until they recover before getting influenza vaccine. Your health care provider can give you more information. 4. Risks of a reaction     Soreness, redness, and swelling where shot is given, fever, muscle aches, and headache can happen after influenza vaccine.  There may be a very small increased risk of Guillain-Barré Syndrome (GBS) after inactivated influenza vaccine (the flu shot). [place] children who get the flu shot along with pneumococcal vaccine (PCV13), and/or DTaP vaccine at the same time might be slightly more likely to have a seizure caused by fever. Tell your health care provider if a child who is getting flu vaccine has ever had a seizure. People sometimes faint after medical procedures, including vaccination. Tell your provider if you feel dizzy or have vision changes or ringing in the ears. As with any medicine, there is a very remote chance of a vaccine causing a severe allergic reaction, other serious injury, or death. 5. What if there is a serious problem? An allergic reaction could occur after the vaccinated person leaves the clinic.  If you see signs of a severe allergic reaction (hives, swelling of the face and throat, difficulty breathing, a fast heartbeat, dizziness, or weakness), call 9-1-1 and get the person to the nearest hospital.    For other signs that concern you, call your health care provider. Adverse reactions should be reported to the Vaccine Adverse Event Reporting System (VAERS). Your health care provider will usually file this report, or you can do it yourself. Visit the VAERS website at www.vaers. hhs.gov or call 1-234.389.4975. VAERS is only for reporting reactions, and VAERS staff do not give medical advice. 6. The National Vaccine Injury Compensation Program    The [facility] Vaccine Injury Compensation Program (VICP) is a federal program that was created to compensate people who may have been injured by certain vaccines. Visit the VICP website at www.[facility].gov/vaccinecompensation or call 5-182.555.8243 to learn about the program and about filing a claim. There is a time limit to file a claim for compensation. 7. How can I learn more?  Ask your health care provider.  Call your local or state health department.  Contact the Centers for Disease Control and Prevention (CDC):  - Call 4-982.845.7267 (4-017-CCL-INFO) or  - Visit CDCs influenza website at www.cdc.gov/flu    Vaccine Information Statement (Interim)  Inactivated Influenza Vaccine   8/15/2019  42 SA[facility] [place] [id number]   Department of Health and Human Services  Centers for Disease Control and Prevention    Office Use Only    The best way to stay healthy is to live a healthy lifestyle. A healthy lifestyle includes regular exercise, eating a well-balanced diet, keeping a healthy weight and not smoking. Regular physical exams and screening tests are another important way to take care of yourself. Preventive exams provided by health care providers can find health problems early when treatment works best and can keep you from getting certain diseases or illnesses. Preventive services include exams, lab tests, screenings, shots, monitoring and information to help you take care of your own health.     All people over 65 should have a pneumonia shot. Pneumonia shots are usually only needed once in a lifetime unless your doctor decides differently. In addition to your physical exam, some screening tests are recommended:    All people over 65 should have a yearly flu shot. People over 65 are at medium to high risk for Hepatitis B. Three shots are needed for complete protection. Bone mass measurement (dexa scan) is recommended every two years. Diabetes Mellitus screening is recommended every year. Glaucoma is an eye disease caused by high pressure in the eye. An eye exam is recommended every year. Cardiovascular screening tests that check your cholesterol and other blood fat (lipid) levels are recommended every five years. Colorectal Cancer screening tests help to find pre-cancerous polyps (growths in the colon) so they can be removed before they turn into cancer. Tests ordered for screening depend on your personal and family history risk factors. Prostate Cancer Screening (annually up to age 76)    Screening for breast cancer is recommended yearly with a Mammogram.    Screening for cervical and vaginal cancer is recommended with a pelvic and Pap test every two years. However if you have had an abnormal pap in the past  three years or at high risk for cervical or vaginal cancer Medicare will cover a pap test and a pelvic exam every year. Here is a list of your current Health Maintenance items with a due date:  Health Maintenance Due   Topic Date Due    DTaP/Tdap/Td  (1 - Tdap) 04/08/1956    Shingles Vaccine (1 of 2) 04/08/1994    Bone Mineral Density   04/08/2009    Diabetic Foot Care  07/23/2019    Flu Vaccine  08/01/2019    Annual Well Visit  11/22/2019    Hemoglobin A1C    12/21/2019          Learning About Cutting Calories  How do calories affect your weight? Food gives your body energy. Energy from the food you eat is measured in calories.  This energy keeps your heart beating, your brain active, and your muscles working. Your body needs a certain number of calories each day. After your body uses the calories it needs, it stores extra calories as fat. To lose weight safely, you have to eat fewer calories while eating in a healthy way. How many calories do you need each day? The more active you are, the more calories you need. When you are less active, you need fewer calories. How many calories you need each day also depends on several things, including your age and whether you are male or female. Here are some general guidelines for adults:  · Less active women and older adults need 1,600 to 2,000 calories each day. · Active women and less active men need 2,000 to 2,400 calories each day. · Active men need 2,400 to 3,000 calories each day. How can you cut calories and eat healthy meals? Whole grains, vegetables and fruits, and dried beans are good lower-calorie foods. They give you lots of nutrients and fiber. And they fill you up. Sweets, energy drinks, and soda pop are high in calories. They give you few nutrients and no fiber. Try to limit soda pop, fruit juice, and energy drinks. Drink water instead. Some fats can be part of a healthy diet. But cutting back on fats from highly processed foods like fast foods and many snack foods is a good way to lower the calories in your diet. Also, use smaller amounts of fats like butter, margarine, salad dressing, and mayonnaise. Add fresh garlic, lemon, or herbs to your meals to add flavor without adding fat. Meats and dairy products can be a big source of hidden fats. Try to choose lean or low-fat versions of these products. Fat-free cookies, candies, chips, and frozen treats can still be high in sugar and calories. Some fat-free foods have more calories than regular ones. Eat fat-free treats in moderation, as you would other foods. If your favorite foods are high in fat, salt, sugar, or calories, limit how often you eat them.  Eat smaller servings, or look for healthy substitutes. Fill up on fruits, vegetables, and whole grains. Eating at home  · Use meat as a side dish instead of as the main part of your meal.  · Try main dishes that use whole wheat pasta, brown rice, dried beans, or vegetables. · Find ways to cook with little or no fat, such as broiling, steaming, or grilling. · Use cooking spray instead of oil. If you use oil, use a monounsaturated oil, such as canola or olive oil. · Trim fat from meats before you cook them. · Drain off fat after you brown the meat or while you roast it. · Chill soups and stews after you cook them. Then skim the fat off the top after it hardens. Eating out  · Order foods that are broiled or poached rather than fried or breaded. · Cut back on the amount of butter or margarine that you use on bread. · Order sauces, gravies, and salad dressings on the side, and use only a little. · When you order pasta, choose tomato sauce rather than cream sauce. · Ask for salsa with your baked potato instead of sour cream, butter, cheese, or ward. · Order meals in a small size instead of upgrading to a large. · Share an entree, or take part of your food home to eat as another meal.  · Share appetizers and desserts. Where can you learn more? Go to http://aly-robert.info/. Enter 99 [id number] in the search box to learn more about \"Learning About Cutting Calories. \"  Current as of: November 7, 2018  Content Version: 12.2  © 7100-6906 [employer]. Care instructions adapted under license by [employer] (which disclaims liability or warranty for this information). If you have questions about a medical condition or this instruction, always ask your healthcare professional. [address] any warranty or liability for your use of this information.

## 2020-01-02 NOTE — PROGRESS NOTES
After obtaining written consent and per orders of Dr. Jessica Bustamante, injection of Fluad given by [facility], LPN. Order and injection/medication verified by second nurse/ma review by Torrey Martinez LPN. Patient tolerated procedure well. VIS was given to them. No reactions noted.

## 2020-01-02 NOTE — PROGRESS NOTES
This note will not be viewable in [address] Anjali Romero is a 75 y.o. female and presents with Annual Wellness Visit; Hypertension (6 mo fu); Diabetes; and Hypothyroidism  . Subjective:  Mrs. Georgina Grajeda returns to the office today in follow-up of multiple medical problems.     The patient has type 2 diabetes mellitus for which she is on Novolin N. The patient checks her blood sugars once daily with average fasting blood sugars around 116 she denies hypoglycemia. She denies any burning paresthesias of her feet. She tries to follow a prudent diet.     She has a history of atrial fibrillation and is currently on Coumadin anticoagulation. She remains on a number of other medications for rate control. She denies any palpitations, bleeding problems and has had no strokelike symptoms. Patient has had a previous right-sided CVA with left-sided hemiplegia but has had no exacerbation of this problem.     Blood pressure is currently controlled with captopril, clonidine, Lasix, carvedilol, amlodipine. She denies any cough, rash, fatigue, muscle cramping or orthostatic dizziness, palpitations. She has had no headaches, numbness, tingling or focal neurological problems.     She is hypercholesterolemia currently on statin therapy. She denies muscle soreness or GI upset. She denies any exertional chest pains or claudication.     Patient has as noted suffered with a right-sided CVA with left hemiplegia and has a history of chronic pain of the left side of her body. She does take tramadol on a as needed basis and has reduced her dose over time such that she is only taking it a couple times a week. She was last prescribed tramadol last summer. She is unable to take anti-inflammatory medication due to her anticoagulation. The patient continues to find the medication effective and she has had no tolerance to the medication. She notes that she is only taken it twice within the last week.   She does have a history of anxiety and difficulty with sleep and does take lorazepam on a as needed basis. As a result of her tramadol use and lorazepam she has received a prescription for Narcan in the past.       Past Medical History:   Diagnosis Date    Anticoagulant long-term use 10/13/2017    Anxiety 10/13/2017    Atrial fibrillation ([address] 10/13/2017    Breast calcification, left 10/13/2017    CAD (coronary artery disease)     Cellulitis of both lower extremities 10/13/2017    Chronic kidney disease     kidney stones    Chronic pain syndrome 10/13/2017    Chronic prescription opiate use 11/15/2017    CVA (cerebral vascular accident) ([address] 10/13/2017    Diabetes ([address]     DJD (degenerative joint disease) 10/13/2017    Dyslipidemia 10/13/2017    Glaucoma 10/13/2017    Hypertension     Hyperthyroidism 10/13/2017    Long-term use of high-risk medication 10/13/2017    Stasis ulcer of lower extremity ([address] 10/13/2017    Stroke ([address]     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 ([address] 8/18/2012    retinopathy      Past Surgical History:   Procedure Laterality Date    CARDIAC SURG PROCEDURE UNLIST      cagb    HX GYN      hysterectomty    HX ORTHOPAEDIC      back surgery     Allergies   Allergen Reactions    Betadine Prepstick Rash    Keflex [Cephalexin] Hives     Pt breaks out in hives     Current Outpatient Medications   Medication Sig Dispense Refill    pomegranate xt/pomegranat seed (POMEGRANATE PO) Take  by mouth.  vit C/E/Zn/coppr/lutein/zeaxan (PRESERVISION AREDS-2 PO) Take  by mouth.       NOVOLIN N NPH U-100 INSULIN 100 unit/mL injection INJECT 44 UNITS UNDER THE SKIN IN THE MORNING AND 36 UNITS IN THE EVENING 80 mL 3    carvedilol (COREG) 12.5 mg tablet TAKE 1 TABLET TWICE A  Tab 4    glucose blood VI test strips (FREESTYLE LITE STRIPS) strip USE ONE STRIP TO CHECK GLUCOSE ONCE DAILY  Dx E11.9 100 Strip 3    cloNIDine HCl (CATAPRES) 0.1 mg tablet TAKE 1 TABLET THREE TIMES A  Tab 3    furosemide (LASIX) 40 mg tablet TAKE 1 TABLET DAILY 90 Tab 2    warfarin (COUMADIN) 2 mg tablet TAKE ONE AND ONE-HALF TABLETS DAILY FOR FIVE DAYS A WEEK AND 2 TABLETS ON WEDNESDAY AND SUNDAY 135 Tab 3    captopril (CAPOTEN) 25 mg tablet TAKE 1 TABLET TWICE A  Tab 3    pravastatin (PRAVACHOL) 80 mg tablet TAKE 1 TABLET NIGHTLY 90 Tab 3    flash glucose scanning reader (FREESTYLE JOSE LUIS READER) misc Use to check blood sugars daily  DX: E11.9 1 Device 0    flash glucose sensor (FREESTYLE JOSE LUIS SENSOR) kit Use to check blood sugars daily  DX: E11.9 1 Device 0    LORazepam (ATIVAN) 0.5 mg tablet Take 1 Tab by mouth nightly as needed. Max Daily Amount: 0.5 mg. 90 Tab 0    ergocalciferol (VITAMIN D2) 50,000 unit capsule Take 50,000 Units by mouth every seven (7) days.  methIMAzole (TAPAZOLE) 10 mg tablet Take  by mouth daily.  amLODIPine (NORVASC) 10 mg tablet TAKE 1 TABLET DAILY 90 Tab 2    Blood-Glucose Meter (FREESTYLE FREEDOM LITE) monitoring kit Daily blood sugar checks 1 Kit 0    lancets (FREESTYLE LANCETS) 28 gauge misc Daily blood sugar checks 100 Lancet 3    naloxone (NARCAN) 4 mg/actuation nasal spray Use 1 spray intranasally into 1 nostril. Indications: OPIATE-INDUCED RESPIRATORY DEPRESSION, Opioid Toxicity 1 Each 0    latanoprost (XALATAN) 0.005 % ophthalmic solution Administer 1 Drop to both eyes nightly.  Insulin Syringe-Needle U-100 (BD INSULIN SYRINGE ULT-FINE II) 0.5 mL 31 gauge x 5/16 syrg 1 Each by Does Not Apply route two (2) times a day. 180 Syringe 3    aspirin (ASPIRIN) 325 mg tablet Take 1 Tab by mouth daily.  traMADol (ULTRAM) 50 mg tablet Take 50 mg by mouth four (4) times daily as needed for Pain.        Social History     Socioeconomic History    Marital status:      Spouse name: Not on file    Number of children: Not on file    Years of education: Not on file    Highest education level: Not on file   Tobacco Use    Smoking status: Never Smoker    Smokeless tobacco: Never Used   Substance and Sexual Activity    Alcohol use: No    Drug use: No     Family History   Problem Relation Age of Onset    Heart Disease Father        Health Maintenance   Topic Date Due    DTaP/Tdap/Td series (1 - Tdap) 04/08/1955    Shingrix Vaccine Age 50> (1 of 2) 04/08/1994    Bone Densitometry (Dexa) Screening  04/08/2009    FOOT EXAM Q1  07/23/2019    Influenza Age 5 to Adult  08/01/2019    MEDICARE YEARLY EXAM  11/22/2019    HEMOGLOBIN A1C Q6M  12/21/2019    GLAUCOMA SCREENING Q2Y  04/19/2020    EYE EXAM RETINAL OR DILATED  04/19/2020    LIPID PANEL Q1  06/21/2020    MICROALBUMIN Q1  06/26/2020    Cologuard Q 3 Years  12/02/2021    Pneumococcal 65+ years  Completed        Review of Systems  Constitutional: negative for fevers, chills, anorexia and weight loss  Eyes:   negative for visual disturbance and irritation  ENT:   negative for tinnitus,sore throat,nasal congestion,ear pain,hoarseness  Respiratory:  negative for cough, hemoptysis, dyspnea,wheezing  CV:   negative for chest pain, palpitations. Positive for chronic lower extremity edema  GI:   negative for nausea, vomiting, diarrhea, abdominal pain,melena  Endo:               negative for polyuria,polydipsia,polyphagia,heat intolerance  Genitourinary: negative for frequency, dysuria and hematuria  Integumentary: negative for rash and pruritus  Hematologic:  negative for easy bruising and gum/nose bleeding  Musculoskel: Positive for left-sided pain related to stroke neurological:  Positive for chronic left-sided weakness and spastic hemiplegia  Behavl/Psych: Positive for intermittent anxiety  ROS otherwise negative      Objective:  Visit Vitals  /68 (BP 1 Location: Right arm, BP Patient Position: Sitting)   Pulse 68   Temp 98.1 °F (36.7 °C) (Oral)   Resp 18   SpO2 96%     There is no height or weight on file to calculate BMI.     Physical Exam:   General appearance - alert, well appearing, and in no distress  Mental status - alert, oriented to person, place, and time  EYE-NICK, EOMI,conjunctiva normal bilaterally, lids normal  ENT-ENT exam normal, no neck nodes or sinus tenderness  Nose - normal and patent, no erythema,  Or discharge   Mouth - mucous membranes moist, pharynx normal without lesions  Neck - supple, no significant adenopathy or bruit  Chest - clear to auscultation, no wheezes, rales or rhonchi. Heart - normal rate, regular rhythm, normal S1, S2, no murmurs, rubs, clicks or gallops   Abdomen - soft, nontender, nondistended, no masses or organomegaly  Lymph- no adenopathy palpable  Ext-peripheral pulses trell. +3 to 4+ lower extremity edema bilaterally is noted  Skin-Warm and dry. no hyperpigmentation, vitiligo, or suspicious lesions  Neuro -alert, oriented, normal speech. Positive for chronic left sided hemiplegia. Diabetic foot exam: deferred due to stroke,hemiplegia,edema          In addition this patient is seen for AWV  as detailed below: This is a Subsequent Medicare Annual Wellness Exam (AWV) (Performed 12 months after IPPE or effective date of Medicare Part B enrollment)    I have reviewed the patient's medical history in detail and updated the computerized patient record. Problem list reviewed with patient and risk factors discussed. PSH, SH, FH, Medications and HM issues also reviewed and discussed. Depression screen, fall risk assessment, functional abilities and ACP also reviewed and discussed as above and below.     Depression Risk Factor Screening:     3 most recent PHQ Screens 1/2/2020   Little interest or pleasure in doing things Not at all   Feeling down, depressed, irritable, or hopeless Not at all   Total Score PHQ 2 0     Alcohol Risk Factor Screening:     Alcohol Risk Factor Screening:   Do you average 1 drink per night or more than 7 drinks a week:  No    On any one occasion in the past three months have you have had more than 3 drinks containing alcohol:  No    Functional Ability and Level of Safety:   Hearing Loss  Hearing is good. Activities of Daily Living  The home contains: handrails and grab bars  Patient needs help with:  transportation, shopping, preparing meals, laundry, housework, dressing, bathing, bathroom needs and walking    Fall Risk  Fall Risk Assessment, last 12 mths 1/2/2020   Able to walk? No   Fall in past 12 months? -       Abuse Screen  Patient is not abused    Cognitive Screening   Evaluation of Cognitive Function:  Has your family/caregiver stated any concerns about your memory: no  Normal    Patient Care Team   Patient Care Team:  Yesenia Puga MD as PCP - General (Internal Medicine)  Yesenia Puga MD as PCP - [facility] Empaneled Provider  Josue Lesches, DPM (Podiatry)  Mihai Coronel MD (Endocrinology)    Assessment/Plan   Education and counseling provided:  Are appropriate based on today's review and evaluation    Assessment/Plan:   Impressions:      ICD-10-CM ICD-9-CM    1. Medicare annual wellness visit, subsequent Z00.00 V70.0    2. Encounter for immunization Z23 V03.89 INFLUENZA VACCINE INACTIVATED (IIV), SUBUNIT, ADJUVANTED, IM      VT IMMUNIZ ADMIN,1 SINGLE/COMB VAC/TOXOID   3. 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 ([facility] E11.3299 250.50 HEMOGLOBIN A1C W/O EAG     362.01 URINE, MICROALBUMIN, SEMIQUANTITATIVE   4. Essential hypertension, benign I10 401.1 CBC WITH AUTOMATED DIFF      COLLECTION VENOUS BLOOD,VENIPUNCTURE      METABOLIC PANEL, COMPREHENSIVE      URINALYSIS W/MICROSCOPIC   5. Dyslipidemia E78.5 272.4 HEMOGLOBIN A1C W/O EAG      LIPID PANEL   6. Long-term use of high-risk medication Z79.899 V58.69 CK   7. Acquired hypothyroidism E03.9 244.9 HEMOGLOBIN A1C W/O EAG      TSH 3RD GENERATION   8. PAF (paroxysmal atrial fibrillation) (HCC) I48.0 427.31    9. Anticoagulant long-term use Z79.01 V58.61 PROTHROMBIN TIME + INR   10.  Chronic prescription opiate use C45.694 V58.69 TOXASSURE SELECT 13 (MW)   11. Cerebrovascular accident (CVA), unspecified mechanism ([facility] I63.9 434.91         Plan:  1. Continue present meds  2. Lifestyle modifications including Na restriction, low carb/fat diet, weight reduction and exercise (at least a walking program). Follow-up and Dispositions    · Return in about 3 months (around 4/2/2020).            Orders Placed This Encounter    Influenza Vaccine Inactivated (IIV)(FLUAD), Subunit, Adjuvanted, IM, (48895)    CBC WITH AUTOMATED DIFF    HEMOGLOBIN A1C W/O EAG ([facility])    LIPID PANEL ([facility])    METABOLIC PANEL, COMPREHENSIVE ([facility])    CK ([facility])    TSH 3RD GENERATION ([facility])    PROTHROMBIN TIME + INR ([facility])    URINALYSIS W/MICROSCOPIC ([facility])    URINE, MICROALBUMIN, SEMIQUANTITATIVE ([facility])    TOXASSURE SELECT 13 (MW)    [place] IMMUNIZ ADMIN,1 SINGLE/COMB VAC/TOXOID    COLLECTION VENOUS BLOOD,VENIPUNCTURE       Bing Lemon MD   Assessment/Plan:  Diagnoses and all orders for this visit:    Medicare annual wellness visit, subsequent    Encounter for immunization  -     INFLUENZA VACCINE INACTIVATED (IIV), SUBUNIT, ADJUVANTED, IM  -     [place] IMMUNIZ ADMIN,1 SINGLE/COMB VAC/TOXOID    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 (HCC)  -     HEMOGLOBIN A1C W/O EAG  -     URINE, MICROALBUMIN, SEMIQUANTITATIVE    Essential hypertension, benign  -     CBC WITH AUTOMATED DIFF  -     COLLECTION VENOUS BLOOD,VENIPUNCTURE  -     METABOLIC PANEL, COMPREHENSIVE  -     URINALYSIS W/MICROSCOPIC    Dyslipidemia  -     HEMOGLOBIN A1C W/O EAG  -     LIPID PANEL    Long-term use of high-risk medication  -     CK    Acquired hypothyroidism  -     HEMOGLOBIN A1C W/O EAG  -     TSH 3RD GENERATION    PAF (paroxysmal atrial fibrillation) (HCC)    Anticoagulant long-term use  -     PROTHROMBIN TIME + INR    Chronic prescription opiate use  -     [place] SELECT 15 (MW)    Cerebrovascular accident (CVA), unspecified mechanism [facility])        Health Maintenance Due   Topic Date Due    DTaP/Tdap/Td series (1 - Tdap) 04/08/1955    Shingrix Vaccine Age 50> (1 of 2) 04/08/1994    Bone Densitometry (Dexa) Screening  04/08/2009    FOOT EXAM Q1  07/23/2019    Influenza Age 9 to Adult  08/01/2019    MEDICARE YEARLY EXAM  11/22/2019    HEMOGLOBIN A1C Q6M  12/21/2019       Other instructions: The patient's medications were reviewed and reconciled. No change in her current medical regimen is made. Body mass index is greater than 36 and dietary counseling along with printed patient education is given    Continue to check blood sugars once daily    Health maintenance issues were reviewed and bone density testing is declined    Age-appropriate vaccinations were reviewed and shingles, Tdap vaccinations were declined    Diabetic foot exam was deferred today by request of patient    Influenza vaccination to be obtained    Await results of multiple labs    Narcotics contract previously presented to patient reviewed    Urine drug screen obtained. [place] prescription drug management program was accessed during the office visit and appears appropriate. Morphine milligram equivalents (MME) = 0 as she takes the medication very infrequently    25 minute office appointment occurred today with greater than 50% of the time spent in counseling and coordination of care as a result of extra time explaining to patient recent laws governing opiate prescriptions, reviewing signed opiate contract, obtaining urine drug screen and accessing the pharmacy management program.    The patient understands she will need to be seen every 3 months to continue her opiate refills. Follow-up 3 months        Follow-up and Dispositions    · Return in about 3 months (around 4/2/2020). I have reviewed with the patient details of the assessment and plan and all questions were answered. Relevent patient education was performed. The most recent lab findings were reviewed with the patient. An After Visit Summary was printed and given to the patient.     Bryan Ortega MD

## 2020-01-03 LAB
HBA1C MFR BLD HPLC: 7.6 % (ref 4–5.7)
INR PPP: 3.2 (ref 0.8–1.2)
PROTHROMBIN TIME: 30.4 SEC (ref 9.1–12)

## 2020-01-06 LAB — BACTERIA UR CULT: NORMAL

## 2020-01-07 ENCOUNTER — TELEPHONE ANTICOAG (OUTPATIENT)
Dept: INTERNAL MEDICINE CLINIC | Age: 75
End: 2020-01-07

## 2020-01-07 DIAGNOSIS — I48.0 PAF (PAROXYSMAL ATRIAL FIBRILLATION) (HCC): Primary | ICD-10-CM

## 2020-01-07 LAB
BASOPHILS # BLD AUTO: 0 X10E3/UL (ref 0–0.2)
BASOPHILS NFR BLD AUTO: 0 %
DRUGS UR: NORMAL
EOSINOPHIL # BLD AUTO: 0.1 X10E3/UL (ref 0–0.4)
EOSINOPHIL NFR BLD AUTO: 3 %
ERYTHROCYTE [DISTWIDTH] IN BLOOD BY AUTOMATED COUNT: 11.7 % (ref 12.3–15.4)
HCT VFR BLD AUTO: 38.1 % (ref 34–46.6)
HGB BLD-MCNC: 12.8 G/DL (ref 11.1–15.9)
IMM GRANULOCYTES # BLD AUTO: 0 X10E3/UL (ref 0–0.1)
IMM GRANULOCYTES NFR BLD AUTO: 0 %
LYMPHOCYTES # BLD AUTO: 1.1 X10E3/UL (ref 0.7–3.1)
LYMPHOCYTES NFR BLD AUTO: 21 %
MCH RBC QN AUTO: 31.4 PG (ref 26.6–33)
MCHC RBC AUTO-ENTMCNC: 33.6 G/DL (ref 31.5–35.7)
MCV RBC AUTO: 93 FL (ref 79–97)
MONOCYTES # BLD AUTO: 0.3 X10E3/UL (ref 0.1–0.9)
MONOCYTES NFR BLD AUTO: 7 %
NEUTROPHILS # BLD AUTO: 3.5 X10E3/UL (ref 1.4–7)
NEUTROPHILS NFR BLD AUTO: 69 %
PLATELET # BLD AUTO: 174 X10E3/UL (ref 150–450)
RBC # BLD AUTO: 4.08 X10E6/UL (ref 3.77–5.28)
WBC # BLD AUTO: 5 X10E3/UL (ref 3.4–10.8)

## 2020-01-07 NOTE — PROGRESS NOTES
Anticoagulation Episode Summary     Current INR goal:   2.0-3.0   TTR:   40.9 % (2 y)   Next INR check:   2/7/2020   INR from last check:   3.2! (1/2/2020)   Weekly max warfarin dose:      Target end date: Indefinite   INR check location:      Preferred lab:      Send INR reminders to:        Indications    PAF (paroxysmal atrial fibrillation) ([facility] (Primary) [I48.0]           Comments:            Anticoagulation Care Providers     Provider Role Specialty Phone number    Astrid Gunderson MD Responsible Internal Medicine [phone]        INR was 3.2.  Would hold Coumadin 1 day and then restart usual regimen

## 2020-01-07 NOTE — PROGRESS NOTES
INR was 3.2. Would hold Coumadin 1 day and then restart usual regimen    Hemoglobin A1c slightly elevated at 7.6    TSH level slightly elevated. Make sure that she is taking it on an empty stomach first thing in the morning with water with nothing to eat for 1 hour after taking it.   Would like to have repeat TSH level done in 1 month when she returns for her pro time

## 2020-02-10 RX ORDER — AMLODIPINE BESYLATE 10 MG/1
TABLET ORAL
Qty: 90 TAB | Refills: 2 | Status: SHIPPED | OUTPATIENT
Start: 2020-02-10 | End: 2020-02-11 | Stop reason: SDUPTHER

## 2020-02-10 NOTE — TELEPHONE ENCOUNTER
She need for a few pills to hold until she get the prescription from Mail order. To be called in to [facility] and the refill to [facility]    The mail order needs a new prescription for Norvasc.

## 2020-02-10 NOTE — TELEPHONE ENCOUNTER
Requested Prescriptions     Pending Prescriptions Disp Refills    amLODIPine (NORVASC) 10 mg tablet 90 Tab 2     Sig: TAKE 1 TABLET DAILY

## 2020-02-11 RX ORDER — AMLODIPINE BESYLATE 10 MG/1
TABLET ORAL
Qty: 30 TAB | Refills: 0 | OUTPATIENT
Start: 2020-02-11

## 2020-02-11 RX ORDER — AMLODIPINE BESYLATE 10 MG/1
TABLET ORAL
Qty: 30 TAB | Refills: 0 | Status: SHIPPED | OUTPATIENT
Start: 2020-02-11 | End: 2020-12-09 | Stop reason: SDUPTHER

## 2020-02-11 NOTE — TELEPHONE ENCOUNTER
Requested Prescriptions     Pending Prescriptions Disp Refills    amLODIPine (NORVASC) 10 mg tablet 30 Tab 0     Sig: TAKE 1 TABLET DAILY

## 2020-03-06 RX ORDER — FUROSEMIDE 40 MG/1
TABLET ORAL
Qty: 90 TAB | Refills: 3 | Status: SHIPPED | OUTPATIENT
Start: 2020-03-06 | End: 2021-05-05

## 2020-03-12 ENCOUNTER — OFFICE VISIT (OUTPATIENT)
Dept: INTERNAL MEDICINE CLINIC | Age: 75
End: 2020-03-12

## 2020-03-12 VITALS
SYSTOLIC BLOOD PRESSURE: 120 MMHG | TEMPERATURE: 98.5 F | HEART RATE: 75 BPM | HEIGHT: 64 IN | BODY MASS INDEX: 36.19 KG/M2 | RESPIRATION RATE: 20 BRPM | OXYGEN SATURATION: 95 % | DIASTOLIC BLOOD PRESSURE: 72 MMHG | WEIGHT: 212 LBS

## 2020-03-12 DIAGNOSIS — I89.0 LYMPHEDEMA OF BOTH LOWER EXTREMITIES: ICD-10-CM

## 2020-03-12 DIAGNOSIS — L97.919 VENOUS STASIS ULCER OF RIGHT LOWER EXTREMITY (HCC): ICD-10-CM

## 2020-03-12 DIAGNOSIS — L03.116 CELLULITIS OF BOTH LOWER EXTREMITIES: ICD-10-CM

## 2020-03-12 DIAGNOSIS — I83.019 VENOUS STASIS ULCER OF RIGHT LOWER EXTREMITY (HCC): ICD-10-CM

## 2020-03-12 DIAGNOSIS — L03.115 CELLULITIS OF BOTH LOWER EXTREMITIES: ICD-10-CM

## 2020-03-12 DIAGNOSIS — E11.3299 TYPE 2 DIABETES MELLITUS WITH BACKGROUND RETINOPATHY (HCC): Primary | Chronic | ICD-10-CM

## 2020-03-12 NOTE — PATIENT INSTRUCTIONS
Learning About Foot and Toenail Care  Foot and toenail care: Overview  Checking your loved one's feet and keeping them clean and soft can help prevent cracks and infection in the skin. This is especially important for people who have diabetes. Keeping toenails trimmed--and polished if that's what the person likes--also helps the person feel well-groomed. If the person you care for has diabetes or has foot problems, such as bad bunions and corns, think about taking them to see a podiatrist. This is a doctor who specializes in the care of the feet. Sometimes a podiatrist will come to the home if the person can't go out for visits. Try to take the person for salon pedicures if that is what they want. It's a chance to get out and see people and continue a favorite activity. You can do basic nail care at home. Usually all you need to do is keep the nails clean and at a safe length. How do you trim someone's toenails? Try to trim the person's nails every week. Or check the nails each week to see if they need to be trimmed. It's easiest to trim nails after the person has had a shower or foot bath. It makes the nails softer and easier to trim. Start by gathering your supplies. You will need toenail clippers and a nail file. You may also need nail polish and nail polish remover. To trim the nails:  1. Wash and dry your hands. You don't need to wear gloves. 2. Use nail polish remover to take off any polish. 3. Hold the person's foot and toe steady with one hand while you trim the nail with your other hand. Trim the nails straight across. Leave the nails a little longer at the corners so that the sharp ends don't cut into the skin. 4. Keep the nails no longer than the tip of the toes. 5. Let the nails dry if they are still damp and soft. 6. Use a nail file to gently smooth the edges of the nails, especially at the corners. They may be sharp after the nails are cut straight.   7. Apply nail polish, if the person wants it. If the person's nails are thick and discolored, it may be safest to have a podiatrist cut them. What else do you need to know? When you're caring for someone's nails, it is important to remember not to trim or cut the cuticles. A minor cut in a cuticle could lead to an infection. Wash the feet daily in the shower or bath or in a basin made for washing feet. It's extra important to wash the feet carefully if the person has diabetes. After washing the feet, dry gently. Put lotion on the feet, especially on the heels. But don't put it between the toes. If the person doesn't have diabetes and you see signs of athlete's foot (such as dry, cracking, or itchy skin between the toes), you can try an over-the-counter medicine. These medicines can kill the fungus that causes athlete's foot. If the problem doesn't go away, talk to the person's doctor. Look every day for cuts or signs of infection, such as pain, swelling, redness, or warmth. If you see any of these signs--especially in someone who has diabetes--call the doctor. Where can you learn more? Go to http://aly-robert.info/  Enter A726 in the search box to learn more about \"Learning About Foot and Toenail Care. \"  Current as of: December 8, 2019Content Version: 12.4  © 0512-5545 [employer]. Care instructions adapted under license by [employer] (which disclaims liability or warranty for this information). If you have questions about a medical condition or this instruction, always ask your healthcare professional. [place] [address] any warranty or liability for your use of this information.

## 2020-03-12 NOTE — PROGRESS NOTES
This note will not be viewable in [address] Subjective:     Mrs. Dyana Fraga returns to our office today in follow-up of her type 2 diabetes mellitus and also for evaluation for diabetic shoes. The patient's type 2 diabetes mellitus has been complicated by background retinopathy. She currently takes Novolin and insulin twice daily and her last hemoglobin A1c was 7.6 at the beginning of January. The patient has a history of chronic lymphedema of both lower extremities with venous insufficiency. Complicating this has been the development of stasis ulcer of the right foot and a history of bilateral cellulitis of the legs. The patient does have both legs wrapped because of the chronic edema and poor wound healing. She denies any history of neuropathy and has had no recent fevers or chills. She had been evaluated by her foot specialist, Dr. Linnea Beasley, and recommendations for diabetic shoes were made.     Past Medical History:   Diagnosis Date    Anticoagulant long-term use 10/13/2017    Anxiety 10/13/2017    Atrial fibrillation ([address] 10/13/2017    Breast calcification, left 10/13/2017    CAD (coronary artery disease)     Cellulitis of both lower extremities 10/13/2017    Chronic kidney disease     kidney stones    Chronic pain syndrome 10/13/2017    Chronic prescription opiate use 11/15/2017    CVA (cerebral vascular accident) ([address] 10/13/2017    Diabetes ([address]     DJD (degenerative joint disease) 10/13/2017    Dyslipidemia 10/13/2017    Glaucoma 10/13/2017    Hypertension     Hyperthyroidism 10/13/2017    Long-term use of high-risk medication 10/13/2017    Stasis ulcer of lower extremity ([address] 10/13/2017    Stroke ([address]     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 ([address] 8/18/2012    retinopathy      Past Surgical History:   Procedure Laterality Date    CARDIAC SURG PROCEDURE UNLIST      cagb    HX GYN      hysterectomty    HX ORTHOPAEDIC      back surgery     Allergies   Allergen Reactions    Betadine Prepstick Rash    Keflex [Cephalexin] Hives     Pt breaks out in hives     Current Outpatient Medications   Medication Sig Dispense Refill    furosemide (LASIX) 40 mg tablet TAKE 1 TABLET DAILY 90 Tab 3    amLODIPine (NORVASC) 10 mg tablet TAKE 1 TABLET DAILY 30 Tab 0    pravastatin (PRAVACHOL) 80 mg tablet TAKE 1 TABLET NIGHTLY 90 Tab 3    pomegranate xt/pomegranat seed (POMEGRANATE PO) Take  by mouth.  vit C/E/Zn/coppr/lutein/zeaxan (PRESERVISION AREDS-2 PO) Take  by mouth.  NOVOLIN N NPH U-100 INSULIN 100 unit/mL injection INJECT 44 UNITS UNDER THE SKIN IN THE MORNING AND 36 UNITS IN THE EVENING 80 mL 3    carvedilol (COREG) 12.5 mg tablet TAKE 1 TABLET TWICE A  Tab 4    glucose blood VI test strips (FREESTYLE LITE STRIPS) strip USE ONE STRIP TO CHECK GLUCOSE ONCE DAILY  Dx E11.9 100 Strip 3    cloNIDine HCl (CATAPRES) 0.1 mg tablet TAKE 1 TABLET THREE TIMES A  Tab 3    warfarin (COUMADIN) 2 mg tablet TAKE ONE AND ONE-HALF TABLETS DAILY FOR FIVE DAYS A WEEK AND 2 TABLETS ON WEDNESDAY AND SUNDAY 135 Tab 3    captopril (CAPOTEN) 25 mg tablet TAKE 1 TABLET TWICE A  Tab 3    traMADol (ULTRAM) 50 mg tablet Take 50 mg by mouth four (4) times daily as needed for Pain.  flash glucose scanning reader (FREESTYLE JOSE LUIS READER) [facility] [place] to check blood sugars daily  DX: E11.9 1 Device 0    flash glucose sensor (FREESTYLE JOSE LUIS SENSOR) kit Use to check blood sugars daily  DX: E11.9 1 Device 0    LORazepam (ATIVAN) 0.5 mg tablet Take 1 Tab by mouth nightly as needed. Max Daily Amount: 0.5 mg. 90 Tab 0    ergocalciferol (VITAMIN D2) 50,000 unit capsule Take 50,000 Units by mouth every seven (7) days.  methIMAzole (TAPAZOLE) 10 mg tablet Take  by mouth daily.       Blood-Glucose Meter (FREESTYLE FREEDOM LITE) monitoring kit Daily blood sugar checks 1 Kit 0    lancets (FREESTYLE LANCETS) 28 gauge misc Daily blood sugar checks 100 Lancet 3    naloxone [facility]) 4 mg/actuation nasal spray Use 1 spray intranasally into 1 nostril. Indications: OPIATE-INDUCED RESPIRATORY DEPRESSION, Opioid Toxicity 1 Each 0    latanoprost (XALATAN) 0.005 % ophthalmic solution Administer 1 Drop to both eyes nightly.  Insulin Syringe-Needle U-100 (BD INSULIN SYRINGE ULT-FINE II) 0.5 mL 31 gauge x 5/16 syrg 1 Each by Does Not Apply route two (2) times a day. 180 Syringe 3    aspirin (ASPIRIN) 325 mg tablet Take 1 Tab by mouth daily. Social History     Socioeconomic History    Marital status:      Spouse name: Not on file    Number of children: Not on file    Years of education: Not on file    Highest education level: Not on file   Tobacco Use    Smoking status: Never Smoker    Smokeless tobacco: Never Used   Substance and Sexual Activity    Alcohol use: No    Drug use: No     Family History   Problem Relation Age of Onset    Heart Disease Father        Review of Systems:  GEN: no weight loss, weight gain, fatigue or night sweats  CV: no PND, orthopnea, or palpitations  Resp: no dyspnea on exertion, no cough  Abd: no nausea, vomiting or diarrhea  EXT: Positive for chronic swelling of both lower extremities  Endocrine: no hair loss, excessive thirst or polyuria  Neurological ROS: Positive for left-sided paralysis due to stroke  ROS otherwise negative      Objective:     Visit Vitals  /72 (BP 1 Location: Left arm, BP Patient Position: Sitting)   Pulse 75   Temp 98.5 °F (36.9 °C) (Oral)   Resp 20   Ht 5' 4\" (1.626 m)   Wt 212 lb (96.2 kg)   SpO2 95%   BMI 36.39 kg/m²     Body mass index is 36.39 kg/m². General:   alert, cooperative and no distress   Eyes: conjunctivae/sclerae clear.  PERRL, EOM's intact   Mouth:  No oral lesions, no pharyngeal erythema, no exudates   Neck: Trachea midline, no thyromegaly, no bruits   Heart: S1 and S2 normal,no murmurs noted    Lungs: Clear to auscultation bilaterally, no increased work of breathing   Abdomen: Soft, nontender. Normal bowel sounds   Extremities: There is 4+ edema of both lower extremities below the knee. A healing stasis ulcer in the arch of the right foot is noted without cellulitis. Neuro: ..alert, oriented x3,speech normal in context and clarity, cranial nerves II-XII intact. Left hemiplegia is present     Physical exam otherwise negative         Assessment/Plan:     Diagnoses and all orders for this visit:    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 ([address]    Lymphedema of both lower extremities    Venous stasis ulcer of right lower extremity (HCC)    Cellulitis of both lower extremities        Other instructions: The patient's medications were reviewed and reconciled. This patient has type 2 diabetes mellitus that requires insulin treatment and has been complicated by retinopathy. Her diabetes has been reasonably controlled with a hemoglobin A1c recently of 7.6    I am treating the patient under a comprehensive plan of care for diabetes. The patient would benefit from diabetic foot wear to protect her feet due to her chronic lymphedema, stasis ulcers and history of bilateral cellulitis of the legs. Follow-up here as previously scheduled    Follow-up and Dispositions    · Return for As previously scheduled. Nani Boogie MD    Please note that this dictation was completed with [employer], the [employer] voice recognition software. Quite often unanticipated grammatical, syntax, homophones, and other interpretive errors are inadvertently transcribed by the computer software. Please disregard these errors. Please excuse any errors that have escaped final proofreading.

## 2020-03-12 NOTE — PROGRESS NOTES
Dudley Aponte is a 75 y.o. female presenting for Form Completion  . 1. Have you been to the ER, urgent care clinic since your last visit? Hospitalized since your last visit? No    2. Have you seen or consulted any other health care providers outside of the [address] [place] [address] since your last visit? Include any pap smears or colon screening. Dr Ortega Meyer- Podiatrist Feb 2020    Fall Risk Assessment, last 12 mths 1/2/2020   Able to walk? No   Fall in past 12 months? -         Abuse Screening Questionnaire 1/2/2020   Do you ever feel afraid of your partner? N   Are you in a relationship with someone who physically or mentally threatens you? N   Is it safe for you to go home? Y       3 most recent PHQ Screens 1/2/2020   Little interest or pleasure in doing things Not at all   Feeling down, depressed, irritable, or hopeless Not at all   Total Score PHQ 2 0       There are no discontinued medications.

## 2020-03-21 DIAGNOSIS — I48.0 PAF (PAROXYSMAL ATRIAL FIBRILLATION) (HCC): Primary | ICD-10-CM

## 2020-03-21 DIAGNOSIS — Z79.01 ANTICOAGULANT LONG-TERM USE: ICD-10-CM

## 2020-03-21 RX ORDER — WARFARIN 2 MG/1
TABLET ORAL
Qty: 135 TAB | Refills: 3 | Status: SHIPPED | OUTPATIENT
Start: 2020-03-21 | End: 2020-04-12

## 2020-04-12 DIAGNOSIS — I48.0 PAF (PAROXYSMAL ATRIAL FIBRILLATION) (HCC): ICD-10-CM

## 2020-04-12 DIAGNOSIS — Z79.01 ANTICOAGULANT LONG-TERM USE: ICD-10-CM

## 2020-04-12 RX ORDER — WARFARIN 2 MG/1
TABLET ORAL
Qty: 135 TAB | Refills: 3 | Status: SHIPPED | OUTPATIENT
Start: 2020-04-12 | End: 2021-05-04 | Stop reason: SDUPTHER

## 2020-06-04 RX ORDER — CAPTOPRIL 25 MG/1
TABLET ORAL
Qty: 180 TAB | Refills: 3 | Status: SHIPPED | OUTPATIENT
Start: 2020-06-04 | End: 2021-05-31

## 2020-06-18 RX ORDER — NAPROXEN SODIUM 220 MG
1 TABLET ORAL 2 TIMES DAILY
Qty: 180 SYRINGE | Refills: 3 | Status: SHIPPED | OUTPATIENT
Start: 2020-06-18 | End: 2021-09-03

## 2020-06-18 NOTE — TELEPHONE ENCOUNTER
Requested Prescriptions     Pending Prescriptions Disp Refills    Insulin Syringe-Needle U-100 (BD Insulin Syringe Ult-Fine II) 0.5 mL 31 gauge x \" syrg 180 Syringe 3     Si Each by Does Not Apply route two (2) times a day.        Last Refill: 17  Last visit:3/12/2020

## 2020-09-05 NOTE — TELEPHONE ENCOUNTER
Requested Prescriptions     Pending Prescriptions Disp Refills    glucose blood VI test strips (FREESTYLE TEST) strip 100 Strip 3     Sig: by Does Not Apply route daily.        Last Refill: New RX  Last visit:11/15/2017 Opt out

## 2020-10-01 ENCOUNTER — CLINICAL SUPPORT (OUTPATIENT)
Dept: INTERNAL MEDICINE CLINIC | Age: 75
End: 2020-10-01
Payer: MEDICARE

## 2020-10-01 VITALS
OXYGEN SATURATION: 95 % | DIASTOLIC BLOOD PRESSURE: 78 MMHG | HEIGHT: 64 IN | HEART RATE: 62 BPM | TEMPERATURE: 97.5 F | BODY MASS INDEX: 36.19 KG/M2 | SYSTOLIC BLOOD PRESSURE: 116 MMHG | WEIGHT: 212 LBS

## 2020-10-01 DIAGNOSIS — Z23 NEEDS FLU SHOT: Primary | ICD-10-CM

## 2020-10-01 PROCEDURE — 90694 VACC AIIV4 NO PRSRV 0.5ML IM: CPT | Performed by: INTERNAL MEDICINE

## 2020-10-01 PROCEDURE — G0008 ADMIN INFLUENZA VIRUS VAC: HCPCS | Performed by: INTERNAL MEDICINE

## 2020-10-01 NOTE — PROGRESS NOTES
After obtaining verbal consent and per orders of Dr. Moncho Collins, injection of Fluad given by Kem Robles. Order and injection/medication verified by second nurse/ma review by Dr Brian Mccormack. Patient tolerated procedure well.  No reactions noted

## 2020-10-01 NOTE — PATIENT INSTRUCTIONS
Vaccine Information Statement    Influenza (Flu) Vaccine (Inactivated or Recombinant): What You Need to Know    Many Vaccine Information Statements are available in German and other languages. See www.immunize.org/vis  Hojas de información sobre vacunas están disponibles en español y en muchos otros idiomas. Visite www.immunize.org/vis    1. Why get vaccinated? Influenza vaccine can prevent influenza (flu). Flu is a contagious disease that spreads around the [facility] every year, usually between October and May. Anyone can get the flu, but it is more dangerous for some people. Infants and young children, people 72years of age and older, pregnant women, and people with certain health conditions or a weakened immune system are at greatest risk of flu complications. Pneumonia, bronchitis, sinus infections and ear infections are examples of flu-related complications. If you have a medical condition, such as heart disease, cancer or diabetes, flu can make it worse. Flu can cause fever and chills, sore throat, muscle aches, fatigue, cough, headache, and runny or stuffy nose. Some people may have vomiting and diarrhea, though this is more common in children than adults. Each year thousands of people in the [facility] die from flu, and many more are hospitalized. Flu vaccine prevents millions of illnesses and flu-related visits to the doctor each year. 2. Influenza vaccines     CDC recommends everyone 10months of age and older get vaccinated every flu season. Children 6 months through 6years of age may need 2 doses during a single flu season. Everyone else needs only 1 dose each flu season. It takes about 2 weeks for protection to develop after vaccination. There are many flu viruses, and they are always changing. Each year a new flu vaccine is made to protect against three or four viruses that are likely to cause disease in the upcoming flu season.  Even when the vaccine doesnt exactly match these viruses, it may still provide some protection. Influenza vaccine does not cause flu. Influenza vaccine may be given at the same time as other vaccines. 3. Talk with your health care provider    Tell your vaccine provider if the person getting the vaccine:   Has had an allergic reaction after a previous dose of influenza vaccine, or has any severe, life-threatening allergies.  Has ever had Guillain-Barré Syndrome (also called GBS). In some cases, your health care provider may decide to postpone influenza vaccination to a future visit. People with minor illnesses, such as a cold, may be vaccinated. People who are moderately or severely ill should usually wait until they recover before getting influenza vaccine. Your health care provider can give you more information. 4. Risks of a reaction     Soreness, redness, and swelling where shot is given, fever, muscle aches, and headache can happen after influenza vaccine.  There may be a very small increased risk of Guillain-Barré Syndrome (GBS) after inactivated influenza vaccine (the flu shot). [place] children who get the flu shot along with pneumococcal vaccine (PCV13), and/or DTaP vaccine at the same time might be slightly more likely to have a seizure caused by fever. Tell your health care provider if a child who is getting flu vaccine has ever had a seizure. People sometimes faint after medical procedures, including vaccination. Tell your provider if you feel dizzy or have vision changes or ringing in the ears. As with any medicine, there is a very remote chance of a vaccine causing a severe allergic reaction, other serious injury, or death. 5. What if there is a serious problem? An allergic reaction could occur after the vaccinated person leaves the clinic.  If you see signs of a severe allergic reaction (hives, swelling of the face and throat, difficulty breathing, a fast heartbeat, dizziness, or weakness), call 9-1-1 and get the person to the nearest hospital.    For other signs that concern you, call your health care provider. Adverse reactions should be reported to the Vaccine Adverse Event Reporting System (VAERS). Your health care provider will usually file this report, or you can do it yourself. Visit the VAERS website at www.vaers. [facility].gov or call 0-978.273.5894. VAERS is only for reporting reactions, and VAERS staff do not give medical advice. 6. The National Vaccine Injury Compensation Program    The [facility] Vaccine Injury Compensation Program (VICP) is a federal program that was created to compensate people who may have been injured by certain vaccines. Visit the VICP website at www.[facility].gov/vaccinecompensation or call 8-963.980.9188 to learn about the program and about filing a claim. There is a time limit to file a claim for compensation. 7. How can I learn more?  Ask your health care provider.  Call your local or state health department.  Contact the Centers for Disease Control and Prevention (CDC):  - Call 9-271.208.1894 (1-800-CDC-INFO) or  - Visit CDCs influenza website at www.cdc.gov/flu    Vaccine Information Statement (Interim)  Inactivated Influenza Vaccine   8/15/2019  42 SANAM Moreno [id number]   Department of Health and Human Services  Centers for Disease Control and Prevention    Office Use Only

## 2020-10-02 DIAGNOSIS — E11.3299 DIABETES MELLITUS WITH BACKGROUND RETINOPATHY (HCC): ICD-10-CM

## 2020-10-02 RX ORDER — BLOOD-GLUCOSE METER
KIT MISCELLANEOUS
Qty: 100 STRIP | Refills: 3 | Status: SHIPPED | OUTPATIENT
Start: 2020-10-02 | End: 2020-10-05 | Stop reason: SDUPTHER

## 2020-10-05 DIAGNOSIS — E11.3299 DIABETES MELLITUS WITH BACKGROUND RETINOPATHY (HCC): ICD-10-CM

## 2020-10-05 RX ORDER — LANCETS 28 GAUGE
EACH MISCELLANEOUS
Qty: 100 LANCET | Refills: 3 | Status: ON HOLD | OUTPATIENT
Start: 2020-10-05 | End: 2022-07-29

## 2020-10-05 RX ORDER — CLONIDINE HYDROCHLORIDE 0.1 MG/1
TABLET ORAL
Qty: 270 TAB | Refills: 3 | Status: SHIPPED | OUTPATIENT
Start: 2020-10-05 | End: 2021-09-03

## 2020-10-05 RX ORDER — BLOOD-GLUCOSE METER
KIT MISCELLANEOUS
Qty: 100 STRIP | Refills: 3 | Status: SHIPPED | OUTPATIENT
Start: 2020-10-05 | End: 2021-12-13

## 2020-10-05 NOTE — TELEPHONE ENCOUNTER
Requested Prescriptions     Pending Prescriptions Disp Refills    cloNIDine HCL (CATAPRES) 0.1 mg tablet 270 Tab 3    glucose blood VI test strips (FreeStyle Lite Strips) strip 100 Strip 3     Sig: USE 1 STRIP TO CHECK GLUCOSE ONCE DAILY    lancets (FreeStyle Lancets) 28 gauge misc 100 Lancet 3     Sig: Daily blood sugar checks       Last Refill: 10/2/20  Next Appointment:3/12/20

## 2020-10-27 ENCOUNTER — HOSPITAL ENCOUNTER (OUTPATIENT)
Dept: MAMMOGRAPHY | Age: 75
Discharge: HOME | End: 2020-10-27
Attending: INTERNAL MEDICINE
Payer: MEDICARE

## 2020-10-27 DIAGNOSIS — Z12.31 VISIT FOR SCREENING MAMMOGRAM: ICD-10-CM

## 2020-10-27 PROCEDURE — 77067 SCR MAMMO BI INCL CAD: CPT

## 2020-10-27 PROCEDURE — 77063 BREAST TOMOSYNTHESIS BI: CPT

## 2020-11-11 ENCOUNTER — TELEPHONE (OUTPATIENT)
Dept: INTERNAL MEDICINE CLINIC | Age: 75
End: 2020-11-11

## 2020-11-11 DIAGNOSIS — I63.9 CEREBROVASCULAR ACCIDENT (CVA), UNSPECIFIED MECHANISM (HCC): Chronic | ICD-10-CM

## 2020-11-11 DIAGNOSIS — Z79.899 LONG-TERM USE OF HIGH-RISK MEDICATION: Primary | Chronic | ICD-10-CM

## 2020-11-11 NOTE — TELEPHONE ENCOUNTER
Patient needs an order for a [employer] Lite Power Chair Serial # [id number]    Fax to [facility]

## 2020-12-02 RX ORDER — HUMAN INSULIN 100 [IU]/ML
INJECTION, SUSPENSION SUBCUTANEOUS
Qty: 80 ML | Refills: 3 | Status: SHIPPED | OUTPATIENT
Start: 2020-12-02 | End: 2021-11-04

## 2020-12-03 ENCOUNTER — OFFICE VISIT (OUTPATIENT)
Dept: INTERNAL MEDICINE CLINIC | Age: 75
End: 2020-12-03
Payer: MEDICARE

## 2020-12-03 VITALS
HEIGHT: 64 IN | HEART RATE: 61 BPM | RESPIRATION RATE: 18 BRPM | TEMPERATURE: 98 F | WEIGHT: 212 LBS | SYSTOLIC BLOOD PRESSURE: 122 MMHG | OXYGEN SATURATION: 98 % | BODY MASS INDEX: 36.19 KG/M2 | DIASTOLIC BLOOD PRESSURE: 82 MMHG

## 2020-12-03 DIAGNOSIS — E03.9 ACQUIRED HYPOTHYROIDISM: Chronic | ICD-10-CM

## 2020-12-03 DIAGNOSIS — I89.0 LYMPHEDEMA OF BOTH LOWER EXTREMITIES: ICD-10-CM

## 2020-12-03 DIAGNOSIS — I63.9 CEREBROVASCULAR ACCIDENT (CVA), UNSPECIFIED MECHANISM (HCC): Chronic | ICD-10-CM

## 2020-12-03 DIAGNOSIS — I48.0 PAF (PAROXYSMAL ATRIAL FIBRILLATION) (HCC): Chronic | ICD-10-CM

## 2020-12-03 DIAGNOSIS — I25.10 ASCVD (ARTERIOSCLEROTIC CARDIOVASCULAR DISEASE): ICD-10-CM

## 2020-12-03 DIAGNOSIS — I10 ESSENTIAL HYPERTENSION, BENIGN: Primary | Chronic | ICD-10-CM

## 2020-12-03 DIAGNOSIS — E78.5 DYSLIPIDEMIA: Chronic | ICD-10-CM

## 2020-12-03 DIAGNOSIS — E11.3299 TYPE 2 DIABETES MELLITUS WITH BACKGROUND RETINOPATHY (HCC): Chronic | ICD-10-CM

## 2020-12-03 DIAGNOSIS — Z79.899 LONG-TERM USE OF HIGH-RISK MEDICATION: Chronic | ICD-10-CM

## 2020-12-03 DIAGNOSIS — Z79.01 ANTICOAGULANT LONG-TERM USE: Chronic | ICD-10-CM

## 2020-12-03 PROCEDURE — G8417 CALC BMI ABV UP PARAM F/U: HCPCS | Performed by: INTERNAL MEDICINE

## 2020-12-03 PROCEDURE — G8432 DEP SCR NOT DOC, RNG: HCPCS | Performed by: INTERNAL MEDICINE

## 2020-12-03 PROCEDURE — 1090F PRES/ABSN URINE INCON ASSESS: CPT | Performed by: INTERNAL MEDICINE

## 2020-12-03 PROCEDURE — G8754 DIAS BP LESS 90: HCPCS | Performed by: INTERNAL MEDICINE

## 2020-12-03 PROCEDURE — 36415 COLL VENOUS BLD VENIPUNCTURE: CPT | Performed by: INTERNAL MEDICINE

## 2020-12-03 PROCEDURE — 99214 OFFICE O/P EST MOD 30 MIN: CPT | Performed by: INTERNAL MEDICINE

## 2020-12-03 PROCEDURE — G8427 DOCREV CUR MEDS BY ELIG CLIN: HCPCS | Performed by: INTERNAL MEDICINE

## 2020-12-03 PROCEDURE — G8752 SYS BP LESS 140: HCPCS | Performed by: INTERNAL MEDICINE

## 2020-12-03 PROCEDURE — G8536 NO DOC ELDER MAL SCRN: HCPCS | Performed by: INTERNAL MEDICINE

## 2020-12-03 PROCEDURE — G8400 PT W/DXA NO RESULTS DOC: HCPCS | Performed by: INTERNAL MEDICINE

## 2020-12-03 PROCEDURE — 1101F PT FALLS ASSESS-DOCD LE1/YR: CPT | Performed by: INTERNAL MEDICINE

## 2020-12-03 PROCEDURE — 3051F HG A1C>EQUAL 7.0%<8.0%: CPT | Performed by: INTERNAL MEDICINE

## 2020-12-03 NOTE — PROGRESS NOTES
Layo Dye is a 67 y.o. female and presents with Follow Up Chronic Condition  . Subjective:  Mrs. Santa Freeman presents to the office today in follow-up of multiple medical problems. 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 She is followed at the [facility] by Dr. Nel Kruger. She continues on Novolin and insulin taking this twice a day. She continues to check her blood sugars once daily with average fasting blood sugars of 100. If she does not eat enough food during the course of the day she can have hypoglycemia. She has seen about every 6 months for her eye examinations. She denies any burning paresthesias of her feet. Blood pressure currently managed on amlodipine, captopril, carvedilol, clonidine, Lasix. She denies orthostatic dizziness. She notes chronic lymphedema of both lower extremities which has been resistant to the current dose of Lasix that she is on. She has had a previous history of stroke affecting her left side and she is in a wheelchair. She has had no exacerbation of the symptoms. Anxiety is treated with Ativan which he takes at bedtime. She does have a history of hyperthyroidism for which she is on methimazole. She denies any symptoms of hypothyroidism. She has hypercholesterolemia currently on statin therapy. Denies muscle soreness or GI upset. Has a history of coronary artery disease for which she has had a CABG x4. She has had no chest pains or shortness of breath. She also has paroxysmal atrial fibrillation which contributed to her stroke. She remains on Coumadin anticoagulation. She denies any bleeding problems and has been noncompliant in returning for protimes.     Past Medical History:   Diagnosis Date    Anticoagulant long-term use 10/13/2017    Anxiety 10/13/2017    Atrial fibrillation ([address] 10/13/2017    Breast calcification, left 10/13/2017    CAD (coronary artery disease)     Cellulitis of both lower extremities 10/13/2017    Chronic kidney disease     kidney stones    Chronic pain syndrome 10/13/2017    Chronic prescription opiate use 11/15/2017    [address]A (cerebral vascular accident) ([facility] 10/13/2017    Diabetes ([facility] [facility]     DJD (degenerative joint disease) 10/13/2017    Dyslipidemia 10/13/2017    Glaucoma 10/13/2017    Hypertension     Hyperthyroidism 10/13/2017    Long-term use of high-risk medication 10/13/2017    Stasis ulcer of lower extremity ([address] 10/13/2017    Stroke ([facility] [facility]     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 ([facility] [address] 8/18/2012    retinopathy      Past Surgical History:   Procedure Laterality Date    CARDIAC SURG PROCEDURE UNLIST      cagb    HX BREAST BIOPSY Left     2014    HX GYN      hysterectomty    HX ORTHOPAEDIC      back surgery     Allergies   Allergen Reactions    Betadine Prepstick Rash    Keflex [Cephalexin] Hives     Pt breaks out in hives     Current Outpatient Medications   Medication Sig Dispense Refill    NovoLIN N NPH U-100 Insulin 100 unit/mL injection INJECT 44 UNITS UNDER THE SKIN IN THE MORNING AND 36 UNITS IN THE EVENING (Patient taking differently: 40u am and 33u pm) 80 mL 3    cloNIDine HCL (CATAPRES) 0.1 mg tablet TAKE 1 TABLET THREE TIMES A  Tab 3    glucose blood VI test strips (FreeStyle Lite Strips) strip USE 1 STRIP TO CHECK GLUCOSE ONCE DAILY 100 Strip 3    lancets (FreeStyle Lancets) 28 gauge misc Daily blood sugar checks 100 Lancet 3    Insulin Syringe-Needle U-100 (BD Insulin Syringe Ult-Fine II) 0.5 mL 31 gauge x 5/16\" syrg 1 Each by Does Not Apply route two (2) times a day.  180 Syringe 3    captopriL (CAPOTEN) 25 mg tablet TAKE 1 TABLET TWICE A  Tab 3    warfarin (COUMADIN) 2 mg tablet TAKE ONE AND ONE-HALF TABLETS DAILY FOR FIVE DAYS A WEEK AND 2 TABLETS ON WEDNESDAY AND SUNDAY 135 Tab 3    furosemide (LASIX) 40 mg tablet TAKE 1 TABLET DAILY 90 Tab 3    amLODIPine (NORVASC) 10 mg tablet TAKE 1 TABLET DAILY 30 Tab 0    pravastatin (PRAVACHOL) 80 mg tablet TAKE 1 TABLET NIGHTLY 90 Tab 3    pomegranate xt/pomegranat seed (POMEGRANATE PO) Take  by mouth.  vit C/E/Zn/coppr/lutein/zeaxan (PRESERVISION AREDS-2 PO) Take  by mouth.  carvedilol (COREG) 12.5 mg tablet TAKE 1 TABLET TWICE A  Tab 4    traMADol (ULTRAM) 50 mg tablet Take 50 mg by mouth four (4) times daily as needed for Pain.  flash glucose scanning reader (FREESTYLE JOSE LUIS READER) [facility] Use to check blood sugars daily  DX: E11.9 1 Device 0    flash glucose sensor (FREESTYLE JOSE LUIS SENSOR) kit Use to check blood sugars daily  DX: E11.9 1 Device 0    LORazepam (ATIVAN) 0.5 mg tablet Take 1 Tab by mouth nightly as needed. Max Daily Amount: 0.5 mg. 90 Tab 0    ergocalciferol (VITAMIN D2) 50,000 unit capsule Take 50,000 Units by mouth every seven (7) days.  methIMAzole (TAPAZOLE) 10 mg tablet Take  by mouth daily.  Blood-Glucose Meter (FREESTYLE FREEDOM LITE) monitoring kit Daily blood sugar checks 1 Kit 0    naloxone (NARCAN) 4 mg/actuation nasal spray Use 1 spray intranasally into 1 nostril. Indications: OPIATE-INDUCED RESPIRATORY DEPRESSION, Opioid Toxicity 1 Each 0    latanoprost (XALATAN) 0.005 % ophthalmic solution Administer 1 Drop to both eyes nightly.  aspirin (ASPIRIN) 325 mg tablet Take 1 Tab by mouth daily.        Social History     Socioeconomic History    Marital status:      Spouse name: Not on file    Number of children: Not on file    Years of education: Not on file    Highest education level: Not on file   Tobacco Use    Smoking status: Never Smoker    Smokeless tobacco: Never Used   Substance and Sexual Activity    Alcohol use: No    Drug use: No     Family History   Problem Relation Age of Onset    Heart Disease Father        Health Maintenance   Topic Date Due    Shingrix Vaccine Age 49> (1 of 2) 04/08/1994    Eye Exam Retinal or Dilated  04/19/2020    Medicare Yearly Exam  01/02/2021    MICROALBUMIN Q1  01/02/2021    Foot Exam Q1  01/01/2021 (Originally 7/23/2019)    Bone Densitometry (Dexa) Screening  01/02/2021 (Originally 4/8/2009)    DTaP/Tdap/Td series (1 - Tdap) 01/02/2021 (Originally 4/8/1966)    Lipid Screen  01/02/2021    GLAUCOMA SCREENING Q2Y  09/15/2022    Flu Vaccine  Completed    Pneumococcal 65+ years  Completed        Review of Systems  Constitutional: negative for fevers, chills, anorexia and weight loss  Eyes:   negative for visual disturbance and irritation  ENT:   negative for tinnitus,sore throat,nasal congestion,ear pain,hoarseness  Respiratory:  negative for cough, hemoptysis, dyspnea,wheezing  CV:   negative for chest pain, palpitations. Positive for chronic lower extremity lymphedema   GI:   negative for nausea, vomiting, diarrhea, abdominal pain,melena  Endo:               negative for polyuria,polydipsia,polyphagia,heat intolerance  Genitourinary: negative for frequency, dysuria and hematuria  Integumentary: negative for rash and pruritus  Hematologic:  negative for easy bruising and gum/nose bleeding  Musculoskel: negative for myalgias, arthralgias, back pain, muscle weakness, joint pain  Neurological:  Hemiplegic on left and seen in wheelchair today  Behavl/Psych: negative for feelings of anxiety, depression, mood changes  ROS otherwise negative      Objective:  Visit Vitals  /82 (BP 1 Location: Right arm, BP Patient Position: Sitting)   Pulse 61   Temp 98 °F (36.7 °C) (Oral)   Resp 18   Ht 5' 4\" (1.626 m) Comment: patient reported   Wt 212 lb (96.2 kg) Comment: patient reported   SpO2 98%   BMI 36.39 kg/m²     Body mass index is 36.39 kg/m².     Physical Exam:   General appearance - alert, well appearing, and in no distress  Mental status - alert, oriented to person, place, and time  EYE-NICK, EOMI,conjunctiva normal bilaterally, lids normal  ENT-ENT exam normal, no neck nodes or sinus tenderness  Nose - normal and patent, no erythema,  Or discharge   Mouth - mucous membranes moist, pharynx normal without lesions  Neck - supple, no significant adenopathy or bruit  Chest - clear to auscultation, no wheezes, rales or rhonchi. Heart -irregularly irregular rhythm with controlled ventricular response and no murmur appreciated   abdomen - soft, nontender, nondistended, no masses or organomegaly  Ext-4+ lymphedema of both lower extremities noted. Edema of left hand noted. Skin-Warm and dry. no hyperpigmentation, vitiligo, or suspicious lesions  Neuro -alert, oriented, normal speech. Left hemiparesis present. Assessment/Plan:  Diagnoses and all orders for this visit:    Essential hypertension, benign  -     COLLECTION VENOUS BLOOD,VENIPUNCTURE  -     CBC WITH AUTOMATED DIFF  -     METABOLIC PANEL, COMPREHENSIVE  -     URINALYSIS W/MICROSCOPIC    Dyslipidemia  -     LIPID PANEL    Long-term use of high-risk medication  -     CK    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 (HCC)  -     HEMOGLOBIN A1C W/O EAG  -     URINE, MICROALBUMIN, SEMIQUANTITATIVE    PAF (paroxysmal atrial fibrillation) (HCC)    Anticoagulant long-term use  -     PROTHROMBIN TIME + INR    Acquired hypothyroidism  -     TSH 3RD GENERATION  -     T4, FREE    Cerebrovascular accident (CVA), unspecified mechanism ([address]    ASCVD (arteriosclerotic cardiovascular disease)    Lymphedema of both lower extremities        Other instructions: The patient's medications were reviewed and reconciled. No change in her current medical regimen will be made. A no added salt, prudent diet is encouraged    Continue to check blood sugars once daily    Continue every 6 month eye exams due to retinopathy    She was reminded that she needs to return for monthly protimes    Await results of multiple labs.   Consider increase of Lasix should kidney function and electrolytes be stable due to her continued lower extremity edema    Follow-up in 6 months    Follow-up and Dispositions    · Return in about 6 months (around 6/3/2021). I have reviewed with the patient details of the assessment and plan and all questions were answered. Relevent patient education was performed. The most recent lab findings were reviewed with the patient. An After Visit Summary was printed and given to the patient. Nat Tinajero MD    Please note that this dictation was completed with [employer], the computer voice recognition software. Quite often unanticipated grammatical, syntax, homophones, and other interpretive errors are inadvertently transcribed by the computer software. Please disregard these errors. Please excuse any errors that have escaped final proofreading.

## 2020-12-03 NOTE — PROGRESS NOTES
Bladimir Hernandez is a 67 y.o. female presenting for Follow Up Chronic Condition  . 1. Have you been to the ER, urgent care clinic since your last visit? Hospitalized since your last visit? No    2. Have you seen or consulted any other health care providers outside of the [address] [place] [address] since your last visit? Include any pap smears or colon screening. Podiatrist    Fall Risk Assessment, last 12 mths 1/2/2020   Able to walk? No   Fall in past 12 months? -         Abuse Screening Questionnaire 1/2/2020   Do you ever feel afraid of your partner? N   Are you in a relationship with someone who physically or mentally threatens you? N   Is it safe for you to go home? Y       3 most recent PHQ Screens 1/2/2020   Little interest or pleasure in doing things Not at all   Feeling down, depressed, irritable, or hopeless Not at all   Total Score PHQ 2 0       There are no discontinued medications.

## 2020-12-03 NOTE — PATIENT INSTRUCTIONS

## 2020-12-09 RX ORDER — AMLODIPINE BESYLATE 10 MG/1
TABLET ORAL
Qty: 90 TAB | Refills: 1 | Status: SHIPPED | OUTPATIENT
Start: 2020-12-09 | End: 2020-12-10

## 2020-12-09 NOTE — TELEPHONE ENCOUNTER
Requested Prescriptions     Pending Prescriptions Disp Refills    amLODIPine (NORVASC) 10 mg tablet 90 Tab 0     Sig: TAKE 1 TABLET DAILY       Last Refill: 2/11/20  Next Appointment:12/3/20

## 2020-12-10 RX ORDER — AMLODIPINE BESYLATE 10 MG/1
TABLET ORAL
Qty: 30 TAB | Refills: 0 | Status: SHIPPED | OUTPATIENT
Start: 2020-12-10 | End: 2021-06-07

## 2020-12-28 RX ORDER — PRAVASTATIN SODIUM 80 MG/1
TABLET ORAL
Qty: 90 TAB | Refills: 3 | Status: SHIPPED | OUTPATIENT
Start: 2020-12-28 | End: 2021-09-14 | Stop reason: SDUPTHER

## 2021-05-04 DIAGNOSIS — I48.0 PAF (PAROXYSMAL ATRIAL FIBRILLATION) (HCC): ICD-10-CM

## 2021-05-04 DIAGNOSIS — Z79.01 ANTICOAGULANT LONG-TERM USE: ICD-10-CM

## 2021-05-04 RX ORDER — WARFARIN 2 MG/1
TABLET ORAL
Qty: 135 TAB | Refills: 0 | Status: SHIPPED | OUTPATIENT
Start: 2021-05-04 | End: 2021-06-09 | Stop reason: ALTCHOICE

## 2021-05-04 NOTE — TELEPHONE ENCOUNTER
Requested Prescriptions     Pending Prescriptions Disp Refills    warfarin (COUMADIN) 2 mg tablet 135 Tab 3       Last Refill 4/12/20  Next Appointment:6/9/21

## 2021-05-06 ENCOUNTER — LAB ONLY (OUTPATIENT)
Dept: INTERNAL MEDICINE CLINIC | Age: 76
End: 2021-05-06

## 2021-05-06 DIAGNOSIS — I48.0 PAF (PAROXYSMAL ATRIAL FIBRILLATION) (HCC): Primary | ICD-10-CM

## 2021-05-07 LAB
APPEARANCE UR: CLEAR
BACTERIA URNS QL MICRO: ABNORMAL /HPF
BILIRUB UR QL: NEGATIVE
COLOR UR: ABNORMAL
CREAT UR-MCNC: 22 MG/DL
EPITH CASTS URNS QL MICRO: ABNORMAL /LPF
GLUCOSE UR STRIP.AUTO-MCNC: NEGATIVE MG/DL
HGB UR QL STRIP: NEGATIVE
HYALINE CASTS URNS QL MICRO: ABNORMAL /LPF (ref 0–5)
KETONES UR QL STRIP.AUTO: NEGATIVE MG/DL
LEUKOCYTE ESTERASE UR QL STRIP.AUTO: ABNORMAL
MICROALBUMIN UR-MCNC: 2.42 MG/DL
MICROALBUMIN/CREAT UR-RTO: 110 MG/G (ref 0–30)
NITRITE UR QL STRIP.AUTO: POSITIVE
PH UR STRIP: 5.5 [PH] (ref 5–8)
PROT UR STRIP-MCNC: NEGATIVE MG/DL
RBC #/AREA URNS HPF: ABNORMAL /HPF (ref 0–5)
SP GR UR REFRACTOMETRY: 1.01 (ref 1–1.03)
UA: UC IF INDICATED,UAUC: ABNORMAL
UROBILINOGEN UR QL STRIP.AUTO: 0.2 EU/DL (ref 0.2–1)
WBC URNS QL MICRO: ABNORMAL /HPF (ref 0–4)

## 2021-05-09 LAB
BACTERIA SPEC CULT: ABNORMAL
BACTERIA SPEC CULT: ABNORMAL
CC UR VC: ABNORMAL
SERVICE CMNT-IMP: ABNORMAL

## 2021-05-14 ENCOUNTER — TELEPHONE (OUTPATIENT)
Dept: INTERNAL MEDICINE CLINIC | Age: 76
End: 2021-05-14

## 2021-05-14 NOTE — TELEPHONE ENCOUNTER
----- Message from Veronica Chris MD sent at 5/14/2021  6:54 AM EDT -----  Urine returned infected. Rx Macrobid 100 mg twice daily #14. They were unable to obtain her blood work as ordered. It is too dangerous to continue Coumadin if we cannot get monthly protimes. She will need to look into Xarelto or Eliquis to start taking for anticoagulation as these do not require protimes to be done. She should check with her pharmacist to see which is the cheapest for her.

## 2021-05-14 NOTE — PROGRESS NOTES
Urine returned infected. Rx Macrobid 100 mg twice daily #14. They were unable to obtain her blood work as ordered. It is too dangerous to continue Coumadin if we cannot get monthly protimes. She will need to look into Xarelto or Eliquis to start taking for anticoagulation as these do not require protimes to be done. She should check with her pharmacist to see which is the cheapest for her.

## 2021-05-14 NOTE — TELEPHONE ENCOUNTER
Patient advised of lab results- please send pended Rx to pharmacy:  Requested Prescriptions     Pending Prescriptions Disp Refills    nitrofurantoin, macrocrystal-monohydrate, (MACROBID) 100 mg capsule 14 Cap 0     Sig: Take 1 Cap by mouth two (2) times a day. She will check with pharmacist about anticoag meds and let us know what they say.

## 2021-05-15 RX ORDER — NITROFURANTOIN 25; 75 MG/1; MG/1
100 CAPSULE ORAL 2 TIMES DAILY
Qty: 14 CAP | Refills: 0 | Status: SHIPPED | OUTPATIENT
Start: 2021-05-15 | End: 2021-06-09 | Stop reason: ALTCHOICE

## 2021-05-19 NOTE — TELEPHONE ENCOUNTER
Patient states she called the pharmacy and both Xarelto and Eliquis are covered by her insurance. She has no preference. Please call in one of them to her pharmacy and let her know.      [phone]

## 2021-05-19 NOTE — TELEPHONE ENCOUNTER
Patient advised. Please send pended Rx to [facility]:  Requested Prescriptions     Pending Prescriptions Disp Refills    rivaroxaban (Xarelto) 20 mg tab tablet 30 Tablet 3     Sig: Take 1 Tablet by mouth daily. Signed Prescriptions Disp Refills    nitrofurantoin, macrocrystal-monohydrate, (MACROBID) 100 mg capsule 14 Cap 0     Sig: Take 1 Cap by mouth two (2) times a day.      Authorizing Provider: Loraine García

## 2021-05-28 NOTE — TELEPHONE ENCOUNTER
Requested Prescriptions     Pending Prescriptions Disp Refills    rivaroxaban (Xarelto) 20 mg tab tablet 90 Tablet 3     Sig: Take 1 Tablet by mouth daily.        Last Refill: 5/19/21  Next Appointment:6/9/21

## 2021-06-01 ENCOUNTER — TELEPHONE (OUTPATIENT)
Dept: INTERNAL MEDICINE CLINIC | Age: 76
End: 2021-06-01

## 2021-06-01 ENCOUNTER — HOSPITAL ENCOUNTER (EMERGENCY)
Age: 76
Discharge: HOME | End: 2021-06-01
Attending: EMERGENCY MEDICINE
Payer: MEDICARE

## 2021-06-01 VITALS
OXYGEN SATURATION: 96 % | DIASTOLIC BLOOD PRESSURE: 68 MMHG | SYSTOLIC BLOOD PRESSURE: 150 MMHG | RESPIRATION RATE: 18 BRPM | WEIGHT: 205 LBS | TEMPERATURE: 97.5 F | HEART RATE: 77 BPM | BODY MASS INDEX: 35 KG/M2 | HEIGHT: 64 IN

## 2021-06-01 DIAGNOSIS — S81.801A WOUND OF RIGHT LOWER EXTREMITY, INITIAL ENCOUNTER: ICD-10-CM

## 2021-06-01 DIAGNOSIS — S81.802A WOUND OF LEFT LOWER EXTREMITY, INITIAL ENCOUNTER: Primary | ICD-10-CM

## 2021-06-01 PROCEDURE — 99282 EMERGENCY DEPT VISIT SF MDM: CPT

## 2021-06-01 PROCEDURE — 2709999900 HC NON-CHARGEABLE SUPPLY

## 2021-06-01 PROCEDURE — 74011000250 HC RX REV CODE- 250: Performed by: PHYSICIAN ASSISTANT

## 2021-06-01 RX ORDER — SODIUM HYPOCHLORITE 2.5 MG/ML
SOLUTION TOPICAL
Qty: 1000 ML | Refills: 3 | Status: SHIPPED | OUTPATIENT
Start: 2021-06-01 | End: 2021-06-08 | Stop reason: SDUPTHER

## 2021-06-01 RX ADMIN — HYOSCYAMINE SULFATE: 16 SOLUTION at 16:12

## 2021-06-01 NOTE — ED PROVIDER NOTES
EMERGENCY DEPARTMENT HISTORY AND PHYSICAL EXAM      Date: 6/1/2021  Patient Name: Cristina Acosta    History of Presenting Illness     Chief Complaint   Patient presents with   [facility] Wound Check     Patient was sent here by Dr. Merari Solomon. She has chronic wounds on her LE's and this past weekend a fly landed on her left leg and she got maggots in the wound. They said they washed it off really well but the doctor wanted her to come here to check it out. History Provided By: Patient and Patient's     HPI: Cristina Acosta, 67 y.o. female with PMHx of afib on xarelto, HTN, DM, CKD, CVA with L sided deficits, presents BIB self to the ED with cc of chronic wounds to b/l LEs. Patient states these wounds have been present for a year. Over the weekend patient was cleaning her wounds and maggots were visualized. She states she washed them out really well and there are no longer any maggots in the wound. She contacted her PCPs office to be seen and they referred her here to the ED instead. Patient denies any recent acute changes in the appearance of the rash. She states she has been seeing a podiatrist to manage this rash. She has been applying an unknown ointment to the legs that was made at a compounding pharmacy. She has not been to a wound care clinic. Patient has difficulty ambulating ever since her stroke 1 year ago. Her  and her son take care of her at home and bring her to her appointments. Denies fevers. There are no other complaints, changes, or physical findings at this time. PCP: Nathaniel Blackman MD    No current facility-administered medications on file prior to encounter. Current Outpatient Medications on File Prior to Encounter   Medication Sig Dispense Refill    captopriL (CAPOTEN) 25 mg tablet TAKE 1 TABLET TWICE A  Tablet 2    rivaroxaban (Xarelto) 20 mg tab tablet Take 1 Tablet by mouth daily.  90 Tablet 2    nitrofurantoin, macrocrystal-monohydrate, (MACROBID) 100 mg capsule Take 1 Cap by mouth two (2) times a day. 14 Cap 0    furosemide (LASIX) 40 mg tablet TAKE 1 TABLET DAILY 90 Tab 2    warfarin (COUMADIN) 2 mg tablet TAKE ONE AND ONE-HALF TABLETS DAILY FOR FIVE DAYS A WEEK AND 2 TABLETS ON WEDNESDAY AND SUNDAY (Patient not taking: Reported on 5/19/2021) 135 Tab 0    carvediloL (COREG) 12.5 mg tablet TAKE 1 TABLET TWICE A  Tab 2    pravastatin (PRAVACHOL) 80 mg tablet TAKE 1 TABLET NIGHTLY 90 Tab 3    amLODIPine (NORVASC) 10 mg tablet Take 1 tablet by mouth once daily 30 Tab 0    NovoLIN N NPH U-100 Insulin 100 unit/mL injection INJECT 44 UNITS UNDER THE SKIN IN THE MORNING AND 36 UNITS IN THE EVENING (Patient taking differently: 40u am and 33u pm) 80 mL 3    cloNIDine HCL (CATAPRES) 0.1 mg tablet TAKE 1 TABLET THREE TIMES A  Tab 3    glucose blood VI test strips (FreeStyle Lite Strips) strip USE 1 STRIP TO CHECK GLUCOSE ONCE DAILY 100 Strip 3    lancets (FreeStyle Lancets) 28 gauge misc Daily blood sugar checks 100 Lancet 3    Insulin Syringe-Needle U-100 (BD Insulin Syringe Ult-Fine II) 0.5 mL 31 gauge x 5/16\" syrg 1 Each by Does Not Apply route two (2) times a day. 180 Syringe 3    pomegranate xt/pomegranat seed (POMEGRANATE PO) Take  by mouth.  vit C/E/Zn/coppr/lutein/zeaxan (PRESERVISION AREDS-2 PO) Take  by mouth.  traMADol (ULTRAM) 50 mg tablet Take 50 mg by mouth four (4) times daily as needed for Pain.  flash glucose scanning reader (FREESTYLE JOSE LUIS READER) [facility] Use to check blood sugars daily  DX: E11.9 1 Device 0    flash glucose sensor (FREESTYLE JOSE LUIS SENSOR) kit Use to check blood sugars daily  DX: E11.9 1 Device 0    LORazepam (ATIVAN) 0.5 mg tablet Take 1 Tab by mouth nightly as needed. Max Daily Amount: 0.5 mg. 90 Tab 0    ergocalciferol (VITAMIN D2) 50,000 unit capsule Take 50,000 Units by mouth every seven (7) days.  methIMAzole (TAPAZOLE) 10 mg tablet Take  by mouth daily.       Blood-Glucose Meter (FREESTYLE FREEDOM LITE) monitoring kit Daily blood sugar checks 1 Kit 0    naloxone (NARCAN) 4 mg/actuation nasal spray Use 1 spray intranasally into 1 nostril. Indications: OPIATE-INDUCED RESPIRATORY DEPRESSION, Opioid Toxicity 1 Each 0    latanoprost (XALATAN) 0.005 % ophthalmic solution Administer 1 Drop to both eyes nightly.  aspirin (ASPIRIN) 325 mg tablet Take 1 Tab by mouth daily. Past History     Past Medical History:  Past Medical History:   Diagnosis Date    Anticoagulant long-term use 10/13/2017    Anxiety 10/13/2017    Atrial fibrillation ([address] 10/13/2017    Breast calcification, left 10/13/2017    CAD (coronary artery disease)     Cellulitis of both lower extremities 10/13/2017    Chronic kidney disease     kidney stones    Chronic pain syndrome 10/13/2017    Chronic prescription opiate use 11/15/2017    CVA (cerebral vascular accident) ([address] 10/13/2017    Diabetes ([address]     DJD (degenerative joint disease) 10/13/2017    Dyslipidemia 10/13/2017    Glaucoma 10/13/2017    Hypertension     Hyperthyroidism 10/13/2017    Long-term use of high-risk medication 10/13/2017    Stasis ulcer of lower extremity ([address] 10/13/2017    Stroke ([address]     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 ([address] 8/18/2012    retinopathy        Past Surgical History:  Past Surgical History:   Procedure Laterality Date    HX BREAST BIOPSY Left     2014    HX GYN      hysterectomty    HX ORTHOPAEDIC      back surgery    CT CARDIAC SURG PROCEDURE UNLIST      cagb       Family History:  Family History   Problem Relation Age of Onset    Heart Disease Father        Social History:  Social History     Tobacco Use    Smoking status: Never Smoker    Smokeless tobacco: Never Used   Substance Use Topics    Alcohol use: No    Drug use: No       Allergies:   Allergies   Allergen Reactions    Betadine Prepstick Rash    Keflex [Cephalexin] Hives     Pt breaks out in hives         Review of Systems   Review of Systems   Constitutional: Negative for fever. Respiratory: Negative for shortness of breath. Cardiovascular: Negative for chest pain. Skin: Positive for rash and wound. Hematological: Bruises/bleeds easily. All other systems reviewed and are negative. Physical Exam   Physical Exam  Vitals and nursing note reviewed. Constitutional:       General: She is not in acute distress. Appearance: She is well-developed. Comments: Obese, chronically ill-appearing   HENT:      Head: Normocephalic and atraumatic. Nose: Nose normal.      Mouth/Throat:      Mouth: Mucous membranes are moist.   Eyes:      General: Lids are normal.      Extraocular Movements: Extraocular movements intact. Conjunctiva/sclera: Conjunctivae normal.   Cardiovascular:      Rate and Rhythm: Normal rate. Rhythm irregular. Pulmonary:      Effort: Pulmonary effort is normal.      Breath sounds: Normal breath sounds. Abdominal:      Palpations: Abdomen is soft. Musculoskeletal:      Cervical back: Normal range of motion and neck supple. Comments: Left-sided deficits and prior CVA   Skin:     Comments: Chronic rash of bilateral lower extremities extending to distal thighs. See photos. Neurological:      General: No focal deficit present. Mental Status: She is alert and oriented to person, place, and time. Psychiatric:         Mood and Affect: Mood normal.         Behavior: Behavior is cooperative. Comments: Poor insight                                 Diagnostic Study Results     Labs -   No results found for this or any previous visit (from the past 12 hour(s)). Radiologic Studies -   No orders to display     CT Results  (Last 48 hours)    None        CXR Results  (Last 48 hours)    None            Medical Decision Making   I am the first provider for this patient.     I reviewed the vital signs, available nursing notes, past medical history, past surgical history, family history and social history. Vital Signs-Reviewed the patient's vital signs. Patient Vitals for the past 12 hrs:   Temp Pulse Resp BP SpO2   06/01/21 1452 97.5 °F (36.4 °C) 77 18 (!) 150/68 96 %       Records Reviewed: Nursing Notes    Provider Notes (Medical Decision Making):   Long discussion with patient regarding her chronic wounds. Strongly encouraged her to establish care with the [facility] [address] wound clinic for further evaluation and treatment. She will require many visits and long-term follow-up. Discharged home with a bottle of Dakin's solution, advised on use. Patient and spouse voiced understanding and agreement this plan. ED Course:   Initial assessment performed. The patients presenting problems have been discussed, and they are in agreement with the care plan formulated and outlined with them. I have encouraged them to ask questions as they arise throughout their visit. Consulted with Roberth Villagomez MD, regarding plan. Critical Care Time: None    Disposition:  D/c    PLAN:  1. Discharge Medication List as of 6/1/2021  4:13 PM      START taking these medications    Details   sodium hypochlorite (Dakin's Solution) external solution Apply to wounds twice daily and cover with dressing, Normal, Disp-1000 mL, R-3         CONTINUE these medications which have NOT CHANGED    Details   captopriL (CAPOTEN) 25 mg tablet TAKE 1 TABLET TWICE A DAY, Normal, Disp-180 Tablet, R-2      rivaroxaban (Xarelto) 20 mg tab tablet Take 1 Tablet by mouth daily. , Normal, Disp-90 Tablet, R-2      nitrofurantoin, macrocrystal-monohydrate, (MACROBID) 100 mg capsule Take 1 Cap by mouth two (2) times a day., Normal, Disp-14 Cap, R-0      furosemide (LASIX) 40 mg tablet TAKE 1 TABLET DAILY, Normal, Disp-90 Tab, R-2      warfarin (COUMADIN) 2 mg tablet TAKE ONE AND ONE-HALF TABLETS DAILY FOR FIVE DAYS A WEEK AND 2 TABLETS ON WEDNESDAY AND SUNDAY, Normal, Disp-135 Tab, R-0      carvediloL (COREG) 12.5 mg tablet TAKE 1 TABLET TWICE A DAY, Normal, Disp-180 Tab, R-2      pravastatin (PRAVACHOL) 80 mg tablet TAKE 1 TABLET NIGHTLY, Normal, Disp-90 Tab, R-3      amLODIPine (NORVASC) 10 mg tablet Take 1 tablet by mouth once daily, Normal, Disp-30 Tab, R-0      NovoLIN N NPH U-100 Insulin 100 unit/mL injection INJECT 44 UNITS UNDER THE SKIN IN THE MORNING AND 36 UNITS IN THE EVENING, Normal, Disp-80 mL,R-3      cloNIDine HCL (CATAPRES) 0.1 mg tablet TAKE 1 TABLET THREE TIMES A DAY, Normal, Disp-270 Tab,R-3      glucose blood VI test strips (FreeStyle Lite Strips) strip USE 1 STRIP TO CHECK GLUCOSE ONCE DAILY, Normal, Disp-100 Strip,R-3      lancets (FreeStyle Lancets) 28 gauge misc Daily blood sugar checks, Normal, Disp-100 Lancet,R-3      Insulin Syringe-Needle U-100 (BD Insulin Syringe Ult-Fine II) 0.5 mL 31 gauge x 5/16\" syrg 1 Each by Does Not Apply route two (2) times a day., Normal, Disp-180 Syringe,R-3      pomegranate xt/pomegranat seed (POMEGRANATE PO) Take  by mouth., Historical Med      vit C/E/Zn/coppr/lutein/zeaxan (PRESERVISION AREDS-2 PO) Take  by mouth., Historical Med      traMADol (ULTRAM) 50 mg tablet Take 50 mg by mouth four (4) times daily as needed for Pain., Historical Med      flash glucose scanning reader (FREESTYLE JOSE LUIS READER) [facility] [place] to check blood sugars daily  DX: E11.9, Print, Disp-1 Device, R-0      flash glucose sensor (FREESTYLE JOSE LUIS SENSOR) kit Use to check blood sugars daily  DX: E11.9, Print, Disp-1 Device, R-0      LORazepam (ATIVAN) 0.5 mg tablet Take 1 Tab by mouth nightly as needed. Max Daily Amount: 0.5 mg., Print, Disp-90 Tab, R-0      ergocalciferol (VITAMIN D2) 50,000 unit capsule Take 50,000 Units by mouth every seven (7) days. , Historical Med      methIMAzole (TAPAZOLE) 10 mg tablet Take  by mouth daily. , Historical Med      Blood-Glucose Meter (FREESTYLE FREEDOM LITE) monitoring kit Daily blood sugar checks, Normal, Disp-1 Kit, R-0      naloxone (NARCAN) 4 mg/actuation nasal spray Use 1 spray intranasally into 1 nostril. Indications: OPIATE-INDUCED RESPIRATORY DEPRESSION, Opioid Toxicity, Normal, Disp-1 Each, R-0      latanoprost (XALATAN) 0.005 % ophthalmic solution Administer 1 Drop to both eyes nightly., Historical Med      aspirin (ASPIRIN) 325 mg tablet Take 1 Tab by mouth daily. , OTC           2. Follow-up Information     Follow up With Specialties Details Why Contact Info    \[facility]\"" EMERGENCY DEPT Emergency Medicine  As needed, If symptoms worsen [address] [place]  Call today For follow up [address] [place] [address]  [phone]        Return to ED if worse     Diagnosis     Clinical Impression:   1. Wound of left lower extremity, initial encounter    2. Wound of right lower extremity, initial encounter          Please note that this dictation was completed with eÃ‡ift, the computer voice recognition software. Quite often unanticipated grammatical, syntax, homophones, and other interpretive errors are inadvertently transcribed by the computer software. Please disregards these errors. Please excuse any errors that have escaped final proofreading.

## 2021-06-01 NOTE — DISCHARGE INSTRUCTIONS
It is imperative that you follow-up with the wound care center for management of these chronic wounds. You will need long-term follow-up with regular visits for resolution of these wounds.

## 2021-06-01 NOTE — ED NOTES
Mercedeskins applied to legs and then legs, then new gauze then ace wrap placed exactly as it was when patient came in.

## 2021-06-01 NOTE — TELEPHONE ENCOUNTER
Patient states she has an infected wound on the top of her L foot. Home health nurse from [facility] come out on Saturday and took her sock/bandage off and the wound had maggots in it. The nurse advised she go to the ER for wound care and antibiotics but she did not go.  She wanted to know if you could prescribe her antibiotics or do you also recommend she go to the ER?

## 2021-06-08 RX ORDER — SODIUM HYPOCHLORITE 2.5 MG/ML
SOLUTION TOPICAL
Qty: 1000 ML | Refills: 3 | Status: SHIPPED | OUTPATIENT
Start: 2021-06-08

## 2021-06-09 ENCOUNTER — OFFICE VISIT (OUTPATIENT)
Dept: INTERNAL MEDICINE CLINIC | Age: 76
End: 2021-06-09
Payer: MEDICARE

## 2021-06-09 VITALS
HEIGHT: 64 IN | OXYGEN SATURATION: 97 % | HEART RATE: 70 BPM | SYSTOLIC BLOOD PRESSURE: 120 MMHG | WEIGHT: 220 LBS | BODY MASS INDEX: 37.56 KG/M2 | RESPIRATION RATE: 20 BRPM | DIASTOLIC BLOOD PRESSURE: 68 MMHG

## 2021-06-09 DIAGNOSIS — E03.9 ACQUIRED HYPOTHYROIDISM: Chronic | ICD-10-CM

## 2021-06-09 DIAGNOSIS — E11.3299 TYPE 2 DIABETES MELLITUS WITH BACKGROUND RETINOPATHY (HCC): Chronic | ICD-10-CM

## 2021-06-09 DIAGNOSIS — I89.0 LYMPHEDEMA OF BOTH LOWER EXTREMITIES: ICD-10-CM

## 2021-06-09 DIAGNOSIS — Z11.59 NEED FOR HEPATITIS C SCREENING TEST: ICD-10-CM

## 2021-06-09 DIAGNOSIS — I10 ESSENTIAL HYPERTENSION, BENIGN: Chronic | ICD-10-CM

## 2021-06-09 DIAGNOSIS — I48.0 PAF (PAROXYSMAL ATRIAL FIBRILLATION) (HCC): Chronic | ICD-10-CM

## 2021-06-09 DIAGNOSIS — E11.21 TYPE 2 DIABETES WITH NEPHROPATHY (HCC): ICD-10-CM

## 2021-06-09 DIAGNOSIS — I63.9 CEREBROVASCULAR ACCIDENT (CVA), UNSPECIFIED MECHANISM (HCC): Chronic | ICD-10-CM

## 2021-06-09 DIAGNOSIS — I25.10 ASCVD (ARTERIOSCLEROTIC CARDIOVASCULAR DISEASE): Chronic | ICD-10-CM

## 2021-06-09 DIAGNOSIS — E78.5 DYSLIPIDEMIA: Chronic | ICD-10-CM

## 2021-06-09 DIAGNOSIS — Z79.01 ANTICOAGULANT LONG-TERM USE: Chronic | ICD-10-CM

## 2021-06-09 DIAGNOSIS — Z00.00 MEDICARE ANNUAL WELLNESS VISIT, SUBSEQUENT: Primary | ICD-10-CM

## 2021-06-09 DIAGNOSIS — Z79.899 LONG-TERM USE OF HIGH-RISK MEDICATION: Chronic | ICD-10-CM

## 2021-06-09 PROBLEM — L97.909 STASIS ULCER OF LOWER EXTREMITY (HCC): Status: RESOLVED | Noted: 2017-10-13 | Resolved: 2021-06-09

## 2021-06-09 PROBLEM — I83.009 STASIS ULCER OF LOWER EXTREMITY (HCC): Status: RESOLVED | Noted: 2017-10-13 | Resolved: 2021-06-09

## 2021-06-09 LAB
ALBUMIN SERPL-MCNC: 3.2 G/DL (ref 3.5–5)
ALBUMIN/GLOB SERPL: 0.6 {RATIO} (ref 1.1–2.2)
ALP SERPL-CCNC: 80 U/L (ref 45–117)
ALT SERPL-CCNC: 16 U/L (ref 12–78)
ANION GAP SERPL CALC-SCNC: 7 MMOL/L (ref 5–15)
AST SERPL-CCNC: 21 U/L (ref 15–37)
BILIRUB SERPL-MCNC: 0.4 MG/DL (ref 0.2–1)
BUN SERPL-MCNC: 18 MG/DL (ref 6–20)
BUN/CREAT SERPL: 17 (ref 12–20)
CALCIUM SERPL-MCNC: 9.3 MG/DL (ref 8.5–10.1)
CHLORIDE SERPL-SCNC: 109 MMOL/L (ref 97–108)
CO2 SERPL-SCNC: 23 MMOL/L (ref 21–32)
CREAT SERPL-MCNC: 1.05 MG/DL (ref 0.55–1.02)
GLOBULIN SER CALC-MCNC: 5.2 G/DL (ref 2–4)
GLUCOSE SERPL-MCNC: 225 MG/DL (ref 65–100)
POTASSIUM SERPL-SCNC: 4.6 MMOL/L (ref 3.5–5.1)
PROT SERPL-MCNC: 8.4 G/DL (ref 6.4–8.2)
SODIUM SERPL-SCNC: 139 MMOL/L (ref 136–145)

## 2021-06-09 PROCEDURE — G8510 SCR DEP NEG, NO PLAN REQD: HCPCS | Performed by: INTERNAL MEDICINE

## 2021-06-09 PROCEDURE — G8400 PT W/DXA NO RESULTS DOC: HCPCS | Performed by: INTERNAL MEDICINE

## 2021-06-09 PROCEDURE — G8754 DIAS BP LESS 90: HCPCS | Performed by: INTERNAL MEDICINE

## 2021-06-09 PROCEDURE — G8427 DOCREV CUR MEDS BY ELIG CLIN: HCPCS | Performed by: INTERNAL MEDICINE

## 2021-06-09 PROCEDURE — 99214 OFFICE O/P EST MOD 30 MIN: CPT | Performed by: INTERNAL MEDICINE

## 2021-06-09 PROCEDURE — G0439 PPPS, SUBSEQ VISIT: HCPCS | Performed by: INTERNAL MEDICINE

## 2021-06-09 PROCEDURE — G8417 CALC BMI ABV UP PARAM F/U: HCPCS | Performed by: INTERNAL MEDICINE

## 2021-06-09 PROCEDURE — G8752 SYS BP LESS 140: HCPCS | Performed by: INTERNAL MEDICINE

## 2021-06-09 PROCEDURE — 1101F PT FALLS ASSESS-DOCD LE1/YR: CPT | Performed by: INTERNAL MEDICINE

## 2021-06-09 PROCEDURE — 1090F PRES/ABSN URINE INCON ASSESS: CPT | Performed by: INTERNAL MEDICINE

## 2021-06-09 PROCEDURE — G8536 NO DOC ELDER MAL SCRN: HCPCS | Performed by: INTERNAL MEDICINE

## 2021-06-09 NOTE — PROGRESS NOTES
Artem Albarado is a 67 y.o. female and presents with Follow Up Chronic Condition and Annual Wellness Visit  . Subjective:  Mrs. Tiffany Beltran is a 69-year-old female who presents to the office today for Medicare wellness check and follow-up of multiple medical problems. 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 Presently she remains on Novolin and 40 units in the morning and 33 units in the evening. She checks her blood sugars twice daily with average fasting blood sugar of 117 and 2-hour postprandial blood sugar of 180. She denies any hypoglycemia. She does have a diabetic retinopathy for which she is seen and followed by Dr. Nieves Huddleston. She denies any burning paresthesias of her feet. The patient has hypertension currently on amlodipine, captopril, Lasix, carvedilol, clonidine. She tolerates this without orthostatic dizziness, cough, muscle cramping. She does have chronic lower extremity lymphedema for which she occasionally uses a lymphedema pump. [facility] has been seeing her at home twice weekly because of frequent skin skin breakdown. She notes that her legs have been swollen more recently as she has not used her lymphedema pumps in at least a week. She denies PND or orthopnea. Hypercholesterolemia currently managed on pravastatin therapy. She denies muscle soreness or GI upset. She does have a history of CABG x4 but denies any exertional chest pains or claudication. She has hypothyroidism currently on thyroid replacement. She continues to take her medication on an empty stomach first thing in the morning with water. She denies heat or cold intolerance, changes in skin or hair, weight has been stable. She has a history of paroxysmal atrial fibrillation. She had been on long-term Coumadin anticoagulation until obtaining blood work became an issue. She has now on Xarelto and is tolerating this well and is compliant with its usage.   The patient is status post stroke with chronic left hemiplegia. Past Medical History:   Diagnosis Date    Anticoagulant long-term use 10/13/2017    Anxiety 10/13/2017    Atrial fibrillation ([address] 10/13/2017    Breast calcification, left 10/13/2017    CAD (coronary artery disease)     Cellulitis of both lower extremities 10/13/2017    Chronic kidney disease     kidney stones    Chronic pain syndrome 10/13/2017    Chronic prescription opiate use 11/15/2017    CVA (cerebral vascular accident) ([address] 10/13/2017    Diabetes ([address]     DJD (degenerative joint disease) 10/13/2017    Dyslipidemia 10/13/2017    Glaucoma 10/13/2017    Hypertension     Hyperthyroidism 10/13/2017    Long-term use of high-risk medication 10/13/2017    Stasis ulcer of lower extremity ([address] 10/13/2017    Stroke ([address]     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 ([address] 8/18/2012    retinopathy      Past Surgical History:   Procedure Laterality Date    HX BREAST BIOPSY Left     2014    HX GYN      hysterectomty    HX ORTHOPAEDIC      back surgery    TN CARDIAC SURG PROCEDURE UNLIST      cagb     Allergies   Allergen Reactions    Betadine Prepstick Rash    Keflex [Cephalexin] Hives     Pt breaks out in hives     Current Outpatient Medications   Medication Sig Dispense Refill    sodium hypochlorite (Dakin's Solution) external solution Apply to wounds twice daily and cover with dressing 1000 mL 3    amLODIPine (NORVASC) 10 mg tablet TAKE 1 TABLET DAILY 90 Tablet 1    captopriL (CAPOTEN) 25 mg tablet TAKE 1 TABLET TWICE A  Tablet 2    rivaroxaban (Xarelto) 20 mg tab tablet Take 1 Tablet by mouth daily.  90 Tablet 2    furosemide (LASIX) 40 mg tablet TAKE 1 TABLET DAILY 90 Tab 2    carvediloL (COREG) 12.5 mg tablet TAKE 1 TABLET TWICE A  Tab 2    pravastatin (PRAVACHOL) 80 mg tablet TAKE 1 TABLET NIGHTLY 90 Tab 3    NovoLIN N NPH U-100 Insulin 100 unit/mL injection INJECT 44 UNITS UNDER THE SKIN IN THE MORNING AND 36 UNITS IN THE EVENING (Patient taking differently: 40u am and 33u pm) 80 mL 3    cloNIDine HCL (CATAPRES) 0.1 mg tablet TAKE 1 TABLET THREE TIMES A  Tab 3    glucose blood VI test strips (FreeStyle Lite Strips) strip USE 1 STRIP TO CHECK GLUCOSE ONCE DAILY 100 Strip 3    lancets (FreeStyle Lancets) 28 gauge misc Daily blood sugar checks 100 Lancet 3    Insulin Syringe-Needle U-100 (BD Insulin Syringe Ult-Fine II) 0.5 mL 31 gauge x 5/16\" syrg 1 Each by Does Not Apply route two (2) times a day. 180 Syringe 3    pomegranate xt/pomegranat seed (POMEGRANATE PO) Take  by mouth.  vit C/E/Zn/coppr/lutein/zeaxan (PRESERVISION AREDS-2 PO) Take  by mouth.  flash glucose scanning reader (FREESTYLE JOSE LUIS READER) [facility] Use to check blood sugars daily  DX: E11.9 1 Device 0    flash glucose sensor (FREESTYLE JOSE LUIS SENSOR) kit Use to check blood sugars daily  DX: E11.9 1 Device 0    LORazepam (ATIVAN) 0.5 mg tablet Take 1 Tab by mouth nightly as needed. Max Daily Amount: 0.5 mg. 90 Tab 0    ergocalciferol (VITAMIN D2) 50,000 unit capsule Take 50,000 Units by mouth every seven (7) days.  Blood-Glucose Meter (FREESTYLE FREEDOM LITE) monitoring kit Daily blood sugar checks 1 Kit 0    naloxone (NARCAN) 4 mg/actuation nasal spray Use 1 spray intranasally into 1 nostril. Indications: OPIATE-INDUCED RESPIRATORY DEPRESSION, Opioid Toxicity 1 Each 0    latanoprost (XALATAN) 0.005 % ophthalmic solution Administer 1 Drop to both eyes nightly.  aspirin (ASPIRIN) 325 mg tablet Take 1 Tab by mouth daily. Social History     Socioeconomic History    Marital status:      Spouse name: Not on file    Number of children: Not on file    Years of education: Not on file    Highest education level: Not on file   Tobacco Use    Smoking status: Never Smoker    Smokeless tobacco: Never Used   Vaping Use    Vaping Use: Never used   Substance and Sexual Activity    Alcohol use: No    Drug use:  No Social Determinants of Health     Financial Resource Strain:     Difficulty of Paying Living Expenses:    Food Insecurity:     Worried About Running Out of Food in the Last Year:     [address] in the Last Year:    Transportation Needs:     Lack of Transportation (Medical):  Lack of Transportation (Non-Medical):    Physical Activity:     Days of Exercise per Week:     Minutes of Exercise per Session:    Stress:     Feeling of Stress :    Social Connections:     Frequency of Communication with Friends and Family:     Frequency of Social Gatherings with Friends and Family:     Attends Congregation Services:     Active Member of Clubs or Organizations:     Attends Club or Organization Meetings:     Marital Status:      Family History   Problem Relation Age of Onset    Heart Disease Father        Health Maintenance   Topic Date Due    Hepatitis C Screening  Never done    COVID-19 Vaccine (1) Never done    DTaP/Tdap/Td series (1 - Tdap) Never done    Shingrix Vaccine Age 50> (1 of 2) Never done    Foot Exam Q1  07/23/2019    Eye Exam Retinal or Dilated  04/19/2020    Lipid Screen  01/02/2021    Bone Densitometry (Dexa) Screening  06/09/2022 (Originally 4/8/2009)    MICROALBUMIN Q1  05/06/2022    Medicare Yearly Exam  06/10/2022    Flu Vaccine  Completed    Pneumococcal 65+ years  Completed        Review of Systems  Constitutional: negative for fevers, chills, anorexia and weight loss  Eyes:   negative for visual disturbance and irritation  ENT:   negative for tinnitus,sore throat,nasal congestion,ear pain,hoarseness  Respiratory:  negative for cough, hemoptysis, dyspnea,wheezing  CV:   negative for chest pain, palpitations.   Positive for chronic lower extremity edema GI:   negative for nausea, vomiting, diarrhea, abdominal pain,melena  Endo:               negative for polyuria,polydipsia,polyphagia,heat intolerance  Genitourinary: negative for frequency, dysuria and hematuria  Integumentary: negative for rash and pruritus  Hematologic:  negative for easy bruising and gum/nose bleeding  Musculoskel: negative for myalgias, arthralgias, back pain, muscle weakness, joint pain  Neurological:  Positive for left hemiplegia related to previous stroke  Behavl/Psych: negative for feelings of anxiety, depression, mood changes  ROS otherwise negative      Objective:  Visit Vitals  /68 (BP 1 Location: Right arm, BP Patient Position: Sitting, BP Cuff Size: Adult)   Pulse 70   Resp 20   Ht 5' 4\" (1.626 m) Comment: patient reported   Wt 220 lb (99.8 kg) Comment: patient reported   SpO2 97%   BMI 37.76 kg/m²     Body mass index is 37.76 kg/m². Physical Exam:   General appearance - alert, chronically ill, and in no distress  Mental status - alert, oriented to person, place, and time  EYE-NICK, EOMI,conjunctiva normal bilaterally, lids normal  ENT-ENT exam normal, no neck nodes or sinus tenderness  Nose - normal and patent, no erythema,  Or discharge   Mouth - mucous membranes moist, pharynx normal without lesions  Neck - supple, no significant adenopathy or bruit  Chest - clear to auscultation, no wheezes, rales or rhonchi. Heart - normal rate, regular rhythm, normal S1, S2, no murmurs, rubs, clicks or gallops   Abdomen - soft, nontender, nondistended, no masses or organomegaly  Lymph- no adenopathy palpable  Ext-4+ lymphedema present without cellulitis  Skin-Warm and dry. no hyperpigmentation, vitiligo, or suspicious lesions  Neuro -alert, oriented, normal speech chronic left-sided hemiplegia present   in addition this patient is seen for AWV  as detailed below: This is a Subsequent Medicare Annual Wellness Exam (AWV) (Performed 12 months after IPPE or effective date of Medicare Part B enrollment)    I have reviewed the patient's medical history in detail and updated the computerized patient record. Problem list reviewed with patient and risk factors discussed.   PSH, SH, FH, Medications and HM issues also reviewed and discussed. Depression screen, fall risk assessment, functional abilities and ACP also reviewed and discussed as above and below. Depression Risk Factor Screening:     3 most recent PHQ Screens 6/9/2021   Little interest or pleasure in doing things Not at all   Feeling down, depressed, irritable, or hopeless Not at all   Total Score PHQ 2 0     Alcohol Risk Factor Screening:   Do you average more than 1 drink per night or more than 7 drinks a week:  No    On any one occasion in the past three months have you have had more than 3 drinks containing alcohol:  No    Functional Ability and Level of Safety:   Hearing Loss  Hearing is good. Activities of Daily Living  The home contains: handrails and grab bars  Patient needs help with:  transportation, shopping, preparing meals, laundry, housework, dressing, bathing, hygiene, bathroom needs and walking    Fall Risk  Fall Risk Assessment, last 12 mths 6/9/2021   Able to walk? Yes   Fall in past 12 months? 0   Do you feel unsteady? 0   Are you worried about falling 0       Abuse Screen  Patient is not abused    Cognitive Screening   Evaluation of Cognitive Function:  Has your family/caregiver stated any concerns about your memory: no  Normal    Patient Care Team   Patient Care Team:  Jessica Newamn MD as PCP - General (Internal Medicine)  Jessica Newman MD as PCP - [facility] Empaneled Provider  Drake Tinajero DPM (Podiatry)  Cindy Huagn MD (Endocrinology)    Assessment/Plan   Education and counseling provided:  Are appropriate based on today's review and evaluation    Assessment/Plan:   Impressions:      ICD-10-CM ICD-9-CM    1. Medicare annual wellness visit, subsequent  Z00.00 V70.0    2. Type 2 diabetes with nephropathy ([facility])  E11.21 250.40 HEMOGLOBIN A1C WITH EAG     583.81 MICROALBUMIN, UR, RAND W/ MICROALB/CREAT RATIO   3.  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 ([facility]  E11.3299 250.50 362.01    4. Essential hypertension, benign  I10 401.1 COLLECTION VENOUS BLOOD,VENIPUNCTURE      CBC WITH AUTOMATED DIFF      METABOLIC PANEL, COMPREHENSIVE      URINALYSIS W/ REFLEX CULTURE   5. Dyslipidemia  E78.5 272.4 LIPID PANEL   6. Long-term use of high-risk medication  Z79.899 V58.69 CK   7. ASCVD (arteriosclerotic cardiovascular disease)  I25.10 429.2      440.9    8. Cerebrovascular accident (CVA), unspecified mechanism ([address]  I63.9 434.91    9. PAF (paroxysmal atrial fibrillation) (HCC)  I48.0 427.31    10. Anticoagulant long-term use  Z79.01 V58.61    11. Acquired hypothyroidism  E03.9 244.9 T4, FREE      TSH 3RD GENERATION   12. Need for hepatitis C screening test  Z11.59 V73.89 HEPATITIS C AB   13. Lymphedema of both lower extremities  I89.0 457.1         Plan:  1. Continue present meds  2. Lifestyle modifications including Na restriction, low carb/fat diet, weight reduction and exercise (at least a walking program).               Orders Placed This Encounter    HEPATITIS C AB     Standing Status:   Future     Standing Expiration Date:   6/9/2022    CBC WITH AUTOMATED DIFF     Standing Status:   Future     Standing Expiration Date:   6/9/2022    CK     Standing Status:   Future     Standing Expiration Date:   6/9/2022    HEMOGLOBIN A1C WITH EAG     Standing Status:   Future     Standing Expiration Date:   6/9/2022    LIPID PANEL     Standing Status:   Future     Standing Expiration Date:   7/8/0555    METABOLIC PANEL, COMPREHENSIVE     Standing Status:   Future     Standing Expiration Date:   6/9/2022    MICROALBUMIN, UR, RAND W/ MICROALB/CREAT RATIO     Standing Status:   Future     Standing Expiration Date:   6/9/2022    T4, FREE     Standing Status:   Future     Standing Expiration Date:   6/9/2022    TSH 3RD GENERATION     Standing Status:   Future     Standing Expiration Date:   6/9/2022    URINALYSIS W/ REFLEX CULTURE     Standing Status:   Future     Standing Expiration Date: 6/9/2022    COLLECTION VENOUS BLOOD,VENIPUNCTURE       Emma Frausto MD   Assessment/Plan:  Diagnoses and all orders for this visit:    Medicare annual wellness visit, subsequent    Type 2 diabetes with nephropathy ([facility]  -     HEMOGLOBIN A1C WITH EAG; Future  -     MICROALBUMIN, UR, RAND W/ MICROALB/CREAT RATIO; Future    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 ([facility]    Essential hypertension, benign  -     COLLECTION VENOUS BLOOD,VENIPUNCTURE  -     CBC WITH AUTOMATED DIFF; Future  -     METABOLIC PANEL, COMPREHENSIVE; Future  -     URINALYSIS W/ REFLEX CULTURE; Future    Dyslipidemia  -     LIPID PANEL; Future    Long-term use of high-risk medication  -     CK; Future    ASCVD (arteriosclerotic cardiovascular disease)    Cerebrovascular accident (CVA), unspecified mechanism (HCC)    PAF (paroxysmal atrial fibrillation) (HCC)    Anticoagulant long-term use    Acquired hypothyroidism  -     T4, FREE; Future  -     TSH 3RD GENERATION; Future    Need for hepatitis C screening test  -     HEPATITIS C AB; Future    Lymphedema of both lower extremities        Health Maintenance Due   Topic Date Due    Hepatitis C Screening  Never done    COVID-19 Vaccine (1) Never done    DTaP/Tdap/Td series (1 - Tdap) Never done    Shingrix Vaccine Age 50> (1 of 2) Never done    Foot Exam Q1  07/23/2019    Eye Exam Retinal or Dilated  04/19/2020    Lipid Screen  01/02/2021       Other instructions: The patient's medications were reviewed and reconciled. No change in her current medical regimen will be made. A prudent diet is encouraged    Continue to check blood sugars twice daily    She has an appointment to see her retina specialist in the next 1 to 2 weeks. Health maintenance issues were reviewed and bone density testing is declined. CDC recommended hepatitis C screening will be obtained. Patient is up-to-date on Covid-19 vaccination.     Tdap and shingles vaccine series have been recommended for the future. Await results of multiple labs    Have recommended use of her lymphedema pump at least daily    Continued home health nursing visits    Follow-up in 6 months        I have reviewed with the patient details of the assessment and plan and all questions were answered. Relevent patient education was performed. The most recent lab findings were reviewed with the patient. An After Visit Summary was printed and given to the patient. Edy Spencer MD    Please note that this dictation was completed with [employer], the computer voice recognition software. Quite often unanticipated grammatical, syntax, homophones, and other interpretive errors are inadvertently transcribed by the computer software. Please disregard these errors. Please excuse any errors that have escaped final proofreading.

## 2021-06-09 NOTE — PATIENT INSTRUCTIONS
Learning About Carbohydrate (Carb) Counting and Eating Out When You Have Diabetes  Why plan your meals? Meal planning can be a key part of managing diabetes. Planning meals and snacks with the right balance of carbohydrate, protein, and fat can help you keep your blood sugar at the target level you set with your doctor. You don't have to eat special foods. You can eat what your family eats, including sweets once in a while. But you do have to pay attention to how often you eat and how much you eat of certain foods. You may want to work with a dietitian or a certified diabetes educator. He or she can give you tips and meal ideas and can answer your questions about meal planning. This health professional can also help you reach a healthy weight if that is one of your goals. What should you know about eating carbs? Managing the amount of carbohydrate (carbs) you eat is an important part of healthy meals when you have diabetes. Carbohydrate is found in many foods. · Learn which foods have carbs. And learn the amounts of carbs in different foods. ? Bread, cereal, pasta, and rice have about 15 grams of carbs in a serving. A serving is 1 slice of bread (1 ounce), ½ cup of cooked cereal, or 1/3 cup of cooked pasta or rice. ? Fruits have 15 grams of carbs in a serving. A serving is 1 small fresh fruit, such as an apple or orange; ½ of a banana; ½ cup of cooked or canned fruit; ½ cup of fruit juice; 1 cup of melon or raspberries; or 2 tablespoons of dried fruit. ? Milk and no-sugar-added yogurt have 15 grams of carbs in a serving. A serving is 1 cup of milk or 2/3 cup of no-sugar-added yogurt. ? Starchy vegetables have 15 grams of carbs in a serving. A serving is ½ cup of mashed potatoes or sweet potato; 1 cup winter squash; ½ of a small baked potato; ½ cup of cooked beans; or ½ cup cooked corn or green peas.   · Learn how much carbs to eat each day and at each meal. A dietitian or CDE can teach you how to keep track of the amount of carbs you eat. This is called carbohydrate counting. · If you are not sure how to count carbohydrate grams, use the Plate Method to plan meals. It is a good, quick way to make sure that you have a balanced meal. It also helps you spread carbs throughout the day. ? Divide your plate by types of foods. Put non-starchy vegetables on half the plate, meat or other protein food on one-quarter of the plate, and a grain or starchy vegetable in the final quarter of the plate. To this you can add a small piece of fruit and 1 cup of milk or yogurt, depending on how many carbs you are supposed to eat at a meal.  · Try to eat about the same amount of carbs at each meal. Do not \"save up\" your daily allowance of carbs to eat at one meal.  · Proteins have very little or no carbs per serving. Examples of proteins are beef, chicken, turkey, fish, eggs, tofu, cheese, cottage cheese, and peanut butter. A serving size of meat is 3 ounces, which is about the size of a deck of cards. Examples of meat substitute serving sizes (equal to 1 ounce of meat) are 1/4 cup of cottage cheese, 1 egg, 1 tablespoon of peanut butter, and ½ cup of tofu. How can you eat out and still eat healthy? · Learn to estimate the serving sizes of foods that have carbohydrate. If you measure food at home, it will be easier to estimate the amount in a serving of restaurant food. · If the meal you order has too much carbohydrate (such as potatoes, corn, or baked beans), ask to have a low-carbohydrate food instead. Ask for a salad or green vegetables. · If you use insulin, check your blood sugar before and after eating out to help you plan how much to eat in the future. · If you eat more carbohydrate at a meal than you had planned, take a walk or do other exercise. This will help lower your blood sugar. What are some tips for eating healthy? · Limit saturated fat, such as the fat from meat and dairy products.  This is a healthy choice because people who have diabetes are at higher risk of heart disease. So choose lean cuts of meat and nonfat or low-fat dairy products. Use olive or canola oil instead of butter or shortening when cooking. · Don't skip meals. Your blood sugar may drop too low if you skip meals and take insulin or certain medicines for diabetes. · Check with your doctor before you drink alcohol. Alcohol can cause your blood sugar to drop too low. Alcohol can also cause a bad reaction if you take certain diabetes medicines. Follow-up care is a key part of your treatment and safety. Be sure to make and go to all appointments, and call your doctor if you are having problems. It's also a good idea to know your test results and keep a list of the medicines you take. Where can you learn more? Go to http://www.truong.com/  Enter I147 in the search box to learn more about \"Learning About Carbohydrate (Carb) Counting and Eating Out When You Have Diabetes. \"  Current as of: August 31, 2020               Content Version: 12.8  © 2006-2021 [employer]. Care instructions adapted under license by [employer] (which disclaims liability or warranty for this information). If you have questions about a medical condition or this instruction, always ask your healthcare professional. [address] any warranty or liability for your use of this information. The best way to stay healthy is to live a healthy lifestyle. A healthy lifestyle includes regular exercise, eating a well-balanced diet, keeping a healthy weight and not smoking. Regular physical exams and screening tests are another important way to take care of yourself. Preventive exams provided by health care providers can find health problems early when treatment works best and can keep you from getting certain diseases or illnesses.  Preventive services include exams, lab tests, screenings, shots, monitoring and information to help you take care of your own health. All people over 65 should have a pneumonia shot. Pneumonia shots are usually only needed once in a lifetime unless your doctor decides differently. In addition to your physical exam, some screening tests are recommended:    All people over 65 should have a yearly flu shot. People over 65 are at medium to high risk for Hepatitis B. Three shots are needed for complete protection. Bone mass measurement (dexa scan) is recommended every two years. Diabetes Mellitus screening is recommended every year. Glaucoma is an eye disease caused by high pressure in the eye. An eye exam is recommended every year. Cardiovascular screening tests that check your cholesterol and other blood fat (lipid) levels are recommended every five years. Colorectal Cancer screening tests help to find pre-cancerous polyps (growths in the colon) so they can be removed before they turn into cancer. Tests ordered for screening depend on your personal and family history risk factors. Prostate Cancer Screening (annually up to age 76)    Screening for breast cancer is recommended yearly with a Mammogram.    Screening for cervical and vaginal cancer is recommended with a pelvic and Pap test every two years. However if you have had an abnormal pap in the past  three years or at high risk for cervical or vaginal cancer Medicare will cover a pap test and a pelvic exam every year.      Here is a list of your current Health Maintenance items with a due date:  Health Maintenance Due   Topic Date Due    Hepatitis C Test  Never done    COVID-19 Vaccine (1) Never done    DTaP/Tdap/Td  (1 - Tdap) Never done    Shingles Vaccine (1 of 2) Never done    Bone Mineral Density   Never done    Diabetic Foot Care  07/23/2019    Eye Exam  04/19/2020    Cholesterol Test   01/02/2021

## 2021-06-09 NOTE — PROGRESS NOTES
Chief Complaint   Patient presents with    Follow Up Chronic Condition    Annual Wellness Visit       Depression Risk Factor Screening:     3 most recent PHQ Screens 6/9/2021   Little interest or pleasure in doing things Not at all   Feeling down, depressed, irritable, or hopeless Not at all   Total Score PHQ 2 0       Functional Ability and Level of Safety:     Activities of Daily Living  ADL Assessment 6/9/2021   Feeding yourself No Help Needed   Getting from bed to chair Help Needed   Getting dressed Help Needed   Bathing or showering Help Needed   Walk across the room (includes cane/walker) Help Needed   Using the telphone No Help Needed   Taking your medications No Help Needed   Preparing meals Help Needed   Managing money (expenses/bills) Help Needed   Moderately strenuous housework (laundry) Help Needed   Shopping for personal items (toiletries/medicines) Help Needed   Shopping for groceries Help Needed   Driving Help Needed   Climbing a flight of stairs Help Needed   Getting to places beyond walking distances Help Needed       Fall Risk  Fall Risk Assessment, last 12 mths 6/9/2021   Able to walk? Yes   Fall in past 12 months? 0   Do you feel unsteady? 0   Are you worried about falling 0       Abuse Screen  Abuse Screening Questionnaire 6/9/2021   Do you ever feel afraid of your partner? N   Are you in a relationship with someone who physically or mentally threatens you? N   Is it safe for you to go home?  Y         Patient Care Team   Patient Care Team:  Fior Sawyer MD as PCP - General (Internal Medicine)  Fior Sawyer [facility] PCP - [facility]  Eva Medina DPM (Podiatry)  Laverne Thorpe MD (Endocrinology)

## 2021-06-11 LAB
APPEARANCE UR: ABNORMAL
BACTERIA URNS QL MICRO: NEGATIVE /HPF
BILIRUB UR QL: NEGATIVE
COLOR UR: ABNORMAL
EPITH CASTS URNS QL MICRO: ABNORMAL /LPF
GLUCOSE UR STRIP.AUTO-MCNC: 500 MG/DL
HGB UR QL STRIP: ABNORMAL
KETONES UR QL STRIP.AUTO: NEGATIVE MG/DL
LEUKOCYTE ESTERASE UR QL STRIP.AUTO: ABNORMAL
MUCOUS THREADS URNS QL MICRO: ABNORMAL /LPF
NITRITE UR QL STRIP.AUTO: NEGATIVE
PH UR STRIP: 5 [PH] (ref 5–8)
PROT UR STRIP-MCNC: NEGATIVE MG/DL
RBC #/AREA URNS HPF: ABNORMAL /HPF (ref 0–5)
SP GR UR REFRACTOMETRY: 1.02 (ref 1–1.03)
UA: UC IF INDICATED,UAUC: ABNORMAL
UROBILINOGEN UR QL STRIP.AUTO: 0.2 EU/DL (ref 0.2–1)
WBC URNS QL MICRO: ABNORMAL /HPF (ref 0–4)

## 2021-06-12 LAB
CHOLEST SERPL-MCNC: 131 MG/DL
CK SERPL-CCNC: 119 U/L (ref 26–192)
HCV AB SERPL QL IA: NONREACTIVE
HCV COMMENT,HCGAC: NORMAL
HDLC SERPL-MCNC: 39 MG/DL
HDLC SERPL: 3.4 {RATIO} (ref 0–5)
LDLC SERPL CALC-MCNC: 74.4 MG/DL (ref 0–100)
T4 FREE SERPL-MCNC: 1.1 NG/DL (ref 0.8–1.5)
TRIGL SERPL-MCNC: 88 MG/DL (ref ?–150)
TSH SERPL DL<=0.05 MIU/L-ACNC: 0.46 UIU/ML (ref 0.36–3.74)
VLDLC SERPL CALC-MCNC: 17.6 MG/DL

## 2021-06-12 NOTE — PROGRESS NOTES
They were not able to get all of the blood needed for tests ordered at last office visit.   What has returned looked stable except random BS was 225 and they were not able to get blood for A1c, thus not able to comment on overall control of DM

## 2021-06-15 ENCOUNTER — TELEPHONE (OUTPATIENT)
Dept: INTERNAL MEDICINE CLINIC | Age: 76
End: 2021-06-15

## 2021-06-15 NOTE — TELEPHONE ENCOUNTER
[id number] Ramon states she needs orders for patients legs. She is having trouble getting orders from her Dr Reyes Yeh, and if nobody tells her what to do she will have to discharge the patient. She states Dr Bruno José did not want the unna boots but did not leave instructions of what he would like done. They have tried multiple times to call his office and there is no response. [facility] is just putting a dry dressing on her because she is not sure if it is fungal.Please call.      [phone]

## 2021-06-16 RX ORDER — MUPIROCIN 20 MG/G
OINTMENT TOPICAL DAILY
Qty: 22 G | Refills: 0 | Status: SHIPPED | OUTPATIENT
Start: 2021-06-16 | End: 2021-06-22 | Stop reason: SDUPTHER

## 2021-06-16 NOTE — TELEPHONE ENCOUNTER
Rosana Velázquez advised of recommendations- Please send pended Rx to [facility]:  Requested Prescriptions     Pending Prescriptions Disp Refills    mupirocin (BACTROBAN) 2 % ointment 22 g 0     Sig: Apply  to affected area daily.

## 2021-06-16 NOTE — TELEPHONE ENCOUNTER
Have the would cultured  Clean with soap and water  Apply bactroban ointment  Cover with nonstick pad

## 2021-06-22 RX ORDER — MUPIROCIN 20 MG/G
OINTMENT TOPICAL DAILY
Qty: 22 G | Refills: 1 | Status: SHIPPED | OUTPATIENT
Start: 2021-06-22 | End: 2021-07-07

## 2021-06-22 RX ORDER — MUPIROCIN 20 MG/G
OINTMENT TOPICAL
Qty: 22 G | Refills: 0 | Status: SHIPPED | OUTPATIENT
Start: 2021-06-22

## 2021-06-22 NOTE — TELEPHONE ENCOUNTER
Requested Prescriptions     Pending Prescriptions Disp Refills    mupirocin (BACTROBAN) 2 % ointment 22 g 1     Sig: Apply  to affected area daily.        Last Refill: 6/16/21  Next Appointment:1/5/22

## 2021-07-07 ENCOUNTER — HOSPITAL ENCOUNTER (OUTPATIENT)
Dept: WOUND CARE | Age: 76
Discharge: HOME | End: 2021-07-07
Payer: MEDICARE

## 2021-07-07 VITALS
DIASTOLIC BLOOD PRESSURE: 61 MMHG | HEART RATE: 71 BPM | TEMPERATURE: 98.3 F | SYSTOLIC BLOOD PRESSURE: 132 MMHG | RESPIRATION RATE: 18 BRPM

## 2021-07-07 DIAGNOSIS — R60.0 BILATERAL LEG EDEMA: ICD-10-CM

## 2021-07-07 DIAGNOSIS — L97.912 NON-PRESSURE CHRONIC ULCER OF RIGHT LOWER LEG WITH FAT LAYER EXPOSED (HCC): ICD-10-CM

## 2021-07-07 DIAGNOSIS — L97.922 NON-PRESSURE CHRONIC ULCER OF LEFT LOWER LEG WITH FAT LAYER EXPOSED (HCC): ICD-10-CM

## 2021-07-07 PROCEDURE — 99204 OFFICE O/P NEW MOD 45 MIN: CPT | Performed by: SURGERY

## 2021-07-07 PROCEDURE — 99214 OFFICE O/P EST MOD 30 MIN: CPT

## 2021-07-07 NOTE — WOUND CARE
07/07/21 1413   LLE Peripheral Vascular    Capillary Refill Greater than 3 seconds   Color Other (comment)  (Crusty/pink)   Temperature Warm   Sensation Decreased   Pedal Pulse Palpable   RLE Peripheral Vascular    Capillary Refill Greater than 3 seconds   Color   (pink/crusty yellow)   Temperature Cool   Sensation Decreased   Pedal Pulse Palpable   Wound Leg lower Right #1 07/07/21   Date First Assessed/Time First Assessed: 07/07/21 1414   Present on Hospital Admission: Yes  Wound Approximate Age at First Assessment (Weeks): 52 weeks  Primary Wound Type: Venous  Location: Leg lower  Wound Location Orientation: Right  Wound Descrip. .. Wound Image       Wound Etiology Venous   Dressing Status Old drainage noted   Cleansed Soap and water   Dressing/Treatment   (Gauze, RG, Ace wraps)   Wound Length (cm) 33.2 cm   Wound Width (cm) 35.4 cm   Wound Depth (cm) 0.2 cm   Wound Surface Area (cm^2) 1175.28 cm^2   Wound Volume (cm^3) 235. 056 cm^3   Wound Assessment [place]/red;Slough   Drainage Amount Moderate   Drainage Description Serous   Wound Odor Moderate   Ebony-Wound/Incision Assessment Hemosiderin staining (brown yellow)   Edges Undefined edges   Wound Thickness Description Full thickness   Wound Leg lower Left #2 07/07/21   Date First Assessed/Time First Assessed: 07/07/21 1415   Present on Hospital Admission: Yes  Wound Approximate Age at First Assessment (Weeks): 52 weeks  Primary Wound Type: Venous  Location: Leg lower  Wound Location Orientation: Left  Wound Descript. ..    Wound Image       Wound Etiology Venous   Dressing Status Old drainage noted   Cleansed Soap and water   Dressing/Treatment   (Gauze, RG, Ace wraps)   Wound Length (cm) 35 cm   Wound Width (cm) 40.8 cm   Wound Depth (cm) 0.2 cm   Wound Surface Area (cm^2) 1428 cm^2   Wound Volume (cm^3) 285.6 cm^3   Wound Assessment Slough   Drainage Amount Moderate   Drainage Description Serous   Wound Odor Moderate   Ebony-Wound/Incision Assessment Hemosiderin staining (brown yellow)   Edges Undefined edges   Wound Thickness Description Full thickness     Visit Vitals  /61 (BP 1 Location: Right upper arm, BP Patient Position: At rest;Reclining)   Pulse 71   Temp 98.3 °F (36.8 °C)   Resp 18

## 2021-07-07 NOTE — DISCHARGE INSTRUCTIONS
Discharge Instructions for  [facility]  [address]  MOB 1 [address] [facility] Sharla Helms, [id number]  Telephone: [phone]    NAME:  Joy Stauffer  YOB: 1945  MEDICAL RECORD NUMBER:  [id number]  DATE:  7/7/2021  WOUND CARE ORDERS:  Bilateral lower leg wounds - cleanse with soap and water, rinse thoroughly and pat dry, cover with dry gauze (may apply adaptic to any areas where gauze sticks, weave dry gauze between toes on bilateral feet, cover with ABDs and secure with roll gauze, apply double layer tubigrip size G or ace wraps to just below the knee. For crusty areas above the knee apply vaseline and leave open to air. Change dressings daily (home health to change 3 x week and family will change on other days). Follow-up in wound clinic in 2 weeks. Sleep with feet higher than level of heart. Decrease fluid intake and take fluid pills daily. Decrease sodium intake. TREATMENT ORDERS:    Elevate leg(s) above the level of the heart when sitting. Avoid prolonged standing in one place. Do no get dressing/wrap wet. Follow Diet as prescribed:   [x] Diet as tolerated: [x] Calorie Diabetic Diet: Low carb and no Sugar [x] No Added Salt:  [x] Increase Protein: [] Limit the amount of liquid you are drinking and avoid drinking in between meals           Return Appointment:  [x] Return Appointment: With DR Maldonado People  in  [facility])  [] Nurse Visit : *** days  [] Ordered tests:    Electronically signed Georgie Stein RN on 7/7/2021 at 2:44 PM     [address] [place] [address] Information: Should you experience any significant changes in your wound(s) or have questions about your wound care, please contact the [facility] at [address] [place] [address] Street 8:00 am - 4:30. If you need help with your wound outside these hours and cannot wait until we are again available, contact your PCP or go to the hospital emergency room.      PLEASE NOTE: IF YOU ARE UNABLE TO OBTAIN WOUND SUPPLIES, CONTINUE TO USE THE SUPPLIES YOU HAVE AVAILABLE UNTIL YOU ARE ABLE TO REACH US. IT IS MOST IMPORTANT TO KEEP THE WOUND COVERED AT ALL TIMES.      Physician Signature:_______________________    Date: ___________ Time:  ____________

## 2021-07-08 NOTE — H&P
[address]  HISTORY AND PHYSICAL    Name:  Germán Villafana  MR#:  [id number]  :  1945  ACCOUNT #:  [de-identified]  ADMIT DATE:  2021    HISTORY OF PRESENT ILLNESS:  The patient is a 71-year-old woman who is referred to the [address] [place] [address] by Dr. Migel Peña regarding ulcerations on both lower extremities. The patient reports that she has had problems with increased leg swelling and ulcers for about a year. Both legs are painful. The patient reports that she sleeps in a sitting position. She will have her legs elevated off the floor to a limited extent. She cannot lie flat in bed because she gets short of breath. The patient does take furosemide 40 mg per day. She reports this does produce a diuresis. The patient reports drinking large quantities of fluid throughout the course of the day. This includes many bottles of water, V8, and other liquids. The patient can stand and can walk a few feet when she holds onto objects. She does not report shortness of breath with this limited exertion. The patient has history of stroke producing left hemipareses. The patient does have history of diabetes mellitus. She reports blood sugars up to around 200. The patient's past medical history includes long-term anticoagulation with Xarelto for paroxysmal atrial fibrillation, anxiety, coronary artery disease, chronic pain, degenerative joint disease, hyperlipidemia, hypertension, and glaucoma. The patient has never smoked. Reported weight 220 pounds, height 5 feet 4 inches. PHYSICAL EXAMINATION:  GENERAL:  The patient is an alert, overweight woman sitting in an exam chair. She is in no acute distress. VITAL SIGNS:  Blood pressure 132/61, pulse 71, respirations 18, temperature 98. 3. HEAD AND NECK:  No jaundice. LUNGS:  Clear bilaterally without rales, rhonchi, or wheezes. HEART:  Regular without murmur, gallop, or rub.   NEUROLOGIC:  The patient is alert and oriented. She has diminished movement on the left side, particularly in left lower extremity. Facial movements are symmetrical.  Speech is normal.    EXTREMITIES:  Examination of the right lower extremity revealed 3+ edema beginning at the groin extending distally to include the foot. There was lichenification of the skin in the lower leg and foot, particularly in the toes. There were multiple shallow ulcerations mainly which were partial-thickness and a portion of which were full thickness skin loss. Tissues appeared quite wet. I could not palpate dorsalis pedis or posterior tibial pulses. There was a soft biphasic signal at the dorsalis pedis on the right. Examination of left lower extremity revealed 3+ edema from the groin level distally to include the foot. There was lichenification of the skin in the lower leg and on the foot, particularly involving the toes. At the time, the patient was first examined by the nurses, there were maggots present mainly located between the toes on the left foot. There were extensive mainly partial-thickness ulcerations in left lower leg with few areas of full thickness skin loss. Tissues were quite wet. Culture was taken of the left leg wound. I explained to the patient that her edema was pitting in most areas. This would suggest edema that can be mobilized. I have recommended the patient attempt to sleep at night lying down. It would be ideal to reach a position in which her feet and her heart are at the same level during the night when she is sleeping. I have recommended the patient continue taking her diuretic. I have recommended greatly reducing total fluid intake. She should eliminate all the extra water bottles she is drinking. She should not drink V8 juice, which is commonly as it is high in salt. She should avoid salt containing foods. She did describe frequently eating frozen dinners.     It is unsure whether the patient is following a true diabetic diet. I recommended the patient try to adhere to a diabetic diet. I have recommended the patient shower every day using soap and water on her legs and between her toes. After each shower, a new dressing needs to be applied consisting of dry gauze between each of the toes and dry gauze on the foot and lower leg, then roll gauze to be applied to include the toes on to the calf. Apply double Tubigrips bilaterally. Home Health nursing has been ordered to assist with wound care three times per week. The patient had noninvasive arterial testing at [facility] on 05/11/2021. This showed both ankle arm indices were non-meaningful because of arterial calcification. The right great toe arm index was 1.01 and left great toe arm index was 0.48. The metatarsal waveforms on both feet were strong and pulsatile. PPG signals were pulsatile in both great toes. This would suggest adequate arterial flow into both lower extremities. She may need a higher dose of diuretic. The patient will follow up in the [address] [place] [address] in two weeks. FINAL DIAGNOSES:  Nonpressure ulcer in right lower leg with fat layer exposed, nonpressure ulcer in left lower leg with fat layer exposed, severe bilateral lower extremity edema.         Man Landrum MD      [id number]  D:  07/07/2021 17:43  T:  07/07/2021 20:03  JOB #:  [id number]

## 2021-07-21 ENCOUNTER — HOSPITAL ENCOUNTER (OUTPATIENT)
Dept: WOUND CARE | Age: 76
Discharge: HOME | End: 2021-07-21
Admitting: SURGERY
Payer: MEDICARE

## 2021-07-21 VITALS — TEMPERATURE: 97.2 F | HEART RATE: 77 BPM | RESPIRATION RATE: 18 BRPM

## 2021-07-21 DIAGNOSIS — L97.912 NON-PRESSURE CHRONIC ULCER OF RIGHT LOWER LEG WITH FAT LAYER EXPOSED (HCC): ICD-10-CM

## 2021-07-21 DIAGNOSIS — L97.922 NON-PRESSURE CHRONIC ULCER OF LEFT LOWER LEG WITH FAT LAYER EXPOSED (HCC): ICD-10-CM

## 2021-07-21 DIAGNOSIS — R60.0 BILATERAL LEG EDEMA: ICD-10-CM

## 2021-07-21 PROBLEM — L03.115 CELLULITIS OF BOTH LOWER EXTREMITIES: Status: RESOLVED | Noted: 2017-10-13 | Resolved: 2021-07-21

## 2021-07-21 PROBLEM — L03.116 CELLULITIS OF BOTH LOWER EXTREMITIES: Status: RESOLVED | Noted: 2017-10-13 | Resolved: 2021-07-21

## 2021-07-21 PROBLEM — A41.9 SEPSIS (HCC): Status: RESOLVED | Noted: 2019-01-30 | Resolved: 2021-07-21

## 2021-07-21 PROCEDURE — 99213 OFFICE O/P EST LOW 20 MIN: CPT

## 2021-07-21 PROCEDURE — 99214 OFFICE O/P EST MOD 30 MIN: CPT | Performed by: SURGERY

## 2021-07-21 NOTE — WOUND CARE
07/21/21 1514   Anesthetic   Anesthetic   (4% Lidocaine gel)   Right Leg Edema Point of Measurement   Leg circumference 43.5 cm   Ankle circumference 29 cm   Compression Therapy Tubular elastic support bandage   Tubular Elastic Support Bandage Compression Pressure   (double tubi )   Left Leg Edema Point of Measurement   Leg circumference 40.7 cm   Ankle circumference 31.2 cm   Compression Therapy Tubular elastic support bandage  (double tubi )   LLE Peripheral Vascular    Capillary Refill Greater than 3 seconds   Color   (crusty pink)   Temperature Warm   Sensation Decreased   Pedal Pulse Palpable   RLE Peripheral Vascular    Capillary Refill Greater than 3 seconds   Color   (crusty pink)   Temperature Warm   Sensation Decreased   Pedal Pulse Palpable   Wound Leg lower Right #1 07/07/21   Date First Assessed/Time First Assessed: 07/07/21 1414   Present on Hospital Admission: Yes  Wound Approximate Age at First Assessment (Weeks): 52 weeks  Primary Wound Type: Venous  Location: Leg lower  Wound Location Orientation: Right  Wound Descrip. .. Wound Image    Wound Etiology Venous   Dressing Status Old drainage noted; Intact  (Removed non adherent gauze; dry gauze;roll gauze, tape; lois)   Cleansed Soap and water   Wound Length (cm) 22 cm   Wound Width (cm) 21.4 cm   Wound Depth (cm) 0.1 cm   Wound Surface Area (cm^2) 470.8 cm^2   Change in Wound Size % 59.94   Wound Volume (cm^3) 47.08 cm^3   Wound Healing % 80   Wound Assessment [place]/red;Slough   Drainage Amount Moderate   Drainage Description Serous   Wound Odor Moderate   Ebony-Wound/Incision Assessment Hemosiderin staining (brown yellow)   Edges Undefined edges   Wound Thickness Description Full thickness   Wound Leg lower Left #2 07/07/21   Date First Assessed/Time First Assessed: 07/07/21 1415   Present on Hospital Admission: Yes  Wound Approximate Age at First Assessment (Weeks): 52 weeks  Primary Wound Type: Venous  Location: Leg lower  Wound Location Orientation: Left  Wound Descript. .. Wound Image    Wound Etiology Venous   Dressing Status Old drainage noted; Intact  (Rem,silverio non adherent dressng;dry gauze; roll gauze; tape; d)   Cleansed Soap and water   Wound Length (cm) 28 cm   Wound Width (cm) 11 cm   Wound Depth (cm) 0.1 cm   Wound Surface Area (cm^2) 308 cm^2   Change in Wound Size % 78.43   Wound Volume (cm^3) 30.8 cm^3   Wound Healing % 89   Wound Assessment [place]/red;Slough   Drainage Amount Moderate   Drainage Description Serous   Wound Odor Moderate   Ebony-Wound/Incision Assessment Hemosiderin staining (brown yellow)   Edges Undefined edges   Wound Thickness Description Full thickness     Visit Vitals  Pulse 77   Temp 97.2 °F (36.2 °C)   Resp 18     LLE Peripheral Vascular   Capillary Refill: (P) Greater than 3 seconds (07/21/21 1514)  Color: (P)  (crusty pink) (07/21/21 1514)  Temperature: (P) Warm (07/21/21 1514)  Sensation: (P) Decreased (07/21/21 1514)  Pedal Pulse: (P) Palpable (07/21/21 1514)  RLE Peripheral Vascular   Capillary Refill: (P) Greater than 3 seconds (07/21/21 1514)  Color: (P)  (crusty pink) (07/21/21 1514)  Temperature: (P) Warm (07/21/21 1514)  Sensation: (P) Decreased (07/21/21 1514)  Pedal Pulse: (P) Palpable (07/21/21 1514)

## 2021-07-21 NOTE — PROGRESS NOTES
HISTORY OF PRESENT ILLNESS:  The patient is a 34-year-old woman who is referred to the [address] [place] [address] Road by Dr. Prakash Potter regarding ulcerations on both lower extremities. The patient reports that she has had problems with increased leg swelling and ulcers for about a year. Both legs have been painful. The patient was first seen at the [address] [place] [address] on 7/7/2021. The patient reports that she sleeps in a sitting position. She will have her legs elevated off the floor to a limited extent. She cannot lie flat in bed because she gets short of breath  . After receiving instructions to attempt to lie down to sleep, she is using a recliner, and has her feet a little below heart level.     The patient does take furosemide 40 mg per day. She reports this does produce a diuresis. The patient reports she had been drinking large quantities of fluid throughout the course of the day. This included many bottles of water, V8, and other liquids. She has now reduced fluid intake.     The patient can stand and can walk a few feet when she holds onto objects. She does not report shortness of breath with this limited exertion. The patient has history of stroke producing left hemipareses.     The patient does have history of diabetes mellitus. She reports blood sugars up to around 200.     The patient's past medical history includes long-term anticoagulation with Xarelto for paroxysmal atrial fibrillation, anxiety, coronary artery disease, chronic pain, degenerative joint disease, hyperlipidemia, hypertension, and glaucoma.     The patient has never smoked. The patient had noninvasive arterial testing at [facility] on 05/11/2021. This showed both ankle arm indices were non-meaningful because of arterial calcification. The right great toe arm index was 1.01 and left great toe arm index was 0.48. The metatarsal waveforms on both feet were strong and pulsatile.   PPG signals were pulsatile in both great toes. This would suggest adequate arterial flow into both lower extremities. Current dressing as of 7/7/2021:  The patient is to shower every day using soap and water on her legs and between her toes. After each shower, a new dressing needs to be applied consisting of dry gauze between each of the toes and dry gauze on the foot and lower leg, then roll gauze to be applied to include the toes on to the calf. Apply double Tubigrips bilaterally.       Reported weight 220 pounds, height 5 feet 4 inches.       PHYSICAL EXAMINATION:  GENERAL:  The patient is an alert, overweight woman sitting in an exam chair. She is in no acute distress. NEUROLOGIC:  The patient is alert and oriented. She has diminished movement on the left side, particularly in left lower extremity. Facial movements are symmetrical.  Speech is normal.     EXTREMITIES:  Examination of the right lower extremity revealed 2+ edema beginning at the groin extending distally to include the foot. There was lichenification of the skin in the lower leg and foot, particularly in the toes. There were shallow ulcerations mainly which were partial-thickness and a portion of which were full thickness skin loss. Tissues appeared less wet than at first visit. I could not palpate dorsalis pedis or posterior tibial pulses. There was a soft biphasic signal at the dorsalis pedis on the right.     Examination of left lower extremity revealed 2+ edema from the groin level distally to include the foot. There was lichenification of the skin in the lower leg and on the foot, particularly involving the toes. There were  mainly partial-thickness ulcerations in left lower leg with few areas of full thickness skin loss. Tissues appeared less wet than at first visit.       Bilateral leg edema decreased a bit. .         I have recommended the patient attempt to sleep at night lying down.   It would be ideal to reach a position in which her feet and her heart are at the same level during the night when she is sleeping.     I have recommended the patient continue taking her diuretic. I have recommended greatly reducing total fluid intake. She should eliminate all the extra water bottles she is drinking. She should not drink V8 juice, which is commonly as it is high in salt. She should avoid salt containing foods. She did describe frequently eating frozen dinners. I will send a note to her primary care physician Dr Jamie Womack regarding a possible increase in diuretic dose.     I recommended the patient try to adhere to a diabetic diet.     Dressing ordered: The patient is  to shower every day using soap and water on her legs and between her toes. After each shower, a new dressing needs to be applied consisting of dry gauze between each of the toes and dry gauze on the foot and lower leg, then roll gauze to be applied to include the toes on to the calf.   Apply double Tubigrips bilaterally.     Home Health nursing is seeing the patient to assist with wound care three times per week.            The patient will follow up in the [address] [place] [address] in two weeks.     FINAL DIAGNOSES:  Nonpressure ulcer in right lower leg with fat layer exposed, nonpressure ulcer in left lower leg with fat layer exposed, severe bilateral lower extremity edema.      M80.067, T34.765, R60.0     Jad Garibay MD

## 2021-07-21 NOTE — DISCHARGE INSTRUCTIONS
Discharge Instructions for  [facility]  [address]  MOB 1, [address] [facility] [id number]  Telephone: [phone]    NAME:  Deedee Bowens OF BIRTH:  1945  MEDICAL RECORD NUMBER:  [id number]  DATE:  7/21/2021  WOUND CARE ORDERS:    Bilateral lower leg wounds - cleanse with soap and water, rinse thoroughly and pat dry, cover with dry gauze (may apply adaptic to any areas where gauze sticks, weave dry gauze between toes on bilateral feet, cover with ABDs and secure with roll gauze, apply double layer tubigrip size G or ace wraps to just below the knee. For crusty areas above the knee apply vaseline and leave open to air. Change dressings daily (home health to change 3 x week and family will change on other days). Follow-up in wound clinic in 2 weeks. TREATMENT ORDERS:    Elevate leg(s) above the level of the heart when sitting. Avoid prolonged standing in one place. Do no get dressing/wrap wet. Follow Diet as prescribed:   [] Diet as tolerated: [x] Calorie Diabetic Diet: Low carb and no Sugar [] No Added Salt:  [] Increase Protein: [x] Limit the amount of liquid you are drinking and avoid drinking in between meals           Return Appointment:  [x] Return Appointment: With Dr. Eben Harding  In [facility])  [] Nurse Visit : *** days  [] Ordered tests:    Electronically signed Jerrell Deng on 7/21/2021 at 3:50 PM     [address] [place] [address] Information: Should you experience any significant changes in your wound(s) or have questions about your wound care, please contact the [facility] Main at [address] [place] [address] 8:00 am - 4:30. If you need help with your wound outside these hours and cannot wait until we are again available, contact your PCP or go to the hospital emergency room. PLEASE NOTE: IF YOU ARE UNABLE TO OBTAIN WOUND SUPPLIES, CONTINUE TO USE THE SUPPLIES YOU HAVE AVAILABLE UNTIL YOU ARE ABLE TO REACH US.  IT IS MOST IMPORTANT TO KEEP THE WOUND COVERED AT ALL TIMES.      Physician Signature:_______________________    Date: ___________ Time:  ____________

## 2021-07-22 ENCOUNTER — TELEPHONE (OUTPATIENT)
Dept: INTERNAL MEDICINE CLINIC | Age: 76
End: 2021-07-22

## 2021-07-22 NOTE — TELEPHONE ENCOUNTER
----- Message from Ruel Womack MD sent at 7/22/2021  6:51 AM EDT -----  Have patient increase lasix to twice daily. Will need FU BMP 2 weeks  ----- Message -----  From: Alicia Guidry MD  Sent: 7/21/2021   4:20 PM EDT  To: Ruel Womack MD    Hi -    I am seeing the patient in the [address] [place] [address] Road. She has leg ulcers and 3+ pitting edema from the groins distally. Edema decreased to 2+ with reduction in fluid and salt intake and attempting to lie nearly flat at night rather than sleeping in a chair. Could she tolerate a higher diuretic dose, currently lasix 40 mg daily? If so, could you order it?     Thanks,    Rosalina Boyce

## 2021-08-04 ENCOUNTER — HOSPITAL ENCOUNTER (OUTPATIENT)
Dept: WOUND CARE | Age: 76
Discharge: HOME | End: 2021-08-04
Payer: MEDICARE

## 2021-08-04 VITALS
DIASTOLIC BLOOD PRESSURE: 58 MMHG | SYSTOLIC BLOOD PRESSURE: 126 MMHG | RESPIRATION RATE: 18 BRPM | TEMPERATURE: 98.4 F | HEART RATE: 67 BPM

## 2021-08-04 DIAGNOSIS — L97.922 NON-PRESSURE CHRONIC ULCER OF LEFT LOWER LEG WITH FAT LAYER EXPOSED (HCC): ICD-10-CM

## 2021-08-04 DIAGNOSIS — L97.912 NON-PRESSURE CHRONIC ULCER OF RIGHT LOWER LEG WITH FAT LAYER EXPOSED (HCC): ICD-10-CM

## 2021-08-04 DIAGNOSIS — R60.0 BILATERAL LEG EDEMA: ICD-10-CM

## 2021-08-04 PROCEDURE — 99214 OFFICE O/P EST MOD 30 MIN: CPT

## 2021-08-04 PROCEDURE — 99213 OFFICE O/P EST LOW 20 MIN: CPT | Performed by: SURGERY

## 2021-08-04 NOTE — WOUND CARE
08/04/21 1440   Right Leg Edema Point of Measurement   Leg circumference 41.3 cm   Ankle circumference 30 cm   Compression Therapy Tubular elastic support bandage  (Single layer tubi)   Left Leg Edema Point of Measurement   Leg circumference 43.3 cm   Ankle circumference 29.8 cm   Compression Therapy Tubular elastic support bandage  (single layer tubi )   LLE Peripheral Vascular    Capillary Refill Less than/equal to 3 seconds   Color   (crusty pink)   Temperature Warm   Sensation Decreased   Pedal Pulse Palpable   RLE Peripheral Vascular    Capillary Refill Less than/equal to 3 seconds   Color   (crusty pink)   Temperature Warm   Sensation Decreased   Pedal Pulse Palpable   Wound Leg lower Right #1 07/07/21   Date First Assessed/Time First Assessed: 07/07/21 1414   Present on Hospital Admission: Yes  Wound Approximate Age at First Assessment (Weeks): 52 weeks  Primary Wound Type: Venous  Location: Leg lower  Wound Location Orientation: Right  Wound Descrip. .. Wound Image       Wound Etiology Venous   Dressing Status Old drainage noted; Intact  (adaptic)   Cleansed Soap and water;Cleansed with saline   Wound Length (cm) 24 cm   Wound Width (cm) 20 cm   Wound Depth (cm) 0.1 cm   Wound Surface Area (cm^2) 480 cm^2   Change in Wound Size % 59.16   Wound Volume (cm^3) 48 cm^3   Wound Healing % 80   Wound Assessment [place]/red;Slough   Drainage Amount Large   Drainage Description Serous   Wound Odor Mild   Ebony-Wound/Incision Assessment Hemosiderin staining (brown yellow)   Edges Undefined edges   Wound Thickness Description Full thickness   Wound Leg lower Left #2 07/07/21   Date First Assessed/Time First Assessed: 07/07/21 1415   Present on Hospital Admission: Yes  Wound Approximate Age at First Assessment (Weeks): 52 weeks  Primary Wound Type: Venous  Location: Leg lower  Wound Location Orientation: Left  Wound Descript. ..    Wound Image       Wound Etiology Venous   Dressing Status Old drainage noted; Intact  (Removed non adherent gauze;roll gauze;ABD; tape; single laye)   Cleansed Soap and water;Cleansed with saline   Wound Length (cm) 24 cm   Wound Width (cm) 8 cm   Wound Depth (cm) 0.1 cm   Wound Surface Area (cm^2) 192 cm^2   Change in Wound Size % 86.55   Wound Volume (cm^3) 19.2 cm^3   Wound Healing % 93   Wound Assessment [place]/red;Slough   Drainage Amount Large   Drainage Description Serous   Wound Odor Mild   Ebony-Wound/Incision Assessment Hemosiderin staining (brown yellow)   Edges Undefined edges   Wound Thickness Description Full thickness   Pain 1   Pain Scale 1 Numeric (0 - 10)   Pain Intensity 1 0   Patient Stated Pain Goal 0   Pain Reassessment 1 Yes     Visit Vitals  BP (!) 126/58 (BP 1 Location: Left upper arm, BP Patient Position: At rest)   Pulse 67   Temp 98.4 °F (36.9 °C)   Resp 18     LLE Peripheral Vascular   Capillary Refill: (P) Less than/equal to 3 seconds (08/04/21 1440)  Color: (P)  (crusty pink) (08/04/21 1440)  Temperature: (P) Warm (08/04/21 1440)  Sensation: (P) Decreased (08/04/21 1440)  Pedal Pulse: (P) Palpable (08/04/21 1440)  RLE Peripheral Vascular   Capillary Refill: (P) Less than/equal to 3 seconds (08/04/21 1440)  Color: (P)  (crusty pink) (08/04/21 1440)  Temperature: (P) Warm (08/04/21 1440)  Sensation: (P) Decreased (08/04/21 1440)  Pedal Pulse: (P) Palpable (08/04/21 1440)

## 2021-08-04 NOTE — PROGRESS NOTES
HISTORY OF PRESENT ILLNESS:  The patient is a 60-year-old woman who is referred to the [address] [place] [address] Road by Dr. Jamar Hein regarding ulcerations on both lower extremities.  The patient reports that she has had problems with increased leg swelling and ulcers for about a year.  Both legs have been painful.     The patient was first seen at the [address] [place] [address] on 7/7/2021.     The patient reports that she had been sleeping in a sitting position.  She will have her legs elevated off the floor to a limited extent.  She cannot lie flat in bed because she gets short of breath  . After receiving instructions to attempt to lie down to sleep, she is using a recliner, and has her feet a little below heart level.     The patient does take furosemide 40 mg per day. Eliceo Hidalgo reports this does produce a diuresis. I messaged her primary MD.   Dr Elieser Malone increased to lasix 40 mg bid. She took the second in the evening and had incontinence, so she reduced to once per day. The patient reports she had been drinking large quantities of fluid throughout the course of the day.  This included many bottles of water, V8, and other liquids. She has now reduced fluid intake.     The patient can stand and can walk a few feet when she holds onto objects.  She does not report shortness of breath with this limited exertion.  The patient has history of stroke producing left hemipareses.     The patient does have history of diabetes mellitus.  She reports blood sugars up to around 160.   She is working on a diabetic diet.     The patient's past medical history includes long-term anticoagulation with Xarelto for paroxysmal atrial fibrillation, anxiety, coronary artery disease, chronic pain, degenerative joint disease, hyperlipidemia, hypertension, and glaucoma.     The patient has never smoked.     The patient had noninvasive arterial testing at [facility] on 05/11/2021.  This showed both ankle arm indices were non-meaningful because of arterial calcification.  The right great toe arm index was 1.01 and left great toe arm index was 0.48.  The metatarsal waveforms on both feet were strong and pulsatile.  PPG signals were pulsatile in both great toes.  This would suggest adequate arterial flow into both lower extremities.     Current dressing as of 7/7/2021:  The patient is to shower every day using soap and water on her legs and between her toes.  After each shower, a new dressing needs to be applied consisting of dry gauze between each of the toes and dry gauze on the foot and lower leg, then roll gauze to be applied to include the toes on to the calf.  Apply double Tubigrips bilaterally.        Reported weight 220 pounds, height 5 feet 4 inches.        PHYSICAL EXAMINATION:  GENERAL:  The patient is an alert, overweight woman sitting in an exam chair.  She is in no acute distress.     NEUROLOGIC:  The patient is alert and oriented.  She has diminished movement on the left side, particularly in left lower extremity.  Facial movements are symmetrical.  Speech is normal.     EXTREMITIES:  Examination of the right lower extremity revealed 2+ edema beginning at the groin extending distally to include the foot.  There was lichenification of the skin in the lower leg and foot, particularly in the toes.  There were shallow ulcerations mainly which were partial-thickness and a portion of which were full thickness skin loss.  Tissues appeared a bit less wet than at first visit.   Still some watery drainage.  I could not palpate dorsalis pedis or posterior tibial pulses.  There was a soft biphasic signal at the dorsalis pedis on the right.     Examination of left lower extremity revealed 2+ edema from the groin level distally to include the foot.  There was lichenification of the skin in the lower leg and on the foot, particularly involving the toes.  There were  mainly partial-thickness ulcerations in left lower leg with few areas of full thickness skin loss.  Tissues appeared less wet than at first visit. Still some watery drainage.        Bilateral leg edema decreased a bit. .           I have recommended the patient attempt to sleep at night lying down.  It would be ideal to reach a position in which her feet and her heart are at the same level during the night when she is sleeping.     I have recommended the patient continue taking her diuretic 40 mg bid with first dose 8:30 AM and second dose 1:00 pm.    I have recommended she continue reducing total fluid intake.  She should eliminate all the extra water bottles she is drinking. Si Brenda should not drink V8 juice, which is commonly as it is high in salt.  She should avoid salt containing foods.  She did describe frequently eating frozen dinners. Use sugar free lemon drop to keep mouth moist.      I recommended the patient try to adhere to a diabetic diet.     Dressing ordered:   The patient is  to shower every day using soap and water on her legs and between her toes.  After each shower, a new dressing needs to be applied consisting of dry gauze between each of the toes and dry gauze on the foot and lower leg, then roll gauze to be applied to include the toes on to the calf.  Apply double Tubigrips bilaterally.  [address] is seeing the patient to assist with wound care three times per week.              The patient will follow up in the [address] [place] [address] in two weeks.     FINAL DIAGNOSES:  Nonpressure ulcer in right lower leg with fat layer exposed, nonpressure ulcer in left lower leg with fat layer exposed, severe bilateral lower extremity edema.      M99.990, V86.993, R60.0     Crescencio Bose MD

## 2021-08-04 NOTE — DISCHARGE INSTRUCTIONS
Discharge Instructions for  [facility]  [address]  MOB 1, 900 [facility] [place], [id number]  Telephone: [phone]    NAME:  Jeremie Glynn OF BIRTH:  1945  MEDICAL RECORD NUMBER:  [id number]  DATE:  8/4/2021  WOUND CARE ORDERS:    Bilateral lower leg wounds - cleanse with soap and water, rinse thoroughly and pat dry, cover with dry gauze (may apply adaptic to any areas where gauze sticks, weave dry gauze between toes on bilateral feet, cover with ABDs and secure with roll gauze, apply double layer tubigrip size G or ace wraps to just below the knee. For crusty areas above the knee apply vaseline and leave open to air. Change dressings daily (home health to change 3 x week and family will change on other days). Follow-up in wound clinic in 2 weeks. May take Furosemide first dose in the morning (8:30am) and second dose after lunch ( at 1:00pm). TREATMENT ORDERS:    Elevate leg(s) above the level of the heart when sitting. Avoid prolonged standing in one place. Do no get dressing/wrap wet. Follow Diet as prescribed:   [] Diet as tolerated: [x] Calorie Diabetic Diet: Low carb and no Sugar [] No Added Salt:  [] Increase Protein: [x] Limit the amount of liquid you are drinking and avoid drinking in between meals           Return Appointment:  [x] Return Appointment: With DR Tamika Finley  in  2 [facility])  [] Nurse Visit : *** days  [] Ordered tests:    Electronically signed Carmita Pacheco on 8/4/2021 at 3:41 PM     Karen Baker 281: Should you experience any significant changes in your wound(s) or have questions about your wound care, please contact the [facility] Main at [address] [place] [address] Street 8:00 am - 4:30. If you need help with your wound outside these hours and cannot wait until we are again available, contact your PCP or go to the hospital emergency room.      PLEASE NOTE: IF YOU ARE UNABLE TO OBTAIN WOUND SUPPLIES, CONTINUE TO USE THE SUPPLIES YOU HAVE AVAILABLE UNTIL YOU ARE ABLE TO REACH US. IT IS MOST IMPORTANT TO KEEP THE WOUND COVERED AT ALL TIMES.      Physician Signature:_______________________    Date: ___________ Time:  ____________

## 2021-08-05 ENCOUNTER — LAB ONLY (OUTPATIENT)
Dept: INTERNAL MEDICINE CLINIC | Age: 76
End: 2021-08-05

## 2021-08-05 DIAGNOSIS — I89.0 LYMPHEDEMA OF BOTH LOWER EXTREMITIES: Primary | ICD-10-CM

## 2021-08-05 LAB
ANION GAP SERPL CALC-SCNC: 3 MMOL/L (ref 5–15)
BUN SERPL-MCNC: 19 MG/DL (ref 6–20)
BUN/CREAT SERPL: 16 (ref 12–20)
CALCIUM SERPL-MCNC: 8.5 MG/DL (ref 8.5–10.1)
CHLORIDE SERPL-SCNC: 108 MMOL/L (ref 97–108)
CO2 SERPL-SCNC: 30 MMOL/L (ref 21–32)
CREAT SERPL-MCNC: 1.16 MG/DL (ref 0.55–1.02)
GLUCOSE SERPL-MCNC: 130 MG/DL (ref 65–100)
POTASSIUM SERPL-SCNC: 4.3 MMOL/L (ref 3.5–5.1)
SODIUM SERPL-SCNC: 141 MMOL/L (ref 136–145)

## 2021-08-06 NOTE — PROGRESS NOTES
Labs stable - continue current diuretic regimen    Called spoke to pt.     Per Dr Celestina Navarrete  advised patient that are stable and current regimen

## 2021-08-25 ENCOUNTER — HOSPITAL ENCOUNTER (OUTPATIENT)
Dept: WOUND CARE | Age: 76
Discharge: HOME | End: 2021-08-25
Payer: MEDICARE

## 2021-08-25 VITALS
HEART RATE: 71 BPM | DIASTOLIC BLOOD PRESSURE: 71 MMHG | RESPIRATION RATE: 18 BRPM | TEMPERATURE: 98 F | SYSTOLIC BLOOD PRESSURE: 133 MMHG

## 2021-08-25 DIAGNOSIS — L97.922 NON-PRESSURE CHRONIC ULCER OF LEFT LOWER LEG WITH FAT LAYER EXPOSED (HCC): ICD-10-CM

## 2021-08-25 DIAGNOSIS — L97.912 NON-PRESSURE CHRONIC ULCER OF RIGHT LOWER LEG WITH FAT LAYER EXPOSED (HCC): ICD-10-CM

## 2021-08-25 DIAGNOSIS — R60.0 BILATERAL LEG EDEMA: ICD-10-CM

## 2021-08-25 PROCEDURE — 99214 OFFICE O/P EST MOD 30 MIN: CPT | Performed by: SURGERY

## 2021-08-25 PROCEDURE — 99214 OFFICE O/P EST MOD 30 MIN: CPT

## 2021-08-25 NOTE — PROGRESS NOTES
HISTORY OF PRESENT ILLNESS:  The patient is a 58-year-old woman who is referred to the [address] [place] [address] by Dr. Jamar Hein regarding ulcerations on both lower extremities.  The patient reports that she has had problems with increased leg swelling and ulcers for about a year.  Both legs have been painful.     The patient was first seen at [facility] on 7/7/2021.     The patient reports that she had been sleeping in a sitting position.  She will have her legs elevated off the floor to a limited extent.  She cannot lie flat in bed because she gets short of breath  .After receiving instructions to attempt to lie down to sleep, she is using a recliner, but has her feet below heart level.     The patient does take furosemide 40 mg per day. Eliceo Hidalgo reports this does produce a diuresis. I messaged her primary MD.   Dr Elieser Malone increased to lasix 40 mg bid. She took the second in the evening and had incontinence, so she reduced to once per day. She now is taking 40 mg in the morning and a half dose in the early afternoon.     The patient reports she had been drinking large quantities of fluid throughout the course of the day.  This included many bottles of water, V8, and other liquids.  She has reduced fluid intake some. She reports drinking a lot of soup. She is now cooking more of her own food to avoid salt in prepared foods.     The patient can stand and can walk a few feet when she holds onto objects.  She does not report shortness of breath with this limited exertion.  The patient has history of stroke producing left hemipareses.     The patient does have history of diabetes mellitus.  She reports blood sugars up to around 160.   She is working on a diabetic diet.     The patient's past medical history includes long-term anticoagulation with Xarelto for paroxysmal atrial fibrillation, anxiety, coronary artery disease, chronic pain, degenerative joint disease, hyperlipidemia, hypertension, and Problem List Items Addressed This Visit        Other    Pregnancy with history of  section, antepartum     Plans TOLAC if able  Polyhydramnios in third trimester     Incidental finding, has weekly APFS              Other Visit Diagnoses     Third trimester pregnancy    -  Primary    Relevant Orders    Strep B DNA probe, amplification glaucoma.     The patient has never smoked.     The patient had noninvasive arterial testing at [facility] on 05/11/2021.  This showed both ankle arm indices were non-meaningful because of arterial calcification.  The right great toe arm index was 1.01 and left great toe arm index was 0.48.  The metatarsal waveforms on both feet were strong and pulsatile.  PPG signals were pulsatile in both great toes.  This would suggest adequate arterial flow into both lower extremities.     Current dressing as of 7/7/2021:  The patient is to shower every day using soap and water on her legs and between her toes.  After each shower, a new dressing needs to be applied consisting of dry gauze between each of the toes and dry gauze on the foot and lower leg, then roll gauze to be applied to include the toes on to the calf.  Apply double Tubigrips bilaterally.        Reported weight 220 pounds, height 5 feet 4 inches.        PHYSICAL EXAMINATION:  GENERAL:  The patient is an alert, overweight woman sitting in an exam chair.  She is in no acute distress.     NEUROLOGIC:  The patient is alert and oriented.  She has diminished movement on the left side, particularly in left lower extremity.  Facial movements are symmetrical.  Speech is normal.     EXTREMITIES:  Examination of the right lower extremity revealed 2+ edema (slightly less than at original visit) beginning at the groin extending distally to include the foot.  There was lichenification of the skin in the lower leg and foot, particularly in the toes.  There were shallow ulcerations mainly which were partial-thickness and a portion of which were full thickness skin loss on the lower leg. .  Tissues appeared less wet than at first visit.   No watery drainage.  I could not palpate dorsalis pedis or posterior tibial pulses.  There was a soft biphasic signal at the dorsalis pedis on the right.     Examination of left lower extremity revealed 2+ edema (slightly less than original visit) from the groin level distally to include the foot.  There was lichenification of the skin in the lower leg and on the foot, particularly involving the toes.  There were  mainly partial-thickness ulcerations in left lower leg with few areas of full thickness skin loss.  Tissues appeared less wet than at first visit. No watery drainage.        Bilateral leg edema decreased a bit.  .           I have recommended the patient attempt to sleep at night lying down.  It would be ideal to reach a position in which her feet and her heart are at the same level during the night when she is sleeping. If in recliner, place pillow under calves to keep foot level with heart.     I have recommended the patient continue taking her diuretic 40 mg bid with first dose 8:30 AM and second dose 2:00 pm.  Take full dose if possible.     I have recommended she continue reducing total fluid intake.  She should eliminate all the extra water bottles she is drinking. Africa Navarro should not drink V8 juice, which is commonly as it is high in salt.  She should avoid salt containing foods.  She should eat no soup because of sodium content. Can make homemade soup without salt. Use sugar free lemon drop to keep mouth moist.      I recommended the patient try to adhere to a diabetic diet. Until there is better control of systemic edema, I will not order venous testing.     Very long discussion about all these recommendation and rationale.     Dressing ordered:  The patient is  to shower every day using soap and water on her legs and between her toes.  After each shower, a new dressing needs to be applied consisting of dry gauze between each of the toes and dry gauze on the foot and lower leg, then roll gauze to be applied to include the toes on to the calf.  Apply double Tubigrips bilaterally.  [address] [place] [address] nursing is seeing the patient to assist with wound care three times per week.              The patient will follow up in the [address] [place] [address] in 3 weeks.     FINAL DIAGNOSES:  Nonpressure ulcer in right lower leg with fat layer exposed, nonpressure ulcer in left lower leg with fat layer exposed, severe bilateral lower extremity edema. Total Evaluation and Management time utilized (excluding any procedure time)  was over 30 minutes, with 80 % in counseling and/or coordination of care.     Love Porras MD          O86.552, F31.028, R60.0     Crescencio Bose MD

## 2021-08-25 NOTE — DISCHARGE INSTRUCTIONS
Discharge Instructions/Wound [address]  [address]  [place], [address] [facility]  Telephone: [phone]    NAME:  Argenis Molina  YOB: 1945  MEDICAL RECORD NUMBER:  [id number]  DATE:  8/25/2021      WOUND CARE ORDERS:  · Clean wounds with soap and water, using washcloth or baby brush for gentle washing. · Weave gauze between toes with gauze  · Vasoline to legs, double tubigrips, or single tubi with ace bandage. .   · Care to be daily. . Continue with home health for bilateral lower extremity care. ·   · Continue to decrease amount of fluid intake you  are currently taking. .  · Use foods that are low in sodium ( salt )  · Do not add salt to foods. · Avoid canned vegetables,  canned soups, and prepared frozen dinners. .   · If you sleep in recliner, have recliner set with pillow so that feet and heart are at the same level, feet not to be lower than your heart. TREATMENT ORDERS:    Elevate leg(s) above the level of the heart when sitting. Avoid prolonged standing in one place. Do no get dressing/wrap wet. Follow Diet as prescribed:   [] Diet as tolerated: [] Calorie Diabetic Diet: [] No Added Salt:  [] Increase Protein: [] Other:                 Return Appointment:  [x] Return Appointment: With Dr Kenzie John in 3 weeks     Electronically signed Delio Ferrari RN on 8/25/2021 at 2:49 PM     Karen Baker 281: Should you experience any significant changes in your wound(s) or have questions about your wound care, please contact the [address] [place] [address] at [address] [place] [address] 8:00 am - 4:30. If you need help with your wound outside these hours and cannot wait until we are again available, contact your PCP or go to the hospital emergency room. PLEASE NOTE: IF YOU ARE UNABLE TO OBTAIN WOUND SUPPLIES, CONTINUE TO USE THE SUPPLIES YOU HAVE AVAILABLE UNTIL YOU ARE ABLE TO REACH US.  IT IS MOST IMPORTANT TO KEEP THE WOUND COVERED AT ALL TIMES.      Physician Signature:_______________________    Date: ___________ Time:  ____________

## 2021-08-25 NOTE — WOUND CARE
08/25/21 1403   Wound Leg lower Right #1 07/07/21   Date First Assessed/Time First Assessed: 07/07/21 1414   Present on Hospital Admission: Yes  Wound Approximate Age at First Assessment (Weeks): 52 weeks  Primary Wound Type: Venous  Location: Leg lower  Wound Location Orientation: Right  Wound Descrip. .. Wound Image    Wound Etiology Venous   Dressing Status Old drainage noted   Cleansed Cleansed with saline   Wound Length (cm) 25 cm   Wound Width (cm) 20 cm   Wound Depth (cm) 0.1 cm   Wound Surface Area (cm^2) 500 cm^2   Change in Wound Size % 57.46   Wound Volume (cm^3) 50 cm^3   Wound Healing % 79   Wound Assessment [place]/red;Slough   Drainage Amount Large   Drainage Description Serous   Wound Odor Mild   Ebony-Wound/Incision Assessment Hemosiderin staining (brown yellow)   Edges Undefined edges   Wound Thickness Description Partial thickness   Wound Leg lower Left #2 07/07/21   Date First Assessed/Time First Assessed: 07/07/21 1415   Present on Hospital Admission: Yes  Wound Approximate Age at First Assessment (Weeks): 52 weeks  Primary Wound Type: Venous  Location: Leg lower  Wound Location Orientation: Left  Wound Descript. ..    Wound Image    Wound Etiology Venous   Dressing Status Old drainage noted   Cleansed Cleansed with saline   Wound Length (cm) 2 cm   Wound Width (cm) 6 cm   Wound Depth (cm) 0.1 cm   Wound Surface Area (cm^2) 12 cm^2   Change in Wound Size % 99.16   Wound Volume (cm^3) 1.2 cm^3   Wound Healing % 100   Wound Assessment [place]/red;Slough   Drainage Amount Large   Drainage Description Serous   Wound Odor Mild   Ebony-Wound/Incision Assessment Hemosiderin staining (brown yellow)   Edges Undefined edges   Wound Thickness Description Partial thickness     Visit Vitals  /71 (BP 1 Location: Left upper arm, BP Patient Position: At rest)   Pulse 71   Temp 98 °F (36.7 °C)   Resp 18

## 2021-09-14 RX ORDER — AMLODIPINE BESYLATE 10 MG/1
TABLET ORAL
Qty: 90 TABLET | Refills: 1 | Status: SHIPPED | OUTPATIENT
Start: 2021-09-14 | End: 2021-12-13

## 2021-09-14 RX ORDER — PRAVASTATIN SODIUM 80 MG/1
TABLET ORAL
Qty: 90 TABLET | Refills: 1 | Status: SHIPPED | OUTPATIENT
Start: 2021-09-14

## 2021-09-14 NOTE — TELEPHONE ENCOUNTER
Requested Prescriptions     Pending Prescriptions Disp Refills    pravastatin (PRAVACHOL) 80 mg tablet 90 Tablet 3     Si Tablet nightly.  amLODIPine (NORVASC) 10 mg tablet 90 Tablet 1     Sig: TAKE 1 TABLET DAILY    rivaroxaban (Xarelto) 20 mg tab tablet 90 Tablet 2     Sig: Take 1 Tablet by mouth daily.        Last Refill:21  Next Appointment:22

## 2021-09-22 ENCOUNTER — HOSPITAL ENCOUNTER (OUTPATIENT)
Dept: WOUND CARE | Age: 76
Discharge: HOME | End: 2021-09-22
Payer: MEDICARE

## 2021-09-22 VITALS
DIASTOLIC BLOOD PRESSURE: 72 MMHG | TEMPERATURE: 97.1 F | SYSTOLIC BLOOD PRESSURE: 159 MMHG | RESPIRATION RATE: 18 BRPM | HEART RATE: 69 BPM

## 2021-09-22 DIAGNOSIS — L97.922 NON-PRESSURE CHRONIC ULCER OF LEFT LOWER LEG WITH FAT LAYER EXPOSED (HCC): ICD-10-CM

## 2021-09-22 DIAGNOSIS — R60.0 BILATERAL LEG EDEMA: ICD-10-CM

## 2021-09-22 DIAGNOSIS — L97.912 NON-PRESSURE CHRONIC ULCER OF RIGHT LOWER LEG WITH FAT LAYER EXPOSED (HCC): ICD-10-CM

## 2021-09-22 PROBLEM — L89.302 PRESSURE INJURY OF BUTTOCK, STAGE 2 (HCC): Status: ACTIVE | Noted: 2021-09-22

## 2021-09-22 PROCEDURE — 99214 OFFICE O/P EST MOD 30 MIN: CPT

## 2021-09-22 PROCEDURE — 99213 OFFICE O/P EST LOW 20 MIN: CPT | Performed by: SURGERY

## 2021-09-22 NOTE — PROGRESS NOTES
HISTORY OF PRESENT ILLNESS:  The patient is a 41-year-old woman who is referred to the [address] [place] [address] Road by Dr. Stu Osuna regarding ulcerations on both lower extremities.  The patient reports that she has had problems with increased leg swelling and ulcers for about a year.  Both legs have been painful.     The patient was first seen at [facility] on 7/7/2021.     The patient reports that she had been sleeping in a sitting position.  She will have her legs elevated off the floor to a limited extent.  She cannot lie flat in bed because she gets short of breath  .After receiving instructions to attempt to lie down to sleep, she is using a recliner, but has her feet below heart level.     The patient does take furosemide 40 mg per day. Mary Greco reports this does produce a diuresis. I messaged her primary MD.   Dr Finley Rm increased to lasix 40 mg bid.  She took the second in the evening and had incontinence, so she reduced to once per day.   She now is taking 40 mg in the morning and a half dose in the early afternoon.     The patient reports she had been drinking large quantities of fluid throughout the course of the day.  This included many bottles of water, V8, and other liquids.  She has reduced fluid intake some.       She is now cooking more of her own food to avoid salt in prepared foods.     The patient can stand and can walk a few feet when she holds onto objects.  She does not report shortness of breath with this limited exertion.  The patient has history of stroke producing left hemipareses.     The patient does have history of diabetes mellitus.  She reports blood sugars up to around 160.  She is working on a diabetic diet.     The patient's past medical history includes long-term anticoagulation with Xarelto for paroxysmal atrial fibrillation, anxiety, coronary artery disease, chronic pain, degenerative joint disease, hyperlipidemia, hypertension, and glaucoma.     The patient has never smoked.     The patient had noninvasive arterial testing at [facility] on 05/11/2021.  This showed both ankle arm indices were non-meaningful because of arterial calcification.  The right great toe arm index was 1.01 and left great toe arm index was 0.48.  The metatarsal waveforms on both feet were strong and pulsatile.  PPG signals were pulsatile in both great toes.  This would suggest adequate arterial flow into both lower extremities.     Current dressing as of 7/7/2021:  The patient is to shower every day using soap and water on her legs and between her toes.  After each shower, a new dressing needs to be applied consisting of dry gauze between each of the toes and dry gauze on the foot and lower leg, then roll gauze to be applied to include the toes on to the calf.  Apply double Tubigrips bilaterally. She reports pain in posterior buttock.        Reported weight 220 pounds, height 5 feet 4 inches.        PHYSICAL EXAMINATION:  GENERAL:  The patient is an alert, overweight woman sitting in an exam chair.  She is in no acute distress.     NEUROLOGIC:  The patient is alert and oriented.  She has diminished movement on the left side, particularly in left lower extremity.  Facial movements are symmetrical.  Speech is normal.     EXTREMITIES:  Examination of the right lower extremity revealed 2+ edema (slightly less than at original visit) beginning at the groin extending distally to include the foot.  There was lichenification of the skin in the lower leg and foot, particularly in the toes.  There were shallow ulcerations mainly which were partial-thickness and a portion of which were full thickness skin loss on the lower leg.   Smaller in size. Tissues appeared less wet than at first visit.  No watery drainage.  I could not palpate dorsalis pedis or posterior tibial pulses.  There was a soft biphasic signal at the dorsalis pedis on the right.     Examination of left lower extremity revealed 2+ edema (slightly less than original visit) from the groin level distally to include the foot.  There was lichenification of the skin in the lower leg and on the foot, particularly involving the toes. Minimal ulcers on the left.  Tissues appeared less wet than at first visit.  No watery drainage. Posteror buttock - to right and left of upper sacrum - ulcer about 0.6 c 0.6 x 0.1 cm with pale surface.            Bilateral leg edema decreased a bit.  . Ulcers improved. Pressure ulcers posterior buttocks, stage 2             I have recommended the patient attempt to sleep at night lying down.  It would be ideal to reach a position in which her feet and her heart are at the same level during the night when she is sleeping. If in recliner, place pillow under calves to keep foot level with heart.     I have recommended the patient continue taking her diuretic 40 mg bid with first dose 8:30 AM and second dose 2:00 pm.  Take full dose if possible.     I have recommended she continue limiting total fluid intake.  She should eliminate all the extra water bottles she is drinking. Argenis Lopez should not drink V8 juice, which is commonly as it is high in salt.  She should avoid salt containing foods.  She should eat no soup because of sodium content. Can make homemade soup without salt.   Use sugar free lemon drop to keep mouth moist.      I recommended the patient try to adhere to a diabetic diet.     Until there is better control of systemic edema, I will not order venous testing.     Dressing ordered:  The patient is  to shower every day using soap and water on her legs and between her toes.  After each shower, a new dressing needs to be applied consisting of dry gauze between each of the toes and dry gauze on the foot and lower leg, then roll gauze to be applied to include the toes on to the calf.  Apply double Tubigrips bilaterally.     Zinc oxide ointment to buttock sites daily.     Home Health nursing is seeing the patient to assist with wound care three times per week. Offloading for buttock sites:  Sleep lying on sides. Turn side to side.              The patient will follow up in the [address] [place] [address] in 3 weeks.     FINAL DIAGNOSES:  Nonpressure ulcer in right lower leg with fat layer exposed, nonpressure ulcer in left lower leg with fat layer exposed, severe bilateral lower extremity edema; pressure ulcers on buttock, stage 2          GGordon Guerrero MD            Q59.656, D12.876, R60.0; J11.200     Ollie Ventura MD

## 2021-09-22 NOTE — DISCHARGE INSTRUCTIONS
Discharge Instructions/Wound Orders  [address], [address] [place] [address] Sharla Helms, [id number]  Telephone: [phone]    NAME:  Chata West OF BIRTH:  1945  MEDICAL RECORD NUMBER:  [id number]  DATE:  9/22/2021     Wound Care Orders:  Sacral pressure wounds. . Clean with soap and water, apply Desitin daily and as needed during the day. .   Try to sleep on your side on bed or sofa to avert further pressure on bottom. .    Daily care to bilateral lower legs    Clean legs with soap and water, pat dry, including between toes. Yvette Vaughan Apply vasoline to dry areas, abds as needed to manage drainage. .   RG, and double tubi from base of toes to bend of knee. .. Return to see MD in 3 weeks    Dietary:    Continue to limit your fluids to moderate amounts with meals and very minimal fluids between meals. .  Continue your fluid pill, taking the whole dose as it is ordered. Limit foods that have salt, including canned soups, ham, sausage, ward, hot dogs, canned vegetables, and prepared foods. .   Any food that is already cooked when you buy it has a lot of salt in it. .  . [] Diet as tolerated: [] Calorie Diabetic Diet:Low carb and no Sugar [x] No Added Salt:[] Increase Protein: [x] Other:Limit the amount of liquid you are drinking and avoid drinking in between meals   Activity:  [x] Activity as tolerated:  [x] Elevate legs to the level of your heart as much as you can during the day            Return Appointment:   [x] Return Appointment: With DR Flaco Broderick  in  [facility])  [] Ordered tests:    Electronically signed Dora Small RN on 9/22/2021 at 3:58 PM     Karen Baker 281: Should you experience any significant changes in your wound(s) or have questions about your wound care, please contact the [address] [place] [address] at [address] [place] [address] 8:00 am - 4:30.   If you need help with your wound outside these hours and cannot wait until we are again available, contact your PCP or go to the hospital emergency room. PLEASE NOTE: IF YOU ARE UNABLE TO OBTAIN WOUND SUPPLIES, CONTINUE TO USE THE SUPPLIES YOU HAVE AVAILABLE UNTIL YOU ARE ABLE TO REACH US. IT IS MOST IMPORTANT TO KEEP THE WOUND COVERED AT ALL TIMES.      Physician Signature:_______________________    Date: ___________ Time:  ____________

## 2021-10-13 ENCOUNTER — HOSPITAL ENCOUNTER (OUTPATIENT)
Dept: WOUND CARE | Age: 76
Discharge: HOME HEALTH | End: 2021-10-13
Payer: MEDICARE

## 2021-10-13 VITALS
SYSTOLIC BLOOD PRESSURE: 170 MMHG | DIASTOLIC BLOOD PRESSURE: 74 MMHG | RESPIRATION RATE: 16 BRPM | TEMPERATURE: 98.1 F | HEART RATE: 75 BPM

## 2021-10-13 DIAGNOSIS — L97.922 NON-PRESSURE CHRONIC ULCER OF LEFT LOWER LEG WITH FAT LAYER EXPOSED (HCC): ICD-10-CM

## 2021-10-13 DIAGNOSIS — R60.0 BILATERAL LEG EDEMA: ICD-10-CM

## 2021-10-13 DIAGNOSIS — L97.912 NON-PRESSURE CHRONIC ULCER OF RIGHT LOWER LEG WITH FAT LAYER EXPOSED (HCC): ICD-10-CM

## 2021-10-13 PROBLEM — L89.303 PRESSURE INJURY OF BUTTOCK, STAGE 3 (HCC): Status: ACTIVE | Noted: 2021-10-13

## 2021-10-13 PROCEDURE — 99214 OFFICE O/P EST MOD 30 MIN: CPT | Performed by: SURGERY

## 2021-10-13 PROCEDURE — 99214 OFFICE O/P EST MOD 30 MIN: CPT

## 2021-10-13 NOTE — PROGRESS NOTES
HISTORY OF PRESENT ILLNESS:  The patient is a 48-year-old woman who is referred to the [address] [place] [address] Road by Dr. Santos Chang regarding ulcerations on both lower extremities.  The patient reports that she has had problems with increased leg swelling and ulcers for about a year.  Both legs have been painful.     The patient was first seen at [facility] on 7/7/2021.     The patient reports that she had been sleeping in a sitting position.  She will have her legs elevated off the floor to a limited extent.  She cannot lie flat in bed because she gets short of breath  .After receiving instructions to attempt to lie down to sleep, she is using a recliner, but has her feet below heart level.     The patient does take furosemide 40 mg per day. Sissy Ruth reports this does produce a diuresis. I messaged her primary MD.   Dr Demetra Jaramillo increased to lasix 40 mg bid.  She took the second in the evening and had incontinence, so she reduced to once per day.  She now says she is taking 40 mg twice per day.     The patient reports she had been drinking large quantities of fluid throughout the course of the day.  This included many bottles of water, V8, and other liquids.  She has reduced fluid intake some.       She is now cooking more of her own food to avoid salt in prepared foods.     The patient can stand and can walk a few feet when she holds onto objects.  She does not report shortness of breath with this limited exertion.  The patient has history of stroke producing left hemipareses.     The patient does have history of diabetes mellitus.  She reports blood sugars up to around 160.  She is working on a diabetic diet.     The patient's past medical history includes long-term anticoagulation with Xarelto for paroxysmal atrial fibrillation, anxiety, coronary artery disease, chronic pain, degenerative joint disease, hyperlipidemia, hypertension, and glaucoma.     The patient has never smoked.     The patient had noninvasive arterial testing at [facility] on 05/11/2021.  This showed both ankle arm indices were non-meaningful because of arterial calcification.  The right great toe arm index was 1.01 and left great toe arm index was 0.48.  The metatarsal waveforms on both feet were strong and pulsatile.  PPG signals were pulsatile in both great toes.  This would suggest adequate arterial flow into both lower extremities.     Current dressing as of 7/7/2021:  The patient is to shower every day using soap and water on her legs and between her toes.  After each shower, a new dressing needs to be applied consisting of dry gauze between each of the toes and Vaseline and dry gauze on the foot and lower leg, then roll gauze to be applied to include the toes on to the calf.  Apply Acewraps bilaterally.     She reports pain in posterior buttock.        Reported weight 220 pounds, height 5 feet 4 inches.        PHYSICAL EXAMINATION:  GENERAL:  The patient is an alert, overweight woman sitting in an exam chair.  She is in no acute distress.     NEUROLOGIC:  The patient is alert and oriented.  She has diminished movement on the left side,  Left upper and lower extremity.  Facial movements are symmetrical.  Speech is normal.     EXTREMITIES:  Examination of the right lower extremity revealed 2+ edema (less than at original visit) beginning at the groin extending distally to include the foot.  There was lichenification of the skin in the lower leg and foot, particularly in the toes.  There were minimal shallow ulcerations mainly which were partial-thickness, and a small portion of which were full thickness skin loss on the lower leg.  Tissues appeared less wet than at first visit.  No watery drainage.  I could not palpate dorsalis pedis or posterior tibial pulses.  There was a soft biphasic signal at the dorsalis pedis on the right.     Examination of left lower extremity revealed 2+ edema (slightly less than original visit) from the groin level distally to include the foot.  There was lichenification of the skin in the lower leg and on the foot, particularly involving the toes. Minimal ulcers on the left.  Tissues appeared less wet than at first visit.  No watery drainage.     Posteror buttock - to right and left of upper sacrum - On left, cluster of ulcers about 0.8 x 1.4 x 0.3 cm with pale surface. On right cluster of ulcers 1.6 x 1 x 0.2 cm with pale base.              Bilateral leg edema persistent, a bit less than at original presentation, still quite significant. Control of edema will be essential to improvement in her skin condition.     Pressure ulcers posterior buttocks, minimal stage 3              I have recommended the patient attempt to sleep at night lying down.  It would be ideal to reach a position in which her feet and her heart are at the same level during the night when she is sleeping.  If in recliner, place pillow under calves to keep foot level with heart. I discussed offloading of midline buttocks - tilt right or left. Never lie directly supine.   In recliner, never be flat on back.     I have recommended the patient continue taking her diuretic 40 mg bid with first dose 8:30 AM and second dose 2:00 pm.  Take full dose if possible.     I have recommended she continue limiting total fluid intake.  She should eliminate all the extra water bottles she is drinking. Irineo Paul should not drink V8 juice, which is commonly as it is high in salt.  She should avoid salt containing foods.  She should eat no soup because of sodium content. Can make homemade soup without salt.   Use sugar free lemon drop to keep mouth moist.      I recommended the patient try to adhere to a diabetic diet.     Until there is better control of systemic edema, I will not order venous testing.     Dressing ordered:  The patient is to shower every day using soap and water on her legs and between her toes.  After each shower, a new dressing needs to be applied consisting of dry gauze between each of the toes and Vaseline and dry gauze on the foot and lower leg, then roll gauze to be applied to include the toes on to the calf.  Apply Acewraps bilaterally.     Zinc oxide ointment to buttock sites daily.  [address] [place] [address] nursing is seeing the patient to assist with wound care three times per week.     I sent message to primary Dr Matthew Braga - Consider higher dose of diuretic. Assess for source of edema, echo, etc.  Patient says edema started about a year ago.              The patient will follow up in the [address] [place] [address] in 4 weeks.     FINAL DIAGNOSES:  Nonpressure ulcer in right lower leg with fat layer exposed, nonpressure ulcer in left lower leg with fat layer exposed, severe bilateral lower extremity edema; pressure ulcers on buttock, stage 3           JEFF Miller MD            B67.736, A81.688, R60.0; L89.303     Rosi Bui MD

## 2021-10-13 NOTE — DISCHARGE INSTRUCTIONS
Discharge Instructions for   [facility]  [address] [facility]  MOB 1, [address] [facility] [place], [id number]  Telephone: [phone]    NAME:  Barrington Clarke OF BIRTH:  1945  MEDICAL RECORD NUMBER:  [id number]  DATE:  10/13/2021     WOUND CARE ORDERS:  Right & Left  buttock wounds - Cleanse with soap & water, & pat dry. Apply Desitin daily (and as needed),  followed by ABD or patient's incontinence pads. Bilateral lower legs-Cleanse legs with soap and water, pat dry, including between toes.  Moisturize dry skin w/Vasoline, Apply ABD pads, roll gauze & Ace wraps (from base of toes to approx 1 inch below bend of knees). Dressings to be changed daily & as needed for soiling. RTC in 4 weeks for MD follow up. TREATMENT ORDERS:    Elevate leg(s) above the level of the heart to assist in controlling leg swelling. Avoid prolonged standing in one place. Do not get dressing/wrap wet. Take your diuretic (fluid pills) as prescribed. Dr. Yadira Navarrete will contact your PCP/Dr. Stacia Crigler regarding possible dosage change. Follow Diet as prescribed:    [x] Calorie Diabetic Diet: Low carb and no Sugar [x] No Added Salt. Avoid prepared foods. They are typically very high in salt content. [x] Limit the amount of liquid you are drinking,  avoid drinking in between meals           Return Appointment:  [x] Return Appointment: With DR Sagrario Gresham  in  [facility])  [] Nurse Visit : *** days  [] Ordered tests:    Electronically signed Jose Duenas RN on 10/13/2021 at 2:12 PM     Karen Baker 281: Should you experience any significant changes in your wound(s) or have questions about your wound care, please contact the [facility] Main at [address] [place] [address] Street 8:00 am - 4:30. If you need help with your wound outside these hours and cannot wait until we are again available, contact your PCP or go to the hospital emergency room.      PLEASE NOTE: IF YOU ARE UNABLE TO OBTAIN WOUND SUPPLIES, CONTINUE TO USE THE SUPPLIES YOU HAVE AVAILABLE UNTIL YOU ARE ABLE TO REACH US. IT IS MOST IMPORTANT TO KEEP THE WOUND COVERED AT ALL TIMES.      Physician Signature:_______________________    Date: ___________ Time:  ____________

## 2021-10-13 NOTE — WOUND CARE
10/13/21 1316   Wound Buttocks Left;Proximal 09/22/21   Date First Assessed/Time First Assessed: 09/22/21 1639   Wound Approximate Age at First Assessment (Weeks): 2 weeks  Primary Wound Type: Pressure Injury  Location: Buttocks  Wound Location Orientation: Left;Proximal  Date of First Observation: 09/22/21   Wound Etiology Pressure Stage 3   Cleansed Cleansed with saline   Wound Length (cm) 0.6 cm   Wound Width (cm) 0.4 cm   Wound Depth (cm) 0.2 cm   Wound Surface Area (cm^2) 0.24 cm^2   Change in Wound Size % 86.67   Wound Volume (cm^3) 0.048 cm^3   Wound Healing % 87   Wound Assessment Pink/red   Drainage Amount Small   Drainage Description Serosanguinous   Wound Odor None   Ebony-Wound/Incision Assessment Fragile;Dry/flaky   Edges Attached edges   Wound Thickness Description Full thickness   Wound Leg lower Left #2 07/07/21   Date First Assessed/Time First Assessed: 07/07/21 1415   Present on Hospital Admission: Yes  Wound Approximate Age at First Assessment (Weeks): 52 weeks  Primary Wound Type: Venous  Location: Leg lower  Wound Location Orientation: Left  Wound Descript. .. Wound Image    Wound Etiology Venous   Dressing Status Breakthrough drainage noted   Cleansed Soap and water;Cleansed with saline   Wound Length (cm) 0.1 cm   Wound Width (cm) 0.1 cm   Wound Depth (cm) 0.1 cm   Wound Surface Area (cm^2) 0.01 cm^2   Change in Wound Size % 100   Wound Volume (cm^3) 0.001 cm^3   Wound Healing % 100   Wound Assessment Other (Comment)  (Venous dermatitis w/weeping & Scattered crusted areas.)   Drainage Amount Moderate   Drainage Description Serous   Wound Odor None   Ebony-Wound/Incision Assessment Dry/flaky;Fragile; Non-Blanchable erythema; Other (Comment)  (Venous dermatitis.)   Wound Leg lower Right #1 07/07/21   Date First Assessed/Time First Assessed: 07/07/21 1414   Present on Hospital Admission: Yes  Wound Approximate Age at First Assessment (Weeks): 52 weeks  Primary Wound Type: Venous  Location: Leg lower  Wound Location Orientation: Right  Wound Descrip. .. Wound Image    Wound Etiology Venous   Dressing Status Breakthrough drainage noted   Cleansed Soap and water   Wound Length (cm) 0.1 cm   Wound Width (cm) 0.1 cm   Wound Depth (cm) 0.1 cm   Wound Surface Area (cm^2) 0.01 cm^2   Change in Wound Size % 100   Wound Volume (cm^3) 0.001 cm^3   Wound Healing % 100   Wound Assessment Non-blanchable erythema;Pink/red; Other (Comment)  (Venous dermatitis, weeping w/scattered crusted areas.)   Drainage Amount Moderate   Drainage Description Serous   Wound Odor None   Ebony-Wound/Incision Assessment Non-Blanchable erythema;Fragile; Other (Comment)  (Venous dermatitis. Fragile, weeping w/crusted areas. )   Edges Undefined edges   Wound Buttocks Right 09/22/21   Date First Assessed/Time First Assessed: 09/22/21 1638   Wound Approximate Age at First Assessment (Weeks): 2 weeks  Primary Wound Type: Pressure Injury  Location: Buttocks  Wound Location Orientation: Right  Date of First Observation: 09/22/21   Wound Image    Wound Etiology Pressure Stage 2   Cleansed Cleansed with saline; Soap and water   Wound Length (cm) 1.6 cm   Wound Width (cm) 1 cm   Wound Depth (cm) 0.2 cm   Wound Surface Area (cm^2) 1.6 cm^2   Change in Wound Size % -344.44   Wound Volume (cm^3) 0.32 cm^3   Wound Healing % -789   Wound Buttocks Left;Distal 10/13/21   Date First Assessed/Time First Assessed: 10/13/21 1327   Primary Wound Type: Pressure Injury  Location: Buttocks  Wound Location Orientation: Left;Distal  Date of First Observation: 10/13/21   Wound Image    Wound Etiology Pressure Stage 3   Cleansed Cleansed with saline; Soap and water   Wound Length (cm) 0.8 cm   Wound Width (cm) 1.4 cm   Wound Depth (cm) 0.3 cm   Wound Surface Area (cm^2) 1.12 cm^2   Wound Volume (cm^3) 0.336 cm^3   Wound Assessment Pink/red;Subcutaneous   Drainage Amount Small   Drainage Description Sanguineous   Wound Odor None   Ebony-Wound/Incision Assessment Fragile;Dry/flaky   Edges Attached edges   Wound Thickness Description Full thickness     Visit Vitals  BP (!) 170/74 (BP 1 Location: Left upper arm, BP Patient Position: Semi fowlers)   Pulse 75   Temp 98.1 °F (36.7 °C)   Resp 16

## 2021-10-14 ENCOUNTER — DOCUMENTATION ONLY (OUTPATIENT)
Dept: SURGERY | Age: 76
End: 2021-10-14

## 2021-10-14 NOTE — PROGRESS NOTES
Wound Care    Patient's situation reviewed by message with Dr Abiola Clarke. He indicates patient has been reluctant to have specialist evaluation for her edema. She has also had deterioration of her renal function with higher doses of diuretics. I will discuss with patient when I see her next.     Kat Valiente MD

## 2021-11-10 ENCOUNTER — HOSPITAL ENCOUNTER (OUTPATIENT)
Dept: WOUND CARE | Age: 76
Discharge: HOME HEALTH | End: 2021-11-10
Payer: MEDICARE

## 2021-11-10 VITALS
SYSTOLIC BLOOD PRESSURE: 184 MMHG | RESPIRATION RATE: 16 BRPM | DIASTOLIC BLOOD PRESSURE: 79 MMHG | HEART RATE: 88 BPM | TEMPERATURE: 98.1 F

## 2021-11-10 DIAGNOSIS — L97.922 NON-PRESSURE CHRONIC ULCER OF LEFT LOWER LEG WITH FAT LAYER EXPOSED (HCC): ICD-10-CM

## 2021-11-10 DIAGNOSIS — L89.313 PRESSURE INJURY OF RIGHT BUTTOCK, STAGE 3 (HCC): ICD-10-CM

## 2021-11-10 DIAGNOSIS — L97.912 NON-PRESSURE CHRONIC ULCER OF RIGHT LOWER LEG WITH FAT LAYER EXPOSED (HCC): ICD-10-CM

## 2021-11-10 PROCEDURE — 99214 OFFICE O/P EST MOD 30 MIN: CPT

## 2021-11-10 PROCEDURE — 99214 OFFICE O/P EST MOD 30 MIN: CPT | Performed by: SURGERY

## 2021-11-10 NOTE — WOUND CARE
11/10/21 1342   [REMOVED] Wound Buttocks Left; Distal 10/13/21   Final Assessment Date: 11/10/21  Date First Assessed/Time First Assessed: 10/13/21 1327   Primary Wound Type: Pressure Injury  Location: Buttocks  Wound Location Orientation: Left; Distal  Date of First Observation: 10/13/21  Wound Outcome: Healed   Wound Image    Wound Length (cm) 0 cm   Wound Width (cm) 0 cm   Wound Depth (cm) 0 cm   Wound Surface Area (cm^2) 0 cm^2   Change in Wound Size % 100   Wound Volume (cm^3) 0 cm^3   Wound Healing % 100   [REMOVED] Wound Buttocks Left; Proximal 09/22/21   Final Assessment Date: 11/10/21  Date First Assessed/Time First Assessed: 09/22/21 1639   Wound Approximate Age at First Assessment (Weeks): 2 weeks  Primary Wound Type: Pressure Injury  Location: Buttocks  Wound Location Orientation: Left; Proximal  ...    Wound Image    Wound Length (cm) 0 cm   Wound Width (cm) 0 cm   Wound Depth (cm) 0 cm   Wound Surface Area (cm^2) 0 cm^2   Change in Wound Size % 100   Wound Volume (cm^3) 0 cm^3   Wound Healing % 100   Wound Assessment Pink/red   Wound Buttocks Right 09/22/21   Date First Assessed/Time First Assessed: 09/22/21 1638   Wound Approximate Age at First Assessment (Weeks): 2 weeks  Primary Wound Type: Pressure Injury  Location: Buttocks  Wound Location Orientation: Right  Date of First Observation: 09/22/21   Wound Image    Wound Etiology Pressure Stage 3   Dressing Status Old drainage noted   Cleansed Cleansed with saline   Wound Length (cm) 1.8 cm   Wound Width (cm) 0.9 cm   Wound Depth (cm) 0.2 cm   Wound Surface Area (cm^2) 1.62 cm^2   Change in Wound Size % -350   Wound Volume (cm^3) 0.324 cm^3   Wound Healing % -800   Wound Assessment Pink/red   Drainage Amount Moderate   Drainage Description Serous   Wound Odor None   Ebony-Wound/Incision Assessment Blanchable erythema   Edges Flat/open edges   Wound Thickness Description Full thickness

## 2021-11-10 NOTE — WOUND CARE
11/10/21 1357   Wound Leg lower Left #2 07/07/21   Date First Assessed/Time First Assessed: 07/07/21 1415   Present on Hospital Admission: Yes  Wound Approximate Age at First Assessment (Weeks): 52 weeks  Primary Wound Type: Venous  Location: Leg lower  Wound Location Orientation: Left  Wound Descript. ..    Wound Image    Wound Etiology Venous   Dressing Status Old drainage noted   Cleansed Soap and water   Wound Length (cm) 0.1 cm   Wound Width (cm) 0.1 cm   Wound Depth (cm) 0.1 cm   Wound Surface Area (cm^2) 0.01 cm^2   Change in Wound Size % 100   Wound Volume (cm^3) 0.001 cm^3   Wound Healing % 100   Drainage Amount Moderate   Drainage Description Serous   Wound Odor None     Visit Vitals  BP (!) 184/79 (BP 1 Location: Left upper arm, BP Patient Position: Semi fowlers)   Pulse 88   Temp 98.1 °F (36.7 °C)   Resp 16

## 2021-11-10 NOTE — PROGRESS NOTES
HISTORY OF PRESENT ILLNESS:  The patient is a 65-year-old woman who is referred to the [address] [place] [address] Road by Dr. Kanu Ramos regarding ulcerations on both lower extremities.  The patient reports that she has had problems with increased leg swelling and ulcers for about a year.  Both legs have been painful.     The patient was first seen at [facility] on 7/7/2021.     The patient reports that she had been sleeping in a sitting position.  She will have her legs elevated off the floor to a limited extent.  She cannot lie flat in bed because she gets short of breath  .After receiving instructions to attempt to lie down to sleep, she is using a recliner, but has her feet below heart level. She says taht she cannot fully change position between left and right by herself.     The patient does take furosemide 40 mg per day. Amanda Staley reports this does produce a diuresis. I messaged her primary MD.   Dr Fredo Hutton increased to lasix 40 mg bid. Amanda Rebeka took the second in the evening and had incontinence, so she reduced to once per day.  She now says she is taking 40 mg twice per day.     The patient reports she had been drinking large quantities of fluid throughout the course of the day.  This included many bottles of water, V8, and other liquids.  She has reduced fluid intake some.       She is now cooking more of her own food to avoid salt in prepared foods. Patient's situation reviewed by message with Dr Fredo Hutton. He indicates patient has been reluctant to have specialist evaluation for her edema.   She has also had deterioration of her renal function with higher doses of diuretics.     The patient can stand and can walk a few feet when she holds onto objects.  She does not report shortness of breath with this limited exertion.  The patient has history of stroke producing left hemipareses.     The patient does have history of diabetes mellitus.  She reports blood sugars up to around 160.  She is working on a diabetic diet.     The patient's past medical history includes long-term anticoagulation with Xarelto for paroxysmal atrial fibrillation, anxiety, coronary artery disease, chronic pain, degenerative joint disease, hyperlipidemia, hypertension, and glaucoma.     The patient has never smoked.     The patient had noninvasive arterial testing at [facility] on 05/11/2021.  This showed both ankle arm indices were non-meaningful because of arterial calcification.  The right great toe arm index was 1.01 and left great toe arm index was 0.48.  The metatarsal waveforms on both feet were strong and pulsatile.  PPG signals were pulsatile in both great toes.  This would suggest adequate arterial flow into both lower extremities.     Current dressing as of 7/7/2021:  The patient is to shower every day using soap and water on her legs and between her toes.  After each shower, a new dressing needs to be applied consisting of dry gauze between each of the toes and Vaseline and dry gauze on the foot and lower leg, then roll gauze to be applied to include the toes on to the calf.  Apply Acewraps bilaterally.     She reports pain in posterior buttock.        Reported weight 220 pounds, height 5 feet 4 inches.        PHYSICAL EXAMINATION:  GENERAL:  The patient is an alert, overweight woman sitting in an exam chair.  She is in no acute distress.     NEUROLOGIC:  The patient is alert and oriented.  She has diminished movement on the left side,  Left upper and lower extremity.  Facial movements are symmetrical.  Speech is normal.     EXTREMITIES:  Examination of the right lower extremity revealed 2+ edema (less than at original visit) beginning at the groin extending distally to include the foot.  There was lichenification of the skin in the lower leg and foot, particularly in the toes.  There were minimal shallow ulcerations mainly which were partial-thickness, and a small portion of which were full thickness skin loss on the lower leg. Tissues appeared less wet than at first visit.  Minimal watery drainage.  I could not palpate dorsalis pedis or posterior tibial pulses.  There was a soft biphasic signal at the dorsalis pedis on the right.     Examination of left lower extremity revealed 2+ edema (slightly less than original visit) from the groin level distally to include the foot.  There was lichenification of the skin in the lower leg and on the foot, particularly involving the toes. Minimal ulcers on the left.  Tissues appeared less wet than at first visit.  Minimal watery drainage.     Posteror buttock - to right and left of upper sacrum - On left, ulcer is healed. On right,  ulcer 1.8 x 0.9 x 0.2 cm with pale base. Skin on both sides shows no maceration.              Bilateral leg edema persistent, a bit less than at original presentation, still quite significant.  Control of edema will be essential to improvement in her skin condition. Leg ulcers very shallow, but do drain some watery fluid.     Pressure ulcer right posterior buttock, stage 3              I have recommended the patient attempt to sleep at night lying down.  It would be ideal to reach a position in which her feet and her heart are at the same level during the night when she is sleeping.  If in recliner, place pillow under calves to keep foot level with heart.     I discussed offloading of midline buttocks - tilt all the way to the right; or detention or fully to the left. Never lie directly supine. In recliner, never be flat on back. She will need assistance from family to change positon every 2 hours.   Use pillows to position.     I have recommended the patient continue taking her diuretic 40 mg bid with first dose 8:30 AM and second dose 2:00 pm.  Take full dose if possible.     I have recommended she continue limiting total fluid intake.  She should eliminate all the extra water bottles she is drinking. Araceli Ackerman should not drink V8 juice, which is commonly as it is high in salt.  She should avoid salt containing foods.  She should eat no soup because of sodium content. Can make homemade soup without salt.   Use sugar free lemon drop to keep mouth moist.     Discuss further testing with Dr Angel Johnson / cardiologist.  Likely needs echo.     I recommended the patient try to adhere to a diabetic diet.     Until there is better control of systemic edema, I will not order venous testing.     Dressing ordered:  The patient is to shower every day using soap and water on her legs and between her toes.  After each shower, a new dressing needs to be applied consisting of dry gauze between each of the toes and Vaseline and dry gauze on the foot and lower leg, then roll gauze to be applied to include the toes on to the calf.  Apply Acewraps bilaterally.     Zinc oxide ointment to buttock sites daily.  [facility] to assist with wound care three times per week.                 The patient will follow up in the [address] [place] [address] in 4 weeks.     FINAL DIAGNOSES:  Nonpressure ulcer in right lower leg with fat layer exposed, nonpressure ulcer in left lower leg with fat layer exposed, severe bilateral lower extremity edema; pressure ulcers on buttock, stage 3           G. Isabel Hdz MD            D16.702, G26.375, R60.0; L89.303     Manny Katz MD

## 2021-11-10 NOTE — DISCHARGE INSTRUCTIONS
Discharge Instructions/Wound 600 [address] 1, 900 [facility] [place], [id number]  Telephone: [phone]  WOUND CARE ORDERS:    Try to sleep so feet and head on same level. .   Try to lie on left or right side, not on your back. .  Have home health nurses show your  and your son how to prop you up with pillows so you are not on your back. You should be turned from right to left and then from  left to right every two hours  Limit your fluids by mouth  Avoid foods that have sodium, ham, frozen dinners, cold cuts hot dogs, sausages, [place], canned vegetables and soups. .   Lower legs. . wash with soap and water, pat dry. Apply abds, RG, double tubi to both legs. . Care to be done every other day . Sacral wound. . Clean with soap and water, pat dry. Apply zinc paste daily and as needed. .. Return to see Md in 4 weeks    TREATMENT ORDERS:  Turn/reposition every 2 hours when in bed, avoid direct pressure on wound site. When sitting, shift position or do seat lifts every 15 minutes. Limit side lying to 30 degree tilt. Limit HOB elevation to 30 degrees. Use speciality pressure relief cushion, mattress as appropriate. Follow diet as prescribed:  [] Diet as tolerated: [] Calorie Diabetic Diet:Low carb and no Sugar [] No Added Salt:[] Increase Protein: [] Other:Limit the amount of liquid you are drinking and avoid drinking in between meals   [facility] to [facility] *           Return Appointment:  [x] Return Appointment: With Dr Indigo Gusman in 4 weeks       Electronically signed Darcie Mccrary RN on 11/10/2021 at 2:27 PM     [address] [place] [address] Information: Should you experience any significant changes in your wound(s) or have questions about your wound care, please contact the [address] [place] [address] at [address] [place] [address] Street 8:00 am - 4:30.   If you need help with your wound outside these hours and cannot wait until we are again available, contact your PCP or go to the hospital emergency room. PLEASE NOTE: IF YOU ARE UNABLE TO OBTAIN WOUND SUPPLIES, CONTINUE TO USE THE SUPPLIES YOU HAVE AVAILABLE UNTIL YOU ARE ABLE TO REACH US. IT IS MOST IMPORTANT TO KEEP THE WOUND COVERED AT ALL TIMES.      Physician Signature:_______________________    Date: ___________ Time:  ____________

## 2021-12-08 ENCOUNTER — HOSPITAL ENCOUNTER (OUTPATIENT)
Dept: WOUND CARE | Age: 76
Discharge: HOME | End: 2021-12-08
Payer: MEDICARE

## 2021-12-08 VITALS
SYSTOLIC BLOOD PRESSURE: 154 MMHG | DIASTOLIC BLOOD PRESSURE: 68 MMHG | RESPIRATION RATE: 20 BRPM | TEMPERATURE: 98 F | HEART RATE: 74 BPM

## 2021-12-08 DIAGNOSIS — L97.922 NON-PRESSURE CHRONIC ULCER OF LEFT LOWER LEG WITH FAT LAYER EXPOSED (HCC): ICD-10-CM

## 2021-12-08 DIAGNOSIS — L89.313 PRESSURE INJURY OF RIGHT BUTTOCK, STAGE 3 (HCC): ICD-10-CM

## 2021-12-08 DIAGNOSIS — L97.912 NON-PRESSURE CHRONIC ULCER OF RIGHT LOWER LEG WITH FAT LAYER EXPOSED (HCC): ICD-10-CM

## 2021-12-08 DIAGNOSIS — R60.0 BILATERAL LEG EDEMA: ICD-10-CM

## 2021-12-08 PROCEDURE — 99213 OFFICE O/P EST LOW 20 MIN: CPT

## 2021-12-08 PROCEDURE — 99213 OFFICE O/P EST LOW 20 MIN: CPT | Performed by: SURGERY

## 2021-12-08 NOTE — DISCHARGE INSTRUCTIONS
Discharge Instructions/Wound 600 [address] 1, 900 [facility] [id number]  Telephone: [phone]  WOUND CARE ORDERS:  Buttocks wound - clean with soap and water or saline, pat dry, apply foam border dressing, change 3 x week and as needed for soiling. Continue wrapping legs with ABD pads, roll gauze and ACE wraps to contain weeping. Follow-up with MD in 5 weeks. TREATMENT ORDERS:  Turn/reposition every 2 hours when in bed, avoid direct pressure on wound site. When sitting, shift position or do seat lifts every 15 minutes. Limit side lying to 30 degree tilt. Limit HOB elevation to 30 degrees. Use speciality pressure relief cushion, mattress as appropriate. Follow diet as prescribed:  [x] Diet as tolerated: [] Calorie Diabetic Diet:Low carb and no Sugar [] No Added Salt:[x] Increase Protein: [] Other:Limit the amount of liquid you are drinking and avoid drinking in between meals              Return Appointment:  [x] Return Appointment: With DR Jossie Mistry  in  [address] [place] [address])  [] Ordered tests:    Electronically signed Elijah Edgar RN on 12/8/2021 at 3:15 PM     Karen Baker 281: Should you experience any significant changes in your wound(s) or have questions about your wound care, please contact the [address] [place] [address] at [address] [place] [address] 8:00 am - 4:30. If you need help with your wound outside these hours and cannot wait until we are again available, contact your PCP or go to the hospital emergency room. PLEASE NOTE: IF YOU ARE UNABLE TO OBTAIN WOUND SUPPLIES, CONTINUE TO USE THE SUPPLIES YOU HAVE AVAILABLE UNTIL YOU ARE ABLE TO REACH US. IT IS MOST IMPORTANT TO KEEP THE WOUND COVERED AT ALL TIMES.      Physician Signature:_______________________    Date: ___________ Time:  ____________

## 2021-12-08 NOTE — PROGRESS NOTES
HISTORY OF PRESENT ILLNESS:  The patient is a 58-year-old woman who is referred to the [address] [place] [address] Road by Dr. Philip Westbrook regarding ulcerations on both lower extremities.  The patient reports that she has had problems with increased leg swelling and ulcers for about a year.  Both legs have been painful.     The patient was first seen at [facility] on 7/7/2021.     The patient reports that she had been sleeping in a sitting position.  She will have her legs elevated off the floor to a limited extent.  She cannot lie flat in bed because she gets short of breath  .After receiving instructions to attempt to lie down to sleep, she is using a recliner, but has her feet below heart level.     She says that she cannot fully change position between left and right by herself.     The patient does take furosemide 40 mg per day. Troy Morning reports this does produce a diuresis. I messaged her primary MD.   Dr Candido Fink increased to lasix 40 mg bid.  She took the second in the evening and had incontinence, so she reduced to once per day.  She now says she is taking 40 mg twice per day.     The patient reports she had been drinking large quantities of fluid throughout the course of the day.  This included many bottles of water, V8, and other liquids.  She has reduced fluid intake some.       She is now cooking more of her own food to avoid salt in prepared foods.     Patient's situation reviewed by message with Dr Chandrika Salguero indicates patient has been reluctant to have specialist evaluation for her edema. Troy Morning has also had deterioration of her renal function with higher doses of diuretics.     The patient can stand and can walk a few feet when she holds onto objects.  She does not report shortness of breath with this limited exertion.  The patient has history of stroke producing left hemipareses.     The patient does have history of diabetes mellitus.  She reports blood sugars up to around 160.  She is working on a diabetic diet.     The patient's past medical history includes long-term anticoagulation with Xarelto for paroxysmal atrial fibrillation, anxiety, coronary artery disease, chronic pain, degenerative joint disease, hyperlipidemia, hypertension, and glaucoma.     The patient has never smoked.     The patient had noninvasive arterial testing at [facility] on 05/11/2021.  This showed both ankle arm indices were non-meaningful because of arterial calcification.  The right great toe arm index was 1.01 and left great toe arm index was 0.48.  The metatarsal waveforms on both feet were strong and pulsatile.  PPG signals were pulsatile in both great toes.  This would suggest adequate arterial flow into both lower extremities.     Current dressing as of 7/7/2021:  The patient is to shower every day using soap and water on both legs and between her toes.  After each shower, a new dressing needs to be applied consisting of dry gauze between each of the toes and Vaseline and dry gauze on the foot and lower leg, then roll gauze to be applied to include the toes on to the calf.  Apply Acewraps bilaterally.         For buttock right wound (as of 11/10/2021):  Zinc oxide ointment to buttock sites daily.             Reported weight 220 pounds, height 5 feet 4 inches.        PHYSICAL EXAMINATION:  GENERAL:  The patient is an alert, overweight woman lying on stretcher.  She is in no acute distress.     NEUROLOGIC:  The patient is alert and oriented.  She has diminished movement on the left side,  Left upper and lower extremity.  Facial movements are symmetrical.  Speech is normal.     EXTREMITIES:  Examination of the right lower extremity revealed 2+ edema (less than at original visit) beginning at the groin extending distally to include the foot.  There was lichenification of the skin in the lower leg and foot, particularly in the toes.  There were minimal areas of scabbing on leg.  I could not palpate dorsalis pedis or posterior tibial pulses.  There was a soft biphasic signal at the dorsalis pedis on the right.     Examination of left lower extremity revealed 2+ edema (slightly less than original visit) from the groin level distally to include the foot.  There was lichenification of the skin in the lower leg and on the foot, particularly involving the toes. Minimal scabs on leg.     Posteror buttock - to right  of upper sacrum -   ulcer 4 x 4 x 0.1 cm with granulation on base.     Skin on both sides shows no maceration.              Bilateral leg edema persistent, but less than at original presentation, still quite significant.  Control of edema will be essential to improvement in her skin condition. Skin of both legs appears drier.     Pressure ulcer right posterior buttock, stage 3              I have recommended the patient attempt to sleep at night lying down.  It would be ideal to reach a position in which her feet and her heart are at the same level during the night when she is sleeping.  If in recliner, place pillow under calves to keep foot level with heart.     I discussed offloading of midline buttocks - tilt all the way to the right; or group home or fully to the left.  Never lie directly supine.  In recliner, never be flat on back. She will need assistance from family to change positon every 2 hours.   Use pillows to position.     I have recommended the patient continue taking her diuretic 40 mg bid with first dose 8:30 AM and second dose 2:00 pm.  Take full dose if possible.     I have recommended she continue limiting total fluid intake.  She should eliminate all the extra water bottles she is drinking. Kirill Magdaleno should not drink V8 juice, as it is high in salt.  She should avoid salt containing foods.  She should eat no soup because of sodium content. Can make homemade soup without salt.   Use sugar free lemon drop to keep mouth moist.      I recommended the patient try to adhere to a diabetic diet.     Until there is better control of systemic edema, I will not order venous testing.     Dressing ordered:  The patient is to shower every day using soap and water on her legs and between her toes.  After each shower, a new dressing needs to be applied consisting of dry gauze between each of the toes and Vaseline and dry gauze on the foot and lower leg, then roll gauze to be applied to include the toes on to the calf.  Apply Acewraps bilaterally.             For right buttock ulcer, apply foam dressing; change 3 times per week and prn.     Home Health nursing is seeing the patient to assist with wound care 2 times per week. Family will need to change dressing other time.                 The patient will follow up in the [address] [place] [address] in 4 weeks.     FINAL DIAGNOSES:  Nonpressure ulcer in right lower leg with fat layer exposed, nonpressure ulcer in left lower leg with fat layer exposed, severe bilateral lower extremity edema; pressure ulcers on buttock, stage 3           G. Gui Yancey MD            Q27.182, D12.348, R60.0; L89.303     Shalini Stokes MD

## 2021-12-08 NOTE — WOUND CARE
12/08/21 1428   Wound Leg lower Right #1 07/07/21   Date First Assessed/Time First Assessed: 07/07/21 1414   Present on Hospital Admission: Yes  Wound Approximate Age at First Assessment (Weeks): 52 weeks  Primary Wound Type: Venous  Location: Leg lower  Wound Location Orientation: Right  Wound Descrip. .. Wound Image    Wound Etiology Venous   Dressing Status Intact   Cleansed Soap and water   Dressing/Treatment   (ABD, kerlix, ace wraps)   Wound Length (cm) 0.1 cm   Wound Width (cm) 0.1 cm   Wound Depth (cm) 0.1 cm   Wound Surface Area (cm^2) 0.01 cm^2   Change in Wound Size % 100   Wound Volume (cm^3) 0.001 cm^3   Wound Healing % 100   Wound Assessment Epithelialization   Drainage Amount None   Wound Odor None   Ebony-Wound/Incision Assessment Intact   Edges Attached edges   Wound Thickness Description Partial thickness   Wound Leg lower Left #2 07/07/21   Date First Assessed/Time First Assessed: 07/07/21 1415   Present on Hospital Admission: Yes  Wound Approximate Age at First Assessment (Weeks): 52 weeks  Primary Wound Type: Venous  Location: Leg lower  Wound Location Orientation: Left  Wound Descript. ..    Wound Image    Wound Etiology Venous   Dressing Status   (Serous weeping)   Cleansed Soap and water   Dressing/Treatment   (ABD, RG, ace wraps)   Wound Length (cm) 0.1 cm   Wound Width (cm) 0.1 cm   Wound Depth (cm) 0.1 cm   Wound Surface Area (cm^2) 0.01 cm^2   Change in Wound Size % 100   Wound Volume (cm^3) 0.001 cm^3   Wound Healing % 100   Wound Assessment Epithelialization   Drainage Amount Moderate   Drainage Description   (Serous weeping)   Wound Odor None   Ebony-Wound/Incision Assessment Dry/flaky   Edges Undefined edges   Wound Thickness Description Partial thickness   Wound Buttocks Right 09/22/21   Date First Assessed/Time First Assessed: 09/22/21 1638   Wound Approximate Age at First Assessment (Weeks): 2 weeks  Primary Wound Type: Pressure Injury  Location: Buttocks  Wound Location Orientation: Right  Date of First Observation: 09/22/21   Wound Image    Wound Etiology Pressure Stage 3   Dressing Status Old drainage noted   Cleansed Cleansed with saline   Dressing/Treatment   (Zinc oxide paste)   Wound Length (cm) 4 cm   Wound Width (cm) 1 cm   Wound Depth (cm) 0.1 cm   Wound Surface Area (cm^2) 4 cm^2   Change in Wound Size % -1011.11   Wound Volume (cm^3) 0.4 cm^3   Wound Healing % -1011   Wound Assessment Pink/red   Drainage Amount Moderate   Drainage Description Serosanguinous   Wound Odor None   Ebony-Wound/Incision Assessment Blanchable erythema   Edges Flat/open edges   Wound Thickness Description Full thickness     Visit Vitals  BP (!) 154/68 (BP 1 Location: Left upper arm, BP Patient Position: At rest;Supine)   Pulse 74   Temp 98 °F (36.7 °C)   Resp 20

## 2021-12-08 NOTE — PROGRESS NOTES
Pressure Injury Interventions: Continue with Q2hr turning/repositioning, use of pressure relief wheelchair  cushion.

## 2022-01-01 ENCOUNTER — HOME CARE VISIT (OUTPATIENT)
Dept: HOSPICE | Facility: HOSPICE | Age: 77
End: 2022-01-01
Payer: MEDICARE

## 2022-01-01 PROCEDURE — G0155 HHCP-SVS OF CSW,EA 15 MIN: HCPCS

## 2022-01-12 ENCOUNTER — HOSPITAL ENCOUNTER (OUTPATIENT)
Dept: WOUND CARE | Age: 77
Discharge: HOME | End: 2022-01-12
Payer: MEDICARE

## 2022-01-12 VITALS
TEMPERATURE: 97.9 F | SYSTOLIC BLOOD PRESSURE: 138 MMHG | DIASTOLIC BLOOD PRESSURE: 80 MMHG | HEART RATE: 78 BPM | RESPIRATION RATE: 18 BRPM

## 2022-01-12 DIAGNOSIS — L97.912 NON-PRESSURE CHRONIC ULCER OF RIGHT LOWER LEG WITH FAT LAYER EXPOSED (HCC): ICD-10-CM

## 2022-01-12 DIAGNOSIS — L97.922 NON-PRESSURE CHRONIC ULCER OF LEFT LOWER LEG WITH FAT LAYER EXPOSED (HCC): ICD-10-CM

## 2022-01-12 DIAGNOSIS — L89.313 PRESSURE INJURY OF RIGHT BUTTOCK, STAGE 3 (HCC): ICD-10-CM

## 2022-01-12 PROCEDURE — 99213 OFFICE O/P EST LOW 20 MIN: CPT

## 2022-01-12 PROCEDURE — 99213 OFFICE O/P EST LOW 20 MIN: CPT | Performed by: SURGERY

## 2022-01-12 NOTE — DISCHARGE INSTRUCTIONS
Discharge Instructions/Wound 600 [facility]  [address]  MOB 1, 900 [facility] [place], [place]  Telephone: [phone]  WOUND CARE ORDERS:    Bilateral lower extremities . Pinky Angles Wash legs with soap and water, pat dry. . Use ABS's for drainage, rolled gauze,ace wraps, tubi  bilaterally. Right buttock wound, clean with soap and water, pat dry Use Desitin or Calmoseptine to area 2-3 x a day. . Turn and position off of backside during the night. .  Wound center to follow up on gel overlay for bed. Home  care to provide care 2 x week     Return to see Md in 4 weeks      TREATMENT ORDERS:  Turn/reposition every 2 hours when in bed, avoid direct pressure on wound site. When sitting, shift position or do seat lifts every 15 minutes. Limit side lying to 30 degree tilt. Limit HOB elevation to 30 degrees. Use speciality pressure relief cushion, mattress as appropriate. Follow diet as prescribed:  [x] Diet as tolerated: [] Calorie Diabetic Diet:Low carb and no Sugar [x] No Added Salt:[] Increase Protein: [x] Other:Limit the amount of liquid you are drinking and avoid drinking in between meals              Return Appointment:  [x] Return Appointment: With DR Peter Reynaga  in  4 [facility])  [] Ordered tests:    Electronically signed Matthew Simms RN on 1/12/2022 at 2:55 PM     [address] [facility] Information: Should you experience any significant changes in your wound(s) or have questions about your wound care, please contact the [facility] Main at [address] [place] [address] 8:00 am - 4:30. If you need help with your wound outside these hours and cannot wait until we are again available, contact your PCP or go to the hospital emergency room. PLEASE NOTE: IF YOU ARE UNABLE TO OBTAIN WOUND SUPPLIES, CONTINUE TO USE THE SUPPLIES YOU HAVE AVAILABLE UNTIL YOU ARE ABLE TO REACH US. IT IS MOST IMPORTANT TO KEEP THE WOUND COVERED AT ALL TIMES. Physician Signature:_______________________    Date: ___________ Time:  ____________

## 2022-01-12 NOTE — PROGRESS NOTES
HISTORY OF PRESENT ILLNESS:  The patient is a 48-year-old woman who is referred to the [address] [place] [address] Road by Dr. Adam Lujan regarding ulcerations on both lower extremities.  The patient reports that she has had problems with increased leg swelling and ulcers for about a year.  Both legs have been painful.     The patient was first seen at [facility] on 7/7/2021.     The patient reports that she had been sleeping in a sitting position.  She will have her legs elevated off the floor to a limited extent.  She cannot lie flat in bed because she gets short of breath  .After receiving instructions to attempt to lie down to sleep, she is using a recliner, but has her feet below heart level.   She can turn to the left, but not to the right at night.     She says that she cannot fully change position between left and right by herself.     The patient does take furosemide 40 mg per day. [facility] reports this does produce a diuresis. I messaged her primary MD.   Dr Erika Julio increased to lasix 40 mg bid. [facility] took the second in the evening and had incontinence, so she reduced to once per day.  She now says she is taking 40 mg twice per day.     The patient reports she had been drinking large quantities of fluid throughout the course of the day.  This included many bottles of water, V8, and other liquids.  She has reduced fluid intake some.      She is apparently using something like Pure-Wick at night for urine.     She is now cooking more of her own food to avoid salt in prepared foods.     Patient's situation reviewed by message with Dr Zoraida Coleman indicates patient has been reluctant to have specialist evaluation for her edema. [facility] has also had deterioration of her renal function with higher doses of diuretics.     The patient can stand and can walk a few feet when she holds onto objects.  She does not report shortness of breath with this limited exertion.  The patient has history of stroke producing left hemipareses.     The patient does have history of diabetes mellitus.  She reports blood sugars up to around 160.  She is working on a diabetic diet.     The patient's past medical history includes long-term anticoagulation with Xarelto for paroxysmal atrial fibrillation, anxiety, coronary artery disease, chronic pain, degenerative joint disease, hyperlipidemia, hypertension, and glaucoma.     The patient has never smoked.     The patient had noninvasive arterial testing at [facility] on 05/11/2021.  This showed both ankle arm indices were non-meaningful because of arterial calcification.  The right great toe arm index was 1.01 and left great toe arm index was 0.48.  The metatarsal waveforms on both feet were strong and pulsatile.  PPG signals were pulsatile in both great toes.  This would suggest adequate arterial flow into both lower extremities.     Current dressing as of 7/7/2021:  The patient is to shower every day using soap and water on both legs and between her toes.  After each shower, a new dressing needs to be applied consisting of dry gauze between each of the toes and Vaseline and dry gauze on the foot and lower leg, then roll gauze to be applied to include the toes on to the calf.  Apply Acewraps bilaterally.                                                     For buttock right wound (as of 11/10/2021):  Zinc oxide ointment to buttock sites daily. In December 2021, for buttock / sacral wound, started: For right buttock ulcer, apply foam dressing; change 3 times per week and prn.     The patient reports skin tears from dressing on buttock.              Reported weight 220 pounds, height 5 feet 4 inches.        PHYSICAL EXAMINATION:  GENERAL:  The patient is an alert, overweight woman lying on stretcher.  She is in no acute distress.     NEUROLOGIC:  The patient is alert and oriented.  She has diminished movement on the left side,  Left upper and lower extremity.  Facial movements are symmetrical.  Speech is normal.     EXTREMITIES:  Examination of the right lower extremity revealed 1-2+ edema (less than at original visit) beginning at the groin extending distally to include the foot.  There was lichenification of the skin in the lower leg and foot, particularly in the toes.  There were minimal areas of scabbing on leg.  I could not palpate dorsalis pedis or posterior tibial pulses.  There was a soft biphasic signal at the dorsalis pedis on the right.     Examination of left lower extremity revealed 1-2+ edema (slightly less than original visit) from the groin level distally to include the foot.  There was lichenification of the skin in the lower leg and on the foot, particularly involving the toes. Minimal scabs on leg.     Posteror buttock - to right  of upper sacrum -   ulcer 1.2 x 1x 0.1 cm with granulation on base.     Skin on both sides shows no maceration.              Bilateral leg edema persistent; less than at original presentation, but still quite significant.  Control of edema will be essential to improvement in her skin condition.  Skin of both legs appears drier.     Pressure ulcer right posterior buttock, stage 3, smaller.              I have recommended the patient attempt to sleep at night lying down.  It would be ideal to reach a position in which her feet and her heart are at the same level during the night when she is sleeping.  If in recliner, place pillow under calves to keep foot level with heart. Discussed possible hospital bed with alternating pressure mattress, but she is reluctant to proceed. Uses recliner because she can reach over with right ar to pull herself onto left side. Has minimal function in left arm.     I will see if a gel overly pad can be obtained for her.     I discussed offloading of midline buttocks - tilt all the way to the right; or shelter or fully to the left.  Never lie directly supine.  In recliner, never be flat on back.  She will need assistance from family to change positon every 2 hours.  Use pillows to position.     I have recommended she continue limiting total fluid intake.  She should eliminate all the extra water bottles she is drinking. Bonny Kim should not drink V8 juice, as it is high in salt.  She should avoid salt containing foods.  She should eat no soup because of sodium content. Can make homemade soup without salt.   Use sugar free lemon drop to keep mouth moist.      I recommended the patient try to adhere to a diabetic diet.     Until there is better control of systemic edema, I will not order venous testing.     Dressing ordered:  The patient is to shower every day using soap and water on her legs and between her toes.  After each shower, a new dressing needs to be applied consisting of dry gauze between each of the toes and Vaseline and dry gauze on the foot and lower leg, then roll gauze to be applied to include the toes on to the calf.  Apply Acewraps bilaterally. Buttock ulcer is smaller - because of dressing causing skin tear on removal, I will change to Zn Oxide:                                   For right buttock ulcer, apply Zinc Oxide ointment to wound area daily  and prn.     Home Health nursing is seeing the patient to assist with wound care 2 times per week. Family will need to change dressing other time.                 The patient will follow up in the [address] [place] [address] in 4 weeks.     FINAL DIAGNOSES:  Nonpressure ulcer in right lower leg with fat layer exposed, nonpressure ulcer in left lower leg with fat layer exposed, severe bilateral lower extremity edema; pressure ulcers on buttock, stage 3           G. Michael Whatley MD            J29.232, Z19.783, R60.0; L89.303     Milton Goldman MD

## 2022-01-26 ENCOUNTER — OFFICE VISIT (OUTPATIENT)
Dept: INTERNAL MEDICINE CLINIC | Age: 77
End: 2022-01-26
Payer: MEDICARE

## 2022-01-26 VITALS
TEMPERATURE: 98.7 F | HEIGHT: 64 IN | SYSTOLIC BLOOD PRESSURE: 126 MMHG | BODY MASS INDEX: 37.56 KG/M2 | RESPIRATION RATE: 16 BRPM | OXYGEN SATURATION: 98 % | WEIGHT: 220 LBS | HEART RATE: 75 BPM | DIASTOLIC BLOOD PRESSURE: 70 MMHG

## 2022-01-26 DIAGNOSIS — E03.9 ACQUIRED HYPOTHYROIDISM: Chronic | ICD-10-CM

## 2022-01-26 DIAGNOSIS — I89.0 LYMPHEDEMA OF BOTH LOWER EXTREMITIES: ICD-10-CM

## 2022-01-26 DIAGNOSIS — I48.0 PAF (PAROXYSMAL ATRIAL FIBRILLATION) (HCC): Chronic | ICD-10-CM

## 2022-01-26 DIAGNOSIS — E78.5 DYSLIPIDEMIA: Chronic | ICD-10-CM

## 2022-01-26 DIAGNOSIS — E11.3299 TYPE 2 DIABETES MELLITUS WITH BACKGROUND RETINOPATHY (HCC): Primary | Chronic | ICD-10-CM

## 2022-01-26 DIAGNOSIS — Z79.899 LONG-TERM USE OF HIGH-RISK MEDICATION: Chronic | ICD-10-CM

## 2022-01-26 DIAGNOSIS — I63.9 CEREBROVASCULAR ACCIDENT (CVA), UNSPECIFIED MECHANISM (HCC): Chronic | ICD-10-CM

## 2022-01-26 DIAGNOSIS — Z79.01 ANTICOAGULANT LONG-TERM USE: Chronic | ICD-10-CM

## 2022-01-26 DIAGNOSIS — I10 ESSENTIAL HYPERTENSION, BENIGN: Chronic | ICD-10-CM

## 2022-01-26 DIAGNOSIS — I25.10 ASCVD (ARTERIOSCLEROTIC CARDIOVASCULAR DISEASE): Chronic | ICD-10-CM

## 2022-01-26 PROCEDURE — 1101F PT FALLS ASSESS-DOCD LE1/YR: CPT | Performed by: INTERNAL MEDICINE

## 2022-01-26 PROCEDURE — G8427 DOCREV CUR MEDS BY ELIG CLIN: HCPCS | Performed by: INTERNAL MEDICINE

## 2022-01-26 PROCEDURE — G8752 SYS BP LESS 140: HCPCS | Performed by: INTERNAL MEDICINE

## 2022-01-26 PROCEDURE — G8510 SCR DEP NEG, NO PLAN REQD: HCPCS | Performed by: INTERNAL MEDICINE

## 2022-01-26 PROCEDURE — G8754 DIAS BP LESS 90: HCPCS | Performed by: INTERNAL MEDICINE

## 2022-01-26 PROCEDURE — G8417 CALC BMI ABV UP PARAM F/U: HCPCS | Performed by: INTERNAL MEDICINE

## 2022-01-26 PROCEDURE — G8400 PT W/DXA NO RESULTS DOC: HCPCS | Performed by: INTERNAL MEDICINE

## 2022-01-26 PROCEDURE — 1090F PRES/ABSN URINE INCON ASSESS: CPT | Performed by: INTERNAL MEDICINE

## 2022-01-26 PROCEDURE — G8536 NO DOC ELDER MAL SCRN: HCPCS | Performed by: INTERNAL MEDICINE

## 2022-01-26 PROCEDURE — 99214 OFFICE O/P EST MOD 30 MIN: CPT | Performed by: INTERNAL MEDICINE

## 2022-01-26 NOTE — PATIENT INSTRUCTIONS
Learning About Carbohydrate (Carb) Counting and Eating Out When You Have Diabetes  Why plan your meals? Meal planning can be a key part of managing diabetes. Planning meals and snacks with the right balance of carbohydrate, protein, and fat can help you keep your blood sugar at the target level you set with your doctor. You don't have to eat special foods. You can eat what your family eats, including sweets once in a while. But you do have to pay attention to how often you eat and how much you eat of certain foods. You may want to work with a dietitian or a certified diabetes educator. He or she can give you tips and meal ideas and can answer your questions about meal planning. This health professional can also help you reach a healthy weight if that is one of your goals. What should you know about eating carbs? Managing the amount of carbohydrate (carbs) you eat is an important part of healthy meals when you have diabetes. Carbohydrate is found in many foods. · Learn which foods have carbs. And learn the amounts of carbs in different foods. ? Bread, cereal, pasta, and rice have about 15 grams of carbs in a serving. A serving is 1 slice of bread (1 ounce), ½ cup of cooked cereal, or 1/3 cup of cooked pasta or rice. ? Fruits have 15 grams of carbs in a serving. A serving is 1 small fresh fruit, such as an apple or orange; ½ of a banana; ½ cup of cooked or canned fruit; ½ cup of fruit juice; 1 cup of melon or raspberries; or 2 tablespoons of dried fruit. ? Milk and no-sugar-added yogurt have 15 grams of carbs in a serving. A serving is 1 cup of milk or 2/3 cup of no-sugar-added yogurt. ? Starchy vegetables have 15 grams of carbs in a serving. A serving is ½ cup of mashed potatoes or sweet potato; 1 cup winter squash; ½ of a small baked potato; ½ cup of cooked beans; or ½ cup cooked corn or green peas.   · Learn how much carbs to eat each day and at each meal. A dietitian or CDE can teach you how to keep track of the amount of carbs you eat. This is called carbohydrate counting. · If you are not sure how to count carbohydrate grams, use the Plate Method to plan meals. It is a good, quick way to make sure that you have a balanced meal. It also helps you spread carbs throughout the day. ? Divide your plate by types of foods. Put non-starchy vegetables on half the plate, meat or other protein food on one-quarter of the plate, and a grain or starchy vegetable in the final quarter of the plate. To this you can add a small piece of fruit and 1 cup of milk or yogurt, depending on how many carbs you are supposed to eat at a meal.  · Try to eat about the same amount of carbs at each meal. Do not \"save up\" your daily allowance of carbs to eat at one meal.  · Proteins have very little or no carbs per serving. Examples of proteins are beef, chicken, turkey, fish, eggs, tofu, cheese, cottage cheese, and peanut butter. A serving size of meat is 3 ounces, which is about the size of a deck of cards. Examples of meat substitute serving sizes (equal to 1 ounce of meat) are 1/4 cup of cottage cheese, 1 egg, 1 tablespoon of peanut butter, and ½ cup of tofu. How can you eat out and still eat healthy? · Learn to estimate the serving sizes of foods that have carbohydrate. If you measure food at home, it will be easier to estimate the amount in a serving of restaurant food. · If the meal you order has too much carbohydrate (such as potatoes, corn, or baked beans), ask to have a low-carbohydrate food instead. Ask for a salad or green vegetables. · If you use insulin, check your blood sugar before and after eating out to help you plan how much to eat in the future. · If you eat more carbohydrate at a meal than you had planned, take a walk or do other exercise. This will help lower your blood sugar. What are some tips for eating healthy? · Limit saturated fat, such as the fat from meat and dairy products.  This is a healthy choice because people who have diabetes are at higher risk of heart disease. So choose lean cuts of meat and nonfat or low-fat dairy products. Use olive or canola oil instead of butter or shortening when cooking. · Don't skip meals. Your blood sugar may drop too low if you skip meals and take insulin or certain medicines for diabetes. · Check with your doctor before you drink alcohol. Alcohol can cause your blood sugar to drop too low. Alcohol can also cause a bad reaction if you take certain diabetes medicines. Follow-up care is a key part of your treatment and safety. Be sure to make and go to all appointments, and call your doctor if you are having problems. It's also a good idea to know your test results and keep a list of the medicines you take. Where can you learn more? Go to http://www.truong.com/  Enter I147 in the search box to learn more about \"Learning About Carbohydrate (Carb) Counting and Eating Out When You Have Diabetes. \"  Current as of: December 17, 2020               Content Version: 13.0  © 2006-2021 [employer]. Care instructions adapted under license by [employer] (which disclaims liability or warranty for this information). If you have questions about a medical condition or this instruction, always ask your healthcare professional. [address] any warranty or liability for your use of this information.

## 2022-01-26 NOTE — PROGRESS NOTES
Stuart Freeman is a 67 y.o. female presenting for Follow Up Chronic Condition (6 mo fu)  . 1. Have you been to the ER, urgent care clinic since your last visit? Hospitalized since your last visit? No    2. Have you seen or consulted any other health care providers outside of the [address] [place] [address] since your last visit? Include any pap smears or colon screening. No    Fall Risk Assessment, last 12 mths 1/26/2022   Able to walk? Yes   Fall in past 12 months? 0   Do you feel unsteady? 0   Are you worried about falling 0         Abuse Screening Questionnaire 6/9/2021   Do you ever feel afraid of your partner? N   Are you in a relationship with someone who physically or mentally threatens you? N   Is it safe for you to go home? Y       3 most recent PHQ Screens 1/26/2022   Little interest or pleasure in doing things Not at all   Feeling down, depressed, irritable, or hopeless Not at all   Total Score PHQ 2 0       There are no discontinued medications.

## 2022-01-27 ENCOUNTER — TELEPHONE (OUTPATIENT)
Dept: INTERNAL MEDICINE CLINIC | Age: 77
End: 2022-01-27

## 2022-01-27 LAB
ALBUMIN SERPL-MCNC: 2.7 G/DL (ref 3.5–5)
ALBUMIN/GLOB SERPL: 0.5 {RATIO} (ref 1.1–2.2)
ALP SERPL-CCNC: 97 U/L (ref 45–117)
ALT SERPL-CCNC: 13 U/L (ref 12–78)
ANION GAP SERPL CALC-SCNC: 4 MMOL/L (ref 5–15)
AST SERPL-CCNC: 14 U/L (ref 15–37)
BASOPHILS # BLD: 0 K/UL (ref 0–0.1)
BASOPHILS NFR BLD: 1 % (ref 0–1)
BILIRUB SERPL-MCNC: 0.3 MG/DL (ref 0.2–1)
BUN SERPL-MCNC: 10 MG/DL (ref 6–20)
BUN/CREAT SERPL: 11 (ref 12–20)
CALCIUM SERPL-MCNC: 8.9 MG/DL (ref 8.5–10.1)
CHLORIDE SERPL-SCNC: 107 MMOL/L (ref 97–108)
CHOLEST SERPL-MCNC: 133 MG/DL
CK SERPL-CCNC: 75 U/L (ref 26–192)
CO2 SERPL-SCNC: 29 MMOL/L (ref 21–32)
CREAT SERPL-MCNC: 0.91 MG/DL (ref 0.55–1.02)
DIFFERENTIAL METHOD BLD: ABNORMAL
EOSINOPHIL # BLD: 0.3 K/UL (ref 0–0.4)
EOSINOPHIL NFR BLD: 7 % (ref 0–7)
ERYTHROCYTE [DISTWIDTH] IN BLOOD BY AUTOMATED COUNT: 13.5 % (ref 11.5–14.5)
EST. AVERAGE GLUCOSE BLD GHB EST-MCNC: 131 MG/DL
GLOBULIN SER CALC-MCNC: 5.5 G/DL (ref 2–4)
GLUCOSE SERPL-MCNC: 210 MG/DL (ref 65–100)
HBA1C MFR BLD: 6.2 % (ref 4–5.6)
HCT VFR BLD AUTO: 37.1 % (ref 35–47)
HDLC SERPL-MCNC: 34 MG/DL
HDLC SERPL: 3.9 {RATIO} (ref 0–5)
HGB BLD-MCNC: 11.4 G/DL (ref 11.5–16)
IMM GRANULOCYTES # BLD AUTO: 0 K/UL (ref 0–0.04)
IMM GRANULOCYTES NFR BLD AUTO: 0 % (ref 0–0.5)
LDLC SERPL CALC-MCNC: 79.2 MG/DL (ref 0–100)
LYMPHOCYTES # BLD: 1.5 K/UL (ref 0.8–3.5)
LYMPHOCYTES NFR BLD: 37 % (ref 12–49)
MCH RBC QN AUTO: 29.2 PG (ref 26–34)
MCHC RBC AUTO-ENTMCNC: 30.7 G/DL (ref 30–36.5)
MCV RBC AUTO: 95.1 FL (ref 80–99)
MONOCYTES # BLD: 0.5 K/UL (ref 0–1)
MONOCYTES NFR BLD: 12 % (ref 5–13)
NEUTS SEG # BLD: 1.8 K/UL (ref 1.8–8)
NEUTS SEG NFR BLD: 43 % (ref 32–75)
NRBC # BLD: 0 K/UL (ref 0–0.01)
NRBC BLD-RTO: 0 PER 100 WBC
PLATELET # BLD AUTO: 271 K/UL (ref 150–400)
PMV BLD AUTO: 11.1 FL (ref 8.9–12.9)
POTASSIUM SERPL-SCNC: 4.5 MMOL/L (ref 3.5–5.1)
PROT SERPL-MCNC: 8.2 G/DL (ref 6.4–8.2)
RBC # BLD AUTO: 3.9 M/UL (ref 3.8–5.2)
SODIUM SERPL-SCNC: 140 MMOL/L (ref 136–145)
T4 FREE SERPL-MCNC: 1.4 NG/DL (ref 0.8–1.5)
TRIGL SERPL-MCNC: 99 MG/DL (ref ?–150)
TSH SERPL DL<=0.05 MIU/L-ACNC: <0.01 UIU/ML (ref 0.36–3.74)
VLDLC SERPL CALC-MCNC: 19.8 MG/DL
WBC # BLD AUTO: 4.2 K/UL (ref 3.6–11)

## 2022-01-27 NOTE — TELEPHONE ENCOUNTER
----- Message from Navid Brown MD sent at 1/27/2022  5:19 AM EST -----  A1c good at 6.2  TSH suppressed - check to see if she is taking thyroid medication

## 2022-02-02 ENCOUNTER — TELEPHONE (OUTPATIENT)
Dept: WOUND CARE | Age: 77
End: 2022-02-02

## 2022-02-02 RX ORDER — AMLODIPINE BESYLATE 10 MG/1
10 TABLET ORAL DAILY
Qty: 90 TABLET | Refills: 0 | Status: SHIPPED | OUTPATIENT
Start: 2022-02-02

## 2022-02-02 NOTE — TELEPHONE ENCOUNTER
Received phone call from [address] [place] [address] Way in response to request for hospital bed and gel overlay. She reported that she called patient 12 times before getting an answer and when she finally spoke with patient and , patient refused hospital and overlay states she prefers to keep her queen size regular bed. Dr. Marily Stone notified.

## 2022-02-02 NOTE — TELEPHONE ENCOUNTER
Requested Prescriptions     Pending Prescriptions Disp Refills    amLODIPine (NORVASC) 10 mg tablet 90 Tablet 1     Si Tablet daily.

## 2022-02-03 LAB
APPEARANCE UR: ABNORMAL
BACTERIA URNS QL MICRO: ABNORMAL /HPF
BILIRUB UR QL: NEGATIVE
CAOX CRY URNS QL MICRO: ABNORMAL
COLOR UR: ABNORMAL
EPITH CASTS URNS QL MICRO: ABNORMAL /LPF
GLUCOSE UR STRIP.AUTO-MCNC: NEGATIVE MG/DL
HGB UR QL STRIP: NEGATIVE
KETONES UR QL STRIP.AUTO: NEGATIVE MG/DL
LEUKOCYTE ESTERASE UR QL STRIP.AUTO: ABNORMAL
NITRITE UR QL STRIP.AUTO: NEGATIVE
PH UR STRIP: 5.5 [PH] (ref 5–8)
PROT UR STRIP-MCNC: NEGATIVE MG/DL
RBC #/AREA URNS HPF: ABNORMAL /HPF (ref 0–5)
SP GR UR REFRACTOMETRY: 1.02 (ref 1–1.03)
UA: UC IF INDICATED,UAUC: ABNORMAL
UROBILINOGEN UR QL STRIP.AUTO: 0.2 EU/DL (ref 0.2–1)
WBC URNS QL MICRO: ABNORMAL /HPF (ref 0–4)

## 2022-02-09 ENCOUNTER — HOSPITAL ENCOUNTER (OUTPATIENT)
Dept: WOUND CARE | Age: 77
Discharge: HOME | End: 2022-02-09
Payer: MEDICARE

## 2022-02-09 VITALS
DIASTOLIC BLOOD PRESSURE: 77 MMHG | HEART RATE: 84 BPM | RESPIRATION RATE: 18 BRPM | TEMPERATURE: 98.9 F | SYSTOLIC BLOOD PRESSURE: 181 MMHG

## 2022-02-09 DIAGNOSIS — L97.912 NON-PRESSURE CHRONIC ULCER OF RIGHT LOWER LEG WITH FAT LAYER EXPOSED (HCC): ICD-10-CM

## 2022-02-09 DIAGNOSIS — L97.922 NON-PRESSURE CHRONIC ULCER OF LEFT LOWER LEG WITH FAT LAYER EXPOSED (HCC): ICD-10-CM

## 2022-02-09 DIAGNOSIS — L89.303 PRESSURE INJURY OF BUTTOCK, STAGE 3, UNSPECIFIED LATERALITY (HCC): ICD-10-CM

## 2022-02-09 DIAGNOSIS — R60.0 BILATERAL LEG EDEMA: ICD-10-CM

## 2022-02-09 PROCEDURE — 99214 OFFICE O/P EST MOD 30 MIN: CPT

## 2022-02-09 PROCEDURE — 99214 OFFICE O/P EST MOD 30 MIN: CPT | Performed by: SURGERY

## 2022-02-09 NOTE — PROGRESS NOTES
HISTORY OF PRESENT ILLNESS:  The patient is a 62-year-old woman who is referred to the [address] [place] [address] Road by Dr. Rosa Riley regarding ulcerations on both lower extremities.  The patient reports that she has had problems with increased leg swelling and ulcers for about a year.  Both legs have been painful.     The patient was first seen at [facility] on 7/7/2021.     The patient reports that she had been sleeping in a sitting position.  She will have her legs elevated off the floor to a limited extent.  She cannot lie flat in bed because she gets short of breath  .After receiving instructions to attempt to lie down to sleep, she is using a recliner, but has her feet below heart level.   She can turn to the left, but not to the right at night.     She says that she cannot fully change position between left and right by herself.     The patient does take furosemide 40 mg per day. [facility] reports this does produce a diuresis. I messaged her primary MD.   Dr James Kay increased to lasix 40 mg bid.  She took the second in the evening and had incontinence, so she reduced to once per day.  She now says she is taking 40 mg twice per day.     The patient reports she had been drinking large quantities of fluid throughout the course of the day.  This included many bottles of water, V8, and other liquids.  She has reduced fluid intake some.       She is apparently using something like Pure-Wick at night for urine.     She is now cooking more of her own food to avoid salt in prepared foods.     Patient's situation reviewed by message with Dr Garo Bedoya indicates patient has been reluctant to have specialist evaluation for her edema. Brisa Coulter has also had in the past deterioration of her renal function with higher doses of diuretics.     The patient can stand and can walk a few feet when she holds onto objects.  She does not report shortness of breath with this limited exertion.  The patient has history of stroke producing left hemipareses. Patient declined hospital bed and gel mattress overlay.     The patient does have history of diabetes mellitus.  She reports blood sugars up to around 160.  She is working on a diabetic diet. Hgb A1c 6.2 on 1/25/2022.     The patient's past medical history includes long-term anticoagulation with Xarelto for paroxysmal atrial fibrillation, anxiety, coronary artery disease, chronic pain, degenerative joint disease, hyperlipidemia, hypertension, and glaucoma.     The patient has never smoked.     The patient had noninvasive arterial testing at [facility] on 05/11/2021.  This showed both ankle arm indices were non-meaningful because of arterial calcification.  The right great toe arm index was 1.01 and left great toe arm index was 0.48.  The metatarsal waveforms on both feet were strong and pulsatile.  PPG signals were pulsatile in both great toes.  This would suggest adequate arterial flow into both lower extremities.     Current dressing as of 7/7/2021:  The patient is to shower every day using soap and water on both legs and between her toes.  After each shower, a new dressing needs to be applied consisting of dry gauze between each of the toes and Vaseline and dry gauze on the foot and lower leg, then roll gauze to be applied to include the toes on to the calf.  Apply Acewraps bilaterally.                                                     For buttock right wound (as of 11/10/2021):  Zinc oxide ointment to buttock sites daily.     In December 2021, for buttock / sacral wound, started: For right buttock ulcer, apply foam dressing; change 3 times per week and prn.     The patient reports skin tears from dressing on buttock.              Reported weight 220 pounds, height 5 feet 4 inches.        PHYSICAL EXAMINATION:  GENERAL:  The patient is an alert, overweight woman lying on stretcher.  She is in no acute distress.     NEUROLOGIC:  The patient is alert and oriented.  She has diminished movement on the left side,  Left upper and lower extremity.  Facial movements are symmetrical.  Speech is normal.     EXTREMITIES:  Examination of the right lower extremity revealed 1-2+ edema (less than at original visit) beginning at the groin extending distally to include the foot.  There was lichenification of the skin in the lower leg and foot, particularly in the toes.  There were minimal areas of scabbing on leg.  I could not palpate dorsalis pedis or posterior tibial pulses.  There was a soft biphasic signal at the dorsalis pedis on the right.     Examination of left lower extremity revealed 1-2+ edema (slightly less than original visit) from the groin level distally to include the foot.  There was lichenification of the skin in the lower leg and on the foot, particularly involving the toes. Minimal scabs on leg.     Posteror buttock - to right  of upper sacrum -   ulcer 4 x 5.2 x 0.1 cm with granulation on base; left buttock 1.2 x 1.5 x 0.1 cm with granulation. .         Ulcer right buttock larger; new ulcer left buttock.      Bilateral leg edema persistent; less than at original presentation, but still quite significant.  Control of edema will be essential to improvement in her skin condition.  Skin of both legs appears drier.                   I have recommended the patient attempt to sleep at night lying down.  It would be ideal to reach a position in which her feet and her heart are at the same level during the night when she is sleeping.  If in recliner, place pillow under calves to keep foot level with heart.     Uses recliner because she can reach over with right ar to pull herself onto left side. Has minimal function in left arm.     She refused hospital bed and gel overlay; after much discussion with her and her family, she now agrees top accept it.     I discussed offloading of midline buttocks - tilt all the way to the right; or correction or fully to the left.  Never lie directly supine.  In recliner, never be flat on back.  She will need assistance from family to change positon every 2 hours.  Use pillows to position.     I have recommended she continue limiting total fluid intake.  She should eliminate all the extra water bottles she is drinking. Ramu Dye should not drink V8 juice, as it is high in salt.  She should avoid salt containing foods.  She should eat no soup because of sodium content. Can make homemade soup without salt.   Use sugar free lemon drop to keep mouth moist.      I recommended the patient try to adhere to a diabetic diet.     Until there is better control of systemic edema, I will not order venous testing.     Dressing ordered:  The patient is to shower every day using soap and water on her legs and between her toes.  After each shower, a new dressing needs to be applied consisting of dry gauze between each of the toes and Vaseline and dry gauze on the foot and lower leg, then roll gauze to be applied to include the toes on to the calf.  Apply Acewraps bilaterally.     Dressing for buttock ulcers:  Xeroform over ulcers, apply heart shaped sacral foam dressing 3 times per week.      Home Health nursing is seeing the patient to assist with wound care 2 times per week.  Family will need to change dressing other time.                 The patient will follow up in the [address] [place] [address] in 3 weeks.     FINAL DIAGNOSES:  Nonpressure ulcer in right lower leg with fat layer exposed, nonpressure ulcer in left lower leg with fat layer exposed, severe bilateral lower extremity edema; pressure ulcers on buttock, stage 3           G. Rudy Damian MD            Z21.453, Q32.147, R60.0; L89.303     Ivory Pate MD

## 2022-02-09 NOTE — WOUND CARE
02/09/22 1417   Wound Buttocks Left 02/09/22   Date First Assessed/Time First Assessed: 02/09/22 1417   Wound Approximate Age at First Assessment (Weeks): 3 weeks  Primary Wound Type: Pressure Injury  Location: Buttocks  Wound Location Orientation: Left  Date of First Observation: 02/09/22   Wound Image    Wound Etiology Pressure Stage 2   Cleansed Soap and water   Wound Length (cm) 1.2 cm   Wound Width (cm) 1.5 cm   Wound Depth (cm) 0.1 cm   Wound Surface Area (cm^2) 1.8 cm^2   Wound Volume (cm^3) 0.18 cm^3   Wound Assessment Pink/red   Drainage Amount Small   Drainage Description Serous   Wound Odor None   Ebony-Wound/Incision Assessment Fragile   Edges Defined edges   Wound Thickness Description Full thickness   Wound Buttocks Right 09/22/21   Date First Assessed/Time First Assessed: 09/22/21 1638   Wound Approximate Age at First Assessment (Weeks): 2 weeks  Primary Wound Type: Pressure Injury  Location: Buttocks  Wound Location Orientation: Right  Date of First Observation: 09/22/21   Wound Image    Wound Etiology Pressure Stage 3  (cluster)   Cleansed Soap and water   Wound Length (cm) 4 cm   Wound Width (cm) 5.2 cm   Wound Depth (cm) 0.1 cm   Wound Surface Area (cm^2) 20.8 cm^2   Change in Wound Size % -5677.78   Wound Volume (cm^3) 2.08 cm^3   Wound Healing % -5678   Wound Assessment Pink/red   Drainage Amount Moderate   Drainage Description Sanguineous   Wound Odor None   Ebony-Wound/Incision Assessment Blanchable erythema   Edges Flat/open edges   Wound Thickness Description Full thickness   Wound Leg lower Left #2 07/07/21   Date First Assessed/Time First Assessed: 07/07/21 1415   Present on Hospital Admission: Yes  Wound Approximate Age at First Assessment (Weeks): 52 weeks  Primary Wound Type: Venous  Location: Leg lower  Wound Location Orientation: Left  Wound Descript. ..    Wound Etiology Venous   Dressing Status Old drainage noted   Cleansed Soap and water   Drainage Amount Small   Drainage Description Serous   Wound Odor None   Ebony-Wound/Incision Assessment   (weepy)   Edges Undefined edges   Wound Thickness Description Partial thickness   Wound Leg lower Right #1 07/07/21   Date First Assessed/Time First Assessed: 07/07/21 1414   Present on Hospital Admission: Yes  Wound Approximate Age at First Assessment (Weeks): 52 weeks  Primary Wound Type: Venous  Location: Leg lower  Wound Location Orientation: Right  Wound Descrip. ..    Wound Etiology Venous   Dressing Status Old drainage noted   Cleansed Soap and water   Drainage Amount Small   Drainage Description Serous   Wound Odor None   Ebony-Wound/Incision Assessment Intact   Edges Undefined edges   Wound Thickness Description Partial thickness     Visit Vitals  BP (!) 181/77 (BP 1 Location: Left upper arm)   Pulse 84   Temp 98.9 °F (37.2 °C)   Resp 18

## 2022-02-09 NOTE — DISCHARGE INSTRUCTIONS
Discharge Instructions/Wound [facility]  [address]  MOB 1, 900 [facility] Sharla Helms, [place]  Telephone: [phone]  WOUND CARE ORDERS:    Sacral wounds, left and right  Clean wounds with soap and water, pat dry. Apply xeroform, and sacral foam dressing. . Change by home health 3 x week. Trula Blew BLE  wash with soap and water, pat dry. Be sure to wash and dry well between toes. Apply ABDs to absorb weeping, rolled gauze, ace wraps and single tubi. . To be changed 3 x week by home health      We will reorder hospital bed and gel overlay. Patient and family have agreed. .. Return to see MD in 3 weeks      TREATMENT ORDERS:  Turn/reposition every 2 hours when in bed, avoid direct pressure on wound site. When sitting, shift position or do seat lifts every 15 minutes. Limit side lying to 30 degree tilt. Limit HOB elevation to 30 degrees. Use speciality pressure relief cushion, mattress as appropriate. Follow diet as prescribed:  [] Diet as tolerated: [] Calorie Diabetic Diet:Low carb and no Sugar [] No Added Salt:[] Increase Protein: [] Other:Limit the amount of liquid you are drinking and avoid drinking in between meals              Return Appointment:  [x] Return Appointment: With Dr Jg Howard in 3 weeks      [] Ordered tests:    Electronically signed Tom Forte RN on 2/9/2022 at 3:09 PM     Karen Baker 281: Should you experience any significant changes in your wound(s) or have questions about your wound care, please contact the [address] [place] [address] at [address] [place] [address] 8:00 am - 4:30. If you need help with your wound outside these hours and cannot wait until we are again available, contact your PCP or go to the hospital emergency room. PLEASE NOTE: IF YOU ARE UNABLE TO OBTAIN WOUND SUPPLIES, CONTINUE TO USE THE SUPPLIES YOU HAVE AVAILABLE UNTIL YOU ARE ABLE TO REACH US.  IT IS MOST IMPORTANT TO KEEP THE WOUND COVERED AT ALL TIMES.      Physician Signature:_______________________    Date: ___________ Time:  ____________

## 2022-02-10 ENCOUNTER — LAB ONLY (OUTPATIENT)
Dept: INTERNAL MEDICINE CLINIC | Age: 77
End: 2022-02-10

## 2022-02-10 DIAGNOSIS — E03.9 ACQUIRED HYPOTHYROIDISM: Primary | ICD-10-CM

## 2022-02-11 LAB
T3FREE SERPL-MCNC: 3.3 PG/ML (ref 2.2–4)
T4 FREE SERPL-MCNC: 2 NG/DL (ref 0.8–1.5)
TSH SERPL DL<=0.05 MIU/L-ACNC: <0.01 UIU/ML (ref 0.36–3.74)

## 2022-02-11 NOTE — PROGRESS NOTES
Thyroid test show that she is hyperthyroid. Needs have thyroid antibodies done.   May need to have nuclear medicine scan performed pending results of antibody test

## 2022-02-17 ENCOUNTER — LAB ONLY (OUTPATIENT)
Dept: INTERNAL MEDICINE CLINIC | Age: 77
End: 2022-02-17

## 2022-02-17 DIAGNOSIS — E03.9 ACQUIRED HYPOTHYROIDISM: Primary | ICD-10-CM

## 2022-02-19 LAB
THYROGLOB AB SERPL-ACNC: <1 IU/ML (ref 0–0.9)
THYROPEROXIDASE AB SERPL-ACNC: 9 IU/ML (ref 0–34)

## 2022-02-22 ENCOUNTER — TELEPHONE (OUTPATIENT)
Dept: INTERNAL MEDICINE CLINIC | Age: 77
End: 2022-02-22

## 2022-02-22 DIAGNOSIS — E05.90 HYPERTHYROIDISM: Primary | ICD-10-CM

## 2022-02-22 NOTE — TELEPHONE ENCOUNTER
----- Message from Tristan Jansen MD sent at 2/20/2022  5:46 AM EST -----  Thyroid antibody test negative  Needs to have nuclear medicine thyroid scan and uptake performed

## 2022-03-01 ENCOUNTER — TELEPHONE (OUTPATIENT)
Dept: INTERNAL MEDICINE CLINIC | Age: 77
End: 2022-03-01

## 2022-03-01 ENCOUNTER — HOSPITAL ENCOUNTER (EMERGENCY)
Age: 77
Discharge: HOME | End: 2022-03-01
Attending: EMERGENCY MEDICINE
Payer: MEDICARE

## 2022-03-01 ENCOUNTER — APPOINTMENT (OUTPATIENT)
Dept: CT IMAGING | Age: 77
End: 2022-03-01
Attending: EMERGENCY MEDICINE
Payer: MEDICARE

## 2022-03-01 VITALS
OXYGEN SATURATION: 96 % | HEIGHT: 64 IN | SYSTOLIC BLOOD PRESSURE: 148 MMHG | TEMPERATURE: 98.5 F | DIASTOLIC BLOOD PRESSURE: 71 MMHG | HEART RATE: 82 BPM | RESPIRATION RATE: 18 BRPM | WEIGHT: 220 LBS | BODY MASS INDEX: 37.56 KG/M2

## 2022-03-01 DIAGNOSIS — R42 DIZZINESS: Primary | ICD-10-CM

## 2022-03-01 LAB
ALBUMIN SERPL-MCNC: 2.2 G/DL (ref 3.5–5)
ALBUMIN/GLOB SERPL: 0.4 {RATIO} (ref 1.1–2.2)
ALP SERPL-CCNC: 81 U/L (ref 45–117)
ALT SERPL-CCNC: 10 U/L (ref 12–78)
ANION GAP SERPL CALC-SCNC: 6 MMOL/L (ref 5–15)
AST SERPL-CCNC: 16 U/L (ref 15–37)
BASOPHILS # BLD: 0 K/UL (ref 0–0.1)
BASOPHILS NFR BLD: 1 % (ref 0–1)
BILIRUB SERPL-MCNC: 0.3 MG/DL (ref 0.2–1)
BUN SERPL-MCNC: 16 MG/DL (ref 6–20)
BUN/CREAT SERPL: 15 (ref 12–20)
CALCIUM SERPL-MCNC: 8.4 MG/DL (ref 8.5–10.1)
CHLORIDE SERPL-SCNC: 105 MMOL/L (ref 97–108)
CO2 SERPL-SCNC: 29 MMOL/L (ref 21–32)
CREAT SERPL-MCNC: 1.09 MG/DL (ref 0.55–1.02)
DIFFERENTIAL METHOD BLD: NORMAL
EOSINOPHIL # BLD: 0.3 K/UL (ref 0–0.4)
EOSINOPHIL NFR BLD: 6 % (ref 0–7)
ERYTHROCYTE [DISTWIDTH] IN BLOOD BY AUTOMATED COUNT: 12.3 % (ref 11.5–14.5)
GLOBULIN SER CALC-MCNC: 5.7 G/DL (ref 2–4)
GLUCOSE SERPL-MCNC: 166 MG/DL (ref 65–100)
HCT VFR BLD AUTO: 35.6 % (ref 35–47)
HGB BLD-MCNC: 11.8 G/DL (ref 11.5–16)
IMM GRANULOCYTES # BLD AUTO: 0 K/UL (ref 0–0.04)
IMM GRANULOCYTES NFR BLD AUTO: 0 % (ref 0–0.5)
LYMPHOCYTES # BLD: 1.6 K/UL (ref 0.8–3.5)
LYMPHOCYTES NFR BLD: 39 % (ref 12–49)
MCH RBC QN AUTO: 29.2 PG (ref 26–34)
MCHC RBC AUTO-ENTMCNC: 33.1 G/DL (ref 30–36.5)
MCV RBC AUTO: 88.1 FL (ref 80–99)
MONOCYTES # BLD: 0.4 K/UL (ref 0–1)
MONOCYTES NFR BLD: 10 % (ref 5–13)
NEUTS SEG # BLD: 1.8 K/UL (ref 1.8–8)
NEUTS SEG NFR BLD: 44 % (ref 32–75)
NRBC # BLD: 0 K/UL (ref 0–0.01)
NRBC BLD-RTO: 0 PER 100 WBC
PLATELET # BLD AUTO: 227 K/UL (ref 150–400)
PMV BLD AUTO: 10.1 FL (ref 8.9–12.9)
POTASSIUM SERPL-SCNC: 3.7 MMOL/L (ref 3.5–5.1)
PROT SERPL-MCNC: 7.9 G/DL (ref 6.4–8.2)
RBC # BLD AUTO: 4.04 M/UL (ref 3.8–5.2)
SODIUM SERPL-SCNC: 140 MMOL/L (ref 136–145)
TROPONIN-HIGH SENSITIVITY: 11 NG/L (ref 0–51)
WBC # BLD AUTO: 4 K/UL (ref 3.6–11)

## 2022-03-01 PROCEDURE — 93005 ELECTROCARDIOGRAM TRACING: CPT

## 2022-03-01 PROCEDURE — 36415 COLL VENOUS BLD VENIPUNCTURE: CPT

## 2022-03-01 PROCEDURE — 85025 COMPLETE CBC W/AUTO DIFF WBC: CPT

## 2022-03-01 PROCEDURE — 99284 EMERGENCY DEPT VISIT MOD MDM: CPT

## 2022-03-01 PROCEDURE — 80053 COMPREHEN METABOLIC PANEL: CPT

## 2022-03-01 PROCEDURE — 70450 CT HEAD/BRAIN W/O DYE: CPT

## 2022-03-01 PROCEDURE — 84484 ASSAY OF TROPONIN QUANT: CPT

## 2022-03-01 NOTE — TELEPHONE ENCOUNTER
Patient states she got up this morning and her furniture appeared tipped over to her but not her son. She called the ambulance and they told her she may have had a low sugar episode. She ate breakfast. She states it happened again and she took her bs and it was 168. Please advise.     TONE [phone]

## 2022-03-01 NOTE — ED NOTES
Pt presents to ED via EMS complaining of dizziness x 1 month. Pt reports see has been having intermittent visual issues. Pt reports it looks like the room \"is leaning to the left\" or \"the ceiling is on the floor\". Pt denies dizziness currently. Pt has a hx of 3 previous strokes  Pt is alert and oriented x 4, RR even and unlabored, skin is warm and dry. Assessment completed and pt updated on plan of care. Call bell in reach. Emergency Department Nursing Plan of Care       The Nursing Plan of Care is developed from the Nursing assessment and Emergency Department Attending provider initial evaluation. The plan of care may be reviewed in the ED Provider note.     The Plan of Care was developed with the following considerations:   Patient / Family readiness to learn indicated by:verbalized understanding  Persons(s) to be included in education: patient  Barriers to Learning/Limitations:No    Signed     Dale Guevara RN    3/1/2022   3:29 PM

## 2022-03-01 NOTE — ED TRIAGE NOTES
Pt brought by EMS complaining of dizziness. EMS reports that for a month the pt has been complaining of intermittently seeing \"the room is lopsided to the left\". Pt has a hx of 3 strokes in the past that has left her w/ left sided weakness. Pt denies recenlty seeing neurologist. Pt denies new onset extremity weakness, no facial droop noted, or aphasia.

## 2022-03-01 NOTE — ED PROVIDER NOTES
EMERGENCY DEPARTMENT HISTORY AND PHYSICAL EXAM      Date: 3/1/2022  Patient Name: Valerie Payne    History of Presenting Illness     Chief Complaint   Patient presents with    Dizziness     History Provided By: Patient and EMS    HPI: Valerie Payne, 67 y.o. female with past medical history significant for anxiety, atrial fibrillation, CAD, chronic kidney disease, CVA, diabetes, hyperlipidemia, hypertension, and lymphedema who presents via EMS to the ED with cc of dizziness with the room leaning to the right. Patient states she has had the symptoms intermittently for the past month but was worse this morning when she woke up from her nap. She states the symptoms have currently resolved and she is asymptomatic now. PMHx: Anxiety, A. fib, CAD, CKD, CVA, diabetes, hyperlipidemia, hypertension, and lymphedema  Social Hx: Denies alcohol, tobacco, or illegal drug use    PCP: Sherine Levine MD    There are no other complaints, changes, or physical findings at this time. No current facility-administered medications on file prior to encounter. Current Outpatient Medications on File Prior to Encounter   Medication Sig Dispense Refill    carvediloL (COREG) 12.5 mg tablet TAKE 1 TABLET TWICE A  Tablet 0    amLODIPine (NORVASC) 10 mg tablet Take 1 Tablet by mouth daily. 90 Tablet 0    FreeStyle Lite Strips strip USE 1 STRIP TO CHECK GLUCOSE ONCE DAILY 100 Strip 1    NovoLIN N NPH U-100 Insulin 100 unit/mL injection INJECT 44 UNITS UNDER THE SKIN IN THE MORNING AND 36 UNITS IN THE EVENING (Patient taking differently: 40 Units by SubCUTAneous route every morning. 33 units in evening) 80 mL 1    pravastatin (PRAVACHOL) 80 mg tablet TAKE 1 TABLET NIGHTLY 90 Tablet 1    rivaroxaban (Xarelto) 20 mg tab tablet Take 1 Tablet by mouth daily.  90 Tablet 1    BD Insulin Syringe Ultra-Fine 0.5 mL 31 gauge x 5/16\" syrg USE 1 SYRINGE TWICE A DAY (Patient not taking: Reported on 3/1/2022) 180 Each 1    cloNIDine HCL (CATAPRES) 0.1 mg tablet TAKE 1 TABLET THREE TIMES A  Tablet 1    mupirocin (BACTROBAN) 2 % ointment APPLY TOPICALLY TO THE AFFECTED AREA DAILY 22 g 0    sodium hypochlorite (Dakin's Solution) external solution Apply to wounds twice daily and cover with dressing 1000 mL 3    captopriL (CAPOTEN) 25 mg tablet TAKE 1 TABLET TWICE A  Tablet 2    furosemide (LASIX) 40 mg tablet TAKE 1 TABLET DAILY (Patient taking differently: Take 40 mg by mouth two (2) times a day.) 90 Tab 2    lancets (FreeStyle Lancets) 28 gauge misc Daily blood sugar checks 100 Lancet 3    pomegranate xt/pomegranat seed (POMEGRANATE PO) Take  by mouth.  flash glucose scanning reader (FREESTYLE JOSE LUIS READER) [facility] Use to check blood sugars daily  DX: E11.9 1 Device 0    flash glucose sensor (FREESTYLE JOSE LUIS SENSOR) kit Use to check blood sugars daily  DX: E11.9 1 Device 0    ergocalciferol (VITAMIN D2) 50,000 unit capsule Take 50,000 Units by mouth every seven (7) days.  Blood-Glucose Meter (FREESTYLE FREEDOM LITE) monitoring kit Daily blood sugar checks (Patient not taking: Reported on 3/1/2022) 1 Kit 0    naloxone (NARCAN) 4 mg/actuation nasal spray Use 1 spray intranasally into 1 nostril. Indications: OPIATE-INDUCED RESPIRATORY DEPRESSION, Opioid Toxicity (Patient not taking: Reported on 1/26/2022) 1 Each 0    latanoprost (XALATAN) 0.005 % ophthalmic solution Administer 1 Drop to both eyes nightly.  aspirin (ASPIRIN) 325 mg tablet Take 1 Tab by mouth daily.        Past History     Past Medical History:  Past Medical History:   Diagnosis Date    Anticoagulant long-term use 10/13/2017    Anxiety 10/13/2017    Atrial fibrillation ([address] 10/13/2017    Breast calcification, left 10/13/2017    CAD (coronary artery disease)     Cellulitis of both lower extremities 10/13/2017    Chronic kidney disease     kidney stones    Chronic pain syndrome 10/13/2017    Chronic prescription opiate use 11/15/2017    CVA (cerebral vascular accident) ([facility] 10/13/2017    Diabetes ([facility]     DJD (degenerative joint disease) 10/13/2017    Dyslipidemia 10/13/2017    Glaucoma 10/13/2017    Hypertension     Hyperthyroidism 10/13/2017    Long-term use of high-risk medication 10/13/2017    Stasis ulcer of lower extremity ([address] 10/13/2017    Stroke ([facility]     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 ([facility] 8/18/2012    retinopathy      Past Surgical History:  Past Surgical History:   Procedure Laterality Date    HX BREAST BIOPSY Left     2014    HX GYN      hysterectomty    HX ORTHOPAEDIC      back surgery    [place] CARDIAC SURG PROCEDURE UNLIST      cagb     Family History:  Family History   Problem Relation Age of Onset    Heart Disease Father      Social History:  Social History     Tobacco Use    Smoking status: Never Smoker    Smokeless tobacco: Never Used   Vaping Use    Vaping Use: Never used   Substance Use Topics    Alcohol use: No    Drug use: No     Allergies: Allergies   Allergen Reactions    Betadine Prepstick Rash    Keflex [Cephalexin] Hives     Pt breaks out in hives     Review of Systems   Review of Systems   Constitutional: Negative for chills and fever. HENT: Negative for congestion, rhinorrhea, sneezing and sore throat. Eyes: Positive for visual disturbance. Negative for redness. Respiratory: Negative for shortness of breath. Cardiovascular: Negative for leg swelling. Gastrointestinal: Negative for abdominal pain, nausea and vomiting. Genitourinary: Negative for difficulty urinating and frequency. Musculoskeletal: Negative for back pain, myalgias and neck stiffness. Skin: Negative for rash. Neurological: Positive for weakness. Negative for dizziness, syncope and headaches. Hematological: Negative for adenopathy. All other systems reviewed and are negative. Physical Exam   Physical Exam  Vitals and nursing note reviewed.    Constitutional:       Appearance: Normal appearance. She is well-developed. HENT:      Head: Normocephalic and atraumatic. Eyes:      Conjunctiva/sclera: Conjunctivae normal.   Cardiovascular:      Rate and Rhythm: Normal rate and regular rhythm. Pulses: Normal pulses. Heart sounds: Normal heart sounds, S1 normal and S2 normal.   Pulmonary:      Effort: Pulmonary effort is normal. No respiratory distress. Breath sounds: Normal breath sounds. No wheezing. Abdominal:      General: Bowel sounds are normal. There is no distension. Palpations: Abdomen is soft. Tenderness: There is no abdominal tenderness. There is no rebound. Musculoskeletal:         General: Normal range of motion. Cervical back: Full passive range of motion without pain, normal range of motion and neck supple. Skin:     General: Skin is warm and dry. Findings: No rash. Comments: Chronic lymphedema of the bilateral lower extremities to the mid thigh, thickened skin without erythema or signs of infection   Neurological:      Mental Status: She is alert and oriented to person, place, and time. Psychiatric:         Speech: Speech normal.         Behavior: Behavior normal.         Thought Content:  Thought content normal.         Judgment: Judgment normal.       Diagnostic Study Results   Labs -     Recent Results (from the past 12 hour(s))   EKG, 12 LEAD, INITIAL    Collection Time: 03/01/22  2:28 PM   Result Value Ref Range    Ventricular Rate 81 BPM    Atrial Rate 81 BPM    P-R Interval 162 ms    QRS Duration 74 ms    Q-T Interval 374 ms    QTC Calculation (Bezet) 434 ms    Calculated P Axis 32 degrees    Calculated R Axis -7 degrees    Calculated T Axis 19 degrees    Diagnosis       Normal sinus rhythm  Voltage criteria for left ventricular hypertrophy  Inferior infarct (cited on or before 30-JAN-2019)  When compared with ECG of 30-JAN-2019 15:42,  Criteria for Anterior infarct are no longer present     [address] [facility] [place] [place] SENSITIVITY    Collection Time: 03/01/22  2:37 PM   Result Value Ref Range    Troponin-High Sensitivity 11 0 - 51 ng/L   CBC WITH AUTOMATED DIFF    Collection Time: 03/01/22  2:37 PM   Result Value Ref Range    WBC 4.0 3.6 - 11.0 K/uL    RBC 4.04 3.80 - 5.20 M/uL    HGB 11.8 11.5 - 16.0 g/dL    HCT 35.6 35.0 - 47.0 %    MCV 88.1 80.0 - 99.0 FL    MCH 29.2 26.0 - 34.0 PG    MCHC 33.1 30.0 - 36.5 g/dL    RDW 12.3 11.5 - 14.5 %    PLATELET 726 498 - 371 K/uL    MPV 10.1 8.9 - 12.9 FL    NRBC 0.0 0  WBC    ABSOLUTE NRBC 0.00 0.00 - 0.01 K/uL    NEUTROPHILS 44 32 - 75 %    LYMPHOCYTES 39 12 - 49 %    MONOCYTES 10 5 - 13 %    EOSINOPHILS 6 0 - 7 %    BASOPHILS 1 0 - 1 %    IMMATURE GRANULOCYTES 0 0.0 - 0.5 %    ABS. NEUTROPHILS 1.8 1.8 - 8.0 K/UL    ABS. LYMPHOCYTES 1.6 0.8 - 3.5 K/UL    ABS. MONOCYTES 0.4 0.0 - 1.0 K/UL    ABS. EOSINOPHILS 0.3 0.0 - 0.4 K/UL    ABS. BASOPHILS 0.0 0.0 - 0.1 K/UL    ABS. IMM. GRANS. 0.0 0.00 - 0.04 K/UL    DF AUTOMATED     METABOLIC PANEL, COMPREHENSIVE    Collection Time: 03/01/22  2:37 PM   Result Value Ref Range    Sodium 140 136 - 145 mmol/L    Potassium 3.7 3.5 - 5.1 mmol/L    Chloride 105 97 - 108 mmol/L    CO2 29 21 - 32 mmol/L    Anion gap 6 5 - 15 mmol/L    Glucose 166 (H) 65 - 100 mg/dL    BUN 16 6 - 20 MG/DL    Creatinine 1.09 (H) 0.55 - 1.02 MG/DL    BUN/Creatinine ratio 15 12 - 20      GFR est AA 59 (L) >60 ml/min/1.73m2    GFR est non-AA 49 (L) >60 ml/min/1.73m2    Calcium 8.4 (L) 8.5 - 10.1 MG/DL    Bilirubin, total 0.3 0.2 - 1.0 MG/DL    ALT (SGPT) 10 (L) 12 - 78 U/L    AST (SGOT) 16 15 - 37 U/L    Alk. phosphatase 81 45 - 117 U/L    Protein, total 7.9 6.4 - 8.2 g/dL    Albumin 2.2 (L) 3.5 - 5.0 g/dL    Globulin 5.7 (H) 2.0 - 4.0 g/dL    A-G Ratio 0.4 (L) 1.1 - 2.2         Radiologic Studies -   CT HEAD WO CONT   Final Result   No acute intracranial abnormality. CT HEAD WO CONT    Result Date: 3/1/2022  No acute intracranial abnormality.      Medical Decision Making   I am the first provider for this patient. I reviewed the vital signs, available nursing notes, past medical history, past surgical history, family history and social history. Vital Signs-Reviewed the patient's vital signs. Patient Vitals for the past 24 hrs:   Temp Pulse Resp BP SpO2   03/01/22 1414 98.5 °F (36.9 °C) 82 18 (!) 146/58 96 %     Pulse Oximetry Analysis - 96% on RA (normal)    Cardiac Monitor:   Rate: 82 bpm  Rhythm: Normal Sinus Rhythm     ED EKG interpretation: 14:28  Rhythm: normal sinus rhythm; and regular . Rate (approx.): 81; Axis: normal; P wave: normal; QRS interval: normal ; ST/T wave: normal; Other findings: normal. This EKG was interpreted by Shannen Elizabeth MD,ED Provider. Records Reviewed: Nursing Notes, Old Medical Records, Previous Radiology Studies and Previous Laboratory Studies    Provider Notes (Medical Decision Making):   48-year-old female presents with dizziness and a transient visual disturbance of the room being lopsided to the right. She states it was as if someone put up all of her furniture and turned it on its side. Her blood sugar was normal per EMS. Patient does not have any current complaints on arrival to the emergency department, but does have significant risk factors. Will obtain a CT of her head, check basic labs including a urinalysis, and reassess. ED Course:   Initial assessment performed. The patients presenting problems have been discussed, and they are in agreement with the care plan formulated and outlined with them. I have encouraged them to ask questions as they arise throughout their visit. Patient's labs and imaging are unremarkable. She is unable to provide a urine sample at this time. While her symptoms could be related to UTI, this would be a very atypical presentation. Since her symptoms have resolved and her work-up is unremarkable, will discharge with primary care and outpatient neurology follow-up.   Discussed plan of care with patient who agrees. Progress Note:   Updated pt on all returned results and findings. Discussed the importance of proper follow up as referred below along with return precautions. Pt in agreement with the care plan and expresses agreement with and understanding of all items discussed. Disposition:  Discharge Note:  The pt is ready for discharge. The pt's signs, symptoms, diagnosis, and discharge instructions have been discussed and pt has conveyed their understanding. The pt is to follow up as recommended or return to ER should their symptoms worsen. Plan has been discussed and pt is in agreement. PLAN:  1. Current Discharge Medication List        2. Follow-up Information     Follow up With Specialties Details Why Contact Info    Tavares Diaz MD Internal Medicine Schedule an appointment as soon as possible for a visit   [facility]  [phone]      Carol Bautista MD Neurology Schedule an appointment as soon as possible for a visit   [address]  [address] [place] [address] [address] On license of [facility]      [address] [place] [address] DEPT Emergency Medicine  As needed, If symptoms worsen [address] [facility]  [phone]        Return to ED if worse     Diagnosis     Clinical Impression:   1. Dizziness            Please note that this dictation was completed with Dragon, computer voice recognition software. Quite often unanticipated grammatical, syntax, homophones, and other interpretive errors are inadvertently transcribed by the computer software. Please disregard these errors. Additionally, please excuse any errors that have escaped final proofreading.

## 2022-03-01 NOTE — ED NOTES
Discharge instructions were given to the patient by Crista Marsh RN. The patient left the Emergency Department ambulatory, alert and oriented and in no acute distress with 0 prescriptions. The patient was encouraged to call or return to the ED for worsening issues or problems and was encouraged to schedule a follow up appointment for continuing care. The patient verbalized understanding of discharge instructions and prescriptions, all questions were answered. The patient has no further concerns at this time.

## 2022-03-01 NOTE — TELEPHONE ENCOUNTER
Does not appear to be a low blood sugar reaction if it occurred when her blood sugar was 168.   May be prudent to have her go to the emergency room to be evaluated

## 2022-03-01 NOTE — PROGRESS NOTES
ACO/CORE measure patient recommend by PCP to come to the ER for symptom. Pt brought by EMS complaining of dizziness. EMS reports that for a month the pt has been complaining of intermittently seeing \"the room is lopsided to the left\". Pt has a hx of 3 strokes in the past that has left her w/ left sided weakness.     Sulma Anthony CM

## 2022-03-02 LAB
ATRIAL RATE: 81 BPM
CALCULATED P AXIS, ECG09: 32 DEGREES
CALCULATED R AXIS, ECG10: -7 DEGREES
CALCULATED T AXIS, ECG11: 19 DEGREES
DIAGNOSIS, 93000: NORMAL
P-R INTERVAL, ECG05: 162 MS
Q-T INTERVAL, ECG07: 374 MS
QRS DURATION, ECG06: 74 MS
QTC CALCULATION (BEZET), ECG08: 434 MS
VENTRICULAR RATE, ECG03: 81 BPM

## 2022-03-08 ENCOUNTER — TELEPHONE (OUTPATIENT)
Dept: INTERNAL MEDICINE CLINIC | Age: 77
End: 2022-03-08

## 2022-03-08 NOTE — TELEPHONE ENCOUNTER
Xarelto will need to be stopped if there is some type of dental surgery being done. If she is just having a cleaning she just needs to let the hygienist know.

## 2022-03-08 NOTE — TELEPHONE ENCOUNTER
----- Message from Branden Silva sent at 3/7/2022 12:03 PM EST -----  Subject: Message to Provider    QUESTIONS  Information for Provider? patient has a dentist apt mynormathieu morning wants to   know if she should continue her zerelto ( not sure of spelling- I dont see   it in her med chart) she has not taken it this morning and wants to see if   she should take it tommathieu   ---------------------------------------------------------------------------  --------------  CALL BACK INFO  What is the best way for the office to contact you? OK to leave message on   voicemail  Preferred Call Back Phone Number? [phone]  ---------------------------------------------------------------------------  --------------  SCRIPT ANSWERS  Relationship to Patient?  Self

## 2022-03-09 ENCOUNTER — HOSPITAL ENCOUNTER (OUTPATIENT)
Dept: WOUND CARE | Age: 77
Discharge: HOME | End: 2022-03-09
Payer: MEDICARE

## 2022-03-09 VITALS
SYSTOLIC BLOOD PRESSURE: 143 MMHG | RESPIRATION RATE: 18 BRPM | HEART RATE: 79 BPM | DIASTOLIC BLOOD PRESSURE: 63 MMHG | TEMPERATURE: 98.1 F

## 2022-03-09 DIAGNOSIS — L97.922 NON-PRESSURE CHRONIC ULCER OF LEFT LOWER LEG WITH FAT LAYER EXPOSED (HCC): ICD-10-CM

## 2022-03-09 DIAGNOSIS — L89.313 PRESSURE INJURY OF RIGHT BUTTOCK, STAGE 3 (HCC): ICD-10-CM

## 2022-03-09 DIAGNOSIS — L97.912 NON-PRESSURE CHRONIC ULCER OF RIGHT LOWER LEG WITH FAT LAYER EXPOSED (HCC): ICD-10-CM

## 2022-03-09 DIAGNOSIS — R60.0 BILATERAL LEG EDEMA: ICD-10-CM

## 2022-03-09 PROCEDURE — 99213 OFFICE O/P EST LOW 20 MIN: CPT | Performed by: SURGERY

## 2022-03-09 PROCEDURE — 99214 OFFICE O/P EST MOD 30 MIN: CPT

## 2022-03-09 NOTE — DISCHARGE INSTRUCTIONS
Discharge Instructions/Wound [address]  [address]  MOB 1, [address] [place], [place]  Telephone: [phone]  WOUND CARE ORDERS:    Bilateral lower extremities to be washed with soap and water at least every day by home health or by family. .   ABDs, rolled gauze, ACE wraps,  Left and right buttock wounds. Clean with normal saline, pat dry. Apply xeroform, sacral foam dressing. Care to be done 3 x week. Return to see MD in 3 weeks      TREATMENT ORDERS:  Turn/reposition every 2 hours when in bed, avoid direct pressure on wound site. When sitting, shift position or do seat lifts every 15 minutes. Limit side lying to 30 degree tilt. Limit HOB elevation to 30 degrees. Use speciality pressure relief cushion, mattress as appropriate. Follow diet as prescribed:  [] Diet as tolerated: [] Calorie Diabetic Diet:Low carb and no Sugar [] No Added Salt:[] Increase Protein: [] Other:Limit the amount of liquid you are drinking and avoid drinking in between meals              Return Appointment:  [x] Return Appointment: With DR Ksenia Simms  in  3 [facility])  [] Ordered tests:    Electronically signed Tristan Dooley RN on 3/9/2022 at 2:41 PM     Karen Baker 281: Should you experience any significant changes in your wound(s) or have questions about your wound care, please contact the [facility] [place] 2[place] Main at [address] [place] [address] Street 8:00 am - 4:30. If you need help with your wound outside these hours and cannot wait until we are again available, contact your PCP or go to the hospital emergency room. PLEASE NOTE: IF YOU ARE UNABLE TO OBTAIN WOUND SUPPLIES, CONTINUE TO USE THE SUPPLIES YOU HAVE AVAILABLE UNTIL YOU ARE ABLE TO REACH US. IT IS MOST IMPORTANT TO KEEP THE WOUND COVERED AT ALL TIMES.      Physician Signature:_______________________    Date: ___________ Time:  ____________

## 2022-03-09 NOTE — PROGRESS NOTES
HISTORY OF PRESENT ILLNESS:  The patient is a 14-year-old woman who is referred to the [address] [place] [address] by Dr. Theo Crawford regarding ulcerations on both lower extremities.  The patient reports that she has had problems with increased leg swelling and ulcers for about a year.  Both legs have been painful.     The patient was first seen at [facility] on 7/7/2021.     The patient reports that she had been sleeping in a sitting position.  She will have her legs elevated off the floor to a limited extent.  She cannot lie flat in bed because she gets short of breath  .After receiving instructions to attempt to lie down to sleep, she is using a recliner, but has her feet below heart level.  She can turn to the left, but not to the right at night.     She says that she cannot fully change position between left and right by herself.     The patient does take furosemide 40 mg per day. Vicki Butler reports this does produce a diuresis. I messaged her primary MD.   Dr Best Childs increased to lasix 40 mg bid. Vicki Butler took the second in the evening and had incontinence, so she reduced to once per day.  She now says she is taking 40 mg twice per day.     The patient reports she had been drinking large quantities of fluid throughout the course of the day.  This included many bottles of water, V8, and other liquids.  She has reduced fluid intake some.  She says she now takes total of 40 ounces fluid per day.     She is apparently using something like Pure-Wick at night for urine.     Patient's situation reviewed by message with Dr Katie Grijalva indicates patient has been reluctant to have specialist evaluation for her edema. Cohen Luke has also had in the past deterioration of her renal function with higher doses of diuretics.     The patient can stand and can walk a few feet when she holds onto objects.  She does not report shortness of breath with this limited exertion.  The patient has history of stroke producing left hemipareses.     Patient now has hospital bed and alternating pressure mattress. She says she is in the bed about 8 hours during the daytime. At night she is in her recliner, because she can turn herself easier there as no one is available to help her turn at night.     The patient does have history of diabetes mellitus.  She reports blood sugars up to around 160.  She is working on a diabetic diet.   Hgb A1c 6.2 on 1/25/2022.     The patient's past medical history includes long-term anticoagulation with Xarelto for paroxysmal atrial fibrillation, anxiety, coronary artery disease, chronic pain, degenerative joint disease, hyperlipidemia, hypertension, and glaucoma.     The patient has never smoked.     The patient had noninvasive arterial testing at [facility] on 05/11/2021.  This showed both ankle arm indices were non-meaningful because of arterial calcification.  The right great toe arm index was 1.01 and left great toe arm index was 0.48.  The metatarsal waveforms on both feet were strong and pulsatile.  PPG signals were pulsatile in both great toes.  This would suggest adequate arterial flow into both lower extremities.     Current dressing as of 7/7/2021:  The patient is to shower every day using soap and water on both legs and between her toes.  After each shower, a new dressing needs to be applied consisting of dry gauze between each of the toes and Vaseline and dry gauze on the foot and lower leg, then roll gauze to be applied to include the toes on to the calf.  Apply Acewraps bilaterally.                                                     For buttock right wound (as of 11/10/2021):  Zinc oxide ointment to buttock sites daily.     In December 2021, for buttock / sacral wound, started:  For right buttock ulcer, apply foam dressing; change 3 times per week and prn.     The patient reports skin tears from dressing on buttock.              Reported weight 220 pounds, height 5 feet 4 inches.        PHYSICAL EXAMINATION:  GENERAL:  The patient is an alert, overweight woman lying on stretcher.  She is in no acute distress.     NEUROLOGIC:  The patient is alert and oriented.  She has diminished movement on the left side,  Left upper and lower extremity.  Facial movements are symmetrical.  Speech is normal.     EXTREMITIES:  Examination of the right lower extremity revealed 1-2+ edema (less than at original visit) beginning at the groin extending distally to include the foot.  There was lichenification of the skin in the lower leg and foot, particularly in the toes.  There were minimal areas of scabbing on leg.  I could not palpate dorsalis pedis or posterior tibial pulses.  There was a soft biphasic signal at the dorsalis pedis on the right.     Examination of left lower extremity revealed 1-2+ edema (slightly less than original visit) from the groin level distally to include the foot.  There was lichenification of the skin in the lower leg and on the foot, particularly involving the toes. Minimal scabs on leg.     Posteror buttock - to right  of upper sacrum -   ulcer 1.5 x 1 x 0.1 cm with granulation on base; left buttock 0.1 x 0.1 x5 x 0.1 cm with scab.           Ulcer right buttock smaller; ulcer left buttock healed.      Bilateral leg edema persistent; less than at original presentation, but still quite significant.  Control of edema will be essential to improvement in her skin condition.  Skin of both legs appears drier.                    I have recommended the patient attempt to sleep at night lying down.  It would be ideal to reach a position in which her feet and her heart are at the same level during the night when she is sleeping.  If in recliner, place pillow under calves to keep foot level with heart.     Uses recliner because she can reach over with right ar to pull herself onto left side.  Has minimal function in left arm.     Use hospital bed with alternating pressure mattress as much as possible.     I discussed offloading of midline buttocks - tilt all the way to the right; or nursing home or fully to the left.  Never lie directly supine.  In recliner, never be flat on back.  She will need assistance from family to change positon every 2 hours.  Use pillows to position.     I have recommended she continue limiting total fluid intake.  She should eliminate all the extra water bottles she is drinking. Marzena Salazar should not drink V8 juice, as it is high in salt.  She should avoid salt containing foods.  She should eat no soup because of sodium content. Can make homemade soup without salt.   Use sugar free lemon drop to keep mouth moist.      I recommended the patient try to adhere to a diabetic diet.     Until there is better control of systemic edema, I will not order venous testing.     Dressing ordered:  The patient is to shower every day using soap and water on her legs and between her toes.  After each shower, a new dressing needs to be applied consisting of dry gauze between each of the toes and Vaseline and dry gauze on the foot and lower leg, then roll gauze to be applied to include the toes on to the calf.  Apply Acewraps bilaterally.     Dressing for buttock ulcers:  Xeroform over ulcers, apply heart shaped sacral foam dressing 3 times per week.      Home Health nursing is seeing the patient to assist with wound care 2 times per week.  Family will need to change dressing other time.                 The patient will follow up in the [address] [place] [address] in 3 weeks.     FINAL DIAGNOSES:  Nonpressure ulcer in right lower leg with fat layer exposed, nonpressure ulcer in left lower leg with fat layer exposed, severe bilateral lower extremity edema; pressure ulcers on buttock, stage 3           JEFF Bustamante MD            Q23.225, K62.788, R60.0; L89.303     Beryle Servant, MD

## 2022-03-09 NOTE — WOUND CARE
03/09/22 1420   Wound Leg lower Left #2 07/07/21   Date First Assessed/Time First Assessed: 07/07/21 1415   Present on Hospital Admission: Yes  Wound Approximate Age at First Assessment (Weeks): 52 weeks  Primary Wound Type: Venous  Location: Leg lower  Wound Location Orientation: Left  Wound Descript. .. Dressing/Treatment ABD pad;Roll gauze; Ace wrap   Wound Leg lower Right #1 07/07/21   Date First Assessed/Time First Assessed: 07/07/21 1414   Present on Hospital Admission: Yes  Wound Approximate Age at First Assessment (Weeks): 52 weeks  Primary Wound Type: Venous  Location: Leg lower  Wound Location Orientation: Right  Wound Descrip. .. Dressing/Treatment ABD pad;Roll gauze; Ace wrap   Wound Buttocks Left 02/09/22   Date First Assessed/Time First Assessed: 02/09/22 1417   Wound Approximate Age at First Assessment (Weeks): 3 weeks  Primary Wound Type: Pressure Injury  Location: Buttocks  Wound Location Orientation: Left  Date of First Observation: 02/09/22   Wound Image    Wound Etiology Pressure Stage 2   Cleansed Soap and water   Wound Length (cm) 0.1 cm   Wound Width (cm) 0.1 cm   Wound Depth (cm) 0.1 cm   Wound Surface Area (cm^2) 0.01 cm^2   Change in Wound Size % 99.44   Wound Volume (cm^3) 0.001 cm^3   Wound Healing % 99   Wound Assessment Pink/red   Drainage Amount Small   Drainage Description Serosanguinous   Wound Odor None   Ebony-Wound/Incision Assessment Fragile   Edges Defined edges   Wound Thickness Description Full thickness   Wound Buttocks Right 09/22/21   Date First Assessed/Time First Assessed: 09/22/21 1638   Wound Approximate Age at First Assessment (Weeks): 2 weeks  Primary Wound Type: Pressure Injury  Location: Buttocks  Wound Location Orientation: Right  Date of First Observation: 09/22/21   Wound Image    Wound Etiology Pressure Stage 3   Dressing Status Old drainage noted   Cleansed Soap and water   Wound Length (cm) 1.5 cm   Wound Width (cm) 1 cm   Wound Depth (cm) 0.1 cm   Wound Surface Area (cm^2) 1.5 cm^2   Change in Wound Size % -316.67   Wound Volume (cm^3) 0.15 cm^3   Wound Healing % -317   Wound Assessment Pink/red   Drainage Amount Moderate   Drainage Description Serosanguinous   Wound Odor None   Ebony-Wound/Incision Assessment Blanchable erythema   Edges Flat/open edges   Wound Thickness Description Full thickness     Visit Vitals  BP (!) 143/63 (BP 1 Location: Left upper arm, BP Patient Position: At rest)   Pulse 79   Temp 98.1 °F (36.7 °C)   Resp 18

## 2022-03-18 PROBLEM — Z79.01 ANTICOAGULANT LONG-TERM USE: Status: ACTIVE | Noted: 2017-10-13

## 2022-03-18 PROBLEM — L97.922 NON-PRESSURE CHRONIC ULCER OF LEFT LOWER LEG WITH FAT LAYER EXPOSED (HCC): Status: ACTIVE | Noted: 2021-07-07

## 2022-03-18 PROBLEM — I89.0 LYMPHEDEMA OF BOTH LOWER EXTREMITIES: Status: ACTIVE | Noted: 2017-10-13

## 2022-03-18 PROBLEM — Z79.891 CHRONIC PRESCRIPTION OPIATE USE: Status: ACTIVE | Noted: 2017-11-15

## 2022-03-18 PROBLEM — E11.21 TYPE 2 DIABETES WITH NEPHROPATHY (HCC): Status: ACTIVE | Noted: 2019-06-21

## 2022-03-19 PROBLEM — N39.0 UTI (URINARY TRACT INFECTION): Status: ACTIVE | Noted: 2019-01-30

## 2022-03-19 PROBLEM — E78.5 DYSLIPIDEMIA: Status: ACTIVE | Noted: 2017-10-13

## 2022-03-19 PROBLEM — G89.4 CHRONIC PAIN SYNDROME: Status: ACTIVE | Noted: 2017-10-13

## 2022-03-19 PROBLEM — I63.9 CVA (CEREBRAL VASCULAR ACCIDENT) (HCC): Status: ACTIVE | Noted: 2017-10-13

## 2022-03-19 PROBLEM — E05.90 HYPERTHYROIDISM: Status: ACTIVE | Noted: 2017-10-13

## 2022-03-19 PROBLEM — R60.0 BILATERAL LEG EDEMA: Status: ACTIVE | Noted: 2021-07-07

## 2022-03-19 PROBLEM — L89.303 PRESSURE INJURY OF BUTTOCK, STAGE 3 (HCC): Status: ACTIVE | Noted: 2021-10-13

## 2022-03-19 PROBLEM — M19.90 DJD (DEGENERATIVE JOINT DISEASE): Status: ACTIVE | Noted: 2017-10-13

## 2022-03-19 PROBLEM — Z79.899 LONG-TERM USE OF HIGH-RISK MEDICATION: Status: ACTIVE | Noted: 2017-10-13

## 2022-03-19 PROBLEM — G93.41 METABOLIC ENCEPHALOPATHY: Status: ACTIVE | Noted: 2019-01-31

## 2022-03-19 PROBLEM — H40.9 GLAUCOMA: Status: ACTIVE | Noted: 2017-10-13

## 2022-03-19 PROBLEM — R92.1 BREAST CALCIFICATION, LEFT: Status: ACTIVE | Noted: 2017-10-13

## 2022-03-20 PROBLEM — L97.912 NON-PRESSURE CHRONIC ULCER OF RIGHT LOWER LEG WITH FAT LAYER EXPOSED (HCC): Status: ACTIVE | Noted: 2021-07-07

## 2022-03-20 PROBLEM — F41.9 ANXIETY: Status: ACTIVE | Noted: 2017-10-13

## 2022-03-30 ENCOUNTER — HOSPITAL ENCOUNTER (OUTPATIENT)
Dept: WOUND CARE | Age: 77
Discharge: HOME | End: 2022-03-30
Payer: MEDICARE

## 2022-03-30 VITALS
HEART RATE: 91 BPM | SYSTOLIC BLOOD PRESSURE: 183 MMHG | RESPIRATION RATE: 18 BRPM | DIASTOLIC BLOOD PRESSURE: 81 MMHG | TEMPERATURE: 98.3 F

## 2022-03-30 DIAGNOSIS — L89.303 PRESSURE INJURY OF BUTTOCK, STAGE 3, UNSPECIFIED LATERALITY (HCC): ICD-10-CM

## 2022-03-30 DIAGNOSIS — L97.922 NON-PRESSURE CHRONIC ULCER OF LEFT LOWER LEG WITH FAT LAYER EXPOSED (HCC): ICD-10-CM

## 2022-03-30 DIAGNOSIS — L97.912 NON-PRESSURE CHRONIC ULCER OF RIGHT LOWER LEG WITH FAT LAYER EXPOSED (HCC): ICD-10-CM

## 2022-03-30 DIAGNOSIS — R60.0 BILATERAL LEG EDEMA: ICD-10-CM

## 2022-03-30 PROCEDURE — 99213 OFFICE O/P EST LOW 20 MIN: CPT | Performed by: SURGERY

## 2022-03-30 PROCEDURE — 99213 OFFICE O/P EST LOW 20 MIN: CPT

## 2022-03-30 RX ORDER — LIDOCAINE HYDROCHLORIDE 20 MG/ML
JELLY TOPICAL ONCE
Status: CANCELLED | OUTPATIENT
Start: 2022-03-30 | End: 2022-03-30

## 2022-03-30 RX ORDER — CLOBETASOL PROPIONATE 0.5 MG/G
OINTMENT TOPICAL ONCE
Status: CANCELLED | OUTPATIENT
Start: 2022-03-30 | End: 2022-03-30

## 2022-03-30 RX ORDER — BACITRACIN 500 [USP'U]/G
OINTMENT TOPICAL ONCE
Status: CANCELLED | OUTPATIENT
Start: 2022-03-30 | End: 2022-03-30

## 2022-03-30 RX ORDER — MUPIROCIN 20 MG/G
OINTMENT TOPICAL ONCE
Status: CANCELLED | OUTPATIENT
Start: 2022-03-30 | End: 2022-03-30

## 2022-03-30 RX ORDER — LIDOCAINE 40 MG/G
CREAM TOPICAL ONCE
Status: CANCELLED | OUTPATIENT
Start: 2022-03-30 | End: 2022-03-30

## 2022-03-30 RX ORDER — SILVER SULFADIAZINE 10 G/1000G
CREAM TOPICAL ONCE
Status: CANCELLED | OUTPATIENT
Start: 2022-03-30 | End: 2022-03-30

## 2022-03-30 RX ORDER — LIDOCAINE HYDROCHLORIDE 40 MG/ML
SOLUTION TOPICAL ONCE
Status: CANCELLED | OUTPATIENT
Start: 2022-03-30 | End: 2022-03-30

## 2022-03-30 RX ORDER — BETAMETHASONE DIPROPIONATE 0.5 MG/G
OINTMENT TOPICAL ONCE
Status: CANCELLED | OUTPATIENT
Start: 2022-03-30 | End: 2022-03-30

## 2022-03-30 RX ORDER — GENTAMICIN SULFATE 1 MG/G
OINTMENT TOPICAL ONCE
Status: CANCELLED | OUTPATIENT
Start: 2022-03-30 | End: 2022-03-30

## 2022-03-30 RX ORDER — BACITRACIN ZINC AND POLYMYXIN B SULFATE 500; 1000 [USP'U]/G; [USP'U]/G
OINTMENT TOPICAL ONCE
Status: CANCELLED | OUTPATIENT
Start: 2022-03-30 | End: 2022-03-30

## 2022-03-30 RX ORDER — TRIAMCINOLONE ACETONIDE 1 MG/G
OINTMENT TOPICAL ONCE
Status: CANCELLED | OUTPATIENT
Start: 2022-03-30 | End: 2022-03-30

## 2022-03-30 NOTE — PROGRESS NOTES
HISTORY OF PRESENT ILLNESS:  The patient is a 68-year-old woman who is referred to the [address] [place] [address] Road by Dr. Kp Jovel regarding ulcerations on both lower extremities.  The patient reports that she has had problems with increased leg swelling and ulcers for about a year.  Both legs have been painful.     The patient was first seen at [facility] on 7/7/2021.     The patient reports that she had been sleeping in a sitting position.  She will have her legs elevated off the floor to a limited extent.  She cannot lie flat in bed because she gets short of breath  .After receiving instructions to attempt to lie down to sleep, she is using a recliner, but has her feet below heart level.  She can turn to the left, but not to the right at night.     She says that she cannot fully change position between left and right by herself.     The patient does take furosemide 40 mg per day. Rosalind Rouse reports this does produce a diuresis. I messaged her primary MD.   Dr Choco Dick increased to lasix 40 mg bid. Rosalind Rouse took the second in the evening and had incontinence, so she reduced to once per day.  She now says she is taking 40 mg twice per day.     The patient reports she had been drinking large quantities of fluid throughout the course of the day.  This included many bottles of water, V8, and other liquids.  She has reduced fluid intake some.  She says she now takes total of 40 ounces fluid per day.     She is apparently using something like Pure-Wick at night for urine.     Patient's situation reviewed by message with Dr Thuy Parkih indicates patient has been reluctant to have specialist evaluation for her edema. Rosalind Rouse has also had in the past deterioration of her renal function with higher doses of diuretics.     The patient can stand and can walk a few feet when she holds onto objects.  She does not report shortness of breath with this limited exertion.  The patient has history of stroke producing left Patient called requesting a refill for pramipexole (Mirapex) 0.5mg tablet 1/2 to 4 tablet q HS as directed. Medication queued for signature.  NOV- 07/19/2019  LOV-06/04/2019    Reason why patient is requesting refill by phone:    Provider visit slot later than refill needed    Is refill request a controlled substance medication:  No    PDMP Reviewed:  Appropriate    Hydrocodone Bitartrate  30MG / Tablet ER 24 Hour Abuse-Deterrent  Rx# [id number] Opioid Qty: 28  Days: 28  Refills: 0 Prescribed: 5/30/2019  Dispensed: 6/3/2019 BULL SHUKLA    Hydrocodone-Acetaminophen  10-325MG / Tablet  Rx# [id number] Opioid Qty: 56  Days: 28  Refills: 0 Prescribed: 5/30/2019  Dispensed: 6/3/2019 BULL SHUKLA    Pregabalin  150MG / Capsule  Rx# [id number] Other Qty: 90  Days: 30  Refills: 5 Prescribed: 2/15/2019  Dispensed: 5/20/2019 JOSELINE JONES    CONTROLLED SUBSTANCE RISK ASSESSMENT  SOAPP: 7  Assessed level of Risk: high  Aberrant Behaviors: none  Hx of Substance abuse: none  Other Risk factors: smokes, has chronic COPD, emphysema, asthma, sleep apnea, and/or respiratory illness  Contract: Date 3/18/19  UDS: Date: 3/18/19  [place] CCAP:  [place] circuit court access was reviewed and does not report any drug related offenses.        hemipareses.     Patient now has hospital bed and alternating pressure mattress. She says she is in the bed about 8 hours during the daytime. At night she is in her recliner, because she can turn herself easier there as no one is available to help her turn at night.     The patient does have history of diabetes mellitus.  She reports blood sugars up to around 160.  She is working on a diabetic diet.   Hgb A1c 6.2 on 1/25/2022.     The patient's past medical history includes long-term anticoagulation with Xarelto for paroxysmal atrial fibrillation, anxiety, coronary artery disease, chronic pain, degenerative joint disease, hyperlipidemia, hypertension, and glaucoma.     The patient has never smoked.     The patient had noninvasive arterial testing at [facility] on 05/11/2021.  This showed both ankle arm indices were non-meaningful because of arterial calcification.  The right great toe arm index was 1.01 and left great toe arm index was 0.48.  The metatarsal waveforms on both feet were strong and pulsatile.  PPG signals were pulsatile in both great toes.  This would suggest adequate arterial flow into both lower extremities.     Current dressing as of 7/7/2021:  The patient is to shower every day using soap and water on both legs and between her toes.  After each shower, a new dressing needs to be applied consisting of dry gauze between each of the toes and Vaseline and dry gauze on the foot and lower leg, then roll gauze to be applied to include the toes on to the calf.  Apply Acewraps bilaterally.                                                     For buttock right wound (as of 11/10/2021):  Zinc oxide ointment to buttock sites daily.     In December 2021, for buttock / sacral wound, started:  For right buttock ulcer, apply foam dressing; change 3 times per week and prn.     The patient on 3/30/2022 refused to have leg dressings removed or to be put up on the stretcher so we could examine her buttock wound sites. She said she just wanted to go home. She had not had lunch and had waited for an hour. She wanted to know what could be done about her leg swelling.              Reported weight 220 pounds, height 5 feet 4 inches.        PHYSICAL EXAMINATION:  GENERAL:  The patient is an alert, overweight woman lying on stretcher.  She is in no acute distress.     NEUROLOGIC:  The patient is alert and oriented.  She has diminished movement on the left side,  Left upper and lower extremity.  Facial movements are symmetrical.  Speech is normal.     EXTREMITIES:  [Prior exam  -  Examination of the right lower extremity revealed 1-2+ edema (less than at original visit) beginning at the groin extending distally to include the foot.  There was lichenification of the skin in the lower leg and foot, particularly in the toes.  There were minimal areas of scabbing on leg.  I could not palpate dorsalis pedis or posterior tibial pulses.  There was a soft biphasic signal at the dorsalis pedis on the right.     Examination of left lower extremity revealed 1-2+ edema (slightly less than original visit) from the groin level distally to include the foot.  There was lichenification of the skin in the lower leg and on the foot, particularly involving the toes. Minimal scabs on leg.     Posteror buttock - to right  of upper sacrum -   ulcer 1.5 x 1 x 0.1 cm with granulation on base; left buttock 0.1 x 0.1 x5 x 0.1 cm with scab.]      Exam today limited to thighs  -  3+ pitting edema in both thighs. No visible wounds.                          I recommended patient contact her primary Dr Chata CASAS and see him if needed to discuss management of her edema. She may need a higher dose diuretic.   She says she is not taking in excess fluid.     Prior discussion  -  [I have recommended the patient attempt to sleep at night lying down.  It would be ideal to reach a position in which her feet and her heart are at the same level during the night when she is sleeping.  If in recliner, place pillow under calves to keep foot level with heart.     Uses recliner because she can reach over with right ar to pull herself onto left side.  Has minimal function in left arm.     Use hospital bed with alternating pressure mattress as much as possible.     I discussed offloading of midline buttocks - tilt all the way to the right; or half-way or fully to the left.  Never lie directly supine.  In recliner, never be flat on back.  She will need assistance from family to change positon every 2 hours.  Use pillows to position.     I have recommended she continue limiting total fluid intake.  She should eliminate all the extra water bottles she is drinking. Lorena Waller should not drink V8 juice, as it is high in salt.  She should avoid salt containing foods.  She should eat no soup because of sodium content. Can make homemade soup without salt.   Use sugar free lemon drop to keep mouth moist.      I recommended the patient try to adhere to a diabetic diet.     Until there is better control of systemic edema, I will not order venous testing.     Dressing ordered:  The patient is to shower every day using soap and water on her legs and between her toes.  After each shower, a new dressing needs to be applied consisting of dry gauze between each of the toes and Vaseline and dry gauze on the foot and lower leg, then roll gauze to be applied to include the toes on to the calf.  Apply Ace wraps bilaterally.     Dressing for buttock ulcers:  Xeroform over ulcers, apply heart shaped sacral foam dressing 3 times per week.      Home Health nursing is seeing the patient to assist with wound care 2 times per week.  Family will need to change dressing other time.]    Continue same dressings for now.                 The patient will follow up in the [address] [place] [address] in 2 weeks.     FINAL DIAGNOSES:  Nonpressure ulcer in right lower leg with fat layer exposed, nonpressure ulcer in left lower leg with fat layer exposed, severe bilateral lower extremity edema; pressure ulcers on buttock, stage 3           G. Pauline Caldwell MD            F88.901, V42.594, R60.0; L89.303     Iman Ann MD

## 2022-03-30 NOTE — DISCHARGE INSTRUCTIONS
[facility] Inst[facility]  [facility]  [address] [facility]  MOB 1, [address] [facility] [place], [id number]  Telephone: [phone]    NAME:  Adonis Dennis OF BIRTH:  1945  MEDICAL RECORD NUMBER:  [id number]  DATE:  3/30/2022  WOUND CARE ORDERS:  Bilateral lower extremities to be washed with soap and water at least every day by home health or by family. .   ABDs, rolled gauze, ACE wraps,  Left and right buttock wounds. Clean with normal saline, pat dry. Apply xeroform, sacral foam dressing. Pt./pcg/HH nurse to change (freq) 3 x week and as needed for compromise. F/U in 2 week. TREATMENT ORDERS:    Elevate leg(s) above the level of the heart when sitting. Avoid prolonged standing in one place. Do no get dressing/wrap wet. Follow Diet as prescribed:   [x] Diet as tolerated: [x] Calorie Diabetic Diet: Low carb and no Sugar [x] No Added Salt: [x] Limit the amount of liquid you are drinking and avoid drinking in between meals           Return Appointment:  [x] Return Appointment: With DR Jacquie Scott  in  [facility])   Electronically signed Luan Liao RN on 3/30/2022 at 2:58 PM     Karen Baker 281: Should you experience any significant changes in your wound(s) or have questions about your wound care, please contact the [facility] Main at [address] [place] [address] Street 8:00 am - 4:30. If you need help with your wound outside these hours and cannot wait until we are again available, contact your PCP or go to the hospital emergency room. PLEASE NOTE: IF YOU ARE UNABLE TO OBTAIN WOUND SUPPLIES, CONTINUE TO USE THE SUPPLIES YOU HAVE AVAILABLE UNTIL YOU ARE ABLE TO REACH US. IT IS MOST IMPORTANT TO KEEP THE WOUND COVERED AT ALL TIMES.      Physician Signature:_______________________    Date: ___________ Time:  ____________

## 2022-03-30 NOTE — WOUND CARE
Patient refused to remove dressing and assess all wounds. Patient wanted to see MD. Dr Sara Thompson at bed side. Wound care per MD order. NO new dressing applied. Order faxed to Home health.

## 2022-04-13 ENCOUNTER — HOSPITAL ENCOUNTER (OUTPATIENT)
Dept: WOUND CARE | Age: 77
Discharge: HOME | End: 2022-04-13
Payer: MEDICARE

## 2022-04-13 VITALS
SYSTOLIC BLOOD PRESSURE: 158 MMHG | RESPIRATION RATE: 18 BRPM | HEART RATE: 83 BPM | TEMPERATURE: 98.8 F | DIASTOLIC BLOOD PRESSURE: 70 MMHG

## 2022-04-13 DIAGNOSIS — L89.303 PRESSURE INJURY OF BUTTOCK, STAGE 3, UNSPECIFIED LATERALITY (HCC): ICD-10-CM

## 2022-04-13 DIAGNOSIS — L97.912 NON-PRESSURE CHRONIC ULCER OF RIGHT LOWER LEG WITH FAT LAYER EXPOSED (HCC): Primary | ICD-10-CM

## 2022-04-13 DIAGNOSIS — R60.0 BILATERAL LEG EDEMA: ICD-10-CM

## 2022-04-13 PROCEDURE — 99213 OFFICE O/P EST LOW 20 MIN: CPT | Performed by: SURGERY

## 2022-04-13 PROCEDURE — 99213 OFFICE O/P EST LOW 20 MIN: CPT

## 2022-04-13 RX ORDER — LIDOCAINE HYDROCHLORIDE 40 MG/ML
SOLUTION TOPICAL ONCE
Status: CANCELLED | OUTPATIENT
Start: 2022-04-13 | End: 2022-04-13

## 2022-04-13 RX ORDER — LIDOCAINE HYDROCHLORIDE 20 MG/ML
JELLY TOPICAL ONCE
Status: CANCELLED | OUTPATIENT
Start: 2022-04-13 | End: 2022-04-13

## 2022-04-13 RX ORDER — CLOBETASOL PROPIONATE 0.5 MG/G
OINTMENT TOPICAL ONCE
Status: CANCELLED | OUTPATIENT
Start: 2022-04-13 | End: 2022-04-13

## 2022-04-13 RX ORDER — BACITRACIN ZINC AND POLYMYXIN B SULFATE 500; 1000 [USP'U]/G; [USP'U]/G
OINTMENT TOPICAL ONCE
Status: CANCELLED | OUTPATIENT
Start: 2022-04-13 | End: 2022-04-13

## 2022-04-13 RX ORDER — BETAMETHASONE DIPROPIONATE 0.5 MG/G
OINTMENT TOPICAL ONCE
Status: CANCELLED | OUTPATIENT
Start: 2022-04-13 | End: 2022-04-13

## 2022-04-13 RX ORDER — TRIAMCINOLONE ACETONIDE 1 MG/G
OINTMENT TOPICAL ONCE
Status: CANCELLED | OUTPATIENT
Start: 2022-04-13 | End: 2022-04-13

## 2022-04-13 RX ORDER — BACITRACIN 500 [USP'U]/G
OINTMENT TOPICAL ONCE
Status: CANCELLED | OUTPATIENT
Start: 2022-04-13 | End: 2022-04-13

## 2022-04-13 RX ORDER — GENTAMICIN SULFATE 1 MG/G
OINTMENT TOPICAL ONCE
Status: CANCELLED | OUTPATIENT
Start: 2022-04-13 | End: 2022-04-13

## 2022-04-13 RX ORDER — MUPIROCIN 20 MG/G
OINTMENT TOPICAL ONCE
Status: CANCELLED | OUTPATIENT
Start: 2022-04-13 | End: 2022-04-13

## 2022-04-13 RX ORDER — LIDOCAINE 40 MG/G
CREAM TOPICAL ONCE
Status: CANCELLED | OUTPATIENT
Start: 2022-04-13 | End: 2022-04-13

## 2022-04-13 RX ORDER — SILVER SULFADIAZINE 10 G/1000G
CREAM TOPICAL ONCE
Status: CANCELLED | OUTPATIENT
Start: 2022-04-13 | End: 2022-04-13

## 2022-04-13 NOTE — WOUND CARE
04/13/22 1438   Right Leg Edema Point of Measurement   Leg circumference 37.8 cm   Ankle circumference 28.5 cm   Compression Therapy Ace wrap   Left Leg Edema Point of Measurement   Leg circumference 41 cm   Ankle circumference 28.7 cm   Compression Therapy Ace wrap   LLE Peripheral Vascular    Capillary Refill Less than/equal to 3 seconds   Color Appropriate for race   Temperature Warm   Sensation Decreased   Pedal Pulse Palpable   RLE Peripheral Vascular    Capillary Refill Less than/equal to 3 seconds   Color Appropriate for race   Temperature Warm   Sensation Decreased   Pedal Pulse Palpable   Wound Leg lower Right #1 07/07/21   Date First Assessed/Time First Assessed: 07/07/21 1414   Present on Hospital Admission: Yes  Wound Approximate Age at First Assessment (Weeks): 52 weeks  Primary Wound Type: Venous  Location: Leg lower  Wound Location Orientation: Right  Wound Descrip. .. Wound Image    Dressing Status Old drainage noted   Cleansed Soap and water   Wound Length (cm) 0.1 cm   Wound Width (cm) 0.1 cm   Wound Depth (cm) 0.1 cm   Wound Surface Area (cm^2) 0.01 cm^2   Change in Wound Size % 100   Wound Volume (cm^3) 0.001 cm^3   Wound Healing % 100   Drainage Amount Small   Drainage Description Serous   Wound Odor None   Ebony-Wound/Incision Assessment Fragile   Edges Undefined edges   Wound Leg lower Left #2 07/07/21   Date First Assessed/Time First Assessed: 07/07/21 1415   Present on Hospital Admission: Yes  Wound Approximate Age at First Assessment (Weeks): 52 weeks  Primary Wound Type: Venous  Location: Leg lower  Wound Location Orientation: Left  Wound Descript. ..    Wound Image    Dressing Status Old drainage noted   Cleansed Soap and water   Wound Length (cm) 0.1 cm   Wound Width (cm) 0.1 cm   Wound Depth (cm) 0.1 cm   Wound Surface Area (cm^2) 0.01 cm^2   Change in Wound Size % 100   Wound Volume (cm^3) 0.001 cm^3   Wound Healing % 100   Drainage Amount Small   Drainage Description Serous   Wound Odor None   Ebony-Wound/Incision Assessment Fragile   Edges Undefined edges   Wound Buttocks Left 02/09/22   Date First Assessed/Time First Assessed: 02/09/22 1417   Wound Approximate Age at First Assessment (Weeks): 3 weeks  Primary Wound Type: Pressure Injury  Location: Buttocks  Wound Location Orientation: Left  Date of First Observation: 02/09/22   Wound Image    Wound Etiology Pressure Stage 2   Dressing Status Old drainage noted   Cleansed Soap and water   Wound Length (cm) 3.9 cm   Wound Width (cm) 2.4 cm   Wound Depth (cm) 0.1 cm   Wound Surface Area (cm^2) 9.36 cm^2   Change in Wound Size % -420   Wound Volume (cm^3) 0.936 cm^3   Wound Healing % -420   Wound Assessment Pink/red   Drainage Amount Small   Drainage Description Serosanguinous   Wound Odor None   Ebony-Wound/Incision Assessment Fragile   Edges Flat/open edges   Wound Thickness Description Full thickness   Wound Buttocks Right 09/22/21   Date First Assessed/Time First Assessed: 09/22/21 1638   Wound Approximate Age at First Assessment (Weeks): 2 weeks  Primary Wound Type: Pressure Injury  Location: Buttocks  Wound Location Orientation: Right  Date of First Observation: 09/22/21   Wound Image    Wound Etiology Pressure Stage 3   Dressing Status Old drainage noted   Cleansed Soap and water   Wound Length (cm) 5.2 cm   Wound Width (cm) 5.3 cm   Wound Depth (cm) 0.2 cm   Wound Surface Area (cm^2) 27.56 cm^2   Change in Wound Size % -7555.56   Wound Volume (cm^3) 5.512 cm^3   Wound Healing % -91114   Wound Assessment Pink/red   Drainage Amount Moderate   Drainage Description Serosanguinous   Wound Odor None   Ebony-Wound/Incision Assessment Fragile   Edges Flat/open edges   Wound Thickness Description Full thickness     Visit Vitals  BP (!) 158/70 (BP 1 Location: Left upper arm, BP Patient Position: At rest)   Pulse 83   Temp 98.8 °F (37.1 °C)   Resp 18

## 2022-04-13 NOTE — PROGRESS NOTES
HISTORY OF PRESENT ILLNESS:  The patient is a 21-year-old woman who is referred to the [address] [place] [address] by Dr. Alison Roche regarding ulcerations on both lower extremities.  The patient reports that she has had problems with increased leg swelling and ulcers for about a year.  Both legs have been painful.     The patient was first seen at [facility] on 7/7/2021.     The patient reports that she had been sleeping in a sitting position.  She will have her legs elevated off the floor to a limited extent.  She cannot lie flat in bed because she gets short of breath  .After receiving instructions to attempt to lie down to sleep, she is using a recliner, but has her feet below heart level.  She can turn to the left, but not to the right at night.     She says that she cannot fully change position between left and right by herself.     The patient does take furosemide 40 mg per day. Bonnie Quintana reports this does produce a diuresis. I messaged her primary MD.   Dr Benjamin Foy increased to lasix 40 mg bid. Bonnie Quintana took the second in the evening and had incontinence, so she reduced to once per day.  She now says she is taking 40 mg twice per day.     The patient reports she had been drinking large quantities of fluid throughout the course of the day.  This included many bottles of water, V8, and other liquids.  She has reduced fluid intake some.  She says she now takes total of 40 ounces fluid per day.     She has at times used a Pure-Wick at night for urine, but inconsistent.     Patient's situation reviewed by message with Dr Manny Tierney indicates patient has been reluctant to have specialist evaluation for her edema. Bonnie Quintana has also had in the past deterioration of her renal function with higher doses of diuretics.     The patient can stand and can walk a few feet when she holds onto objects.  She does not report shortness of breath with this limited exertion.  The patient has history of stroke producing left hemipareses.     Patient now has hospital bed and alternating pressure mattress. She says she is in the bed about 8 hours during the daytime.   At night she is in her recliner, because she can turn herself easier there as no one is available to help her turn at night.     The patient does have history of diabetes mellitus.  She reports blood sugars up to around 160.  She is working on a diabetic diet.  Hgb A1c 6.2 on 1/25/2022.     The patient's past medical history includes long-term anticoagulation with Xarelto for paroxysmal atrial fibrillation, anxiety, coronary artery disease, chronic pain, degenerative joint disease, hyperlipidemia, hypertension, and glaucoma.     The patient has never smoked.     The patient had noninvasive arterial testing at [facility] on 05/11/2021.  This showed both ankle arm indices were non-meaningful because of arterial calcification.  The right great toe arm index was 1.01 and left great toe arm index was 0.48.  The metatarsal waveforms on both feet were strong and pulsatile.  PPG signals were pulsatile in both great toes.  This would suggest adequate arterial flow into both lower extremities.     Current dressing as of 7/7/2021:  The patient is to shower every day using soap and water on both legs and between her toes.  After each shower, a new dressing needs to be applied consisting of dry gauze between each of the toes and Vaseline and dry gauze on the foot and lower leg, then roll gauze to be applied to include the toes on to the calf.  Apply Acewraps bilaterally.                                                     For buttock right wound (as of 11/10/2021):  Zinc oxide ointment to buttock sites daily.     In December 2021, for buttock / sacral wound, started:  For right buttock ulcer, apply foam dressing; change 3 times per week and prn.              Reported weight 220 pounds, height 5 feet 4 inches.        PHYSICAL EXAMINATION:  GENERAL:  The patient is an alert, overweight woman lying on stretcher.  She is in no acute distress.     NEUROLOGIC:  The patient is alert and oriented.  She has diminished movement on the left side,  Left upper and lower extremity.  Facial movements are symmetrical.  Speech is normal.     EXTREMITIES:  [Prior exam  -  Examination of the right lower extremity revealed 1-2+ edema (less than at original visit) beginning at the groin extending distally to include the foot.  There was lichenification of the skin in the lower leg and foot, particularly in the toes.  There were minimal areas of scabbing on leg.  I could not palpate dorsalis pedis or posterior tibial pulses.  There was a soft biphasic signal at the dorsalis pedis on the right.]    Examination of left lower extremity revealed 1-2+ edema (slightly less than original visit) from the groin level distally to include the foot.  There was lichenification of the skin in the lower leg and on the foot, particularly involving the toes. Minimal scabs on leg.     Posteror buttock - to right  of upper sacrum -   ulcer cluster  5.2 x 5.3 x 0.2 cm. .  To left of midline on buttock ulcer 3.9 x 2.4 x 0.1 cm. Skin more macerated.        Exam today limited to thighs  -  2-3+ pitting edema in both thighs. No visible wounds.                           I recommended patient contact her primary  and see him if needed to discuss management of her edema. She may need a higher dose diuretic. She says she is not taking in excess fluid.     I urged patient to use Pure-Wick system to prevent urine causing wetness of tissues as this is contributing to maceration of skin, increase in ulcer size.     Prior discussion  -  [I have recommended the patient attempt to sleep at night lying down.  It would be ideal to reach a position in which her feet and her heart are at the same level during the night when she is sleeping.  If in recliner, place pillow under calves to keep foot level with heart.     Uses recliner because she can reach over with right ar to pull herself onto left side.  Has minimal function in left arm.     Use hospital bed with alternating pressure mattress as much as possible.     I discussed offloading of midline buttocks - tilt all the way to the right; or residential or fully to the left.  Never lie directly supine.  In recliner, never be flat on back.  She will need assistance from family to change positon every 2 hours.  Use pillows to position.     I have recommended she continue limiting total fluid intake.  She should eliminate all the extra water bottles she is drinking. Sidney Mora should not drink V8 juice, as it is high in salt.  She should avoid salt containing foods.  She should eat no soup because of sodium content. Can make homemade soup without salt.   Use sugar free lemon drop to keep mouth moist.      I recommended the patient try to adhere to a diabetic diet.     Until there is better control of systemic edema, I will not order venous testing.     Dressing ordered:  The patient is to shower every day using soap and water on her legs and between her toes.  After each shower, a new dressing needs to be applied consisting of dry gauze between each of the toes and Vaseline and dry gauze on the foot and lower leg, then roll gauze to be applied to include the toes on to the calf.  Apply Ace wraps bilaterally.     Dressing for buttock ulcers:  Xeroform over ulcers, apply heart shaped sacral foam dressing 3 times per week.      Home Health nursing is seeing the patient to assist with wound care 2 times per week.  Family will need to change dressing other time. ]     Continue same dressings for now.                 The patient will follow up in the [address] [place] [address] in 3 weeks.     FINAL DIAGNOSES:  Severe bilateral lower extremity edema; pressure ulcers on buttock, stage 3           JEFF Lal MD            R60.0; L89.303     Marli Clark MD

## 2022-04-13 NOTE — DISCHARGE INSTRUCTIONS
Discharge Instructions for  [facility]  [address] [facility]  MOB 1, [address] [facility] [place], [id number]  Telephone: [phone]    NAME:  Radha Holder OF BIRTH:  1945  MEDICAL RECORD NUMBER:  [id number]  DATE:  4/13/2022     WOUND CARE ORDERS:  Right & Left Legs - Cleanse w/mild soap & water, pat dry. Apply ABD pads, rolled gauze & Ace wraps-applied from foot/base of toes to just below knees. HHC/Caregiver to change daily, and as needed for soiling. Right & left buttock wounds - Cleanse w/Saline & gauze or mild soap & water, pat dry. Apply Xeroform, Cover w/Sacral bordered foam dressing. HHC/Caregiver to change 3 x week & prn soiling. Patient to use Pure-wick system for control of urinary incontinence. Return to clinic for follow up w/Dr. Teresa Bal in 3 weeks. TREATMENT ORDERS:   Limit time sitting, turn & reposition frequently to avoid pressure on buttock wounds. Elevate leg(s) when sitting. Avoid prolonged standing in one place. Do not get dressing/wrap wet. Change dressings if they become wet. Take diuretic (fluid pill) as directed  Use Pure wick system for urinary incontinence  Follow Diet as prescribed:   [] Diet as tolerated: [x]  Diabetic Diet: Low carb and no Sugar [x] No Added Salt  [] Increase Protein: [x] Limit the amount of liquid you are drinking and avoid drinking in between meals           Return Appointment:  [x] Return Appointment: With DR Fozia Contreras  in 3 [facility])     Electronically signed Rusty Fong RN on 4/13/2022 at 3:03 PM     Karen Baker 281: Should you experience any significant changes in your wound(s) or have questions about your wound care, please contact the [facility] Main at [address] [place] [address] Street 8:00 am - 4:30. If you need help with your wound outside these hours and cannot wait until we are again available, contact your PCP or go to the hospital emergency room.      PLEASE NOTE: IF YOU ARE UNABLE TO OBTAIN WOUND SUPPLIES, CONTINUE TO USE THE SUPPLIES YOU HAVE AVAILABLE UNTIL YOU ARE ABLE TO REACH US. IT IS MOST IMPORTANT TO KEEP THE WOUND COVERED AT ALL TIMES.      Physician Signature:_______________________    Date: ___________ Time:  ____________

## 2022-05-04 ENCOUNTER — HOSPITAL ENCOUNTER (OUTPATIENT)
Dept: WOUND CARE | Age: 77
Discharge: HOME | End: 2022-05-04
Payer: MEDICARE

## 2022-05-04 VITALS
RESPIRATION RATE: 18 BRPM | DIASTOLIC BLOOD PRESSURE: 74 MMHG | TEMPERATURE: 97.8 F | SYSTOLIC BLOOD PRESSURE: 171 MMHG | HEART RATE: 80 BPM

## 2022-05-04 DIAGNOSIS — L97.912 NON-PRESSURE CHRONIC ULCER OF RIGHT LOWER LEG WITH FAT LAYER EXPOSED (HCC): Primary | ICD-10-CM

## 2022-05-04 DIAGNOSIS — R60.0 BILATERAL LEG EDEMA: ICD-10-CM

## 2022-05-04 PROCEDURE — 99214 OFFICE O/P EST MOD 30 MIN: CPT

## 2022-05-04 PROCEDURE — 99213 OFFICE O/P EST LOW 20 MIN: CPT | Performed by: SURGERY

## 2022-05-04 RX ORDER — BACITRACIN 500 [USP'U]/G
OINTMENT TOPICAL ONCE
Status: CANCELLED | OUTPATIENT
Start: 2022-05-04 | End: 2022-05-04

## 2022-05-04 RX ORDER — LIDOCAINE HYDROCHLORIDE 20 MG/ML
JELLY TOPICAL ONCE
Status: CANCELLED | OUTPATIENT
Start: 2022-05-04 | End: 2022-05-04

## 2022-05-04 RX ORDER — TRIAMCINOLONE ACETONIDE 1 MG/G
OINTMENT TOPICAL ONCE
Status: CANCELLED | OUTPATIENT
Start: 2022-05-04 | End: 2022-05-04

## 2022-05-04 RX ORDER — LIDOCAINE 40 MG/G
CREAM TOPICAL ONCE
Status: CANCELLED | OUTPATIENT
Start: 2022-05-04 | End: 2022-05-04

## 2022-05-04 RX ORDER — CLOBETASOL PROPIONATE 0.5 MG/G
OINTMENT TOPICAL ONCE
Status: CANCELLED | OUTPATIENT
Start: 2022-05-04 | End: 2022-05-04

## 2022-05-04 RX ORDER — MUPIROCIN 20 MG/G
OINTMENT TOPICAL ONCE
Status: CANCELLED | OUTPATIENT
Start: 2022-05-04 | End: 2022-05-04

## 2022-05-04 RX ORDER — BACITRACIN ZINC AND POLYMYXIN B SULFATE 500; 1000 [USP'U]/G; [USP'U]/G
OINTMENT TOPICAL ONCE
Status: CANCELLED | OUTPATIENT
Start: 2022-05-04 | End: 2022-05-04

## 2022-05-04 RX ORDER — SILVER SULFADIAZINE 10 G/1000G
CREAM TOPICAL ONCE
Status: CANCELLED | OUTPATIENT
Start: 2022-05-04 | End: 2022-05-04

## 2022-05-04 RX ORDER — BETAMETHASONE DIPROPIONATE 0.5 MG/G
OINTMENT TOPICAL ONCE
Status: CANCELLED | OUTPATIENT
Start: 2022-05-04 | End: 2022-05-04

## 2022-05-04 RX ORDER — GENTAMICIN SULFATE 1 MG/G
OINTMENT TOPICAL ONCE
Status: CANCELLED | OUTPATIENT
Start: 2022-05-04 | End: 2022-05-04

## 2022-05-04 RX ORDER — LIDOCAINE HYDROCHLORIDE 40 MG/ML
SOLUTION TOPICAL ONCE
Status: CANCELLED | OUTPATIENT
Start: 2022-05-04 | End: 2022-05-04

## 2022-05-04 NOTE — DISCHARGE INSTRUCTIONS
Discharge Instructions/Wound [address]  [address]  MOB 1, [facility]  Telephone: [phone]  WOUND CARE ORDERS:  Right Buttock  :Cleanse with saline , apply primary dressing xeroform on open wound cover with secondary dressing   border foam .    Left and right lower leg: Cleanse with Soap and water(use baby hair brush to clean the dry skin) Pat it dry  , apply vasline  cover with secondary dressing   abd pad on open areas, roll gauze and Tape . Apply Ace bandage from toes to under the knee. Make sure the dressing is not cutting into the skin. Pt./pcg/HH nurse to change (freq) 3 x week and as needed for compromise. F/U in 3 week. TREATMENT ORDERS:  Turn/reposition every 2 hours when in bed, avoid direct pressure on wound site. When sitting, shift position or do seat lifts every 15 minutes. Limit side lying to 30 degree tilt. Limit HOB elevation to 30 degrees. Use speciality pressure relief cushion, mattress as appropriate. Follow diet as prescribed:  [x] Diet as tolerated: [x] Calorie Diabetic Diet:Low carb and no Sugar [x] No Added Salt: [] Other:Limit the amount of liquid you are drinking and avoid drinking in between meals              Return Appointment:  [x] Return Appointment: With DR Daniel Burnett  in  3 Week(s)   Electronically signed Hilary Ware RN on 5/4/2022 at 3:00 PM     Karen Baker 281: Should you experience any significant changes in your wound(s) or have questions about your wound care, please contact the [address] [place] [address] at [address] [place] [address] Street 8:00 am - 4:30. If you need help with your wound outside these hours and cannot wait until we are again available, contact your PCP or go to the hospital emergency room. PLEASE NOTE: IF YOU ARE UNABLE TO OBTAIN WOUND SUPPLIES, CONTINUE TO USE THE SUPPLIES YOU HAVE AVAILABLE UNTIL YOU ARE ABLE TO REACH US.  IT IS MOST IMPORTANT TO KEEP THE WOUND COVERED AT ALL TIMES.      Physician Signature:_______________________    Date: ___________ Time:  ____________

## 2022-05-04 NOTE — WOUND CARE
05/04/22 1509   Right Leg Edema Point of Measurement   Compression Therapy Ace wrap   Left Leg Edema Point of Measurement   Compression Therapy Ace wrap   Wound Leg lower Right #1 07/07/21   Date First Assessed/Time First Assessed: 07/07/21 1414   Present on Hospital Admission: Yes  Wound Approximate Age at First Assessment (Weeks): 52 weeks  Primary Wound Type: Venous  Location: Leg lower  Wound Location Orientation: Right  Wound Descrip. .. Dressing Status New dressing applied   Dressing/Treatment ABD pad;Roll gauze; Ace wrap  (vaseline on BLE)   Wound Leg lower Left #2 07/07/21   Date First Assessed/Time First Assessed: 07/07/21 1415   Present on Hospital Admission: Yes  Wound Approximate Age at First Assessment (Weeks): 52 weeks  Primary Wound Type: Venous  Location: Leg lower  Wound Location Orientation: Left  Wound Descript. .. Dressing Status New dressing applied   Dressing/Treatment ABD pad;Roll gauze; Ace wrap   Wound Buttocks Right 09/22/21   Date First Assessed/Time First Assessed: 09/22/21 1638   Wound Approximate Age at First Assessment (Weeks): 2 weeks  Primary Wound Type: Pressure Injury  Location: Buttocks  Wound Location Orientation: Right  Date of First Observation: 09/22/21   Dressing Status New dressing applied   Cleansed Cleansed with saline   Dressing/Treatment Xeroform;Silicone border

## 2022-05-04 NOTE — PROGRESS NOTES
HISTORY OF PRESENT ILLNESS:  The patient is a 65-year-old woman who is referred to the [address] [place] [address] by Dr. Marisol Ortiz regarding ulcerations on both lower extremities.  The patient reports that she has had problems with increased leg swelling and ulcers for about a year.  Both legs have been painful.     The patient was first seen at [facility] on 7/7/2021.     The patient reports that she had been sleeping in a sitting position.  She will have her legs elevated off the floor to a limited extent.  She had not been able to lie flat in bed because she gets short of breath  .After receiving instructions to attempt to lie down to sleep, she was using a recliner, but had her feet below heart level.  She can turn to the left, but not to the right at night.     Patient now has hospital bed and alternating pressure mattress.  She says she is in the bed about 8 hours during the daytime.  At night she is in her recliner, because she can turn herself easier there as no one is available to help her turn at night.     The patient does take furosemide, initially 40 mg per day. Damon Pryor reports this does produce a diuresis. I messaged her primary MD.   Dr Jamie Womack increased to lasix 40 mg bid.  She took the second in the evening and had incontinence, so she had reduced to once per day.  She now says she is taking 40 mg twice per day.     The patient reports she had been drinking large quantities of fluid throughout the course of the day.  This included many bottles of water, V8, and other liquids.  She has reduced fluid intake some.  She says she now takes total of 40 ounces fluid per day.     She has at times used a Pure-Wick at night for urine, but inconsistent.     Patient's situation reviewed by message with Dr Laurita Sue indicates patient has been reluctant to have specialist evaluation for her edema. Damon Pryor has also had in the past deterioration of her renal function with higher doses of diuretics.     The patient can stand and can walk a few feet when she holds onto objects.  She does not report shortness of breath with this limited exertion.  The patient has history of stroke producing left hemipareses.     The patient does have history of diabetes mellitus.  She reports blood sugars up to around 160.  She is working on a diabetic diet.  Hgb A1c 6.2 on 1/25/2022.     The patient's past medical history includes long-term anticoagulation with Xarelto for paroxysmal atrial fibrillation, anxiety, coronary artery disease, chronic pain, degenerative joint disease, hyperlipidemia, hypertension, and glaucoma.     The patient has never smoked.     The patient had noninvasive arterial testing at [facility] on 05/11/2021.  This showed both ankle arm indices were non-meaningful because of arterial calcification.  The right great toe arm index was 1.01 and left great toe arm index was 0.48.  The metatarsal waveforms on both feet were strong and pulsatile.  PPG signals were pulsatile in both great toes.  This would suggest adequate arterial flow into both lower extremities.     Dressing as of 7/7/2021:  The patient is to shower every day using soap and water on both legs and between her toes.  After each shower, a new dressing needs to be applied consisting of dry gauze between each of the toes and Vaseline and dry gauze on the foot and lower leg, then roll gauze to be applied to include the toes on to the calf.  Apply Acewraps bilaterally.                                                     For buttock right wound (as of 11/10/2021):  Zinc oxide ointment to buttock sites daily.     In December 2021, for buttock / sacral wound, started:  For right buttock ulcer, apply foam dressing; change 3 times per week and prn.     Dressing for buttock ulcers:  Xeroform over ulcers, apply heart shaped sacral foam dressing 3 times per week.              Reported weight 220 pounds, height 5 feet 4 inches.        PHYSICAL EXAMINATION:  GENERAL:  The patient is an alert, overweight woman lying on stretcher.  She is in no acute distress.     NEUROLOGIC:  The patient is alert and oriented.  She has diminished movement on the left side,  Left upper and lower extremity.  Facial movements are symmetrical.  Speech is normal.     EXTREMITIES:   Examination of the right lower extremity revealed 1+ edema (less than at original visit) beginning at the groin extending distally to include the foot.  There was lichenification of the skin in the lower leg and foot, particularly in the toes.  There were minimal areas of scabbing on leg.  I could not palpate dorsalis pedis or posterior tibial pulses.  There was a soft biphasic signal at the dorsalis pedis on the right.     Examination of left lower extremity revealed 1+ edema (slightly less than original visit) from the groin level distally to include the foot.  There was lichenification of the skin in the lower leg and on the foot, particularly involving the toes. Minimal scabs on leg.     Posteror buttock - to right  of upper sacrum -   ulcer cluster  0.9 x 0.8 x 0.1 cm. .  To left of midline on buttock site is healed.              All sites much improved.     Edema in legs improved.          Uses recliner because she can reach over with right ar to pull herself onto left side.  Has minimal function in left arm.     Use hospital bed with alternating pressure mattress as much as possible.     I discussed offloading of midline buttocks - tilt all the way to the right; or custodial or fully to the left.  Never lie directly supine.  In recliner, never be flat on back.  She will need assistance from family to change positon every 2 hours.  Use pillows to position.     I have recommended she continue limiting total fluid intake.  She should eliminate all the extra water bottles she is drinking. Mary Greco should not drink V8 juice, as it is high in salt.  She should avoid salt containing foods.  She should eat no soup because of sodium content. Can make homemade soup without salt.   Use sugar free lemon drop to keep mouth moist.      I recommended the patient try to adhere to a diabetic diet.     Dressing ordered:  The patient is to shower 3 times per week using soap and water on her legs and between her toes. Apply Vaseline to all lichenified / dry skin.   After each shower, a new dressing needs to be applied consisting of dry gauze between each of the toes and Vaseline and dry gauze on the foot and lower leg, then roll gauze to be applied to include the toes on to the calf.  Apply Ace wraps bilaterally.  Three times per week.     Dressing for buttock ulcers:  Xeroform over ulcers, apply heart shaped sacral foam dressing 3 times per week.      Home Health nursing is seeing the patient to assist with wound care 2 times per week.  Family will need to change dressing other time.                      The patient will follow up in the [address] [place] [address] in 3 weeks.     FINAL DIAGNOSES:  Severe bilateral lower extremity edema; pressure ulcers on buttock, stage 3            R60.0; L89.303     Almas Boyce MD

## 2022-05-04 NOTE — WOUND CARE
05/04/22 1434   Right Leg Edema Point of Measurement   Leg circumference 34 cm   Ankle circumference 24.5 cm   Compression Therapy Ace wrap   Left Leg Edema Point of Measurement   Leg circumference 32 cm   Ankle circumference 27 cm   Compression Therapy Ace wrap   LLE Peripheral Vascular    Capillary Refill Less than/equal to 3 seconds   Color Appropriate for race   Temperature Warm   Sensation Decreased   RLE Peripheral Vascular    Capillary Refill Less than/equal to 3 seconds   Color Appropriate for race   Temperature Warm   Sensation Decreased   Wound Buttocks Left 02/09/22   Date First Assessed/Time First Assessed: 02/09/22 1417   Wound Approximate Age at First Assessment (Weeks): 3 weeks  Primary Wound Type: Pressure Injury  Location: Buttocks  Wound Location Orientation: Left  Date of First Observation: 02/09/22   Wound Etiology Pressure Stage 2   Cleansed Cleansed with saline   Wound Length (cm) 0 cm   Wound Width (cm) 0 cm   Wound Depth (cm) 0 cm   Wound Surface Area (cm^2) 0 cm^2   Change in Wound Size % 100   Wound Volume (cm^3) 0 cm^3   Wound Healing % 100   Wound Assessment Epithelialization   Drainage Amount None   Wound Odor None   Wound Leg lower Right #1 07/07/21   Date First Assessed/Time First Assessed: 07/07/21 1414   Present on Hospital Admission: Yes  Wound Approximate Age at First Assessment (Weeks): 52 weeks  Primary Wound Type: Venous  Location: Leg lower  Wound Location Orientation: Right  Wound Descrip. ..    Cleansed Soap and water   Wound Length (cm) 0.1 cm   Wound Width (cm) 0.1 cm   Wound Depth (cm) 0.1 cm   Wound Surface Area (cm^2) 0.01 cm^2   Change in Wound Size % 100   Wound Volume (cm^3) 0.001 cm^3   Wound Healing % 100   Drainage Amount Small   Drainage Description Serosanguinous   Wound Odor None   Ebony-Wound/Incision Assessment Dry/flaky   Edges Undefined edges   Wound Leg lower Left #2 07/07/21   Date First Assessed/Time First Assessed: 07/07/21 1415   Present on [facility] Admission: Yes  Wound Approximate Age at First Assessment (Weeks): 52 weeks  Primary Wound Type: Venous  Location: Leg lower  Wound Location Orientation: Left  Wound Descript. ..    Cleansed Soap and water   Wound Length (cm) 0.1 cm   Wound Width (cm) 0.1 cm   Wound Depth (cm) 0.1 cm   Wound Surface Area (cm^2) 0.01 cm^2   Change in Wound Size % 100   Wound Volume (cm^3) 0.001 cm^3   Wound Healing % 100   Wound Assessment Epithelialization   Drainage Amount None   Wound Odor None   Ebony-Wound/Incision Assessment Dry/flaky   Wound Buttocks Right 09/22/21   Date First Assessed/Time First Assessed: 09/22/21 1638   Wound Approximate Age at First Assessment (Weeks): 2 weeks  Primary Wound Type: Pressure Injury  Location: Buttocks  Wound Location Orientation: Right  Date of First Observation: 09/22/21   Wound Image    Wound Etiology Pressure Stage 3   Cleansed Cleansed with saline   Wound Length (cm) 0.9 cm   Wound Width (cm) 0.8 cm   Wound Depth (cm) 0.1 cm   Wound Surface Area (cm^2) 0.72 cm^2   Change in Wound Size % -100   Wound Volume (cm^3) 0.072 cm^3   Wound Healing % -100   Wound Assessment Pink/red   Drainage Amount Moderate   Drainage Description Serosanguinous   Wound Odor None   Ebony-Wound/Incision Assessment Fragile   Edges Flat/open edges   Wound Thickness Description Full thickness   Pain 1   Pain Scale 1 Numeric (0 - 10)   Pain Intensity 1 0   Patient Stated Pain Goal 0   Pain Reassessment 1 Yes     Visit Vitals  BP (!) 171/74   Pulse 80   Temp 97.8 °F (36.6 °C)   Resp 18

## 2022-05-05 ENCOUNTER — OFFICE VISIT (OUTPATIENT)
Dept: INTERNAL MEDICINE CLINIC | Age: 77
End: 2022-05-05
Payer: MEDICARE

## 2022-05-05 ENCOUNTER — DOCUMENTATION ONLY (OUTPATIENT)
Dept: INTERNAL MEDICINE CLINIC | Age: 77
End: 2022-05-05

## 2022-05-05 VITALS
BODY MASS INDEX: 37.56 KG/M2 | RESPIRATION RATE: 16 BRPM | TEMPERATURE: 98.7 F | OXYGEN SATURATION: 96 % | WEIGHT: 220 LBS | SYSTOLIC BLOOD PRESSURE: 126 MMHG | HEIGHT: 64 IN | HEART RATE: 92 BPM | DIASTOLIC BLOOD PRESSURE: 76 MMHG

## 2022-05-05 DIAGNOSIS — I10 ESSENTIAL HYPERTENSION, BENIGN: Chronic | ICD-10-CM

## 2022-05-05 DIAGNOSIS — E03.9 ACQUIRED HYPOTHYROIDISM: Chronic | ICD-10-CM

## 2022-05-05 DIAGNOSIS — Z01.818 PREOP EXAMINATION: Primary | ICD-10-CM

## 2022-05-05 DIAGNOSIS — E11.3299 TYPE 2 DIABETES MELLITUS WITH BACKGROUND RETINOPATHY (HCC): Chronic | ICD-10-CM

## 2022-05-05 DIAGNOSIS — I25.10 ASCVD (ARTERIOSCLEROTIC CARDIOVASCULAR DISEASE): Chronic | ICD-10-CM

## 2022-05-05 DIAGNOSIS — I48.0 PAF (PAROXYSMAL ATRIAL FIBRILLATION) (HCC): Chronic | ICD-10-CM

## 2022-05-05 DIAGNOSIS — I63.9 CEREBROVASCULAR ACCIDENT (CVA), UNSPECIFIED MECHANISM (HCC): Chronic | ICD-10-CM

## 2022-05-05 DIAGNOSIS — E66.01 SEVERE OBESITY (BMI 35.0-39.9) WITH COMORBIDITY (HCC): ICD-10-CM

## 2022-05-05 PROCEDURE — G8752 SYS BP LESS 140: HCPCS | Performed by: INTERNAL MEDICINE

## 2022-05-05 PROCEDURE — G8417 CALC BMI ABV UP PARAM F/U: HCPCS | Performed by: INTERNAL MEDICINE

## 2022-05-05 PROCEDURE — G8536 NO DOC ELDER MAL SCRN: HCPCS | Performed by: INTERNAL MEDICINE

## 2022-05-05 PROCEDURE — 3044F HG A1C LEVEL LT 7.0%: CPT | Performed by: INTERNAL MEDICINE

## 2022-05-05 PROCEDURE — G8400 PT W/DXA NO RESULTS DOC: HCPCS | Performed by: INTERNAL MEDICINE

## 2022-05-05 PROCEDURE — 99214 OFFICE O/P EST MOD 30 MIN: CPT | Performed by: INTERNAL MEDICINE

## 2022-05-05 PROCEDURE — G8432 DEP SCR NOT DOC, RNG: HCPCS | Performed by: INTERNAL MEDICINE

## 2022-05-05 PROCEDURE — G8754 DIAS BP LESS 90: HCPCS | Performed by: INTERNAL MEDICINE

## 2022-05-05 PROCEDURE — 1101F PT FALLS ASSESS-DOCD LE1/YR: CPT | Performed by: INTERNAL MEDICINE

## 2022-05-05 PROCEDURE — G8427 DOCREV CUR MEDS BY ELIG CLIN: HCPCS | Performed by: INTERNAL MEDICINE

## 2022-05-05 PROCEDURE — 1090F PRES/ABSN URINE INCON ASSESS: CPT | Performed by: INTERNAL MEDICINE

## 2022-05-05 NOTE — PROGRESS NOTES
Filippo Molina is a 65 y.o. female and presents with Pre-op Exam  .    Subjective:    Mrs. Keyanna Eddy presents today for preoperative evaluation prior to undergoing oral maxillofacial surgery with local anesthesia on Friday, 6 May. Her history significant for hypertension, cord atherosclerotic heart disease, paroxysmal atrial fibrillation, anticoagulation therapy, history of CVA, and diabetes. She has what sounds like some chronic lymphedema of her lower extremities and she has been followed at wound care for this. Her diabetes has been well controlled with her last A1c of 6.2%. There have been no changes in her medications. She held her aspirin for several days and held her Xarelto since Monday of this week. She has no shortness of breath, chest pain, palpitations, PND, orthopnea.     Past Medical History:   Diagnosis Date    Anticoagulant long-term use 10/13/2017    Anxiety 10/13/2017    Atrial fibrillation ([address] 10/13/2017    Breast calcification, left 10/13/2017    CAD (coronary artery disease)     Cellulitis of both lower extremities 10/13/2017    Chronic kidney disease     kidney stones    Chronic pain syndrome 10/13/2017    Chronic prescription opiate use 11/15/2017    CVA (cerebral vascular accident) ([address] 10/13/2017    Diabetes ([address]     DJD (degenerative joint disease) 10/13/2017    Dyslipidemia 10/13/2017    Glaucoma 10/13/2017    Hypertension     Hyperthyroidism 10/13/2017    Long-term use of high-risk medication 10/13/2017    Stasis ulcer of lower extremity ([address] 10/13/2017    Stroke ([address]     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 ([address] 8/18/2012    retinopathy      Past Surgical History:   Procedure Laterality Date    HX BREAST BIOPSY Left     2014    HX GYN      hysterectomty    HX ORTHOPAEDIC      back surgery    NE CARDIAC SURG PROCEDURE UNLIST      cagb     Allergies   Allergen Reactions    Betadine Prepstick Rash    Keflex [Cephalexin] Hives     Pt breaks out in hives     Current Outpatient Medications   Medication Sig Dispense Refill    cloNIDine HCL (CATAPRES) 0.1 mg tablet TAKE 1 TABLET THREE TIMES A  Tablet 0    carvediloL (COREG) 12.5 mg tablet TAKE 1 TABLET TWICE A  Tablet 0    amLODIPine (NORVASC) 10 mg tablet Take 1 Tablet by mouth daily. 90 Tablet 0    FreeStyle Lite Strips strip USE 1 STRIP TO CHECK GLUCOSE ONCE DAILY 100 Strip 1    NovoLIN N NPH U-100 Insulin 100 unit/mL injection INJECT 44 UNITS UNDER THE SKIN IN THE MORNING AND 36 UNITS IN THE EVENING (Patient taking differently: 44 Units by SubCUTAneous route every morning. 33 units in evening) 80 mL 1    pravastatin (PRAVACHOL) 80 mg tablet TAKE 1 TABLET NIGHTLY 90 Tablet 1    rivaroxaban (Xarelto) 20 mg tab tablet Take 1 Tablet by mouth daily. 90 Tablet 1    BD Insulin Syringe Ultra-Fine 0.5 mL 31 gauge x 5/16\" syrg USE 1 SYRINGE TWICE A  Each 1    mupirocin (BACTROBAN) 2 % ointment APPLY TOPICALLY TO THE AFFECTED AREA DAILY 22 g 0    sodium hypochlorite (Dakin's Solution) external solution Apply to wounds twice daily and cover with dressing 1000 mL 3    captopriL (CAPOTEN) 25 mg tablet TAKE 1 TABLET TWICE A  Tablet 2    furosemide (LASIX) 40 mg tablet TAKE 1 TABLET DAILY (Patient taking differently: Take 40 mg by mouth two (2) times a day.) 90 Tab 2    lancets (FreeStyle Lancets) 28 gauge misc Daily blood sugar checks 100 Lancet 3    pomegranate xt/pomegranat seed (POMEGRANATE PO) Take  by mouth.  flash glucose scanning reader (FREESTYLE JOSE LUIS READER) [facility] Use to check blood sugars daily  DX: E11.9 1 Device 0    flash glucose sensor (FREESTYLE JOSE LUIS SENSOR) kit Use to check blood sugars daily  DX: E11.9 1 Device 0    ergocalciferol (VITAMIN D2) 50,000 unit capsule Take 50,000 Units by mouth every seven (7) days.       Blood-Glucose Meter (FREESTYLE FREEDOM LITE) monitoring kit Daily blood sugar checks 1 Kit 0    naloxone (NARCAN) 4 mg/actuation nasal spray Use 1 spray intranasally into 1 nostril. Indications: OPIATE-INDUCED RESPIRATORY DEPRESSION, Opioid Toxicity 1 Each 0    latanoprost (XALATAN) 0.005 % ophthalmic solution Administer 1 Drop to both eyes nightly.  aspirin (ASPIRIN) 325 mg tablet Take 1 Tab by mouth daily. Social History     Socioeconomic History    Marital status:    Tobacco Use    Smoking status: Never Smoker    Smokeless tobacco: Never Used   Vaping Use    Vaping Use: Never used   Substance and Sexual Activity    Alcohol use: No    Drug use: No     Family History   Problem Relation Age of Onset    Heart Disease Father        Review of Systems  Constitutional:  negative for fevers, chills, anorexia and weight loss  Eyes:    negative for visual disturbance and irritation  ENT:    negative for tinnitus,sore throat,nasal congestion,ear pains. hoarseness  Respiratory:     negative for cough, hemoptysis, dyspnea,wheezing  CV:    negative for chest pain, palpitations,  GI:    negative for nausea, vomiting, diarrhea, abdominal pain,melena  Endo:               negative for polyuria,polydipsia,polyphagia,heat intolerance  Genitourinary : negative for frequency, dysuria and hematuria  Integumentary: negative for rash and pruritus  Hematologic:   negative for easy bruising and gum/nose bleeding  Musculoskel:  negative for myalgias, arthralgias, back pain, muscle weakness, joint pain  Neurological:   negative for headaches, dizziness, vertigo, memory problems and gait   Behavl/Psych:  negative for feelings of anxiety, depression, mood changes  ROS otherwise negative      Objective:  Visit Vitals  /76 (BP 1 Location: Right upper arm, BP Patient Position: Sitting, BP Cuff Size: Adult)   Pulse 92   Temp 98.7 °F (37.1 °C) (Oral)   Resp 16   Ht 5' 4\" (1.626 m)   Wt 220 lb (99.8 kg)   SpO2 96%   BMI 37.76 kg/m²     Physical Exam:   General appearance - alert, well appearing, and in no distress  Mental status - alert, oriented to person, place, and time  EYE-NICK, EOMI, fundi normal, corneas normal, no foreign bodies  ENT-ENT exam normal, no neck nodes or sinus tenderness  Nose - normal and patent, no erythema, discharge or polyps  Mouth - mucous membranes moist, pharynx normal without lesions  Neck - supple, no significant adenopathy   Chest - clear to auscultation, no wheezes, rales or rhonchi, symmetric air entry   Heart - normal rate, regular rhythm, normal S1, S2, no murmurs, rubs, clicks or gallops   Abdomen - soft, nontender, nondistended, no masses or organomegaly  Lymph- no adenopathy palpable  Ext-peripheral pulses normal, chronic nonpitting pedal edema bilaterally, no clubbing or cyanosis  Skin-Warm and dry. no hyperpigmentation, vitiligo, or suspicious lesions  Neuro -alert, oriented, normal speech, left hemiplegia      Assessment/Plan:  Diagnoses and all orders for this visit:    1. Preop examination    2. Severe obesity (BMI 35.0-39. 9) with comorbidity ([address]    3. ASCVD (arteriosclerotic cardiovascular disease)    4. PAF (paroxysmal atrial fibrillation) ([address]    5. Cerebrovascular accident (CVA), unspecified mechanism ([address]    6. Essential hypertension, benign    7. 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 ([address]    8. Acquired hypothyroidism          ICD-10-CM ICD-9-CM    1. Preop examination  Z01.818 V72.84    2. Severe obesity (BMI 35.0-39. 9) with comorbidity ([address]  E66.01 278.01    3. ASCVD (arteriosclerotic cardiovascular disease)  I25.10 429.2      440.9    4. PAF (paroxysmal atrial fibrillation) (HCC)  I48.0 427.31    5. Cerebrovascular accident (CVA), unspecified mechanism ([address]  I63.9 434.91    6. Essential hypertension, benign  I10 401.1    7. 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 ([address]  E11.3299 250.50      362.01    8. Acquired hypothyroidism  E03.9 244.9        Plan:    The patient is medically stable for the planned procedure.   She has appropriately held her Xarelto and aspirin prior to the procedure. She may stay off of aspirin and Xarelto for 24 hours after the procedure. She should resume her anticoagulation on Sunday, 8 May. I have reviewed with the patient details of the assessment and plan and all questions were answered. Relevent patient education was performed. Verbal and/or written instructions (see AVS) provided. The most recent lab findings were reviewed with the patient. Plan was discussed with patient who verbally expressed understanding. An After Visit Summary was printed and given to the patient.     Malka Stringer MD

## 2022-05-05 NOTE — PROGRESS NOTES
Brian Odell is a 65 y.o. female presenting for Pre-op Exam  .     1. Have you been to the ER, urgent care clinic since your last visit? Hospitalized since your last visit? No    2. Have you seen or consulted any other health care providers outside of the [address] [place] [address] since your last visit? Include any pap smears or colon screening. No    Fall Risk Assessment, last 12 mths 1/26/2022   Able to walk? Yes   Fall in past 12 months? 0   Do you feel unsteady? 0   Are you worried about falling 0         Abuse Screening Questionnaire 6/9/2021   Do you ever feel afraid of your partner? N   Are you in a relationship with someone who physically or mentally threatens you? N   Is it safe for you to go home? Y       3 most recent PHQ Screens 1/26/2022   Little interest or pleasure in doing things Not at all   Feeling down, depressed, irritable, or hopeless Not at all   Total Score PHQ 2 0       There are no discontinued medications.

## 2022-05-18 ENCOUNTER — HOSPITAL ENCOUNTER (OUTPATIENT)
Dept: WOUND CARE | Age: 77
Discharge: HOME | End: 2022-05-18
Payer: MEDICARE

## 2022-05-18 VITALS
HEART RATE: 92 BPM | RESPIRATION RATE: 18 BRPM | TEMPERATURE: 98.7 F | SYSTOLIC BLOOD PRESSURE: 191 MMHG | DIASTOLIC BLOOD PRESSURE: 96 MMHG

## 2022-05-18 DIAGNOSIS — L97.912 NON-PRESSURE CHRONIC ULCER OF RIGHT LOWER LEG WITH FAT LAYER EXPOSED (HCC): Primary | ICD-10-CM

## 2022-05-18 PROCEDURE — 99213 OFFICE O/P EST LOW 20 MIN: CPT

## 2022-05-18 RX ORDER — LIDOCAINE HYDROCHLORIDE 20 MG/ML
JELLY TOPICAL ONCE
OUTPATIENT
Start: 2022-05-18 | End: 2022-05-18

## 2022-05-18 RX ORDER — TRIAMCINOLONE ACETONIDE 1 MG/G
OINTMENT TOPICAL ONCE
OUTPATIENT
Start: 2022-05-18 | End: 2022-05-18

## 2022-05-18 RX ORDER — CLOBETASOL PROPIONATE 0.5 MG/G
OINTMENT TOPICAL ONCE
OUTPATIENT
Start: 2022-05-18 | End: 2022-05-18

## 2022-05-18 RX ORDER — GENTAMICIN SULFATE 1 MG/G
OINTMENT TOPICAL ONCE
OUTPATIENT
Start: 2022-05-18 | End: 2022-05-18

## 2022-05-18 RX ORDER — BACITRACIN ZINC AND POLYMYXIN B SULFATE 500; 1000 [USP'U]/G; [USP'U]/G
OINTMENT TOPICAL ONCE
OUTPATIENT
Start: 2022-05-18 | End: 2022-05-18

## 2022-05-18 RX ORDER — MUPIROCIN 20 MG/G
OINTMENT TOPICAL ONCE
OUTPATIENT
Start: 2022-05-18 | End: 2022-05-18

## 2022-05-18 RX ORDER — BETAMETHASONE DIPROPIONATE 0.5 MG/G
OINTMENT TOPICAL ONCE
OUTPATIENT
Start: 2022-05-18 | End: 2022-05-18

## 2022-05-18 RX ORDER — LIDOCAINE 40 MG/G
CREAM TOPICAL ONCE
OUTPATIENT
Start: 2022-05-18 | End: 2022-05-18

## 2022-05-18 RX ORDER — BACITRACIN 500 [USP'U]/G
OINTMENT TOPICAL ONCE
OUTPATIENT
Start: 2022-05-18 | End: 2022-05-18

## 2022-05-18 RX ORDER — LIDOCAINE HYDROCHLORIDE 40 MG/ML
SOLUTION TOPICAL ONCE
OUTPATIENT
Start: 2022-05-18 | End: 2022-05-18

## 2022-05-18 RX ORDER — SILVER SULFADIAZINE 10 G/1000G
CREAM TOPICAL ONCE
OUTPATIENT
Start: 2022-05-18 | End: 2022-05-18

## 2022-05-18 NOTE — PROGRESS NOTES
Pressure Injury Interventions: Continue with Q2hr turning/repositioning, use of pressure relief cushion and sleep surface.

## 2022-05-18 NOTE — DISCHARGE INSTRUCTIONS
Discharge Instructions/Wound Orders  [address], [address] [place] [address] Sharla Helms, [id number]  Telephone: [phone]    NAME:  Young aPl  YOB: 1945  MEDICAL RECORD NUMBER:  [id number]  DATE:  5/18/2022  Wound Care Orders:  Right buttocks :Cleanse with saline , apply primary dressing Calmoseptine, gauze, secure with tape, change 3 x week. Bilateral lower legs - cleanse with saline or soap and water, apply ABD, secure with roll gauze and Ace wraps, change 3 x week. Pt./pcg/HH nurse to change (freq) 3 x week and as needed for compromise. F/U in 2 week. Dietary:  [x] Diet as tolerated: [] Calorie Diabetic Diet:Low carb and no Sugar [x] No Added Salt:[x] Increase Protein: [] Other:Limit the amount of liquid you are drinking and avoid drinking in between meals   Activity:  [x] Activity as tolerated:  [] Patient has no activity restrictions     [] Strict Bedrest: [] Remain off Work:     [] May return to full duty work:                                   [] Return to work with restrictions:             Return Appointment:  [x] Return Appointment: With DR Kelvin Munoz  in  2 Week(s)  [] Ordered tests:    Electronically signed Cintia Parra RN on 5/18/2022 at 3:41 PM     Karen Baker 281: Should you experience any significant changes in your wound(s) or have questions about your wound care, please contact the [address] [place] [address] at [address] [place] [address] 8:00 am - 4:30. If you need help with your wound outside these hours and cannot wait until we are again available, contact your PCP or go to the hospital emergency room. PLEASE NOTE: IF YOU ARE UNABLE TO OBTAIN WOUND SUPPLIES, CONTINUE TO USE THE SUPPLIES YOU HAVE AVAILABLE UNTIL YOU ARE ABLE TO REACH US. IT IS MOST IMPORTANT TO KEEP THE WOUND COVERED AT ALL TIMES.      Physician Signature:_______________________    Date: ___________ Time: ____________

## 2022-05-18 NOTE — WOUND CARE
05/18/22 1510   Right Leg Edema Point of Measurement   Leg circumference 31.8 cm   Ankle circumference 24.3 cm   Compression Therapy Ace wrap   Left Leg Edema Point of Measurement   Leg circumference 31.2 cm   Ankle circumference 24.4 cm   Compression Therapy Ace wrap   LLE Peripheral Vascular    Capillary Refill Less than/equal to 3 seconds   Color Appropriate for race   Temperature Warm   Sensation Decreased   RLE Peripheral Vascular    Capillary Refill Less than/equal to 3 seconds   Color Appropriate for race   Temperature Warm   Sensation Decreased   Pedal Pulse Palpable   [REMOVED] Wound Buttocks Left 02/09/22   Final Assessment Date/Final Assessment Time: 05/18/22 1524  Date First Assessed/Time First Assessed: 02/09/22 1417   Wound Approximate Age at First Assessment (Weeks): 3 weeks  Primary Wound Type: Pressure Injury  Location: Buttocks  Wound Location Or. .. Wound Length (cm) 0 cm   Wound Width (cm) 0 cm   Wound Depth (cm) 0 cm   Wound Surface Area (cm^2) 0 cm^2   Change in Wound Size % 100   Wound Volume (cm^3) 0 cm^3   Wound Healing % 100   Wound Leg lower Right #1 07/07/21   Date First Assessed/Time First Assessed: 07/07/21 1414   Present on Hospital Admission: Yes  Wound Approximate Age at First Assessment (Weeks): 52 weeks  Primary Wound Type: Venous  Location: Leg lower  Wound Location Orientation: Right  Wound Descrip. .. Wound Image    Dressing Status Intact   Cleansed Soap and water   Dressing/Treatment ABD pad; Ace wrap;Roll gauze   Drainage Amount None   Wound Odor None   Wound Leg lower Left #2 07/07/21   Date First Assessed/Time First Assessed: 07/07/21 1415   Present on Hospital Admission: Yes  Wound Approximate Age at First Assessment (Weeks): 52 weeks  Primary Wound Type: Venous  Location: Leg lower  Wound Location Orientation: Left  Wound Descript. .. Wound Image    Dressing Status Intact   Cleansed Soap and water   Dressing/Treatment ABD pad;Roll gauze; Ace wrap   Drainage Amount None Wound Odor None   Wound Buttocks Right;Distal 09/22/21   Date First Assessed/Time First Assessed: 09/22/21 1638   Wound Approximate Age at First Assessment (Weeks): 2 weeks  Primary Wound Type: Pressure Injury  Location: Buttocks  Wound Location Orientation: Right;Distal  Date of First Observation: 09/22/21   Wound Image    Wound Etiology Pressure Stage 3   Dressing Status Old drainage noted   Cleansed Cleansed with saline   Dressing/Treatment Xeroform;Silicone border   Wound Length (cm) 1 cm   Wound Width (cm) 0.4 cm   Wound Depth (cm) 0.1 cm   Wound Surface Area (cm^2) 0.4 cm^2   Change in Wound Size % -11.11   Wound Volume (cm^3) 0.04 cm^3   Wound Healing % -11   Wound Assessment Pink/red   Drainage Amount Moderate   Drainage Description Serosanguinous   Wound Odor None   Ebony-Wound/Incision Assessment Fragile   Edges Flat/open edges   Wound Thickness Description Full thickness   Wound Buttocks Proximal;Right   Date First Assessed/Time First Assessed: 05/18/22 1530   Present on Hospital Admission: Yes  Wound Approximate Age at First Assessment (Weeks): 2 weeks  Primary Wound Type: Pressure Injury  Location: Buttocks  Wound Location Orientation: Proximal;Right   Wound Image    Wound Etiology Pressure Stage 3   Dressing Status Old drainage noted   Cleansed Cleansed with saline   Dressing/Treatment Silicone border;Xeroform   Wound Length (cm) 3 cm   Wound Width (cm) 2 cm   Wound Depth (cm) 0.1 cm   Wound Surface Area (cm^2) 6 cm^2   Wound Volume (cm^3) 0.6 cm^3   Wound Assessment Pink/red   Drainage Amount Moderate   Drainage Description Serosanguinous   Wound Odor None   Ebony-Wound/Incision Assessment Fragile   Edges Undefined edges   Wound Thickness Description Full thickness   Pain 1   Pain Scale 1 Numeric (0 - 10)   Pain Intensity 1 6   Pain Location 1 Mouth   Pain Description 1 Sore   Pain Intervention(s) 1 Medication (see MAR)       Visit Vitals  BP (!) 191/96   Pulse 92   Temp 98.7 °F (37.1 °C)   Resp 18

## 2022-05-19 NOTE — WOUND CARE
Followup for venous stasis disease. Yogi least as far as the legs are concerned. They are closed, if in an environment of verrucous changes in chronic edema. The lower intergluteal cleft on th L has a mild excoriation in an environment of \"diaper rash. \"    No intercurrent illnesses, systemic symptoms, new complaints or changes in meds. There IS the problem of frequent dressing changes. The home health folks can only do BIW and the \"menfolk\" in the family are disinclined to do it in the interim. Alert, oriented and conversant. Visit Vitals  BP (!) 191/96   Pulse 92   Temp 98.7 °F (37.1 °C)   Resp 18     The legs are as described. Baby shampoo and baby hairbrush. Elevation. Calmoseptine and gauze  Persuade the gentlemen to play nurse.     RTC 2 weeks    L89.303  L97.912  L97.922

## 2022-06-01 ENCOUNTER — HOSPITAL ENCOUNTER (OUTPATIENT)
Dept: WOUND CARE | Age: 77
Discharge: HOME | End: 2022-06-01

## 2022-06-01 ENCOUNTER — HOSPITAL ENCOUNTER (EMERGENCY)
Age: 77
Discharge: HOME | End: 2022-06-01
Attending: STUDENT IN AN ORGANIZED HEALTH CARE EDUCATION/TRAINING PROGRAM
Payer: MEDICARE

## 2022-06-01 VITALS
BODY MASS INDEX: 41.54 KG/M2 | SYSTOLIC BLOOD PRESSURE: 139 MMHG | HEIGHT: 61 IN | RESPIRATION RATE: 23 BRPM | DIASTOLIC BLOOD PRESSURE: 67 MMHG | OXYGEN SATURATION: 92 % | TEMPERATURE: 98.2 F | HEART RATE: 103 BPM | WEIGHT: 220 LBS

## 2022-06-01 DIAGNOSIS — R11.2 NAUSEA AND VOMITING, UNSPECIFIED VOMITING TYPE: ICD-10-CM

## 2022-06-01 DIAGNOSIS — N30.00 ACUTE CYSTITIS WITHOUT HEMATURIA: Primary | ICD-10-CM

## 2022-06-01 DIAGNOSIS — E16.2 HYPOGLYCEMIA: ICD-10-CM

## 2022-06-01 LAB
ALBUMIN SERPL-MCNC: 2.6 G/DL (ref 3.5–5)
ALBUMIN/GLOB SERPL: 0.4 {RATIO} (ref 1.1–2.2)
ALP SERPL-CCNC: 86 U/L (ref 45–117)
ALT SERPL-CCNC: 11 U/L (ref 12–78)
ANION GAP SERPL CALC-SCNC: 5 MMOL/L (ref 5–15)
APPEARANCE UR: ABNORMAL
AST SERPL-CCNC: 15 U/L (ref 15–37)
BACTERIA URNS QL MICRO: ABNORMAL /HPF
BASOPHILS # BLD: 0 K/UL (ref 0–0.1)
BASOPHILS NFR BLD: 0 % (ref 0–1)
BILIRUB SERPL-MCNC: 0.5 MG/DL (ref 0.2–1)
BILIRUB UR QL: NEGATIVE
BUN SERPL-MCNC: 17 MG/DL (ref 6–20)
BUN/CREAT SERPL: 15 (ref 12–20)
CALCIUM SERPL-MCNC: 9.2 MG/DL (ref 8.5–10.1)
CHLORIDE SERPL-SCNC: 105 MMOL/L (ref 97–108)
CO2 SERPL-SCNC: 30 MMOL/L (ref 21–32)
COLOR UR: ABNORMAL
CREAT SERPL-MCNC: 1.16 MG/DL (ref 0.55–1.02)
DIFFERENTIAL METHOD BLD: ABNORMAL
EOSINOPHIL # BLD: 0 K/UL (ref 0–0.4)
EOSINOPHIL NFR BLD: 0 % (ref 0–7)
EPITH CASTS URNS QL MICRO: ABNORMAL /LPF
ERYTHROCYTE [DISTWIDTH] IN BLOOD BY AUTOMATED COUNT: 12.7 % (ref 11.5–14.5)
GLOBULIN SER CALC-MCNC: 6 G/DL (ref 2–4)
GLUCOSE BLD STRIP.AUTO-MCNC: 113 MG/DL (ref 65–117)
GLUCOSE BLD STRIP.AUTO-MCNC: 137 MG/DL (ref 65–117)
GLUCOSE BLD STRIP.AUTO-MCNC: 50 MG/DL (ref 65–117)
GLUCOSE SERPL-MCNC: 53 MG/DL (ref 65–100)
GLUCOSE UR STRIP.AUTO-MCNC: NEGATIVE MG/DL
HCT VFR BLD AUTO: 36.2 % (ref 35–47)
HGB BLD-MCNC: 12.2 G/DL (ref 11.5–16)
HGB UR QL STRIP: ABNORMAL
IMM GRANULOCYTES # BLD AUTO: 0.1 K/UL (ref 0–0.04)
IMM GRANULOCYTES NFR BLD AUTO: 1 % (ref 0–0.5)
KETONES UR QL STRIP.AUTO: ABNORMAL MG/DL
LEUKOCYTE ESTERASE UR QL STRIP.AUTO: ABNORMAL
LIPASE SERPL-CCNC: 28 U/L (ref 73–393)
LYMPHOCYTES # BLD: 0.5 K/UL (ref 0.8–3.5)
LYMPHOCYTES NFR BLD: 8 % (ref 12–49)
MCH RBC QN AUTO: 29.4 PG (ref 26–34)
MCHC RBC AUTO-ENTMCNC: 33.7 G/DL (ref 30–36.5)
MCV RBC AUTO: 87.2 FL (ref 80–99)
MONOCYTES # BLD: 0.3 K/UL (ref 0–1)
MONOCYTES NFR BLD: 4 % (ref 5–13)
NEUTS SEG # BLD: 5.6 K/UL (ref 1.8–8)
NEUTS SEG NFR BLD: 87 % (ref 32–75)
NITRITE UR QL STRIP.AUTO: POSITIVE
NRBC # BLD: 0 K/UL (ref 0–0.01)
NRBC BLD-RTO: 0 PER 100 WBC
PH UR STRIP: 5 [PH] (ref 5–8)
PLATELET # BLD AUTO: 241 K/UL (ref 150–400)
PMV BLD AUTO: 10.4 FL (ref 8.9–12.9)
POTASSIUM SERPL-SCNC: 3.5 MMOL/L (ref 3.5–5.1)
PROT SERPL-MCNC: 8.6 G/DL (ref 6.4–8.2)
PROT UR STRIP-MCNC: 30 MG/DL
RBC # BLD AUTO: 4.15 M/UL (ref 3.8–5.2)
RBC #/AREA URNS HPF: ABNORMAL /HPF (ref 0–5)
RBC MORPH BLD: ABNORMAL
SERVICE CMNT-IMP: ABNORMAL
SERVICE CMNT-IMP: ABNORMAL
SERVICE CMNT-IMP: NORMAL
SODIUM SERPL-SCNC: 140 MMOL/L (ref 136–145)
SP GR UR REFRACTOMETRY: 1.01
UA: UC IF INDICATED,UAUC: ABNORMAL
UROBILINOGEN UR QL STRIP.AUTO: 1 EU/DL (ref 0.2–1)
WBC # BLD AUTO: 6.5 K/UL (ref 3.6–11)
WBC URNS QL MICRO: ABNORMAL /HPF (ref 0–4)

## 2022-06-01 PROCEDURE — 80053 COMPREHEN METABOLIC PANEL: CPT

## 2022-06-01 PROCEDURE — 83690 ASSAY OF LIPASE: CPT

## 2022-06-01 PROCEDURE — 74011250637 HC RX REV CODE- 250/637: Performed by: STUDENT IN AN ORGANIZED HEALTH CARE EDUCATION/TRAINING PROGRAM

## 2022-06-01 PROCEDURE — 93005 ELECTROCARDIOGRAM TRACING: CPT

## 2022-06-01 PROCEDURE — 82962 GLUCOSE BLOOD TEST: CPT

## 2022-06-01 PROCEDURE — 87077 CULTURE AEROBIC IDENTIFY: CPT

## 2022-06-01 PROCEDURE — 74011000250 HC RX REV CODE- 250: Performed by: STUDENT IN AN ORGANIZED HEALTH CARE EDUCATION/TRAINING PROGRAM

## 2022-06-01 PROCEDURE — 74011250636 HC RX REV CODE- 250/636: Performed by: STUDENT IN AN ORGANIZED HEALTH CARE EDUCATION/TRAINING PROGRAM

## 2022-06-01 PROCEDURE — 87186 SC STD MICRODIL/AGAR DIL: CPT

## 2022-06-01 PROCEDURE — 85025 COMPLETE CBC W/AUTO DIFF WBC: CPT

## 2022-06-01 PROCEDURE — 96375 TX/PRO/DX INJ NEW DRUG ADDON: CPT

## 2022-06-01 PROCEDURE — 81001 URINALYSIS AUTO W/SCOPE: CPT

## 2022-06-01 PROCEDURE — 96365 THER/PROPH/DIAG IV INF INIT: CPT

## 2022-06-01 PROCEDURE — 36415 COLL VENOUS BLD VENIPUNCTURE: CPT

## 2022-06-01 PROCEDURE — 87086 URINE CULTURE/COLONY COUNT: CPT

## 2022-06-01 PROCEDURE — 99284 EMERGENCY DEPT VISIT MOD MDM: CPT

## 2022-06-01 RX ORDER — ONDANSETRON 2 MG/ML
4 INJECTION INTRAMUSCULAR; INTRAVENOUS
Status: COMPLETED | OUTPATIENT
Start: 2022-06-01 | End: 2022-06-01

## 2022-06-01 RX ORDER — LEVOFLOXACIN 500 MG/1
500 TABLET, FILM COATED ORAL DAILY
Qty: 4 TABLET | Refills: 0 | Status: SHIPPED | OUTPATIENT
Start: 2022-06-02 | End: 2022-06-01 | Stop reason: SDUPTHER

## 2022-06-01 RX ORDER — ONDANSETRON 4 MG/1
4 TABLET, FILM COATED ORAL
Qty: 15 TABLET | Refills: 0 | Status: SHIPPED | OUTPATIENT
Start: 2022-06-01 | End: 2022-06-01 | Stop reason: SDUPTHER

## 2022-06-01 RX ORDER — DEXTROSE MONOHYDRATE 100 MG/ML
0-250 INJECTION, SOLUTION INTRAVENOUS AS NEEDED
Status: DISCONTINUED | OUTPATIENT
Start: 2022-06-01 | End: 2022-06-02 | Stop reason: HOSPADM

## 2022-06-01 RX ORDER — LEVOFLOXACIN 500 MG/1
500 TABLET, FILM COATED ORAL
Status: COMPLETED | OUTPATIENT
Start: 2022-06-01 | End: 2022-06-01

## 2022-06-01 RX ORDER — ONDANSETRON 4 MG/1
4 TABLET, FILM COATED ORAL
Qty: 15 TABLET | Refills: 0 | Status: SHIPPED | OUTPATIENT
Start: 2022-06-01

## 2022-06-01 RX ORDER — LEVOFLOXACIN 500 MG/1
500 TABLET, FILM COATED ORAL DAILY
Qty: 4 TABLET | Refills: 0 | Status: SHIPPED | OUTPATIENT
Start: 2022-06-02 | End: 2022-06-06

## 2022-06-01 RX ADMIN — LEVOFLOXACIN 500 MG: 500 TABLET, FILM COATED ORAL at 22:45

## 2022-06-01 RX ADMIN — ONDANSETRON 4 MG: 2 INJECTION INTRAMUSCULAR; INTRAVENOUS at 20:01

## 2022-06-01 RX ADMIN — DEXTROSE MONOHYDRATE 250 ML: 10 INJECTION, SOLUTION INTRAVENOUS at 20:45

## 2022-06-01 NOTE — ED PROVIDER NOTES
EMERGENCY DEPARTMENT HISTORY AND PHYSICAL EXAM      Date: 6/1/2022  Patient Name: Pa Gregg    History of Presenting Illness     Chief Complaint   Patient presents with    Vomiting     pt wheeled into triage with a cc of vomiting x 30 mins ago; pts son states that they were at wound care clinic when she started vomiting and they sent her to ED        History Provided By: Patient    HPI: Pa Gregg, 65 y.o. female with past medical history of T2DM, htn, hypothyroidism, CVA, dyslipidemia,  presents to the ED with cc of vomiting. Patient was being seen at wound care clinic for follow-up of her chronic leg wounds when she began vomiting. She was subsequently sent to the ED for evaluation of the symptoms. Patient is now no longer vomiting. She denies any ongoing nausea at this time. She states that she never had any abdominal pain, and continues to be pain-free. She denies any diarrhea. No fevers or chills. PCP: Mariana Garcia MD    No current facility-administered medications on file prior to encounter. Current Outpatient Medications on File Prior to Encounter   Medication Sig Dispense Refill    cloNIDine HCL (CATAPRES) 0.1 mg tablet TAKE 1 TABLET THREE TIMES A  Tablet 0    carvediloL (COREG) 12.5 mg tablet TAKE 1 TABLET TWICE A  Tablet 0    amLODIPine (NORVASC) 10 mg tablet Take 1 Tablet by mouth daily. 90 Tablet 0    FreeStyle Lite Strips strip USE 1 STRIP TO CHECK GLUCOSE ONCE DAILY 100 Strip 1    NovoLIN N NPH U-100 Insulin 100 unit/mL injection INJECT 44 UNITS UNDER THE SKIN IN THE MORNING AND 36 UNITS IN THE EVENING (Patient taking differently: 44 Units by SubCUTAneous route every morning. 33 units in evening) 80 mL 1    pravastatin (PRAVACHOL) 80 mg tablet TAKE 1 TABLET NIGHTLY 90 Tablet 1    rivaroxaban (Xarelto) 20 mg tab tablet Take 1 Tablet by mouth daily.  90 Tablet 1    BD Insulin Syringe Ultra-Fine 0.5 mL 31 gauge x 5/16\" syrg USE 1 SYRINGE TWICE A  Each 1    mupirocin (BACTROBAN) 2 % ointment APPLY TOPICALLY TO THE AFFECTED AREA DAILY 22 g 0    sodium hypochlorite (Dakin's Solution) external solution Apply to wounds twice daily and cover with dressing 1000 mL 3    captopriL (CAPOTEN) 25 mg tablet TAKE 1 TABLET TWICE A  Tablet 2    furosemide (LASIX) 40 mg tablet TAKE 1 TABLET DAILY (Patient taking differently: Take 40 mg by mouth two (2) times a day.) 90 Tab 2    lancets (FreeStyle Lancets) 28 gauge misc Daily blood sugar checks 100 Lancet 3    pomegranate xt/pomegranat seed (POMEGRANATE PO) Take  by mouth.  flash glucose scanning reader (FREESTYLE JOSE LUIS READER) [facility] Use to check blood sugars daily  DX: E11.9 1 Device 0    flash glucose sensor (FREESTYLE JOSE LUIS SENSOR) kit Use to check blood sugars daily  DX: E11.9 1 Device 0    ergocalciferol (VITAMIN D2) 50,000 unit capsule Take 50,000 Units by mouth every seven (7) days.  Blood-Glucose Meter (FREESTYLE FREEDOM LITE) monitoring kit Daily blood sugar checks 1 Kit 0    naloxone (NARCAN) 4 mg/actuation nasal spray Use 1 spray intranasally into 1 nostril. Indications: OPIATE-INDUCED RESPIRATORY DEPRESSION, Opioid Toxicity (Patient not taking: Reported on 5/18/2022) 1 Each 0    latanoprost (XALATAN) 0.005 % ophthalmic solution Administer 1 Drop to both eyes nightly.  aspirin (ASPIRIN) 325 mg tablet Take 1 Tab by mouth daily.          Past History     Past Medical History:  Past Medical History:   Diagnosis Date    Anticoagulant long-term use 10/13/2017    Anxiety 10/13/2017    Atrial fibrillation ([address] 10/13/2017    Breast calcification, left 10/13/2017    CAD (coronary artery disease)     Cellulitis of both lower extremities 10/13/2017    Chronic kidney disease     kidney stones    Chronic pain syndrome 10/13/2017    Chronic prescription opiate use 11/15/2017    CVA (cerebral vascular accident) ([address] 10/13/2017    Diabetes [facility]     DJD (degenerative joint disease) 10/13/2017    Dyslipidemia 10/13/2017    Glaucoma 10/13/2017    Hypertension     Hyperthyroidism 10/13/2017    Long-term use of high-risk medication 10/13/2017    Stasis ulcer of lower extremity ([facility] 10/13/2017    Stroke ([facility]     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 ([facility] 8/18/2012    retinopathy        Past Surgical History:  Past Surgical History:   Procedure Laterality Date    HX BREAST BIOPSY Left     2014    HX GYN      hysterectomty    HX ORTHOPAEDIC      back surgery    GA CARDIAC SURG PROCEDURE UNLIST      cagb       Family History:  Family History   Problem Relation Age of Onset    Heart Disease Father        Social History:  Social History     Tobacco Use    Smoking status: Never Smoker    Smokeless tobacco: Never Used   Vaping Use    Vaping Use: Never used   Substance Use Topics    Alcohol use: No    Drug use: No       Allergies: Allergies   Allergen Reactions    Betadine Prepstick Rash    Keflex [Cephalexin] Hives     Pt breaks out in hives         Review of Systems   Review of Systems   Constitutional: Negative for chills and fever. HENT: Negative for congestion and rhinorrhea. Eyes: Negative for visual disturbance. Respiratory: Negative for chest tightness and shortness of breath. Cardiovascular: Negative for chest pain and palpitations. Gastrointestinal: Positive for vomiting. Negative for abdominal pain, diarrhea and nausea. Genitourinary: Negative for dysuria, flank pain and hematuria. Musculoskeletal: Negative for back pain and neck pain. Skin: Negative for rash. Allergic/Immunologic: Negative for immunocompromised state. Neurological: Negative for dizziness, speech difficulty, weakness and headaches. Hematological: Negative for adenopathy. Psychiatric/Behavioral: Negative for dysphoric mood and suicidal ideas. Physical Exam   Physical Exam  Vitals and nursing note reviewed.    Constitutional:       General: She is not in acute distress. Appearance: She is not ill-appearing or toxic-appearing. HENT:      Head: Normocephalic and atraumatic. Nose: Nose normal.      Mouth/Throat:      Mouth: Mucous membranes are moist.   Eyes:      Extraocular Movements: Extraocular movements intact. Pupils: Pupils are equal, round, and reactive to light. Cardiovascular:      Rate and Rhythm: Normal rate and regular rhythm. Pulses: Normal pulses. Pulmonary:      Effort: Pulmonary effort is normal.      Breath sounds: No stridor. No wheezing or rhonchi. Abdominal:      General: Abdomen is flat. There is no distension. Tenderness: There is no abdominal tenderness. Musculoskeletal:         General: Normal range of motion. Cervical back: Normal range of motion and neck supple. Skin:     General: Skin is warm and dry. Neurological:      General: No focal deficit present. Mental Status: She is alert and oriented to person, place, and time. Psychiatric:         Judgment: Judgment normal.         Diagnostic Study Results     Labs -   No results found for this or any previous visit (from the past 24 hour(s)). Radiologic Studies -   No orders to display     CT Results  (Last 48 hours)    None        CXR Results  (Last 48 hours)    None          Medical Decision Making   IVazquez MD am the first provider for this patient, and I am the attending of record for this patient encounter. I reviewed the vital signs, available nursing notes, past medical history, past surgical history, family history and social history. Vital Signs-Reviewed the patient's vital signs. Patient Vitals for the past 24 hrs:   Temp Pulse Resp BP SpO2   06/01/22 1434 98.2 °F (36.8 °C) (!) 126 16 (!) 154/96 100 %       EKG interpretation: (Preliminary)  Sinus rhythm with PVCs, nonspecific ST changes.  EKG interpretation by Vazquez Ruby MD    Records Reviewed: Nursing Notes and Old Medical Records    Provider Notes (Medical Decision Making): In the ED, patient is afebrile, nontoxic in appearance. No reproducible abdominal pain. Differentials considered include: Gastroenteritis, gastritis, UTI, dehydration, electrolyte derangements, hypoglycemia, etc. doubt acute surgical abdominal process as patient has completely benign abdominal exam.    Plan to check labs, UA. Will medicate with IV Zofran in case of recurrence of symptoms here. Patient with hypoglycemia of 50. She was given D10 drip, and oral glucose for replenishment. Repeat glucose checked at 1 hour intervals after this show sustained stable glucose levels within normal limits. Work-up in the ED is also remarkable for UA that is suspicious for infection. Urine culture is sent. Considering symptoms of vomiting, will treat patient for suspected UTI with Levaquin, first dose given in the ED. Patient remained in stable condition throughout her ED course, without recurrence of emesis or nausea complaint. She is in stable condition for discharge. We will send Rx for remainder course of Levaquin to her pharmacy on file along with Zofran. Advised early PCP follow-up. Patient and family felt comfortable with plan as outlined above. Return precautions discussed. ED Course:   Initial assessment performed. The patient's presenting problems have been discussed, and they are in agreement with the care plan formulated and outlined with them. I have encouraged them to ask questions as they arise throughout their visit. Gadiel Glez MD      Disposition:  Discharge      DISCHARGE PLAN:  1. Discharge Medication List as of 6/1/2022 10:44 PM      START taking these medications    Details   levoFLOXacin (Levaquin) 500 mg tablet Take 1 Tablet by mouth daily for 4 days. , Normal, Disp-4 Tablet, R-0      ondansetron hcl (Zofran) 4 mg tablet Take 1 Tablet by mouth every eight (8) hours as needed for Nausea., Normal, Disp-15 Tablet, R-0         CONTINUE these medications which have NOT CHANGED    Details   cloNIDine HCL (CATAPRES) 0.1 mg tablet TAKE 1 TABLET THREE TIMES A DAY, Normal, Disp-270 Tablet, R-0      carvediloL (COREG) 12.5 mg tablet TAKE 1 TABLET TWICE A DAY, Normal, Disp-180 Tablet, R-0      amLODIPine (NORVASC) 10 mg tablet Take 1 Tablet by mouth daily. , Normal, Disp-90 Tablet, R-0      FreeStyle Lite Strips strip USE 1 STRIP TO CHECK GLUCOSE ONCE DAILY, Normal, Disp-100 Strip, R-1      NovoLIN N NPH U-100 Insulin 100 unit/mL injection INJECT 44 UNITS UNDER THE SKIN IN THE MORNING AND 36 UNITS IN THE EVENING, Normal, Disp-80 mL, R-1      pravastatin (PRAVACHOL) 80 mg tablet TAKE 1 TABLET NIGHTLY, Normal, Disp-90 Tablet, R-1      rivaroxaban (Xarelto) 20 mg tab tablet Take 1 Tablet by mouth daily. , Normal, Disp-90 Tablet, R-1      BD Insulin Syringe Ultra-Fine 0.5 mL 31 gauge x 5/16\" syrg USE 1 SYRINGE TWICE A DAY, Normal, Disp-180 Each, R-1      mupirocin (BACTROBAN) 2 % ointment APPLY TOPICALLY TO THE AFFECTED AREA DAILY, Normal, Disp-22 g, R-0      sodium hypochlorite (Dakin's Solution) external solution Apply to wounds twice daily and cover with dressing, Normal, Disp-1000 mL, R-3      captopriL (CAPOTEN) 25 mg tablet TAKE 1 TABLET TWICE A DAY, Normal, Disp-180 Tablet, R-2      furosemide (LASIX) 40 mg tablet TAKE 1 TABLET DAILY, Normal, Disp-90 Tab, R-2      lancets (FreeStyle Lancets) 28 gauge misc Daily blood sugar checks, Normal, Disp-100 Lancet,R-3      pomegranate xt/pomegranat seed (POMEGRANATE PO) Take  by mouth., Historical [facility]      flash glucose scanning reader (FREESTYLE JOSE LUIS READER) [facility] Use to check blood sugars daily  DX: E11.9, Print, Disp-1 Device, R-0      flash glucose sensor (FREESTYLE JOSE LUIS SENSOR) kit Use to check blood sugars daily  DX: E11.9, Print, Disp-1 Device, R-0      ergocalciferol (VITAMIN D2) 50,000 unit capsule Take 50,000 Units by mouth every seven (7) days. , Historical Med      Blood-Glucose Meter (FREESTYLE FREEDOM LITE) monitoring kit Daily blood sugar checks, Normal, Disp-1 Kit, R-0      naloxone (NARCAN) 4 mg/actuation nasal spray Use 1 spray intranasally into 1 nostril. Indications: OPIATE-INDUCED RESPIRATORY DEPRESSION, Opioid Toxicity, Normal, Disp-1 Each, R-0      latanoprost (XALATAN) 0.005 % ophthalmic solution Administer 1 Drop to both eyes nightly., Historical Med      aspirin (ASPIRIN) 325 mg tablet Take 1 Tab by mouth daily. , OTC           2. Follow-up Information     Follow up With Specialties Details Why Contact Info    Reji Nicole MD Internal Medicine Physician In 2 days  [address]  [address] [address] [phone]      [facility] EMERGENCY DEPT Emergency Medicine  If symptoms worsen, As needed [address]  [address] [facility]  [phone]        3. Return to ED if worse     Diagnosis     Clinical Impression:   1. Acute cystitis without hematuria    2. Hypoglycemia    3. Nausea and vomiting, unspecified vomiting type        Attestations:    Bryce Baum MD    Please note that this dictation was completed with [employer], the [employer] voice recognition software. Quite often unanticipated grammatical, syntax, homophones, and other interpretive errors are inadvertently transcribed by the computer software. Please disregard these errors. Please excuse any errors that have escaped final proofreading. Thank you.

## 2022-06-02 LAB
ATRIAL RATE: 96 BPM
CALCULATED P AXIS, ECG09: 48 DEGREES
CALCULATED R AXIS, ECG10: -9 DEGREES
CALCULATED T AXIS, ECG11: 38 DEGREES
DIAGNOSIS, 93000: NORMAL
GLUCOSE BLD STRIP.AUTO-MCNC: NORMAL MG/DL (ref 65–117)
P-R INTERVAL, ECG05: 148 MS
Q-T INTERVAL, ECG07: 324 MS
QRS DURATION, ECG06: 70 MS
QTC CALCULATION (BEZET), ECG08: 409 MS
SERVICE CMNT-IMP: NORMAL
VENTRICULAR RATE, ECG03: 96 BPM

## 2022-06-02 NOTE — ED NOTES
Mehran Charles MD reviewed discharge instructions with the patient. The patient verbalized understanding. All questions and concerns were addressed. The patient is discharged by personal wheelchair with instructions and prescriptions in hand. Pt is alert and oriented x 4. Respirations are clear and unlabored.

## 2022-06-02 NOTE — ED NOTES
2011Rodolfo Veliz RN notified of patient's critical BG reading of 53. Elias Rome RN notified, attempted to notify physician at this time. Will continue attempting to notify MD.    2014: Patient provided with PO juice and crackers at this time. Encouraged patient to complete PO treatment, patient verbalized understanding. 2022: Notified MD of result, orders received. Patient's BG checked at this time, result of 50. MD notified. 2024: Order placed for dextrose 10%. No IV floor stock at this time. MD notified, provided patient with additional PO treatment per MD.     2044: Received dextrose 10% from pharmacy at this time. 2045: Dextrose 10% infusion started at this time. Per order, 250mL to be administered over 20 minutes. 2105: Dextrose 10% infusion completed at this time. 2122: BG checked at this time; 113. MD notified. Order to recheck at 2230.

## 2022-07-06 ENCOUNTER — HOSPITAL ENCOUNTER (OUTPATIENT)
Dept: WOUND CARE | Age: 77
End: 2022-07-06

## 2022-07-07 ENCOUNTER — TELEPHONE (OUTPATIENT)
Dept: WOUND CARE | Age: 77
End: 2022-07-07

## 2022-07-07 NOTE — TELEPHONE ENCOUNTER
Received incoming call from Layo connolly/[employer] regarding patient's Alternating pressure air mattress. States Medicare requires monthly wound assessments/measurement as well as patient's compliance in utilizing pressure alternating sleep surface daily. Per [address] [place] has reported that patient has been sleeping in recliner on a regular basis. Also, Insurance/Medicare requires monthly wound assessment/measurements. Patient was last seen in [address] [place] [address] in May 2022. [facility] appointments in May 2022 & on July 6th had to be cancelled due to illness. [employer] then requested wound Documentation from [address] [place] [address] for June 2022, but apparently no wound measurements for sacral/Right Buttock wound were recorded. Ms. Zee Arnold states she will continue to try to reach patient via phone & discuss options.

## 2022-07-14 DIAGNOSIS — E11.3299 DIABETES MELLITUS WITH BACKGROUND RETINOPATHY (HCC): ICD-10-CM

## 2022-07-14 NOTE — TELEPHONE ENCOUNTER
Last Refill: Novolin 11/4/21                    Glucose test strips 8/17/18                    [place] 9/3/21  Last Visit: 1/26/2022 Dr Ancelmo Busch                    5/5/22 Dr. Kurtis Mccormack. (pre-op)  Next Visit: No future appt    Requested Prescriptions     Pending Prescriptions Disp Refills    insulin NPH (NovoLIN N NPH U-100 Insulin) 100 unit/mL injection 80 mL 1    glucose blood VI test strips (FreeStyle Lite Strips) strip 100 Strip 1    Insulin Syringe-Needle U-100 (BD Insulin Syringe Ultra-Fine) 0.5 mL 31 gauge x 5/16\" syrg 180 Each 1

## 2022-07-15 RX ORDER — BLOOD-GLUCOSE METER
KIT MISCELLANEOUS
Qty: 100 STRIP | Refills: 1 | Status: ON HOLD | OUTPATIENT
Start: 2022-07-15 | End: 2022-07-29

## 2022-07-15 RX ORDER — NAPROXEN SODIUM 220 MG
TABLET ORAL
Qty: 180 EACH | Refills: 1 | Status: ON HOLD | OUTPATIENT
Start: 2022-07-15 | End: 2022-07-29

## 2022-07-18 ENCOUNTER — TELEPHONE (OUTPATIENT)
Dept: INTERNAL MEDICINE CLINIC | Age: 77
End: 2022-07-18

## 2022-07-18 NOTE — TELEPHONE ENCOUNTER
Patient's daughter Javan Galeazzi) called in regarding the patient; stated the patient tested positive around July 8th. Per daughter the patient was retested on the 15th and was still positive. Patient no longer has a fever however she is still having congestion and some shortness of breath. Daughter is concerned due to mother's weakened immune system and would like to know if there is anything else that can be done.     [phone]

## 2022-07-19 NOTE — TELEPHONE ENCOUNTER
Not unusual for there to be residual SOB + Congestion following COVID but important to rule out complicating disease process like secondary bacterial pneumonia (seems less likely without fever). No additional antiviral such as Paxlovid would typically be administered at this point given duration of time since symptom onset. If symptoms are progressive, given patient's advanced age and risk factors for complicated disease, in-person evaluation at ER would be recommended.

## 2022-07-26 NOTE — WOUND CARE
09/22/21 1536   Right Leg Edema Point of Measurement   Ankle circumference 38 cm   Foot circumference 28 cm   Compression Therapy Tubular elastic support bandage   Left Leg Edema Point of Measurement   Leg circumference 42 cm   Ankle circumference 28 cm   Compression Therapy Tubular elastic support bandage   LLE Peripheral Vascular    Capillary Refill Less than/equal to 3 seconds   Color Pink   Temperature Warm   Sensation Present   Pedal Pulse Palpable   RLE Peripheral Vascular    Capillary Refill Less than/equal to 3 seconds   Color Pink   Temperature Warm   Sensation Present   Pedal Pulse Palpable   Wound Leg lower Right #1 07/07/21   Date First Assessed/Time First Assessed: 07/07/21 1414   Present on Hospital Admission: Yes  Wound Approximate Age at First Assessment (Weeks): 52 weeks  Primary Wound Type: Venous  Location: Leg lower  Wound Location Orientation: Right  Wound Descrip. .. Wound Image    Wound Etiology Venous   Dressing Status Old drainage noted   Cleansed Soap and water   Dressing/Treatment   (Gauze, RG, Ace wraps)   Wound Length (cm) 3 cm   Wound Width (cm) 1 cm   Wound Depth (cm) 0.1 cm   Wound Surface Area (cm^2) 3 cm^2   Change in Wound Size % 99.74   Wound Volume (cm^3) 0.3 cm^3   Wound Healing % 100   Wound Assessment [place]/red;Slough   Drainage Amount Moderate   Drainage Description Serous   Wound Odor Mild   Ebony-Wound/Incision Assessment Hemosiderin staining (brown yellow)   Edges Undefined edges   Wound Thickness Description Partial thickness   Wound Leg lower Left #2 07/07/21   Date First Assessed/Time First Assessed: 07/07/21 1415   Present on Hospital Admission: Yes  Wound Approximate Age at First Assessment (Weeks): 52 weeks  Primary Wound Type: Venous  Location: Leg lower  Wound Location Orientation: Left  Wound Descript. ..    Wound Image    Wound Etiology Venous   Dressing Status Intact   Cleansed Soap and water   Dressing/Treatment   (Gauze, RG, ace wraps)   Wound Length (cm) 0.1 cm   Wound Width (cm) 0.1 cm   Wound Depth (cm) 0.1 cm   Wound Surface Area (cm^2) 0.01 cm^2   Change in Wound Size % 100   Wound Volume (cm^3) 0.001 cm^3   Wound Healing % 100   Wound Assessment Epithelialization   Drainage Amount None   Wound Odor None   Ebony-Wound/Incision Assessment Hemosiderin staining (brown yellow)   Edges Undefined edges   Wound Thickness Description Partial thickness     Visit Vitals  BP (!) 159/72 (BP 1 Location: Right upper arm, BP Patient Position: At rest;Sitting)   Pulse 69   Temp 97.1 °F (36.2 °C)   Resp 18 Yes

## 2022-07-27 ENCOUNTER — APPOINTMENT (OUTPATIENT)
Dept: GENERAL RADIOLOGY | Age: 77
DRG: 606 | End: 2022-07-27
Attending: EMERGENCY MEDICINE
Payer: MEDICARE

## 2022-07-27 ENCOUNTER — HOSPITAL ENCOUNTER (INPATIENT)
Age: 77
LOS: 6 days | Discharge: NURSING, REHAB OR LONG-TERM CARE | DRG: 606 | End: 2022-08-02
Attending: EMERGENCY MEDICINE | Admitting: INTERNAL MEDICINE
Payer: MEDICARE

## 2022-07-27 DIAGNOSIS — S81.809A MULTIPLE OPEN WOUNDS OF LOWER LEG, UNSPECIFIED LATERALITY, INITIAL ENCOUNTER: ICD-10-CM

## 2022-07-27 DIAGNOSIS — L03.119 CELLULITIS OF LOWER EXTREMITY, UNSPECIFIED LATERALITY: Primary | ICD-10-CM

## 2022-07-27 DIAGNOSIS — U07.1 COVID-19 VIRUS INFECTION: ICD-10-CM

## 2022-07-27 PROBLEM — L03.116 BILATERAL LOWER LEG CELLULITIS: Status: ACTIVE | Noted: 2022-01-01

## 2022-07-27 PROBLEM — L03.116 BILATERAL LOWER LEG CELLULITIS: Status: ACTIVE | Noted: 2022-07-27

## 2022-07-27 PROBLEM — L03.115 BILATERAL LOWER LEG CELLULITIS: Status: ACTIVE | Noted: 2022-01-01

## 2022-07-27 LAB
ALBUMIN SERPL-MCNC: 2.3 G/DL (ref 3.5–5)
ALBUMIN/GLOB SERPL: 0.3 {RATIO} (ref 1.1–2.2)
ALP SERPL-CCNC: 93 U/L (ref 45–117)
ALT SERPL-CCNC: 23 U/L (ref 12–78)
ANION GAP SERPL CALC-SCNC: 6 MMOL/L (ref 5–15)
APPEARANCE UR: ABNORMAL
AST SERPL-CCNC: 58 U/L (ref 15–37)
BACTERIA URNS QL MICRO: ABNORMAL /HPF
BASOPHILS # BLD: 0 K/UL (ref 0–0.1)
BASOPHILS NFR BLD: 0 % (ref 0–1)
BILIRUB SERPL-MCNC: 0.4 MG/DL (ref 0.2–1)
BILIRUB UR QL: NEGATIVE
BUN SERPL-MCNC: 13 MG/DL (ref 6–20)
BUN/CREAT SERPL: 18 (ref 12–20)
CALCIUM SERPL-MCNC: 9.5 MG/DL (ref 8.5–10.1)
CHLORIDE SERPL-SCNC: 98 MMOL/L (ref 97–108)
CO2 SERPL-SCNC: 27 MMOL/L (ref 21–32)
COLOR UR: ABNORMAL
COMMENT, HOLDF: NORMAL
CREAT SERPL-MCNC: 0.73 MG/DL (ref 0.55–1.02)
DIFFERENTIAL METHOD BLD: NORMAL
EOSINOPHIL # BLD: 0.1 K/UL (ref 0–0.4)
EOSINOPHIL NFR BLD: 2 % (ref 0–7)
EPITH CASTS URNS QL MICRO: ABNORMAL /LPF
ERYTHROCYTE [DISTWIDTH] IN BLOOD BY AUTOMATED COUNT: 13.4 % (ref 11.5–14.5)
GLOBULIN SER CALC-MCNC: 7 G/DL (ref 2–4)
GLUCOSE BLD STRIP.AUTO-MCNC: 187 MG/DL (ref 65–117)
GLUCOSE SERPL-MCNC: 168 MG/DL (ref 65–100)
GLUCOSE UR STRIP.AUTO-MCNC: NEGATIVE MG/DL
HCT VFR BLD AUTO: 35.1 % (ref 35–47)
HGB BLD-MCNC: 11.5 G/DL (ref 11.5–16)
HGB UR QL STRIP: ABNORMAL
IMM GRANULOCYTES # BLD AUTO: 0 K/UL (ref 0–0.04)
IMM GRANULOCYTES NFR BLD AUTO: 0 % (ref 0–0.5)
KETONES UR QL STRIP.AUTO: NEGATIVE MG/DL
LEUKOCYTE ESTERASE UR QL STRIP.AUTO: ABNORMAL
LYMPHOCYTES # BLD: 1.2 K/UL (ref 0.8–3.5)
LYMPHOCYTES NFR BLD: 22 % (ref 12–49)
MCH RBC QN AUTO: 28.8 PG (ref 26–34)
MCHC RBC AUTO-ENTMCNC: 32.8 G/DL (ref 30–36.5)
MCV RBC AUTO: 87.8 FL (ref 80–99)
MONOCYTES # BLD: 0.4 K/UL (ref 0–1)
MONOCYTES NFR BLD: 8 % (ref 5–13)
NEUTS SEG # BLD: 3.7 K/UL (ref 1.8–8)
NEUTS SEG NFR BLD: 68 % (ref 32–75)
NITRITE UR QL STRIP.AUTO: NEGATIVE
NRBC # BLD: 0 K/UL (ref 0–0.01)
NRBC BLD-RTO: 0 PER 100 WBC
PH UR STRIP: 5.5 [PH] (ref 5–8)
PLATELET # BLD AUTO: 369 K/UL (ref 150–400)
PMV BLD AUTO: 10 FL (ref 8.9–12.9)
POTASSIUM SERPL-SCNC: 4.6 MMOL/L (ref 3.5–5.1)
PROT SERPL-MCNC: 9.3 G/DL (ref 6.4–8.2)
PROT UR STRIP-MCNC: NEGATIVE MG/DL
RBC # BLD AUTO: 4 M/UL (ref 3.8–5.2)
RBC #/AREA URNS HPF: ABNORMAL /HPF (ref 0–5)
SAMPLES BEING HELD,HOLD: NORMAL
SERVICE CMNT-IMP: ABNORMAL
SODIUM SERPL-SCNC: 131 MMOL/L (ref 136–145)
SP GR UR REFRACTOMETRY: 1.01
UA: UC IF INDICATED,UAUC: ABNORMAL
UROBILINOGEN UR QL STRIP.AUTO: 0.2 EU/DL (ref 0.2–1)
WBC # BLD AUTO: 5.4 K/UL (ref 3.6–11)
WBC URNS QL MICRO: >100 /HPF (ref 0–4)
YEAST URNS QL MICRO: PRESENT

## 2022-07-27 PROCEDURE — 82962 GLUCOSE BLOOD TEST: CPT

## 2022-07-27 PROCEDURE — 71045 X-RAY EXAM CHEST 1 VIEW: CPT

## 2022-07-27 PROCEDURE — 80053 COMPREHEN METABOLIC PANEL: CPT

## 2022-07-27 PROCEDURE — 80047 BASIC METABLC PNL IONIZED CA: CPT

## 2022-07-27 PROCEDURE — 65270000046 HC RM TELEMETRY

## 2022-07-27 PROCEDURE — 85025 COMPLETE CBC W/AUTO DIFF WBC: CPT

## 2022-07-27 PROCEDURE — 74011250637 HC RX REV CODE- 250/637: Performed by: INTERNAL MEDICINE

## 2022-07-27 PROCEDURE — 36415 COLL VENOUS BLD VENIPUNCTURE: CPT

## 2022-07-27 PROCEDURE — 74011250637 HC RX REV CODE- 250/637: Performed by: HOSPITALIST

## 2022-07-27 PROCEDURE — 99285 EMERGENCY DEPT VISIT HI MDM: CPT

## 2022-07-27 PROCEDURE — 73630 X-RAY EXAM OF FOOT: CPT

## 2022-07-27 PROCEDURE — 74011250637 HC RX REV CODE- 250/637: Performed by: EMERGENCY MEDICINE

## 2022-07-27 PROCEDURE — 87086 URINE CULTURE/COLONY COUNT: CPT

## 2022-07-27 PROCEDURE — 83605 ASSAY OF LACTIC ACID: CPT

## 2022-07-27 PROCEDURE — 81001 URINALYSIS AUTO W/SCOPE: CPT

## 2022-07-27 RX ORDER — PRAVASTATIN SODIUM 40 MG/1
80 TABLET ORAL DAILY
Status: DISCONTINUED | OUTPATIENT
Start: 2022-07-28 | End: 2022-08-02 | Stop reason: HOSPADM

## 2022-07-27 RX ORDER — ONDANSETRON 2 MG/ML
4 INJECTION INTRAMUSCULAR; INTRAVENOUS
Status: DISCONTINUED | OUTPATIENT
Start: 2022-07-27 | End: 2022-08-02 | Stop reason: HOSPADM

## 2022-07-27 RX ORDER — MAGNESIUM SULFATE 100 %
4 CRYSTALS MISCELLANEOUS AS NEEDED
Status: DISCONTINUED | OUTPATIENT
Start: 2022-07-27 | End: 2022-08-02 | Stop reason: HOSPADM

## 2022-07-27 RX ORDER — INSULIN LISPRO 100 [IU]/ML
INJECTION, SOLUTION INTRAVENOUS; SUBCUTANEOUS
Status: DISCONTINUED | OUTPATIENT
Start: 2022-07-27 | End: 2022-08-02 | Stop reason: HOSPADM

## 2022-07-27 RX ORDER — NYSTATIN 100000 [USP'U]/G
POWDER TOPICAL
Status: COMPLETED | OUTPATIENT
Start: 2022-07-27 | End: 2022-07-27

## 2022-07-27 RX ORDER — POLYETHYLENE GLYCOL 3350 17 G/17G
17 POWDER, FOR SOLUTION ORAL DAILY PRN
Status: DISCONTINUED | OUTPATIENT
Start: 2022-07-27 | End: 2022-08-02 | Stop reason: HOSPADM

## 2022-07-27 RX ORDER — LATANOPROST 50 UG/ML
1 SOLUTION/ DROPS OPHTHALMIC
Status: DISCONTINUED | OUTPATIENT
Start: 2022-07-27 | End: 2022-08-02 | Stop reason: HOSPADM

## 2022-07-27 RX ORDER — ACETAMINOPHEN 325 MG/1
650 TABLET ORAL
Status: DISCONTINUED | OUTPATIENT
Start: 2022-07-27 | End: 2022-08-02 | Stop reason: HOSPADM

## 2022-07-27 RX ORDER — CARVEDILOL 12.5 MG/1
12.5 TABLET ORAL 2 TIMES DAILY
Status: DISCONTINUED | OUTPATIENT
Start: 2022-07-27 | End: 2022-08-02 | Stop reason: HOSPADM

## 2022-07-27 RX ORDER — SODIUM CHLORIDE 0.9 % (FLUSH) 0.9 %
5-40 SYRINGE (ML) INJECTION AS NEEDED
Status: DISCONTINUED | OUTPATIENT
Start: 2022-07-27 | End: 2022-08-02 | Stop reason: HOSPADM

## 2022-07-27 RX ORDER — CLONIDINE HYDROCHLORIDE 0.1 MG/1
0.1 TABLET ORAL 2 TIMES DAILY
Status: DISCONTINUED | OUTPATIENT
Start: 2022-07-27 | End: 2022-08-02 | Stop reason: HOSPADM

## 2022-07-27 RX ORDER — FUROSEMIDE 40 MG/1
40 TABLET ORAL DAILY
Status: DISCONTINUED | OUTPATIENT
Start: 2022-07-28 | End: 2022-08-02 | Stop reason: HOSPADM

## 2022-07-27 RX ORDER — ONDANSETRON 4 MG/1
4 TABLET, ORALLY DISINTEGRATING ORAL
Status: DISCONTINUED | OUTPATIENT
Start: 2022-07-27 | End: 2022-08-02 | Stop reason: HOSPADM

## 2022-07-27 RX ORDER — ACETAMINOPHEN 650 MG/1
650 SUPPOSITORY RECTAL
Status: DISCONTINUED | OUTPATIENT
Start: 2022-07-27 | End: 2022-08-02 | Stop reason: HOSPADM

## 2022-07-27 RX ORDER — AMLODIPINE BESYLATE 5 MG/1
10 TABLET ORAL DAILY
Status: DISCONTINUED | OUTPATIENT
Start: 2022-07-28 | End: 2022-08-02 | Stop reason: HOSPADM

## 2022-07-27 RX ORDER — SODIUM CHLORIDE 0.9 % (FLUSH) 0.9 %
5-40 SYRINGE (ML) INJECTION EVERY 8 HOURS
Status: DISCONTINUED | OUTPATIENT
Start: 2022-07-27 | End: 2022-07-29

## 2022-07-27 RX ORDER — CAPTOPRIL 25 MG/1
25 TABLET ORAL 2 TIMES DAILY
Status: DISCONTINUED | OUTPATIENT
Start: 2022-07-27 | End: 2022-08-02 | Stop reason: HOSPADM

## 2022-07-27 RX ORDER — AZTREONAM 1 G/1
1 INJECTION, POWDER, LYOPHILIZED, FOR SOLUTION INTRAMUSCULAR; INTRAVENOUS EVERY 8 HOURS
Status: DISCONTINUED | OUTPATIENT
Start: 2022-07-27 | End: 2022-07-27

## 2022-07-27 RX ORDER — DEXTROSE MONOHYDRATE 100 MG/ML
0-250 INJECTION, SOLUTION INTRAVENOUS AS NEEDED
Status: DISCONTINUED | OUTPATIENT
Start: 2022-07-27 | End: 2022-08-02 | Stop reason: HOSPADM

## 2022-07-27 RX ORDER — VANCOMYCIN 2 GRAM/500 ML IN 0.9 % SODIUM CHLORIDE INTRAVENOUS
2000 ONCE
Status: DISPENSED | OUTPATIENT
Start: 2022-07-27 | End: 2022-07-28

## 2022-07-27 RX ORDER — ASPIRIN 325 MG
325 TABLET ORAL DAILY
Status: DISCONTINUED | OUTPATIENT
Start: 2022-07-28 | End: 2022-08-02 | Stop reason: HOSPADM

## 2022-07-27 RX ORDER — ACETAMINOPHEN 500 MG
1000 TABLET ORAL ONCE
Status: COMPLETED | OUTPATIENT
Start: 2022-07-27 | End: 2022-07-27

## 2022-07-27 RX ORDER — LINEZOLID 600 MG/1
600 TABLET, FILM COATED ORAL
Status: COMPLETED | OUTPATIENT
Start: 2022-07-27 | End: 2022-07-27

## 2022-07-27 RX ADMIN — CLONIDINE HYDROCHLORIDE 0.1 MG: 0.1 TABLET ORAL at 21:05

## 2022-07-27 RX ADMIN — CAPTOPRIL 25 MG: 25 TABLET ORAL at 21:11

## 2022-07-27 RX ADMIN — NYSTATIN: 100000 POWDER TOPICAL at 21:11

## 2022-07-27 RX ADMIN — CARVEDILOL 12.5 MG: 12.5 TABLET, FILM COATED ORAL at 21:05

## 2022-07-27 RX ADMIN — ACETAMINOPHEN 1000 MG: 500 TABLET ORAL at 17:15

## 2022-07-27 RX ADMIN — LINEZOLID 600 MG: 600 TABLET, FILM COATED ORAL at 22:18

## 2022-07-27 NOTE — ED NOTES
Pt presents to ER w/ general weakness and malaise x1 week. Reports that she called the ambulance due to receiving x3 positive home COVID tests. She confessed that she, \"Just feels bad\" and wants to get better. Assisted to position of comfort, call bell within reach.

## 2022-07-27 NOTE — ED NOTES
Confirmed with lab that metabolic labs are currently being processed. Lab states tube was received at 1530.

## 2022-07-27 NOTE — ED PROVIDER NOTES
EMERGENCY DEPARTMENT HISTORY AND PHYSICAL EXAM      Date: 7/27/2022  Patient Name: Desiree Coleman    History of Presenting Illness     Chief Complaint   Patient presents with    Cough     Weak, cough and congestion and tested positive for Covid; pt has had Covid Vaccinations. Has left sided weakness from a previous stroke    Positive For Covid-19       History Provided By: Patient and EMS    HPI: Desiree Coleman, 65 y.o. female presents to the ED with cc of COVID-positive and weakness. Patient is vaccinated against COVID-19 and has received a booster. She has had symptoms for over a week. She says she has taken 3 COVID test which have all come back positive. She feels like she has had chills, but no fever. She has had occasional headaches and congestion. She denies any headache currently. She has baseline left-sided weakness from a previous stroke, and lymphedema bilaterally in her legs. She was supposed to see her wound care doctor today, but came to the ER. She is unable to walk at baseline. She called EMS, because she felt weaker than normal.  She has a cough which is nonproductive. She denies dysuria or abdominal pain. She denies shortness of breath. There are no other complaints, changes, or physical findings at this time. PCP: Mu Taveras MD    No current facility-administered medications on file prior to encounter. Current Outpatient Medications on File Prior to Encounter   Medication Sig Dispense Refill    insulin NPH (NovoLIN N NPH U-100 Insulin) 100 unit/mL injection 44 Units by SubCUTAneous route every morning.  33 units in evening 80 mL 0    glucose blood VI test strips (FreeStyle Lite Strips) strip USE 1 STRIP TO CHECK GLUCOSE ONCE DAILY 100 Strip 1    Insulin Syringe-Needle U-100 (BD Insulin Syringe Ultra-Fine) 0.5 mL 31 gauge x 5/16\" syrg USE 1 SYRINGE TWICE A  Each 1    ondansetron hcl (Zofran) 4 mg tablet Take 1 Tablet by mouth every eight (8) hours as needed for Nausea. 15 Tablet 0    cloNIDine HCL (CATAPRES) 0.1 mg tablet TAKE 1 TABLET THREE TIMES A  Tablet 0    carvediloL (COREG) 12.5 mg tablet TAKE 1 TABLET TWICE A  Tablet 0    amLODIPine (NORVASC) 10 mg tablet Take 1 Tablet by mouth daily. 90 Tablet 0    pravastatin (PRAVACHOL) 80 mg tablet TAKE 1 TABLET NIGHTLY 90 Tablet 1    rivaroxaban (Xarelto) 20 mg tab tablet Take 1 Tablet by mouth daily. 90 Tablet 1    mupirocin (BACTROBAN) 2 % ointment APPLY TOPICALLY TO THE AFFECTED AREA DAILY 22 g 0    sodium hypochlorite (Dakin's Solution) external solution Apply to wounds twice daily and cover with dressing 1000 mL 3    captopriL (CAPOTEN) 25 mg tablet TAKE 1 TABLET TWICE A  Tablet 2    furosemide (LASIX) 40 mg tablet TAKE 1 TABLET DAILY (Patient taking differently: Take 40 mg by mouth two (2) times a day.) 90 Tab 2    lancets (FreeStyle Lancets) 28 gauge misc Daily blood sugar checks 100 Lancet 3    pomegranate xt/pomegranat seed (POMEGRANATE PO) Take  by mouth. flash glucose scanning reader (FREESTYLE JOSE LUIS READER) [facility] [place] to check blood sugars daily  DX: E11.9 1 Device 0    flash glucose sensor (FREESTYLE JOSE LUIS SENSOR) kit Use to check blood sugars daily  DX: E11.9 1 Device 0    ergocalciferol (VITAMIN D2) 50,000 unit capsule Take 50,000 Units by mouth every seven (7) days. Blood-Glucose Meter (FREESTYLE FREEDOM LITE) monitoring kit Daily blood sugar checks 1 Kit 0    naloxone (NARCAN) 4 mg/actuation nasal spray Use 1 spray intranasally into 1 nostril. Indications: OPIATE-INDUCED RESPIRATORY DEPRESSION, Opioid Toxicity (Patient not taking: Reported on 5/18/2022) 1 Each 0    latanoprost (XALATAN) 0.005 % ophthalmic solution Administer 1 Drop to both eyes nightly. aspirin (ASPIRIN) 325 mg tablet Take 1 Tab by mouth daily.          Past History     Past Medical History:  Past Medical History:   Diagnosis Date    Anticoagulant long-term use 10/13/2017    Anxiety 10/13/2017    Atrial fibrillation ([address] 10/13/2017    Breast calcification, left 10/13/2017    CAD (coronary artery disease)     Cellulitis of both lower extremities 10/13/2017    Chronic kidney disease     kidney stones    Chronic pain syndrome 10/13/2017    Chronic prescription opiate use 11/15/2017    CVA (cerebral vascular accident) ([address] 10/13/2017    Diabetes ([address]     DJD (degenerative joint disease) 10/13/2017    Dyslipidemia 10/13/2017    Glaucoma 10/13/2017    Hypertension     Hyperthyroidism 10/13/2017    Long-term use of high-risk medication 10/13/2017    Stasis ulcer of lower extremity ([address] 10/13/2017    Stroke [address]     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 ([facility] 8/18/2012    retinopathy        Past Surgical History:  Past Surgical History:   Procedure Laterality Date    HX BREAST BIOPSY Left     2014    HX GYN      hysterectomty    HX ORTHOPAEDIC      back surgery    [place] CARDIAC SURG PROCEDURE UNLIST      cagb       Family History:  Family History   Problem Relation Age of Onset    Heart Disease Father        Social History:  Social History     Tobacco Use    Smoking status: Never    Smokeless tobacco: Never   Vaping Use    Vaping Use: Never used   Substance Use Topics    Alcohol use: No    Drug use: No       Allergies: Allergies   Allergen Reactions    Betadine Prepstick Rash    Keflex [Cephalexin] Hives     Pt breaks out in hives         Review of Systems   Review of Systems   Constitutional:  Positive for chills. HENT:  Negative for congestion. Eyes: Negative. Respiratory:  Positive for cough. Cardiovascular:  Negative for chest pain. Gastrointestinal:  Negative for abdominal pain. Endocrine: Negative for heat intolerance. Genitourinary: Negative. Musculoskeletal:  Negative for back pain. Skin:  Positive for wound. Allergic/Immunologic: Negative for immunocompromised state. Neurological:  Positive for weakness. Hematological:  Does not bruise/bleed easily. Psychiatric/Behavioral: Negative. All other systems reviewed and are negative. Physical Exam   Physical Exam  Vitals and nursing note reviewed. Constitutional:       General: She is not in acute distress. Appearance: She is well-developed. HENT:      Head: Normocephalic. Eyes:      Extraocular Movements: Extraocular movements intact. Cardiovascular:      Rate and Rhythm: Normal rate and regular rhythm. Pulses: Normal pulses. Heart sounds: Normal heart sounds. Pulmonary:      Effort: Pulmonary effort is normal.      Breath sounds: Normal breath sounds. Abdominal:      General: Bowel sounds are normal.      Palpations: Abdomen is soft. Tenderness: There is no abdominal tenderness. Musculoskeletal:         General: Tenderness present. Normal range of motion. Cervical back: Normal range of motion and neck supple. Right lower leg: Edema present. Left lower leg: Edema present. Comments: bilateral lymphedema, left heel tenderness, no erythema,   Skin:     General: Skin is warm and dry. Comments: Possible yeast infection in the groin,   Neurological:      Mental Status: She is alert and oriented to person, place, and time.       Comments:   Left-sided weakness at baseline   Psychiatric:         Mood and Affect: Mood normal.         Behavior: Behavior normal.       Diagnostic Study Results     Labs -     Recent Results (from the past 12 hour(s))   CBC WITH AUTOMATED DIFF    Collection Time: 07/27/22  2:16 PM   Result Value Ref Range    WBC 5.4 3.6 - 11.0 K/uL    RBC 4.00 3.80 - 5.20 M/uL    HGB 11.5 11.5 - 16.0 g/dL    HCT 35.1 35.0 - 47.0 %    MCV 87.8 80.0 - 99.0 FL    MCH 28.8 26.0 - 34.0 PG    MCHC 32.8 30.0 - 36.5 g/dL    RDW 13.4 11.5 - 14.5 %    PLATELET 355 921 - 519 K/uL    MPV 10.0 8.9 - 12.9 FL    NRBC 0.0 0  WBC    ABSOLUTE NRBC 0.00 0.00 - 0.01 K/uL    NEUTROPHILS 68 32 - 75 %    LYMPHOCYTES 22 12 - 49 %    MONOCYTES 8 5 - 13 % EOSINOPHILS 2 0 - 7 %    BASOPHILS 0 0 - 1 %    IMMATURE GRANULOCYTES 0 0.0 - 0.5 %    ABS. NEUTROPHILS 3.7 1.8 - 8.0 K/UL    ABS. LYMPHOCYTES 1.2 0.8 - 3.5 K/UL    ABS. MONOCYTES 0.4 0.0 - 1.0 K/UL    ABS. EOSINOPHILS 0.1 0.0 - 0.4 K/UL    ABS. BASOPHILS 0.0 0.0 - 0.1 K/UL    ABS. IMM. GRANS. 0.0 0.00 - 0.04 K/UL    DF AUTOMATED     URINALYSIS W/ REFLEX CULTURE    Collection Time: 07/27/22  6:46 PM    Specimen: Urine   Result Value Ref Range    Color YELLOW/STRAW      Appearance CLOUDY (A) CLEAR      Specific gravity 1.009      pH (UA) 5.5 5.0 - 8.0      Protein Negative NEG mg/dL    Glucose Negative NEG mg/dL    Ketone Negative NEG mg/dL    Bilirubin Negative NEG      Blood MODERATE (A) NEG      Urobilinogen 0.2 0.2 - 1.0 EU/dL    Nitrites Negative NEG      Leukocyte Esterase LARGE (A) NEG      WBC >100 (H) 0 - 4 /hpf    RBC 20-50 0 - 5 /hpf    Epithelial cells FEW FEW /lpf    Bacteria 4+ (A) NEG /hpf    UA:UC IF INDICATED URINE CULTURE ORDERED (A) CNI      Yeast PRESENT (A) NEG         Radiologic Studies -   XR FOOT LT MIN 3 V   Final Result   Diffuse osteopenia limits evaluation. No definite acute osseous   abnormality. Diffuse soft tissue swelling. XR CHEST PORT   Final Result      No acute findings. CT Results  (Last 48 hours)      None          CXR Results  (Last 48 hours)                 07/27/22 1539  XR CHEST PORT Final result    Impression:      No acute findings. Narrative:  EXAM:  XR CHEST PORT       INDICATION: weak, covid positive and cough and congestion       COMPARISON: Chest radiograph 1/30/2019       TECHNIQUE: Upright portable chest AP view       FINDINGS:        Median sternotomy and coronary vascularization. Cardiac mediastinal silhouette is stably mildly enlarged. Lungs and pleural spaces are grossly clear. Medical Decision Making   I am the first provider for this patient.     I reviewed the vital signs, available nursing notes, past medical history, past surgical history, family history and social history. Vital Signs-Reviewed the patient's vital signs. Patient Vitals for the past 12 hrs:   Temp Pulse Resp BP SpO2   07/27/22 1406 98.5 °F (36.9 °C) 82 18 (!) 154/56 98 %       Records Reviewed: Nursing Notes, Old Medical Records, Ambulance Run Sheet, Previous Radiology Studies, and Previous Laboratory Studies    Provider Notes (Medical Decision Making):   Cellulitis, lymphedema, UTI, COVID-19 infection, pneumonia    ED Course:   Initial assessment performed. The patients presenting problems have been discussed, and they are in agreement with the care plan formulated and outlined with them. I have encouraged them to ask questions as they arise throughout their visit. Consult note:    Patient is being admitted by the hospitalist, [address] Time:   0    Disposition:  admit    DISCHARGE PLAN:  1. Current Discharge Medication List        2. Follow-up Information    None       3. Return to ED if worse     Diagnosis     Clinical Impression:   1. Cellulitis of lower extremity, unspecified laterality    2. COVID-19 virus infection    3. Multiple open wounds of lower leg, unspecified laterality, initial encounter        Attestations:    Nora Tran MD        Please note that this dictation was completed with Amara, the computer voice recognition software. Quite often unanticipated grammatical, syntax, homophones, and other interpretive errors are inadvertently transcribed by the computer software. Please disregard these errors. Please excuse any errors that have escaped final proofreading. Thank you.

## 2022-07-27 NOTE — ED NOTES
Patient with excoriation to henry area and buttocks. Patient also with non blanchable redness and purple skin to sacraum. Pt. With wounds to bilateral lower extremities. Patient with redness to lower abdominal skin fold. Spoke with Dr. Bruno Carballo who is aware of wounds. Patient states that she has not seen her wound care team in 1 week. Barrier cream applied to henry area and buttocks.

## 2022-07-28 LAB
ANION GAP BLD CALC-SCNC: 15 MMOL/L (ref 10–20)
ANION GAP SERPL CALC-SCNC: 3 MMOL/L (ref 5–15)
BUN SERPL-MCNC: 12 MG/DL (ref 6–20)
BUN/CREAT SERPL: 18 (ref 12–20)
CA-I BLD-MCNC: 1.26 MMOL/L (ref 1.12–1.32)
CALCIUM SERPL-MCNC: 8.9 MG/DL (ref 8.5–10.1)
CHLORIDE BLD-SCNC: 95 MMOL/L (ref 100–108)
CHLORIDE SERPL-SCNC: 101 MMOL/L (ref 97–108)
CO2 BLD-SCNC: 29 MMOL/L (ref 19–24)
CO2 SERPL-SCNC: 31 MMOL/L (ref 21–32)
CREAT SERPL-MCNC: 0.67 MG/DL (ref 0.55–1.02)
CREAT UR-MCNC: 0.6 MG/DL (ref 0.6–1.3)
CRP SERPL-MCNC: 2.88 MG/DL (ref 0–0.6)
D DIMER PPP FEU-MCNC: 0.57 MG/L FEU (ref 0–0.65)
ERYTHROCYTE [DISTWIDTH] IN BLOOD BY AUTOMATED COUNT: 13.3 % (ref 11.5–14.5)
GLUCOSE BLD STRIP.AUTO-MCNC: 102 MG/DL (ref 65–117)
GLUCOSE BLD STRIP.AUTO-MCNC: 107 MG/DL (ref 65–117)
GLUCOSE BLD STRIP.AUTO-MCNC: 109 MG/DL (ref 65–117)
GLUCOSE BLD STRIP.AUTO-MCNC: 156 MG/DL (ref 65–117)
GLUCOSE BLD STRIP.AUTO-MCNC: 59 MG/DL (ref 65–117)
GLUCOSE BLD STRIP.AUTO-MCNC: 71 MG/DL (ref 65–117)
GLUCOSE SERPL-MCNC: 69 MG/DL (ref 65–100)
HCT VFR BLD AUTO: 29.6 % (ref 35–47)
HGB BLD-MCNC: 9.9 G/DL (ref 11.5–16)
LACTATE BLD-SCNC: 1.4 MMOL/L (ref 0.4–2)
MCH RBC QN AUTO: 29.3 PG (ref 26–34)
MCHC RBC AUTO-ENTMCNC: 33.4 G/DL (ref 30–36.5)
MCV RBC AUTO: 87.6 FL (ref 80–99)
NRBC # BLD: 0 K/UL (ref 0–0.01)
NRBC BLD-RTO: 0 PER 100 WBC
PLATELET # BLD AUTO: 330 K/UL (ref 150–400)
PMV BLD AUTO: 9.7 FL (ref 8.9–12.9)
POTASSIUM BLD-SCNC: 3.9 MMOL/L (ref 3.5–5.5)
POTASSIUM SERPL-SCNC: 3.6 MMOL/L (ref 3.5–5.1)
RBC # BLD AUTO: 3.38 M/UL (ref 3.8–5.2)
SERVICE CMNT-IMP: ABNORMAL
SERVICE CMNT-IMP: ABNORMAL
SERVICE CMNT-IMP: NORMAL
SODIUM BLD-SCNC: 138 MMOL/L (ref 136–145)
SODIUM SERPL-SCNC: 135 MMOL/L (ref 136–145)
WBC # BLD AUTO: 4.9 K/UL (ref 3.6–11)

## 2022-07-28 PROCEDURE — 74011250636 HC RX REV CODE- 250/636: Performed by: INTERNAL MEDICINE

## 2022-07-28 PROCEDURE — 0202U NFCT DS 22 TRGT SARS-COV-2: CPT

## 2022-07-28 PROCEDURE — 76937 US GUIDE VASCULAR ACCESS: CPT

## 2022-07-28 PROCEDURE — 74011636637 HC RX REV CODE- 636/637: Performed by: INTERNAL MEDICINE

## 2022-07-28 PROCEDURE — 74011250636 HC RX REV CODE- 250/636: Performed by: HOSPITALIST

## 2022-07-28 PROCEDURE — 74011250637 HC RX REV CODE- 250/637: Performed by: INTERNAL MEDICINE

## 2022-07-28 PROCEDURE — 82962 GLUCOSE BLOOD TEST: CPT

## 2022-07-28 PROCEDURE — 85379 FIBRIN DEGRADATION QUANT: CPT

## 2022-07-28 PROCEDURE — 36415 COLL VENOUS BLD VENIPUNCTURE: CPT

## 2022-07-28 PROCEDURE — 65270000046 HC RM TELEMETRY

## 2022-07-28 PROCEDURE — 80048 BASIC METABOLIC PNL TOTAL CA: CPT

## 2022-07-28 PROCEDURE — 74011000258 HC RX REV CODE- 258: Performed by: INTERNAL MEDICINE

## 2022-07-28 PROCEDURE — 74011000250 HC RX REV CODE- 250: Performed by: INTERNAL MEDICINE

## 2022-07-28 PROCEDURE — 85027 COMPLETE CBC AUTOMATED: CPT

## 2022-07-28 PROCEDURE — 86140 C-REACTIVE PROTEIN: CPT

## 2022-07-28 RX ORDER — VANCOMYCIN 2 GRAM/500 ML IN 0.9 % SODIUM CHLORIDE INTRAVENOUS
2000 ONCE
Status: COMPLETED | OUTPATIENT
Start: 2022-07-28 | End: 2022-07-28

## 2022-07-28 RX ORDER — DEXTROSE MONOHYDRATE 100 MG/ML
INJECTION, SOLUTION INTRAVENOUS
Status: DISPENSED
Start: 2022-07-28 | End: 2022-07-29

## 2022-07-28 RX ADMIN — ACETAMINOPHEN 650 MG: 325 TABLET ORAL at 09:00

## 2022-07-28 RX ADMIN — ASPIRIN 325 MG ORAL TABLET 325 MG: 325 PILL ORAL at 09:00

## 2022-07-28 RX ADMIN — CAPTOPRIL 25 MG: 25 TABLET ORAL at 09:00

## 2022-07-28 RX ADMIN — VANCOMYCIN HYDROCHLORIDE 2000 MG: 500 INJECTION, POWDER, LYOPHILIZED, FOR SOLUTION INTRAVENOUS at 17:54

## 2022-07-28 RX ADMIN — CLONIDINE HYDROCHLORIDE 0.1 MG: 0.1 TABLET ORAL at 17:54

## 2022-07-28 RX ADMIN — Medication 30 UNITS: at 09:01

## 2022-07-28 RX ADMIN — CLONIDINE HYDROCHLORIDE 0.1 MG: 0.1 TABLET ORAL at 09:01

## 2022-07-28 RX ADMIN — LATANOPROST 1 DROP: 50 SOLUTION OPHTHALMIC at 22:00

## 2022-07-28 RX ADMIN — FUROSEMIDE 40 MG: 40 TABLET ORAL at 09:00

## 2022-07-28 RX ADMIN — CARVEDILOL 12.5 MG: 12.5 TABLET, FILM COATED ORAL at 09:00

## 2022-07-28 RX ADMIN — ACETAMINOPHEN 650 MG: 325 TABLET ORAL at 02:06

## 2022-07-28 RX ADMIN — CAPTOPRIL 25 MG: 25 TABLET ORAL at 17:54

## 2022-07-28 RX ADMIN — RIVAROXABAN 20 MG: 20 TABLET, FILM COATED ORAL at 09:00

## 2022-07-28 RX ADMIN — DEXTROSE MONOHYDRATE 250 ML: 100 INJECTION, SOLUTION INTRAVENOUS at 17:51

## 2022-07-28 RX ADMIN — SODIUM CHLORIDE, PRESERVATIVE FREE 10 ML: 5 INJECTION INTRAVENOUS at 21:48

## 2022-07-28 RX ADMIN — AMLODIPINE BESYLATE 10 MG: 5 TABLET ORAL at 09:00

## 2022-07-28 RX ADMIN — CARVEDILOL 12.5 MG: 12.5 TABLET, FILM COATED ORAL at 17:54

## 2022-07-28 RX ADMIN — AZTREONAM 1 G: 1 INJECTION, POWDER, LYOPHILIZED, FOR SOLUTION INTRAMUSCULAR; INTRAVENOUS at 22:00

## 2022-07-28 RX ADMIN — AZTREONAM 1 G: 1 INJECTION, POWDER, LYOPHILIZED, FOR SOLUTION INTRAMUSCULAR; INTRAVENOUS at 15:27

## 2022-07-28 RX ADMIN — ACETAMINOPHEN 650 MG: 325 TABLET ORAL at 14:26

## 2022-07-28 NOTE — PROGRESS NOTES
MD and PICC team aware that patient cannot receive antibiotics without access. PICC team stated they would try their best to place line but is really busy.

## 2022-07-28 NOTE — PROGRESS NOTES
Problem: Risk for Spread of Infection  Goal: Prevent transmission of infectious organism to others  Description: Prevent the transmission of infectious organisms to other patients, staff members, and visitors.   Outcome: Progressing Towards Goal     Instructed and emphasized importance of proper hand hygiene, proper disposal of tissues especially with respiratory secretions, cover cough and sneezes and to refrain from activities that might spread infections

## 2022-07-28 NOTE — PROGRESS NOTES
Patient very agitated, patient is refusing blood glucose checks. Patient is stating she would like to speak with her daughter, RN attempted to place call to daughter, no answer. Will continue to try to reach daughter for patient.

## 2022-07-28 NOTE — ROUTINE PROCESS
Turning done every 2 hours, heels kept offloaded made sure patient is relieving pressure points with the help from the team.

## 2022-07-28 NOTE — PROGRESS NOTES
Endurance Catheter insertion note     Inserted 20 G, 6 cm long  Endurance Extended Dwell Peripheral Catheter into right forearm using ultrasound guidance and sterile technique. Positive blood return. Patient tolerated procedure well. Denies questions or concerns at this time. The Endurance catheter is an extended dwell peripheral catheter and may remain in place 29 days. Blood samples can be obtained from this catheter. To obtain labs, a tourniquet may be used, flush with 10ml NS, waste 3ml, draw labs, flush with 20ml NS. Dressings needs to be changed with central line dressing kit using bio patch every 7 days by nurse. Primary nurse        RN notified.         [address] Vascular Access Team. PICC Nurse

## 2022-07-28 NOTE — ED NOTES
Call received from Reena Norton [facility] order for Vancomycin held at this time. Orders for Zyvox will follow. Patient will need consult for PICC access.

## 2022-07-28 NOTE — PROGRESS NOTES
Comprehensive Nutrition Assessment    Type and Reason for Visit: Initial, Wound    Nutrition Recommendations/Plan:   Continue CCD  Add ensure high protein daily     Nutrition Assessment:    Patient medically noted for bilateral lower extremity cellulitis and COVID-19. PMH DM, HTN, CVA, CKD, and Heart failure. Currently receiving a 4 Carb choice diet. Referral received for documented PI's. Wound care consult pending. Attempted to contact patient by phone but unable to speak with her. Will add ensure high protein daily for now. Encourage intake of meals. Will monitor PO and acceptance of ONS. Nutrition Related Findings:    Na 135, -703-  Norvasc, Coreg, Lasix, Humalog, NPH, Pravastatin   Wound Type: Wound consult pending    Current Nutrition Intake & Therapies:        ADULT DIET Regular; 4 carb choices (60 gm/meal)    Anthropometric Measures:  Height: 5' 1\" (154.9 cm)  Ideal Body Weight (IBW): 105 lbs (48 kg)     Current Body Wt:  99.8 kg (220 lb 0.3 oz), 209.5 % IBW. Current BMI (kg/m2): 41.6                          BMI Category: Obese class 3 (BMI 40.0 or greater)    Estimated Daily Nutrient Needs:  Energy Requirements Based On: Formula  Weight Used for Energy Requirements: Current  Energy (kcal/day): 1843 kcal (BMR 1417 x 1. 3AF)  Weight Used for Protein Requirements: Current  Protein (g/day): 100g (1.0 g/kg bw)  Method Used for Fluid Requirements: 1 ml/kcal  Fluid (ml/day): 1850 mL    Nutrition Diagnosis:   No nutrition diagnosis at this time       Nutrition Interventions:   Food and/or Nutrient Delivery: Continue current diet, Start oral nutrition supplement  Nutrition Education/Counseling: No recommendations at this time  Coordination of Nutrition Care: Continue to monitor while inpatient       Goals:     Goals:  (PO intake >50% of meals/ONS next 3-5 days)       Nutrition Monitoring and Evaluation:   Behavioral-Environmental Outcomes: None identified  Food/Nutrient Intake Outcomes: Food and nutrient intake, Supplement intake  Physical Signs/Symptoms Outcomes: Biochemical data, Skin, Weight, Fluid status or edema    Discharge Planning:    Continue current diet    Rajni Rowan RD  Contact: ext [phone]

## 2022-07-28 NOTE — CONSULTS
Seen patient in the room resting verbally responsive     Consulted for bilateral foot and heel wounds    Superficial pre ulcerative lesions none open left heel same presentatio    Unable to palpate pedal pulses due to sever Lymphatic edema     No acute treatment required     Recommend out patient Lymphedema treatment     Will sign off on this patient     If need any other assistance please reconsult    Full note to follow

## 2022-07-28 NOTE — ED NOTES
Ultrasound attempt, unsuccessful. Perfect Serve message sent to Lc Vásquez MD at 2048. Antibiotic therapy remains delayed.

## 2022-07-28 NOTE — PROGRESS NOTES
Unable to obtain IV access per nightshift RN. Notified PICC team for placement of endurance catheter, patient has an antibiotic due with no access. Will notify MD again.

## 2022-07-28 NOTE — PROGRESS NOTES
Transition of Care Plan:    RUR: 13%  Disposition: home with family vs SNF placement (pt interested in rehab)   Follow up appointments: PCP, specialists as indicated   DME needed: has hospital bed, BSC, wheelchair   Transportation at Discharge: family vs medical transport pending progress   Keys or means to access home: yes     IM Medicare Letter: to be reviewed prior to d/c   Is patient a BCPI-A Bundle: No    If yes, was Bundle Letter given?:    Is patient a  and connected with the [address]? No            If yes, was [employer] transfer form completed and [facility] notified? Caregiver Contact: Bryantkeiko Les (spouse) [phone]  Discharge Caregiver contacted prior to discharge? yes  Care Conference needed?:  No    Reason for Admission:  bilateral lower leg cellulitis                      RUR Score:  13%                   Plan for utilizing home health: TBD, spouse reports she was having home visits (SN) for wound care        PCP: First and Last name:  Rosco Koyanagi, MD (last saw Valeria Castellano in May 2022)     Name of Practice:    Are you a current patient: Yes/No: yes   Approximate date of last visit: 5/5/2022   Can you participate in a virtual visit with your PCP: with family assist                     Current Advanced Directive/Advance Care Plan: Full Code    Advance Care Planning   Healthcare Decision Maker: Ericka Saldana (spouse)    Today we documented Decision Maker(s) consistent with Legal Next of Kin hierarchy. Transition of Care Plan:    home with family, Naomy Cardona vs SNF placement. Pt's family is interested in pursuing rehab at discharge. Chart reviewed. Attempted to reach pt via room phone to complete assessment today. Pt answered the phone, however politely declined and asked CM to contact her spouse, Ericka Saldana. Called placed to spouse. Verified contact information and demographics. Pt resides with spouse and son in a two level home with 4 [address]NAMI[address].  Pt's bedroom is located on the first floor of the home. Spouse reports that family has to assist with ADLs. Pt is bed or chair bound, spouse reports she does not walk. Pt has access to a wheelchair, hospital bed, and BSC at home. Spouse reports pt utilizes briefs/diapers for toileting most of the time. PCP was Dr. Brandon Fontana who has since retired. Pt last saw Dr. Jaren Little in May 2022. Preferred pharmacy is [facility]. Pt with hx of rehab at [address] [place] [address]. No ACP on file. Spouse reports family can transport home and bring her wheelchair. Spouse shared that he is interested in pt going to rehab after discharge with the goal of returning home after. Pt will benefit from PT/OT consults when medically appropriate. CM will continue to follow. Care Management Interventions  PCP Verified by CM: Yes  Palliative Care Criteria Met (RRAT>21 & CHF Dx)?: No  Mode of Transport at Discharge:  Other (see comment) (family to transport )  Transition of Care Consult (CM Consult): Discharge Planning  Discharge Durable Medical Equipment: No  Physical Therapy Consult: No  Occupational Therapy Consult: No  Speech Therapy Consult: No  Support Systems: Spouse/Significant Other, Child(jonatan), Caregiver/Home Care Staff  Confirm Follow Up Transport: Family  The Patient and/or Patient Representative was Provided with a Choice of Provider and Agrees with the Discharge Plan?: Yes  Name of the Patient Representative Who was Provided with a Choice of Provider and Agrees with the Discharge Plan: spouse  Freedom of Choice List was Provided with Basic Dialogue that Supports the Patient's Individualized Plan of Care/Goals, Treatment Preferences and Shares the Quality Data Associated with the Providers?: No  [place] Resource Information Provided?: No  Discharge Location  Patient Expects to be Discharged to[de-identified] Skilled nursing facility    Jes Gutierrez, [address], ED [facility]  [phone]

## 2022-07-28 NOTE — PROGRESS NOTES
Bedside shift change report given to [address]on (oncoming nurse) by Shane Tabares RN(offgoing nurse). Report included the following information SBAR, Kardex, Intake/Output, MAR, and Cardiac Rhythm Sinus rhythm .

## 2022-07-28 NOTE — PROGRESS NOTES
Problem: Pressure Injury - Risk of  Goal: *Prevention of pressure injury  Description: Document Basilio Scale and appropriate interventions in the flowsheet. Outcome: Progressing Towards Goal  Note: Pressure Injury Interventions:  Sensory Interventions: Assess changes in LOC, Assess need for specialty bed, Float heels, Keep linens dry and wrinkle-free, Minimize linen layers, Turn and reposition approx. every two hours (pillows and wedges if needed)    Moisture Interventions: Apply protective barrier, creams and emollients, Assess need for specialty bed, Check for incontinence Q2 hours and as needed, Internal/External urinary devices, Minimize layers    Activity Interventions: Assess need for specialty bed    Mobility Interventions: Assess need for specialty bed, Float heels, Turn and reposition approx. every two hours(pillow and wedges)    Nutrition Interventions: Document food/fluid/supplement intake, Offer support with meals,snacks and hydration    Friction and Shear Interventions: Foam dressings/transparent film/skin sealants, HOB 30 degrees or less, Lift team/patient mobility team, Minimize layers                Problem: Patient Education: Go to Patient Education Activity  Goal: Patient/Family Education  Outcome: Progressing Towards Goal     Problem: Risk for Spread of Infection  Goal: Prevent transmission of infectious organism to others  Description: Prevent the transmission of infectious organisms to other patients, staff members, and visitors. Outcome: Progressing Towards Goal     Problem: Patient Education:  Go to Education Activity  Goal: Patient/Family Education  Outcome: Progressing Towards Goal     Problem: Falls - Risk of  Goal: *Absence of Falls  Description: Document Paulo Barillas Fall Risk and appropriate interventions in the flowsheet.   Outcome: Progressing Towards Goal  Note: Fall Risk Interventions:  Mobility Interventions: Bed/chair exit alarm    Mentation Interventions: Bed/chair exit alarm    Medication Interventions: Bed/chair exit alarm, Teach patient to arise slowly    Elimination Interventions: Call light in reach, Patient to call for help with toileting needs    History of Falls Interventions: Door open when patient unattended, Room close to nurse's station         Problem: Patient Education: Go to Patient Education Activity  Goal: Patient/Family Education  Outcome: Progressing Towards Goal

## 2022-07-28 NOTE — ED NOTES
TRANSFER - OUT REPORT:    Verbal report given to Funmilayo(name) on Milana Garcia  being transferred to [facility](unit) for routine progression of care       Report consisted of patients Situation, Background, Assessment and   Recommendations(SBAR). Information from the following report(s) SBAR, Kardex, ED Summary, Intake/Output, Recent Results, and Cardiac Rhythm Sinus Rhythm  was reviewed with the receiving nurse. Lines:       Opportunity for questions and clarification was provided.       Patient transported with:   Patient-specific medications from Pharmacy

## 2022-07-28 NOTE — PROGRESS NOTES
Pharmacy Automatic Renal Dosing Protocol - Antimicrobials    Indication for Antimicrobials: SSTI      Current Regimen of Each Antimicrobial:  Vancomycin 2000 mg x 1 then (Start Date ; Day # 1)  Aztreonam 1 g Q8H (Start Date , Day1)    Previous Antimicrobial Therapy:    Vancomycin Goal Level: 400-600 mg*hour/liter per 24 hours    Vancomycin Levels  Date Dose & Interval Measured (mcg/mL) Steady State (mcg/mL)                       Date & time of next level:     Significant Cultures:     Radiology / Imaging results: (X-ray, CT scan or MRI):       Labs:  Recent Labs     22  1416   WBC 5.4     Temp (24hrs), Av.5 °F (36.9 °C), Min:98.5 °F (36.9 °C), Max:98.5 °F (36.9 °C)      Paralysis, amputations, malnutrition:   Creatinine Clearance (mL/min) or Dialysis:     Impression/Plan:   Vancomycin 2000 mg x 1 then maintenance dose depending on renal function. Serum creatinine ordered today and daily. Pharmacy will follow daily and adjust medications as appropriate for renal function and/or serum levels. Thank you,  Laura Gresham, [facility]      Recommended duration of therapy  http://[facility]/[place]/[facility]/Pharmacy/Clinical%20Companion/Duration%20of%20ABX%20therapy. docx    Renal Dosing  http://[facility]/[place]/[facility]/Pharmacy/Clinical%20Companion/Renal%20Dosing%[id number]. pdf

## 2022-07-29 LAB
ANION GAP SERPL CALC-SCNC: 7 MMOL/L (ref 5–15)
B PERT DNA SPEC QL NAA+PROBE: NOT DETECTED
BACTERIA SPEC CULT: NORMAL
BORDETELLA PARAPERTUSSIS PCR, BORPAR: NOT DETECTED
BUN SERPL-MCNC: 14 MG/DL (ref 6–20)
BUN/CREAT SERPL: 20 (ref 12–20)
C PNEUM DNA SPEC QL NAA+PROBE: NOT DETECTED
CALCIUM SERPL-MCNC: 8.7 MG/DL (ref 8.5–10.1)
CC UR VC: NORMAL
CHLORIDE SERPL-SCNC: 102 MMOL/L (ref 97–108)
CO2 SERPL-SCNC: 29 MMOL/L (ref 21–32)
CREAT SERPL-MCNC: 0.71 MG/DL (ref 0.55–1.02)
CRP SERPL-MCNC: 3.47 MG/DL (ref 0–0.6)
D DIMER PPP FEU-MCNC: 0.57 MG/L FEU (ref 0–0.65)
FLUAV H1 2009 PAND RNA SPEC QL NAA+PROBE: NOT DETECTED
FLUAV H1 RNA SPEC QL NAA+PROBE: NOT DETECTED
FLUAV H3 RNA SPEC QL NAA+PROBE: NOT DETECTED
FLUAV SUBTYP SPEC NAA+PROBE: NOT DETECTED
FLUBV RNA SPEC QL NAA+PROBE: NOT DETECTED
GLUCOSE BLD STRIP.AUTO-MCNC: 101 MG/DL (ref 65–117)
GLUCOSE BLD STRIP.AUTO-MCNC: 115 MG/DL (ref 65–117)
GLUCOSE BLD STRIP.AUTO-MCNC: 186 MG/DL (ref 65–117)
GLUCOSE SERPL-MCNC: 86 MG/DL (ref 65–100)
HADV DNA SPEC QL NAA+PROBE: NOT DETECTED
HCOV 229E RNA SPEC QL NAA+PROBE: NOT DETECTED
HCOV HKU1 RNA SPEC QL NAA+PROBE: NOT DETECTED
HCOV NL63 RNA SPEC QL NAA+PROBE: NOT DETECTED
HCOV OC43 RNA SPEC QL NAA+PROBE: NOT DETECTED
HMPV RNA SPEC QL NAA+PROBE: NOT DETECTED
HPIV1 RNA SPEC QL NAA+PROBE: NOT DETECTED
HPIV2 RNA SPEC QL NAA+PROBE: NOT DETECTED
HPIV3 RNA SPEC QL NAA+PROBE: NOT DETECTED
HPIV4 RNA SPEC QL NAA+PROBE: NOT DETECTED
M PNEUMO DNA SPEC QL NAA+PROBE: NOT DETECTED
POTASSIUM SERPL-SCNC: 3.8 MMOL/L (ref 3.5–5.1)
RSV RNA SPEC QL NAA+PROBE: NOT DETECTED
RV+EV RNA SPEC QL NAA+PROBE: NOT DETECTED
SARS-COV-2 PCR, COVPCR: DETECTED
SERVICE CMNT-IMP: ABNORMAL
SERVICE CMNT-IMP: NORMAL
SODIUM SERPL-SCNC: 138 MMOL/L (ref 136–145)

## 2022-07-29 PROCEDURE — 82962 GLUCOSE BLOOD TEST: CPT

## 2022-07-29 PROCEDURE — 86140 C-REACTIVE PROTEIN: CPT

## 2022-07-29 PROCEDURE — 74011000258 HC RX REV CODE- 258: Performed by: INTERNAL MEDICINE

## 2022-07-29 PROCEDURE — 74011250636 HC RX REV CODE- 250/636: Performed by: INTERNAL MEDICINE

## 2022-07-29 PROCEDURE — 74011250637 HC RX REV CODE- 250/637: Performed by: HOSPITALIST

## 2022-07-29 PROCEDURE — 74011250636 HC RX REV CODE- 250/636: Performed by: HOSPITALIST

## 2022-07-29 PROCEDURE — 85379 FIBRIN DEGRADATION QUANT: CPT

## 2022-07-29 PROCEDURE — 80048 BASIC METABOLIC PNL TOTAL CA: CPT

## 2022-07-29 PROCEDURE — 74011000250 HC RX REV CODE- 250: Performed by: INTERNAL MEDICINE

## 2022-07-29 PROCEDURE — 74011250637 HC RX REV CODE- 250/637: Performed by: INTERNAL MEDICINE

## 2022-07-29 PROCEDURE — 74011636637 HC RX REV CODE- 636/637: Performed by: INTERNAL MEDICINE

## 2022-07-29 PROCEDURE — 65270000046 HC RM TELEMETRY

## 2022-07-29 PROCEDURE — 36415 COLL VENOUS BLD VENIPUNCTURE: CPT

## 2022-07-29 RX ORDER — DOXYCYCLINE HYCLATE 100 MG
100 TABLET ORAL EVERY 12 HOURS
Status: DISCONTINUED | OUTPATIENT
Start: 2022-07-29 | End: 2022-08-02 | Stop reason: HOSPADM

## 2022-07-29 RX ADMIN — RIVAROXABAN 20 MG: 20 TABLET, FILM COATED ORAL at 09:30

## 2022-07-29 RX ADMIN — LATANOPROST 1 DROP: 50 SOLUTION OPHTHALMIC at 23:42

## 2022-07-29 RX ADMIN — AMLODIPINE BESYLATE 10 MG: 5 TABLET ORAL at 09:30

## 2022-07-29 RX ADMIN — CLONIDINE HYDROCHLORIDE 0.1 MG: 0.1 TABLET ORAL at 09:30

## 2022-07-29 RX ADMIN — PRAVASTATIN SODIUM 80 MG: 40 TABLET ORAL at 09:30

## 2022-07-29 RX ADMIN — DOXYCYCLINE HYCLATE 100 MG: 100 TABLET, COATED ORAL at 21:54

## 2022-07-29 RX ADMIN — AZTREONAM 1 G: 1 INJECTION, POWDER, LYOPHILIZED, FOR SOLUTION INTRAMUSCULAR; INTRAVENOUS at 09:26

## 2022-07-29 RX ADMIN — Medication 2 UNITS: at 18:11

## 2022-07-29 RX ADMIN — Medication 30 UNITS: at 18:11

## 2022-07-29 RX ADMIN — SODIUM CHLORIDE, PRESERVATIVE FREE 10 ML: 5 INJECTION INTRAVENOUS at 21:54

## 2022-07-29 RX ADMIN — CLONIDINE HYDROCHLORIDE 0.1 MG: 0.1 TABLET ORAL at 18:11

## 2022-07-29 RX ADMIN — Medication 30 UNITS: at 09:00

## 2022-07-29 RX ADMIN — CARVEDILOL 12.5 MG: 12.5 TABLET, FILM COATED ORAL at 18:11

## 2022-07-29 RX ADMIN — SODIUM CHLORIDE, PRESERVATIVE FREE 10 ML: 5 INJECTION INTRAVENOUS at 05:18

## 2022-07-29 RX ADMIN — CARVEDILOL 12.5 MG: 12.5 TABLET, FILM COATED ORAL at 09:30

## 2022-07-29 RX ADMIN — FUROSEMIDE 40 MG: 40 TABLET ORAL at 09:30

## 2022-07-29 RX ADMIN — ASPIRIN 325 MG ORAL TABLET 325 MG: 325 PILL ORAL at 09:30

## 2022-07-29 RX ADMIN — CAPTOPRIL 25 MG: 25 TABLET ORAL at 18:10

## 2022-07-29 RX ADMIN — CAPTOPRIL 25 MG: 25 TABLET ORAL at 10:51

## 2022-07-29 RX ADMIN — DOXYCYCLINE HYCLATE 100 MG: 100 TABLET, COATED ORAL at 11:54

## 2022-07-29 RX ADMIN — Medication 2 UNITS: at 11:54

## 2022-07-29 RX ADMIN — VANCOMYCIN HYDROCHLORIDE 750 MG: 750 INJECTION, POWDER, LYOPHILIZED, FOR SOLUTION INTRAVENOUS at 05:18

## 2022-07-29 NOTE — PROGRESS NOTES
Hospitalist Progress Note    NAME: Elvia Lesches   :  1945   MRN:  [id number]     Subjective:   Daily Progress Note: 2022 10:38 AM      Chief complaint: LE cellulitis  Patient seen and examined, chart was reviewed. Patient comfortable in bed without complaints. Seen by podiatry does not feel any acute intervention needed. Lower extremity wounds does not look infected at this time.     Current Facility-Administered Medications   Medication Dose Route Frequency    doxycycline (VIBRA-TABS) tablet 100 mg  100 mg Oral Q12H    zinc oxide-cod liver oil (DESITIN) 40 % paste   Topical PRN    amLODIPine (NORVASC) tablet 10 mg  10 mg Oral DAILY    aspirin tablet 325 mg  325 mg Oral DAILY    captopriL (CAPOTEN) tablet 25 mg  25 mg Oral BID    carvediloL (COREG) tablet 12.5 mg  12.5 mg Oral BID    cloNIDine HCL (CATAPRES) tablet 0.1 mg  0.1 mg Oral BID    furosemide (LASIX) tablet 40 mg  40 mg Oral DAILY    insulin NPH (NOVOLIN N, HUMULIN N) injection 30 Units  30 Units SubCUTAneous BID    latanoprost (XALATAN) 0.005 % ophthalmic solution 1 Drop  1 Drop Both Eyes QHS    pravastatin (PRAVACHOL) tablet 80 mg  80 mg Oral DAILY    rivaroxaban (XARELTO) tablet 20 mg  20 mg Oral DAILY    sodium chloride (NS) flush 5-40 mL  5-40 mL IntraVENous PRN    acetaminophen (TYLENOL) tablet 650 mg  650 mg Oral Q6H PRN    Or    acetaminophen (TYLENOL) suppository 650 mg  650 mg Rectal Q6H PRN    polyethylene glycol (MIRALAX) packet 17 g  17 g Oral DAILY PRN    ondansetron (ZOFRAN ODT) tablet 4 mg  4 mg Oral Q8H PRN    Or    ondansetron (ZOFRAN) injection 4 mg  4 mg IntraVENous Q6H PRN    insulin lispro (HUMALOG) injection   SubCUTAneous AC&HS    glucose chewable tablet 16 g  4 Tablet Oral PRN    glucagon (GLUCAGEN) injection 1 mg  1 mg IntraMUSCular PRN    dextrose 10% infusion 0-250 mL  0-250 mL IntraVENous PRN          Objective:     Visit Vitals  BP (!) 133/112   Pulse 77   Temp 98.1 °F (36.7 °C)   Resp 18   Ht 5' 1\" (1.549 m)   Wt 99.8 kg (220 lb)   SpO2 96%   BMI 41.57 kg/m²      O2 Device: None (Room air)    Temp (24hrs), Av.8 °F (36.6 °C), Min:97.4 °F (36.3 °C), Max:98.4 °F (36.9 °C)        PHYSICAL EXAM:  Gen WDWN  Neck supple  CVS RRR  Reps symmetric expansion  Abdominal soft  Extremities with edema  Skin with venous dermatitis bilateral LE  Neuro alert  Psych flat affect      Data Review    Recent Results (from the past 24 hour(s))   GLUCOSE, POC    Collection Time: 22  4:37 PM   Result Value Ref Range    Glucose (POC) 71 65 - 117 mg/dL    Performed by Alo Flannery RN    GLUCOSE, POC    Collection Time: 22  5:45 PM   Result Value Ref Range    Glucose (POC) 59 (L) 65 - 117 mg/dL    Performed by Alo Flannery RN    GLUCOSE, POC    Collection Time: 22  6:25 PM   Result Value Ref Range    Glucose (POC) 156 (H) 65 - 117 mg/dL    Performed by Napoleon Gonzalez, POC    Collection Time: 22  9:29 PM   Result Value Ref Range    Glucose (POC) 109 65 - 117 mg/dL    Performed by Noe Godoy (PCT)    RESPIRATORY VIRUS PANEL W/COVID-19, PCR    Collection Time: 22 10:31 PM    Specimen: Nasopharyngeal   Result Value Ref Range    Adenovirus Not detected NOTD      Coronavirus 229E Not detected NOTD      Coronavirus HKU1 Not detected NOTD      Coronavirus CVNL63 Not detected NOTD      Coronavirus OC43 Not detected NOTD      SARS-CoV-2, PCR Detected (A) NOTD      Metapneumovirus Not detected NOTD      Rhinovirus and Enterovirus Not detected NOTD      Influenza A Not detected NOTD      Influenza A, subtype H1 Not detected NOTD      Influenza A, subtype H3 Not detected NOTD      INFLUENZA A H1N1 PCR Not detected NOTD      Influenza B Not detected NOTD      Parainfluenza 1 Not detected NOTD      Parainfluenza 2 Not detected NOTD      Parainfluenza 3 Not detected NOTD      Parainfluenza virus 4 Not detected NOTD      RSV by PCR Not detected NOTD      B. parapertussis, PCR Not detected NOTD      Bordetella pertussis - PCR Not detected NOTD      Chlamydophila pneumoniae DNA, QL, PCR Not detected NOTD      Mycoplasma pneumoniae DNA, QL, PCR Not detected NOTD     C REACTIVE PROTEIN, QT    Collection Time: 07/29/22  3:45 AM   Result Value Ref Range    C-Reactive protein 3.47 (H) 0.00 - 0.60 mg/dL   D DIMER    Collection Time: 07/29/22  3:45 AM   Result Value Ref Range    D-dimer 0.57 0.00 - 0.65 mg/L FEU   METABOLIC PANEL, BASIC    Collection Time: 07/29/22  3:45 AM   Result Value Ref Range    Sodium 138 136 - 145 mmol/L    Potassium 3.8 3.5 - 5.1 mmol/L    Chloride 102 97 - 108 mmol/L    CO2 29 21 - 32 mmol/L    Anion gap 7 5 - 15 mmol/L    Glucose 86 65 - 100 mg/dL    BUN 14 6 - 20 MG/DL    Creatinine 0.71 0.55 - 1.02 MG/DL    BUN/Creatinine ratio 20 12 - 20      GFR est AA >60 >60 ml/min/1.73m2    GFR est non-AA >60 >60 ml/min/1.73m2    Calcium 8.7 8.5 - 10.1 MG/DL   GLUCOSE, POC    Collection Time: 07/29/22  8:09 AM   Result Value Ref Range    Glucose (POC) 101 65 - 117 mg/dL    Performed by Vladimir Phil  CON    GLUCOSE, POC    Collection Time: 07/29/22 11:04 AM   Result Value Ref Range    Glucose (POC) 186 (H) 65 - 117 mg/dL    Performed by [employer]egro[employer]drive  CON      No results found for this visit on 07/27/22.   All Micro Results       Procedure Component Value Units Date/Time    CULTURE, URINE [[id number]] Collected: 07/27/22 1846    Order Status: Completed Specimen: Urine Updated: 07/29/22 0908     Special Requests: --        NO SPECIAL REQUESTS  Reflexed from [id number]       [place] Count --        >100,000  COLONIES/mL       Culture result:       MIXED UROGENITAL GERSON ISOLATED          RESPIRATORY VIRUS PANEL W/COVID-19, PCR [[id number]]  (Abnormal) Collected: 07/28/22 2231    Order Status: Completed Specimen: Nasopharyngeal Updated: 07/29/22 0848     Adenovirus Not detected        Coronavirus 229E Not detected        Coronavirus HKU1 Not detected        Coronavirus CVNL63 Not detected        Coronavirus OC43 Not detected        SARS-CoV-2, PCR Detected        Comment: CALLED TO AND READ BACK BY  CALLED TO Herve Araujo RN. 7/29/22 [id number] BY DG          Metapneumovirus Not detected        Rhinovirus and Enterovirus Not detected        Influenza A Not detected        Influenza A, subtype H1 Not detected        Influenza A, subtype H3 Not detected        INFLUENZA A H1N1 PCR Not detected        Influenza B Not detected        Parainfluenza 1 Not detected        Parainfluenza 2 Not detected        Parainfluenza 3 Not detected        Parainfluenza virus 4 Not detected        RSV by PCR Not detected        B. parapertussis, PCR Not detected        Bordetella pertussis - PCR Not detected        Chlamydophila pneumoniae DNA, QL, PCR Not detected        Mycoplasma pneumoniae DNA, QL, PCR Not detected                    Assessment/Plan:     Jasson LE  wounds with doubt cellulitis likely venous dermatitis  -chronic wounds with fu  at wound care center/lymphedema clinic  - empiric antibiotics doxycycline  -allergic to cephalosporins. - fu crp/procal  - Podiatry eval no acute intervention needed    COVID-19 + test at home asymptomatic  DMT2- - ISS monitor BG.   Hypertension- monitor on PTA home meds  Hypothyroidism- cont PTA home meds  Atrial fibrillation on anticoagulation with Xarelto  CAD/CVA- asa/bb/statins  Chr DHF-compensated on lasix       NOK son at [phone]  called 7/29/22 went to   Code Status: Full code  Surrogate Decision Maker:  Jeff  DVT Prophylaxis: Xarelto  Dispo: SNF if bed available        Signed By: Al Prieto MD     July 29, 2022

## 2022-07-30 LAB
GLUCOSE BLD STRIP.AUTO-MCNC: 100 MG/DL (ref 65–117)
GLUCOSE BLD STRIP.AUTO-MCNC: 119 MG/DL (ref 65–117)
GLUCOSE BLD STRIP.AUTO-MCNC: 141 MG/DL (ref 65–117)
GLUCOSE BLD STRIP.AUTO-MCNC: 72 MG/DL (ref 65–117)
SERVICE CMNT-IMP: ABNORMAL
SERVICE CMNT-IMP: ABNORMAL
SERVICE CMNT-IMP: NORMAL
SERVICE CMNT-IMP: NORMAL

## 2022-07-30 PROCEDURE — 65270000046 HC RM TELEMETRY

## 2022-07-30 PROCEDURE — 74011636637 HC RX REV CODE- 636/637: Performed by: INTERNAL MEDICINE

## 2022-07-30 PROCEDURE — 74011250637 HC RX REV CODE- 250/637: Performed by: HOSPITALIST

## 2022-07-30 PROCEDURE — 82962 GLUCOSE BLOOD TEST: CPT

## 2022-07-30 PROCEDURE — 74011000250 HC RX REV CODE- 250: Performed by: INTERNAL MEDICINE

## 2022-07-30 PROCEDURE — 74011250637 HC RX REV CODE- 250/637: Performed by: INTERNAL MEDICINE

## 2022-07-30 RX ADMIN — CAPTOPRIL 25 MG: 25 TABLET ORAL at 13:05

## 2022-07-30 RX ADMIN — CARVEDILOL 12.5 MG: 12.5 TABLET, FILM COATED ORAL at 18:24

## 2022-07-30 RX ADMIN — AMLODIPINE BESYLATE 10 MG: 5 TABLET ORAL at 09:08

## 2022-07-30 RX ADMIN — CLONIDINE HYDROCHLORIDE 0.1 MG: 0.1 TABLET ORAL at 09:08

## 2022-07-30 RX ADMIN — ASPIRIN 325 MG ORAL TABLET 325 MG: 325 PILL ORAL at 09:08

## 2022-07-30 RX ADMIN — SODIUM CHLORIDE, PRESERVATIVE FREE 10 ML: 5 INJECTION INTRAVENOUS at 18:25

## 2022-07-30 RX ADMIN — RIVAROXABAN 20 MG: 20 TABLET, FILM COATED ORAL at 09:08

## 2022-07-30 RX ADMIN — DOXYCYCLINE HYCLATE 100 MG: 100 TABLET, COATED ORAL at 09:08

## 2022-07-30 RX ADMIN — Medication 30 UNITS: at 18:35

## 2022-07-30 RX ADMIN — Medication 30 UNITS: at 09:08

## 2022-07-30 RX ADMIN — PRAVASTATIN SODIUM 80 MG: 40 TABLET ORAL at 09:08

## 2022-07-30 RX ADMIN — CAPTOPRIL 25 MG: 25 TABLET ORAL at 18:24

## 2022-07-30 RX ADMIN — DOXYCYCLINE HYCLATE 100 MG: 100 TABLET, COATED ORAL at 21:30

## 2022-07-30 RX ADMIN — FUROSEMIDE 40 MG: 40 TABLET ORAL at 09:08

## 2022-07-30 RX ADMIN — SODIUM CHLORIDE, PRESERVATIVE FREE 10 ML: 5 INJECTION INTRAVENOUS at 21:30

## 2022-07-30 RX ADMIN — CLONIDINE HYDROCHLORIDE 0.1 MG: 0.1 TABLET ORAL at 18:24

## 2022-07-30 RX ADMIN — CARVEDILOL 12.5 MG: 12.5 TABLET, FILM COATED ORAL at 09:08

## 2022-07-30 NOTE — PROGRESS NOTES
Hospitalist Progress Note    NAME: Araseli Horton   :  1945   MRN:  [id number]     Subjective:   Daily Progress Note: 2022 10:38 AM      Chief complaint: LE cellulitis  Patient seen and examined, chart was reviewed. Patient denies any acute complaints. Family wants her to go to SNF.     Current Facility-Administered Medications   Medication Dose Route Frequency    doxycycline (VIBRA-TABS) tablet 100 mg  100 mg Oral Q12H    zinc oxide-cod liver oil (DESITIN) 40 % paste   Topical PRN    amLODIPine (NORVASC) tablet 10 mg  10 mg Oral DAILY    aspirin tablet 325 mg  325 mg Oral DAILY    captopriL (CAPOTEN) tablet 25 mg  25 mg Oral BID    carvediloL (COREG) tablet 12.5 mg  12.5 mg Oral BID    cloNIDine HCL (CATAPRES) tablet 0.1 mg  0.1 mg Oral BID    furosemide (LASIX) tablet 40 mg  40 mg Oral DAILY    insulin NPH (NOVOLIN N, HUMULIN N) injection 30 Units  30 Units SubCUTAneous BID    latanoprost (XALATAN) 0.005 % ophthalmic solution 1 Drop  1 Drop Both Eyes QHS    pravastatin (PRAVACHOL) tablet 80 mg  80 mg Oral DAILY    rivaroxaban (XARELTO) tablet 20 mg  20 mg Oral DAILY    sodium chloride (NS) flush 5-40 mL  5-40 mL IntraVENous PRN    acetaminophen (TYLENOL) tablet 650 mg  650 mg Oral Q6H PRN    Or    acetaminophen (TYLENOL) suppository 650 mg  650 mg Rectal Q6H PRN    polyethylene glycol (MIRALAX) packet 17 g  17 g Oral DAILY PRN    ondansetron (ZOFRAN ODT) tablet 4 mg  4 mg Oral Q8H PRN    Or    ondansetron (ZOFRAN) injection 4 mg  4 mg IntraVENous Q6H PRN    insulin lispro (HUMALOG) injection   SubCUTAneous AC&HS    glucose chewable tablet 16 g  4 Tablet Oral PRN    glucagon (GLUCAGEN) injection 1 mg  1 mg IntraMUSCular PRN    dextrose 10% infusion 0-250 mL  0-250 mL IntraVENous PRN          Objective:     Visit Vitals  BP (!) 143/61 (BP 1 Location: Right upper arm, BP Patient Position: At rest)   Pulse 77   Temp 98.7 °F (37.1 °C)   Resp 18   Ht 5' 1\" (1.549 m)   Wt 99.8 kg (220 lb)   SpO2 100% BMI 41.57 kg/m²      O2 Device: None (Room air)    Temp (24hrs), Av.3 °F (36.8 °C), Min:97.8 °F (36.6 °C), Max:98.7 °F (37.1 °C)        PHYSICAL EXAM:  Gen WDWN  Neck supple  CVS RRR  Reps symmetric expansion  Abdominal soft  Extremities with edema  Skin with venous dermatitis bilateral LE  Neuro alert LUE contracted  Psych flat affect      Data Review    Recent Results (from the past 24 hour(s))   GLUCOSE, POC    Collection Time: 22 11:04 AM   Result Value Ref Range    Glucose (POC) 186 (H) 65 - 117 mg/dL    Performed by Araceli RAMOS    GLUCOSE, POC    Collection Time: 22 10:02 PM   Result Value Ref Range    Glucose (POC) 115 65 - 117 mg/dL    Performed by Alfa, POC    Collection Time: 22  8:29 AM   Result Value Ref Range    Glucose (POC) 72 65 - 117 mg/dL    Performed by Uzma Yo PCT      No results found for this visit on 22.   All Micro Results       Procedure Component Value Units Date/Time    CULTURE, URINE [[id number]] Collected: 22 1846    Order Status: Completed Specimen: Urine Updated: 22 0908     Special Requests: --        NO SPECIAL REQUESTS  Reflexed from [id number]       [place] Count --        >100,000  COLONIES/mL       Culture result:       MIXED UROGENITAL GERSON ISOLATED          RESPIRATORY VIRUS PANEL W/COVID-19, PCR [[id number]]  (Abnormal) Collected: 22 2231    Order Status: Completed Specimen: Nasopharyngeal Updated: 22 0848     Adenovirus Not detected        Coronavirus 229E Not detected        Coronavirus HKU1 Not detected        Coronavirus CVNL63 Not detected        Coronavirus OC43 Not detected        SARS-CoV-2, PCR Detected        Comment: CALLED TO AND READ BACK BY  CALLED TO James Robin RN. 22 [id number] BY MIC          Metapneumovirus Not detected        Rhinovirus and Enterovirus Not detected        Influenza A Not detected        Influenza A, subtype H1 Not detected        Influenza A, subtype H3 Not detected        INFLUENZA A H1N1 PCR Not detected        Influenza B Not detected        Parainfluenza 1 Not detected        Parainfluenza 2 Not detected        Parainfluenza 3 Not detected        Parainfluenza virus 4 Not detected        RSV by PCR Not detected        B. parapertussis, PCR Not detected        Bordetella pertussis - PCR Not detected        Chlamydophila pneumoniae DNA, QL, PCR Not detected        Mycoplasma pneumoniae DNA, QL, PCR Not detected                    Assessment/Plan:     Jasson LE  wounds with doubt cellulitis likely venous dermatitis  -chronic wounds with fu  at wound care center/lymphedema clinic  - empiric antibiotics doxycycline  -allergic to cephalosporins. - fu crp/procal  - Podiatry eval no acute intervention needed    COVID-19 + test at home asymptomatic  DMT2- - ISS monitor BG.   Hypertension- monitor on PTA home meds  Hypothyroidism- cont PTA home meds  Atrial fibrillation on anticoagulation with Xarelto  CAD/CVA- asa/bb/statins  Chr DHF-compensated on lasix       NOK son at [phone]  called 7/29/22 went to   Code Status: Full code  Surrogate Decision Maker: Macy Pandya [phone] called 7/30 updated clinical status  DVT Prophylaxis: Xarelto  Dispo: SNF if bed available        Signed By: Chago Alvarenga MD     July 30, 2022

## 2022-07-30 NOTE — PROGRESS NOTES
[address]    Bedside shift change report given to Yazmin Davis LPN (oncoming nurse) by Elisabeth Howe, RN (offgoing nurse). Report included the following information SBAR, Kardex, Intake/Output, MAR, Accordion, and Recent Results.

## 2022-07-30 NOTE — PROGRESS NOTES
10: 03AM Outbound call to spouse @ [phone]. Identified self, role, and nature of the call. Inquired about d/c plan for spouse. Voiced \"want her to go to a facility in [place] only, then come home. \"  Spouse gave verbal consent for referrals to be sent for [place]. Referrals sent to [address] [place] [address],[address] of [place] [address] only. Pending acceptance and bed availability.          ISIDRO Velarde

## 2022-07-31 LAB
GLUCOSE BLD STRIP.AUTO-MCNC: 154 MG/DL (ref 65–117)
GLUCOSE BLD STRIP.AUTO-MCNC: 236 MG/DL (ref 65–117)
GLUCOSE BLD STRIP.AUTO-MCNC: 252 MG/DL (ref 65–117)
GLUCOSE BLD STRIP.AUTO-MCNC: 38 MG/DL (ref 65–117)
GLUCOSE BLD STRIP.AUTO-MCNC: 49 MG/DL (ref 65–117)
GLUCOSE BLD STRIP.AUTO-MCNC: 76 MG/DL (ref 65–117)
GLUCOSE BLD STRIP.AUTO-MCNC: 89 MG/DL (ref 65–117)
SERVICE CMNT-IMP: ABNORMAL
SERVICE CMNT-IMP: NORMAL
SERVICE CMNT-IMP: NORMAL

## 2022-07-31 PROCEDURE — 74011250637 HC RX REV CODE- 250/637: Performed by: INTERNAL MEDICINE

## 2022-07-31 PROCEDURE — 65270000046 HC RM TELEMETRY

## 2022-07-31 PROCEDURE — 74011250637 HC RX REV CODE- 250/637: Performed by: HOSPITALIST

## 2022-07-31 PROCEDURE — 82962 GLUCOSE BLOOD TEST: CPT

## 2022-07-31 PROCEDURE — 74011636637 HC RX REV CODE- 636/637: Performed by: INTERNAL MEDICINE

## 2022-07-31 PROCEDURE — 74011636637 HC RX REV CODE- 636/637: Performed by: HOSPITALIST

## 2022-07-31 RX ADMIN — FUROSEMIDE 40 MG: 40 TABLET ORAL at 11:02

## 2022-07-31 RX ADMIN — PRAVASTATIN SODIUM 80 MG: 40 TABLET ORAL at 11:03

## 2022-07-31 RX ADMIN — CLONIDINE HYDROCHLORIDE 0.1 MG: 0.1 TABLET ORAL at 17:18

## 2022-07-31 RX ADMIN — Medication 5 UNITS: at 17:17

## 2022-07-31 RX ADMIN — AMLODIPINE BESYLATE 10 MG: 5 TABLET ORAL at 11:02

## 2022-07-31 RX ADMIN — Medication 3 UNITS: at 17:16

## 2022-07-31 RX ADMIN — RIVAROXABAN 20 MG: 20 TABLET, FILM COATED ORAL at 11:03

## 2022-07-31 RX ADMIN — ASPIRIN 325 MG ORAL TABLET 325 MG: 325 PILL ORAL at 11:02

## 2022-07-31 RX ADMIN — Medication 3 UNITS: at 22:39

## 2022-07-31 RX ADMIN — CARVEDILOL 12.5 MG: 12.5 TABLET, FILM COATED ORAL at 17:18

## 2022-07-31 RX ADMIN — DOXYCYCLINE HYCLATE 100 MG: 100 TABLET, COATED ORAL at 11:02

## 2022-07-31 RX ADMIN — LATANOPROST 1 DROP: 50 SOLUTION OPHTHALMIC at 21:28

## 2022-07-31 RX ADMIN — DOXYCYCLINE HYCLATE 100 MG: 100 TABLET, COATED ORAL at 21:25

## 2022-07-31 RX ADMIN — CARVEDILOL 12.5 MG: 12.5 TABLET, FILM COATED ORAL at 11:02

## 2022-07-31 RX ADMIN — CLONIDINE HYDROCHLORIDE 0.1 MG: 0.1 TABLET ORAL at 11:02

## 2022-07-31 NOTE — PROGRESS NOTES
Hospitalist Progress Note    NAME: Emilie Bates   :  1945   MRN:  [id number]     Subjective:   Daily Progress Note: 2022 10:38 AM      Chief complaint: LE cellulitis  Patient seen and examined, chart was reviewed. Spoke with nursing at bedside, blood glucose was low this morning. Insulin has been held for this morning future dosing has been adjusted. Patient awake and alert responding to simple, answering questions.   BP intermittently elevated    Current Facility-Administered Medications   Medication Dose Route Frequency    insulin NPH (NOVOLIN N, HUMULIN N) injection 5 Units  5 Units SubCUTAneous BID    doxycycline (VIBRA-TABS) tablet 100 mg  100 mg Oral Q12H    zinc oxide-cod liver oil (DESITIN) 40 % paste   Topical PRN    amLODIPine (NORVASC) tablet 10 mg  10 mg Oral DAILY    aspirin tablet 325 mg  325 mg Oral DAILY    captopriL (CAPOTEN) tablet 25 mg  25 mg Oral BID    carvediloL (COREG) tablet 12.5 mg  12.5 mg Oral BID    cloNIDine HCL (CATAPRES) tablet 0.1 mg  0.1 mg Oral BID    furosemide (LASIX) tablet 40 mg  40 mg Oral DAILY    latanoprost (XALATAN) 0.005 % ophthalmic solution 1 Drop  1 Drop Both Eyes QHS    pravastatin (PRAVACHOL) tablet 80 mg  80 mg Oral DAILY    rivaroxaban (XARELTO) tablet 20 mg  20 mg Oral DAILY    sodium chloride (NS) flush 5-40 mL  5-40 mL IntraVENous PRN    acetaminophen (TYLENOL) tablet 650 mg  650 mg Oral Q6H PRN    Or    acetaminophen (TYLENOL) suppository 650 mg  650 mg Rectal Q6H PRN    polyethylene glycol (MIRALAX) packet 17 g  17 g Oral DAILY PRN    ondansetron (ZOFRAN ODT) tablet 4 mg  4 mg Oral Q8H PRN    Or    ondansetron (ZOFRAN) injection 4 mg  4 mg IntraVENous Q6H PRN    insulin lispro (HUMALOG) injection   SubCUTAneous AC&HS    glucose chewable tablet 16 g  4 Tablet Oral PRN    glucagon (GLUCAGEN) injection 1 mg  1 mg IntraMUSCular PRN    dextrose 10% infusion 0-250 mL  0-250 mL IntraVENous PRN          Objective:     Visit Vitals  BP (!) 172/94 Pulse 83   Temp 97.7 °F (36.5 °C)   Resp 18   Ht 5' 1\" (1.549 m)   Wt 99.8 kg (220 lb)   SpO2 96%   BMI 41.57 kg/m²      O2 Device: None (Room air)    Temp (24hrs), Av °F (36.7 °C), Min:97.5 °F (36.4 °C), Max:98.5 °F (36.9 °C)        PHYSICAL EXAM:  Gen WDWN  Neck supple  CVS RRR  Reps symmetric expansion  Abdominal soft  Extremities with edema  Skin with venous dermatitis bilateral LE  Neuro alert LUE contracted  Psych flat affect      Data Review    Recent Results (from the past 24 hour(s))   GLUCOSE, POC    Collection Time: 22 12:11 PM   Result Value Ref Range    Glucose (POC) 100 65 - 117 mg/dL    Performed by Jodi Hawk PCT    GLUCOSE, POC    Collection Time: 22  6:28 PM   Result Value Ref Range    Glucose (POC) 141 (H) 65 - 117 mg/dL    Performed by Clifton Haines (TRFRIEDA RN)    GLUCOSE, POC    Collection Time: 22  8:16 PM   Result Value Ref Range    Glucose (POC) 119 (H) 65 - 117 mg/dL    Performed by Maribel Roe PCT    GLUCOSE, POC    Collection Time: 22  7:40 AM   Result Value Ref Range    Glucose (POC) 38 (LL) 65 - 117 mg/dL    Performed by [address], POC    Collection Time: 22  7:57 AM   Result Value Ref Range    Glucose (POC) 49 (LL) 65 - 117 mg/dL    Performed by Trisha Cortez   CON    GLUCOSE, POC    Collection Time: 22  8:12 AM   Result Value Ref Range    Glucose (POC) 76 65 - 117 mg/dL    Performed by Monika Lagos (MARTHA RN)    GLUCOSE, POC    Collection Time: 22  8:21 AM   Result Value Ref Range    Glucose (POC) 89 65 - 117 mg/dL    Performed by Monika Lagos (MARTHA RN)      No results found for this visit on 22.   All Micro Results       Procedure Component Value Units Date/Time    CULTURE, URINE [[id number]] Collected: 22 2686    Order Status: Completed Specimen: Urine Updated: 22 0908     Special Requests: --        NO SPECIAL REQUESTS  Reflexed from [id number]       [place] Count --        >100,000  COLONIES/mL Culture result:       MIXED UROGENITAL GERSON ISOLATED          RESPIRATORY VIRUS PANEL W/COVID-19, PCR [[id number]]  (Abnormal) Collected: 07/28/22 2231    Order Status: Completed Specimen: Nasopharyngeal Updated: 07/29/22 0848     Adenovirus Not detected        Coronavirus 229E Not detected        Coronavirus HKU1 Not detected        Coronavirus CVNL63 Not detected        Coronavirus OC43 Not detected        SARS-CoV-2, PCR Detected        Comment: CALLED TO AND READ BACK BY  CALLED TO Nichelle Davey RN. 7/29/22 [id number] BY DG          Metapneumovirus Not detected        Rhinovirus and Enterovirus Not detected        Influenza A Not detected        Influenza A, subtype H1 Not detected        Influenza A, subtype H3 Not detected        INFLUENZA A H1N1 PCR Not detected        Influenza B Not detected        Parainfluenza 1 Not detected        Parainfluenza 2 Not detected        Parainfluenza 3 Not detected        Parainfluenza virus 4 Not detected        RSV by PCR Not detected        B. parapertussis, PCR Not detected        Bordetella pertussis - PCR Not detected        Chlamydophila pneumoniae DNA, QL, PCR Not detected        Mycoplasma pneumoniae DNA, QL, PCR Not detected                    Assessment/Plan:     Jasson LE  wounds with doubt cellulitis likely venous dermatitis  -chronic wounds with fu  at wound care center/lymphedema clinic  - empiric antibiotics doxycycline  -allergic to cephalosporins. - fu crp/procal  - Podiatry eval no acute intervention needed    COVID-19 + test at home asymptomatic  DMT2- -with hypoglycemia, NPH dose decreased. Monitor BG.      HTN- monitor on PTA home meds  Hypothyroidism- cont PTA home meds  Atrial fibrillation on anticoagulation with Xarelto  CAD/CVA- asa/bb/statins  Chr DHF-compensated on lasix       NOK son at [phone]  called 7/29/22 went to   Code Status: Full code  Surrogate Decision Maker: Abi Albrecht [phone] called 7/30 updated clinical status  DVT Prophylaxis: Xarelto  Dispo: SNF if bed available        Signed By: Mariia Dennis MD     July 31, 2022

## 2022-07-31 NOTE — PROGRESS NOTES
[phone] alerted capillary blood sugar 38. Glucagon given as ordered. Patient awake, delayed following commands, laughing with using words, lethargic. Post glucagon glu 49    0813 second dose glucagon post glucose 76. Reporting to MD    7720 reported glucose results to Dr. Balbir Sue. Dr. Balbir Sue communicated will review and change NPH order.   Patient returned to baseline alert and oriented x 2

## 2022-08-01 LAB
ANION GAP SERPL CALC-SCNC: 4 MMOL/L (ref 5–15)
BUN SERPL-MCNC: 17 MG/DL (ref 6–20)
BUN/CREAT SERPL: 26 (ref 12–20)
CALCIUM SERPL-MCNC: 8.7 MG/DL (ref 8.5–10.1)
CHLORIDE SERPL-SCNC: 105 MMOL/L (ref 97–108)
CO2 SERPL-SCNC: 29 MMOL/L (ref 21–32)
CREAT SERPL-MCNC: 0.66 MG/DL (ref 0.55–1.02)
GLUCOSE BLD STRIP.AUTO-MCNC: 182 MG/DL (ref 65–117)
GLUCOSE BLD STRIP.AUTO-MCNC: 217 MG/DL (ref 65–117)
GLUCOSE BLD STRIP.AUTO-MCNC: 264 MG/DL (ref 65–117)
GLUCOSE BLD STRIP.AUTO-MCNC: 286 MG/DL (ref 65–117)
GLUCOSE SERPL-MCNC: 154 MG/DL (ref 65–100)
POTASSIUM SERPL-SCNC: 3.7 MMOL/L (ref 3.5–5.1)
SERVICE CMNT-IMP: ABNORMAL
SODIUM SERPL-SCNC: 138 MMOL/L (ref 136–145)

## 2022-08-01 PROCEDURE — 74011636637 HC RX REV CODE- 636/637: Performed by: INTERNAL MEDICINE

## 2022-08-01 PROCEDURE — 36415 COLL VENOUS BLD VENIPUNCTURE: CPT

## 2022-08-01 PROCEDURE — 74011250637 HC RX REV CODE- 250/637: Performed by: HOSPITALIST

## 2022-08-01 PROCEDURE — 82962 GLUCOSE BLOOD TEST: CPT

## 2022-08-01 PROCEDURE — 74011250637 HC RX REV CODE- 250/637: Performed by: INTERNAL MEDICINE

## 2022-08-01 PROCEDURE — 65270000046 HC RM TELEMETRY

## 2022-08-01 PROCEDURE — 80048 BASIC METABOLIC PNL TOTAL CA: CPT

## 2022-08-01 PROCEDURE — 74011636637 HC RX REV CODE- 636/637: Performed by: HOSPITALIST

## 2022-08-01 RX ADMIN — Medication 5 UNITS: at 19:31

## 2022-08-01 RX ADMIN — Medication 3 UNITS: at 23:18

## 2022-08-01 RX ADMIN — CARVEDILOL 12.5 MG: 12.5 TABLET, FILM COATED ORAL at 10:26

## 2022-08-01 RX ADMIN — AMLODIPINE BESYLATE 10 MG: 5 TABLET ORAL at 10:26

## 2022-08-01 RX ADMIN — Medication: at 10:34

## 2022-08-01 RX ADMIN — DOXYCYCLINE HYCLATE 100 MG: 100 TABLET, COATED ORAL at 23:18

## 2022-08-01 RX ADMIN — Medication 2 UNITS: at 10:27

## 2022-08-01 RX ADMIN — Medication 5 UNITS: at 10:26

## 2022-08-01 RX ADMIN — RIVAROXABAN 20 MG: 20 TABLET, FILM COATED ORAL at 10:26

## 2022-08-01 RX ADMIN — LATANOPROST 1 DROP: 50 SOLUTION OPHTHALMIC at 23:31

## 2022-08-01 RX ADMIN — CLONIDINE HYDROCHLORIDE 0.1 MG: 0.1 TABLET ORAL at 10:26

## 2022-08-01 RX ADMIN — CAPTOPRIL 25 MG: 25 TABLET ORAL at 10:29

## 2022-08-01 RX ADMIN — Medication 3 UNITS: at 13:10

## 2022-08-01 RX ADMIN — DOXYCYCLINE HYCLATE 100 MG: 100 TABLET, COATED ORAL at 10:26

## 2022-08-01 RX ADMIN — PRAVASTATIN SODIUM 80 MG: 40 TABLET ORAL at 10:26

## 2022-08-01 RX ADMIN — ASPIRIN 325 MG ORAL TABLET 325 MG: 325 PILL ORAL at 10:26

## 2022-08-01 RX ADMIN — Medication: at 23:31

## 2022-08-01 RX ADMIN — FUROSEMIDE 40 MG: 40 TABLET ORAL at 10:26

## 2022-08-01 NOTE — PROGRESS NOTES
Physical Therapy Screening:    An [facility] screening referral was triggered for physical therapy based on results obtained during the nursing admission assessment. The patients chart was reviewed and the patient is appropriate for a skilled therapy evaluation if there is a decline in functional mobility from baseline. Please order a consult for physical therapy if you are in agreement and would like an evaluation to be completed. Thank you.     Laverne La, PT, DPT

## 2022-08-01 NOTE — PROGRESS NOTES
Bedside shift change report given to Srinivas (oncoming nurse) by Shanda Lemon RN (offgoing nurse). Report included the following information SBAR, Kardex, Intake/Output, MAR, and Cardiac Rhythm 10 beats VT during shift, SR with PVC .

## 2022-08-01 NOTE — PROGRESS NOTES
Transition of Care Plan: [place]: 12% (low)  Disposition: SNF - [facility] accepted patient for COVID + bed but not yet ready for discharge today, CM to check again tomorrow. Follow up appointments: defer to SNF - PCP, specialists as indicated   DME needed: SNF to provide, patient has hospital bed, BSC, wheelchair at home. Transportation at Discharge: family vs medical transport pending progress   Keys or means to access home: yes      Medicare Letter: to be reviewed prior to d/c   Is patient a BCPI-A Bundle: No               If yes, was Bundle Letter given?:    Is patient a [place] and connected with the [place]? No            If yes, was [employer] transfer form completed and VA notified? Caregiver Contact: Radha Isaac (spouse) [phone]  Discharge Caregiver contacted prior to discharge? yes  Care Conference needed?:  No    Wound care to see patient today.       Thao Hyde, TATUMN, RN    Care Management  [phone]

## 2022-08-01 NOTE — PROGRESS NOTES
Dr Bradley Tanner contacted via perfect serve to make aware this RN received call from telemetry tech @ 2130 pt with 10 beats VT, NAD noted, VSS, pt denies SOB or chest pain. Dr Bradley Tanner aware and responded via perfect serve to \"check potassium and magnesium with am labs. Please document for am team to review\".

## 2022-08-01 NOTE — PROGRESS NOTES
Hospitalist Progress Note    NAME: Mckenzie Barrow   :  1945   MRN:  [id number]     Subjective:   Daily Progress Note: 2022 10:38 AM      Chief complaint: LE cellulitis  Patient seen and examined, chart was reviewed. Spoke with nursing at bedside has sacral breakdown. Wound care consulted. BG improved.      Current Facility-Administered Medications   Medication Dose Route Frequency    insulin NPH (NOVOLIN N, HUMULIN N) injection 5 Units  5 Units SubCUTAneous BID    doxycycline (VIBRA-TABS) tablet 100 mg  100 mg Oral Q12H    zinc oxide-cod liver oil (DESITIN) 40 % paste   Topical PRN    amLODIPine (NORVASC) tablet 10 mg  10 mg Oral DAILY    aspirin tablet 325 mg  325 mg Oral DAILY    captopriL (CAPOTEN) tablet 25 mg  25 mg Oral BID    carvediloL (COREG) tablet 12.5 mg  12.5 mg Oral BID    cloNIDine HCL (CATAPRES) tablet 0.1 mg  0.1 mg Oral BID    furosemide (LASIX) tablet 40 mg  40 mg Oral DAILY    latanoprost (XALATAN) 0.005 % ophthalmic solution 1 Drop  1 Drop Both Eyes QHS    pravastatin (PRAVACHOL) tablet 80 mg  80 mg Oral DAILY    rivaroxaban (XARELTO) tablet 20 mg  20 mg Oral DAILY    sodium chloride (NS) flush 5-40 mL  5-40 mL IntraVENous PRN    acetaminophen (TYLENOL) tablet 650 mg  650 mg Oral Q6H PRN    Or    acetaminophen (TYLENOL) suppository 650 mg  650 mg Rectal Q6H PRN    polyethylene glycol (MIRALAX) packet 17 g  17 g Oral DAILY PRN    ondansetron (ZOFRAN ODT) tablet 4 mg  4 mg Oral Q8H PRN    Or    ondansetron (ZOFRAN) injection 4 mg  4 mg IntraVENous Q6H PRN    insulin lispro (HUMALOG) injection   SubCUTAneous AC&HS    glucose chewable tablet 16 g  4 Tablet Oral PRN    glucagon (GLUCAGEN) injection 1 mg  1 mg IntraMUSCular PRN    dextrose 10% infusion 0-250 mL  0-250 mL IntraVENous PRN          Objective:     Visit Vitals  BP (!) 154/58 (BP 1 Location: Right upper arm, BP Patient Position: At rest)   Pulse 87   Temp 98.9 °F (37.2 °C)   Resp 18   Ht 5' 1\" (1.549 m)   Wt 99.8 kg (220 lb)   SpO2 95%   BMI 41.57 kg/m²      O2 Device: None (Room air)    Temp (24hrs), Av.7 °F (37.1 °C), Min:97.9 °F (36.6 °C), Max:99.2 °F (37.3 °C)        PHYSICAL EXAM:  Gen WDWN  Neck supple  CVS RRR  Reps symmetric expansion  Abdominal soft  Extremities with edema  Skin with venous dermatitis bilateral LE  Neuro alert LUE contracted  Psych flat affect      Data Review    Recent Results (from the past 24 hour(s))   GLUCOSE, POC    Collection Time: 22 12:07 PM   Result Value Ref Range    Glucose (POC) 154 (H) 65 - 117 mg/dL    Performed by Kayce Robison PCT    GLUCOSE, POC    Collection Time: 22  5:08 PM   Result Value Ref Range    Glucose (POC) 236 (H) 65 - 117 mg/dL    Performed by Ayaka Alvarez (MARTHA RN)    GLUCOSE, POC    Collection Time: 22 10:13 PM   Result Value Ref Range    Glucose (POC) 252 (H) 65 - 117 mg/dL    Performed by Juju Vasquez PCT    METABOLIC PANEL, BASIC    Collection Time: 22  4:51 AM   Result Value Ref Range    Sodium 138 136 - 145 mmol/L    Potassium 3.7 3.5 - 5.1 mmol/L    Chloride 105 97 - 108 mmol/L    CO2 29 21 - 32 mmol/L    Anion gap 4 (L) 5 - 15 mmol/L    Glucose 154 (H) 65 - 100 mg/dL    BUN 17 6 - 20 MG/DL    Creatinine 0.66 0.55 - 1.02 MG/DL    BUN/Creatinine ratio 26 (H) 12 - 20      GFR est AA >60 >60 ml/min/1.73m2    GFR est non-AA >60 >60 ml/min/1.73m2    Calcium 8.7 8.5 - 10.1 MG/DL   GLUCOSE, POC    Collection Time: 22  7:34 AM   Result Value Ref Range    Glucose (POC) 182 (H) 65 - 117 mg/dL    Performed by Valeda Opitz PCT      No results found for this visit on 22.   All Micro Results       Procedure Component Value Units Date/Time    CULTURE, URINE [[id number]] Collected: 22 4702    Order Status: Completed Specimen: Urine Updated: 22 3487     Special Requests: --        NO SPECIAL REQUESTS  Reflexed from [id number]       [place] Count --        >100,000  COLONIES/mL       Culture result:       MIXED UROGENITAL GERSON ISOLATED          RESPIRATORY VIRUS PANEL W/COVID-19, PCR [[id number]]  (Abnormal) Collected: 07/28/22 2231    Order Status: Completed Specimen: Nasopharyngeal Updated: 07/29/22 0848     Adenovirus Not detected        Coronavirus 229E Not detected        Coronavirus HKU1 Not detected        Coronavirus CVNL63 Not detected        Coronavirus OC43 Not detected        SARS-CoV-2, PCR Detected        Comment: CALLED TO AND READ BACK BY  CALLED TO Jevon Marcus RN. 7/29/22 [id number] BY DG          Metapneumovirus Not detected        Rhinovirus and Enterovirus Not detected        Influenza A Not detected        Influenza A, subtype H1 Not detected        Influenza A, subtype H3 Not detected        INFLUENZA A H1N1 PCR Not detected        Influenza B Not detected        Parainfluenza 1 Not detected        Parainfluenza 2 Not detected        Parainfluenza 3 Not detected        Parainfluenza virus 4 Not detected        RSV by PCR Not detected        B. parapertussis, PCR Not detected        Bordetella pertussis - PCR Not detected        Chlamydophila pneumoniae DNA, QL, PCR Not detected        Mycoplasma pneumoniae DNA, QL, PCR Not detected                    Assessment/Plan:     Jasson LE  wounds  doubt cellulitis likely venous dermatitis  -chronic wounds with fu  at wound care center/lymphedema clinic  - empiric antibiotics doxycycline  -allergic to cephalosporins.   - Crp low  - Podiatry eval no acute intervention needed    COVID-19 + test at home asymptomatic  DMT2- -with hypoglycemia, NPH dose decreased. Monitor BG.      HTN- monitor on PTA home meds  Hypothyroidism- cont PTA home meds  Atrial fibrillation on anticoagulation with Xarelto  CAD/CVA- asa/bb/statins  Chr DHF-compensated on lasix         Code Status: Full code  Surrogate Decision Maker: Katerin Cortes [phone] called 8/1 updated clinical status  DVT Prophylaxis: Xarelto  Dispo: SNF if bed available        Signed By: Cyndi Rothman MD     August 1, 2022

## 2022-08-02 VITALS
OXYGEN SATURATION: 97 % | TEMPERATURE: 99.1 F | RESPIRATION RATE: 17 BRPM | HEIGHT: 61 IN | BODY MASS INDEX: 41.54 KG/M2 | WEIGHT: 220 LBS | DIASTOLIC BLOOD PRESSURE: 58 MMHG | SYSTOLIC BLOOD PRESSURE: 166 MMHG | HEART RATE: 89 BPM

## 2022-08-02 PROBLEM — L03.115 BILATERAL LOWER LEG CELLULITIS: Status: RESOLVED | Noted: 2022-07-27 | Resolved: 2022-08-02

## 2022-08-02 PROBLEM — L03.115 BILATERAL LOWER LEG CELLULITIS: Status: RESOLVED | Noted: 2022-01-01 | Resolved: 2022-01-01

## 2022-08-02 PROBLEM — L03.116 BILATERAL LOWER LEG CELLULITIS: Status: RESOLVED | Noted: 2022-01-01 | Resolved: 2022-01-01

## 2022-08-02 LAB
ANION GAP SERPL CALC-SCNC: 4 MMOL/L (ref 5–15)
BUN SERPL-MCNC: 14 MG/DL (ref 6–20)
BUN/CREAT SERPL: 24 (ref 12–20)
CALCIUM SERPL-MCNC: 8.9 MG/DL (ref 8.5–10.1)
CHLORIDE SERPL-SCNC: 107 MMOL/L (ref 97–108)
CO2 SERPL-SCNC: 29 MMOL/L (ref 21–32)
CREAT SERPL-MCNC: 0.58 MG/DL (ref 0.55–1.02)
GLUCOSE BLD STRIP.AUTO-MCNC: 154 MG/DL (ref 65–117)
GLUCOSE BLD STRIP.AUTO-MCNC: 237 MG/DL (ref 65–117)
GLUCOSE SERPL-MCNC: 146 MG/DL (ref 65–100)
POTASSIUM SERPL-SCNC: 4 MMOL/L (ref 3.5–5.1)
SERVICE CMNT-IMP: ABNORMAL
SERVICE CMNT-IMP: ABNORMAL
SODIUM SERPL-SCNC: 140 MMOL/L (ref 136–145)

## 2022-08-02 PROCEDURE — 74011636637 HC RX REV CODE- 636/637: Performed by: INTERNAL MEDICINE

## 2022-08-02 PROCEDURE — 74011250637 HC RX REV CODE- 250/637: Performed by: HOSPITALIST

## 2022-08-02 PROCEDURE — 74011250637 HC RX REV CODE- 250/637: Performed by: INTERNAL MEDICINE

## 2022-08-02 PROCEDURE — 82962 GLUCOSE BLOOD TEST: CPT

## 2022-08-02 PROCEDURE — 80048 BASIC METABOLIC PNL TOTAL CA: CPT

## 2022-08-02 PROCEDURE — 36415 COLL VENOUS BLD VENIPUNCTURE: CPT

## 2022-08-02 RX ORDER — DOXYCYCLINE HYCLATE 100 MG
100 TABLET ORAL EVERY 12 HOURS
Qty: 10 TABLET | Refills: 0 | Status: SHIPPED
Start: 2022-08-02 | End: 2022-08-07

## 2022-08-02 RX ORDER — INSULIN LISPRO 100 [IU]/ML
INJECTION, SOLUTION INTRAVENOUS; SUBCUTANEOUS
Qty: 1 EACH | Refills: 0 | Status: SHIPPED
Start: 2022-08-02

## 2022-08-02 RX ADMIN — CARVEDILOL 12.5 MG: 12.5 TABLET, FILM COATED ORAL at 10:27

## 2022-08-02 RX ADMIN — PRAVASTATIN SODIUM 80 MG: 40 TABLET ORAL at 10:27

## 2022-08-02 RX ADMIN — Medication: at 10:46

## 2022-08-02 RX ADMIN — CLONIDINE HYDROCHLORIDE 0.1 MG: 0.1 TABLET ORAL at 10:27

## 2022-08-02 RX ADMIN — FUROSEMIDE 40 MG: 40 TABLET ORAL at 10:27

## 2022-08-02 RX ADMIN — CAPTOPRIL 25 MG: 25 TABLET ORAL at 10:31

## 2022-08-02 RX ADMIN — ASPIRIN 325 MG ORAL TABLET 325 MG: 325 PILL ORAL at 10:27

## 2022-08-02 RX ADMIN — ACETAMINOPHEN 650 MG: 325 TABLET ORAL at 14:37

## 2022-08-02 RX ADMIN — AMLODIPINE BESYLATE 10 MG: 5 TABLET ORAL at 10:27

## 2022-08-02 RX ADMIN — RIVAROXABAN 20 MG: 20 TABLET, FILM COATED ORAL at 10:27

## 2022-08-02 RX ADMIN — DOXYCYCLINE HYCLATE 100 MG: 100 TABLET, COATED ORAL at 10:27

## 2022-08-02 RX ADMIN — Medication 2 UNITS: at 13:21

## 2022-08-02 RX ADMIN — Medication 2 UNITS: at 10:26

## 2022-08-02 NOTE — DISCHARGE SUMMARY
Hospitalist Discharge Summary     Patient ID:  Daniel Mccallum  [id number]  38 y.o.  1945 7/27/2022    PCP on record: Jm Kenney MD    Admit date: 7/27/2022  Discharge date and time: 8/2/2022    DISCHARGE DIAGNOSIS:  Bilateral LE wounds suspected venous dermatitis  DM type II  HTN  Bedbound status  Chronic A. fib on Xarelto  Chronic diastolic heart failure  XWLQT-21 positive but asymptomatic    CONSULTATIONS:  IP CONSULT TO PODIATRY    Excerpted HPI from H&P of Bubba Ching MD:  CHIEF COMPLAINT: Generalized weakness     HISTORY OF PRESENT ILLNESS:     Amber Jarvis is a 65 y.o.   female who presents with past medical history of diabetes mellitus, hypertension, congestive heart failure, atrial fibrillation is coming the hospital chief complaints of generalized weakness and also not feeling well since last 1 week. Patient reports that she was in usual state of health until about a week ago when she noticed some weakness. She had 3 positive test for COVID at home. She reports fatigue and weakness involving both her arms and legs and not able to do her usual household stuff. She reports increasing pain around wounds of both of her legs. Does not report any chest pain or shortness of breath. Does not report any abdominal pain, nausea or vomiting. On arrival to ED, noted to have stable vitals. On labs CBC is normal.  BMP hemolyzed and repeat is pending. X-ray of the left foot shows diffuse osteopenia with no osseous process and diffuse soft tissue swelling. Chest x-ray shows no acute findings. We were asked to admit for work up and evaluation of the above problems. ______________________________________________________________________  DISCHARGE SUMMARY/HOSPITAL COURSE:  for full details see H&P, daily progress notes, labs, consult notes.      Jasson LE  wounds  doubt cellulitis likely venous dermatitis  -chronic wounds with fu  at wound care center/lymphedema clinic  - empiric antibiotics doxycycline  5 more days  -allergic to cephalosporins.   - Crp low  - Podiatry eval no acute intervention needed    COVID-19 + test at home asymptomatic  DMT2- -with hypoglycemia, NPH dose decreased 7u bid. Monitor BG at SNF closely. HTN- monitor on PTA home meds  Hypothyroidism- cont PTA home meds  Atrial fibrillation on anticoagulation with Xarelto  CAD/CVA- asa/bb/statins  Chr DHF-compensated on lasix         Code Status: Full code  Surrogate Decision Maker: Mae Doan [phone] called 8/1 updated clinical status  DVT Prophylaxis: Xarelto  Dispo: SNF if bed available today           _______________________________________________________________________  Patient seen and examined by me on discharge day. Patient maximized inpatient benefit stay and is able to be discharged to SNF if bed available today. Patient will need close follow-up with lymphedema clinic for her chronic lower extremity wounds, currently no active infection noted but likely has dermatitis. Patient also has sacral wounds which need continued close follow-up at SNF with wound care team.  No other acute issues medically stable for discharge today.     _______________________________________________________________________  DISCHARGE MEDICATIONS:   Current Discharge Medication List        START taking these medications    Details   doxycycline (VIBRA-TABS) 100 mg tablet Take 1 Tablet by mouth every twelve (12) hours for 5 days. Qty: 10 Tablet, Refills: 0  Start date: 8/2/2022, End date: 8/7/2022      insulin lispro (HUMALOG) 100 unit/mL injection As directed  Qty: 1 Each, Refills: 0  Start date: 8/2/2022      zinc oxide-cod liver oil (DESITIN) 40 % paste Apply  to affected area three (3) times daily.   Qty: 1 Each, Refills: 0  Start date: 8/2/2022           CONTINUE these medications which have CHANGED    Details   insulin NPH (NovoLIN N NPH U-100 Insulin) 100 unit/mL injection 5 Units by SubCUTAneous route Before breakfast and dinner. Qty: 1 mL, Refills: 0  Start date: 8/2/2022           CONTINUE these medications which have NOT CHANGED    Details   ondansetron hcl (Zofran) 4 mg tablet Take 1 Tablet by mouth every eight (8) hours as needed for Nausea. Qty: 15 Tablet, Refills: 0      cloNIDine HCL (CATAPRES) 0.1 mg tablet TAKE 1 TABLET THREE TIMES A DAY  Qty: 270 Tablet, Refills: 0      carvediloL (COREG) 12.5 mg tablet TAKE 1 TABLET TWICE A DAY  Qty: 180 Tablet, Refills: 0      amLODIPine (NORVASC) 10 mg tablet Take 1 Tablet by mouth daily. Qty: 90 Tablet, Refills: 0      pravastatin (PRAVACHOL) 80 mg tablet TAKE 1 TABLET NIGHTLY  Qty: 90 Tablet, Refills: 1      rivaroxaban (Xarelto) 20 mg tab tablet Take 1 Tablet by mouth daily. Qty: 90 Tablet, Refills: 1      mupirocin (BACTROBAN) 2 % ointment APPLY TOPICALLY TO THE AFFECTED AREA DAILY  Qty: 22 g, Refills: 0      sodium hypochlorite (Dakin's Solution) external solution Apply to wounds twice daily and cover with dressing  Qty: 1000 mL, Refills: 3      captopriL (CAPOTEN) 25 mg tablet TAKE 1 TABLET TWICE A DAY  Qty: 180 Tablet, Refills: 2      furosemide (LASIX) 40 mg tablet TAKE 1 TABLET DAILY  Qty: 90 Tab, Refills: 2      ergocalciferol (VITAMIN D2) 50,000 unit capsule Take 50,000 Units by mouth every seven (7) days. latanoprost (XALATAN) 0.005 % ophthalmic solution Administer 1 Drop to both eyes nightly. aspirin (ASPIRIN) 325 mg tablet Take 1 Tab by mouth daily.                Patient Follow Up Instructions:   Diet ADA  Activity slowly increase to levels as before  Check CBC/BMP in 5 days at [facility]  Continue current wound care orders at [facility]  Strict isolation as per CDC guidelines for COVID-19  Strict fall/aspiration/pressure ulcer precautions  Accu-Cheks before every meal and at bedtime call MD if less than 70 or more than 299  Return to ED or call 911 immediately if symptoms recur or get worse  Continue follow-up with lymphedema clinic as before    Follow-up Information       Follow up With Specialties Details Why Contact Info    Gabe Hurst MD Internal Medicine Physician    [facility] [address] [facility]  [phone]      Gabe Hurst MD Internal Medicine Physician       [address] [place] [address] Avenue  [address]  [phone]          ________________________________________________________________    Risk of deterioration: Moderate    Condition at Discharge:  Stable  __________________________________________________________________    Disposition  SNF/LTC    ____________________________________________________________________    Code Status: Full Code  ___________________________________________________________________      Total time in minutes spent coordinating this discharge  36  minutes    Signed:  Warren Gonsalez MD  .

## 2022-08-02 NOTE — PROGRESS NOTES
Transition of Care Plan: [facility] [place]: 13% (low)  Disposition: [address] and Rehab  Follow up appointments: defer to SNF - PCP, specialists as indicated   DME needed: SNF to provide, patient has hospital bed, BSC, wheelchair at home. Transportation at Discharge: [facility] at [address] [place] [address], to [facility] and [address] [place] [address], room [address], nursing call report to [phone]. [place] or means to access home: yes      Medicare Letter: to be mailed due to family not having email/fax or being able to come to hospital.  Is patient a BCPI-A Bundle: No               If yes, was Bundle Letter given?:    Is patient a  and connected with the [place]? No            If yes, was [employer] transfer form completed and VA notified? Caregiver Contact: Andrey Narayananerin (spouse) [phone]  Discharge Caregiver contacted prior to discharge? Spouse unavailable and does not have cell phone, sonAlejandro, notified of discharge plan and agreeable. Care Conference needed?:  No    Spouse was not available to speak with CM due to being at meeting and not having a working cell phone according to son, Giuseppemat Lars. Alejandro [facility] notified of patient's discharge to [facility], requested time 3:00pm.  He is agreeable to this and had no further questions or concerns for CM. He will have spouse call CM back. They do not have email or fax, 2nd IMM and FOC to be mailed to home address, confirmed with son: [address] [place] [address], [address] RN made aware of discharge time. 1258 - Spouse contacted and updated regarding discharge at 3pm with [facility] to [facility] and [facility], room [address], and phone number [phone]. Spouse agreeable to discharge plan and had no further questions or concerns for CM. Leave Smarter tool left outside door. Transition of Care Plan to SNF/Rehab    Communication to Patient/Family:  Met with patient and family and they are agreeable to the transition plan.  The Plan for Transition of Care is related to the following treatment goals: PT/OT/nursing    The Patient and/or patient representative was provided with a choice of provider and agrees  with the discharge plan. Yes [x] No []    A Freedom of choice list was provided with basic dialogue that supports the patient's individualized plan of care/goals and shares the quality data associated with the providers. Yes [x] No []    SNF/Rehab Transition:  Patient has been accepted to [facility] SN[facility]Rehab and meets criteria for admission. Patient will transported by [facility] and expected to leave at 3:00pm.    Communication to SNF/Rehab:  Bedside RN, Zeferino Burkett, has been notified to update the transition plan to the facility and call report (phone number). Discharge information has been updated on the AVS and paper copy to be sent with patient. Nursing Please include all hard scripts for controlled substances, med rec and dc summary, and AVS in packet. Reviewed and confirmed with facility, [facility], can manage the patient care needs for the following:     Kevin Brown with (X) only those applicable:  Medication:  [x]Medications are available at the facility  []IV Antibiotics    []Controlled Substance - hard copies available sent. []Weekly Labs    Equipment:  []CPAP/BiPAP  []Wound Vacuum  []Walker or Urinary Device  []PICC/Central Line  []Nebulizer  []Ventilator    Treatment:  [x]Isolation (for MRSA, VRE, etc.) - COVID 19 +  []Surgical Drain Management  []Tracheostomy Care  []Dressing Changes  []Dialysis with transportation  []PEG Care  []Oxygen  []Daily Weights for Heart Failure    Dietary:  []Any diet limitations  []Tube Feedings   []Total Parenteral Management (TPN)    Financial Resources:  []Medicaid Application Completed    []UAI Completed and copy given to pt/family  and copy given to pt/family  []A screening has previously been completed.     []Level II Completed    [] Private pay individual who will not become financially eligible for Medicaid within 6 months from admission to a [facility] facility. [] Individual refused to have screening conducted. []Medicaid Application Completed    []The screening denied because it was determined individual did not need/did not qualify for nursing facility level of care. [] Out of state residents seeking direct admission to a [address] facility. [] Individuals who are inpatients of an out of state hospital, or in state or out of state veterans/ hospital and seek direct admission to a [address] facility  [] Individuals who are pateints or residents of a state owned/operated facility that is licensed by [employer]) and seek direct admission to [address] [place] [address]  [] A screening not required for enrollment in [address] [address] [address] services as set out in [address] [place] [address] 73-  [] [facility]) staff shall perform screenings of the [facility] clients. Advanced Care Plan:  []Surrogate Decision Maker of Care  []POA  []Communicated Code Status and copy sent.     Other:            TATUM SierraN, RN    Care Management  [phone]

## 2022-08-02 NOTE — PROGRESS NOTES
Hospitalist Progress Note    NAME: Bay Ochoa   :  1945   MRN:  [id number]     Subjective:   Daily Progress Note: 2022 10:38 AM      Chief complaint: LE cellulitis  Patient seen and examined, chart was reviewed. Blood glucose has improved. Discussed with nursing at bedside no other acute issues reported.     Current Facility-Administered Medications   Medication Dose Route Frequency    insulin NPH (NOVOLIN N, HUMULIN N) injection 7 Units  7 Units SubCUTAneous BID    zinc oxide-cod liver oil (DESITIN) 40 % paste   Topical TID    doxycycline (VIBRA-TABS) tablet 100 mg  100 mg Oral Q12H    amLODIPine (NORVASC) tablet 10 mg  10 mg Oral DAILY    aspirin tablet 325 mg  325 mg Oral DAILY    captopriL (CAPOTEN) tablet 25 mg  25 mg Oral BID    carvediloL (COREG) tablet 12.5 mg  12.5 mg Oral BID    cloNIDine HCL (CATAPRES) tablet 0.1 mg  0.1 mg Oral BID    furosemide (LASIX) tablet 40 mg  40 mg Oral DAILY    latanoprost (XALATAN) 0.005 % ophthalmic solution 1 Drop  1 Drop Both Eyes QHS    pravastatin (PRAVACHOL) tablet 80 mg  80 mg Oral DAILY    rivaroxaban (XARELTO) tablet 20 mg  20 mg Oral DAILY    sodium chloride (NS) flush 5-40 mL  5-40 mL IntraVENous PRN    acetaminophen (TYLENOL) tablet 650 mg  650 mg Oral Q6H PRN    Or    acetaminophen (TYLENOL) suppository 650 mg  650 mg Rectal Q6H PRN    polyethylene glycol (MIRALAX) packet 17 g  17 g Oral DAILY PRN    ondansetron (ZOFRAN ODT) tablet 4 mg  4 mg Oral Q8H PRN    Or    ondansetron (ZOFRAN) injection 4 mg  4 mg IntraVENous Q6H PRN    insulin lispro (HUMALOG) injection   SubCUTAneous AC&HS    glucose chewable tablet 16 g  4 Tablet Oral PRN    glucagon (GLUCAGEN) injection 1 mg  1 mg IntraMUSCular PRN    dextrose 10% infusion 0-250 mL  0-250 mL IntraVENous PRN          Objective:     Visit Vitals  BP (!) 150/56   Pulse 84   Temp 98.6 °F (37 °C)   Resp 18   Ht 5' 1\" (1.549 m)   Wt 99.8 kg (220 lb)   SpO2 97%   BMI 41.57 kg/m²      O2 Device: None (Room air)    Temp (24hrs), Av.6 °F (37 °C), Min:98.2 °F (36.8 °C), Max:98.9 °F (37.2 °C)        PHYSICAL EXAM:  Gen WDWN  Neck supple  CVS RRR  Reps symmetric expansion  Abdominal soft  Extremities with edema  Skin with venous dermatitis bilateral LE  Neuro alert LUE contracted  Psych flat affect      Data Review    Recent Results (from the past 24 hour(s))   GLUCOSE, POC    Collection Time: 22 11:03 AM   Result Value Ref Range    Glucose (POC) 217 (H) 65 - 117 mg/dL    Performed by Kevin Chacon PCT    GLUCOSE, POC    Collection Time: 22  7:34 PM   Result Value Ref Range    Glucose (POC) 286 (H) 65 - 117 mg/dL    Performed by Cruz Huizar RN    GLUCOSE, POC    Collection Time: 22  8:51 PM   Result Value Ref Range    Glucose (POC) 264 (H) 65 - 117 mg/dL    Performed by José Miguel Alfaro PCT    METABOLIC PANEL, BASIC    Collection Time: 22  6:26 AM   Result Value Ref Range    Sodium 140 136 - 145 mmol/L    Potassium 4.0 3.5 - 5.1 mmol/L    Chloride 107 97 - 108 mmol/L    CO2 29 21 - 32 mmol/L    Anion gap 4 (L) 5 - 15 mmol/L    Glucose 146 (H) 65 - 100 mg/dL    BUN 14 6 - 20 MG/DL    Creatinine 0.58 0.55 - 1.02 MG/DL    BUN/Creatinine ratio 24 (H) 12 - 20      GFR est AA >60 >60 ml/min/1.73m2    GFR est non-AA >60 >60 ml/min/1.73m2    Calcium 8.9 8.5 - 10.1 MG/DL   GLUCOSE, POC    Collection Time: 22  7:37 AM   Result Value Ref Range    Glucose (POC) 154 (H) 65 - 117 mg/dL    Performed by Yessi [facility] PCT      No results found for this visit on 22.   All Micro Results       Procedure Component Value Units Date/Time    CULTURE, URINE [[id number]] Collected: 22 1846    Order Status: Completed Specimen: Urine Updated: 2208     Special Requests: --        NO SPECIAL REQUESTS  Reflexed from [id number]       [place] Count --        >100,000  COLONIES/mL       Culture result:       MIXED UROGENITAL GERSON ISOLATED          RESPIRATORY VIRUS PANEL W/COVID-19, PCR [[id number]]  (Abnormal) Collected: 07/28/22 2231    Order Status: Completed Specimen: Nasopharyngeal Updated: 07/29/22 0848     Adenovirus Not detected        Coronavirus 229E Not detected        Coronavirus HKU1 Not detected        Coronavirus CVNL63 Not detected        Coronavirus OC43 Not detected        SARS-CoV-2, PCR Detected        Comment: CALLED TO AND READ BACK BY  CALLED TO Allyssa Davis RN. 7/29/22 [id number] BY DG          Metapneumovirus Not detected        Rhinovirus and Enterovirus Not detected        Influenza A Not detected        Influenza A, subtype H1 Not detected        Influenza A, subtype H3 Not detected        INFLUENZA A H1N1 PCR Not detected        Influenza B Not detected        Parainfluenza 1 Not detected        Parainfluenza 2 Not detected        Parainfluenza 3 Not detected        Parainfluenza virus 4 Not detected        RSV by PCR Not detected        B. parapertussis, PCR Not detected        Bordetella pertussis - PCR Not detected        Chlamydophila pneumoniae DNA, QL, PCR Not detected        Mycoplasma pneumoniae DNA, QL, PCR Not detected                    Assessment/Plan:     Jasson LE  wounds  doubt cellulitis likely venous dermatitis  -chronic wounds with fu  at wound care center/lymphedema clinic  - empiric antibiotics doxycycline  5 more days  -allergic to cephalosporins.   - Crp low  - Podiatry eval no acute intervention needed    COVID-19 + test at home asymptomatic  DMT2- -with hypoglycemia, NPH dose decreased. Monitor BG.      HTN- monitor on PTA home meds  Hypothyroidism- cont PTA home meds  Atrial fibrillation on anticoagulation with Xarelto  CAD/CVA- asa/bb/statins  Chr DHF-compensated on lasix         Code Status: Full code  Surrogate Decision Maker: Xuan Putnam [phone] called 8/1 updated clinical status  DVT Prophylaxis: Xarelto  Dispo: SNF if bed available today        Signed By: Amado Marks MD     August 2, 2022

## 2022-08-02 NOTE — PROGRESS NOTES
Pt IV removed and tylenol given. Pt tele box removed. Pt left with [facility] at approximately 1500 in no apparent distress and in good spirits. Pt belongings transported with pt. Report called to Candace Topete LPN at [facility] at [phone].

## 2022-08-02 NOTE — DISCHARGE INSTRUCTIONS
Diet ADA  Activity slowly increase to levels as before  Check CBC/BMP in 5 days at SNF  Continue current wound care orders at SNF  Strict isolation as per CDC guidelines for COVID-19  Strict fall/aspiration/pressure ulcer precautions  Accu-Cheks before every meal and at bedtime call MD if less than 70 or more than 299  Return to ED or call 911 immediately if symptoms recur or get worse  Continue follow-up with lymphedema clinic as before

## 2022-08-02 NOTE — PROGRESS NOTES
This RN gave report to Litzy Mcneil (oncoming RN). All questions answered. Pt had no acute events overnight. Pt provided henry-care prior to bed, Kamrar applied to wound sites, and lotion applied to BLE which pt reported relieved her discomfort. Pt was not visualized this AM at handoff since pt is COVID+.

## 2022-08-07 NOTE — PROGRESS NOTES
Physician Progress Note      Ave MIL[address] #:                  [id number]  :                       1945  ADMIT DATE:       2022 2:38 PM  [address] [place] DATE:        2022 4:00 PM  RESPONDING  PROVIDER #:        Juvenal SNYDER MD          QUERY TEXT:    Pt presents with LE weakness reports increasing pain around wounds to b/l legs. Discharge summary noted 'Jasson LE  wounds  doubt cellulitis likely venous dermatitis.'  Please clarify the following condition: The medical record reflects the following:  Risk Factors: 77yoF w/ severe lymphedema DMT2, CHF, HTN  Clinical Indicators:  Discharge Summary:  Jasson LE  wounds with doubt cellulitis likely venous dermatitis   PN:  Lower extremity wounds does not look infected at this time. ?   Podiatry c/s;  Superficial pre ulcerative lesions none open left heel same presentation  Unable to palpate pedal pulses due to sever Lymphatic edema    Treatment: Empiric abx Doxycycline, f/u  at wound care center/lymphedema clinic, WOCN consult. Thank you,  Michael Barcenas RN, CDI  Options provided:  -- Cellulitis ruled out  -- Cellulitis ruled in associated with Diabetes  -- Cellulitis ruled in unrelated to Diabetes  -- Other - I will add my own diagnosis  -- Disagree - Not applicable / Not valid  -- Disagree - Clinically unable to determine / Unknown  -- Refer to Clinical Documentation Reviewer    PROVIDER RESPONSE TEXT:    Cellulitis ruled out after study.     Query created by: Laura Macdonald on 2022 12:15 PM      Electronically signed by:  Josemanuel Troncoso MD 2022 8:31 AM

## 2022-08-10 ENCOUNTER — PATIENT OUTREACH (OUTPATIENT)
Dept: CASE MANAGEMENT | Age: 77
End: 2022-08-10

## 2022-08-11 NOTE — PROGRESS NOTES
Post Acute Facility Update     *The information contained in this note was received during a weekly care coordination call with the post acute facility*    Current Facility: [address] (SNF)  Anticipated Discharge Date: TBD    Facility Nursing Update: no current updates  Facility Social Work Update:  lives w/  and son. Will return home. Bundle Program Status: none  Upper Extremity Assistance: Maximum Assistance  Lower Extremity Assistance: Maximum Assistance  Bed Mobility: Maximum Assistance  Transfers: Maximum Assistance  Ambulation: Dependent  How far (in feet) is the patient ambulating?  0  Device Used:N/A  Barriers to Discharge: TBBRUNO LYNN, RN  [facility]

## 2022-08-17 ENCOUNTER — PATIENT OUTREACH (OUTPATIENT)
Dept: CASE MANAGEMENT | Age: 77
End: 2022-08-17

## 2022-08-22 ENCOUNTER — HOME HEALTH ADMISSION (OUTPATIENT)
Dept: HOME HEALTH SERVICES | Facility: HOME HEALTH | Age: 77
End: 2022-08-22

## 2022-08-24 ENCOUNTER — PATIENT OUTREACH (OUTPATIENT)
Dept: CASE MANAGEMENT | Age: 77
End: 2022-08-24

## 2022-08-25 DIAGNOSIS — M79.672 LEFT FOOT PAIN: Primary | ICD-10-CM

## 2022-08-25 RX ORDER — TRAMADOL HYDROCHLORIDE 50 MG/1
25 TABLET ORAL
Qty: 14 TABLET | Refills: 0 | Status: SHIPPED | OUTPATIENT
Start: 2022-08-25 | End: 2022-09-01

## 2022-08-25 NOTE — PROGRESS NOTES
Patient will be discharging from [facility] today 08/25/2022 after being under the care of [facility] while at facility. During time at facility had some pain not resolved with tylenol. Not candidate for NSAIDS and was given tramadol 25mg PO Q6H PRN. This will be sent to pharmacy. Needs to follow up with PCP for refills.

## 2022-08-25 NOTE — PROGRESS NOTES
.Post Acute Facility Update     *The information contained in this note was received during a weekly care coordination call with the post acute facility*    Current Facility: [address]  (SNF)  Anticipated Discharge Date: 8/25/22    Facility Nursing Update: No current updates  Facility Social Work Update: Plan to discharge on 8/25 with , son and [facility]  Bundle Program Status: none  Upper Extremity Assistance: Moderate Assistance   Lower Extremity Assistance: Maximum Assistance  Bed Mobility: Mod/Max Assistance  Transfers: Dependent  Ambulation: Dependent  How far (in feet) is the patient ambulating?  0  Device Used: N/A  Barriers to Discharge: ELY LYNN, RN  [facility] [place]

## 2022-08-26 ENCOUNTER — PATIENT OUTREACH (OUTPATIENT)
Dept: CASE MANAGEMENT | Age: 77
End: 2022-08-26

## 2022-08-26 NOTE — PROGRESS NOTES
[address] [place] [address] Dr Discharge Follow-Up      Date/Time:  2022 11:00 AM    Patient was admitted to [facility] on 22 and discharged to [address] [place] [address] on 22. The patients discharge diagnosis was Multiple open Wounds of lower leg. Patient was discharge on 22 from [address] [place] [address]. The discharge summary from the post acute facilty was not available at the time of outreach. Patient was contacted within 1 business days of discharge from the post acute facility. N Care Coordinator contacted the family daughter Bishop Ambrosio with patient in the room who gave consent by telephone to perform post hospital discharge follow up. Verified name and  with family as identifiers. Provided introduction to self, and explanation of the LPN Care Coordinator role. Family received post acute facility discharge instructions. LPN Care Coordinator reviewed hospital discharge instructions and red flags with family who verbalized understanding. Family given an opportunity to ask questions and does not have any further questions or concerns at this time. The family agrees to contact the PCP office for questions related to their healthcare. N Care Coordinator provided contact information for future reference. Offered to schedule a PCP follow up but her daughter thinks she will need a visiting Doctor, patient has a hard time going out of the house. Will follow up next week, and refer to a CM. Advance Care Planning:   Does patient have an Advance Directive:  not on file     [address] orders at discharge: PT, OT, SN, [address]: [facility]  Date of initial visit: 22    [address] [place] ordered at discharge: none  [address]: none  [address] [place] received: patient has her own walker, W/C, BSC, and hospital bed. Medication(s):   The post acute facility medication discharge list was not available for this call. [facility] follow up appointment(s): No future appointments.    Non-[facility] follow up appointment(s): n/a  [facility]:  information provided as a resource

## 2022-08-31 ENCOUNTER — PATIENT OUTREACH (OUTPATIENT)
Dept: CASE MANAGEMENT | Age: 77
End: 2022-08-31

## 2022-08-31 NOTE — PROGRESS NOTES
[address] [place] [address] Dr Discharge Note    *The information contained in this note was received during a weekly care coordination call with the post acute facility*      Patient was discharged from [address] [place] [address] ([facility]) on 8/25/2022 to Home with family support. PCP: MD KASSANDRA Lira appointment: Family scheduled PCP appointment. Home Health/Outpatient orders at discharge: home health care  [address]: [address] ordered at discharge:  None  [address] received prior to discharge: Felipe Bender Coordinator managing patient: ARIE Cardenas. Community Care Team will sign off at this time. Medications were not reconciled and general patient assessment was not completed during this skilled nursing facility outreach.        ISIDRO Ortega  [facility] Team

## 2022-09-02 ENCOUNTER — PATIENT OUTREACH (OUTPATIENT)
Dept: CASE MANAGEMENT | Age: 77
End: 2022-09-02

## 2022-09-07 ENCOUNTER — PATIENT OUTREACH (OUTPATIENT)
Dept: CASE MANAGEMENT | Age: 77
End: 2022-09-07

## 2022-09-07 NOTE — PROGRESS NOTES
Ambulatory Care Management Note    Date/Time:  9/7/2022 4:17 PM    This patient was received as a referral from Care Transition Nurse. Ambulatory Care Manager outreached to patient today to offer care management services. Introduction to self and role of care manager provided. Patient accepted care management services at this time. Follow up call scheduled at this time. Patient has Ambulatory Care Manager's contact number for for any questions or concerns. Ambulatory Care Management Note    Date/Time:  9/7/2022 4:20 PM    This Ambulatory Care Manager (ACM) reviewed and updated the following screenings during this call; general assessment, self management assessment, and SDOH assessments    Patient's challenges to self-management identified:   depression, functional physical ability, level of motivation, medical condition, PCP relationship, polypharmacy, transportation, and utilization of services      Medication Management:  good adherence and good understanding    Advance Care Planning:   Does patient have an Advance Directive:   has HCDM's listed, need to review    [employer], Referrals, and Durable Medical Equipment: pt is receiving HH, continues to have wound care for leg ulcers at home. She said she does have an appoint with a visiting physician, she was unsure of group/name. Health Maintenance Due   Topic Date Due    COVID-19 Vaccine (1) Never done    DTaP/Tdap/Td series (1 - Tdap) Never done    Shingrix Vaccine Age 50> (1 of 2) Never done    Bone Densitometry (Dexa) Screening  Never done    Eye Exam Retinal or Dilated  04/19/2020    MICROALBUMIN Q1  05/06/2022    Medicare Yearly Exam  06/10/2022    Flu Vaccine (1) 09/01/2022     Health Maintenance Reviewed: She is overdue on quite a few care gaps including Medicare yearly exam. She does not currently have a PCP. Pt reports heel pain, elena when laying in bed, she denies any sore/breakdown.  Instructed her how to offload/float her heals.  She continues to be very weak requiring assistance to get OOB, she is not walking. Patient was asked to consider health care goals that they would like to focus on with this ACM. ACM will follow up with patient to discuss goals and establish care plan in the next 7-14 days. PCP/Specialist follow up: No future appointments.

## 2022-10-03 ENCOUNTER — PATIENT OUTREACH (OUTPATIENT)
Dept: CASE MANAGEMENT | Age: 77
End: 2022-10-03

## 2022-10-03 NOTE — PROGRESS NOTES
10/03/22  ACM Note -   Spoke with pt for follow up ACM. Pt reports she feels fine. Pt speaks of her frustrations of having so many people come in to the house caring for her, she is not sure who they are. Pt reports she is essentially bed bound, unable to assess if she gets out of bed - when asked pt states she is unable to walk and changes the topic. Pt said she did have a physician come to the house after our last conversation though she was not sure who or what they said. She said a nurse is looking at her bottom and putting something on it though she is not aware of what it is. Pt reports the nse told her her breakdown on her bottom is getting worse. She tells me she is incontinent, she does not get up to the [facility]. Discussed changing wet/soiled diaper immed and applying skin barrier cream such as Desitin to prevent further breakdown and protect skin. Discussed changing positions to get off her bottom to prevent breakdown. She said her family takes care of her and they do the best they can. Asked her if she needs additional help to come in to bathe her, change sheets, etc and she declined saying her family does all of that. She reports she is taking allo her medications, though she admits she does not know what they are. She reports a good appetite. She was unable to answer how the ulcers on her legs are doing. This ACM asked to speak with her family to see if they had questions or concerns and pt declined. Patient has graduated from the Complex Case Management  program on 10/03/22 . Pt is not engaged and not willing for this ACM to speak with her family. She is frustrated with having so many people coming in to her house taking care of her -suggested she use a notebook to write everyone's name down and what they are doing. She is receiving home services - it sounds like daily, unsure what services are being provided though. Patient/family has the ability to self-manage at this time.   Care management goals have been completed. No further Ambulatory Care Manager follow up scheduled. Goals Addressed    None         Patient has Ambulatory Care Manager's contact information for any further questions, concerns, or needs. Patients upcoming visits:  No future appointments.

## 2022-11-27 ENCOUNTER — HOSPITAL ENCOUNTER (INPATIENT)
Age: 77
LOS: 18 days | Discharge: HOSPICE | DRG: 853 | End: 2022-12-15
Attending: EMERGENCY MEDICINE | Admitting: STUDENT IN AN ORGANIZED HEALTH CARE EDUCATION/TRAINING PROGRAM
Payer: MEDICARE

## 2022-11-27 ENCOUNTER — APPOINTMENT (OUTPATIENT)
Dept: GENERAL RADIOLOGY | Age: 77
DRG: 853 | End: 2022-11-27
Attending: EMERGENCY MEDICINE
Payer: MEDICARE

## 2022-11-27 ENCOUNTER — ANESTHESIA EVENT (OUTPATIENT)
Dept: SURGERY | Age: 77
DRG: 853 | End: 2022-11-27
Payer: MEDICARE

## 2022-11-27 ENCOUNTER — APPOINTMENT (OUTPATIENT)
Dept: CT IMAGING | Age: 77
DRG: 853 | End: 2022-11-27
Attending: EMERGENCY MEDICINE
Payer: MEDICARE

## 2022-11-27 ENCOUNTER — ANESTHESIA (OUTPATIENT)
Dept: SURGERY | Age: 77
DRG: 853 | End: 2022-11-27
Payer: MEDICARE

## 2022-11-27 DIAGNOSIS — A41.9 SEVERE SEPSIS (HCC): Primary | ICD-10-CM

## 2022-11-27 DIAGNOSIS — M46.28 SACRAL OSTEOMYELITIS (HCC): ICD-10-CM

## 2022-11-27 DIAGNOSIS — N39.0 COMPLICATED UTI (URINARY TRACT INFECTION): ICD-10-CM

## 2022-11-27 DIAGNOSIS — Z51.5 PALLIATIVE CARE ENCOUNTER: ICD-10-CM

## 2022-11-27 DIAGNOSIS — G93.41 METABOLIC ENCEPHALOPATHY: ICD-10-CM

## 2022-11-27 DIAGNOSIS — R65.20 SEVERE SEPSIS (HCC): Primary | ICD-10-CM

## 2022-11-27 DIAGNOSIS — N30.01 ACUTE CYSTITIS WITH HEMATURIA: ICD-10-CM

## 2022-11-27 DIAGNOSIS — G30.1 SEVERE LATE ONSET ALZHEIMER'S DEMENTIA WITHOUT BEHAVIORAL DISTURBANCE, PSYCHOTIC DISTURBANCE, MOOD DISTURBANCE, OR ANXIETY (HCC): ICD-10-CM

## 2022-11-27 DIAGNOSIS — R53.81 DEBILITY: ICD-10-CM

## 2022-11-27 DIAGNOSIS — E11.3299 TYPE 2 DIABETES MELLITUS WITH BACKGROUND RETINOPATHY (HCC): Chronic | ICD-10-CM

## 2022-11-27 DIAGNOSIS — E63.9 POOR NUTRITION: ICD-10-CM

## 2022-11-27 DIAGNOSIS — F02.C0 SEVERE LATE ONSET ALZHEIMER'S DEMENTIA WITHOUT BEHAVIORAL DISTURBANCE, PSYCHOTIC DISTURBANCE, MOOD DISTURBANCE, OR ANXIETY (HCC): ICD-10-CM

## 2022-11-27 DIAGNOSIS — L89.154 SACRAL DECUBITUS ULCER, STAGE IV (HCC): ICD-10-CM

## 2022-11-27 DIAGNOSIS — A49.8 BACTEROIDES INFECTION: ICD-10-CM

## 2022-11-27 DIAGNOSIS — B95.7 BACTEREMIA, COAGULASE-NEGATIVE STAPHYLOCOCCAL: ICD-10-CM

## 2022-11-27 DIAGNOSIS — Z71.89 GOALS OF CARE, COUNSELING/DISCUSSION: ICD-10-CM

## 2022-11-27 DIAGNOSIS — E88.09 HYPOALBUMINEMIA: ICD-10-CM

## 2022-11-27 DIAGNOSIS — I63.9 CVA (CEREBRAL INFARCTION): ICD-10-CM

## 2022-11-27 DIAGNOSIS — R78.81 BACTEREMIA DUE TO PROTEUS SPECIES: ICD-10-CM

## 2022-11-27 DIAGNOSIS — B96.4 BACTEREMIA DUE TO PROTEUS SPECIES: ICD-10-CM

## 2022-11-27 DIAGNOSIS — M46.28 OSTEOMYELITIS OF SACRUM (HCC): ICD-10-CM

## 2022-11-27 DIAGNOSIS — R78.81 BACTEREMIA, COAGULASE-NEGATIVE STAPHYLOCOCCAL: ICD-10-CM

## 2022-11-27 DIAGNOSIS — Z71.89 DNR (DO NOT RESUSCITATE) DISCUSSION: ICD-10-CM

## 2022-11-27 LAB
ALBUMIN SERPL-MCNC: 1.7 G/DL (ref 3.5–5)
ALBUMIN/GLOB SERPL: 0.3 {RATIO} (ref 1.1–2.2)
ALP SERPL-CCNC: 143 U/L (ref 45–117)
ALT SERPL-CCNC: <6 U/L (ref 12–78)
ANION GAP SERPL CALC-SCNC: 7 MMOL/L (ref 5–15)
APPEARANCE UR: ABNORMAL
AST SERPL-CCNC: 9 U/L (ref 15–37)
BACTERIA URNS QL MICRO: ABNORMAL /HPF
BASE EXCESS BLD CALC-SCNC: 5.3 MMOL/L
BASOPHILS # BLD: 0 K/UL (ref 0–0.1)
BASOPHILS NFR BLD: 0 % (ref 0–1)
BILIRUB SERPL-MCNC: 0.4 MG/DL (ref 0.2–1)
BILIRUB UR QL: NEGATIVE
BUN SERPL-MCNC: 12 MG/DL (ref 6–20)
BUN/CREAT SERPL: 20 (ref 12–20)
CA-I BLD-MCNC: 1.25 MMOL/L (ref 1.12–1.32)
CALCIUM SERPL-MCNC: 9.5 MG/DL (ref 8.5–10.1)
CHLORIDE BLD-SCNC: 97 MMOL/L (ref 100–108)
CHLORIDE SERPL-SCNC: 96 MMOL/L (ref 97–108)
CO2 BLD-SCNC: 30 MMOL/L (ref 19–24)
CO2 SERPL-SCNC: 32 MMOL/L (ref 21–32)
COLOR UR: ABNORMAL
CREAT SERPL-MCNC: 0.61 MG/DL (ref 0.55–1.02)
CREAT UR-MCNC: 0.4 MG/DL (ref 0.6–1.3)
DIFFERENTIAL METHOD BLD: ABNORMAL
EOSINOPHIL # BLD: 0 K/UL (ref 0–0.4)
EOSINOPHIL NFR BLD: 0 % (ref 0–7)
EPITH CASTS URNS QL MICRO: ABNORMAL /LPF
ERYTHROCYTE [DISTWIDTH] IN BLOOD BY AUTOMATED COUNT: 14.5 % (ref 11.5–14.5)
GLOBULIN SER CALC-MCNC: 6.2 G/DL (ref 2–4)
GLUCOSE BLD STRIP.AUTO-MCNC: 321 MG/DL (ref 65–117)
GLUCOSE BLD STRIP.AUTO-MCNC: 456 MG/DL (ref 74–106)
GLUCOSE SERPL-MCNC: 440 MG/DL (ref 65–100)
GLUCOSE UR STRIP.AUTO-MCNC: 500 MG/DL
HCO3 BLDA-SCNC: 31 MMOL/L
HCT VFR BLD AUTO: 32.1 % (ref 35–47)
HGB BLD-MCNC: 9.8 G/DL (ref 11.5–16)
HGB UR QL STRIP: ABNORMAL
IMM GRANULOCYTES # BLD AUTO: 0.1 K/UL (ref 0–0.04)
IMM GRANULOCYTES NFR BLD AUTO: 0 % (ref 0–0.5)
KETONES UR QL STRIP.AUTO: 15 MG/DL
LACTATE BLD-SCNC: 3.19 MMOL/L (ref 0.4–2)
LEUKOCYTE ESTERASE UR QL STRIP.AUTO: ABNORMAL
LYMPHOCYTES # BLD: 2 K/UL (ref 0.8–3.5)
LYMPHOCYTES NFR BLD: 13 % (ref 12–49)
MCH RBC QN AUTO: 29.7 PG (ref 26–34)
MCHC RBC AUTO-ENTMCNC: 30.5 G/DL (ref 30–36.5)
MCV RBC AUTO: 97.3 FL (ref 80–99)
MONOCYTES # BLD: 0.6 K/UL (ref 0–1)
MONOCYTES NFR BLD: 4 % (ref 5–13)
NEUTS SEG # BLD: 13 K/UL (ref 1.8–8)
NEUTS SEG NFR BLD: 83 % (ref 32–75)
NITRITE UR QL STRIP.AUTO: POSITIVE
NRBC # BLD: 0 K/UL (ref 0–0.01)
NRBC BLD-RTO: 0 PER 100 WBC
PCO2 BLDV: 46.6 MMHG (ref 41–51)
PH BLDV: 7.43 [PH] (ref 7.32–7.42)
PH UR STRIP: 5.5 [PH] (ref 5–8)
PLATELET # BLD AUTO: 571 K/UL (ref 150–400)
PMV BLD AUTO: 9.2 FL (ref 8.9–12.9)
PO2 BLDV: 33 MMHG (ref 25–40)
POTASSIUM BLD-SCNC: 4.6 MMOL/L (ref 3.5–5.5)
POTASSIUM SERPL-SCNC: 4.6 MMOL/L (ref 3.5–5.1)
PROCALCITONIN SERPL-MCNC: 1.63 NG/ML
PROT SERPL-MCNC: 7.9 G/DL (ref 6.4–8.2)
PROT UR STRIP-MCNC: 100 MG/DL
RBC # BLD AUTO: 3.3 M/UL (ref 3.8–5.2)
RBC #/AREA URNS HPF: ABNORMAL /HPF (ref 0–5)
SERVICE CMNT-IMP: ABNORMAL
SERVICE CMNT-IMP: ABNORMAL
SODIUM BLD-SCNC: 137 MMOL/L (ref 136–145)
SODIUM SERPL-SCNC: 135 MMOL/L (ref 136–145)
SP GR UR REFRACTOMETRY: 1.03
SPECIMEN SITE: ABNORMAL
TROPONIN-HIGH SENSITIVITY: 9 NG/L (ref 0–51)
UA: UC IF INDICATED,UAUC: ABNORMAL
UROBILINOGEN UR QL STRIP.AUTO: 1 EU/DL (ref 0.2–1)
WBC # BLD AUTO: 15.6 K/UL (ref 3.6–11)
WBC URNS QL MICRO: >100 /HPF (ref 0–4)

## 2022-11-27 PROCEDURE — 71045 X-RAY EXAM CHEST 1 VIEW: CPT

## 2022-11-27 PROCEDURE — 96365 THER/PROPH/DIAG IV INF INIT: CPT

## 2022-11-27 PROCEDURE — 81001 URINALYSIS AUTO W/SCOPE: CPT

## 2022-11-27 PROCEDURE — 82962 GLUCOSE BLOOD TEST: CPT

## 2022-11-27 PROCEDURE — 87040 BLOOD CULTURE FOR BACTERIA: CPT

## 2022-11-27 PROCEDURE — 2709999900 HC NON-CHARGEABLE SUPPLY: Performed by: SURGERY

## 2022-11-27 PROCEDURE — 74011000636 HC RX REV CODE- 636: Performed by: EMERGENCY MEDICINE

## 2022-11-27 PROCEDURE — 76060000033 HC ANESTHESIA 1 TO 1.5 HR: Performed by: SURGERY

## 2022-11-27 PROCEDURE — 99231 SBSQ HOSP IP/OBS SF/LOW 25: CPT | Performed by: SURGERY

## 2022-11-27 PROCEDURE — 87086 URINE CULTURE/COLONY COUNT: CPT

## 2022-11-27 PROCEDURE — 93005 ELECTROCARDIOGRAM TRACING: CPT

## 2022-11-27 PROCEDURE — 74011250637 HC RX REV CODE- 250/637: Performed by: STUDENT IN AN ORGANIZED HEALTH CARE EDUCATION/TRAINING PROGRAM

## 2022-11-27 PROCEDURE — 84484 ASSAY OF TROPONIN QUANT: CPT

## 2022-11-27 PROCEDURE — 73590 X-RAY EXAM OF LOWER LEG: CPT

## 2022-11-27 PROCEDURE — 80053 COMPREHEN METABOLIC PANEL: CPT

## 2022-11-27 PROCEDURE — 87186 SC STD MICRODIL/AGAR DIL: CPT

## 2022-11-27 PROCEDURE — 84145 PROCALCITONIN (PCT): CPT

## 2022-11-27 PROCEDURE — 36415 COLL VENOUS BLD VENIPUNCTURE: CPT

## 2022-11-27 PROCEDURE — 99285 EMERGENCY DEPT VISIT HI MDM: CPT

## 2022-11-27 PROCEDURE — 87150 DNA/RNA AMPLIFIED PROBE: CPT

## 2022-11-27 PROCEDURE — 82803 BLOOD GASES ANY COMBINATION: CPT

## 2022-11-27 PROCEDURE — 85025 COMPLETE CBC W/AUTO DIFF WBC: CPT

## 2022-11-27 PROCEDURE — 87077 CULTURE AEROBIC IDENTIFY: CPT

## 2022-11-27 PROCEDURE — 76010000149 HC OR TIME 1 TO 1.5 HR: Performed by: SURGERY

## 2022-11-27 PROCEDURE — 74011000250 HC RX REV CODE- 250: Performed by: NURSE ANESTHETIST, CERTIFIED REGISTERED

## 2022-11-27 PROCEDURE — 74011250636 HC RX REV CODE- 250/636: Performed by: EMERGENCY MEDICINE

## 2022-11-27 PROCEDURE — 76210000006 HC OR PH I REC 0.5 TO 1 HR: Performed by: SURGERY

## 2022-11-27 PROCEDURE — 65270000029 HC RM PRIVATE

## 2022-11-27 PROCEDURE — 74011636637 HC RX REV CODE- 636/637: Performed by: EMERGENCY MEDICINE

## 2022-11-27 PROCEDURE — 74011250636 HC RX REV CODE- 250/636: Performed by: NURSE ANESTHETIST, CERTIFIED REGISTERED

## 2022-11-27 PROCEDURE — 74011000258 HC RX REV CODE- 258: Performed by: EMERGENCY MEDICINE

## 2022-11-27 PROCEDURE — 96375 TX/PRO/DX INJ NEW DRUG ADDON: CPT

## 2022-11-27 PROCEDURE — 74177 CT ABD & PELVIS W/CONTRAST: CPT

## 2022-11-27 RX ORDER — ACETAMINOPHEN 650 MG/1
650 SUPPOSITORY RECTAL
Status: DISCONTINUED | OUTPATIENT
Start: 2022-11-27 | End: 2022-12-15 | Stop reason: HOSPADM

## 2022-11-27 RX ORDER — PROPOFOL 10 MG/ML
INJECTION, EMULSION INTRAVENOUS AS NEEDED
Status: DISCONTINUED | OUTPATIENT
Start: 2022-11-27 | End: 2022-11-28 | Stop reason: HOSPADM

## 2022-11-27 RX ORDER — INSULIN GLARGINE 100 [IU]/ML
10 INJECTION, SOLUTION SUBCUTANEOUS
Status: DISCONTINUED | OUTPATIENT
Start: 2022-11-27 | End: 2022-11-28

## 2022-11-27 RX ORDER — SODIUM CHLORIDE 0.9 % (FLUSH) 0.9 %
5-40 SYRINGE (ML) INJECTION EVERY 8 HOURS
Status: DISCONTINUED | OUTPATIENT
Start: 2022-11-27 | End: 2022-12-15 | Stop reason: HOSPADM

## 2022-11-27 RX ORDER — OXYCODONE HYDROCHLORIDE 5 MG/1
5 TABLET ORAL
Status: DISCONTINUED | OUTPATIENT
Start: 2022-11-27 | End: 2022-12-15 | Stop reason: HOSPADM

## 2022-11-27 RX ORDER — ONDANSETRON 4 MG/1
4 TABLET, ORALLY DISINTEGRATING ORAL
Status: DISCONTINUED | OUTPATIENT
Start: 2022-11-27 | End: 2022-12-15 | Stop reason: HOSPADM

## 2022-11-27 RX ORDER — SODIUM CHLORIDE, SODIUM LACTATE, POTASSIUM CHLORIDE, CALCIUM CHLORIDE 600; 310; 30; 20 MG/100ML; MG/100ML; MG/100ML; MG/100ML
INJECTION, SOLUTION INTRAVENOUS
Status: DISCONTINUED | OUTPATIENT
Start: 2022-11-27 | End: 2022-11-28 | Stop reason: HOSPADM

## 2022-11-27 RX ORDER — ACETAMINOPHEN 325 MG/1
650 TABLET ORAL
Status: DISCONTINUED | OUTPATIENT
Start: 2022-11-27 | End: 2022-12-15 | Stop reason: HOSPADM

## 2022-11-27 RX ORDER — MAGNESIUM SULFATE 100 %
4 CRYSTALS MISCELLANEOUS AS NEEDED
Status: DISCONTINUED | OUTPATIENT
Start: 2022-11-27 | End: 2022-12-15 | Stop reason: HOSPADM

## 2022-11-27 RX ORDER — ASPIRIN 325 MG
325 TABLET ORAL DAILY
Status: DISCONTINUED | OUTPATIENT
Start: 2022-11-28 | End: 2022-12-15 | Stop reason: HOSPADM

## 2022-11-27 RX ORDER — MORPHINE SULFATE 2 MG/ML
4 INJECTION, SOLUTION INTRAMUSCULAR; INTRAVENOUS
Status: DISCONTINUED | OUTPATIENT
Start: 2022-11-27 | End: 2022-11-27

## 2022-11-27 RX ORDER — SODIUM CHLORIDE 0.9 % (FLUSH) 0.9 %
5-10 SYRINGE (ML) INJECTION AS NEEDED
Status: DISCONTINUED | OUTPATIENT
Start: 2022-11-27 | End: 2022-11-27

## 2022-11-27 RX ORDER — PHENYLEPHRINE HCL IN 0.9% NACL 0.4MG/10ML
SYRINGE (ML) INTRAVENOUS AS NEEDED
Status: DISCONTINUED | OUTPATIENT
Start: 2022-11-27 | End: 2022-11-28 | Stop reason: HOSPADM

## 2022-11-27 RX ORDER — ONDANSETRON 2 MG/ML
INJECTION INTRAMUSCULAR; INTRAVENOUS AS NEEDED
Status: DISCONTINUED | OUTPATIENT
Start: 2022-11-27 | End: 2022-11-28 | Stop reason: HOSPADM

## 2022-11-27 RX ORDER — CARVEDILOL 12.5 MG/1
12.5 TABLET ORAL 2 TIMES DAILY
Status: DISCONTINUED | OUTPATIENT
Start: 2022-11-27 | End: 2022-12-15 | Stop reason: HOSPADM

## 2022-11-27 RX ORDER — AMLODIPINE BESYLATE 5 MG/1
10 TABLET ORAL DAILY
Status: DISCONTINUED | OUTPATIENT
Start: 2022-11-28 | End: 2022-12-15 | Stop reason: HOSPADM

## 2022-11-27 RX ORDER — CLONIDINE HYDROCHLORIDE 0.1 MG/1
0.1 TABLET ORAL 2 TIMES DAILY
Status: DISCONTINUED | OUTPATIENT
Start: 2022-11-27 | End: 2022-12-15 | Stop reason: HOSPADM

## 2022-11-27 RX ORDER — ONDANSETRON 2 MG/ML
4 INJECTION INTRAMUSCULAR; INTRAVENOUS
Status: DISCONTINUED | OUTPATIENT
Start: 2022-11-27 | End: 2022-12-15 | Stop reason: HOSPADM

## 2022-11-27 RX ORDER — SUCCINYLCHOLINE CHLORIDE 20 MG/ML
INJECTION INTRAMUSCULAR; INTRAVENOUS AS NEEDED
Status: DISCONTINUED | OUTPATIENT
Start: 2022-11-27 | End: 2022-11-28 | Stop reason: HOSPADM

## 2022-11-27 RX ORDER — SODIUM CHLORIDE 0.9 % (FLUSH) 0.9 %
5-40 SYRINGE (ML) INJECTION AS NEEDED
Status: DISCONTINUED | OUTPATIENT
Start: 2022-11-27 | End: 2022-12-15 | Stop reason: HOSPADM

## 2022-11-27 RX ORDER — FENTANYL CITRATE 50 UG/ML
INJECTION, SOLUTION INTRAMUSCULAR; INTRAVENOUS AS NEEDED
Status: DISCONTINUED | OUTPATIENT
Start: 2022-11-27 | End: 2022-11-28 | Stop reason: HOSPADM

## 2022-11-27 RX ORDER — LIDOCAINE HYDROCHLORIDE 20 MG/ML
INJECTION, SOLUTION EPIDURAL; INFILTRATION; INTRACAUDAL; PERINEURAL AS NEEDED
Status: DISCONTINUED | OUTPATIENT
Start: 2022-11-27 | End: 2022-11-28 | Stop reason: HOSPADM

## 2022-11-27 RX ORDER — ACETAMINOPHEN 325 MG/1
650 TABLET ORAL
Status: DISCONTINUED | OUTPATIENT
Start: 2022-11-27 | End: 2022-11-27

## 2022-11-27 RX ORDER — POLYETHYLENE GLYCOL 3350 17 G/17G
17 POWDER, FOR SOLUTION ORAL DAILY PRN
Status: DISCONTINUED | OUTPATIENT
Start: 2022-11-27 | End: 2022-12-15 | Stop reason: HOSPADM

## 2022-11-27 RX ORDER — INSULIN LISPRO 100 [IU]/ML
INJECTION, SOLUTION INTRAVENOUS; SUBCUTANEOUS EVERY 4 HOURS
Status: DISCONTINUED | OUTPATIENT
Start: 2022-11-27 | End: 2022-11-28

## 2022-11-27 RX ORDER — ONDANSETRON 2 MG/ML
4 INJECTION INTRAMUSCULAR; INTRAVENOUS
Status: DISCONTINUED | OUTPATIENT
Start: 2022-11-27 | End: 2022-11-27

## 2022-11-27 RX ORDER — SODIUM CHLORIDE, SODIUM LACTATE, POTASSIUM CHLORIDE, CALCIUM CHLORIDE 600; 310; 30; 20 MG/100ML; MG/100ML; MG/100ML; MG/100ML
50 INJECTION, SOLUTION INTRAVENOUS CONTINUOUS
Status: DISPENSED | OUTPATIENT
Start: 2022-11-27 | End: 2022-11-28

## 2022-11-27 RX ORDER — PRAVASTATIN SODIUM 40 MG/1
80 TABLET ORAL
Status: DISCONTINUED | OUTPATIENT
Start: 2022-11-27 | End: 2022-12-15 | Stop reason: HOSPADM

## 2022-11-27 RX ADMIN — PIPERACILLIN AND TAZOBACTAM 4.5 G: 4; .5 INJECTION, POWDER, FOR SOLUTION INTRAVENOUS at 17:35

## 2022-11-27 RX ADMIN — SUCCINYLCHOLINE CHLORIDE 120 MG: 20 INJECTION, SOLUTION INTRAMUSCULAR; INTRAVENOUS at 23:28

## 2022-11-27 RX ADMIN — ONDANSETRON HYDROCHLORIDE 4 MG: 2 INJECTION, SOLUTION INTRAMUSCULAR; INTRAVENOUS at 23:45

## 2022-11-27 RX ADMIN — FENTANYL CITRATE 50 MCG: 50 INJECTION, SOLUTION INTRAMUSCULAR; INTRAVENOUS at 23:24

## 2022-11-27 RX ADMIN — Medication 10 UNITS: at 17:34

## 2022-11-27 RX ADMIN — SODIUM CHLORIDE, SODIUM LACTATE, POTASSIUM CHLORIDE, AND CALCIUM CHLORIDE: 600; 310; 30; 20 INJECTION, SOLUTION INTRAVENOUS at 23:21

## 2022-11-27 RX ADMIN — SODIUM CHLORIDE 1000 ML: 9 INJECTION, SOLUTION INTRAVENOUS at 17:34

## 2022-11-27 RX ADMIN — IOPAMIDOL 100 ML: 755 INJECTION, SOLUTION INTRAVENOUS at 16:41

## 2022-11-27 RX ADMIN — VANCOMYCIN HYDROCHLORIDE 2500 MG: 10 INJECTION, POWDER, LYOPHILIZED, FOR SOLUTION INTRAVENOUS at 17:41

## 2022-11-27 RX ADMIN — OXYCODONE 5 MG: 5 TABLET ORAL at 22:13

## 2022-11-27 RX ADMIN — DEXAMETHASONE SODIUM PHOSPHATE 4 MG: 4 INJECTION, SOLUTION INTRAMUSCULAR; INTRAVENOUS at 23:45

## 2022-11-27 RX ADMIN — LIDOCAINE HYDROCHLORIDE 80 MG: 20 INJECTION, SOLUTION EPIDURAL; INFILTRATION; INTRACAUDAL; PERINEURAL at 23:24

## 2022-11-27 RX ADMIN — Medication 80 MCG: at 23:32

## 2022-11-27 RX ADMIN — Medication 80 MCG: at 23:28

## 2022-11-27 RX ADMIN — PROPOFOL 60 MG: 10 INJECTION, EMULSION INTRAVENOUS at 23:28

## 2022-11-27 RX ADMIN — Medication 80 MCG: at 23:35

## 2022-11-27 RX ADMIN — Medication 80 MCG: at 23:49

## 2022-11-27 RX ADMIN — Medication 80 MCG: at 23:37

## 2022-11-27 NOTE — ED NOTES
EMERGENCY DEPARTMENT HISTORY AND PHYSICAL EXAM      Date: 11/27/2022  Patient Name: Blaine Harp    History of Presenting Illness     Chief Complaint   Patient presents with    Wound Check     Via EMS from home. Large bed sore on bottom x 3 weeks, getting worse.        History Provided By: Patient and Patient's Daughter    HPI: Blaine Harp, 65 y.o. female with  a past medical history significant for medical problems as stated below presents to the ED with cc of sacral wound x 2 months. Daughter reports patient was seen at [facility] in October for this and was recommended debridement at that time however patient decline. MRI from October visit showed sacral wound with possible osteomyelitis. She has not had any wound care set up since her d/c, daughter has been providing wound care. Daughter reports wound has increased in size and depth since discharge. Patient has been non-ambulatory for the last few years. She lives by herself at home, daughter from [place] is staying with her for the last few months providing care. Daughter reports patient has been feeling warm. There are no associated symptoms. No other exacerbating or ameliorating factors. PCP: Brianna Menjivar MD    No current facility-administered medications on file prior to encounter. Current Outpatient Medications on File Prior to Encounter   Medication Sig Dispense Refill    insulin NPH (NovoLIN N NPH U-100 Insulin) 100 unit/mL injection 5 Units by SubCUTAneous route Before breakfast and dinner. 1 mL 0    insulin lispro (HUMALOG) 100 unit/mL injection As directed 1 Each 0    zinc oxide-cod liver oil (DESITIN) 40 % paste Apply  to affected area three (3) times daily. 1 Each 0    ondansetron hcl (Zofran) 4 mg tablet Take 1 Tablet by mouth every eight (8) hours as needed for Nausea.  15 Tablet 0    cloNIDine HCL (CATAPRES) 0.1 mg tablet TAKE 1 TABLET THREE TIMES A  Tablet 0    carvediloL (COREG) 12.5 mg tablet TAKE 1 TABLET TWICE A  Tablet 0    amLODIPine (NORVASC) 10 mg tablet Take 1 Tablet by mouth daily. 90 Tablet 0    pravastatin (PRAVACHOL) 80 mg tablet TAKE 1 TABLET NIGHTLY 90 Tablet 1    rivaroxaban (Xarelto) 20 mg tab tablet Take 1 Tablet by mouth daily. 90 Tablet 1    mupirocin (BACTROBAN) 2 % ointment APPLY TOPICALLY TO THE AFFECTED AREA DAILY 22 g 0    sodium hypochlorite (Dakin's Solution) external solution Apply to wounds twice daily and cover with dressing 1000 mL 3    captopriL (CAPOTEN) 25 mg tablet TAKE 1 TABLET TWICE A  Tablet 2    furosemide (LASIX) 40 mg tablet TAKE 1 TABLET DAILY 90 Tab 2    ergocalciferol (VITAMIN D2) 50,000 unit capsule Take 50,000 Units by mouth every seven (7) days. latanoprost (XALATAN) 0.005 % ophthalmic solution Administer 1 Drop to both eyes nightly. aspirin (ASPIRIN) 325 mg tablet Take 1 Tab by mouth daily.          Past History     Past Medical History:  Past Medical History:   Diagnosis Date    Anticoagulant long-term use 10/13/2017    Anxiety 10/13/2017    Atrial fibrillation ([address] 10/13/2017    Breast calcification, left 10/13/2017    CAD (coronary artery disease)     Cellulitis of both lower extremities 10/13/2017    Chronic kidney disease     kidney stones    Chronic pain syndrome 10/13/2017    Chronic prescription opiate use 11/15/2017    CVA (cerebral vascular accident) ([address] 10/13/2017    Diabetes ([address]     DJD (degenerative joint disease) 10/13/2017    Dyslipidemia 10/13/2017    Glaucoma 10/13/2017    Hypertension     Hyperthyroidism 10/13/2017    Long-term use of high-risk medication 10/13/2017    Stasis ulcer of lower extremity ([address] 10/13/2017    Stroke ([address]     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 ([address] 8/18/2012    retinopathy        Past Surgical History:  Past Surgical History:   Procedure Laterality Date    HX BREAST BIOPSY Left     2014    HX GYN      hysterectomty    HX ORTHOPAEDIC      back surgery    FL CARDIAC SURG PROCEDURE UNLIST cagb       Family History:  Family History   Problem Relation Age of Onset    Heart Disease Father        Social History:  Social History     Tobacco Use    Smoking status: Never    Smokeless tobacco: Never   Vaping Use    Vaping Use: Never used   Substance Use Topics    Alcohol use: No    Drug use: No       Allergies: Allergies   Allergen Reactions    Betadine Prepstick Rash    Keflex [Cephalexin] Hives     Pt breaks out in hives         Review of Systems   Review of Systems   Constitutional:  Positive for fatigue. Negative for chills and fever. HENT:  Negative for congestion. Eyes:  Positive for discharge. Respiratory:  Negative for cough and shortness of breath. Cardiovascular:  Negative for chest pain. Gastrointestinal:  Negative for abdominal pain, diarrhea and vomiting. Skin:  Positive for wound. Neurological:  Negative for syncope. Physical Exam   Physical Exam  Constitutional:       Appearance: Normal appearance. HENT:      Head: Normocephalic and atraumatic. Eyes:      Extraocular Movements: Extraocular movements intact. Cardiovascular:      Rate and Rhythm: Tachycardia present. Pulmonary:      Effort: Pulmonary effort is normal.   Skin:     Findings: Wound present. Comments: Sacral wound: Stage 4 sacral pressure ulcer approx 12 in width x 6 in height with extensive surrounding necrotic tissue and erythema. LLE: Stage 3 pressure ulcer to posterior calf with necrotic tissue. RLE: Stage 3 pressure ulcer to posterior calf. Neurological:      General: No focal deficit present. Mental Status: She is alert. Mental status is at baseline.       Comments: Oriented to person and place   Psychiatric:         Mood and Affect: Mood normal.         Behavior: Behavior normal.         Diagnostic Study Results     Labs -     Recent Results (from the past 24 hour(s))   CBC WITH AUTOMATED DIFF    Collection Time: 11/27/22  4:08 PM   Result Value Ref Range    WBC 15.6 (H) 3.6 - 11.0 K/uL    RBC 3.30 (L) 3.80 - 5.20 M/uL    HGB 9.8 (L) 11.5 - 16.0 g/dL    HCT 32.1 (L) 35.0 - 47.0 %    MCV 97.3 80.0 - 99.0 FL    MCH 29.7 26.0 - 34.0 PG    MCHC 30.5 30.0 - 36.5 g/dL    RDW 14.5 11.5 - 14.5 %    PLATELET 415 (H) 025 - 400 K/uL    MPV 9.2 8.9 - 12.9 FL    NRBC 0.0 0  WBC    ABSOLUTE NRBC 0.00 0.00 - 0.01 K/uL    NEUTROPHILS 83 (H) 32 - 75 %    LYMPHOCYTES 13 12 - 49 %    MONOCYTES 4 (L) 5 - 13 %    EOSINOPHILS 0 0 - 7 %    BASOPHILS 0 0 - 1 %    IMMATURE GRANULOCYTES 0 0.0 - 0.5 %    ABS. NEUTROPHILS 13.0 (H) 1.8 - 8.0 K/UL    ABS. LYMPHOCYTES 2.0 0.8 - 3.5 K/UL    ABS. MONOCYTES 0.6 0.0 - 1.0 K/UL    ABS. EOSINOPHILS 0.0 0.0 - 0.4 K/UL    ABS. BASOPHILS 0.0 0.0 - 0.1 K/UL    ABS. IMM. GRANS. 0.1 (H) 0.00 - 0.04 K/UL    DF AUTOMATED     METABOLIC PANEL, COMPREHENSIVE    Collection Time: 11/27/22  4:08 PM   Result Value Ref Range    Sodium 135 (L) 136 - 145 mmol/L    Potassium 4.6 3.5 - 5.1 mmol/L    Chloride 96 (L) 97 - 108 mmol/L    CO2 32 21 - 32 mmol/L    Anion gap 7 5 - 15 mmol/L    Glucose 440 (H) 65 - 100 mg/dL    BUN 12 6 - 20 MG/DL    Creatinine 0.61 0.55 - 1.02 MG/DL    BUN/Creatinine ratio 20 12 - 20      eGFR >60 >60 ml/min/1.73m2    Calcium 9.5 8.5 - 10.1 MG/DL    Bilirubin, total 0.4 0.2 - 1.0 MG/DL    ALT (SGPT) <6 (L) 12 - 78 U/L    AST (SGOT) 9 (L) 15 - 37 U/L    Alk.  phosphatase 143 (H) 45 - 117 U/L    Protein, total 7.9 6.4 - 8.2 g/dL    Albumin 1.7 (L) 3.5 - 5.0 g/dL    Globulin 6.2 (H) 2.0 - 4.0 g/dL    A-G Ratio 0.3 (L) 1.1 - 2.2     TROPONIN-HIGH SENSITIVITY    Collection Time: 11/27/22  4:08 PM   Result Value Ref Range    Troponin-High Sensitivity 9 0 - 51 ng/L   URINALYSIS W/ REFLEX CULTURE    Collection Time: 11/27/22  4:08 PM    Specimen: Urine   Result Value Ref Range    Color DARK YELLOW      Appearance TURBID (A) CLEAR      Specific gravity 1.026      pH (UA) 5.5 5.0 - 8.0      Protein 100 (A) NEG mg/dL    Glucose 500 (A) NEG mg/dL    Ketone 15 (A) NEG mg/dL Bilirubin Negative NEG      Blood LARGE (A) NEG      Urobilinogen 1.0 0.2 - 1.0 EU/dL    Nitrites Positive (A) NEG      Leukocyte Esterase LARGE (A) NEG      WBC >100 (H) 0 - 4 /hpf    RBC 5-10 0 - 5 /hpf    Epithelial cells FEW FEW /lpf    Bacteria 2+ (A) NEG /hpf    UA:UC IF INDICATED URINE CULTURE ORDERED (A) CNI     PROCALCITONIN    Collection Time: 11/27/22  4:08 PM   Result Value Ref Range    Procalcitonin 1.63 ng/mL   BLOOD GAS,CHEM8,LACTIC ACID POC    Collection Time: 11/27/22  4:16 PM   Result Value Ref Range    Calcium, ionized (POC) 1.25 1.12 - 1.32 mmol/L    BICARBONATE 31 mmol/L    Base excess (POC) 5.3 mmol/L    Sample source VENOUS BLOOD      CO2, POC 30 (H) 19 - 24 MMOL/L    Sodium,  136 - 145 MMOL/L    Potassium, POC 4.6 3.5 - 5.5 MMOL/L    Chloride, POC 97 (L) 100 - 108 MMOL/L    Glucose,  (HH) 74 - 106 MG/DL    Creatinine, POC 0.4 (L) 0.6 - 1.3 MG/DL    Lactic Acid (POC) 3.19 (HH) 0.40 - 2.00 mmol/L    Critical value read back NOTIFIED ADRIA     pH, venous (POC) 7.43 (H) 7.32 - 7.42      pCO2, venous (POC) 46.6 41 - 51 MMHG    pO2, venous (POC) 33 25 - 40 mmHg   GLUCOSE, POC    Collection Time: 11/27/22  6:38 PM   Result Value Ref Range    Glucose (POC) 321 (H) 65 - 117 mg/dL    Performed by Danii Saldivar (TRV RN)        Radiologic Studies -   XR TIB/FIB LT   Final Result      Osteopenia. No evidence of acute osteomyelitis. XR TIB/FIB RT   Final Result   Osteopenia. No evidence of osteomyelitis. CT ABD PELV W CONT   Final Result      1. Large sacral decubitus ulcer with associated extensive osteomyelitis of the   distal sacrum and coccyx with associated erosive changes, as well as gas and   fluid containing collection within the posterior subcutaneous soft tissues   communicating with the skin surface as above, right larger than left. 2. Distended gallbladder with cholelithiasis, but no convincing CT evidence of   acute cholecystitis.  If there is clinical concern, consider ultrasound for   further evaluation. 3. Additional findings as above. XR CHEST PORT   Final Result   1. No acute disease            CT Results  (Last 48 hours)                 11/27/22 1637  CT ABD PELV W CONT Final result    Impression:      1. Large sacral decubitus ulcer with associated extensive osteomyelitis of the   distal sacrum and coccyx with associated erosive changes, as well as gas and   fluid containing collection within the posterior subcutaneous soft tissues   communicating with the skin surface as above, right larger than left. 2. Distended gallbladder with cholelithiasis, but no convincing CT evidence of   acute cholecystitis. If there is clinical concern, consider ultrasound for   further evaluation. 3. Additional findings as above. Narrative:  EXAM:  CT ABD PELV W CONT       INDICATION: sacral wound concern for osteomyelitis        COMPARISON: None. CONTRAST:  100 mL of Isovue-370. TECHNIQUE:    Following the uneventful intravenous administration of contrast, thin axial   images were obtained through the abdomen and pelvis. Coronal and sagittal   reconstructions were generated. Oral contrast was not administered. CT dose   reduction was achieved through use of a standardized protocol tailored for this   examination and automatic exposure control for dose modulation. FINDINGS:    Lower Thorax:   Lung Bases: Clear. Heart: Postsurgical changes of CABG. Heart size is normal. No pericardial   effusion. Abdomen/Pelvis:   Liver:  No focal liver lesions. Biliary system: Gallbladder is distended and contains multiple stones, but   without gallbladder wall thickening or surrounding fat stranding. No   intrahepatic or extrahepatic biliary ductal dilatation. Spleen: Normal.       Pancreas: Normal.       Kidneys/Ureters/Bladder: Subcentimeter hypodensities in the kidneys,   statistically benign cysts. No renal or ureteral calculi.  No hydronephrosis or   hydroureter. Walker catheter in the bladder, which is partially decompressed and   contains air secondary to instrumentation. Adrenals: Normal.       Stomach/bowel: No dilation or abnormal wall thickening is present. There is a   rectal stool ball, and a moderate volume of stool throughout the remaining   colon. Mild sigmoid diverticulosis. No free intraperitoneal air noted. Normal   appendix. Reproductive Organs: Status post hysterectomy. No suspicious adnexal masses. Vasculature: Normal caliber arteries. Severe calcific atherosclerosis of the   abdominal aorta and iliac vasculature. Portal vein, SMV, and splenic vein are   patent. Nodes: No pathologically enlarged lymph nodes. Fluid: No free fluid. Bones/Soft Tissue: There is a large ulcer overlying the distal sacrum and   coccyx, eccentric to the right, with an associated gas and fluid containing   collection that communicates with the skin measuring 12.5 x 2.7 cm (series 301   image 76). There is extension of the collection into the right gluteus madelyn   (series 301 image 81) with that component measuring 2.5 x 1.3 cm. There is also   extension into the left retrosacral soft tissue tissues with air noted (series   301 image 68). There is extensive osteolysis of the posterior sacrum and coccyx   extending from S4 inferiorly. There is sclerosis noted in the bilateral femoral heads, suspicious for   avascular necrosis. Osteopenia. Postoperative changes of L5-S1 posterior fusion. Multilevel degenerative disc disease in the lumbar spine with severe lower   lumbar facet arthropathy. CXR Results  (Last 48 hours)                 11/27/22 1615  XR CHEST PORT Final result    Impression:  1. No acute disease           Narrative:  INDICATION:  Eval for Infiltrate        Exam: Portable chest 1610. Comparison: 7/27/2022. Findings: Cardiomediastinal silhouette is within normal limits. Pulmonary   vasculature is not engorged. There are no focal parenchymal opacities,   effusions, or pneumothorax. 11/26/2022                     Medical Decision Making   I am the first provider for this patient. I reviewed the vital signs, available nursing notes, past medical history, past surgical history, family history and social history. Vital Signs-Reviewed the patient's vital signs. Patient Vitals for the past 12 hrs:   Temp Pulse Resp BP SpO2   11/27/22 1830 -- (!) 114 20 (!) 143/77 100 %   11/27/22 1815 -- (!) 114 30 (!) 155/71 98 %   11/27/22 1730 -- (!) 118 23 136/68 95 %   11/27/22 1530 -- (!) 117 (!) 35 (!) 148/77 98 %   11/27/22 1349 98.6 °F (37 °C) (!) 118 -- (!) 159/61 100 %       Records Reviewed: Nursing records and medical records reviewed    Provider Notes (Medical Decision Making):   Patient presents to ED with daughter via EMS with tachycardia with sacral pressure wound. Patient was admitted at [address] [place] [address] about 1 month ago and declined wound debridement at that time. Exam with stage 4 pressure ulcer with extensive necrotic tissue. Wound has now doubled in size per daughter. CT shows large sacral decubitus ulcer with extensive osteomyelitis of the distal sacrum and coccyx with associated erosive changes and gas and fluid within posterior subcutaneous soft tissues. WBC elevated 15.6. UA with infection. Glucose 443. Patient started on zosyn and vancomycin in ED. Received 10 units regular insulin. Case discussed with surgeon Dr. Mt Mg. Patient will be admitted to medicine for further care and surgery will evaluate patient tomorrow. Patient has no evidence of shock, blood pressures have been stable, patient being evaluated by general surgery in the ER and respiratory antibiotics have been initiated. ED Course:   Initial assessment performed. The patients presenting problems have been discussed, and they are in agreement with the care plan formulated and outlined with them.   I have encouraged them to ask questions as they arise throughout their visit. ED Course as of 11/27/22 1934   Marlea Leyden Nov 27, 2022   1627 ED [facility] ED SEPSIS NOTE:     4:28 PM The patient now meets criteria for: Severe Sepsis    Fluid resuscitation with: Patient does not require a 30cc/kg bolus because they do not meet criteria for septic shock (SBP<90 or decreased by >40 mmHG from baseline, MAP<65, Lactate 4 or greater). Due to concern for rapidly advancing infection and deterioration of patient's condition, antibiotics are started STAT and cultures ordered. [CC]   1924 Discussed code status with patient and patient's daughter. Patient verbalized she would like to be DNR. Daughter agrees with decision. [LK]   1926 Discussed case with Dr. Lalitha Cantrell. He would like admission under medicine, and surgery will see patient in the morning to discuss options for wound debridement. [LK]   1928 Discussed case with hospitalist Dr. Pamela Barragan. Medicine will admit patient to service. [LK]      ED Course User Index  [CC] Daron Brandt MD  [LK] Rashaun Resendez         Critical Care:  I have spent 35 minutes of critical care time in evaluating and treating this patient. This includes time spent at bedside, time with family and decision makers, documentation, review of labs and imaging, and/or consultation with specialists. It does not include time spent on separately billed procedures. This patient presents with a critical illness or injury that acutely impairs one or more vital organ systems such that there is a high probability of imminent or life threatening deterioration in the patient's condition. This case involved decision making of high complexity to assess, manipulate, and support vital organ system failure and/or to prevent further life threatening deterioration of the patient's condition.  Failure to initiate these interventions on an urgent basis would likely result in sudden, clinically significant or life threatening deterioration in the patient's condition. Abnormal findings supporting critical care: Severe sepsis, osteomyelitis of the sacrum, urinary tract infection  Interventions to support critical care: Fluid resuscitation, broad-spectrum antibiotics  Failure to intervene may result in: Progression to shock and/or death        Disposition:  Admit Note:  7:34 PM  Pt is being admitted by Dr. Juan May. The results of their tests and reason(s) for their admission have been discussed with pt and/or available family. They convey agreement and understanding for the need to be admitted and for admission diagnosis. Diagnosis     Clinical Impression:   1. Severe sepsis ([address]    2. Osteomyelitis of sacrum ([address]    3. Acute cystitis with hematuria        Attestations:      Please note that this dictation was completed with ContentDJ, the computer voice recognition software. Quite often unanticipated grammatical, syntax, homophones, and other interpretive errors are inadvertently transcribed by the computer software. Please disregard these errors. Please excuse any errors that have escaped final proofreading. Thank you.

## 2022-11-28 LAB
ACC. NO. FROM MICRO ORDER, ACCP: ABNORMAL
ACINETOBACTER CALCOACETICUS-BAUMANII COMPLEX, ACBCX: NOT DETECTED
ANION GAP SERPL CALC-SCNC: 5 MMOL/L (ref 5–15)
ANION GAP SERPL CALC-SCNC: 5 MMOL/L (ref 5–15)
ATRIAL RATE: 119 BPM
BACTEROIDES FRAGILIS, BFRA: NOT DETECTED
BASOPHILS # BLD: 0 K/UL (ref 0–0.1)
BASOPHILS NFR BLD: 0 % (ref 0–1)
BIOFIRE COMMENT, BCIDPF: ABNORMAL
BUN SERPL-MCNC: 10 MG/DL (ref 6–20)
BUN SERPL-MCNC: 8 MG/DL (ref 6–20)
BUN/CREAT SERPL: 17 (ref 12–20)
BUN/CREAT SERPL: 29 (ref 12–20)
C GLABRATA DNA VAG QL NAA+PROBE: NOT DETECTED
CALCIUM SERPL-MCNC: 8.8 MG/DL (ref 8.5–10.1)
CALCIUM SERPL-MCNC: 9 MG/DL (ref 8.5–10.1)
CALCULATED P AXIS, ECG09: 77 DEGREES
CALCULATED R AXIS, ECG10: 9 DEGREES
CALCULATED T AXIS, ECG11: -137 DEGREES
CANDIDA ALBICANS: NOT DETECTED
CANDIDA AURIS, CAAU: NOT DETECTED
CANDIDA KRUSEI, CKRP: NOT DETECTED
CANDIDA PARAPSILOSIS, CPAUP: NOT DETECTED
CANDIDA TROPICALIS, CTROP: NOT DETECTED
CHLORIDE SERPL-SCNC: 103 MMOL/L (ref 97–108)
CHLORIDE SERPL-SCNC: 104 MMOL/L (ref 97–108)
CHOLEST SERPL-MCNC: 67 MG/DL
CO2 SERPL-SCNC: 30 MMOL/L (ref 21–32)
CO2 SERPL-SCNC: 32 MMOL/L (ref 21–32)
CREAT SERPL-MCNC: 0.34 MG/DL (ref 0.55–1.02)
CREAT SERPL-MCNC: 0.47 MG/DL (ref 0.55–1.02)
CRP SERPL-MCNC: 14.8 MG/DL (ref 0–0.6)
CRYPTO NEOFORMANS/GATTII, CRYNEG: NOT DETECTED
CTX-M (ESBL RESISTANT GENE), CTX: NOT DETECTED
DIAGNOSIS, 93000: NORMAL
DIFFERENTIAL METHOD BLD: ABNORMAL
ENTEROBACTER CLOACAE COMPLEX, ECCP: NOT DETECTED
ENTEROBACTERALES SP. , ENBLS: DETECTED
ENTEROCOCCUS FAECALIS, ENFA: NOT DETECTED
ENTEROCOCCUS FAECIUM, ENFAM: NOT DETECTED
EOSINOPHIL # BLD: 0 K/UL (ref 0–0.4)
EOSINOPHIL NFR BLD: 0 % (ref 0–7)
ERYTHROCYTE [DISTWIDTH] IN BLOOD BY AUTOMATED COUNT: 14.5 % (ref 11.5–14.5)
ERYTHROCYTE [SEDIMENTATION RATE] IN BLOOD: 127 MM/HR (ref 0–30)
ESCHERICHIA COLI: NOT DETECTED
GLUCOSE BLD STRIP.AUTO-MCNC: 163 MG/DL (ref 65–117)
GLUCOSE BLD STRIP.AUTO-MCNC: 219 MG/DL (ref 65–117)
GLUCOSE BLD STRIP.AUTO-MCNC: 230 MG/DL (ref 65–117)
GLUCOSE BLD STRIP.AUTO-MCNC: 233 MG/DL (ref 65–117)
GLUCOSE BLD STRIP.AUTO-MCNC: 252 MG/DL (ref 65–117)
GLUCOSE BLD STRIP.AUTO-MCNC: 253 MG/DL (ref 65–117)
GLUCOSE SERPL-MCNC: 208 MG/DL (ref 65–100)
GLUCOSE SERPL-MCNC: 232 MG/DL (ref 65–100)
HAEMOPHILUS INFLUENZAE, HMI: NOT DETECTED
HCT VFR BLD AUTO: 27.1 % (ref 35–47)
HDLC SERPL-MCNC: 23 MG/DL
HDLC SERPL: 2.9 {RATIO} (ref 0–5)
HGB BLD-MCNC: 7.9 G/DL (ref 11.5–16)
IMM GRANULOCYTES # BLD AUTO: 0 K/UL (ref 0–0.04)
IMM GRANULOCYTES NFR BLD AUTO: 0 % (ref 0–0.5)
IMP (CARBAPENEMASE RESISTANT GENE), IMPC: NOT DETECTED
KLEBSIELLA AEROGENES, KLAE: NOT DETECTED
KLEBSIELLA OXYTOCA: NOT DETECTED
KLEBSIELLA PNEUMONIAE GROUP, KPPG: NOT DETECTED
KPC (CARBAPENEM RESISTANCE GENE): NOT DETECTED
LDLC SERPL CALC-MCNC: 21.8 MG/DL (ref 0–100)
LISTERIA MONOCYTOGENES, LMONP: NOT DETECTED
LYMPHOCYTES # BLD: 0.6 K/UL (ref 0.8–3.5)
LYMPHOCYTES NFR BLD: 3 % (ref 12–49)
MCH RBC QN AUTO: 28.8 PG (ref 26–34)
MCHC RBC AUTO-ENTMCNC: 29.2 G/DL (ref 30–36.5)
MCV RBC AUTO: 98.9 FL (ref 80–99)
MONOCYTES # BLD: 0 K/UL (ref 0–1)
MONOCYTES NFR BLD: 0 % (ref 5–13)
NDM (CARBAPENEMASE RESISTANT GENE), NDM: NOT DETECTED
NEISSERIA MENINGITIDIS, NMNI: NOT DETECTED
NEUTS SEG # BLD: 18.4 K/UL (ref 1.8–8)
NEUTS SEG NFR BLD: 97 % (ref 32–75)
NRBC # BLD: 0 K/UL (ref 0–0.01)
NRBC BLD-RTO: 0 PER 100 WBC
OXA-48-LIKE (CARBAPENEMASE RESISTANT GENE), OXA48: NOT DETECTED
P-R INTERVAL, ECG05: 116 MS
PLATELET # BLD AUTO: 431 K/UL (ref 150–400)
PMV BLD AUTO: 9.3 FL (ref 8.9–12.9)
POTASSIUM SERPL-SCNC: 3.6 MMOL/L (ref 3.5–5.1)
POTASSIUM SERPL-SCNC: 4 MMOL/L (ref 3.5–5.1)
PROTEUS, PRP: DETECTED
PSEUDOMONAS AERUGINOSA: NOT DETECTED
Q-T INTERVAL, ECG07: 300 MS
QRS DURATION, ECG06: 68 MS
QTC CALCULATION (BEZET), ECG08: 422 MS
RBC # BLD AUTO: 2.74 M/UL (ref 3.8–5.2)
RBC MORPH BLD: ABNORMAL
RESISTANT GENE SPACE, REGENE: ABNORMAL
SALMONELLA, SALMO: NOT DETECTED
SERRATIA MARCESCENS: NOT DETECTED
SERVICE CMNT-IMP: ABNORMAL
SODIUM SERPL-SCNC: 139 MMOL/L (ref 136–145)
SODIUM SERPL-SCNC: 140 MMOL/L (ref 136–145)
STAPH EPIDERMIDIS, STEP: NOT DETECTED
STAPH LUGDUNENSIS, STALUG: NOT DETECTED
STAPHYLOCOCCUS AUREUS: NOT DETECTED
STAPHYLOCOCCUS, STAPP: NOT DETECTED
STENO MALTOPHILIA, STMA: NOT DETECTED
STREPTOCOCCUS , STPSP: NOT DETECTED
STREPTOCOCCUS AGALACTIAE (GROUP B): NOT DETECTED
STREPTOCOCCUS PNEUMONIAE , SPNP: NOT DETECTED
STREPTOCOCCUS PYOGENES (GROUP A), SPYOP: NOT DETECTED
TRIGL SERPL-MCNC: 111 MG/DL (ref ?–150)
TSH SERPL DL<=0.05 MIU/L-ACNC: <0.01 UIU/ML (ref 0.36–3.74)
VENTRICULAR RATE, ECG03: 119 BPM
VIM (CARBAPENEMASE RESISTANT GENE), VIM: NOT DETECTED
VLDLC SERPL CALC-MCNC: 22.2 MG/DL
WBC # BLD AUTO: 19 K/UL (ref 3.6–11)

## 2022-11-28 PROCEDURE — 80048 BASIC METABOLIC PNL TOTAL CA: CPT

## 2022-11-28 PROCEDURE — 0KBN0ZZ EXCISION OF RIGHT HIP MUSCLE, OPEN APPROACH: ICD-10-PCS | Performed by: SURGERY

## 2022-11-28 PROCEDURE — 82962 GLUCOSE BLOOD TEST: CPT

## 2022-11-28 PROCEDURE — 99233 SBSQ HOSP IP/OBS HIGH 50: CPT | Performed by: CLINICAL NURSE SPECIALIST

## 2022-11-28 PROCEDURE — 74011250637 HC RX REV CODE- 250/637: Performed by: SURGERY

## 2022-11-28 PROCEDURE — 85652 RBC SED RATE AUTOMATED: CPT

## 2022-11-28 PROCEDURE — 84443 ASSAY THYROID STIM HORMONE: CPT

## 2022-11-28 PROCEDURE — 92610 EVALUATE SWALLOWING FUNCTION: CPT

## 2022-11-28 PROCEDURE — 0KBP0ZZ EXCISION OF LEFT HIP MUSCLE, OPEN APPROACH: ICD-10-PCS | Performed by: SURGERY

## 2022-11-28 PROCEDURE — 87185 SC STD ENZYME DETCJ PER NZM: CPT

## 2022-11-28 PROCEDURE — 74011000250 HC RX REV CODE- 250: Performed by: STUDENT IN AN ORGANIZED HEALTH CARE EDUCATION/TRAINING PROGRAM

## 2022-11-28 PROCEDURE — 87075 CULTR BACTERIA EXCEPT BLOOD: CPT

## 2022-11-28 PROCEDURE — 87077 CULTURE AEROBIC IDENTIFY: CPT

## 2022-11-28 PROCEDURE — 85025 COMPLETE CBC W/AUTO DIFF WBC: CPT

## 2022-11-28 PROCEDURE — 11046 DBRDMT MUSC&/FSCA EA ADDL: CPT | Performed by: SURGERY

## 2022-11-28 PROCEDURE — 87186 SC STD MICRODIL/AGAR DIL: CPT

## 2022-11-28 PROCEDURE — 74011250636 HC RX REV CODE- 250/636: Performed by: INTERNAL MEDICINE

## 2022-11-28 PROCEDURE — 74011250636 HC RX REV CODE- 250/636: Performed by: NURSE ANESTHETIST, CERTIFIED REGISTERED

## 2022-11-28 PROCEDURE — 36415 COLL VENOUS BLD VENIPUNCTURE: CPT

## 2022-11-28 PROCEDURE — 87205 SMEAR GRAM STAIN: CPT

## 2022-11-28 PROCEDURE — 65270000029 HC RM PRIVATE

## 2022-11-28 PROCEDURE — 74011636637 HC RX REV CODE- 636/637: Performed by: INTERNAL MEDICINE

## 2022-11-28 PROCEDURE — 88304 TISSUE EXAM BY PATHOLOGIST: CPT

## 2022-11-28 PROCEDURE — 74011250637 HC RX REV CODE- 250/637: Performed by: STUDENT IN AN ORGANIZED HEALTH CARE EDUCATION/TRAINING PROGRAM

## 2022-11-28 PROCEDURE — 74011000258 HC RX REV CODE- 258: Performed by: STUDENT IN AN ORGANIZED HEALTH CARE EDUCATION/TRAINING PROGRAM

## 2022-11-28 PROCEDURE — 74011250636 HC RX REV CODE- 250/636: Performed by: STUDENT IN AN ORGANIZED HEALTH CARE EDUCATION/TRAINING PROGRAM

## 2022-11-28 PROCEDURE — 74011000250 HC RX REV CODE- 250: Performed by: SURGERY

## 2022-11-28 PROCEDURE — 86140 C-REACTIVE PROTEIN: CPT

## 2022-11-28 PROCEDURE — 11043 DBRDMT MUSC&/FSCA 1ST 20/<: CPT | Performed by: SURGERY

## 2022-11-28 PROCEDURE — 74011636637 HC RX REV CODE- 636/637: Performed by: STUDENT IN AN ORGANIZED HEALTH CARE EDUCATION/TRAINING PROGRAM

## 2022-11-28 PROCEDURE — 80061 LIPID PANEL: CPT

## 2022-11-28 RX ORDER — DEXAMETHASONE SODIUM PHOSPHATE 4 MG/ML
INJECTION, SOLUTION INTRA-ARTICULAR; INTRALESIONAL; INTRAMUSCULAR; INTRAVENOUS; SOFT TISSUE AS NEEDED
Status: DISCONTINUED | OUTPATIENT
Start: 2022-11-27 | End: 2022-11-28 | Stop reason: HOSPADM

## 2022-11-28 RX ORDER — INSULIN GLARGINE 100 [IU]/ML
15 INJECTION, SOLUTION SUBCUTANEOUS
Status: DISCONTINUED | OUTPATIENT
Start: 2022-11-28 | End: 2022-11-29

## 2022-11-28 RX ORDER — INSULIN LISPRO 100 [IU]/ML
INJECTION, SOLUTION INTRAVENOUS; SUBCUTANEOUS
Status: DISCONTINUED | OUTPATIENT
Start: 2022-11-29 | End: 2022-12-15 | Stop reason: HOSPADM

## 2022-11-28 RX ADMIN — SODIUM CHLORIDE, POTASSIUM CHLORIDE, SODIUM LACTATE AND CALCIUM CHLORIDE 50 ML/HR: 600; 310; 30; 20 INJECTION, SOLUTION INTRAVENOUS at 06:53

## 2022-11-28 RX ADMIN — Medication 5 UNITS: at 01:00

## 2022-11-28 RX ADMIN — Medication 1 AMPULE: at 09:39

## 2022-11-28 RX ADMIN — INSULIN GLARGINE 15 UNITS: 100 INJECTION, SOLUTION SUBCUTANEOUS at 22:41

## 2022-11-28 RX ADMIN — SODIUM CHLORIDE, PRESERVATIVE FREE 10 ML: 5 INJECTION INTRAVENOUS at 22:42

## 2022-11-28 RX ADMIN — Medication 3 UNITS: at 04:37

## 2022-11-28 RX ADMIN — PIPERACILLIN AND TAZOBACTAM 3.38 G: 3; .375 INJECTION, POWDER, FOR SOLUTION INTRAVENOUS at 18:16

## 2022-11-28 RX ADMIN — Medication 3 UNITS: at 20:32

## 2022-11-28 RX ADMIN — SODIUM CHLORIDE, PRESERVATIVE FREE 10 ML: 5 INJECTION INTRAVENOUS at 02:27

## 2022-11-28 RX ADMIN — ASPIRIN 325 MG ORAL TABLET 325 MG: 325 PILL ORAL at 09:39

## 2022-11-28 RX ADMIN — AMLODIPINE BESYLATE 10 MG: 5 TABLET ORAL at 09:39

## 2022-11-28 RX ADMIN — Medication 3 UNITS: at 16:40

## 2022-11-28 RX ADMIN — CARVEDILOL 12.5 MG: 12.5 TABLET, FILM COATED ORAL at 09:40

## 2022-11-28 RX ADMIN — Medication 1 AMPULE: at 22:41

## 2022-11-28 RX ADMIN — CLONIDINE HYDROCHLORIDE 0.1 MG: 0.1 TABLET ORAL at 18:17

## 2022-11-28 RX ADMIN — Medication 3 UNITS: at 12:35

## 2022-11-28 RX ADMIN — PIPERACILLIN AND TAZOBACTAM 3.38 G: 3; .375 INJECTION, POWDER, FOR SOLUTION INTRAVENOUS at 09:49

## 2022-11-28 RX ADMIN — Medication: at 22:47

## 2022-11-28 RX ADMIN — PRAVASTATIN SODIUM 80 MG: 40 TABLET ORAL at 22:41

## 2022-11-28 RX ADMIN — SODIUM CHLORIDE, SODIUM LACTATE, POTASSIUM CHLORIDE, AND CALCIUM CHLORIDE: 600; 310; 30; 20 INJECTION, SOLUTION INTRAVENOUS at 00:16

## 2022-11-28 RX ADMIN — CLONIDINE HYDROCHLORIDE 0.1 MG: 0.1 TABLET ORAL at 09:39

## 2022-11-28 RX ADMIN — Medication 2 UNITS: at 09:39

## 2022-11-28 RX ADMIN — OXYCODONE 5 MG: 5 TABLET ORAL at 22:51

## 2022-11-28 RX ADMIN — SODIUM CHLORIDE, PRESERVATIVE FREE 10 ML: 5 INJECTION INTRAVENOUS at 18:19

## 2022-11-28 RX ADMIN — Medication 1 AMPULE: at 02:25

## 2022-11-28 RX ADMIN — VANCOMYCIN HYDROCHLORIDE 1000 MG: 1 INJECTION, POWDER, LYOPHILIZED, FOR SOLUTION INTRAVENOUS at 12:05

## 2022-11-28 RX ADMIN — OXYCODONE 5 MG: 5 TABLET ORAL at 13:46

## 2022-11-28 RX ADMIN — PIPERACILLIN AND TAZOBACTAM 3.38 G: 3; .375 INJECTION, POWDER, FOR SOLUTION INTRAVENOUS at 02:26

## 2022-11-28 NOTE — PERIOP NOTES
Handoff Report from Operating Room to PACU  [id number]  Report received from Roge Arias and Mateusz Kaiser RN regarding Denise Lynn. Surgeon(s):  Azucena Ferguson MD  And Procedure(s) (LRB):  debridement sacral ulcer (N/A)  confirmed   with allergies, drains, and dressings discussed. Anesthesia type, drugs, patient history, complications, estimated blood loss, vital signs, intake and output, and last pain medication, lines, and temperature were reviewed. Multiple wounds to BLE dressings CDI. Heels offloaded with pillows. 1:17 AM  TRANSFER - OUT REPORT:    Verbal report given on Denise Lynn  being transferred to surg tele (unit) for routine progression of care       Report consisted of patients Situation, Background, Assessment and   Recommendations(SBAR). Information from the following report(s) SBAR, Kardex, Intake/Output, MAR, and Accordion was reviewed with the receiving nurse. Lines:   Peripheral IV 05/50/60 Left Basilic (Active)   Site Assessment Clean, dry, & intact 11/28/22 0035   Phlebitis Assessment 0 11/28/22 0035   Infiltration Assessment 0 11/28/22 0035   Dressing Status Clean, dry, & intact 11/28/22 0035   Dressing Type Transparent;Tape 11/28/22 0035   Hub Color/Line Status Green; Infusing 11/28/22 0035        Opportunity for questions and clarification was provided. The only personal belongings pt came to OR with was a head scarf, which was sent up with patient to room. Per Dr Helio Lainez the pt's daughter was updated and went home. Pt checked and dressing CDI prior to transfer to floor.     Patient transported with:   Monitor  O2 @ 3 liters  Registered Nurse

## 2022-11-28 NOTE — BRIEF OP NOTE
Brief Postoperative Note    Patient: Stephon Riddle  YOB: 1945  MRN: [id number]    Date of Procedure: 11/27/2022     Pre-Op Diagnosis: extensive septic sacral ulcer    Post-Op Diagnosis: Same as preoperative diagnosis.       Procedure(s):  debridement sacral ulcer including skin subcutaneous tissue and muscle    Surgeon(s):  Cassidy Jimenez MD  Circ-1: Kyleigh Johnson RN  Scrub Tech-1: Taco MARLEY  Surg Asst-1: Darian Pantoja    Anesthesia: General     Estimated Blood Loss (mL): less than 926     Complications: None    Specimens:   ID Type Source Tests Collected by Time Destination   1 :  Wound Sacral Wound CULTURE, ANAEROBIC Cassidy Jimenez MD 11/28/2022 0003 Microbiology   2 :  Wound Sacral Wound CULTURE, WOUND W GRAM STAIN Cassidy Jimenez MD 11/28/2022 0004 Microbiology        Implants: * No implants in log *    Drains:   [REMOVED] External Female Catheter 02/03/19 (Removed)       [REMOVED] External Female Catheter 02/04/19 (Removed)       [REMOVED] External Urinary Catheter 07/30/22 (Removed)       Findings: deep necrotic tissue injury with abscess down to sacral exposed bone    Electronically Signed by Ayo Ruelas MD on 11/28/2022 at 12:18 AM

## 2022-11-28 NOTE — PROGRESS NOTES
Occupational Therapy Note:    Orders acknowledged and chart reviewed. Pt is bedbound at baseline and has a very large sacral wound that was debrided yesterday. Not appropriate for skilled therapy at this time, will complete OT orders. Thank you.     Francesca Roach OTR/L

## 2022-11-28 NOTE — PROGRESS NOTES
Pharmacy Antimicrobial Kinetic Dosing    Indication for Antimicrobials: Sepsis, Sacral wound infection + Osteomyelitis PTA    Current Regimen of Each Antimicrobial:  Zosyn 4.5 gm Iv x1 then 3.375 gm IV q 8h  (Start Date ; Day # 1)  Vancomycin 2500 mg IV x1 then pharmacy to dose (Start Date ; Day #1)    Previous Antimicrobial Therapy: n/a    Goal Level: AUC: 400-600 mg/hr/Liter/day    Date Dose & Interval Measured (mcg/mL) Predicted AUC/FAUZIA                       Date & time of next level:  PM before midnight dose    Dosing calculator used: Lumense calculator    Significant Positive Cultures:     Wound (Intraop debridement) = pending     Conditions for Dosing Consideration: None    Labs:  Recent Labs     22  0420 22  1608   CREA 0.47* 0.61   BUN 8 12   PCT  --  1.63     Recent Labs     220 22  1608   WBC 19.0* 15.6*     Temp (24hrs), Av.7 °F (37.1 °C), Min:98.2 °F (36.8 °C), Max:99.2 °F (37.3 °C)      Creatinine Clearance (mL/min):   CrCl (Adjusted Body Weight): 54.0 mL/min   If actual weight < IBW: CrCl (Actual Body Weight) 60.0    Impression/Plan:   Patient weight confirmed on 22 as 57.4 kg  Ordered Vancomycin 1,000 mg IV q12h for a projected AUC of 565 and trough of 17  Daily BMP per Vancomycin dosing protocol    Antimicrobial stop date: to be determined     Pharmacy will follow daily and adjust medications as appropriate for renal function and/or serum levels.     Thank you,  CASE Burns

## 2022-11-28 NOTE — PROGRESS NOTES
Problem: Dysphagia (Adult)  Goal: *Acute Goals and Plan of Care (Insert Text)  Description: 2022  Speech path goals  1. Patient will tolerate soft/bite size diet with thins with no overt s/s of aspiration. 2022 1015 by Ulysses Puffer, SLP  Outcome: Not Met  SPEECH [address] EVALUATION  Patient: Dhiraj Watkins (7 y.o. female)  Date: 2022  Primary Diagnosis: Sacral osteomyelitis ([address] [M46.28]  Procedure(s) (LRB):  debridement sacral ulcer (N/A) 1 Day Post-Op   Precautions:        ASSESSMENT :  Based on the objective data described below, the patient presents with mild oral dysphagia characterized by slow, prolonged mastication. Liquid was used to help the patient to swallow solids after lengthy mastication. She had only min oral residue after the swallow on her tongue. No overt s/s of aspiration. Due to her slow mastication, recommend softer diet of soft/bite size food and thins. May use a straw. Meds whole as tolerated. Speech was minimal in output but fully intelligible. Ox1. She was able to participate in eval. She has almost complete dentition. Patient will benefit from skilled intervention to address the above impairments. Patients rehabilitation potential is considered to be Fair     PLAN :  Recommendations and Planned Interventions:  Soft/bite size and thins  May use liquid wash to help clear her oral cavity of solids. Frequency/Duration: Patient will be followed by speech-language pathology 2 times a week to address goals. Discharge Recommendations: None     SUBJECTIVE:   Patient stated her name and  with good intelligibility. Answered \"family\" when asked who she lived with.      OBJECTIVE:     Past Medical History:   Diagnosis Date    Anticoagulant long-term use 10/13/2017    Anxiety 10/13/2017    Atrial fibrillation ([address] 10/13/2017    Breast calcification, left 10/13/2017    CAD (coronary artery disease)     Cellulitis of both lower extremities 10/13/2017    Chronic kidney disease     kidney stones    Chronic pain syndrome 10/13/2017    Chronic prescription opiate use 11/15/2017    CVA (cerebral vascular accident) [facility] 10/13/2017    Diabetes ([facility]     DJD (degenerative joint disease) 10/13/2017    Dyslipidemia 10/13/2017    Glaucoma 10/13/2017    Hypertension     Hyperthyroidism 10/13/2017    Long-term use of high-risk medication 10/13/2017    Stasis ulcer of lower extremity ([address] 10/13/2017    Stroke ([facility]     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 ([address] 8/18/2012    retinopathy      Past Surgical History:   Procedure Laterality Date    HX BREAST BIOPSY Left     2014    HX GYN      hysterectomty    HX ORTHOPAEDIC      back surgery    SD CARDIAC SURG PROCEDURE UNLIST      cagb     Prior Level of Function/Home Situation: 54  Home Situation  Patient Expects to be Discharged to[de-identified] Home  Diet prior to admission: Regular/thins  Current Diet:  NPO   Cognitive and Communication Status:  Neurologic State: Alert  Orientation Level: Oriented to person  Cognition: Follows commands  Perception: Appears intact  Perseveration: No perseveration noted     Oral Assessment:  Oral Assessment  Labial: No impairment  Dentition: Full;Natural  Lingual:  (grossly wnl)  Velum: Unable to visualize  Mandible: No impairment  P.O. Trials:  Patient Position: semi upright in bed  Vocal quality prior to P.O.: No impairment  Consistency Presented: Thin liquid;Puree;Mechanical soft  How Presented: Self-fed/presented;Straw     Bolus Acceptance: No impairment  Bolus Formation/Control: Impaired  Type of Impairment: Delayed;Mastication  Propulsion: Delayed (# of seconds)  Oral Residue: Less than 10% of bolus; Lingual  Initiation of Swallow: No impairment  Laryngeal Elevation: Functional  Aspiration Signs/Symptoms: None  Pharyngeal Phase Characteristics: No impairment, issues, or problems      Cues for Modifications: None       Oral Phase Severity: Mild  Pharyngeal Phase Severity : No impairment    NOMS:   The NOMS functional outcome measure was used to quantify this patient's level of swallowing impairment. Based on the NOMS, the patient was determined to be at level 5 for swallow function       NOMS Swallowing Levels:  Level 1 (CN): NPO  Level 2 (CM): NPO but takes consistency in therapy  Level 3 (CL): Takes less than 50% of nutrition p.o. and continues with nonoral feedings; and/or safe with mod cues; and/or max diet restriction  Level 4 (CK): Safe swallow but needs mod cues; and/or mod diet restriction; and/or still requires some nonoral feeding/supplements  Level 5 (CJ): Safe swallow with min diet restriction; and/or needs min cues  Level 6 (CI): Independent with p.o.; rare cues; usually self cues; may need to avoid some foods or needs extra time  Level 7 ([address] [place] [address]): Independent for all p.o.  VALERI. (2003). National Outcomes Measurement System (NOMS): Adult Speech-Language Pathology User's Guide. Pain:  Pain Scale 1: Numeric (0 - 10)  Pain Intensity 1: 0       After treatment:   Patient left in no apparent distress in bed    COMMUNICATION/EDUCATION:   Patient was educated regarding her deficit(s) of swallowing as this relates to her diagnosis of dementia. She demonstrated Guarded understanding     The patient's plan of care including recommendations, planned interventions, and recommended diet changes were discussed with: Registered nurse. Patient is unable to participate in goal setting and plan of care.     Thank you for this referral.  Kristin Man, SLP

## 2022-11-28 NOTE — PROGRESS NOTES
Physical Therapy    Chart reviewed, patient is bedbound at baseline and has very large sacral wound surgically debrided yesterday, not appropriate for skilled therapy so will sign off.      Kayleigh Sa

## 2022-11-28 NOTE — ANESTHESIA POSTPROCEDURE EVALUATION
Procedure(s):  debridement sacral ulcer. general    Anesthesia Post Evaluation      Multimodal analgesia: multimodal analgesia used between 6 hours prior to anesthesia start to PACU discharge  Patient location during evaluation: PACU  Patient participation: complete - patient participated  Level of consciousness: sleepy but conscious and responsive to verbal stimuli  Pain score: 2  Pain management: adequate  Airway patency: patent  Anesthetic complications: no  Cardiovascular status: acceptable  Respiratory status: acceptable  Hydration status: acceptable  Comments: +Post-Anesthesia Evaluation and Assessment    Patient: Niki Gay MRN: [id number]  SSN: [id number]   YOB: 1945  Age: 65 y.o. Sex: female      Cardiovascular Function/Vital Signs    BP (!) 164/76   Pulse (!) 109   Temp 37.3 °C (99.2 °F)   Resp 17   Wt 58.8 kg (129 lb 9.6 oz)   SpO2 99%   BMI 24.49 kg/m²     Patient is status post Procedure(s):  debridement sacral ulcer. Nausea/Vomiting: Controlled. Postoperative hydration reviewed and adequate. Pain:      Managed. Neurological Status: At baseline. Mental Status and Level of Consciousness: Arousable. Pulmonary Status:   O2 Device: Nasal cannula (11/28/22 0039)   Adequate oxygenation and airway patent. Complications related to anesthesia: None    Post-anesthesia assessment completed. No concerns. Signed By: Genesis Mares MD    11/28/2022  Post anesthesia nausea and vomiting:  controlled  Final Post Anesthesia Temperature Assessment:  Normothermia (36.0-37.5 degrees C)      INITIAL Post-op Vital signs:   Vitals Value Taken Time   /96 11/28/22 0040   Temp 37.3 °C (99.2 °F) 11/28/22 0039   Pulse 118 11/28/22 0043   Resp 21 11/28/22 0043   SpO2 95 % 11/28/22 0043   Vitals shown include unvalidated device data.

## 2022-11-28 NOTE — PROGRESS NOTES
Comprehensive Nutrition Assessment    Type and Reason for Visit: Initial, Consult, Wound    Nutrition Recommendations/Plan:   Continue diet per SLP  RD to add Glucerna BID     Malnutrition Assessment:  Malnutrition Status:  Severe malnutrition (11/28/22 1609)    Context:  Chronic illness     Findings of the 6 clinical characteristics of malnutrition:   Energy Intake:  75% or less est energy requirements for 1 month or longer  Weight Loss:  Unable to assess     Body Fat Loss:  Mild body fat loss, Triceps   Muscle Mass Loss:  Severe muscle mass loss, Thigh (quadriceps)  Fluid Accumulation:  No significant fluid accumulation,     Strength:  Not performed     Nutrition Assessment:  Pt admitted with sacral osteomyelitis. PMH: CVA, DM, dementia, HTN, CAD. Consult received, chart reviewed. Pt lying in bed lethargic, no family in the room to speak with. She is s/p wound debridement today, several wounds listed (pending WOCN consult). Pt with a significantly lower wt than UBW listed in EMR. It does appear last several admits were not 'real weights' but likely copy and pasted from previous admissions. Per visual assessment pt appears closer to 126lb than her UBW of 220lb, current wt is also a bed scale wt. This level of wt loss likely occurred over several months versus 4 that EMR leads one to believe. Moderate quad wasting noted, mild wasting noted on upper extremities (but exam limited due to pt discomfort and lethargy). Will add PO supplements BID and monitor acceptance. If she does not eat over the next few days will need to visit [address], given dementia aggressive nutrition support may not be appropriate.       Wt Readings from Last 15 Encounters:   11/28/22 57.4 kg (126 lb 8.7 oz)   07/27/22 99.8 kg (220 lb)   06/01/22 99.8 kg (220 lb)   05/05/22 99.8 kg (220 lb)   03/01/22 99.8 kg (220 lb)   01/26/22 99.8 kg (220 lb)   06/09/21 99.8 kg (220 lb)   06/01/21 93 kg (205 lb)   12/03/20 96.2 kg (212 lb)   10/01/20 96.2 kg (212 lb)   03/12/20 96.2 kg (212 lb)   06/21/19 96.2 kg (212 lb)   03/29/19 93 kg (205 lb)   02/15/19 93 kg (205 lb)   01/30/19 93 kg (205 lb)            Nutrition Related Findings:    Meds: lantus, humalog, zosyn, vancomycin, [email or web address] BM 11/28 Wound Type: Wound consult pending, Wound vac, Surgical incision    Current Nutrition Intake & Therapies:  Average Meal Intake:  (diet just advanced)     ADULT DIET Dysphagia - Soft & Bite Sized; 4 carb choices (60 gm/meal)  ADULT ORAL NUTRITION SUPPLEMENT Dinner, Breakfast; Diabetic Supplement    Anthropometric Measures:  Height: 5' 1\" (154.9 cm)  Ideal Body Weight (IBW): 105 lbs (48 kg)     Current Body Wt:  57.4 kg (126 lb 8.7 oz), 120.5 % IBW. Bed scale  Current BMI (kg/m2): 23.9  Usual Body Weight: 99.8 kg (220 lb)  % Weight Change (Calculated): -42.5                    BMI Category: Normal weight (BMI 18.5-24. 9)    Estimated Daily Nutrient Needs:  Energy Requirements Based On: Formula  Weight Used for Energy Requirements: Current  Energy (kcal/day): MSJ 1550 (992 x 1.3 +250 for wt gain)  Weight Used for Protein Requirements: Current  Protein (g/day): 92g (1.6gPro/kg)  Method Used for Fluid Requirements: 1 ml/kcal  Fluid (ml/day): 1550mL    Nutrition Diagnosis:   Unintended weight loss related to cognitive or neurological impairment, inadequate protein-energy intake as evidenced by weight loss greater than or equal to 20% in 1 year, moderate muscle loss    Nutrition Interventions:   Food and/or Nutrient Delivery: Continue current diet, Start oral nutrition supplement  Nutrition Education/Counseling: No recommendations at this time  Coordination of Nutrition Care: Continue to monitor while inpatient       Goals:     Goals: PO intake 50% or greater, by next RD assessment       Nutrition Monitoring and Evaluation:   Behavioral-Environmental Outcomes: None identified  Food/Nutrient Intake Outcomes: Food and nutrient intake, Supplement intake  Physical Signs/Symptoms Outcomes: Biochemical data, Nutrition focused physical findings, Skin, Weight    Discharge Planning:     Too soon to determine    Nuria Wong RD, CNSC  Contact: ext [phone]

## 2022-11-28 NOTE — OP NOTES
[address]  OPERATIVE REPORT    Name:  Pansy Councilman  MR#:  [id number]  :  1945  ACCOUNT #:  [de-identified]  DATE OF SERVICE:  2022    PREOPERATIVE DIAGNOSIS:  Extensive septic sacral deep tissue injury with abscess. POSTOPERATIVE DIAGNOSIS:  Extensive septic sacral deep tissue injury with abscess. PROCEDURE PERFORMED:  Debridement of sacral deep tissue injury including skin, subcutaneous tissue and muscle. SURGEON:  Rony Michaels MD    SURGICAL FIRST ASSIST:  Marah Simon. ASSISTANT:  There is no assistant surgeon. ANESTHESIA:  General endotracheal anesthesia. ANESTHESIOLOGIST:  Lundy Prader, MD    COMPLICATIONS:  No operative complications. SPECIMENS REMOVED:  1. Widely excised eschar, skin, subcutaneous tissue and muscle in the sacral area. 2.  Aerobic and anaerobic culture swabs for culture and Gram stain. IMPLANTS:  There were no implants. DRAINS:  No drains. ESTIMATED BLOOD LOSS:  Less than 100. FINDINGS:  Deep necrotic tissue injury with abscess down to exposed sacral bone clinically consistent with osteomyelitis. DESCRIPTION OF OPERATION IN DETAIL:  After appropriate consent was obtained, the patient was brought to the operating room and administered general endotracheal anesthesia on the [facility] and then rolled into the prone position on the operating table on several pillows and gel bed with a protective safe pillow. The patient was then positioned in the prone jackknife position and prepped and draped in standard fashion. A time-out was completed. At this time, the eschar and deep tissue wound was sharply excised all the way down to exposed sacral bone. There was a significant amount of necrotic muscle which was excised along with skin and subcutaneous tissue and an abscess was encountered and culture swabs were obtained. The final excision was approximately 15 x 15 cm.   Once the wound was fully excised with electrocautery and hemostasis was obtained with the electrocautery, the wound was irrigated with the pulse  with saline solution. Once this was completed, the wound was dressed with Dakin's moistened Kerlix gauze and ABD pads. The patient was awakened, extubated and transferred to the recovery room in stable condition.       MD KEMI [id number]  D:  11/28/2022 0:36  T:  11/28/2022 2:59  JOB #:  [id number]

## 2022-11-28 NOTE — DIABETES MGMT
[address] [facility]    CLINICAL NURSE SPECIALIST CONSULT     Initial Presentation   Niki Gay is a 65 y.o. female admitted from the ER 11/27/22 after large bed sore noted. Daughter reports patient was seen at [facility] in October for this and was recommended debridement at that time however patient decline. MRI from October visit showed sacral wound with possible osteomyelitis. She has not had any wound care set up since her d/c, daughter has been providing wound care. Daughter reports wound has increased in size and depth since discharge. Afebrile. Tachycardic. Hypertensive. 02 sats 100%  ER exam: See picture  Skin:     Findings: Wound present. Comments: Sacral wound: Stage 4 sacral pressure ulcer approx 12 in width x 6 in height with extensive surrounding necrotic tissue and erythema. LLE: Stage 3 pressure ulcer to posterior calf with necrotic tissue. RLE: Stage 3 pressure ulcer to posterior calf. CXR: No acute disease  Xray of TIB/FIB: Osteopenia  CT ABd:   1. Large sacral decubitus ulcer with associated extensive osteomyelitis of the   distal sacrum and coccyx with associated erosive changes, as well as gas and   fluid containing collection within the posterior subcutaneous soft tissues   communicating with the skin surface as above, right larger than left. 2. Distended gallbladder with cholelithiasis, but no convincing CT evidence of   acute cholecystitis. If there is clinical concern, consider ultrasound for   further evaluation.      HX:   Past Medical History:   Diagnosis Date    Anticoagulant long-term use 10/13/2017    Anxiety 10/13/2017    Atrial fibrillation ([address] 10/13/2017    Breast calcification, left 10/13/2017    CAD (coronary artery disease)     Cellulitis of both lower extremities 10/13/2017    Chronic kidney disease     kidney stones    Chronic pain syndrome 10/13/2017    Chronic prescription opiate use 11/15/2017    CVA (cerebral vascular accident) ([address] 10/13/2017 Diabetes ([facility]     DJD (degenerative joint disease) 10/13/2017    Dyslipidemia 10/13/2017    Glaucoma 10/13/2017    Hypertension     Hyperthyroidism 10/13/2017    Long-term use of high-risk medication 10/13/2017    Stasis ulcer of lower extremity ([facility] 10/13/2017    Stroke ([facility]     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 ([facility] 8/18/2012    retinopathy       INITIAL DX:   Sacral osteomyelitis ([facility] [M46.28]     Current Treatment     TX: 11/28/22 debridement sacral ulcer including skin subcutaneous tissue and muscle    Consulted by Provider for advanced diabetes nursing assessment and care for:   [] Transitioning off [address] [place]   [x] Inpatient management strategy  [x] Home management assessment  [] Survival skill education    Hospital Course   Clinical progress has been complicated by large sacral wound  11/27/22 General surgery consult: 66 yo woman who has been bedridden for 2+ years, with severe deep sacral tissue injury with associated osteomyelitis documented for 2 months, declined debridement on initial presentation at [facility]. Daughter would now like surgical debridement for source control as her mother is now septic. Diabetes History   Patient is unable to provide any information at this time    Diabetes-related Medical History - Deferred    Diabetes Medication History  Key Antihyperglycemic Medications               insulin NPH (NovoLIN N NPH U-100 Insulin) 100 unit/mL injection (Taking) 5 Units by SubCUTAneous route Before breakfast and dinner. insulin lispro (HUMALOG) 100 unit/mL injection (Taking) As directed           Diabetes self-management practices: Deferred    Subjective   Moaning     Objective   Physical exam  General Normal weight female who is ill-appearing.    Neuro  Moaning  Vital Signs Visit Vitals  BP (!) 110/52 (BP 1 Location: Right upper arm, BP Patient Position: Lying left side)   Pulse 87   Temp 98.2 °F (36.8 °C)   Resp 19   Ht 5' 1\" (1.549 m)   Wt 57.4 kg (126 lb 8.7 oz)   SpO2 97%   BMI 23.91 kg/m²     Laboratory  Recent Labs     11/28/22  1156 11/28/22  0420 11/27/22  1608   * 232* 440*   AGAP 5 5 7   WBC  --  19.0* 15.6*   CREA 0.34* 0.47* 0.61   AST  --   --  9*   ALT  --   --  <6*     Factors impacting BG management  Factor Dose Comments   Nutrition:  Dysphagia meals   60 grams/meal   Began today   Pain Oxycodone PRN    Infection Vanc Q12 hrs  Zosyn Q8 hrs    Other:   Kidney function  Liver function   Normal  Normal      Blood glucose pattern      Significant diabetes-related events over the past 24-72 hours  Admission   One dose of dexamethasone 11/27/22  On corrective insulin => Bgs in 200s    Assessment and Plan   Nursing Diagnosis Risk for unstable blood glucose pattern   Nursing Intervention Domain 5254 Decision-making Support   Nursing Interventions Examined current inpatient diabetes/blood glucose control   Explored factors facilitating and impeding inpatient management  Explored corrective strategies with patient and responsible inpatient provider   Informed patient of rational for insulin strategy while hospitalized     Evaluation   This 65year old AA female was admitted with large sacral decubitus that was surgically debrided today. Bgs were elevated on admission which has required corrective insulin. Bgs are in the 200s. Recommend starting a basal corrective insulin approach.     Recommendations     [x] Use of Subcutaneous Insulin Order set (5803)  Insulin Dosing Specific recommendation   Basal                                      (Based on weight, BMI & GFR) [x]        0.2 units/kg/D  [] 0.3 units/kg/D  [] 0.4 units/kg/D Lantus insulin 12 units D   Nutritional                                      (Based on CHO/dextrose load) [] Normal sensitivity  [] Insulin-resistant sensitivity    Corrective                                       (Useful in adjusting insulin dosing) [x] Normal sensitivity  [] HIGH sensitivity  [] Insulin-resistant sensitivity Billing Code(s)   [x] 21  IP subsequent hospital care - 35 minutes     Before making these care recommendations, I personally reviewed the hospitalization record, including notes, laboratory & diagnostic data and current medications, and examined the patient at the bedside (circumstances permitting) before making care recommendations. More than fifty (50) percent of the time was spent in patient counseling and/or care coordination.   Total minutes: [address], CNS  Diabetes Clinical Nurse Specialist  [facility]  Access via [address] [place] [address]

## 2022-11-28 NOTE — PROGRESS NOTES
End of Shift Note    Bedside shift change report given to [address] [place] [address] (oncoming nurse) by Vincent Stout RN and Fatoumata RN (offgoing nurse). Report included the following information SBAR, Kardex, Intake/Output, Recent Results, and Cardiac Rhythm sinus tach with PVC     Shift worked:  7p-7a     Shift summary and any significant changes:     Patient had 3 runs of V Tach I was informed by tele. Hourly rounds and Seymour care given. The patient was pleasantly confused. Dressing from debridement intact and dry. Concerns for physician to address:       Zone phone for oncoming shift:          Activity:  Activity Level: Bed Rest  Number times ambulated in hallways past shift: 0  Number of times OOB to chair past shift: 0    Cardiac:   Cardiac Monitoring: Yes      Cardiac Rhythm: Sinus Tachy, PVC    Access:  Current line(s): PIV     Genitourinary:   Urinary status: incontinent and seymour    Respiratory:   O2 Device: Nasal cannula  Chronic home O2 use?: NO  Incentive spirometer at bedside: NO       GI:  Last Bowel Movement Date: 11/28/22  Current diet:  DIET NPO  Passing flatus: YES  Tolerating current diet: YES       Pain Management:   Patient states pain is manageable on current regimen: YES    Skin:  Basilio Score: 12  Interventions: speciality bed, float heels, foam dressing, and limit briefs    Patient Safety:  Fall Score:  Total Score: 3  Interventions: bed/chair alarm  High Fall Risk: Yes    Length of Stay:  Expected LOS: - - -  Actual LOS: 1      Vincent Stout RN

## 2022-11-28 NOTE — H&P
This note is compiled to communicate with the medical care team. It may contain sensitive and protected information. It is not intended to serve as a source of communication with patients/families/friends; that communication occurs at the bedside or via alternative direct communications means (secure messaging, letters, video/telephone, etc.). Hospitalist Admission Note    NAME: Stephon Riddle   :  1945   MRN:  [id number]     Date/Time:  2022 2:06 AM    Patient PCP: Bijan Escamilla MD  ______________________________________________________________________  Given the patient's current clinical presentation, I have a high level of concern for decompensation if discharged from the emergency department. Complex decision making was performed, which includes reviewing the patient's available past medical records, laboratory results, and x-ray films. My assessment of this patient's clinical condition and my plan of care is as follows. Subjective:   CHIEF COMPLAINT: Sacral ulcer infection    HISTORY OF PRESENT ILLNESS:     Dori Lynn is a 65 y.o.  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 Reportedly, patient developed sacral pressure injury in  that ulcerated and then progressively worsened to the point that patient was hospitalized at [facility] from 10/15- during that time patient was managed with IV antibiotics but refused any surgical debridement and was ultimately discharged home without antibiotics, infectious disease follow-up, or home health services.   Since that time daughter reports family has attempted to keep her clean and provide wound care as demonstrated by PCP who provides home visits. Unfortunately, patient has continued to decline now with extension of her ulcer, obvious necrotic tissue, and development of additional pressure injuries over her body prompting them to present to our facility with request for operative debridement. ROS otherwise negative. Patient reports no other complaints or concerns and denies tobacco, alcohol, or illicit drug use. In the ED, patient afebrile, tachycardic in 110s, hypertensive 140s/80s, saturating upper 90s on 3 L/min via nasal cannula. Extensive pressure injuries and necrotic sacral ulcer demonstrated. ECG demonstrates sinus tachycardia. 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 Left and right tib/fib radiographs demonstrate osteopenia without evidence of osteomyelitis. Labs demonstrate: Lactic acid 3.19, WBC 15.6, hemoglobin 9.8, platelets 701, sodium 135, potassium 4.6, glucose 440, BUN 12, creatinine 0.61, procalcitonin 1.63, high sensitive troponin 9, UA reflex to culture (protein, glucose, ketones, blood, nitrites, leukoesterase, WBCs, bacteria). Patient started on empiric vancomycin and Zosyn by ED provider. Case discussed with on-call general surgeon, Dr. Carrie Dangelo, who has taken patient to the [address] [place] [address] for debridement. We were asked to admit for work up and evaluation of the above problems.      Past Medical History:   Diagnosis Date    Anticoagulant long-term use 10/13/2017    Anxiety 10/13/2017    Atrial fibrillation ([address] 10/13/2017    Breast calcification, left 10/13/2017    CAD (coronary artery disease)     Cellulitis of both lower extremities 10/13/2017    Chronic kidney disease kidney stones    Chronic pain syndrome 10/13/2017    Chronic prescription opiate use 11/15/2017    CVA (cerebral vascular accident) ([facility] 10/13/2017    Diabetes ([facility]     DJD (degenerative joint disease) 10/13/2017    Dyslipidemia 10/13/2017    Glaucoma 10/13/2017    Hypertension     Hyperthyroidism 10/13/2017    Long-term use of high-risk medication 10/13/2017    Stasis ulcer of lower extremity ([facility] 10/13/2017    Stroke ([facility]     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 ([facility] 8/18/2012    retinopathy         Past Surgical History:   Procedure Laterality Date    HX BREAST BIOPSY Left     2014    HX GYN      hysterectomty    HX ORTHOPAEDIC      back surgery    MO CARDIAC SURG PROCEDURE UNLIST      cagb       Social History     Tobacco Use    Smoking status: Never    Smokeless tobacco: Never   Substance Use Topics    Alcohol use: No        Family History   Problem Relation Age of Onset    Heart Disease Father        Allergies   Allergen Reactions    Betadine Prepstick Rash    Keflex [Cephalexin] Hives     Pt breaks out in hives        Prior to Admission medications    Medication Sig Start Date End Date Taking? Authorizing Provider   insulin NPH (NovoLIN N NPH U-100 Insulin) 100 unit/mL injection 5 Units by SubCUTAneous route Before breakfast and dinner. Patient taking differently: 15 Units by SubCUTAneous route Before breakfast and dinner. 8/2/22  Yes Warren Paez MD   insulin lispro (HUMALOG) 100 unit/mL injection As directed  Patient taking differently: Sliding scale 8/2/22  Yes Warren Paez MD   cloNIDine HCL (CATAPRES) 0.1 mg tablet TAKE 1 TABLET THREE TIMES A DAY 3/22/22  Yes Bijan Escamilla MD   carvediloL (COREG) 12.5 mg tablet TAKE 1 TABLET TWICE A DAY 2/16/22  Yes Bijan Escamilla MD   amLODIPine (NORVASC) 10 mg tablet Take 1 Tablet by mouth daily.  2/2/22  Yes Bijan Escamilla MD   pravastatin (PRAVACHOL) 80 mg tablet TAKE 1 TABLET NIGHTLY 9/14/21  Yes Bijan Escamilla MD   rivaroxaban (Xarelto) 20 mg tab tablet Take 1 Tablet by mouth daily. 9/14/21  Yes bAi Nayak MD   captopriL (CAPOTEN) 25 mg tablet TAKE 1 TABLET TWICE A DAY  Patient taking differently: 25 mg two (2) times a day. 5/31/21  Yes Abi Nayak MD   furosemide (LASIX) 40 mg tablet TAKE 1 TABLET DAILY 5/5/21  Yes Abi Nayak MD   aspirin (ASPIRIN) 325 mg tablet Take 1 Tab by mouth daily. 8/22/12  Yes Abi Nayak MD   sodium hypochlorite (Dakin's Solution) external solution Apply to wounds twice daily and cover with dressing 6/8/21   Shannon Daily Garrick ROLDAN   ergocalciferol (VITAMIN D2) 50,000 unit capsule Take 50,000 Units by mouth every seven (7) days.     Provider, Historical       REVIEW OF SYSTEMS:       Total of 12 systems reviewed as follows:       POSITIVE= Red text   General: Fever, chills, sweats, weakness/malaise, weight loss/gain, loss of appetite    Eyes:   Vision change, eye pain   ENT:    Rhinorrhea, pharyngitis, Hearing loss    Respiratory:   cough, sputum production, SOB, MARTINEZ, wheezing, pleuritic pain    Cardiology:   chest pain, palpitations, orthopnea, edema, syncope    Gastrointestinal:  abdominal pain , N/V, diarrhea, dysphagia, constipation, bleeding    Genitourinary:  frequency, urgency, dysuria, hematuria, incontinence    Muskuloskeletal:  arthralgia, myalgia, back pain   Hematology:  easy bruising, nose or gum bleeding, lymphadenopathy    Dermatological: rash, ulceration, pruritis, color change / jaundice   Endocrine:  hot flashes or polydipsia    Neurological:  headache, dizziness, confusion, focal weakness, paresthesia, Speech difficulties, memory loss, gait difficulty   Psychological: Feelings of anxiety, depression, agitation       Objective:   VITALS:    Visit Vitals  BP (!) 145/83 (BP 1 Location: Right upper arm, BP Patient Position: Supine)   Pulse (!) 115   Temp 98.4 °F (36.9 °C)   Resp 15   Wt 58.8 kg (129 lb 9.6 oz)   SpO2 100%   BMI 24.49 kg/m² PHYSICAL EXAM:    General:   Alert, cooperative, no distress, chronically ill-appearing frail elderly -American female        HEENT:  Atraumatic, anicteric sclerae, pink conjunctivae, mucosa moist, dentition fair     Neck:  Supple, symmetrical, trachea midline, no abnormalities on palpation     Lungs:   CTA B. Symmetric expansion. Good aeration. Normal respiratory effort. Chest wall:  No tenderness. No Accessory muscle use. Cardiovascular:   Tachycardic rate, regular rhythm, No murmur/rub/gallop. No JVD. Radial/AT/DP pulse 2+     GI/:   Soft, non-tender. Not distended. Bowel sounds normal. No HSM, negative Kendall's     Extremities No edema. No cyanosis. Capillary refill normal, bilateral calf pressure injuries, multiple areas of skin breakdown overlying the joint surfaces, flaccid left-sided weakness     Skin: No significant lesions noted. Not Jaundiced. No rashes, sacral wound as pictured below     Neurologic: PERRL. EOMI. No facial asymmetry. No aphasia or slurred speech. Moves all extremities. Sensation to light touch grossly intact BUE/BLE. No overt focal deficits appreciated     Psych:  Alert and oriented X 4.   Anxious appearing     Other:   N/A           LAB DATA REVIEWED:    Recent Results (from the past 24 hour(s))   EKG, 12 LEAD, INITIAL    Collection Time: 11/27/22  3:02 PM   Result Value Ref Range    Ventricular Rate 119 BPM    Atrial Rate 119 BPM    P-R Interval 116 ms    QRS Duration 68 ms    Q-T Interval 300 ms    QTC Calculation (Bezet) 422 ms    Calculated P Axis 77 degrees    Calculated R Axis 9 degrees    Calculated T Axis -137 degrees    Diagnosis       Sinus tachycardia  Nonspecific T wave abnormality  When compared with ECG of 01-JUN-2022 18:02,  premature ventricular complexes are no longer present  Criteria for Anterior infarct are no longer present  Non-specific change in ST segment in Inferior leads  Nonspecific T wave abnormality, worse in Inferior leads  Nonspecific T wave abnormality, worse in Lateral leads     CBC WITH AUTOMATED DIFF    Collection Time: 11/27/22  4:08 PM   Result Value Ref Range    WBC 15.6 (H) 3.6 - 11.0 K/uL    RBC 3.30 (L) 3.80 - 5.20 M/uL    HGB 9.8 (L) 11.5 - 16.0 g/dL    HCT 32.1 (L) 35.0 - 47.0 %    MCV 97.3 80.0 - 99.0 FL    MCH 29.7 26.0 - 34.0 PG    MCHC 30.5 30.0 - 36.5 g/dL    RDW 14.5 11.5 - 14.5 %    PLATELET 105 (H) 598 - 400 K/uL    MPV 9.2 8.9 - 12.9 FL    NRBC 0.0 0  WBC    ABSOLUTE NRBC 0.00 0.00 - 0.01 K/uL    NEUTROPHILS 83 (H) 32 - 75 %    LYMPHOCYTES 13 12 - 49 %    MONOCYTES 4 (L) 5 - 13 %    EOSINOPHILS 0 0 - 7 %    BASOPHILS 0 0 - 1 %    IMMATURE GRANULOCYTES 0 0.0 - 0.5 %    ABS. NEUTROPHILS 13.0 (H) 1.8 - 8.0 K/UL    ABS. LYMPHOCYTES 2.0 0.8 - 3.5 K/UL    ABS. MONOCYTES 0.6 0.0 - 1.0 K/UL    ABS. EOSINOPHILS 0.0 0.0 - 0.4 K/UL    ABS. BASOPHILS 0.0 0.0 - 0.1 K/UL    ABS. IMM. GRANS. 0.1 (H) 0.00 - 0.04 K/UL    DF AUTOMATED     METABOLIC PANEL, COMPREHENSIVE    Collection Time: 11/27/22  4:08 PM   Result Value Ref Range    Sodium 135 (L) 136 - 145 mmol/L    Potassium 4.6 3.5 - 5.1 mmol/L    Chloride 96 (L) 97 - 108 mmol/L    CO2 32 21 - 32 mmol/L    Anion gap 7 5 - 15 mmol/L    Glucose 440 (H) 65 - 100 mg/dL    BUN 12 6 - 20 MG/DL    Creatinine 0.61 0.55 - 1.02 MG/DL    BUN/Creatinine ratio 20 12 - 20      eGFR >60 >60 ml/min/1.73m2    Calcium 9.5 8.5 - 10.1 MG/DL    Bilirubin, total 0.4 0.2 - 1.0 MG/DL    ALT (SGPT) <6 (L) 12 - 78 U/L    AST (SGOT) 9 (L) 15 - 37 U/L    Alk.  phosphatase 143 (H) 45 - 117 U/L    Protein, total 7.9 6.4 - 8.2 g/dL    Albumin 1.7 (L) 3.5 - 5.0 g/dL    Globulin 6.2 (H) 2.0 - 4.0 g/dL    A-G Ratio 0.3 (L) 1.1 - 2.2     TROPONIN-HIGH SENSITIVITY    Collection Time: 11/27/22  4:08 PM   Result Value Ref Range    Troponin-High Sensitivity 9 0 - 51 ng/L   URINALYSIS W/ REFLEX CULTURE    Collection Time: 11/27/22  4:08 PM    Specimen: Urine   Result Value Ref Range    Color DARK YELLOW Appearance TURBID (A) CLEAR      Specific gravity 1.026      pH (UA) 5.5 5.0 - 8.0      Protein 100 (A) NEG mg/dL    Glucose 500 (A) NEG mg/dL    Ketone 15 (A) NEG mg/dL    Bilirubin Negative NEG      Blood LARGE (A) NEG      Urobilinogen 1.0 0.2 - 1.0 EU/dL    Nitrites Positive (A) NEG      Leukocyte Esterase LARGE (A) NEG      WBC >100 (H) 0 - 4 /hpf    RBC 5-10 0 - 5 /hpf    Epithelial cells FEW FEW /lpf    Bacteria 2+ (A) NEG /hpf    UA:UC IF INDICATED URINE CULTURE ORDERED (A) CNI     PROCALCITONIN    Collection Time: 11/27/22  4:08 PM   Result Value Ref Range    Procalcitonin 1.63 ng/mL   BLOOD GAS,CHEM8,LACTIC ACID POC    Collection Time: 11/27/22  4:16 PM   Result Value Ref Range    Calcium, ionized (POC) 1.25 1.12 - 1.32 mmol/L    BICARBONATE 31 mmol/L    Base excess (POC) 5.3 mmol/L    Sample source VENOUS BLOOD      CO2, POC 30 (H) 19 - 24 MMOL/L    Sodium,  136 - 145 MMOL/L    Potassium, POC 4.6 3.5 - 5.5 MMOL/L    Chloride, POC 97 (L) 100 - 108 MMOL/L    Glucose,  (HH) 74 - 106 MG/DL    Creatinine, POC 0.4 (L) 0.6 - 1.3 MG/DL    Lactic Acid (POC) 3.19 (HH) 0.40 - 2.00 mmol/L    Critical value read back NOTIFIED ADRIA     pH, venous (POC) 7.43 (H) 7.32 - 7.42      pCO2, venous (POC) 46.6 41 - 51 MMHG    pO2, venous (POC) 33 25 - 40 mmHg   GLUCOSE, POC    Collection Time: 11/27/22  6:38 PM   Result Value Ref Range    Glucose (POC) 321 (H) 65 - 117 mg/dL    Performed by Juana Romero (MARTHA RN)    GLUCOSE, POC    Collection Time: 11/28/22 12:47 AM   Result Value Ref Range    Glucose (POC) 253 (H) 65 - 117 mg/dL    Performed by Susana Stephenson \"Dee Dee\" Angelita        IMAGING:  CXR-no acute disease    X-ray tip/fib right and left-osteopenia. No evidence of osteomyelitis. CT abdomen pelvis:  1.  Large sacral decubitus ulcer with associated extensive osteomyelitis of the  distal sacrum and coccyx with associated erosive changes, as well as gas and  fluid containing collection within the posterior subcutaneous soft tissues  communicating with the skin surface as above, right larger than left. 2. Distended gallbladder with cholelithiasis, but no convincing CT evidence of  acute cholecystitis. If there is clinical concern, consider ultrasound for  further evaluation. 3. Additional findings as above.  -sclerosis noted in the bilateral femoral heads, suspicious for  avascular necrosis  -Gallbladder is distended and contains multiple stones, but  without gallbladder wall thickening or surrounding fat stranding.  No  intrahepatic or extrahepatic biliary ductal dilatation    EKG: Sinus tachycardia, rate 119, , QTc 422  ______________________________________________________________________    Assessment / Plan:  Necrotic sacral ulcer with extensive osteomyelitis  Lactic acidosis secondary to above  Multiple pressure injuries  Debility  History CVA with residual left-sided deficits  Poorly controlled insulin-dependent diabetes mellitus  UTI with indwelling Walker  Essential hypertension  Atrial fibrillation  Dementia    PLAN:    Necrotic sacral ulcer with extensive osteomyelitis  Lactic acidosis secondary to above  Multiple pressure injuries  Debility  History CVA with residual left-sided deficits  Admit to telemetry monitoring  General surgery consulted, greatly appreciate their expertise  -Taken to OR for debridement tonight  -Sample sent for culture-we will target antibiotics pending results although suspect this will be polymicrobial  Continue empiric Zosyn and vancomycin in the interim  Check ESR and CRP  Given extent of sacral osteomyelitis I doubt wound will ever truly be cured, but may be able to be controlled  Wound care consulted  Diabetes management consulted  Check hemoglobin A1c, lipids  Continue Walker catheter to promote wound healing  Incentive spirometry to prevent atelectasis  Nutrition consulted for supplementation recommendations to assist in wound healing  PT/OT consulted  Discussed with family patient would likely require rehab facility if not long-term care, which they were agreeable to  SLP consulted given family voicing concerns with patient difficulty swallowing and occasional episodes of aspiration    Poorly controlled insulin-dependent diabetes mellitus  Diabetes management consulted  Checking hemoglobin A1c  Glargine 10 units nightly  Corrective coverage insulin every 4 hours-switch to basal bolus regimen when patient no longer n.p.o.    UTI with indwelling Walker  Well covered by current antibiotics  Follow-up on culture results  Walker care ordered    Essential hypertension  Atrial fibrillation  Continue PTA amlodipine, aspirin, carvedilol, clonidine, pravastatin  Follow-up on lipid panel and adjust statin as needed    Dementia  Monitor for signs of delirium    Incidental findings requiring outpatient follow up/ evaluation:  -sclerosis noted in the bilateral femoral heads, suspicious for  avascular necrosis  -Gallbladder is distended and contains multiple stones, but  without gallbladder wall thickening or surrounding fat stranding.  No  intrahepatic or extrahepatic biliary ductal dilatation      Code Status: DNR-confirmed with daughter and patient    DVT Prophylaxis: Held for procedural intervention  GI Prophylaxis: Not indicated  Diet: N.p.o.       Care Plan discussed with:    Comments   Patient x    Family  x Daughter at bedside   RN     Care Manager                    Consultant:      _______________________________________________________________________  Baseline Level of Function: Debilitated/bedbound    Expected  Disposition:   Home with Family    HH/PT/OT/RN    SNF/LTC x   [facility]    ________________________________________________________________________  TOTAL TIME:  80 Minutes   MEDICAL COMPLEXITY: high  TOBACCO CESSATION COUNSELING: NO        Comments    x Reviewed previous records   >50% of visit spent in counseling and coordination of care x Discussion with patient and/or family and questions answered       ________________________________________________________________________  Signed: Willy Bryant DO  11/28/2022 2:06 AM    Please note that this documentation was completed in part with Dragon dictation software. Occasionally unanticipated grammatical, syntax, homophones, and other interpretive errors are inadvertently transcribed by the computer software. Please excuse and disregard any errors that have escaped final proofreading. If in doubt, please do not hesitate to reach out to myself or the assigned hospitalist via [facility] paging system for clarification.

## 2022-11-28 NOTE — WOUND CARE
Wound Care consult for this patient for the Sacrum wound that was present on admission:  Chart reviewed. Last seen at the outpatient wound center in May of this year. Pre-op wound photo:     Dr. Hoa Izaguirre did a surgical debridement of the sacrum wound last night in the OR. The resulting wound is 15 cm x 15 cm. POSTOPERATIVE DIAGNOSIS:  Extensive septic sacral deep tissue injury with abscess. PROCEDURE PERFORMED:  Debridement of sacral deep tissue injury including skin, subcutaneous tissue and muscle. The resulting wound is large per Dr. Hoa Izaguirre. Currently packed with gauze moistened with Dakin solution. Pt. Has osteo in the wound base. Plan:  Wound care orders written for twice daily wound packing with the Dakin solution (ordered from pharmacy). First dressing change to be done by nursing staff at 1800 today. This patient will be seen by Wound care nurse tomorrow (Tuesday).     Carlos Eduardo Campo RN, BSN, [employer]

## 2022-11-28 NOTE — CONSULTS
Surgery History and Physical    Subjective:      Madhuri Rodriguez is a 65 y.o. female who presents to ED with daughter for progression of sacral wound. The daughter states this has been present for at least 3 months. She was admitted in October at [facility] for a full month and recommended debridement on presentation which the family declined. She was found on MRI to have osteomyelitis and was treated for a month with iv antibiotics and local wound care. She did not go home on antibiotics and did not have any follow up arrangements on discharge. The patient is bedridden. PMH includes A-fib, CKD, DM, HTN, CVA.   PSH includes CABG, hysterectomy, spinal surgery    Past Medical History:   Diagnosis Date    Anticoagulant long-term use 10/13/2017    Anxiety 10/13/2017    Atrial fibrillation ([address] 10/13/2017    Breast calcification, left 10/13/2017    CAD (coronary artery disease)     Cellulitis of both lower extremities 10/13/2017    Chronic kidney disease     kidney stones    Chronic pain syndrome 10/13/2017    Chronic prescription opiate use 11/15/2017    CVA (cerebral vascular accident) ([address] 10/13/2017    Diabetes ([address]     DJD (degenerative joint disease) 10/13/2017    Dyslipidemia 10/13/2017    Glaucoma 10/13/2017    Hypertension     Hyperthyroidism 10/13/2017    Long-term use of high-risk medication 10/13/2017    Stasis ulcer of lower extremity ([address] 10/13/2017    Stroke ([address]     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 ([address] 8/18/2012    retinopathy      Past Surgical History:   Procedure Laterality Date    HX BREAST BIOPSY Left     2014    HX GYN      hysterectomty    HX ORTHOPAEDIC      back surgery    TX CARDIAC SURG PROCEDURE UNLIST      cagb      Family History   Problem Relation Age of Onset    Heart Disease Father      Social History     Tobacco Use    Smoking status: Never    Smokeless tobacco: Never   Substance Use Topics    Alcohol use: No      Prior to Admission medications    Medication Sig Start Date End Date Taking? Authorizing Provider   insulin NPH (NovoLIN N NPH U-100 Insulin) 100 unit/mL injection 5 Units by SubCUTAneous route Before breakfast and dinner. Patient taking differently: 15 Units by SubCUTAneous route Before breakfast and dinner. 8/2/22  Yes Warren Paez MD   insulin lispro (HUMALOG) 100 unit/mL injection As directed  Patient taking differently: Sliding scale 8/2/22  Yes Warren Paez MD   cloNIDine HCL (CATAPRES) 0.1 mg tablet TAKE 1 TABLET THREE TIMES A DAY 3/22/22  Yes Nafisa Browne MD   carvediloL (COREG) 12.5 mg tablet TAKE 1 TABLET TWICE A DAY 2/16/22  Yes Nafisa Browne MD   amLODIPine (NORVASC) 10 mg tablet Take 1 Tablet by mouth daily. 2/2/22  Yes Nafisa Browne MD   pravastatin (PRAVACHOL) 80 mg tablet TAKE 1 TABLET NIGHTLY 9/14/21  Yes Nafisa Browne MD   rivaroxaban (Xarelto) 20 mg tab tablet Take 1 Tablet by mouth daily. 9/14/21  Yes Nafisa Browne MD   captopriL (CAPOTEN) 25 mg tablet TAKE 1 TABLET TWICE A DAY  Patient taking differently: 25 mg two (2) times a day. 5/31/21  Yes Nafisa Browne MD   furosemide (LASIX) 40 mg tablet TAKE 1 TABLET DAILY 5/5/21  Yes Nafisa Browne MD   aspirin (ASPIRIN) 325 mg tablet Take 1 Tab by mouth daily. 8/22/12  Yes Nafisa Browne MD   sodium hypochlorite (Dakin's Solution) external solution Apply to wounds twice daily and cover with dressing 6/8/21   Garrick Andrade   ergocalciferol (VITAMIN D2) 50,000 unit capsule Take 50,000 Units by mouth every seven (7) days. Provider, Historical      Allergies   Allergen Reactions    Betadine Prepstick Rash    Keflex [Cephalexin] Hives     Pt breaks out in hives       Review of Systems   Constitutional:  Positive for fatigue. Negative for chills, diaphoresis and fever. Respiratory:  Negative for shortness of breath and wheezing. Cardiovascular:  Negative for chest pain and palpitations.    Gastrointestinal:  Negative for abdominal pain, diarrhea, nausea and vomiting. Musculoskeletal:  Negative for myalgias. Hematological:  Does not bruise/bleed easily. All other systems reviewed and are negative. Objective:     Patient Vitals for the past 8 hrs:   BP Pulse Resp SpO2 Weight   22 -- -- -- -- 129 lb 9.6 oz (58.8 kg)   22 213 (!) 130/52 (!) 115 (!) 33 94 % --   227 129/61 (!) 107 28 96 % --   22 1830 (!) 143/77 (!) 114 20 100 % --   22 1815 (!) 155/71 (!) 114 30 98 % --   22 1730 136/68 (!) 118 23 95 % --   22 1530 (!) 148/77 (!) 117 (!) 35 98 % --       Temp (24hrs), Av.6 °F (37 °C), Min:98.6 °F (37 °C), Max:98.6 °F (37 °C)      Physical Exam  Constitutional:       Appearance: She is well-developed. She is toxic-appearing. HENT:      Head: Normocephalic and atraumatic. Eyes:      General: No scleral icterus. Pupils: Pupils are equal, round, and reactive to light. Cardiovascular:      Rate and Rhythm: Normal rate and regular rhythm. Heart sounds: Normal heart sounds. Pulmonary:      Breath sounds: Normal breath sounds. No wheezing or rales. Abdominal:      General: Bowel sounds are normal. There is no distension. Palpations: Abdomen is soft. There is no mass. Tenderness: There is no abdominal tenderness. There is no guarding or rebound. Musculoskeletal:         General: Normal range of motion. Lymphadenopathy:      Cervical: No cervical adenopathy. Skin:            Comments: Large sacral eschar with breakdown and exensive undermining, foul smelling with giovani pus. Inferiorly approaches anal verge   Neurological:      General: No focal deficit present. Mental Status: She is alert and oriented to person, place, and time. Assessment:     66 yo woman who has been bedridden for 2+ years, with severe deep sacral tissue injury with associated osteomyelitis documented for 2 months, declined debridement on initial presentation at [facility].   Daughter would now like surgical debridement for source control as her mother is now septic. Plan:     Discussed the risk of surgery including but not limited to bleeding, infection, inability to clear all infection with surgery alone, need for ongoing wound care and antibiotics, very low likelihood wound will ever fully heal, and the risks of general anesthetic including possible death. The patient and daughter understand the risks; any and all questions were answered to their satisfaction.

## 2022-11-28 NOTE — PROGRESS NOTES
Transition of Care Plan:    RUR: 17% - Moderate  Disposition: Home w/ family vs SNF -   If SNF or IPR: Date FOC offered:SNF list e-mailed to family -on 11/28/2022 -  [email or web address] - per family request - requested to have at least 3 choices by 11/29/2022  Date [address] received:  Date authorization started with reference number:  Date authorization received and expires:  Accepting facility:  Follow up appointments:PCP and specialists as indicated  DME needed: TBD  Transportation at [place] [address] or means to access home:     per family    IM Medicare Letter: 1st IM letter provided 11/27  Is patient a  and connected with the [place]? No - patient has  secondary insurance as patient's  has service connection        If yes, was [employer] transfer form completed and VA notified? Caregiver Contact:Spouse - Adonis Trent - [phone] or daughter - Dana Carlin - [phone]  Discharge Caregiver contacted prior to discharge?  Will need to update family on plan of care  Care Conference needed?:             No    Bisi Geronimo, RN, BSN, [address] [place] [address]  Manager of Case Management  [phone]

## 2022-11-28 NOTE — PROGRESS NOTES
Reason for Admission:   Sacral Osteomyelitis                    RUR Score:  17%                PCP: First and Last name:   Nafisa Browne MD     Name of Practice:    Are you a current patient: Yes/No:    Approximate date of last visit: Unsure   Can you participate in a virtual visit if needed:     Do you (patient/family) have any concerns for transition/discharge? Patient will need care for wound and likely IV ABX therapy. Plan for utilizing home health:  [facility] vs SNF     Current Advanced Directive/Advance Care Plan:  DNR  CM confirmed patient is a DNR    Healthcare Decision Maker:   Click here to complete [place] including selection of the Healthcare Decision Maker Relationship (ie \"Primary\")            , Taisha Laguerre, [phone]    Transition of Care Plan:        Patient lives at home with her . Patient has in the past declined HH and IV ABX to treat sacral wound. Patient's daughter tries to help keep wound as clean as possible, but patient needs assistance with all ADLs. Patient will likely need SNF, but at least [facility], if patient will agree.   Current Dispo: HH vs SNF

## 2022-11-28 NOTE — ANESTHESIA PREPROCEDURE EVALUATION
Relevant Problems   NEUROLOGY   (+) CVA (cerebral vascular accident) ([facility]      CARDIOVASCULAR   (+) Essential hypertension, benign   (+) PAF (paroxysmal atrial fibrillation) (HCC)      ENDOCRINE   (+) Acquired hypothyroidism   (+) Hyperthyroidism   (+) 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 (HCC)   (+) Type 2 diabetes with nephropathy (HCC)      HEMATOLOGY   (+) Sacral osteomyelitis (HCC)       Anesthetic History   No history of anesthetic complications            Review of Systems / Medical History  Patient summary reviewed, nursing notes reviewed and pertinent labs reviewed    Pulmonary  Within defined limits                 Neuro/Psych       CVA  TIA     Cardiovascular    Hypertension: poorly controlled        Dysrhythmias : atrial fibrillation  CAD    Exercise tolerance: >4 METS     GI/Hepatic/Renal  Within defined limits              Endo/Other    Diabetes: poorly controlled, type 2  Hypothyroidism  Arthritis     Other Findings              Physical Exam    Airway  Mallampati: II  TM Distance: 4 - 6 cm  Neck ROM: normal range of motion   Mouth opening: Normal     Cardiovascular  Regular rate and rhythm,  S1 and S2 normal,  no murmur, click, rub, or gallop  Rhythm: regular  Rate: normal         Dental  No notable dental hx       Pulmonary  Breath sounds clear to auscultation               Abdominal  GI exam deferred       Other Findings            Anesthetic Plan    ASA: 3, emergent  Anesthesia type: general

## 2022-11-28 NOTE — PROGRESS NOTES
Problem: Risk for Spread of Infection  Goal: Prevent transmission of infectious organism to others  Description: Prevent the transmission of infectious organisms to other patients, staff members, and visitors. Outcome: Progressing Towards Goal     Problem: Falls - Risk of  Goal: *Absence of Falls  Description: Document [place] Fall Risk and appropriate interventions in the flowsheet.   Outcome: Progressing Towards Goal  Note: Fall Risk Interventions:       Mentation Interventions: Bed/chair exit alarm, Increase mobility, Room close to nurse's station    Medication Interventions: Bed/chair exit alarm, Patient to call before getting OOB    Elimination Interventions: Bed/chair exit alarm, Call light in reach, Toileting schedule/hourly rounds              Problem: Patient Education: Go to Patient Education Activity  Goal: Patient/Family Education  Outcome: Progressing Towards Goal

## 2022-11-29 LAB
ANION GAP SERPL CALC-SCNC: 6 MMOL/L (ref 5–15)
BASOPHILS # BLD: 0 K/UL (ref 0–0.1)
BASOPHILS NFR BLD: 0 % (ref 0–1)
BUN SERPL-MCNC: 11 MG/DL (ref 6–20)
BUN/CREAT SERPL: 31 (ref 12–20)
CALCIUM SERPL-MCNC: 8.9 MG/DL (ref 8.5–10.1)
CHLORIDE SERPL-SCNC: 106 MMOL/L (ref 97–108)
CO2 SERPL-SCNC: 31 MMOL/L (ref 21–32)
CREAT SERPL-MCNC: 0.36 MG/DL (ref 0.55–1.02)
DIFFERENTIAL METHOD BLD: ABNORMAL
EOSINOPHIL # BLD: 0.2 K/UL (ref 0–0.4)
EOSINOPHIL NFR BLD: 2 % (ref 0–7)
ERYTHROCYTE [DISTWIDTH] IN BLOOD BY AUTOMATED COUNT: 14.5 % (ref 11.5–14.5)
EST. AVERAGE GLUCOSE BLD GHB EST-MCNC: 148 MG/DL
GLUCOSE BLD STRIP.AUTO-MCNC: 109 MG/DL (ref 65–117)
GLUCOSE BLD STRIP.AUTO-MCNC: 129 MG/DL (ref 65–117)
GLUCOSE BLD STRIP.AUTO-MCNC: 158 MG/DL (ref 65–117)
GLUCOSE SERPL-MCNC: 143 MG/DL (ref 65–100)
HBA1C MFR BLD: 6.8 % (ref 4–5.6)
HCT VFR BLD AUTO: 24.2 % (ref 35–47)
HGB BLD-MCNC: 7.2 G/DL (ref 11.5–16)
IMM GRANULOCYTES # BLD AUTO: 0.1 K/UL (ref 0–0.04)
IMM GRANULOCYTES NFR BLD AUTO: 0 % (ref 0–0.5)
LACTATE SERPL-SCNC: 1.7 MMOL/L (ref 0.4–2)
LYMPHOCYTES # BLD: 1.2 K/UL (ref 0.8–3.5)
LYMPHOCYTES NFR BLD: 10 % (ref 12–49)
MCH RBC QN AUTO: 29.8 PG (ref 26–34)
MCHC RBC AUTO-ENTMCNC: 29.8 G/DL (ref 30–36.5)
MCV RBC AUTO: 100 FL (ref 80–99)
MONOCYTES # BLD: 0.4 K/UL (ref 0–1)
MONOCYTES NFR BLD: 4 % (ref 5–13)
NEUTS SEG # BLD: 9.4 K/UL (ref 1.8–8)
NEUTS SEG NFR BLD: 84 % (ref 32–75)
NRBC # BLD: 0 K/UL (ref 0–0.01)
NRBC BLD-RTO: 0 PER 100 WBC
PLATELET # BLD AUTO: 377 K/UL (ref 150–400)
PMV BLD AUTO: 9.4 FL (ref 8.9–12.9)
POTASSIUM SERPL-SCNC: 3.3 MMOL/L (ref 3.5–5.1)
RBC # BLD AUTO: 2.42 M/UL (ref 3.8–5.2)
SERVICE CMNT-IMP: ABNORMAL
SERVICE CMNT-IMP: ABNORMAL
SERVICE CMNT-IMP: NORMAL
SODIUM SERPL-SCNC: 143 MMOL/L (ref 136–145)
WBC # BLD AUTO: 11.2 K/UL (ref 3.6–11)

## 2022-11-29 PROCEDURE — 74011250636 HC RX REV CODE- 250/636: Performed by: INTERNAL MEDICINE

## 2022-11-29 PROCEDURE — 65270000029 HC RM PRIVATE

## 2022-11-29 PROCEDURE — 94760 N-INVAS EAR/PLS OXIMETRY 1: CPT

## 2022-11-29 PROCEDURE — 74011000258 HC RX REV CODE- 258: Performed by: STUDENT IN AN ORGANIZED HEALTH CARE EDUCATION/TRAINING PROGRAM

## 2022-11-29 PROCEDURE — 87150 DNA/RNA AMPLIFIED PROBE: CPT

## 2022-11-29 PROCEDURE — 74011636637 HC RX REV CODE- 636/637: Performed by: INTERNAL MEDICINE

## 2022-11-29 PROCEDURE — 80048 BASIC METABOLIC PNL TOTAL CA: CPT

## 2022-11-29 PROCEDURE — 85025 COMPLETE CBC W/AUTO DIFF WBC: CPT

## 2022-11-29 PROCEDURE — 74011000250 HC RX REV CODE- 250: Performed by: STUDENT IN AN ORGANIZED HEALTH CARE EDUCATION/TRAINING PROGRAM

## 2022-11-29 PROCEDURE — 77030040392 HC DRSG OPTIFOAM MDII -A

## 2022-11-29 PROCEDURE — 74011250637 HC RX REV CODE- 250/637: Performed by: STUDENT IN AN ORGANIZED HEALTH CARE EDUCATION/TRAINING PROGRAM

## 2022-11-29 PROCEDURE — 83036 HEMOGLOBIN GLYCOSYLATED A1C: CPT

## 2022-11-29 PROCEDURE — 83605 ASSAY OF LACTIC ACID: CPT

## 2022-11-29 PROCEDURE — 82962 GLUCOSE BLOOD TEST: CPT

## 2022-11-29 PROCEDURE — 74011250637 HC RX REV CODE- 250/637: Performed by: SURGERY

## 2022-11-29 PROCEDURE — 36415 COLL VENOUS BLD VENIPUNCTURE: CPT

## 2022-11-29 PROCEDURE — 74011250636 HC RX REV CODE- 250/636: Performed by: STUDENT IN AN ORGANIZED HEALTH CARE EDUCATION/TRAINING PROGRAM

## 2022-11-29 PROCEDURE — 87040 BLOOD CULTURE FOR BACTERIA: CPT

## 2022-11-29 PROCEDURE — 77010033678 HC OXYGEN DAILY

## 2022-11-29 RX ORDER — INSULIN GLARGINE 100 [IU]/ML
18 INJECTION, SOLUTION SUBCUTANEOUS
Status: DISCONTINUED | OUTPATIENT
Start: 2022-11-29 | End: 2022-11-30

## 2022-11-29 RX ADMIN — PIPERACILLIN AND TAZOBACTAM 3.38 G: 3; .375 INJECTION, POWDER, FOR SOLUTION INTRAVENOUS at 02:32

## 2022-11-29 RX ADMIN — VANCOMYCIN HYDROCHLORIDE 1000 MG: 1 INJECTION, POWDER, LYOPHILIZED, FOR SOLUTION INTRAVENOUS at 00:48

## 2022-11-29 RX ADMIN — VANCOMYCIN HYDROCHLORIDE 1000 MG: 1 INJECTION, POWDER, LYOPHILIZED, FOR SOLUTION INTRAVENOUS at 12:18

## 2022-11-29 RX ADMIN — SODIUM CHLORIDE, PRESERVATIVE FREE 10 ML: 5 INJECTION INTRAVENOUS at 10:03

## 2022-11-29 RX ADMIN — INSULIN GLARGINE 18 UNITS: 100 INJECTION, SOLUTION SUBCUTANEOUS at 22:15

## 2022-11-29 RX ADMIN — SODIUM CHLORIDE, PRESERVATIVE FREE 10 ML: 5 INJECTION INTRAVENOUS at 22:18

## 2022-11-29 RX ADMIN — PIPERACILLIN AND TAZOBACTAM 3.38 G: 3; .375 INJECTION, POWDER, FOR SOLUTION INTRAVENOUS at 10:00

## 2022-11-29 RX ADMIN — Medication 2 UNITS: at 22:14

## 2022-11-29 RX ADMIN — SODIUM CHLORIDE, PRESERVATIVE FREE 10 ML: 5 INJECTION INTRAVENOUS at 15:13

## 2022-11-29 RX ADMIN — PRAVASTATIN SODIUM 80 MG: 40 TABLET ORAL at 22:17

## 2022-11-29 RX ADMIN — VANCOMYCIN HYDROCHLORIDE 1000 MG: 1 INJECTION, POWDER, LYOPHILIZED, FOR SOLUTION INTRAVENOUS at 23:21

## 2022-11-29 RX ADMIN — PIPERACILLIN AND TAZOBACTAM 3.38 G: 3; .375 INJECTION, POWDER, FOR SOLUTION INTRAVENOUS at 17:53

## 2022-11-29 RX ADMIN — CLONIDINE HYDROCHLORIDE 0.1 MG: 0.1 TABLET ORAL at 17:54

## 2022-11-29 RX ADMIN — CARVEDILOL 12.5 MG: 12.5 TABLET, FILM COATED ORAL at 17:54

## 2022-11-29 RX ADMIN — Medication 1 AMPULE: at 22:16

## 2022-11-29 RX ADMIN — Medication: at 10:10

## 2022-11-29 NOTE — PROGRESS NOTES
Transition of Care Plan:     RUR:   17% - Moderate  Disposition: Home w/ family vs SNF -   If SNF or IPR: Date FOC offered:SNF list e-mailed to family -on 11/28/2022 -  [email or web address] - per family request - requested to have at least 3 choices by 11/29/2022  4:09 PM   CM called DTR, Nino Holly and had to leave a . Asked her to call back with SNF preferences so that we could start looking for a SNF bed. Date FOC received:  Date authorization started with reference number:  Date authorization received and expires:  Accepting facility:  Follow up appointments:PCP and specialists as indicated  DME needed: TBD  Transportation at [place] [address] or means to access home:     per family    IM Medicare Letter: 1st IM letter provided 11/27  Is patient a  and connected with the [place]? No - patient has  secondary insurance as patient's  has service connection        If yes, was [employer] transfer form completed and VA notified? Caregiver Contact:Spouse - Gregory Mercer - [phone] or daughter - Nino Holly - [phone]  Discharge Caregiver contacted prior to discharge? Will need to update family on plan of care  Care Conference needed?:             No    CM will continue to monitor discharge plan.      Nick, Juan Manuel[address]  [phone]

## 2022-11-29 NOTE — PROGRESS NOTES
Hospitalist Progress Note    NAME: Andrew Goldsmith   :  1945   MRN:  [id number]     Admit date: 2022    Today's date: 22    PCP: Rach Barrera MD      Anticipated discharge date: 2022    Barriers:  IV antibiotics, wound care    Assessment / Plan:    Severe sepsis POA WBC 15.6, , RR 35, lactate 3.19  GNR bacteremia POA  Necrotic sacral ulcer with extensive osteomyelitis POA  Lactic acidosis POA  Multiple pressure injuries  Debility  Prior CVA with residual left-sided deficits  CT abdomen/pelvis IMPRESSION  1. Large sacral decubitus ulcer with associated extensive osteomyelitis of the  distal sacrum and coccyx with associated erosive changes, as well as gas and  fluid containing collection within the posterior subcutaneous soft tissues  communicating with the skin surface as above, right larger than left. 2. Distended gallbladder with cholelithiasis, but no convincing CT evidence of  acute cholecystitis. General surgery consulted, OR on 2022   POSTOPERATIVE DIAGNOSIS:  Extensive septic sacral deep tissue injury with abscess. PROCEDURE PERFORMED:  Debridement of sacral deep tissue injury including skin, subcutaneous tissue and muscle.   Wound culture from [facility]   4+ [address]   GRAM STAIN   3+ GRAM POSITIVE 1 [facility]   Culture result:  PENDING P     Continue empiric Zosyn and vancomycin  Wound care consulted  Continue Walker catheter to promote wound healing  Incentive spirometry to prevent atelectasis  Nutrition consulted for supplementation recommendations to assist in wound healing  PT/OT consulted  Discussed with family patient would likely require rehab facility if not long-term care, which they were agreeable to  Speech following  Consult ID once cultures back, will need prolonged antibiotics  Check follow cultures in Am Diabetes mellitus type 2 POA   Hemoglobin A1c 6.2 in Jan 2022  Recheck in AM  Increase lantus from 10 units to 15 units nightly  Change SSI to QID  Add pre meal insulin in Am pending on sugars     UTI due to chronic indwelling Walker POA  UA nitrite +, > 100 WBC, 5 to 10 RBC, 2+ bacteremia  Follow-up on culture results  Walker care ordered  Continue IV antibiotics     Essential hypertension POA  Paroxysmal atrial fibrillation  Hyperlipidemia POA  Continue PTA amlodipine, aspirin, carvedilol, clonidine, pravastatin     Dementia  Monitor for signs of delirium     Incidental findings requiring outpatient follow up/ evaluation:  -sclerosis noted in the bilateral femoral heads, suspicious for  avascular necrosis  -Gallbladder is distended and contains multiple stones, but  without gallbladder wall thickening or surrounding fat stranding. No  intrahepatic or extrahepatic biliary ductal dilatation     Code Status: DNR-confirmed with daughter and patient     DVT Prophylaxis: Lovenox    Body mass index is 23.91 kg/m². Subjective:     Chief Complaint / Reason for Physician Visit  Opens eyes, not talking much. Discussed with RN events overnight. Not able to provide history  OR overnight to debride the sacral ulcer  BC positive for GNR    Review of Systems:  Symptom Y/N Comments  Symptom Y/N Comments   Fever/Chills    Chest Pain     Poor Appetite    Edema     Cough    Abdominal Pain     Sputum    Joint Pain     SOB/MARTINEZ    Headache     Nausea/vomit    Tolerating PT/OT     Diarrhea    Tolerating Diet     Constipation    Other       Could NOT obtain due to: Lethargy, not talking much     Objective:     VITALS:   Last 24hrs VS reviewed since prior progress note.  Most recent are:  Patient Vitals for the past 24 hrs:   Temp Pulse Resp BP SpO2   11/28/22 2018 97.9 °F (36.6 °C) 94 -- (!) 113/59 97 %   11/28/22 1816 -- 67 -- (!) 108/52 --   11/28/22 1630 -- 87 -- (!) 110/52 97 %   11/28/22 1218 -- 90 -- 110/61 99 %   11/28/22 1215 98.2 °F (36.8 °C) 90 19 (!) 91/49 --   11/28/22 0440 98.2 °F (36.8 °C) (!) 101 18 (!) 130/58 100 %   11/28/22 0140 98.4 °F (36.9 °C) (!) 115 20 (!) 145/83 100 %   11/28/22 0115 99 °F (37.2 °C) (!) 112 15 (!) 168/76 95 %   11/28/22 0100 -- (!) 115 19 (!) 152/58 98 %   11/28/22 0055 -- (!) 115 14 (!) 154/61 97 %   11/28/22 0050 -- (!) 115 14 (!) 182/62 96 %   11/28/22 0045 -- (!) 117 18 (!) 165/87 96 %   11/28/22 0040 -- (!) 116 12 (!) 170/96 98 %   11/28/22 0039 99.2 °F (37.3 °C) (!) 109 17 (!) 164/76 99 %   11/27/22 2132 -- (!) 115 (!) 33 (!) 130/52 94 %   11/27/22 2117 -- (!) 107 28 129/61 96 %       Intake/Output Summary (Last 24 hours) at 11/28/2022 2051  Last data filed at 11/28/2022 1816  Gross per 24 hour   Intake 1700 ml   Output 1850 ml   Net -150 ml        Wt Readings from Last 12 Encounters:   11/28/22 57.4 kg (126 lb 8.7 oz)   07/27/22 99.8 kg (220 lb)   06/01/22 99.8 kg (220 lb)   05/05/22 99.8 kg (220 lb)   03/01/22 99.8 kg (220 lb)   01/26/22 99.8 kg (220 lb)   06/09/21 99.8 kg (220 lb)   06/01/21 93 kg (205 lb)   12/03/20 96.2 kg (212 lb)   10/01/20 96.2 kg (212 lb)   03/12/20 96.2 kg (212 lb)   06/21/19 96.2 kg (212 lb)       PHYSICAL EXAM:    I had a face to face encounter and independently examined this patient on 11/28/22 as outlined below:    General: WD, WN.lethargic, cooperative, no acute distress    EENT:  PERRL. Anicteric sclerae. MMM  Neck:  No meningismus, no thyromegaly  Resp:  CTA bilaterally, no wheezing or rales. No accessory muscle use  CV:  Regular  rhythm,  No edema  GI:  Soft, Non distended, Non tender. +Bowel sounds, no rebound  Neurologic:  Lethargic, confused, not talking much, non focal motor exam  Psych:   Not anxious nor agitated  Skin:  No rashes.   No jaundice, wound in sacrum bandaged, not viewed          Reviewed most current lab test results and cultures  YES  Reviewed most current radiology test results   YES  Review and summation of old records today NO  Reviewed patient's current orders and MAR    YES  PMH/SH reviewed - no change compared to H&P  ________________________________________________________________________  Care Plan discussed with:    Comments   Patient x    Family      RN x    Care Manager     Consultant                        Multidiciplinary team rounds were held today with , nursing, pharmacist and clinical coordinator. Patient's plan of care was discussed; medications were reviewed and discharge planning was addressed. ________________________________________________________________________      Comments   >50% of visit spent in counseling and coordination of care     ________________________________________________________________________  Laura Renee MD     Procedures: see electronic medical records for all procedures/Xrays and details which were not copied into this note but were reviewed prior to creation of Plan. LABS:  I reviewed today's most current labs and imaging studies.   Pertinent labs include:  Recent Labs     11/28/22  0420 11/27/22  1608   WBC 19.0* 15.6*   HGB 7.9* 9.8*   HCT 27.1* 32.1*   * 571*     Recent Labs     11/28/22  1156 11/28/22  0420 11/27/22  1608    139 135*   K 4.0 3.6 4.6    104 96*   CO2 32 30 32   * 232* 440*   BUN 10 8 12   CREA 0.34* 0.47* 0.61   CA 8.8 9.0 9.5   ALB  --   --  1.7*   TBILI  --   --  0.4   ALT  --   --  <6*

## 2022-11-29 NOTE — PROGRESS NOTES
Physician Progress Note      Mili Contreras  CSN #:                  [id number]  :                       1945  ADMIT DATE:       2022 1:48 PM  [address] [place] DATE:    PROVIDER #:        John Aguila MD          QUERY TEXT:    Pt admitted with Sepsis and has malnutrition documented. Please further specify type of malnutrition with documentation in the medical record. The medical record reflects the following:  Risk Factors: Nutrition Diagnosis:  ? Unintended weight loss related to cognitive or neurological impairment, inadequate protein-energy intake as evidenced by weight loss greater than or equal to 20% in 1 year, moderate muscle loss    Clinical Indicators: Malnutrition Assessment:  Malnutrition Status:  Severe malnutrition (22 1609)  Context:  Chronic illness  Findings of the 6 clinical characteristics of malnutrition:  Energy Intake:  75% or less est energy requirements for 1 month or longer  Weight Loss:  Unable to assess  Body Fat Loss:  Mild body fat loss, Triceps  Muscle Mass Loss:  Severe muscle mass loss, Thigh (quadriceps)  Fluid Accumulation:  No significant fluid accumulation,   Strength:  Not performed    Current Body Wt:  57.4 kg (126 lb 8.7 oz)  Current BMI (kg/m2): 23.9    Treatment: Nutrition Recommendations/Plan:  1. Continue diet per SLP  2. RD to add Glucerna BID    ASPEN Criteria:  https://aspenjournals. onlinelibrary. brewer. com/doi/full/10.1177/5381938476437315  Options provided:  -- Severe Malnutrition  -- Severe Protein calorie malnutrition  -- Other - I will add my own diagnosis  -- Disagree - Not applicable / Not valid  -- Disagree - Clinically unable to determine / Unknown  -- Refer to Clinical Documentation Reviewer    PROVIDER RESPONSE TEXT:    This patient has severe protein calorie malnutrition. Query created by: John Castro on 2022 1:22 PM      QUERY TEXT:    Patient admitted with Sepsis.  Per ***(note type/date), noted to also have pressure ulcer. If possible, please document in progress notes and discharge summary the location, present on admission status and stage of the pressure ulcer: The medical record reflects the following:  Risk Factors: 11/28 Wound care RN PN \"Wound Care consult for this patient for the Sacrum wound that was present on admission\". Clinical Indicators: Extensive septic sacral deep tissue injury with abscess    Treatment: Debridement of sacral deep tissue injury including skin, subcutaneous tissue and muscle. Stage 1:  Non-blanchable erythema of intact skin  Stage 2:  Abrasion, Blister, Partial-thickness skin loss, with exposed dermis  Stage 3:  Full-thickness skin loss with damage or necrosis of subcutaneous tissue  Stage 4:  Full-thickness skin & soft tissue loss through to underlying muscle, tendon or bone  Unstageable: Obscured full-thickness skin & tissue loss  Options provided:  -- Deep tissue injury pressure ulcer of sacrum present on admission  -- Deep tissue injury pressure ulcer of sacrum not present on admission  -- Other - I will add my own diagnosis  -- Disagree - Not applicable / Not valid  -- Disagree - Clinically unable to determine / Unknown  -- Refer to Clinical Documentation Reviewer    PROVIDER RESPONSE TEXT:    This patient has a deep tissue injury pressure ulcer of the sacrum which was present on admission.     Query created by: Claire Duarte on 11/29/2022 1:25 PM      Electronically signed by:  Noemi Esteban MD 11/29/2022 5:40 PM

## 2022-11-29 NOTE — PROGRESS NOTES
Bedside and Verbal shift change report given to Jesus Rey (oncoming nurse) by DMITRIY Ortiz RN (offgoing nurse). Report given with SBAR, Kardex, Intake/Output, MAR and Recent Results.

## 2022-11-29 NOTE — PROGRESS NOTES
Pharmacy Antimicrobial Kinetic Dosing    Indication for Antimicrobials: Sepsis, Sacral wound infection + Osteomyelitis PTA    Current Regimen of Each Antimicrobial:  Zosyn 4.5 gm Iv x1 then 3.375 gm IV q 8h  (Start Date ; Day # 2)  Vancomycin 2500 mg IV x1 then pharmacy to dose (Start Date ; Day #2)    Previous Antimicrobial Therapy: n/a    Goal Level: AUC: 400-600 mg/hr/Liter/day    Date Dose & Interval Measured (mcg/mL) Predicted AUC/FAUZIA                       Date & time of next level:  PM before midnight dose    Dosing calculator used: [employer] calculator    Significant Positive Cultures:     Wound (Intraop debridement) = pending     Conditions for Dosing Consideration: None    Labs:  Recent Labs     22  0420 22  1156 22  0420 22  1608   CREA 0.36* 0.34* 0.47* 0.61   BUN 11 10 8 12   PCT  --   --   --  1.63     Recent Labs     22  0420 22  0420 22  1608   WBC 11.2* 19.0* 15.6*     Temp (24hrs), Av.7 °F (36.5 °C), Min:97.3 °F (36.3 °C), Max:98.2 °F (36.8 °C)      Creatinine Clearance (mL/min):   CrCl (Adjusted Body Weight): 54.0 mL/min   If actual weight < IBW: CrCl (Actual Body Weight) 60.0    Impression/Plan:   Afebrile, WBCs downtrending, renal function stable  Continue Vancomycin 1,000 mg IV q12h for a projected AUC of 458 and trough of 12.8  Vancomycin level ordered to be drawn before midnight dose tonight ()  Daily BMP per Vancomycin dosing protocol    Antimicrobial stop date: to be determined     Pharmacy will follow daily and adjust medications as appropriate for renal function and/or serum levels.     Thank you,  CASE Laird

## 2022-11-29 NOTE — WOUND CARE
Wound care consult for large Sacrum wound present on admission. Pt. Has many pressure injuries described below - both heels, both calfs, left hip, left scapula. Pt. Is currently on an Envision on RobArt bed surface with Low air loss as a rental bed. Assessment and Treatment with photos of each wound:     WOUND POA CONDITIONS    Wound Heel Left (Active)   Wound Image   11/29/22 1357   Wound Etiology Deep Tissue/Injury 11/29/22 1357   Dressing Status Intact;Dry;Clean 11/28/22 2032   Offloading for Diabetic Foot Ulcers Offloading ordered 11/29/22 1357   Dressing Change Due 11/30/22 11/29/22 1357   Wound Assessment Purple/maroon;Eschar dry 11/29/22 1357   Drainage Amount None 11/29/22 1357   Wound Odor None 11/29/22 1357   Ebony-Wound/Incision Assessment Dry/flaky 11/29/22 1357   Wound Thickness Description Full thickness 11/29/22 1357   Number of days: 125       Wound Heel Right pressure injury to the right heel (Active)   Wound Image   11/29/22 1406   Wound Etiology Deep Tissue/Injury 11/29/22 1406   Wound Assessment Devitalized tissue; Purple/maroon;Eschar dry 11/29/22 1406   Drainage Amount None 11/29/22 1406   Wound Odor None 11/29/22 1406   Ebony-Wound/Incision Assessment Dry/flaky;Denuded 11/29/22 1406   Edges Defined edges 11/29/22 1406   Wound Thickness Description Full thickness 11/29/22 1406       Wound Sacral/coccyx Large Pressure injury post op Surgical Debridement (Active)   Wound Image   11/29/22 1357   Wound Etiology Pressure Stage 4 11/29/22 1357   Dressing Status New dressing applied 11/29/22 1357   Cleansed Wound cleanser 11/29/22 1357   Dressing/Treatment Packing;Moist to dry 11/29/22 1357   Wound Assessment Pink/red; Exposed Structure Fascia; Exposed Structure Bone;Exposed Structure Muscle; Purple/maroon 11/29/22 1357   Drainage Amount Moderate 11/29/22 1357   Drainage Description Serosanguinous 11/29/22 1357   Wound Odor None 11/29/22 1357   Ebony-Wound/Incision Assessment Ecchymosis;Edematous 11/29/22 1357   Edges Epibole (rolled edges) 11/29/22 1357   Wound Thickness Description Full thickness 11/29/22 1357       Wound Calf Right posterior-lateral RIGHT calf (Active)   Wound Image   11/29/22 1413   Wound Etiology Pressure Unstageable 11/29/22 1413   Dressing Status Breakthrough drainage noted 11/29/22 1413   Cleansed Cleansed with saline 11/29/22 1413   Dressing/Treatment Foam 11/29/22 1413   Wound Assessment Purple/maroon;Pink/red;Eschar moist 11/29/22 1413   Drainage Amount Small 11/29/22 1413   Drainage Description Serosanguinous 11/29/22 1413   Wound Odor Mild 11/29/22 1413   Ebony-Wound/Incision Assessment Ecchymosis;Edematous; Blanchable erythema 11/29/22 1413   Wound Thickness Description Full thickness 11/29/22 1413       Wound Calf Left posterior lateral LEFT Calf (Active)   Wound Image   11/29/22 1415   Wound Etiology Pressure Unstageable 11/29/22 1415   Dressing Status Breakthrough drainage noted 11/29/22 1415   Cleansed Cleansed with saline 11/29/22 1415   Wound Assessment Purple/maroon;Pink/red 11/29/22 1415   Drainage Amount Scant 11/29/22 1415   Drainage Description Serosanguinous 11/29/22 1415   Wound Odor None 11/29/22 1415   Ebony-Wound/Incision Assessment Edematous; Ecchymosis 11/29/22 1415   Wound Thickness Description Full thickness 11/29/22 1415       Wound Back Left;Upper DTI x 3 small wounds - Right Scapula area (Active)   Wound Image   11/29/22 1415   Wound Etiology Deep Tissue/Injury 11/29/22 1415   Dressing Status New dressing applied 11/29/22 1415   Cleansed Cleansed with saline 11/29/22 1415   Dressing/Treatment Foam 11/29/22 1415   Wound Assessment Eschar moist;Pink/red;Purple/maroon 11/29/22 1415   Drainage Amount Scant 11/29/22 1415   Drainage Description Serosanguinous 11/29/22 1415   Wound Odor None 11/29/22 1415   Ebony-Wound/Incision Assessment Denuded 11/29/22 1415   Wound Thickness Description Full thickness 11/29/22 1415       Wound Hip Anterior; Left Left HIP Pressure injury - covered with eschar (unstageable) (Active)   Wound Image   11/29/22 1415   Wound Etiology Pressure Unstageable 11/29/22 1415   Dressing Status New dressing applied 11/29/22 1415   Cleansed Cleansed with saline 11/29/22 1415   Dressing/Treatment Foam 11/29/22 1415   Wound Assessment Slough;Eschar dry 11/29/22 1415   Drainage Amount None 11/29/22 1415   Wound Odor None 11/29/22 1415   Ebony-Wound/Incision Assessment Intact 11/29/22 1415   Wound Thickness Description Full thickness 11/29/22 1415       Plan: Santyl collagenase ointment ordered for the calfs, the left hip and left scapula. Wound care to continue as ordered with one change starting tomorrow - can discontinue the [facility] solution. Use Sterile NS for all wound care. Pt. Needs much encouragement to do the wound care - you have to talk her into it . Turn patient every two hours - talk her into it. Float the heels  Encourage her to eat her food.      Abimael Brown, RN, BSN, [facility]

## 2022-11-29 NOTE — PROGRESS NOTES
End of Shift Note    Bedside shift change report given to [address] [place] [address]  (oncoming nurse) by Maritza Garcia RN (offgoing nurse). Report included the following information SBAR, Kardex, Intake/Output, and Recent Results    Shift worked:  7am-7pm     Shift summary and any significant changes:     Sacral dsg CDI, Bilateral Foot dsg CDI, Pt is a feeder, has poor po intake. Medicated for pain x1. Concerns for physician to address:  none     Zone phone for oncoming shift:   [phone]       Activity:  Activity Level: Bed Rest  Number times ambulated in hallways past shift: 0  Number of times OOB to chair past shift: 0    Cardiac:   Cardiac Monitoring: Yes      Cardiac Rhythm: Sinus Tachy, PVC    Access:  Current line(s): PIV     Genitourinary:   Urinary status: seymour    Respiratory:   O2 Device: Nasal cannula  Chronic home O2 use?: NO  Incentive spirometer at bedside: NO       GI:  Last Bowel Movement Date: 11/28/22  Current diet:  ADULT DIET Dysphagia - Soft & Bite Sized; 4 carb choices (60 gm/meal)  ADULT ORAL NUTRITION SUPPLEMENT Dinner, Breakfast; Diabetic Supplement  Passing flatus: YES  Tolerating current diet: YES       Pain Management:   Patient states pain is manageable on current regimen: YES    Skin:  Basilio Score: 12  Interventions: float heels and PT/OT consult    Patient Safety:  Fall Score:  Total Score: 3  Interventions: bed/chair alarm and gripper socks  High Fall Risk: Yes    Length of Stay:  Expected LOS: - - -  Actual LOS: 1      Maritza Garcia RN

## 2022-11-29 NOTE — PROGRESS NOTES
CM received return phone call from patient's daughter - she has received the SNF list for review and states she needs to complete review and do some research. Choices to be provided by 12/1/2022.     Anabell Burton RN, BSN, [address] [place] [address]  Manager of Case Management  [phone]

## 2022-11-30 LAB
ACC. NO. FROM MICRO ORDER, ACCP: ABNORMAL
ACINETOBACTER CALCOACETICUS-BAUMANII COMPLEX, ACBCX: NOT DETECTED
ANION GAP SERPL CALC-SCNC: 6 MMOL/L (ref 5–15)
BACTERIA SPEC CULT: ABNORMAL
BACTERIA SPEC CULT: ABNORMAL
BACTEROIDES FRAGILIS, BFRA: NOT DETECTED
BASOPHILS # BLD: 0 K/UL (ref 0–0.1)
BASOPHILS NFR BLD: 0 % (ref 0–1)
BIOFIRE COMMENT, BCIDPF: ABNORMAL
BUN SERPL-MCNC: 10 MG/DL (ref 6–20)
BUN/CREAT SERPL: 27 (ref 12–20)
C GLABRATA DNA VAG QL NAA+PROBE: NOT DETECTED
CALCIUM SERPL-MCNC: 8.6 MG/DL (ref 8.5–10.1)
CANDIDA ALBICANS: NOT DETECTED
CANDIDA AURIS, CAAU: NOT DETECTED
CANDIDA KRUSEI, CKRP: NOT DETECTED
CANDIDA PARAPSILOSIS, CPAUP: NOT DETECTED
CANDIDA TROPICALIS, CTROP: NOT DETECTED
CC UR VC: ABNORMAL
CHLORIDE SERPL-SCNC: 107 MMOL/L (ref 97–108)
CO2 SERPL-SCNC: 30 MMOL/L (ref 21–32)
CREAT SERPL-MCNC: 0.37 MG/DL (ref 0.55–1.02)
CRYPTO NEOFORMANS/GATTII, CRYNEG: NOT DETECTED
DATE LAST DOSE: ABNORMAL
DIFFERENTIAL METHOD BLD: ABNORMAL
ENTEROBACTER CLOACAE COMPLEX, ECCP: NOT DETECTED
ENTEROBACTERALES SP. , ENBLS: NOT DETECTED
ENTEROCOCCUS FAECALIS, ENFA: NOT DETECTED
ENTEROCOCCUS FAECIUM, ENFAM: NOT DETECTED
EOSINOPHIL # BLD: 0.1 K/UL (ref 0–0.4)
EOSINOPHIL NFR BLD: 2 % (ref 0–7)
ERYTHROCYTE [DISTWIDTH] IN BLOOD BY AUTOMATED COUNT: 14.5 % (ref 11.5–14.5)
ESCHERICHIA COLI: NOT DETECTED
GLUCOSE BLD STRIP.AUTO-MCNC: 124 MG/DL (ref 65–117)
GLUCOSE BLD STRIP.AUTO-MCNC: 200 MG/DL (ref 65–117)
GLUCOSE BLD STRIP.AUTO-MCNC: 203 MG/DL (ref 65–117)
GLUCOSE BLD STRIP.AUTO-MCNC: 72 MG/DL (ref 65–117)
GLUCOSE BLD STRIP.AUTO-MCNC: 99 MG/DL (ref 65–117)
GLUCOSE SERPL-MCNC: 82 MG/DL (ref 65–100)
HAEMOPHILUS INFLUENZAE, HMI: NOT DETECTED
HCT VFR BLD AUTO: 22.7 % (ref 35–47)
HGB BLD-MCNC: 6.8 G/DL (ref 11.5–16)
IMM GRANULOCYTES # BLD AUTO: 0 K/UL (ref 0–0.04)
IMM GRANULOCYTES NFR BLD AUTO: 1 % (ref 0–0.5)
KLEBSIELLA AEROGENES, KLAE: NOT DETECTED
KLEBSIELLA OXYTOCA: NOT DETECTED
KLEBSIELLA PNEUMONIAE GROUP, KPPG: NOT DETECTED
LISTERIA MONOCYTOGENES, LMONP: NOT DETECTED
LYMPHOCYTES # BLD: 1.3 K/UL (ref 0.8–3.5)
LYMPHOCYTES NFR BLD: 18 % (ref 12–49)
MCH RBC QN AUTO: 29.6 PG (ref 26–34)
MCHC RBC AUTO-ENTMCNC: 30 G/DL (ref 30–36.5)
MCV RBC AUTO: 98.7 FL (ref 80–99)
MECA/C (METHICILLIN RESISTANT GENE), MECACP: DETECTED
MONOCYTES # BLD: 0.3 K/UL (ref 0–1)
MONOCYTES NFR BLD: 5 % (ref 5–13)
NEISSERIA MENINGITIDIS, NMNI: NOT DETECTED
NEUTS SEG # BLD: 5.2 K/UL (ref 1.8–8)
NEUTS SEG NFR BLD: 75 % (ref 32–75)
NRBC # BLD: 0 K/UL (ref 0–0.01)
NRBC BLD-RTO: 0 PER 100 WBC
PLATELET # BLD AUTO: 349 K/UL (ref 150–400)
PMV BLD AUTO: 9.4 FL (ref 8.9–12.9)
POTASSIUM SERPL-SCNC: 3 MMOL/L (ref 3.5–5.1)
PROTEUS, PRP: NOT DETECTED
PSEUDOMONAS AERUGINOSA: NOT DETECTED
RBC # BLD AUTO: 2.3 M/UL (ref 3.8–5.2)
REPORTED DOSE,DOSE: ABNORMAL UNITS
REPORTED DOSE/TIME,TMG: ABNORMAL
RESISTANT GENE SPACE, REGENE: ABNORMAL
SALMONELLA, SALMO: NOT DETECTED
SERRATIA MARCESCENS: NOT DETECTED
SERVICE CMNT-IMP: ABNORMAL
SERVICE CMNT-IMP: NORMAL
SERVICE CMNT-IMP: NORMAL
SODIUM SERPL-SCNC: 143 MMOL/L (ref 136–145)
STAPH EPIDERMIDIS, STEP: DETECTED
STAPH LUGDUNENSIS, STALUG: NOT DETECTED
STAPHYLOCOCCUS AUREUS: NOT DETECTED
STAPHYLOCOCCUS, STAPP: DETECTED
STENO MALTOPHILIA, STMA: NOT DETECTED
STREPTOCOCCUS , STPSP: NOT DETECTED
STREPTOCOCCUS AGALACTIAE (GROUP B): NOT DETECTED
STREPTOCOCCUS PNEUMONIAE , SPNP: NOT DETECTED
STREPTOCOCCUS PYOGENES (GROUP A), SPYOP: NOT DETECTED
VANCOMYCIN SERPL-MCNC: >50 UG/ML
VANCOMYCIN TROUGH SERPL-MCNC: 25.8 UG/ML (ref 5–10)
WBC # BLD AUTO: 7 K/UL (ref 3.6–11)

## 2022-11-30 PROCEDURE — 80202 ASSAY OF VANCOMYCIN: CPT

## 2022-11-30 PROCEDURE — 36415 COLL VENOUS BLD VENIPUNCTURE: CPT

## 2022-11-30 PROCEDURE — 65270000029 HC RM PRIVATE

## 2022-11-30 PROCEDURE — 74011250636 HC RX REV CODE- 250/636: Performed by: INTERNAL MEDICINE

## 2022-11-30 PROCEDURE — 74011000250 HC RX REV CODE- 250: Performed by: STUDENT IN AN ORGANIZED HEALTH CARE EDUCATION/TRAINING PROGRAM

## 2022-11-30 PROCEDURE — 99231 SBSQ HOSP IP/OBS SF/LOW 25: CPT

## 2022-11-30 PROCEDURE — 74011636637 HC RX REV CODE- 636/637: Performed by: INTERNAL MEDICINE

## 2022-11-30 PROCEDURE — 94760 N-INVAS EAR/PLS OXIMETRY 1: CPT

## 2022-11-30 PROCEDURE — 74011250636 HC RX REV CODE- 250/636: Performed by: STUDENT IN AN ORGANIZED HEALTH CARE EDUCATION/TRAINING PROGRAM

## 2022-11-30 PROCEDURE — 82962 GLUCOSE BLOOD TEST: CPT

## 2022-11-30 PROCEDURE — 85025 COMPLETE CBC W/AUTO DIFF WBC: CPT

## 2022-11-30 PROCEDURE — 99223 1ST HOSP IP/OBS HIGH 75: CPT | Performed by: INTERNAL MEDICINE

## 2022-11-30 PROCEDURE — 74011000258 HC RX REV CODE- 258: Performed by: STUDENT IN AN ORGANIZED HEALTH CARE EDUCATION/TRAINING PROGRAM

## 2022-11-30 PROCEDURE — 74011250637 HC RX REV CODE- 250/637: Performed by: STUDENT IN AN ORGANIZED HEALTH CARE EDUCATION/TRAINING PROGRAM

## 2022-11-30 PROCEDURE — 74011250637 HC RX REV CODE- 250/637: Performed by: INTERNAL MEDICINE

## 2022-11-30 PROCEDURE — 74011000250 HC RX REV CODE- 250: Performed by: INTERNAL MEDICINE

## 2022-11-30 PROCEDURE — 74011250637 HC RX REV CODE- 250/637: Performed by: SURGERY

## 2022-11-30 PROCEDURE — 80048 BASIC METABOLIC PNL TOTAL CA: CPT

## 2022-11-30 PROCEDURE — 77030040392 HC DRSG OPTIFOAM MDII -A

## 2022-11-30 RX ORDER — POTASSIUM CHLORIDE 750 MG/1
40 TABLET, FILM COATED, EXTENDED RELEASE ORAL 2 TIMES DAILY
Status: COMPLETED | OUTPATIENT
Start: 2022-11-30 | End: 2022-11-30

## 2022-11-30 RX ORDER — INSULIN GLARGINE 100 [IU]/ML
14 INJECTION, SOLUTION SUBCUTANEOUS
Status: DISCONTINUED | OUTPATIENT
Start: 2022-11-30 | End: 2022-12-04

## 2022-11-30 RX ADMIN — OXYCODONE 5 MG: 5 TABLET ORAL at 15:12

## 2022-11-30 RX ADMIN — SODIUM CHLORIDE, PRESERVATIVE FREE 10 ML: 5 INJECTION INTRAVENOUS at 09:20

## 2022-11-30 RX ADMIN — CARVEDILOL 12.5 MG: 12.5 TABLET, FILM COATED ORAL at 17:59

## 2022-11-30 RX ADMIN — Medication: at 09:21

## 2022-11-30 RX ADMIN — SODIUM CHLORIDE, PRESERVATIVE FREE 10 ML: 5 INJECTION INTRAVENOUS at 21:25

## 2022-11-30 RX ADMIN — CLONIDINE HYDROCHLORIDE 0.1 MG: 0.1 TABLET ORAL at 09:15

## 2022-11-30 RX ADMIN — Medication: at 21:31

## 2022-11-30 RX ADMIN — Medication 1 AMPULE: at 09:15

## 2022-11-30 RX ADMIN — SODIUM CHLORIDE, PRESERVATIVE FREE 10 ML: 5 INJECTION INTRAVENOUS at 13:22

## 2022-11-30 RX ADMIN — PRAVASTATIN SODIUM 80 MG: 40 TABLET ORAL at 21:23

## 2022-11-30 RX ADMIN — PIPERACILLIN AND TAZOBACTAM 3.38 G: 3; .375 INJECTION, POWDER, FOR SOLUTION INTRAVENOUS at 09:15

## 2022-11-30 RX ADMIN — INSULIN GLARGINE 14 UNITS: 100 INJECTION, SOLUTION SUBCUTANEOUS at 22:09

## 2022-11-30 RX ADMIN — SODIUM CHLORIDE, PRESERVATIVE FREE 10 ML: 5 INJECTION INTRAVENOUS at 06:39

## 2022-11-30 RX ADMIN — POTASSIUM CHLORIDE 40 MEQ: 750 TABLET, FILM COATED, EXTENDED RELEASE ORAL at 13:22

## 2022-11-30 RX ADMIN — ASPIRIN 325 MG ORAL TABLET 325 MG: 325 PILL ORAL at 09:15

## 2022-11-30 RX ADMIN — AMLODIPINE BESYLATE 10 MG: 5 TABLET ORAL at 09:15

## 2022-11-30 RX ADMIN — PIPERACILLIN AND TAZOBACTAM 3.38 G: 3; .375 INJECTION, POWDER, FOR SOLUTION INTRAVENOUS at 01:34

## 2022-11-30 RX ADMIN — Medication 1 AMPULE: at 21:25

## 2022-11-30 RX ADMIN — COLLAGENASE SANTYL: 250 OINTMENT TOPICAL at 09:18

## 2022-11-30 RX ADMIN — VANCOMYCIN HYDROCHLORIDE 1000 MG: 1 INJECTION, POWDER, LYOPHILIZED, FOR SOLUTION INTRAVENOUS at 21:23

## 2022-11-30 RX ADMIN — CARVEDILOL 12.5 MG: 12.5 TABLET, FILM COATED ORAL at 09:15

## 2022-11-30 RX ADMIN — Medication 2 UNITS: at 22:09

## 2022-11-30 RX ADMIN — CLONIDINE HYDROCHLORIDE 0.1 MG: 0.1 TABLET ORAL at 17:59

## 2022-11-30 RX ADMIN — PIPERACILLIN AND TAZOBACTAM 3.38 G: 3; .375 INJECTION, POWDER, FOR SOLUTION INTRAVENOUS at 18:00

## 2022-11-30 RX ADMIN — POTASSIUM CHLORIDE 40 MEQ: 750 TABLET, FILM COATED, EXTENDED RELEASE ORAL at 17:59

## 2022-11-30 NOTE — DIABETES MGMT
[facility]    CLINICAL NURSE SPECIALIST CONSULT     Initial Presentation   Chrissy Mccrary is a 65 y.o. female admitted from the ER 11/27/22 after large bed sore noted. Daughter reports patient was seen at [address] [place] [address] in October for this and was recommended debridement at that time however patient decline. MRI from October visit showed sacral wound with possible osteomyelitis. She has not had any wound care set up since her d/c, daughter has been providing wound care. Daughter reports wound has increased in size and depth since discharge. Afebrile. Tachycardic. Hypertensive. 02 sats 100%  ER exam: See picture  Skin:     Findings: Wound present. Comments: Sacral wound: Stage 4 sacral pressure ulcer approx 12 in width x 6 in height with extensive surrounding necrotic tissue and erythema. LLE: Stage 3 pressure ulcer to posterior calf with necrotic tissue. RLE: Stage 3 pressure ulcer to posterior calf. CXR: No acute disease  Xray of TIB/FIB: Osteopenia  CT ABd:   1. Large sacral decubitus ulcer with associated extensive osteomyelitis of the   distal sacrum and coccyx with associated erosive changes, as well as gas and   fluid containing collection within the posterior subcutaneous soft tissues   communicating with the skin surface as above, right larger than left. 2. Distended gallbladder with cholelithiasis, but no convincing CT evidence of   acute cholecystitis. If there is clinical concern, consider ultrasound for   further evaluation.      HX:   Past Medical History:   Diagnosis Date    Anticoagulant long-term use 10/13/2017    Anxiety 10/13/2017    Atrial fibrillation ([address] 10/13/2017    Breast calcification, left 10/13/2017    CAD (coronary artery disease)     Cellulitis of both lower extremities 10/13/2017    Chronic kidney disease     kidney stones    Chronic pain syndrome 10/13/2017    Chronic prescription opiate use 11/15/2017    CVA (cerebral vascular accident) ([address] 10/13/2017 Diabetes ([facility]     DJD (degenerative joint disease) 10/13/2017    Dyslipidemia 10/13/2017    Glaucoma 10/13/2017    Hypertension     Hyperthyroidism 10/13/2017    Long-term use of high-risk medication 10/13/2017    Stasis ulcer of lower extremity ([facility] 10/13/2017    Stroke ([facility]     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 ([facility] 8/18/2012    retinopathy       INITIAL DX:   Sacral osteomyelitis ([facility] [M46.28]     Current Treatment     TX: 11/28/22 debridement sacral ulcer including skin subcutaneous tissue and muscle    Consulted by Provider for advanced diabetes nursing assessment and care for:   [] Transitioning off Shree Kettle   [x] Inpatient management strategy  [x] Home management assessment  [] Survival skill education    Hospital Course   Clinical progress has been complicated by large sacral wound  11/27/22 General surgery consult: 64 yo woman who has been bedridden for 2+ years, with severe deep sacral tissue injury with associated osteomyelitis documented for 2 months, declined debridement on initial presentation at [facility]. Daughter would now like surgical debridement for source control as her mother is now septic.  11/30/22 patient alert and oriented. Pleasant. Reports pain in sacral area. Nursing notified. Diabetes History   Patient is unable to provide any information at this time    Diabetes-related Medical History - Deferred    Diabetes Medication History  Key Antihyperglycemic Medications               insulin NPH (NovoLIN N NPH U-100 Insulin) 100 unit/mL injection (Taking) 5 Units by SubCUTAneous route Before breakfast and dinner. insulin lispro (HUMALOG) 100 unit/mL injection (Taking) As directed           Diabetes self-management practices: Deferred    Subjective   \" I've been through the ringer\"     Objective   Physical exam  General Normal weight female who is ill-appearing. Neuro  Alert and oriented.   Vital Signs Visit Vitals  BP (!) 136/55 (BP 1 Location: Right leg) Pulse 96   Temp 98.1 °F (36.7 °C)   Resp 16   Ht 5' 1\" (1.549 m)   Wt 57.4 kg (126 lb 8.7 oz)   SpO2 99%   BMI 23.91 kg/m²     Laboratory  Recent Labs     11/30/22  0355 11/29/22  0420 11/28/22  1156 11/28/22  0420 11/27/22  1608   GLU 82 143* 208* 232* 440*   AGAP 6 6 5 5 7   TRIGL  --   --  111  --   --    WBC 7.0 11.2*  --  19.0* 15.6*   CREA 0.37* 0.36* 0.34* 0.47* 0.61   AST  --   --   --   --  9*   ALT  --   --   --   --  <6*       Factors impacting BG management  Factor Dose Comments   Nutrition:  Dysphagia meals   60 grams/meal   Reports eating   Pain Oxycodone PRN    Infection Vanc Q12 hrs  Zosyn Q8 hrs    Other:   Kidney function  Liver function   Normal  Normal      Blood glucose pattern      Significant diabetes-related events over the past 24-72 hours  Admission   One dose of dexamethasone 11/27/22  On corrective insulin => Bgs in 200s    Assessment and Plan   Nursing Diagnosis Risk for unstable blood glucose pattern   Nursing Intervention Domain 7585 Decision-making Support   Nursing Interventions Examined current inpatient diabetes/blood glucose control   Explored factors facilitating and impeding inpatient management  Explored corrective strategies with patient and responsible inpatient provider   Informed patient of rational for insulin strategy while hospitalized     Evaluation   This 65year old AA female was admitted with large sacral decubitus that was surgically debrided today. Bgs were elevated on admission which has required corrective insulin. Bgs are in the 200s. Recommend starting a basal corrective insulin approach. BG's slightly below goal on the current insulin dosing. Consider a reduction in the basal insulin dosing.    Recommendations   Decrease to 14 units Lantus daily      Billing Code(s)   [x] 78017 IP subsequent hospital care - 15 minutes     Before making these care recommendations, I personally reviewed the hospitalization record, including notes, laboratory & diagnostic data and current medications, and examined the patient at the bedside (circumstances permitting) before making care recommendations. More than fifty (50) percent of the time was spent in patient counseling and/or care coordination.   Total minutes: 175 Ric Bertrand, CNS  Diabetes Clinical Nurse Specialist  [facility]  Access via [address] [place] [address]

## 2022-11-30 NOTE — PROGRESS NOTES
Pharmacy Antimicrobial Kinetic Dosing    Indication for Antimicrobials: Sepsis, Sacral wound infection + Osteomyelitis PTA    Current Regimen of Each Antimicrobial:  Zosyn 4.5 gm Iv x1 then 3.375 gm IV q 8h  (Start Date ; Day # 3)  Vancomycin 2500 mg IV x1 then pharmacy to dose (Start Date ; Day # 3)    Previous Antimicrobial Therapy: n/a    Goal Level: AUC: 400-600 mg/hr/Liter/day    Date Dose & Interval Measured (mcg/mL) Predicted AUC/FAUZIA   22 @0400 Inappropriately drawn > 50    22 @1207 Vanc 2,500mg load + 1,000mg x4 25.8            Date & time of next level:  PM before midnight dose    Dosing calculator used: "[employer]    Significant Positive Cultures:     Wound (Intraop debridement) = pending    Blood = Proteus sp   Urine = gram negative rods >100,000   Blood = CNStaph    Conditions for Dosing Consideration: None    Labs:  Recent Labs     22  0355 22  0420 22  1156 22  0420 22  1608   CREA 0.37* 0.36* 0.34*   < > 0.61   BUN 10 11 10   < > 12   PCT  --   --   --   --  1.63    < > = values in this interval not displayed. Recent Labs     22  0355 22  0420 22  0420 22  1608   WBC 7.0 11.2* 19.0* 15.6*     Temp (24hrs), Av.3 °F (36.8 °C), Min:98.1 °F (36.7 °C), Max:99 °F (37.2 °C)      Creatinine Clearance (mL/min):   CrCl (Adjusted Body Weight): 54.0 mL/min   If actual weight < IBW: CrCl (Actual Body Weight) 60.0    Impression/Plan:   Supra-therapeutic level of 25.8 resulted today, appropriately drawn; first level of >50 inappropriately drawn.    Extending dosing interval to Vancomycin 1,000 mg IV q24h for a projected AUC of 460 and trough of 11.8  Afebrile, WBCs downtrending, renal function stable  Order vancomycin level before second dose of 1,000 mg q24h (projected 12/ prior to PM dose)  Daily BMP per Vancomycin dosing protocol    Antimicrobial stop date: to be determined     Pharmacy will follow daily and adjust medications as appropriate for renal function and/or serum levels.     Thank you,  CASE Salazar

## 2022-11-30 NOTE — PROGRESS NOTES
Hospitalist Progress Note    NAME: Molly Rausch   :  1945   MRN:  [id number]     Admit date: 2022    Today's date: 22    PCP: Abi Nayak MD    Anticipated discharge date: 2022    Barriers:  IV antibiotics, wound care, LTC/SNF placement    Assessment / Plan:    Severe sepsis POA WBC 15.6, , RR 35, lactate 3.19  Proteus bacteremia POA  Necrotic sacral ulcer with extensive osteomyelitis POA  Lactic acidosis POA  Unstageable Multiple pressure injuries POA- R heel, LHeel, L upper back, R calf, L calf, L Hip  Debility  Prior CVA with residual left-sided deficits  CT abdomen/pelvis IMPRESSION  1. Large sacral decubitus ulcer with associated extensive osteomyelitis of the  distal sacrum and coccyx with associated erosive changes, as well as gas and  fluid containing collection within the posterior subcutaneous soft tissues  communicating with the skin surface as above, right larger than left. 2. Distended gallbladder with cholelithiasis, but no convincing CT evidence of  acute cholecystitis. General surgery consulted, OR on 2022   POSTOPERATIVE DIAGNOSIS:  Extensive septic sacral deep tissue injury with abscess. PROCEDURE PERFORMED:  Debridement of sacral deep tissue injury including skin, subcutaneous tissue and muscle.   Wound culture from [facility]   4+ [address]   GRAM STAIN   3+ GRAM POSITIVE [facility]   Culture result:  PENDING P     Continue empiric Zosyn and vancomycin  BC with proteus  Wound care consulted  Continue Walker catheter to promote wound healing  Incentive spirometry to prevent atelectasis  Nutrition consulted for supplementation recommendations to assist in wound healing  PT/OT consulted  Discussed with family patient would likely require rehab facility if not long-term care, which they were agreeable to  Speech following  Consult ID in AM  Check follow cultures in Am     Diabetes mellitus type 2 POA   Hemoglobin A1c 6.8 this admission    Decrease lantus from 18 units to 14 units nightly today  Change SSI to QID  Add pre meal insulin pending on sugars     Hypokalemia- 3.0 today  Replenish K+ PO today  BMP in AM    UTI due to chronic indwelling Walker POA  UA nitrite +, > 100 WBC, 5 to 10 RBC, 2+ bacteremia  Urine culture with GNR  Walker care ordered  Continue IV antibiotics     Essential hypertension POA  Paroxysmal atrial fibrillation  Hyperlipidemia POA  Continue PTA amlodipine, aspirin, carvedilol, clonidine, pravastatin     Dementia  Monitor for signs of delirium     Incidental findings requiring outpatient follow up/ evaluation:  -sclerosis noted in the bilateral femoral heads, suspicious for  avascular necrosis  -Gallbladder is distended and contains multiple stones, but  without gallbladder wall thickening or surrounding fat stranding. No  intrahepatic or extrahepatic biliary ductal dilatation     Code Status: DNR-confirmed with daughter and patient  Palliative consulted     DVT Prophylaxis: Lovenox    Body mass index is 23.91 kg/m². Subjective:     Chief Complaint / Reason for Physician Visit  Opens eyes, not talking much. Discussed with RN events overnight. \"I am ok\"  Alert, very confused  Not able to provide history due to Dementia  BC positive for Proteus    Review of Systems:  Symptom Y/N Comments  Symptom Y/N Comments   Fever/Chills    Chest Pain     Poor Appetite    Edema     Cough    Abdominal Pain     Sputum    Joint Pain     SOB/MARTINEZ    Headache     Nausea/vomit    Tolerating PT/OT     Diarrhea    Tolerating Diet     Constipation    Other       Could NOT obtain due to: Dementia     Objective:     VITALS:   Last 24hrs VS reviewed since prior progress note.  Most recent are:  Patient Vitals for the past 24 hrs:   Temp Pulse Resp BP SpO2   11/30/22 1505 97.9 °F (36.6 °C) 76 18 (!) 119/57 --   11/30/22 1107 98.1 °F (36.7 °C) 96 16 (!) 136/55 --   11/30/22 0827 -- 84 -- -- 99 %   11/30/22 0825 -- 89 -- (!) 163/72 --   11/30/22 0358 98.2 °F (36.8 °C) 88 16 128/63 100 %   11/30/22 0000 -- 88 -- -- --   11/29/22 2339 98.2 °F (36.8 °C) 88 16 (!) 121/58 100 %   11/29/22 2000 -- 75 -- -- --   11/29/22 1958 99 °F (37.2 °C) 75 16 122/61 94 %   11/29/22 1930 -- -- -- -- 100 %   11/29/22 1725 98.1 °F (36.7 °C) 98 18 (!) 153/71 99 %         Intake/Output Summary (Last 24 hours) at 11/30/2022 1610  Last data filed at 11/30/2022 1259  Gross per 24 hour   Intake 480 ml   Output 700 ml   Net -220 ml          Wt Readings from Last 12 Encounters:   11/29/22 57.4 kg (126 lb 8.7 oz)   07/27/22 99.8 kg (220 lb)   06/01/22 99.8 kg (220 lb)   05/05/22 99.8 kg (220 lb)   03/01/22 99.8 kg (220 lb)   01/26/22 99.8 kg (220 lb)   06/09/21 99.8 kg (220 lb)   06/01/21 93 kg (205 lb)   12/03/20 96.2 kg (212 lb)   10/01/20 96.2 kg (212 lb)   03/12/20 96.2 kg (212 lb)   06/21/19 96.2 kg (212 lb)       PHYSICAL EXAM:    I had a face to face encounter and independently examined this patient on 11/30/22 as outlined below:    General: WD, WN.lethargic, cooperative, no acute distress    EENT:  PERRL. Anicteric sclerae. MMM  Neck:  No meningismus, no thyromegaly  Resp:  CTA bilaterally, no wheezing or rales. No accessory muscle use  CV:  Regular  rhythm,  No edema  GI:  Soft, Non distended, Non tender. +Bowel sounds, no rebound  Neurologic:  Lethargic, confused, not talking much, non focal motor exam  Psych:   Not anxious nor agitated  Skin:  No rashes.   No jaundice, wound in sacrum bandaged, not viewed          Reviewed most current lab test results and cultures  YES  Reviewed most current radiology test results   YES  Review and summation of old records today    NO  Reviewed patient's current orders and MAR    YES  PMH/SH reviewed - no change compared to H&P  ________________________________________________________________________  Care Plan discussed with:    Comments   Patient x    Family      RN x    Care Manager x    Consultant                       x Multidiciplinary team rounds were held today with , nursing, pharmacist and clinical coordinator. Patient's plan of care was discussed; medications were reviewed and discharge planning was addressed. ________________________________________________________________________  Total time: 36 mins    Comments   >50% of visit spent in counseling and coordination of care x    ________________________________________________________________________  Sri Burgess MD     Procedures: see electronic medical records for all procedures/Xrays and details which were not copied into this note but were reviewed prior to creation of Plan. LABS:  I reviewed today's most current labs and imaging studies.   Pertinent labs include:  Recent Labs     11/30/22  0355 11/29/22  0420 11/28/22  0420   WBC 7.0 11.2* 19.0*   HGB 6.8* 7.2* 7.9*   HCT 22.7* 24.2* 27.1*    377 431*       Recent Labs     11/30/22  0355 11/29/22  0420 11/28/22  1156    143 140   K 3.0* 3.3* 4.0    106 103   CO2 30 31 32   GLU 82 143* 208*   BUN 10 11 10   CREA 0.37* 0.36* 0.34*   CA 8.6 8.9 8.8

## 2022-11-30 NOTE — PROGRESS NOTES
Hospitalist Progress Note    NAME: Latonia Gaxiola   :  1945   MRN:  [id number]     Admit date: 2022    Today's date: 22    PCP: Selene Lopez MD    Anticipated discharge date: 2022    Barriers:  IV antibiotics, wound care    Assessment / Plan:    Severe sepsis POA WBC 15.6, , RR 35, lactate 3.19  Proteus bacteremia POA  Necrotic sacral ulcer with extensive osteomyelitis POA  Lactic acidosis POA  Multiple pressure injuries POA  Debility  Prior CVA with residual left-sided deficits  CT abdomen/pelvis IMPRESSION  1. Large sacral decubitus ulcer with associated extensive osteomyelitis of the  distal sacrum and coccyx with associated erosive changes, as well as gas and  fluid containing collection within the posterior subcutaneous soft tissues  communicating with the skin surface as above, right larger than left. 2. Distended gallbladder with cholelithiasis, but no convincing CT evidence of  acute cholecystitis. General surgery consulted, OR on 2022   POSTOPERATIVE DIAGNOSIS:  Extensive septic sacral deep tissue injury with abscess. PROCEDURE PERFORMED:  Debridement of sacral deep tissue injury including skin, subcutaneous tissue and muscle.   Wound culture from [facility], [place], operated by [facility]   4+ [address]   GRAM STAIN   3+ GRAM POSITIVE 1 [facility]   Culture result:  PENDING P     Continue empiric Zosyn and vancomycin  BC with proteus  Wound care consulted  Continue Walker catheter to promote wound healing  Incentive spirometry to prevent atelectasis  Nutrition consulted for supplementation recommendations to assist in wound healing  PT/OT consulted  Discussed with family patient would likely require rehab facility if not long-term care, which they were agreeable to  Speech following  Consult ID in AM  Check follow cultures in Am     Diabetes mellitus type 2 POA   Hemoglobin A1c 6.2 in Jan 2022  Recheck in AM  Increase lantus from 10 units to 18 units nightly  Change SSI to QID  Add pre meal insulin pending on sugars     UTI due to chronic indwelling Walker POA  UA nitrite +, > 100 WBC, 5 to 10 RBC, 2+ bacteremia  Urine culture with GNR  Walker care ordered  Continue IV antibiotics     Essential hypertension POA  Paroxysmal atrial fibrillation  Hyperlipidemia POA  Continue PTA amlodipine, aspirin, carvedilol, clonidine, pravastatin     Dementia  Monitor for signs of delirium     Incidental findings requiring outpatient follow up/ evaluation:  -sclerosis noted in the bilateral femoral heads, suspicious for  avascular necrosis  -Gallbladder is distended and contains multiple stones, but  without gallbladder wall thickening or surrounding fat stranding. No  intrahepatic or extrahepatic biliary ductal dilatation     Code Status: DNR-confirmed with daughter and patient     DVT Prophylaxis: Lovenox    Body mass index is 23.91 kg/m². Subjective:     Chief Complaint / Reason for Physician Visit  Opens eyes, not talking much. Discussed with RN events overnight. \"I am scared, why am I at my sisters house\"  Alert, very confused  Not able to provide history  BC positive for Proteus    Review of Systems:  Symptom Y/N Comments  Symptom Y/N Comments   Fever/Chills    Chest Pain     Poor Appetite    Edema     Cough    Abdominal Pain     Sputum    Joint Pain     SOB/MARTINEZ    Headache     Nausea/vomit    Tolerating PT/OT     Diarrhea    Tolerating Diet     Constipation    Other       Could NOT obtain due to: Lethargy, not talking much     Objective:     VITALS:   Last 24hrs VS reviewed since prior progress note.  Most recent are:  Patient Vitals for the past 24 hrs:   Temp Pulse Resp BP SpO2   11/29/22 2000 -- 75 -- -- --   11/29/22 1958 99 °F (37.2 °C) 75 16 122/61 94 %   11/29/22 1725 98.1 °F (36.7 °C) 98 18 (!) 153/71 99 %   11/29/22 1447 98.1 °F (36.7 °C) (!) 128 -- (!) 119/54 (!) 85 %   11/29/22 1328 98.1 °F (36.7 °C) (!) 102 16 (!) 154/70 100 %   11/29/22 0717 97.5 °F (36.4 °C) 93 17 (!) 154/58 100 %   11/29/22 0413 97.3 °F (36.3 °C) 83 18 (!) 145/71 100 %   11/28/22 2315 97.7 °F (36.5 °C) 93 -- (!) 122/50 100 %         Intake/Output Summary (Last 24 hours) at 11/29/2022 2057  Last data filed at 11/29/2022 1219  Gross per 24 hour   Intake 590 ml   Output 900 ml   Net -310 ml          Wt Readings from Last 12 Encounters:   11/28/22 57.4 kg (126 lb 8.7 oz)   07/27/22 99.8 kg (220 lb)   06/01/22 99.8 kg (220 lb)   05/05/22 99.8 kg (220 lb)   03/01/22 99.8 kg (220 lb)   01/26/22 99.8 kg (220 lb)   06/09/21 99.8 kg (220 lb)   06/01/21 93 kg (205 lb)   12/03/20 96.2 kg (212 lb)   10/01/20 96.2 kg (212 lb)   03/12/20 96.2 kg (212 lb)   06/21/19 96.2 kg (212 lb)       PHYSICAL EXAM:    I had a face to face encounter and independently examined this patient on 11/29/22 as outlined below:    General: WD, WN.lethargic, cooperative, no acute distress    EENT:  PERRL. Anicteric sclerae. MMM  Neck:  No meningismus, no thyromegaly  Resp:  CTA bilaterally, no wheezing or rales. No accessory muscle use  CV:  Regular  rhythm,  No edema  GI:  Soft, Non distended, Non tender. +Bowel sounds, no rebound  Neurologic:  Lethargic, confused, not talking much, non focal motor exam  Psych:   Not anxious nor agitated  Skin:  No rashes.   No jaundice, wound in sacrum bandaged, not viewed          Reviewed most current lab test results and cultures  YES  Reviewed most current radiology test results   YES  Review and summation of old records today    NO  Reviewed patient's current orders and MAR    YES  PMH/SH reviewed - no change compared to H&P  ________________________________________________________________________  Care Plan discussed with:    Comments   Patient x    Family      RN x    Care Manager     Consultant                        Multidiciplinary team rounds were held today with , nursing, pharmacist and clinical coordinator. Patient's plan of care was discussed; medications were reviewed and discharge planning was addressed. ________________________________________________________________________      Comments   >50% of visit spent in counseling and coordination of care     ________________________________________________________________________  Lesia Garcia MD     Procedures: see electronic medical records for all procedures/Xrays and details which were not copied into this note but were reviewed prior to creation of Plan. LABS:  I reviewed today's most current labs and imaging studies.   Pertinent labs include:  Recent Labs     11/29/22 0420 11/28/22 0420 11/27/22  1608   WBC 11.2* 19.0* 15.6*   HGB 7.2* 7.9* 9.8*   HCT 24.2* 27.1* 32.1*    431* 571*       Recent Labs     11/29/22 0420 11/28/22  1156 11/28/22 0420 11/27/22  1608    140 139 135*   K 3.3* 4.0 3.6 4.6    103 104 96*   CO2 31 32 30 32   * 208* 232* 440*   BUN 11 10 8 12   CREA 0.36* 0.34* 0.47* 0.61   CA 8.9 8.8 9.0 9.5   ALB  --   --   --  1.7*   TBILI  --   --   --  0.4   ALT  --   --   --  <6*

## 2022-11-30 NOTE — PROGRESS NOTES
Ms. Carisa Hall is alert but confused. She is bed bound with multiple pressure injuries and is on a specialty bed. We have been turning her Q2 hours and her wound dressings have been changed. She ate 25% of her breakfast and lunch (meds are crushed in apple sauce). Her vanc trough was >25, pharmacy is aware.  Will continue to monitor

## 2022-11-30 NOTE — PROGRESS NOTES
Pharmacy Antimicrobial Kinetic Dosing    Indication for Antimicrobials: Sepsis, Sacral wound infection + Osteomyelitis PTA    Current Regimen of Each Antimicrobial:  Zosyn 4.5 gm Iv x1 then 3.375 gm IV q 8h  (Start Date ; Day # 2)  Vancomycin 2500 mg IV x1 then pharmacy to dose (Start Date ; Day #2)    Previous Antimicrobial Therapy: n/a    Goal Level: AUC: 400-600 mg/hr/Liter/day    Date Dose & Interval Measured (mcg/mL) Predicted AUC/FAUZIA                       Date & time of next level:  PM before midnight dose    Dosing calculator used: [employer] calculator    Significant Positive Cultures:     Wound (Intraop debridement) = pending     Conditions for Dosing Consideration: None    Labs:  Recent Labs     22  0355 220 22  1156 22  0420 22  1608   CREA 0.37* 0.36* 0.34*   < > 0.61   BUN 10 11 10   < > 12   PCT  --   --   --   --  1.63    < > = values in this interval not displayed. Recent Labs     22  0355 22  0420 22  0420 22  1608   WBC 7.0 11.2* 19.0* 15.6*     Temp (24hrs), Av.3 °F (36.8 °C), Min:98.1 °F (36.7 °C), Max:99 °F (37.2 °C)      Creatinine Clearance (mL/min):   CrCl (Adjusted Body Weight): 54.0 mL/min   If actual weight < IBW: CrCl (Actual Body Weight) 60.0    Impression/Plan:   Vanc trough >50  @0355 - This was only 4 hours post the last dose. Will order trough prior prior to 12 noon dose. Have informed  the nurse , will hold 12 noon dose     Afebrile, WBCs downtrending, renal function stable  Continue Vancomycin 1,000 mg IV q12h for a projected AUC of 458 and trough of 12.8  Vancomycin level ordered to be drawn before midnight dose tonight ()  Daily BMP per Vancomycin dosing protocol    Antimicrobial stop date: to be determined     Pharmacy will follow daily and adjust medications as appropriate for renal function and/or serum levels.     Thank you,  Anny Dillon, [facility] [place]

## 2022-11-30 NOTE — PROGRESS NOTES
End of Shift Note    Bedside shift change report given to Ade Moses (oncoming nurse) by Brittanie Beltran RN (offgoing nurse). Report included the following information SBAR, Intake/Output, Recent Results, and Dual Neuro Assessment    Shift worked:  7a-7p     Shift summary and any significant changes:     Antibiotics in progress       Concerns for physician to address:        Zone phone for oncoming shift:   [phone]         Activity:  Activity Level: Bed Rest  Number times ambulated in hallways past shift: 0  Number of times OOB to chair past shift: 0    Cardiac:   Cardiac Monitoring: Yes      Cardiac Rhythm: Sinus Rhythm    Access:  Current line(s): midline     Genitourinary:   Urinary status: seymour    Respiratory:   O2 Device: None (Room air)  Chronic home O2 use?: no    Incentive spirometer at bedside: NO       GI:  Last Bowel Movement Date: 11/28/22  Current diet:  ADULT DIET Dysphagia - Soft & Bite Sized; 4 carb choices (60 gm/meal)  ADULT ORAL NUTRITION SUPPLEMENT Dinner, Breakfast; Diabetic Supplement  Passing flatus: YES  Tolerating current diet: YES       Pain Management:   Patient states pain is manageable on current regimen: YES    Skin:  Basilio Score: 13  Interventions: turn team    Patient Safety:  Fall Score:  Total Score: 3  Interventions: bed/chair alarm  High Fall Risk: Yes    Length of Stay:  Expected LOS: 5d 7h  Actual LOS: Maurice Meneses RN

## 2022-11-30 NOTE — CONSULTS
Palliative Medicine Consult  [place]: [phone])    Patient Name: Adair Maynard  YOB: 1945    Date of Initial Consult: 11/30/22  Reason for Consult: end stage disease   Requesting Provider: Karolyn Gurrola MD   Primary Care Physician: Fe Patiño MD     SUMMARY:   Adair Maynard is a 65 y.o. 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 She is awaiting ID input . Current medical issues leading to Palliative Medicine involvement include: debility , sacral osteomyelitis, malnutrition , support care decisions  Social hx : lives with her  and son , her daughter Pravin Foster lives in [place], has been staying with her to help, she is bed bound total care .  is legal next of kin   PALLIATIVE DIAGNOSES:   Debility( late effect of CVA, multiple pressure injuries)  Malnutrition ( albumin 1.7 )  Sacral osteomyelitis   Palliative care encounter  Goals of care   DNR        PLAN:   Patient is alert , confused. I left a message with her  , he did not call back until the end of the day . I spoke to her daughter Parvin Foster ( she is  in [place] ). Pravin Foster shared she is closely involved in patient care and patient  relies on her to help with medical decisions on behalf of patient . Debility: Pravin Foster shared patient lately is getting weaker and weaker unable to hold a cup , her memory is declining she is on and off confused.   Pravin Foster understands her current medical issues, specifically , unlikely wounds will heal completely , we talked about side effects of antibiotics,  family wants to give her chance to recover , goal is transfer to rehab . We talked about hospice if she continues to decline requiring repeated trips to hospital .  DNR : Armida Slater affirms DNR congruent with patient wishes , we discussed importance of portable DNR/DDNR, we agreed our team  will call patient  to follow up on signing a DDNR. For now goals are clear for active treatment and d/c to rehab . We are available peripherally . Thank you for allowing us to be part of Ms Alda Low' care . Initial consult note routed to primary continuity provider and/or primary health care team members  Communicated plan of care with: Palliative IDTBasia 192 Team     GOALS OF CARE / TREATMENT PREFERENCES:     GOALS OF CARE:  Patient/Health Care Proxy Stated Goals: Rehabilitation    TREATMENT PREFERENCES:   Code Status: DNR    Patient and family's personal goals include: active treatment of medical issues     Important upcoming milestones or family events: The patient identifies the following as important for living well: unable to tell me because of altered mental status       Advance Care Planning:  [x] The [facility] Interdisciplinary Team has updated the ACP Navigator with [address] and Patient Capacity      Primary Decision Maker: Siddharth Rosario - [phone]  Advance Care Planning 7/28/2022   Patient's Healthcare Decision Maker is: Legal Next of Kin   Confirm Advance Directive None   Patient Would Like to Complete Advance Directive No       Medical Interventions:  (DNR)       Other:    As far as possible, the palliative care team has discussed with patient / health care proxy about goals of care / treatment preferences for patient.      HISTORY:     History obtained from: chart, daughter     CHIEF COMPLAINT: \" I have pain all over \"    HPI/SUBJECTIVE: The patient is:   [] Verbal and participatory  [x] Non-participatory due to: She only spoke one coherent sentence, noted above . Clinical Pain Assessment (nonverbal scale for severity on nonverbal patients):   Clinical Pain Assessment  Severity: 0          Duration: for how long has pt been experiencing pain (e.g., 2 days, 1 month, years)  Frequency: how often pain is an issue (e.g., several times per day, once every few days, constant)     FUNCTIONAL ASSESSMENT:     Palliative Performance Scale (PPS):  PPS: 30       PSYCHOSOCIAL/SPIRITUAL SCREENING:     Palliative IDT has assessed this patient for cultural preferences / practices and a referral made as appropriate to needs (Cultural Services, Patient Advocacy, Ethics, etc.)    Any spiritual / Denominational concerns:  [] Yes /  [x] No   If \"Yes\" to discuss with pastoral care during IDT     Does caregiver feel burdened by caring for their loved one:   [] Yes /  [x] No /  [] No Caregiver Present/Available [] No Caregiver [] Pt Lives at Facility  If \"Yes\" to discuss with social work during IDT    Anticipatory grief assessment:   [x] Normal  / [] Maladaptive     If \"Maladaptive\" to discuss with social work during IDT    ESAS Anxiety:      ESAS Depression:          REVIEW OF SYSTEMS:     Positive and pertinent negative findings in ROS are noted above in HPI. The following systems were [] reviewed / [x] unable to be reviewed as noted in HPI  Other findings are noted below. Systems: constitutional, ears/nose/mouth/throat, respiratory, gastrointestinal, genitourinary, musculoskeletal, integumentary, neurologic, psychiatric, endocrine. Positive findings noted below.   Modified ESAS Completed by: provider           Pain: 0     Nausea: 0     Dyspnea: 0           Stool Occurrence(s): 1        PHYSICAL EXAM:     From RN flowsheet:  Wt Readings from Last 3 Encounters:   11/29/22 126 lb 8.7 oz (57.4 kg)   07/27/22 220 lb (99.8 kg)   06/01/22 220 lb (99.8 kg)     Blood pressure (!) 119/57, pulse 76, temperature 97.9 °F (36.6 °C), resp. rate 18, height 5' 1\" (1.549 m), weight 126 lb 8.7 oz (57.4 kg), SpO2 99 %.     Pain Scale 1: Numeric (0 - 10)  Pain Intensity 1: 4     Pain Location 1: Generalized     Pain Description 1: Aching  Pain Intervention(s) 1: Medication (see MAR)  Last bowel movement, if known:     Constitutional: frail, alert, speech is incoherent, does not engages in conversation , not in any distress  Eyes: pupils equal, anicteric  ENMT: no nasal discharge   Cardiovascular: regular rhythm, not able to palpate due mynor lymphedema   Respiratory: breathing not labored, symmetric  Gastrointestinal: soft non-tender, +bowel sounds  Musculoskeletal: no deformity, no tenderness to palpation  Skin: warm, dry, multiple pressure injuries (I have not examines, per wound care documentation ), lymph edema of both legs with thick folded skin   Neurologic: following  simple commands  Psychiatric: not able to evaluate because of patient factors   Other:       HISTORY:     Active Problems:    Sacral osteomyelitis ([address] (11/27/2022)    Past Medical History:   Diagnosis Date    Anticoagulant long-term use 10/13/2017    Anxiety 10/13/2017    Atrial fibrillation ([address] 10/13/2017    Breast calcification, left 10/13/2017    CAD (coronary artery disease)     Cellulitis of both lower extremities 10/13/2017    Chronic kidney disease     kidney stones    Chronic pain syndrome 10/13/2017    Chronic prescription opiate use 11/15/2017    CVA (cerebral vascular accident) ([address] 10/13/2017    Diabetes ([address]     DJD (degenerative joint disease) 10/13/2017    Dyslipidemia 10/13/2017    Glaucoma 10/13/2017    Hypertension     Hyperthyroidism 10/13/2017    Long-term use of high-risk medication 10/13/2017    Stasis ulcer of lower extremity ([address] 10/13/2017    Stroke ([address]     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 ([address] 8/18/2012    retinopathy       Past Surgical History:   Procedure Laterality Date HX BREAST BIOPSY Left     2014    HX GYN      hysterectomty    HX ORTHOPAEDIC      back surgery    CA CARDIAC SURG PROCEDURE UNLIST      cagb      Family History   Problem Relation Age of Onset    Heart Disease Father       History reviewed, no pertinent family history.   Social History     Tobacco Use    Smoking status: Never    Smokeless tobacco: Never   Substance Use Topics    Alcohol use: No     Allergies   Allergen Reactions    Betadine Prepstick Rash    Keflex [Cephalexin] Hives     Pt breaks out in hives      Current Facility-Administered Medications   Medication Dose Route Frequency    insulin glargine (LANTUS) injection 14 Units  14 Units SubCUTAneous QHS    vancomycin (VANCOCIN) 1,000 mg in 0.9% sodium chloride 250 mL ([id number])  1,000 mg IntraVENous Q24H    collagenase (SANTYL) 250 unit/gram ointment   Topical DAILY    alcohol 62% (NOZIN) nasal  1 Ampule  1 Ampule Topical Q12H    sodium hypochlorite (QUARTER STRENGTH DAKIN'S) 0.125% irrigation (bottle)   Topical BID    insulin lispro (HUMALOG) injection   SubCUTAneous AC&HS    piperacillin-tazobactam (ZOSYN) 3.375 g in 0.9% sodium chloride (MBP/ADV) 100 mL MBP  3.375 g IntraVENous Q8H    amLODIPine (NORVASC) tablet 10 mg  10 mg Oral DAILY    aspirin tablet 325 mg  325 mg Oral DAILY    carvediloL (COREG) tablet 12.5 mg  12.5 mg Oral BID    cloNIDine HCL (CATAPRES) tablet 0.1 mg  0.1 mg Oral BID    pravastatin (PRAVACHOL) tablet 80 mg  80 mg Oral QHS    sodium chloride (NS) flush 5-40 mL  5-40 mL IntraVENous Q8H    sodium chloride (NS) flush 5-40 mL  5-40 mL IntraVENous PRN    acetaminophen (TYLENOL) tablet 650 mg  650 mg Oral Q6H PRN    Or    acetaminophen (TYLENOL) suppository 650 mg  650 mg Rectal Q6H PRN    polyethylene glycol (MIRALAX) packet 17 g  17 g Oral DAILY PRN    ondansetron (ZOFRAN ODT) tablet 4 mg  4 mg Oral Q8H PRN    Or    ondansetron (ZOFRAN) injection 4 mg  4 mg IntraVENous Q6H PRN    oxyCODONE IR (ROXICODONE) tablet 5 mg  5 mg Oral Q4H PRN    glucose chewable tablet 16 g  4 Tablet Oral PRN    glucagon (GLUCAGEN) injection 1 mg  1 mg IntraMUSCular PRN    dextrose 10% infusion 0-250 mL  0-250 mL IntraVENous PRN          LAB AND IMAGING FINDINGS:     Lab Results   Component Value Date/Time    WBC 7.0 11/30/2022 03:55 AM    HGB 6.8 (L) 11/30/2022 03:55 AM    PLATELET 958 42/99/7565 03:55 AM     Lab Results   Component Value Date/Time    Sodium 143 11/30/2022 03:55 AM    Potassium 3.0 (L) 11/30/2022 03:55 AM    Chloride 107 11/30/2022 03:55 AM    CO2 30 11/30/2022 03:55 AM    BUN 10 11/30/2022 03:55 AM    Creatinine 0.37 (L) 11/30/2022 03:55 AM    Calcium 8.6 11/30/2022 03:55 AM    Magnesium 2.2 01/30/2019 05:00 PM      Lab Results   Component Value Date/Time    Alk. phosphatase 143 (H) 11/27/2022 04:08 PM    Protein, total 7.9 11/27/2022 04:08 PM    Albumin 1.7 (L) 11/27/2022 04:08 PM    Globulin 6.2 (H) 11/27/2022 04:08 PM     Lab Results   Component Value Date/Time    INR 3.2 (H) 01/02/2020 12:18 PM    Prothrombin time 30.4 (H) 01/02/2020 12:18 PM    aPTT 29.9 01/30/2019 04:39 PM      No results found for: IRON, FE, TIBC, IBCT, PSAT, FERR   No results found for: PH, PCO2, PO2  No components found for: [place]   Lab Results   Component Value Date/Time    CK 75 01/26/2022 04:10 PM    CK - MB 1.8 04/28/2015 06:29 PM                Total time: 70 mins  Counseling / coordination time, spent as noted above: 50 mins  > 50% counseling / coordination?: yes     Prolonged service was provided for  []30 min   []75 min in face to face time in the presence of the patient, spent as noted above. Time Start:   Time End:   Note: this can only be billed with 69845 (initial) or 33807 (follow up). If multiple start / stop times, list each separately.

## 2022-11-30 NOTE — PROGRESS NOTES
Physician Progress Note      Mili Contreras  CSN #:                  [id number]  :                       1945  ADMIT DATE:       2022 1:48 PM  DISCH DATE:  RESPONDING  PROVIDER #:        Autumn Bledsoe MD        QUERY TEXT:    Stage of Chronic Kidney Disease: Please provide further specificity, if known. Clinical indicators include:  Options provided:  -- Chronic kidney disease stage 1  -- Chronic kidney disease stage 2  -- Chronic kidney disease stage 3  -- Chronic kidney disease stage 3a  -- Chronic kidney disease stage 3b  -- Chronic kidney disease stage 4  -- Chronic kidney disease stage 5  -- Chronic kidney disease stage 5, requiring dialysis  -- End stage renal disease  -- Other - I will add my own diagnosis  -- Disagree - Not applicable / Not valid  -- Disagree - Clinically Unable to determine / Unknown        PROVIDER RESPONSE TEXT:    Provider dismissed this query because it was not applicable to the patient or not a valid query. no CKD          QUERY TEXT:    Patient admitted with Sepsis. Per 22 wound care PN , noted to also have pressure ulcer. If possible, please document in progress notes and discharge summary the location, present on admission status and stage of the pressure ulcer: The medical record reflects the following:  Risk Factors: Wound care RN PN \"Wound Care consult for this patient for the Sacrum wound that was present on admission\". Clinical Indicators:  Pt. Has many pressure injuries described below - both heels, both calfs, left hip, left scapula. Treatment: Wound care consult, Turn q 2 hours, float heels, encourage meals,  Envision on Versacare bed surface with Low air loss  Santyl collagenase ointment ordered for the calfs, the left hip and left scapula  can discontinue the Dakin solution. Use Sterile NS for all wound care.     Stage 1:  Non-blanchable erythema of intact skin  Stage 2:  Abrasion, Blister, Partial-thickness skin loss, with exposed dermis  Stage 3:  Full-thickness skin loss with damage or necrosis of subcutaneous tissue  Stage 4:  Full-thickness skin & soft tissue loss through to underlying muscle, tendon or bone  Unstageable: Obscured full-thickness skin & tissue loss  Options provided:  -- Deep tissue injury pressure ulcer of right heel, left heel and left upper back, present on admission  -- Deep tissue injury pressure ulcer of right heel, left heel and left upper back, not present on admission  -- Other - I will add my own diagnosis  -- Disagree - Not applicable / Not valid  -- Disagree - Clinically unable to determine / Unknown  -- Refer to Clinical Documentation Reviewer    PROVIDER RESPONSE TEXT:    This patient has a Deep tissue injury pressure ulcer of right heel, left heel and left upper back, which was present on admission. Query created by: Jodie Werner on 11/30/2022 10:35 AM      QUERY TEXT:    Patient admitted with Sepsis. Per 11/29/22 wound care PN, noted to also have pressure ulcer. If possible, please document in progress notes and discharge summary the location, present on admission status and stage of the pressure ulcer: The medical record reflects the following:  Risk Factors:11/28 Wound care RN PN \"Wound Care consult for this patient for the Sacrum wound that was present on admission\". Clinical Indicators:  Pt. Has many pressure injuries described below - both heels, both calves, left hip, left scapula. Treatment: Wound care consult, turn q 2 hours, float heels, encourage meals,  Envision on Versa care bed surface with Low air loss  Santyl collagenase ointment ordered for the calves, the left hip and left scapula  can discontinue the Dakin solution.  Use Sterile NS for all wound care      Stage 1:  Non-blanchable erythema of intact skin  Stage 2:  Abrasion, Blister, Partial-thickness skin loss, with exposed dermis  Stage 3:  Full-thickness skin loss with damage or necrosis of subcutaneous tissue  Stage 4:  Full-thickness skin & soft tissue loss through to underlying muscle, tendon or bone  Unstageable: Obscured full-thickness skin & tissue loss  Options provided:  -- Unstageable Pressure Ulcer right calf, left calf, and left hip present on admission  -- Unstageable Pressure Ulcer right calf, left calf, and left hip not present on admission  -- Other - I will add my own diagnosis  -- Disagree - Not applicable / Not valid  -- Disagree - Clinically unable to determine / Unknown  -- Refer to Clinical Documentation Reviewer    PROVIDER RESPONSE TEXT:    This patient has an Unstageable Pressure Ulcer right calf, left calf, and left hip that was present on admission.     Query created by: Kade Kellogg on 11/30/2022 10:39 AM      Electronically signed by:  Brad Red MD 11/30/2022 4:18 PM

## 2022-12-01 LAB
ANION GAP SERPL CALC-SCNC: 2 MMOL/L (ref 5–15)
BACTERIA SPEC CULT: ABNORMAL
BUN SERPL-MCNC: 12 MG/DL (ref 6–20)
BUN/CREAT SERPL: 17 (ref 12–20)
CALCIUM SERPL-MCNC: 8.4 MG/DL (ref 8.5–10.1)
CHLORIDE SERPL-SCNC: 113 MMOL/L (ref 97–108)
CO2 SERPL-SCNC: 30 MMOL/L (ref 21–32)
CREAT SERPL-MCNC: 0.69 MG/DL (ref 0.55–1.02)
ERYTHROCYTE [DISTWIDTH] IN BLOOD BY AUTOMATED COUNT: 14.5 % (ref 11.5–14.5)
GLUCOSE BLD STRIP.AUTO-MCNC: 127 MG/DL (ref 65–117)
GLUCOSE BLD STRIP.AUTO-MCNC: 294 MG/DL (ref 65–117)
GLUCOSE BLD STRIP.AUTO-MCNC: 354 MG/DL (ref 65–117)
GLUCOSE BLD STRIP.AUTO-MCNC: 355 MG/DL (ref 65–117)
GLUCOSE SERPL-MCNC: 148 MG/DL (ref 65–100)
GRAM STN SPEC: ABNORMAL
HCT VFR BLD AUTO: 23.1 % (ref 35–47)
HGB BLD-MCNC: 6.9 G/DL (ref 11.5–16)
MAGNESIUM SERPL-MCNC: 1.6 MG/DL (ref 1.6–2.4)
MCH RBC QN AUTO: 29.5 PG (ref 26–34)
MCHC RBC AUTO-ENTMCNC: 29.9 G/DL (ref 30–36.5)
MCV RBC AUTO: 98.7 FL (ref 80–99)
NRBC # BLD: 0 K/UL (ref 0–0.01)
NRBC BLD-RTO: 0 PER 100 WBC
PLATELET # BLD AUTO: 329 K/UL (ref 150–400)
PMV BLD AUTO: 9.3 FL (ref 8.9–12.9)
POTASSIUM SERPL-SCNC: 4.4 MMOL/L (ref 3.5–5.1)
RBC # BLD AUTO: 2.34 M/UL (ref 3.8–5.2)
SERVICE CMNT-IMP: ABNORMAL
SODIUM SERPL-SCNC: 145 MMOL/L (ref 136–145)
WBC # BLD AUTO: 7 K/UL (ref 3.6–11)

## 2022-12-01 PROCEDURE — 83735 ASSAY OF MAGNESIUM: CPT

## 2022-12-01 PROCEDURE — 92526 ORAL FUNCTION THERAPY: CPT

## 2022-12-01 PROCEDURE — 74011250636 HC RX REV CODE- 250/636: Performed by: STUDENT IN AN ORGANIZED HEALTH CARE EDUCATION/TRAINING PROGRAM

## 2022-12-01 PROCEDURE — 74011636637 HC RX REV CODE- 636/637: Performed by: NURSE PRACTITIONER

## 2022-12-01 PROCEDURE — 74011250636 HC RX REV CODE- 250/636: Performed by: INTERNAL MEDICINE

## 2022-12-01 PROCEDURE — 74011636637 HC RX REV CODE- 636/637: Performed by: INTERNAL MEDICINE

## 2022-12-01 PROCEDURE — 74011000250 HC RX REV CODE- 250: Performed by: SURGERY

## 2022-12-01 PROCEDURE — 74011250637 HC RX REV CODE- 250/637: Performed by: SURGERY

## 2022-12-01 PROCEDURE — 74011000250 HC RX REV CODE- 250: Performed by: STUDENT IN AN ORGANIZED HEALTH CARE EDUCATION/TRAINING PROGRAM

## 2022-12-01 PROCEDURE — 99231 SBSQ HOSP IP/OBS SF/LOW 25: CPT

## 2022-12-01 PROCEDURE — 65270000029 HC RM PRIVATE

## 2022-12-01 PROCEDURE — 94760 N-INVAS EAR/PLS OXIMETRY 1: CPT

## 2022-12-01 PROCEDURE — 74011000258 HC RX REV CODE- 258: Performed by: STUDENT IN AN ORGANIZED HEALTH CARE EDUCATION/TRAINING PROGRAM

## 2022-12-01 PROCEDURE — 80048 BASIC METABOLIC PNL TOTAL CA: CPT

## 2022-12-01 PROCEDURE — 36415 COLL VENOUS BLD VENIPUNCTURE: CPT

## 2022-12-01 PROCEDURE — 74011250637 HC RX REV CODE- 250/637: Performed by: STUDENT IN AN ORGANIZED HEALTH CARE EDUCATION/TRAINING PROGRAM

## 2022-12-01 PROCEDURE — 99233 SBSQ HOSP IP/OBS HIGH 50: CPT | Performed by: INTERNAL MEDICINE

## 2022-12-01 PROCEDURE — 85027 COMPLETE CBC AUTOMATED: CPT

## 2022-12-01 PROCEDURE — 82962 GLUCOSE BLOOD TEST: CPT

## 2022-12-01 RX ORDER — MAGNESIUM SULFATE 1 G/100ML
1 INJECTION INTRAVENOUS ONCE
Status: COMPLETED | OUTPATIENT
Start: 2022-12-01 | End: 2022-12-01

## 2022-12-01 RX ORDER — INSULIN LISPRO 100 [IU]/ML
5 INJECTION, SOLUTION INTRAVENOUS; SUBCUTANEOUS ONCE
Status: COMPLETED | OUTPATIENT
Start: 2022-12-01 | End: 2022-12-01

## 2022-12-01 RX ADMIN — Medication 7 UNITS: at 18:07

## 2022-12-01 RX ADMIN — CARVEDILOL 12.5 MG: 12.5 TABLET, FILM COATED ORAL at 08:58

## 2022-12-01 RX ADMIN — PRAVASTATIN SODIUM 80 MG: 40 TABLET ORAL at 21:55

## 2022-12-01 RX ADMIN — PIPERACILLIN AND TAZOBACTAM 3.38 G: 3; .375 INJECTION, POWDER, FOR SOLUTION INTRAVENOUS at 09:04

## 2022-12-01 RX ADMIN — ASPIRIN 325 MG ORAL TABLET 325 MG: 325 PILL ORAL at 08:57

## 2022-12-01 RX ADMIN — Medication: at 21:48

## 2022-12-01 RX ADMIN — PIPERACILLIN AND TAZOBACTAM 3.38 G: 3; .375 INJECTION, POWDER, FOR SOLUTION INTRAVENOUS at 18:06

## 2022-12-01 RX ADMIN — SODIUM CHLORIDE, PRESERVATIVE FREE 10 ML: 5 INJECTION INTRAVENOUS at 18:08

## 2022-12-01 RX ADMIN — SODIUM CHLORIDE, PRESERVATIVE FREE 10 ML: 5 INJECTION INTRAVENOUS at 05:50

## 2022-12-01 RX ADMIN — INSULIN GLARGINE 14 UNITS: 100 INJECTION, SOLUTION SUBCUTANEOUS at 21:48

## 2022-12-01 RX ADMIN — COLLAGENASE SANTYL: 250 OINTMENT TOPICAL at 18:12

## 2022-12-01 RX ADMIN — SODIUM CHLORIDE, PRESERVATIVE FREE 10 ML: 5 INJECTION INTRAVENOUS at 21:49

## 2022-12-01 RX ADMIN — Medication: at 09:00

## 2022-12-01 RX ADMIN — Medication 5 UNITS: at 21:48

## 2022-12-01 RX ADMIN — Medication 1 AMPULE: at 08:57

## 2022-12-01 RX ADMIN — MAGNESIUM SULFATE HEPTAHYDRATE 1 G: 1 INJECTION, SOLUTION INTRAVENOUS at 18:06

## 2022-12-01 RX ADMIN — PIPERACILLIN AND TAZOBACTAM 3.38 G: 3; .375 INJECTION, POWDER, FOR SOLUTION INTRAVENOUS at 02:12

## 2022-12-01 NOTE — PROGRESS NOTES
Spiritual Care Assessment/Progress Note  [facility]      NAME: Madhuri Rodriguez      MRN: [id number]  AGE: 65 y.o. SEX: female  Hinduism Affiliation: Sabianism   Language: English     12/1/2022     Total Time (in minutes): 5     Spiritual Assessment begun in [facility] 3 SURG TELE through conversation with:         []Patient        [] Family    [] Friend(s)        Reason for Consult: Palliative Care, Initial/Spiritual Assessment     Spiritual beliefs: (Please include comment if needed)     [] Identifies with a briseyda tradition:         [] Supported by a briseyda community:            [] Claims no spiritual orientation:           [] Seeking spiritual identity:                [] Adheres to an individual form of spirituality:           [x] Not able to assess:                           Identified resources for coping:      [] Prayer                               [] Music                  [] Guided Imagery     [] Family/friends                 [] Pet visits     [] Devotional reading                         [] Unknown     [] Other:                                             Interventions offered during this visit: (See comments for more details)    Patient Interventions: Initial visit           Plan of Care:     [] Support spiritual and/or cultural needs    [] Support AMD and/or advance care planning process      [] Support grieving process   [] Coordinate Rites and/or Rituals    [] Coordination with community clergy   [] No spiritual needs identified at this time   [] Detailed Plan of Care below (See Comments)  [] Make referral to Music Therapy  [] Make referral to Pet Therapy     [] Make referral to Addiction services  [] Make referral to [facility]  [] Make referral to Spiritual Care Partner  [] No future visits requested        [x] Contact Spiritual Care for further referrals     Comments: Attempted initial spiritual assessment with palliative pt in 3211. Pt awake, being attended to by staff.  Stepped away and will attempt follow-up. No family present. Contact Spiritual Care for any further referrals.  Ean Vegas M.Div, [facility]   Paging Service [phone])

## 2022-12-01 NOTE — PROGRESS NOTES
Palliative Medicine Consult  [place]: [phone] ([phone])    Patient Name: Jose Martin Clayton  YOB: 1945    Date of Initial Consult: 11/30/22  Reason for Consult: end stage disease   Requesting Provider: Marzena Oliveros MD   Primary Care Physician: Matthew Cabrera MD     SUMMARY:   Jose Martin Clayton is a 65 y.o. 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 She is awaiting ID input . Current medical issues leading to Palliative Medicine involvement include: debility , sacral osteomyelitis, malnutrition , support care decisions  Social hx : lives with her  and son , her daughter Huma Self lives in [place], has been staying with her to help, she is bed bound total care .  is legal next of kin   PALLIATIVE DIAGNOSES:   Debility( late effect of CVA, multiple pressure injuries)  Malnutrition ( albumin 1.7 )  Sacral osteomyelitis   Palliative care encounter  Goals of care   DNR discussion        PLAN:   Patient is alert , confused, no meanginful conversation . Met with hr  Mr Qiana Jefferson and patient son Kirt Garcia .  is legal next of kin. I reviewed Ms Qiana Jefferson condition in detail, I felt Mr Qiana Jefferson does not seem to grasp the severity of medical issues . We discuss hospice , he defers to their daughter  daughter Huma Self , because of her health care back ground and she is most involved in patient care .   I shared with Mr Qiana Jefferson, per my conversation with Huma Self yesterday, Huma Self is not ready for hospice and would like continue treatment of wound infection and transfer to skilled nursing facility to see some improvement in wound. I encouraged them to revisit hospice amongst themselves. DNR : patient has inpatient DNR order, her  signed DDNR, a copy given to him , another copy and original placed on chart . Goals are clear . We will sign off . Thank you for allowing us to be part of Ms Salomon Urban' care . Initial consult note routed to primary continuity provider and/or primary health care team members  Communicated plan of care with: Palliative IDT, Catarina Freitas Team     GOALS OF CARE / TREATMENT PREFERENCES:     GOALS OF CARE:  Patient/Health Care Proxy Stated Goals: Rehabilitation    TREATMENT PREFERENCES:   Code Status: DNR    Patient and family's personal goals include: active treatment of medical issues     Important upcoming milestones or family events: The patient identifies the following as important for living well: unable to tell me because of altered mental status       Advance Care Planning:  [x] [facility] Interdisciplinary Team has updated the ACP Navigator with [place] and Patient Capacity      Primary Decision Maker: Luz Arana - [phone]  Advance Care Planning 7/28/2022   Patient's Healthcare Decision Maker is: Legal Next of Kin   Confirm Advance Directive None   Patient Would Like to Complete Advance Directive No       Medical Interventions:  (DNAR)       Other:    As far as possible, the palliative care team has discussed with patient / health care proxy about goals of care / treatment preferences for patient. HISTORY:     History obtained from: chart, daughter     CHIEF COMPLAINT: some     HPI/SUBJECTIVE:    The patient is:   [] Verbal and participatory  [x] Non-participatory due to: She spoke few sentences not meaningful conversation.     Clinical Pain Assessment (nonverbal scale for severity on nonverbal patients):   Clinical Pain Assessment  Severity: 0          Duration: for how long has pt been experiencing pain (e.g., 2 days, 1 month, years)  Frequency: how often pain is an issue (e.g., several times per day, once every few days, constant)     FUNCTIONAL ASSESSMENT:     Palliative Performance Scale (PPS):  PPS: 30       PSYCHOSOCIAL/SPIRITUAL SCREENING:     Palliative IDT has assessed this patient for cultural preferences / practices and a referral made as appropriate to needs (Cultural Services, Patient Advocacy, Ethics, etc.)    Any spiritual / Christian concerns:  [] Yes /  [x] No   If \"Yes\" to discuss with pastoral care during IDT     Does caregiver feel burdened by caring for their loved one:   [] Yes /  [x] No /  [] No Caregiver Present/Available [] No Caregiver [] Pt Lives at Facility  If \"Yes\" to discuss with social work during IDT    Anticipatory grief assessment:   [x] Normal  / [] Maladaptive     If \"Maladaptive\" to discuss with social work during IDT    ESAS Anxiety:      ESAS Depression:          REVIEW OF SYSTEMS:     Positive and pertinent negative findings in ROS are noted above in HPI. The following systems were [] reviewed / [x] unable to be reviewed as noted in HPI  Other findings are noted below. Systems: constitutional, ears/nose/mouth/throat, respiratory, gastrointestinal, genitourinary, musculoskeletal, integumentary, neurologic, psychiatric, endocrine. Positive findings noted below. Modified ESAS Completed by: provider           Pain: 0     Nausea: 0     Dyspnea: 0           Stool Occurrence(s): 1        PHYSICAL EXAM:     From RN flowsheet:  Wt Readings from Last 3 Encounters:   11/29/22 126 lb 8.7 oz (57.4 kg)   07/27/22 220 lb (99.8 kg)   06/01/22 220 lb (99.8 kg)     Blood pressure (!) 145/67, pulse 97, temperature 98.1 °F (36.7 °C), resp. rate 16, height 5' 1\" (1.549 m), weight 126 lb 8.7 oz (57.4 kg), SpO2 98 %.     Pain Scale 1: Numeric (0 - 10)  Pain Intensity 1: 0     Pain Location 1: Generalized     Pain Description 1: Aching  Pain Intervention(s) 1: Medication (see MAR)  Last bowel movement, if known:     Constitutional: frail, alert, speech is incoherent, does not engages in conversation , not in any distress, spoke few sentences today .   Eyes: pupils equal, anicteric  ENMT: no nasal discharge   Cardiovascular: regular rhythm, not able to palpate due mynor lymphedema   Respiratory: breathing not labored, symmetric  Gastrointestinal: soft non-tender, +bowel sounds  Musculoskeletal: no deformity, no tenderness to palpation  Skin: warm, dry, multiple pressure injuries (per wound care documentation , I have not examined), lymph edema of both legs with thick folded skin   Neurologic: following  simple commands  Psychiatric: not able to evaluate because of patient factors   Other:       HISTORY:     Active Problems:    Sacral osteomyelitis ([address] (11/27/2022)    Past Medical History:   Diagnosis Date    Anticoagulant long-term use 10/13/2017    Anxiety 10/13/2017    Atrial fibrillation ([address] 10/13/2017    Breast calcification, left 10/13/2017    CAD (coronary artery disease)     Cellulitis of both lower extremities 10/13/2017    Chronic kidney disease     kidney stones    Chronic pain syndrome 10/13/2017    Chronic prescription opiate use 11/15/2017    CVA (cerebral vascular accident) ([address] 10/13/2017    Diabetes ([address]     DJD (degenerative joint disease) 10/13/2017    Dyslipidemia 10/13/2017    Glaucoma 10/13/2017    Hypertension     Hyperthyroidism 10/13/2017    Long-term use of high-risk medication 10/13/2017    Stasis ulcer of lower extremity ([address] 10/13/2017    Stroke ([address]     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   Social History     Tobacco Use    Smoking status: Never    Smokeless tobacco: Never   Substance Use Topics    Alcohol use: No     Allergies   Allergen Reactions    Betadine Prepstick Rash    Keflex [Cephalexin] Hives     Pt breaks out in hives      Current Facility-Administered Medications   Medication Dose Route Frequency    [START ON 12/2/2022] vancomycin (VANCOCIN) 1,000 mg in 0.9% sodium chloride 250 mL ([id number])  1,000 mg IntraVENous Q36H    [START ON 12/2/2022] Vancomycin - Draw level with 12/2 AM labs  1 Each Other ONCE    magnesium sulfate 1 g/100 ml IVPB (premix or compounded)  1 g IntraVENous ONCE    insulin glargine (LANTUS) injection 14 Units  14 Units SubCUTAneous QHS    collagenase (SANTYL) 250 unit/gram ointment   Topical DAILY    alcohol 62% (NOZIN) nasal  1 Ampule  1 Ampule Topical Q12H    sodium hypochlorite (QUARTER STRENGTH DAKIN'S) 0.125% irrigation (bottle)   Topical BID    insulin lispro (HUMALOG) injection   SubCUTAneous AC&HS    piperacillin-tazobactam (ZOSYN) 3.375 g in 0.9% sodium chloride (MBP/ADV) 100 mL MBP  3.375 g IntraVENous Q8H    amLODIPine (NORVASC) tablet 10 mg  10 mg Oral DAILY    aspirin tablet 325 mg  325 mg Oral DAILY    carvediloL (COREG) tablet 12.5 mg  12.5 mg Oral BID    cloNIDine HCL (CATAPRES) tablet 0.1 mg  0.1 mg Oral BID    pravastatin (PRAVACHOL) tablet 80 mg  80 mg Oral QHS    sodium chloride (NS) flush 5-40 mL  5-40 mL IntraVENous Q8H    sodium chloride (NS) flush 5-40 mL  5-40 mL IntraVENous PRN    acetaminophen (TYLENOL) tablet 650 mg  650 mg Oral Q6H PRN    Or    acetaminophen (TYLENOL) suppository 650 mg  650 mg Rectal Q6H PRN    polyethylene glycol (MIRALAX) packet 17 g  17 g Oral DAILY PRN    ondansetron (ZOFRAN ODT) tablet 4 mg  4 mg Oral Q8H PRN    Or    ondansetron (ZOFRAN) injection 4 mg  4 mg IntraVENous Q6H PRN    oxyCODONE IR (ROXICODONE) tablet 5 mg  5 mg Oral Q4H PRN    glucose chewable tablet 16 g  4 Tablet Oral PRN    glucagon (GLUCAGEN) injection 1 mg  1 mg IntraMUSCular PRN    dextrose 10% infusion 0-250 mL  0-250 mL IntraVENous PRN          LAB AND IMAGING FINDINGS:     Lab Results   Component Value Date/Time    WBC 7.0 12/01/2022 10:28 AM    HGB 6.9 (L) 12/01/2022 10:28 AM    PLATELET 821 89/47/2218 10:28 AM     Lab Results   Component Value Date/Time    Sodium 145 12/01/2022 03:30 AM    Potassium 4.4 12/01/2022 03:30 AM    Chloride 113 (H) 12/01/2022 03:30 AM    CO2 30 12/01/2022 03:30 AM    BUN 12 12/01/2022 03:30 AM    Creatinine 0.69 12/01/2022 03:30 AM    Calcium 8.4 (L) 12/01/2022 03:30 AM    Magnesium 1.6 12/01/2022 10:28 AM      Lab Results   Component Value Date/Time    Alk. phosphatase 143 (H) 11/27/2022 04:08 PM    Protein, total 7.9 11/27/2022 04:08 PM    Albumin 1.7 (L) 11/27/2022 04:08 PM    Globulin 6.2 (H) 11/27/2022 04:08 PM     Lab Results   Component Value Date/Time    INR 3.2 (H) 01/02/2020 12:18 PM    Prothrombin time 30.4 (H) 01/02/2020 12:18 PM    aPTT 29.9 01/30/2019 04:39 PM      No results found for: IRON, FE, TIBC, IBCT, PSAT, FERR   No results found for: PH, PCO2, PO2  No components found for: [place]   Lab Results   Component Value Date/Time    CK 75 01/26/2022 04:10 PM    CK - MB 1.8 04/28/2015 06:29 PM                Total time: 35 mins  Counseling / coordination time, spent as noted above: 25 mins  > 50% counseling / coordination?: yes     Prolonged service was provided for  []30 min   []75 min in face to face time in the presence of the patient, spent as noted above. Time Start:   Time End:   Note: this can only be billed with 13009 (initial) or 59425 (follow up). If multiple start / stop times, list each separately.

## 2022-12-01 NOTE — PROGRESS NOTES
Comprehensive Nutrition Assessment    Type and Reason for Visit: Reassess    Nutrition Recommendations/Plan:   Continue diet and supplements as ordered  Please document % meals and supplements consumed in flowsheet I/O's under intake      Malnutrition Assessment:  Malnutrition Status:  Severe malnutrition (11/28/22 1609)    Context:  Chronic illness     Findings of the 6 clinical characteristics of malnutrition:   Energy Intake:  75% or less est energy requirements for 1 month or longer  Weight Loss:  Unable to assess     Body Fat Loss:  Mild body fat loss, Triceps   Muscle Mass Loss:  Severe muscle mass loss, Thigh (quadriceps)  Fluid Accumulation:  No significant fluid accumulation,     Strength:  Not performed     Nutrition Assessment:  Chart reviewed, pt sleeping soundly at time of visit. No family in the room. Noted untouched lunch tray at the bedside. RN flowsheet's she is consuming a little bit more than the past couple of days and she is consuming some of her supplements. Labs reviewed, WNL. If improvement in PO continues can forego any aggressive nutrition support measures. Will continue to monitor. Patient Vitals for the past 168 hrs:   % Diet Eaten   12/01/22 0954 26 - 50%   11/30/22 1821 26 - 50%   11/30/22 1259 1 - 25%   11/30/22 0827 1 - 25%   11/28/22 1816 1 - 25%   11/28/22 1337 1 - 25%     Patient Vitals for the past 168 hrs:   Supplement intake %   11/30/22 0827 76 - 100%          Nutrition Related Findings:    Meds: lantus, humalog, MagSO4, zosyn, vancomycin.    11/30 Wound Type: Stage II(L back), Stage IV(sacrum), Unstageable (heels, calfs, L hip)    Current Nutrition Intake & Therapies:  Average Meal Intake: 26-50%  Average Supplement Intake: %  ADULT DIET Dysphagia - Soft & Bite Sized; 4 carb choices (60 gm/meal)  ADULT ORAL NUTRITION SUPPLEMENT Dinner, Breakfast; Diabetic Supplement    Anthropometric Measures:  Height: 5' 1\" (154.9 cm)  Ideal Body Weight (IBW): 105 lbs (48 kg)     Current Body Wt:  57.4 kg (126 lb 8.7 oz), 120.5 % IBW. Bed scale  Current BMI (kg/m2): 23.9  Usual Body Weight: 99.8 kg (220 lb)  % Weight Change (Calculated): -42.5                    BMI Category: Normal weight (BMI 18.5-24. 9)    Estimated Daily Nutrient Needs:  Energy Requirements Based On: Formula  Weight Used for Energy Requirements: Current  Energy (kcal/day): MSJ 1550 (992 x 1.3 +250 for wt gain)  Weight Used for Protein Requirements: Current  Protein (g/day): 92g (1.6gPro/kg)  Method Used for Fluid Requirements: 1 ml/kcal  Fluid (ml/day): 1550mL    Nutrition Diagnosis:   Unintended weight loss related to cognitive or neurological impairment, inadequate protein-energy intake as evidenced by weight loss greater than or equal to 20% in 1 year, moderate muscle loss  Previous dx continues. Nutrition Interventions:   Food and/or Nutrient Delivery: Continue current diet, Continue oral nutrition supplement  Nutrition Education/Counseling: No recommendations at this time  Coordination of Nutrition Care: Continue to monitor while inpatient       Goals:  Previous Goal Met: Progressing toward goal(s)  Goals: PO intake 50% or greater, by next RD assessment       Nutrition Monitoring and Evaluation:   Behavioral-Environmental Outcomes: None identified  Food/Nutrient Intake Outcomes: Food and nutrient intake, Supplement intake  Physical Signs/Symptoms Outcomes: Biochemical data, Nutrition focused physical findings, Skin, Weight    Discharge Planning:     Too soon to determine    Geraldine Michaels RD, CNSC  Contact: ext [phone]

## 2022-12-01 NOTE — PROGRESS NOTES
Problem: Dysphagia (Adult)  Goal: *Acute Goals and Plan of Care (Insert Text)  Description: 11/28/2022  Speech path goals  1. Patient will tolerate soft/bite size diet with thins with no overt s/s of aspiration. Outcome: Progressing Towards Goal     SPEECH LANGUAGE PATHOLOGY DYSPHAGIA TREATMENT  Patient: Madhuri Rodriguez (53 y.o. female)  Date: 12/1/2022  Diagnosis: [facility]ral osteomyelitis ([facility] [M46.28] <principal problem not specified>  Procedure(s) (LRB):  debridement sacral ulcer (N/A) 4 Days Post-Op  Precautions: Aspiration      ASSESSMENT:  Patient seen for dysphagia therapy. Patient agreeable to PO trials of thin liquids and purees; she declined solids despite encouragement. Oral phase was functional for both both consistencies. Previous assessment revealed mild oral deficits for solids. Pharyngeal phase revealed audible swallow but no overt s/s of aspiration with either consistency, including with system stress via large sequential straw sips. RN reported patient has been tolerating diet but has variable volume of intake. Recommend patient continue PO diet of soft/bite-sized with thin liquids + feeding assistance and aspiration precautions. SLP will continue to follow. PLAN:  Recommendations and Planned Interventions:  Continue soft/bite-sized diet with thin liquids  Medications orally as tolerated, prefers in purees  Oral care via standard toothbrush 2-3x/daily  Aspiration precautions: Upright to 90 degrees for PO intake, slow rate, small bites/sips, alternate liquids/solids, check for oral pocketing, remain upright for 30 minutes following meals  Total feeding assistance and supervision for PO intake  SLP will follow for dysphagia management    Patient continues to benefit from skilled intervention to address the above impairments. Continue treatment per established plan of care. Discharge Recommendations: To Be Determined     SUBJECTIVE:   Patient stated I'm cold.     OBJECTIVE:   Cognitive and Communication Status:  Neurologic State: Alert  Orientation Level: Oriented to person  Cognition: Decreased attention/concentration, Follows commands  Perception: Appears intact  Perseveration: Perseverative throughout session on temperature     Dysphagia Treatment:  Oral Assessment:  P.O. Trials:  Patient Position: Partially upright in bed  Vocal quality prior to P.O.: No impairment  Consistency Presented: Thin liquid;Puree  How Presented: SLP-fed/presented;Straw;Spoon  Bolus Acceptance: No impairment  Bolus Formation/Control: No impairment  Propulsion: No impairment  Oral Residue: None  Initiation of Swallow: No impairment  Laryngeal Elevation: Present  Aspiration Signs/Symptoms: None  Pharyngeal Phase Characteristics: Audible swallow     After treatment:   Patient left in no apparent distress in bed, Call bell within reach, and Nursing notified    COMMUNICATION/EDUCATION:     The patient's plan of care including recommendations, planned interventions, and recommended diet were discussed with: Registered nurse and Patient. Patient will benefit from reinforcement given cognitive impairment.      Rafael Scheuermann, SLP

## 2022-12-01 NOTE — PROGRESS NOTES
Care Management:    Transition of Care Plan:     RUR: 14%  Disposition: SNF  Transportation: BLS    Per IDR rounds, patient will likely be medically ready for discharge tomorrow. Will need to obtain SNF choice and send referrals by Friday. CM yesterday gave jefe Holly list of SNF. Daughter to review list and notify CM of her preference. She sent e-mail stating preferences are [facility], [facility], [facility], and [facility]. These are all inpatient rehab (IPR) facilities. Patient does not meet criteria for IPR. 16:56 Called livan Holly ([phone]) and left voicemail. Explained we did receive her preferences. Explained preferences are IPR and patient does not meet criteria and that we sill need preferences from the list of skilled nursing facilities that previous CM sent. Explained these facilities are also on the Medicare. gov web-site. Asked her to call this CM today by 5:30 PM or the covering CM tomorrow at same number regarding her preferences for SNF.      ISIDRO He

## 2022-12-01 NOTE — PROGRESS NOTES
Problem: Risk for Spread of Infection  Goal: Prevent transmission of infectious organism to others  Description: Prevent the transmission of infectious organisms to other patients, staff members, and visitors.   Outcome: Progressing Towards Goal  Note:   Verify correct isolation order has been placed for confirmed or suspected   infection  Proper isolation precautions placed on patient   door  Proper PPE utilized by all who enter patient's   room  Proper hand   hygiene  Proper decontamination of surfaces and shared   equipment/devices

## 2022-12-01 NOTE — PROGRESS NOTES
Hospitalist Progress Note    NAME: Moris Villalobos   :  1945   MRN:  [id number]     Admit date: 2022    Today's date: 22    PCP: Margarita Burris MD    Anticipated discharge date: 2022    Barriers:  IV antibiotics, wound care, LTC/SNF placement    Assessment / Plan:    Severe sepsis POA WBC 15.6, , RR 35, lactate 3.19  Proteus bacteremia POA  Necrotic sacral ulcer with extensive osteomyelitis POA  Lactic acidosis POA  Unstageable Multiple pressure injuries POA- R heel, LHeel, L upper back, R calf, L calf, L Hip  Debility  Prior CVA with residual left-sided deficits  CT abdomen/pelvis IMPRESSION  1. Large sacral decubitus ulcer with associated extensive osteomyelitis of the  distal sacrum and coccyx with associated erosive changes, as well as gas and  fluid containing collection within the posterior subcutaneous soft tissues  communicating with the skin surface as above, right larger than left. 2. Distended gallbladder with cholelithiasis, but no convincing CT evidence of  acute cholecystitis. General surgery consulted, OR on 2022   POSTOPERATIVE DIAGNOSIS:  Extensive septic sacral deep tissue injury with abscess. PROCEDURE PERFORMED:  Debridement of sacral deep tissue injury including skin, subcutaneous tissue and muscle.   Wound culture from [facility]   4+ [address]   GRAM STAIN   3+ GRAM POSITIVE 1 [facility]   Culture result:  PENDING P     Continue empiric Zosyn and vancomycin  BC with proteus  Wound care consulted  Continue Walker catheter to promote wound healing  Incentive spirometry to prevent atelectasis  Nutrition consulted for supplementation recommendations to assist in wound healing  PT/OT consulted  Discussed with family patient would likely require rehab facility if not long-term care, which they were agreeable to  Speech following  Consult ID in AM  Check follow cultures in Am     Diabetes mellitus type 2 POA   Hemoglobin A1c 6.8 this admission    Decreased lantus from 18 units to 14 units nightly now  Change SSI to QID  Add pre meal insulin pending on sugars     Hypomagnesemia 1.6 today  Hypokalemia- 4.4 today  BMP in AM  Replenish Mag IV 1 g x 1 today    UTI due to chronic indwelling Walker POA  UA nitrite +, > 100 WBC, 5 to 10 RBC, 2+ bacteremia  Urine culture with GNR  Walker care ordered  Continue IV antibiotics     Essential hypertension POA  Paroxysmal atrial fibrillation  Paroxsymal Vtachs noted  Hyperlipidemia POA  Continue PTA amlodipine, aspirin, carvedilol, clonidine, pravastatin  Replenish Lytes-- Mag as above  Pt is DNR     Dementia  Monitor for signs of delirium       Incidental findings requiring outpatient follow up/ evaluation:  -sclerosis noted in the bilateral femoral heads, suspicious for  avascular necrosis  -Gallbladder is distended and contains multiple stones, but  without gallbladder wall thickening or surrounding fat stranding. No  intrahepatic or extrahepatic biliary ductal dilatation     Code Status: DNR-confirmed with daughter and patient  Palliative consult appreciated-- goal of care is to cont aggressive care per pt/family-- plan for rehab     DVT Prophylaxis: Lovenox    Body mass index is 23.91 kg/m². Subjective:     Chief Complaint / Reason for Physician Visit  Opens eyes, not talking much. Discussed with RN events overnight.    \"I am ok\"  Alert, very confused  Not able to provide history due to Dementia  BC positive for Proteus    Review of Systems:  Symptom Y/N Comments  Symptom Y/N Comments   Fever/Chills    Chest Pain     Poor Appetite    Edema     Cough    Abdominal Pain     Sputum    Joint Pain     SOB/MARTINEZ    Headache     Nausea/vomit    Tolerating PT/OT     Diarrhea    Tolerating Diet     Constipation    Other       Could NOT obtain due to: Dementia     Objective:     VITALS:   Last 24hrs VS reviewed since prior progress note. Most recent are:  Patient Vitals for the past 24 hrs:   Temp Pulse Resp BP SpO2   12/01/22 0805 97.9 °F (36.6 °C) 84 16 (!) 141/58 98 %   12/01/22 0222 98.1 °F (36.7 °C) 86 16 (!) 144/57 98 %   11/30/22 2310 98.1 °F (36.7 °C) 88 18 131/68 99 %   11/30/22 2006 97.9 °F (36.6 °C) 83 20 138/61 98 %   11/30/22 1505 97.9 °F (36.6 °C) 76 18 (!) 119/57 --         Intake/Output Summary (Last 24 hours) at 12/1/2022 1351  Last data filed at 12/1/2022 0221  Gross per 24 hour   Intake 360 ml   Output 900 ml   Net -540 ml          Wt Readings from Last 12 Encounters:   11/29/22 57.4 kg (126 lb 8.7 oz)   07/27/22 99.8 kg (220 lb)   06/01/22 99.8 kg (220 lb)   05/05/22 99.8 kg (220 lb)   03/01/22 99.8 kg (220 lb)   01/26/22 99.8 kg (220 lb)   06/09/21 99.8 kg (220 lb)   06/01/21 93 kg (205 lb)   12/03/20 96.2 kg (212 lb)   10/01/20 96.2 kg (212 lb)   03/12/20 96.2 kg (212 lb)   06/21/19 96.2 kg (212 lb)       PHYSICAL EXAM:    I had a face to face encounter and independently examined this patient on 12/01/22 as outlined below:    General: WD, WN.lethargic, cooperative, no acute distress    EENT:  PERRL. Anicteric sclerae. MMM  Neck:  No meningismus, no thyromegaly  Resp:  CTA bilaterally, no wheezing or rales. No accessory muscle use  CV:  Regular  rhythm,  No edema  GI:  Soft, Non distended, Non tender. +Bowel sounds, no rebound  Neurologic:  Lethargic, confused, not talking much, non focal motor exam  Psych:   Not anxious nor agitated  Skin:  No rashes.   No jaundice, wound in sacrum bandaged, not viewed          Reviewed most current lab test results and cultures  YES  Reviewed most current radiology test results   YES  Review and summation of old records today    NO  Reviewed patient's current orders and MAR    YES  PMH/SH reviewed - no change compared to H&P  ________________________________________________________________________  Care Plan discussed with:    Comments   Patient x Family      RN x    Care Manager x    Consultant                       x Multidiciplinary team rounds were held today with , nursing, pharmacist and clinical coordinator. Patient's plan of care was discussed; medications were reviewed and discharge planning was addressed. ________________________________________________________________________  Total time: 26 mins    Comments   >50% of visit spent in counseling and coordination of care x    ________________________________________________________________________  Carlos A Mathur MD     Procedures: see electronic medical records for all procedures/Xrays and details which were not copied into this note but were reviewed prior to creation of Plan. LABS:  I reviewed today's most current labs and imaging studies.   Pertinent labs include:  Recent Labs     12/01/22  1028 11/30/22  0355 11/29/22  0420   WBC 7.0 7.0 11.2*   HGB 6.9* 6.8* 7.2*   HCT 23.1* 22.7* 24.2*    349 377       Recent Labs     12/01/22  1028 12/01/22  0330 11/30/22  0355 11/29/22  0420   NA  --  145 143 143   K  --  4.4 3.0* 3.3*   CL  --  113* 107 106   CO2  --  30 30 31   GLU  --  148* 82 143*   BUN  --  12 10 11   CREA  --  0.69 0.37* 0.36*   CA  --  8.4* 8.6 8.9   MG 1.6  --   --   --

## 2022-12-01 NOTE — PROGRESS NOTES
End of Shift Note    Bedside shift change report given to Julia Crespo (oncoming nurse) by Cara Fajardo RN (offgoing nurse). Report included the following information SBAR, Kardex, Procedure Summary, Intake/Output, MAR, and Recent Results    Shift worked:  7 pm - 7 am     Shift summary and any significant changes:     No changes   Concerns for physician to address:  No concerns   Zone phone for oncoming shift:   [phone]       Activity:  Activity Level: Bed Rest  Number times ambulated in hallways past shift: 0  Number of times OOB to chair past shift: 0    Cardiac:   Cardiac Monitoring: Yes      Cardiac Rhythm: Sinus Rhythm, Run PVCs, Atrial Fib, V Tachy (6 beats of vtach noted)    Access:  Current line(s): PIV     Genitourinary:   Urinary status: seymour    Respiratory:   O2 Device: None (Room air)  Chronic home O2 use?: NO  Incentive spirometer at bedside: NO       GI:  Last Bowel Movement Date: 11/30/22  Current diet:  ADULT DIET Dysphagia - Soft & Bite Sized; 4 carb choices (60 gm/meal)  ADULT ORAL NUTRITION SUPPLEMENT Dinner, Breakfast; Diabetic Supplement  Passing flatus: YES  Tolerating current diet: YES       Pain Management:   Patient states pain is manageable on current regimen: N/A    Skin:  Basilio Score: 13  Interventions: float heels, increase time out of bed, and PT/OT consult    Patient Safety:  Fall Score:  Total Score: 2  Interventions: assistive device (walker, cane, etc)  High Fall Risk: Yes    Length of Stay:  Expected LOS: 5d 7h  Actual LOS: [address] [facility] [address], RN

## 2022-12-01 NOTE — PROGRESS NOTES
Pharmacy Antimicrobial Kinetic Dosing    Indication for Antimicrobials: Sepsis, Sacral wound infection + Osteomyelitis PTA    Current Regimen of Each Antimicrobial:  Zosyn 4.5 gm Iv x1 then 3.375 gm IV q 8h  (Start Date ; Day # 4)  Vancomycin 2500 mg IV x1 then pharmacy to dose (Start Date ; Day # 4)    Previous Antimicrobial Therapy: n/a    Goal Level: AUC: 400-600 mg/hr/Liter/day    Date Dose & Interval Measured (mcg/mL) Predicted AUC/FAUZIA   22 @0400 Inappropriately drawn > 50    22 @1207 Vanc 2,500mg load + 1,000mg x4 25.8            Date & time of next level: before  PM dose    Dosing calculator used: Eferio calcPirate3D    Significant Positive Cultures:    Blood = Proteus sp   Urine = Providencia stuartii >100,000   Anaerobic = moderate beta-lactamase producing anaerobic sp   Wound = heavy E. coli + heavy P. mirabilis   Blood = CNStaph  bottles        Labs:  Recent Labs     22  0330 22  0355 22  0420   CREA 0.69 0.37* 0.36*   BUN 12 10 11     Recent Labs     22  0355 22  0420   WBC 7.0 11.2*     Temp (24hrs), Av °F (36.7 °C), Min:97.9 °F (36.6 °C), Max:98.1 °F (36.7 °C)    Conditions for Dosing Consideration: extensive necrotic/infected sacral ulcer and multiple pressure injuries due to debility    Creatinine Clearance (mL/min):   CrCl (Adjusted Body Weight): 54.0 mL/min   If actual weight < IBW: CrCl (Actual Body Weight) 60.0    Impression/Plan:   Afebrile, WBCs downtrending, SCr rising  Extending dosing interval to Vancomycin 1,000 mg IV q36h for a projected AUC of 453 and trough of 11.7  Vancomycin level before next dose (ordered with  labs)  Supra-therapeutic level on , appropriately drawn  First supra-therapeutic level of >50 on  inappropriately drawn.    Daily BMP per Vancomycin dosing protocol    Antimicrobial stop date: to be determined     Pharmacy will follow daily and adjust medications as appropriate for renal function and/or serum levels.     Thank you,  CASE Mcconnell

## 2022-12-01 NOTE — DIABETES MGMT
[address] [facility]    CLINICAL NURSE SPECIALIST CONSULT     Initial Presentation   Niki Gay is a 65 y.o. female admitted from the ER 11/27/22 after large bed sore noted. Daughter reports patient was seen at [facility] in October for this and was recommended debridement at that time however patient decline. MRI from October visit showed sacral wound with possible osteomyelitis. She has not had any wound care set up since her d/c, daughter has been providing wound care. Daughter reports wound has increased in size and depth since discharge. Afebrile. Tachycardic. Hypertensive. 02 sats 100%  ER exam: See picture  Skin:     Findings: Wound present. Comments: Sacral wound: Stage 4 sacral pressure ulcer approx 12 in width x 6 in height with extensive surrounding necrotic tissue and erythema. LLE: Stage 3 pressure ulcer to posterior calf with necrotic tissue. RLE: Stage 3 pressure ulcer to posterior calf. CXR: No acute disease  Xray of TIB/FIB: Osteopenia  CT ABd:   1. Large sacral decubitus ulcer with associated extensive osteomyelitis of the   distal sacrum and coccyx with associated erosive changes, as well as gas and   fluid containing collection within the posterior subcutaneous soft tissues   communicating with the skin surface as above, right larger than left. 2. Distended gallbladder with cholelithiasis, but no convincing CT evidence of   acute cholecystitis. If there is clinical concern, consider ultrasound for   further evaluation.      HX:   Past Medical History:   Diagnosis Date    Anticoagulant long-term use 10/13/2017    Anxiety 10/13/2017    Atrial fibrillation ([address] 10/13/2017    Breast calcification, left 10/13/2017    CAD (coronary artery disease)     Cellulitis of both lower extremities 10/13/2017    Chronic kidney disease     kidney stones    Chronic pain syndrome 10/13/2017    Chronic prescription opiate use 11/15/2017    CVA (cerebral vascular accident) ([address] 10/13/2017 Diabetes ([facility]     DJD (degenerative joint disease) 10/13/2017    Dyslipidemia 10/13/2017    Glaucoma 10/13/2017    Hypertension     Hyperthyroidism 10/13/2017    Long-term use of high-risk medication 10/13/2017    Stasis ulcer of lower extremity ([facility] 10/13/2017    Stroke ([facility]     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 ([facility] 8/18/2012    retinopathy       INITIAL DX:   [facility] [M46.28]     Current Treatment     TX: 11/28/22 debridement sacral ulcer including skin subcutaneous tissue and muscle    Consulted by Provider for advanced diabetes nursing assessment and care for:   [] Transitioning off [place]   [x] Inpatient management strategy  [x] Home management assessment  [] Survival skill education    Hospital Course   Clinical progress has been complicated by large sacral wound  11/27/22 General surgery consult: 66 yo woman who has been bedridden for 2+ years, with severe deep sacral tissue injury with associated osteomyelitis documented for 2 months, declined debridement on initial presentation at [facility]. Daughter would now like surgical debridement for source control as her mother is now septic.  11/30/22 patient alert and oriented. Pleasant. Reports pain in sacral area. Nursing notified. Diabetes History   Patient is unable to provide any information at this time    Diabetes-related Medical History - Deferred    Diabetes Medication History  Key Antihyperglycemic Medications               insulin NPH (NovoLIN N NPH U-100 Insulin) 100 unit/mL injection (Taking) 5 Units by SubCUTAneous route Before breakfast and dinner. insulin lispro (HUMALOG) 100 unit/mL injection (Taking) As directed           Diabetes self-management practices: Deferred    Subjective   \"I think I'm doing a little better\"     Objective   Physical exam  General Normal weight female who is ill-appearing. Neuro  Alert and oriented.   Vital Signs Visit Vitals  BP (!) 141/58   Pulse 84   Temp 97.9 °F (36.6 °C)   Resp 16   Ht 5' 1\" (1.549 m)   Wt 57.4 kg (126 lb 8.7 oz)   SpO2 98%   BMI 23.91 kg/m²     Laboratory  Recent Labs     12/01/22  1028 12/01/22  0330 11/30/22  0355 11/29/22  0420   GLU  --  148* 82 143*   AGAP  --  2* 6 6   WBC 7.0  --  7.0 11.2*   CREA  --  0.69 0.37* 0.36*       Factors impacting BG management  Factor Dose Comments   Nutrition:  Dysphagia meals   60 grams/meal   Reports eating   Pain Oxycodone PRN    Infection Vanc Q12 hrs  Zosyn Q8 hrs    Other:   Kidney function  Liver function   Normal  Normal      Blood glucose pattern      Significant diabetes-related events over the past 24-72 hours  Admission   One dose of dexamethasone 11/27/22  On corrective insulin => Bgs in 200s    Assessment and Plan   Nursing Diagnosis Risk for unstable blood glucose pattern   Nursing Intervention Domain 5252 Decision-making Support   Nursing Interventions Examined current inpatient diabetes/blood glucose control   Explored factors facilitating and impeding inpatient management  Explored corrective strategies with patient and responsible inpatient provider   Informed patient of rational for insulin strategy while hospitalized     Evaluation   This 65year old AA female was admitted with large sacral decubitus that was surgically debrided today. Bgs were elevated on admission which has required corrective insulin. Bgs are in the 200s. Recommend starting a basal corrective insulin approach. BG's slightly below goal on the current insulin dosing. Consider a reduction in the basal insulin dosing. BG's are stable today on 14 units Lantus. The patient reports feeling better today. She is eating some. Continue on the current regimen. Recommendations   Continue 14 units Lantus nightly.        Billing Code(s)   [x] 23830  subsequent hospital care - 15 minutes     Before making these care recommendations, I personally reviewed the hospitalization record, including notes, laboratory & diagnostic data and current medications, and examined the patient at the bedside (circumstances permitting) before making care recommendations. More than fifty (50) percent of the time was spent in patient counseling and/or care coordination.   Total minutes: 15 minutes    NIMO Batista  Diabetes Clinical Nurse Specialist  [facility]  Access via [employer]

## 2022-12-02 ENCOUNTER — HOSPICE ADMISSION (OUTPATIENT)
Dept: HOSPICE | Facility: HOSPICE | Age: 77
End: 2022-12-02
Payer: MEDICARE

## 2022-12-02 LAB
ANION GAP SERPL CALC-SCNC: 6 MMOL/L (ref 5–15)
BUN SERPL-MCNC: 11 MG/DL (ref 6–20)
BUN/CREAT SERPL: 19 (ref 12–20)
CALCIUM SERPL-MCNC: 8.8 MG/DL (ref 8.5–10.1)
CHLORIDE SERPL-SCNC: 110 MMOL/L (ref 97–108)
CO2 SERPL-SCNC: 30 MMOL/L (ref 21–32)
CREAT SERPL-MCNC: 0.59 MG/DL (ref 0.55–1.02)
ERYTHROCYTE [DISTWIDTH] IN BLOOD BY AUTOMATED COUNT: 14.4 % (ref 11.5–14.5)
GLUCOSE BLD STRIP.AUTO-MCNC: 129 MG/DL (ref 65–117)
GLUCOSE BLD STRIP.AUTO-MCNC: 181 MG/DL (ref 65–117)
GLUCOSE BLD STRIP.AUTO-MCNC: 253 MG/DL (ref 65–117)
GLUCOSE BLD STRIP.AUTO-MCNC: 257 MG/DL (ref 65–117)
GLUCOSE SERPL-MCNC: 128 MG/DL (ref 65–100)
HCT VFR BLD AUTO: 26.5 % (ref 35–47)
HGB BLD-MCNC: 8.3 G/DL (ref 11.5–16)
MCH RBC QN AUTO: 30.1 PG (ref 26–34)
MCHC RBC AUTO-ENTMCNC: 31.3 G/DL (ref 30–36.5)
MCV RBC AUTO: 96 FL (ref 80–99)
NRBC # BLD: 0 K/UL (ref 0–0.01)
NRBC BLD-RTO: 0 PER 100 WBC
PLATELET # BLD AUTO: 371 K/UL (ref 150–400)
PMV BLD AUTO: 9.2 FL (ref 8.9–12.9)
POTASSIUM SERPL-SCNC: 3.6 MMOL/L (ref 3.5–5.1)
RBC # BLD AUTO: 2.76 M/UL (ref 3.8–5.2)
SERVICE CMNT-IMP: ABNORMAL
SODIUM SERPL-SCNC: 146 MMOL/L (ref 136–145)
VANCOMYCIN SERPL-MCNC: 19.7 UG/ML
WBC # BLD AUTO: 7.8 K/UL (ref 3.6–11)

## 2022-12-02 PROCEDURE — 74011250637 HC RX REV CODE- 250/637: Performed by: SURGERY

## 2022-12-02 PROCEDURE — 85027 COMPLETE CBC AUTOMATED: CPT

## 2022-12-02 PROCEDURE — 74011636637 HC RX REV CODE- 636/637: Performed by: INTERNAL MEDICINE

## 2022-12-02 PROCEDURE — 87040 BLOOD CULTURE FOR BACTERIA: CPT

## 2022-12-02 PROCEDURE — 74011250636 HC RX REV CODE- 250/636: Performed by: STUDENT IN AN ORGANIZED HEALTH CARE EDUCATION/TRAINING PROGRAM

## 2022-12-02 PROCEDURE — 80202 ASSAY OF VANCOMYCIN: CPT

## 2022-12-02 PROCEDURE — 36415 COLL VENOUS BLD VENIPUNCTURE: CPT

## 2022-12-02 PROCEDURE — 65270000029 HC RM PRIVATE

## 2022-12-02 PROCEDURE — 82962 GLUCOSE BLOOD TEST: CPT

## 2022-12-02 PROCEDURE — 80048 BASIC METABOLIC PNL TOTAL CA: CPT

## 2022-12-02 PROCEDURE — 74011250637 HC RX REV CODE- 250/637: Performed by: STUDENT IN AN ORGANIZED HEALTH CARE EDUCATION/TRAINING PROGRAM

## 2022-12-02 PROCEDURE — 74011000250 HC RX REV CODE- 250: Performed by: STUDENT IN AN ORGANIZED HEALTH CARE EDUCATION/TRAINING PROGRAM

## 2022-12-02 PROCEDURE — 99223 1ST HOSP IP/OBS HIGH 75: CPT | Performed by: INTERNAL MEDICINE

## 2022-12-02 PROCEDURE — 99231 SBSQ HOSP IP/OBS SF/LOW 25: CPT

## 2022-12-02 PROCEDURE — 77010033678 HC OXYGEN DAILY

## 2022-12-02 PROCEDURE — 74011000258 HC RX REV CODE- 258: Performed by: STUDENT IN AN ORGANIZED HEALTH CARE EDUCATION/TRAINING PROGRAM

## 2022-12-02 PROCEDURE — 74011250636 HC RX REV CODE- 250/636: Performed by: INTERNAL MEDICINE

## 2022-12-02 PROCEDURE — 94760 N-INVAS EAR/PLS OXIMETRY 1: CPT

## 2022-12-02 RX ADMIN — ASPIRIN 325 MG ORAL TABLET 325 MG: 325 PILL ORAL at 09:49

## 2022-12-02 RX ADMIN — AMLODIPINE BESYLATE 10 MG: 5 TABLET ORAL at 09:49

## 2022-12-02 RX ADMIN — PIPERACILLIN AND TAZOBACTAM 3.38 G: 3; .375 INJECTION, POWDER, FOR SOLUTION INTRAVENOUS at 01:40

## 2022-12-02 RX ADMIN — PRAVASTATIN SODIUM 80 MG: 40 TABLET ORAL at 23:22

## 2022-12-02 RX ADMIN — CARVEDILOL 12.5 MG: 12.5 TABLET, FILM COATED ORAL at 09:49

## 2022-12-02 RX ADMIN — VANCOMYCIN HYDROCHLORIDE 1000 MG: 1 INJECTION, POWDER, LYOPHILIZED, FOR SOLUTION INTRAVENOUS at 10:00

## 2022-12-02 RX ADMIN — CLONIDINE HYDROCHLORIDE 0.1 MG: 0.1 TABLET ORAL at 09:49

## 2022-12-02 RX ADMIN — PIPERACILLIN AND TAZOBACTAM 3.38 G: 3; .375 INJECTION, POWDER, FOR SOLUTION INTRAVENOUS at 17:46

## 2022-12-02 RX ADMIN — CARVEDILOL 12.5 MG: 12.5 TABLET, FILM COATED ORAL at 17:46

## 2022-12-02 RX ADMIN — Medication 5 UNITS: at 23:00

## 2022-12-02 RX ADMIN — Medication 2 UNITS: at 16:30

## 2022-12-02 RX ADMIN — Medication: at 21:00

## 2022-12-02 RX ADMIN — SODIUM CHLORIDE, PRESERVATIVE FREE 10 ML: 5 INJECTION INTRAVENOUS at 14:31

## 2022-12-02 RX ADMIN — Medication 1 AMPULE: at 21:00

## 2022-12-02 RX ADMIN — Medication 1 AMPULE: at 09:00

## 2022-12-02 RX ADMIN — PIPERACILLIN AND TAZOBACTAM 3.38 G: 3; .375 INJECTION, POWDER, FOR SOLUTION INTRAVENOUS at 09:49

## 2022-12-02 RX ADMIN — Medication 5 UNITS: at 13:02

## 2022-12-02 RX ADMIN — SODIUM CHLORIDE, PRESERVATIVE FREE 10 ML: 5 INJECTION INTRAVENOUS at 05:51

## 2022-12-02 RX ADMIN — Medication: at 09:50

## 2022-12-02 RX ADMIN — SODIUM CHLORIDE, PRESERVATIVE FREE 10 ML: 5 INJECTION INTRAVENOUS at 23:22

## 2022-12-02 RX ADMIN — COLLAGENASE SANTYL: 250 OINTMENT TOPICAL at 09:50

## 2022-12-02 RX ADMIN — CLONIDINE HYDROCHLORIDE 0.1 MG: 0.1 TABLET ORAL at 17:46

## 2022-12-02 RX ADMIN — INSULIN GLARGINE 14 UNITS: 100 INJECTION, SOLUTION SUBCUTANEOUS at 23:43

## 2022-12-02 NOTE — PROGRESS NOTES
End of Shift Note    Bedside shift change report given to  Angela(oncoming nurse) by Isabel Mejias RN (offgoing nurse). Report included the following information SBAR, Kardex, Procedure Summary, Intake/Output, MAR, and Recent Results    Shift worked:  7 pm - 7 am       Shift summary and any significant changes:     No changes     Concerns for physician to address:  No concerns     Zone phone for oncoming shift:   [phone]       Activity:  Activity Level: Bed Rest  Number times ambulated in hallways past shift: 0  Number of times OOB to chair past shift: 0    Cardiac:   Cardiac Monitoring: Yes      Cardiac Rhythm: Sinus Rhythm, PAC    Access:  Current line(s): PIV     Genitourinary:   Urinary status: seymour    Respiratory:   O2 Device: Nasal cannula  Chronic home O2 use?: YES  Incentive spirometer at bedside: YES       GI:  Last Bowel Movement Date: 12/01/22  Current diet:  ADULT DIET Dysphagia - Soft & Bite Sized; 4 carb choices (60 gm/meal)  ADULT ORAL NUTRITION SUPPLEMENT Dinner, Breakfast; Diabetic Supplement  Passing flatus: YES  Tolerating current diet: YES       Pain Management:   Patient states pain is manageable on current regimen: YES    Skin:  Basilio Score: 12  Interventions: float heels and internal/external urinary devices    Patient Safety:  Fall Score:  Total Score: 3  Interventions: bed/chair alarm  High Fall Risk: Yes    Length of Stay:  Expected LOS: 5d 7h  Actual LOS: Pina Mccann RN

## 2022-12-02 NOTE — HOSPICE
[facility]  [phone]     Patient Name: Silvestre Elizabeth  YOB: 1945  Age: 65 y.o. [facility] RN Note:  Hospice consult received, reviewing chart. Will follow up with Unit Nurse and Care Manager to discuss plan of care, patient status and discharge disposition within the hour. Evaluated patient , tried to call daughter, left message. Patient is not GIP. Spoke with daughter reviewed levels of care and how we treat wound care. She said she is not ready for us now. Signing off       Thank you for the opportunity to be of service to this patient.    Rivera Pacheco RN  [phone]

## 2022-12-02 NOTE — PROGRESS NOTES
Transition of Care Plan:    RUR: 13% (low)  Disposition: SNFs pending. If SNF or IPR: Date FOC offered: 12/1/2022. Date FOC received: 12/2/2022. Date authorization started with reference number: N/A  Date authorization received and expires: N/A  Accepting facility: TBD. Follow up appointments: PCP/specialists if needed. DME needed: TBD. Transportation at Discharge: Anticipating [facility] will be needed. Keys or means to access home:  Patient has access. IM Medicare Letter: 2nd IM needed prior to discharge. Is patient a  and connected with the [place]? N/A            If yes, was [employer] transfer form completed and VA notified? N/A  Caregiver Contact: Valeria licona - [phone] - [email or web address]. Discharge Caregiver contacted prior to discharge? CM to contact. Care Conference needed?: No.                   RN provided CM with patient's 's information, Loyd Peñanicola - [phone]. CM spoke with patient's daughter, Demian Anderson, this morning regarding SNF choices. Demian Anderson said she receive list but is now interested in having patient transitioning to hospice, did not provide SNF options. Demian Anderson would like patient to receive inpatient hospice. CM explained that referral can be sent to [address] [place] [address] to speak with her and evaluate if patient is appropriate for inpatient hospice, Demian Anderson agreeable. Attending notified of the need for a hospice consult. Hospice consult placed and CM sent referral to [address] [place] [address]. Jammie contacted CM to inform that patient's daughter wants hospice. Per  Hospice's note, patient's daughter is not ready for hospice at this time. DTR provided CM with the following choices via email: Ellen Loza and [facility]. Referrals sent to [facility] and [address] [facility] via reanna and [facility] via [address] [facility].         SHANE Meeks, RN    Care Management  [phone]

## 2022-12-02 NOTE — DIABETES MGMT
[facility]    CLINICAL NURSE SPECIALIST CONSULT     Initial Presentation   Jacky Glez is a 65 y.o. female admitted from the ER 11/27/22 after large bed sore noted. Daughter reports patient was seen at [facility] in October for this and was recommended debridement at that time however patient decline. MRI from October visit showed sacral wound with possible osteomyelitis. She has not had any wound care set up since her d/c, daughter has been providing wound care. Daughter reports wound has increased in size and depth since discharge. Afebrile. Tachycardic. Hypertensive. 02 sats 100%  ER exam: See picture  Skin:     Findings: Wound present. Comments: Sacral wound: Stage 4 sacral pressure ulcer approx 12 in width x 6 in height with extensive surrounding necrotic tissue and erythema. LLE: Stage 3 pressure ulcer to posterior calf with necrotic tissue. RLE: Stage 3 pressure ulcer to posterior calf. CXR: No acute disease  Xray of TIB/FIB: Osteopenia  CT ABd:   1. Large sacral decubitus ulcer with associated extensive osteomyelitis of the   distal sacrum and coccyx with associated erosive changes, as well as gas and   fluid containing collection within the posterior subcutaneous soft tissues   communicating with the skin surface as above, right larger than left. 2. Distended gallbladder with cholelithiasis, but no convincing CT evidence of   acute cholecystitis. If there is clinical concern, consider ultrasound for   further evaluation.      HX:   Past Medical History:   Diagnosis Date    Anticoagulant long-term use 10/13/2017    Anxiety 10/13/2017    Atrial fibrillation ([address] 10/13/2017    Breast calcification, left 10/13/2017    CAD (coronary artery disease)     Cellulitis of both lower extremities 10/13/2017    Chronic kidney disease     kidney stones    Chronic pain syndrome 10/13/2017    Chronic prescription opiate use 11/15/2017    CVA (cerebral vascular accident) ([address] 10/13/2017 Diabetes ([facility]     DJD (degenerative joint disease) 10/13/2017    Dyslipidemia 10/13/2017    Glaucoma 10/13/2017    Hypertension     Hyperthyroidism 10/13/2017    Long-term use of high-risk medication 10/13/2017    Stasis ulcer of lower extremity ([facility] 10/13/2017    Stroke ([facility]     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 ([facility] 8/18/2012    retinopathy       INITIAL DX:   Sacral osteomyelitis ([facility] [M46.28]     Current Treatment     TX: 11/28/22 debridement sacral ulcer including skin subcutaneous tissue and muscle    Consulted by Provider for advanced diabetes nursing assessment and care for:   [] Transitioning off Malott Carrel   [x] Inpatient management strategy  [x] Home management assessment  [] Survival skill education    Hospital Course   Clinical progress has been complicated by large sacral wound  11/27/22 General surgery consult: 64 yo woman who has been bedridden for 2+ years, with severe deep sacral tissue injury with associated osteomyelitis documented for 2 months, declined debridement on initial presentation at [facility]. Daughter would now like surgical debridement for source control as her mother is now septic.  11/30/22 patient alert and oriented. Pleasant. Reports pain in sacral area. Nursing notified. Diabetes History   Patient is unable to provide any information at this time    Diabetes-related Medical History - Deferred    Diabetes Medication History  Key Antihyperglycemic Medications               insulin NPH (NovoLIN N NPH U-100 Insulin) 100 unit/mL injection (Taking) 5 Units by SubCUTAneous route Before breakfast and dinner. insulin lispro (HUMALOG) 100 unit/mL injection (Taking) As directed           Diabetes self-management practices: Deferred    Subjective   \"I'm not doing well at all\"     Objective   Physical exam  General Normal weight female who is ill-appearing. Neuro  Alert and oriented.   Vital Signs Visit Vitals  BP (!) 129/51   Pulse 80   Temp 97.5 °F (36.4 °C)   Resp 16   Ht 5' 1\" (1.549 m)   Wt 57.4 kg (126 lb 8.7 oz)   SpO2 100%   BMI 23.91 kg/m²     Laboratory  Recent Labs     12/02/22  0430 12/01/22  1028 12/01/22  0330 11/30/22  0355   *  --  148* 82   AGAP 6  --  2* 6   WBC 7.8 7.0  --  7.0   CREA 0.59  --  0.69 0.37*       Factors impacting BG management  Factor Dose Comments   Nutrition:  Dysphagia meals   60 grams/meal   Eating   Pain Oxycodone PRN    Infection Vanc Q12 hrs  Zosyn Q8 hrs    Other:   Kidney function  Liver function   Normal  Normal      Blood glucose pattern      Significant diabetes-related events over the past 24-72 hours  Admission   One dose of dexamethasone 11/27/22  On corrective insulin => Bgs in 200s    Assessment and Plan   Nursing Diagnosis Risk for unstable blood glucose pattern   Nursing Intervention Domain 5256 Decision-making Support   Nursing Interventions Examined current inpatient diabetes/blood glucose control   Explored factors facilitating and impeding inpatient management  Explored corrective strategies with patient and responsible inpatient provider   Informed patient of rational for insulin strategy while hospitalized     Evaluation   This 65year old AA female was admitted with large sacral decubitus that was surgically debrided today. Bgs were elevated on admission which has required corrective insulin. Bgs are in the 200s. Recommend starting a basal corrective insulin approach. BG's slightly below goal on the current insulin dosing. Consider a reduction in the basal insulin dosing. BG's are stable today on 14 units Lantus. The patient reports feeling better today. She is eating some. Continue on the current regimen. BG stable this morning. In reviewing BG trends, the patient's BG's rise tends to rise significantly after eating. However, the patient's appetite is unpredictable, so I do not recommend scheduled meal time insulin at this time. Recommendations   Continue 14 units Lantus nightly. Billing Code(s)   [x] 17097 IP subsequent hospital care - 15 minutes     Before making these care recommendations, I personally reviewed the hospitalization record, including notes, laboratory & diagnostic data and current medications, and examined the patient at the bedside (circumstances permitting) before making care recommendations. More than fifty (50) percent of the time was spent in patient counseling and/or care coordination.   Total minutes: 15 minutes    NIMO Marina  Diabetes Clinical Nurse Specialist  [facility]  Access via [employer]

## 2022-12-02 NOTE — PROGRESS NOTES
Pharmacy Antimicrobial Kinetic Dosing    Indication for Antimicrobials: Sepsis, Sacral wound infection + Osteomyelitis PTA    Current Regimen of Each Antimicrobial:  Zosyn 4.5 gm Iv x1 then 3.375 gm IV q 8h  (Start Date ; Day # 5)  Vancomycin 2500 mg IV x1 then pharmacy to dose (Start Date ; Day # 5)    Previous Antimicrobial Therapy: n/a    Goal Level: AUC: 400-600 mg/hr/Liter/day    Date Dose & Interval Measured (mcg/mL) Predicted AUC/FAUZIA   22 @0400 Inappropriately drawn > 50    22 @1207 Vanc 2,500mg load + 1,000mg x4 25.8     @ 0430 1000 mg Q36h 19.7      Date & time of next level: tbd    Dosing calculator used: [employer] calculator    Significant Positive Cultures:    Blood = Proteus sp   Urine = Providencia stuartii >100,000   Anaerobic = moderate beta-lactamase producing anaerobic sp   Wound = heavy E. coli + heavy P. mirabilis   Blood = CNStaph  bottles    Labs:  Recent Labs     22  0430 22  0330 22  0355   CREA 0.59 0.69 0.37*   BUN 11  10     Recent Labs     22  0430 22  1028 22  0355   WBC 7.8 7.0 7.0     Temp (24hrs), Av.8 °F (36.6 °C), Min:97.5 °F (36.4 °C), Max:98.1 °F (36.7 °C)    Conditions for Dosing Consideration: extensive necrotic/infected sacral ulcer and multiple pressure injuries due to debility    Creatinine Clearance (mL/min):   CrCl (Adjusted Body Weight): 54.0 mL/min   If actual weight < IBW: CrCl (Actual Body Weight) 60.0    Impression/Plan:   Afebrile, WBCs downtrending  Vancomycin level okay, continue with 1000 mg Q36H and watch SCr. Daily BMP per Vancomycin dosing protocol    Antimicrobial stop date: to be determined     Pharmacy will follow daily and adjust medications as appropriate for renal function and/or serum levels.     Thank you,  Hakeem Pereyra, PHARMD

## 2022-12-02 NOTE — CONSULTS
Infectious Disease Consult    Date of Consultation:  December 2, 2022  Reason for Consultation:sacral osteomyelitis, bacteremia  Referring Physician: Derek Sender  Date of Admission:11/27/22    Impression    Severe sepsis     Proteus, MRSE bacteremia   Blood cultures 11/27+ for P.mirabilis 1/2  Blood cultures 11/29+ for MRSE 1/2? Contaminant  Repeat cultures-ordered, pending      Necrotic sacral ulcer , acute osteomyelitis   CT abdomen/pelvis 11/27+ for Large sacral decubitus ulcer with associated extensive osteomyelitis of the  distal sacrum and coccyx with associated erosive changes, as well as gas and  fluid containing collection within the posterior subcutaneous soft tissues  communicating with the skin surface. S/p debridement by general surgery 11/28  Intra-Op cultures 11/28+ for heavy P mirabilis/E. Coli( pan S )  Bacteroides & Prevotella beta-lactamase+  Unstageable Multiple pressure injuries on R heel, LHeel, L upper back, R calf, L calf, L Hip      Catheter associated UTI chronic Walker  Urine culture 11/27+ for Providentia stuartii  On Zosyn IV. Diabetes mellitus  A1c 6.8    Advanced dementia      CRP-pending    Plan  Continue Zosyn IV x7 days end date 12/5. This will treat organisms on intraoperative cultures and providentia on UC  Thereafter switch to Unasyn from 12/6. Patient would require total of 6 weeks of antimicrobial therapy, wound care, pressure offloading, adequate nutrition  Repeat blood cultures x 2, if negative may DC vancomycin. MRSE is a probable contaminant. D/w Dr Schreiber  ID service to follow.           Daron Redman is a 65 y.o.  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 Per H&P patient had reportedly, patient developed sacral pressure injury in August/September that ulcerated and then progressively worsened to the point that patient was hospitalized at [facility] from 10/15-11/2. During that time patient was managed with IV antibiotics but refused any surgical debridement and was ultimately discharged home without antibiotics, infectious disease follow-up, or home health services. Since that time daughter reported that family had attempted to keep her clean and provide wound care as demonstrated by PCP who provided home visits. Unfortunately, patient had continued to decline  with extension of her ulcer, obvious necrotic tissue, and development of additional pressure injuries over her body prompting them to present to our facility with request for operative debridement. ROS otherwise negative. Patient reports no other complaints or concerns and denies tobacco, alcohol, or illicit drug use. Patient has been admitted to hospitalist service. S/p debridement by general surgery 11/28. Intra-Op cultures 11/28+ for heavy P mirabilis/E. Coli( pan S ),Bacteroides & Prevotella beta-lactamase+  ID service has been consulted for antibiotic recommendations. Patient seen today.   She is awake, nonverbal.  Not able to answer questions  D/w hospitalist    Active Hospital Problems    Diagnosis Date Noted    Sacral osteomyelitis ([address] 11/27/2022         Past Medical History:   Diagnosis Date    Anticoagulant long-term use 10/13/2017    Anxiety 10/13/2017    Atrial fibrillation ([address] 10/13/2017    Breast calcification, left 10/13/2017    CAD (coronary artery disease)     Cellulitis of both lower extremities 10/13/2017    Chronic kidney disease     kidney stones    Chronic pain syndrome 10/13/2017    Chronic prescription opiate use 11/15/2017    CVA (cerebral vascular accident) ([address] 10/13/2017    Diabetes ([address]     DJD (degenerative joint disease) 10/13/2017    Dyslipidemia 10/13/2017    Glaucoma 10/13/2017    Hypertension     Hyperthyroidism 10/13/2017    Long-term use of high-risk medication 10/13/2017    Stasis ulcer of lower extremity ([facility] 10/13/2017    Stroke ([facility]     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 ([facility] 8/18/2012    retinopathy        Past Surgical History:   Procedure Laterality Date    HX BREAST BIOPSY Left     2014    HX GYN      hysterectomty    HX ORTHOPAEDIC      back surgery    [place] CARDIAC SURG PROCEDURE UNLIST      cagb       Allergies   Allergen Reactions    Betadine Prepstick Rash    Keflex [Cephalexin] Hives     Pt breaks out in hives       Social Connections: Not on file       Family Status   Relation Name Status    Father  (Not Specified)       Medication Documentation Review Audit       Reviewed by Sunny Arriola RN (Registered Nurse) on 12/02/22 at 3627      Medication Sig Documenting Provider Last Dose Status Taking? amLODIPine (NORVASC) 10 mg tablet Take 1 Tablet by mouth daily. Jeancarlos Cuevas MD 11/27/2022 Active Yes   aspirin (ASPIRIN) 325 mg tablet Take 1 Tab by mouth daily. Jeancarlos Cuevas MD 11/27/2022 Active Yes   captopriL (CAPOTEN) 25 mg tablet TAKE 1 TABLET TWICE A DAY   Patient taking differently: 25 mg two (2) times a day. Jeancarlos Cuevas MD 11/27/2022 Active Yes   carvediloL (COREG) 12.5 mg tablet TAKE 1 TABLET TWICE A DAY Overmeyer, Richard Jacqulyne Kanner, MD 11/27/2022 Active Yes   cloNIDine HCL (CATAPRES) 0.1 mg tablet TAKE 1 TABLET THREE TIMES A DAY Daniel Lozano MD 11/27/2022 Active Yes   ergocalciferol (ERGOCALCIFEROL) 1,250 mcg (50,000 unit) capsule Take 50,000 Units by mouth every seven (7) days.  Provider, Historical 11/2/2022 Active Yes   furosemide (LASIX) 40 mg tablet TAKE 1 TABLET DAILY Jeancarlos Cuevas MD 11/27/2022 Active Yes   insulin lispro (HUMALOG) 100 unit/mL injection As directed   Patient taking differently: Sliding scale    Warren Paez MD 11/27/2022 Active Yes   insulin NPH (NovoLIN N NPH U-100 Insulin) 100 unit/mL injection 5 Units by SubCUTAneous route Before breakfast and dinner. Patient taking differently: 15 Units by SubCUTAneous route Before breakfast and dinner. Warren Paez MD 11/27/2022 Active Yes   pravastatin (PRAVACHOL) 80 mg tablet TAKE 1 TABLET NIGHTLY Radha Thompson MD 11/26/2022 Active Yes   rivaroxaban (Xarelto) 20 mg tab tablet Take 1 Tablet by mouth daily. Radha Thompson MD 11/26/2022 Active Yes   sodium hypochlorite (Dakin's Solution) external solution Apply to wounds twice daily and cover with dressing Leeann Lombardi [place] Unknown Active No                      Review of Systems -could not obtain due to patient factors      PHYSICAL EXAM:  General:          Awake, cooperative, no acute distress, nonverbalEENT:              EOMI. Anicteric sclerae. MMM  Resp:               CTA bilaterally, no wheezing or rales. No accessory muscle use  CV:                  Regular  rhythm,  No edema  GI:                   Soft, Non distended, Non tender. +Bowel sounds  Neurologic:      Alert and oriented X 3, normal speech,   Psych:             Good insight. Not anxious nor agitated  Skin:                No rashes. No jaundice.   Extremities: No cyanosis, edema    Marsha Cisneros MD [address]

## 2022-12-02 NOTE — PROGRESS NOTES
Hospitalist Progress Note    NAME: Nella Reynolds   :  1945   MRN:  [id number]     Admit date: 2022    Today's date: 22    PCP: Tavares Diaz MD    Anticipated discharge date: ?? Barriers:  wound care, LTC/SNF placement with Hospice care if pt not IP hospice candidate    Assessment / Plan:    Severe sepsis POA WBC 15.6, , RR 35, lactate 3.19  Proteus bacteremia POA  Necrotic sacral ulcer with extensive osteomyelitis POA  Lactic acidosis POA  Unstageable Multiple pressure injuries POA- R heel, LHeel, L upper back, R calf, L calf, L Hip  Debility  Prior CVA with residual left-sided deficits  CT abdomen/pelvis IMPRESSION  1. Large sacral decubitus ulcer with associated extensive osteomyelitis of the  distal sacrum and coccyx with associated erosive changes, as well as gas and  fluid containing collection within the posterior subcutaneous soft tissues  communicating with the skin surface as above, right larger than left. 2. Distended gallbladder with cholelithiasis, but no convincing CT evidence of  acute cholecystitis. General surgery consulted, OR on 2022   POSTOPERATIVE DIAGNOSIS:  Extensive septic sacral deep tissue injury with abscess. PROCEDURE PERFORMED:  Debridement of sacral deep tissue injury including skin, subcutaneous tissue and muscle.   Wound culture from [facility]   4+ [address] 69   GRAM STAIN   3+ [facility]   Culture result:  PENDING P     Continue empiric Zosyn and vancomycin  BC with proteus  Wound care consulted  Continue Walker catheter to promote wound healing  Incentive spirometry to prevent atelectasis  Nutrition consulted for supplementation recommendations to assist in wound healing  PT/OT consulted  Discussed with family patient would likely require rehab facility if not long-term care, which they were agreeable to  Speech following  Consult ID in AM  Check follow cultures in Am     Diabetes mellitus type 2 POA   Hemoglobin A1c 6.8 this admission    Cont Decreased lantus at 14 units nightly now  Change SSI to QID  Add pre meal insulin pending on sugars     Hypomagnesemia 1.6   Hypokalemia- 3.6 now    S/p Mag IV 1 g x 1 yesterday    UTI due to chronic indwelling Walker POA  UA nitrite +, > 100 WBC, 5 to 10 RBC, 2+ bacteremia  Urine culture with GNR  Walker care ordered  Continue IV antibiotics     Essential hypertension POA  Paroxysmal atrial fibrillation  Paroxsymal Vtachs noted  Hyperlipidemia POA  Continue PTA amlodipine, aspirin, carvedilol, clonidine, pravastatin  Replenish Lytes-- Mag as above  Pt is DNR     Dementia  Monitor for signs of delirium       Incidental findings requiring outpatient follow up/ evaluation:  -sclerosis noted in the bilateral femoral heads, suspicious for  avascular necrosis  -Gallbladder is distended and contains multiple stones, but  without gallbladder wall thickening or surrounding fat stranding. No  intrahepatic or extrahepatic biliary ductal dilatation     Code Status: DNR-confirmed with daughter and patient  Palliative consult appreciated-- Daughter initially wanted Aggressive care but has decided to consider Hospice and LTC placement today if not deemed IP Hospice candidate  IP Hospice eval ordered- CM working on it     DVT Prophylaxis: Lovenox    Body mass index is 23.91 kg/m². Subjective:     Chief Complaint / Reason for Physician Visit  Opens eyes, not talking much. Discussed with RN events overnight.    \"I am ok\"  Alert, very confused  Not able to provide history due to Dementia  BC positive for Proteus    Review of Systems:  Symptom Y/N Comments  Symptom Y/N Comments   Fever/Chills    Chest Pain     Poor Appetite    Edema     Cough    Abdominal Pain     Sputum    Joint Pain     SOB/MARTINEZ    Headache     Nausea/vomit    Tolerating PT/OT     Diarrhea Tolerating Diet     Constipation    Other       Could NOT obtain due to: Dementia     Objective:     VITALS:   Last 24hrs VS reviewed since prior progress note. Most recent are:  Patient Vitals for the past 24 hrs:   Temp Pulse Resp BP SpO2   12/02/22 1301 -- 80 16 (!) 129/51 100 %   12/02/22 0309 97.5 °F (36.4 °C) 94 19 (!) 164/66 100 %   12/01/22 2331 97.8 °F (36.6 °C) 97 19 (!) 148/67 100 %   12/01/22 2318 97.7 °F (36.5 °C) (!) 102 24 (!) 149/65 100 %   12/01/22 1928 98.1 °F (36.7 °C) 91 22 (!) 147/64 100 %   12/01/22 1536 98.1 °F (36.7 °C) 97 16 (!) 145/67 98 %         Intake/Output Summary (Last 24 hours) at 12/2/2022 1332  Last data filed at 12/2/2022 0412  Gross per 24 hour   Intake 240 ml   Output 4250 ml   Net -4010 ml          Wt Readings from Last 12 Encounters:   11/29/22 57.4 kg (126 lb 8.7 oz)   07/27/22 99.8 kg (220 lb)   06/01/22 99.8 kg (220 lb)   05/05/22 99.8 kg (220 lb)   03/01/22 99.8 kg (220 lb)   01/26/22 99.8 kg (220 lb)   06/09/21 99.8 kg (220 lb)   06/01/21 93 kg (205 lb)   12/03/20 96.2 kg (212 lb)   10/01/20 96.2 kg (212 lb)   03/12/20 96.2 kg (212 lb)   06/21/19 96.2 kg (212 lb)       PHYSICAL EXAM:    I had a face to face encounter and independently examined this patient on 12/02/22 as outlined below:    General: WD, WN.lethargic, cooperative, no acute distress    EENT:  PERRL. Anicteric sclerae. MMM  Neck:  No meningismus, no thyromegaly  Resp:  CTA bilaterally, no wheezing or rales. No accessory muscle use  CV:  Regular  rhythm,  No edema  GI:  Soft, Non distended, Non tender. +Bowel sounds, no rebound  Neurologic:  Lethargic, confused, not talking much, non focal motor exam  Psych:   Not anxious nor agitated  Skin:  No rashes.   No jaundice, wound in sacrum bandaged, not viewed          Reviewed most current lab test results and cultures  YES  Reviewed most current radiology test results   YES  Review and summation of old records today    NO  Reviewed patient's current orders and MAR    YES  PMH/SH reviewed - no change compared to H&P  ________________________________________________________________________  Care Plan discussed with:    Comments   Patient x    Family      RN x    Care Manager x Freda Hamilton   Consultant  x Dr Marilu Shoemaker (ID)                    x Multidiciplinary team rounds were held today with , nursing, pharmacist and clinical coordinator. Patient's plan of care was discussed; medications were reviewed and discharge planning was addressed. ________________________________________________________________________  Total time: 16 mins    Comments   >50% of visit spent in counseling and coordination of care x    ________________________________________________________________________  Grant Germain MD     Procedures: see electronic medical records for all procedures/Xrays and details which were not copied into this note but were reviewed prior to creation of Plan. LABS:  I reviewed today's most current labs and imaging studies.   Pertinent labs include:  Recent Labs     12/02/22  0430 12/01/22  1028 11/30/22  0355   WBC 7.8 7.0 7.0   HGB 8.3* 6.9* 6.8*   HCT 26.5* 23.1* 22.7*    329 349       Recent Labs     12/02/22  0430 12/01/22  1028 12/01/22  0330 11/30/22  0355   *  --  145 143   K 3.6  --  4.4 3.0*   *  --  113* 107   CO2 30  --  30 30   *  --  148* 82   BUN 11  --  12 10   CREA 0.59  --  0.69 0.37*   CA 8.8  --  8.4* 8.6   MG  --  1.6  --   --

## 2022-12-03 LAB
GLUCOSE BLD STRIP.AUTO-MCNC: 137 MG/DL (ref 65–117)
GLUCOSE BLD STRIP.AUTO-MCNC: 173 MG/DL (ref 65–117)
GLUCOSE BLD STRIP.AUTO-MCNC: 179 MG/DL (ref 65–117)
GLUCOSE BLD STRIP.AUTO-MCNC: 86 MG/DL (ref 65–117)
SERVICE CMNT-IMP: ABNORMAL
SERVICE CMNT-IMP: NORMAL

## 2022-12-03 PROCEDURE — 74011250637 HC RX REV CODE- 250/637: Performed by: STUDENT IN AN ORGANIZED HEALTH CARE EDUCATION/TRAINING PROGRAM

## 2022-12-03 PROCEDURE — 74011250636 HC RX REV CODE- 250/636: Performed by: STUDENT IN AN ORGANIZED HEALTH CARE EDUCATION/TRAINING PROGRAM

## 2022-12-03 PROCEDURE — 74011250636 HC RX REV CODE- 250/636: Performed by: INTERNAL MEDICINE

## 2022-12-03 PROCEDURE — 74011000250 HC RX REV CODE- 250: Performed by: STUDENT IN AN ORGANIZED HEALTH CARE EDUCATION/TRAINING PROGRAM

## 2022-12-03 PROCEDURE — 74011000258 HC RX REV CODE- 258: Performed by: STUDENT IN AN ORGANIZED HEALTH CARE EDUCATION/TRAINING PROGRAM

## 2022-12-03 PROCEDURE — 82962 GLUCOSE BLOOD TEST: CPT

## 2022-12-03 PROCEDURE — 74011636637 HC RX REV CODE- 636/637: Performed by: INTERNAL MEDICINE

## 2022-12-03 PROCEDURE — 77010033678 HC OXYGEN DAILY

## 2022-12-03 PROCEDURE — 65270000029 HC RM PRIVATE

## 2022-12-03 PROCEDURE — 74011250637 HC RX REV CODE- 250/637: Performed by: SURGERY

## 2022-12-03 PROCEDURE — 94760 N-INVAS EAR/PLS OXIMETRY 1: CPT

## 2022-12-03 RX ADMIN — Medication 2 UNITS: at 12:37

## 2022-12-03 RX ADMIN — PIPERACILLIN AND TAZOBACTAM 3.38 G: 3; .375 INJECTION, POWDER, FOR SOLUTION INTRAVENOUS at 02:38

## 2022-12-03 RX ADMIN — CARVEDILOL 12.5 MG: 12.5 TABLET, FILM COATED ORAL at 09:20

## 2022-12-03 RX ADMIN — SODIUM CHLORIDE, PRESERVATIVE FREE 10 ML: 5 INJECTION INTRAVENOUS at 06:43

## 2022-12-03 RX ADMIN — AMLODIPINE BESYLATE 10 MG: 5 TABLET ORAL at 09:20

## 2022-12-03 RX ADMIN — ASPIRIN 325 MG ORAL TABLET 325 MG: 325 PILL ORAL at 09:20

## 2022-12-03 RX ADMIN — SODIUM CHLORIDE, PRESERVATIVE FREE 10 ML: 5 INJECTION INTRAVENOUS at 22:37

## 2022-12-03 RX ADMIN — Medication 1 AMPULE: at 22:09

## 2022-12-03 RX ADMIN — Medication: at 22:09

## 2022-12-03 RX ADMIN — CARVEDILOL 12.5 MG: 12.5 TABLET, FILM COATED ORAL at 17:18

## 2022-12-03 RX ADMIN — INSULIN GLARGINE 7 UNITS: 100 INJECTION, SOLUTION SUBCUTANEOUS at 23:10

## 2022-12-03 RX ADMIN — VANCOMYCIN HYDROCHLORIDE 1000 MG: 1 INJECTION, POWDER, LYOPHILIZED, FOR SOLUTION INTRAVENOUS at 22:31

## 2022-12-03 RX ADMIN — CLONIDINE HYDROCHLORIDE 0.1 MG: 0.1 TABLET ORAL at 17:18

## 2022-12-03 RX ADMIN — PIPERACILLIN AND TAZOBACTAM 3.38 G: 3; .375 INJECTION, POWDER, FOR SOLUTION INTRAVENOUS at 09:20

## 2022-12-03 RX ADMIN — COLLAGENASE SANTYL: 250 OINTMENT TOPICAL at 09:21

## 2022-12-03 RX ADMIN — PIPERACILLIN AND TAZOBACTAM 3.38 G: 3; .375 INJECTION, POWDER, FOR SOLUTION INTRAVENOUS at 17:18

## 2022-12-03 RX ADMIN — ACETAMINOPHEN 650 MG: 325 TABLET ORAL at 22:06

## 2022-12-03 RX ADMIN — Medication 1 AMPULE: at 09:21

## 2022-12-03 RX ADMIN — SODIUM CHLORIDE, PRESERVATIVE FREE 10 ML: 5 INJECTION INTRAVENOUS at 17:18

## 2022-12-03 RX ADMIN — CLONIDINE HYDROCHLORIDE 0.1 MG: 0.1 TABLET ORAL at 09:20

## 2022-12-03 RX ADMIN — Medication: at 09:22

## 2022-12-03 RX ADMIN — PRAVASTATIN SODIUM 80 MG: 40 TABLET ORAL at 22:06

## 2022-12-03 NOTE — PROGRESS NOTES
End of Shift Note    Bedside shift change report given RN (oncoming nurse) by Amara Mg LPN (offgoing nurse). Report included the following information SBAR    Shift worked:  7p-7a     Shift summary and any significant changes:    Pt had several bowel movements throughout the night. No complaints of pain. Dressings are changed. Pt ran 8 beats of V-Tach at 0550, made NP aware     Concerns for physician to address:      Zone phone for oncoming shift:  [phone]       Activity:  Activity Level: Bed Rest  Number times ambulated in hallways past shift: 0  Number of times OOB to chair past shift: 0    Cardiac:   Cardiac Monitoring: No      Cardiac Rhythm: Sinus Rhythm, PAC    Access:  Current line(s): PIV     Genitourinary:   Urinary status: incontinent    Respiratory:   O2 Device: Nasal cannula  Chronic home O2 use?: NO  Incentive spirometer at bedside: YES       GI:  Last Bowel Movement Date: 12/01/22  Current diet:  ADULT DIET Dysphagia - Soft & Bite Sized; 4 carb choices (60 gm/meal)  ADULT ORAL NUTRITION SUPPLEMENT Dinner, Breakfast; Diabetic Supplement  Passing flatus: YES  Tolerating current diet: YES       Pain Management:   Patient states pain is manageable on current regimen: YES    Skin:  Basilio Score: 12  Interventions: speciality bed, foam dressing, and internal/external urinary devices    Patient Safety:  Fall Score:  Total Score: 3  Interventions: bed/chair alarm, assistive device (walker, cane, etc), gripper socks, and pt to call before getting OOB  High Fall Risk: Yes    Length of Stay:  Expected LOS: 5d 7h  Actual LOS: [address], LPN

## 2022-12-03 NOTE — PROGRESS NOTES
End of Shift Note    Bedside shift change report given to Katelin Tijerina (oncoming nurse) by Harry Mishra RN (offgoing nurse). Report included the following information SBAR, Kardex, Intake/Output, and Recent Results    Shift worked:  7am-7pm     Shift summary and any significant changes:    Multiple wounds dsg CDI, no c/o pain     Concerns for physician to address:  none     Zone phone for oncoming shift:   [phone]       Activity:  Activity Level: Bed Rest  Number times ambulated in hallways past shift: 0  Number of times OOB to chair past shift: 0    Cardiac:   Cardiac Monitoring: Yes      Cardiac Rhythm: Sinus Rhythm, PAC    Access:  Current line(s): midline     Genitourinary:   Urinary status: seymour    Respiratory:   O2 Device: Nasal cannula  Chronic home O2 use?: NO  Incentive spirometer at bedside: NO       GI:  Last Bowel Movement Date: 12/01/22  Current diet:  ADULT DIET Dysphagia - Soft & Bite Sized; 4 carb choices (60 gm/meal)  ADULT ORAL NUTRITION SUPPLEMENT Dinner, Breakfast; Diabetic Supplement  Passing flatus: YES  Tolerating current diet: YES       Pain Management:   Patient states pain is manageable on current regimen: N/A    Skin:  Basilio Score: 12  Interventions: float heels and internal/external urinary devices    Patient Safety:  Fall Score:  Total Score: 3  Interventions: gripper socks  High Fall Risk: Yes    Length of Stay:  Expected LOS: 5d 7h  Actual LOS: [address] Bowling RN

## 2022-12-03 NOTE — PROGRESS NOTES
Problem: Risk for Spread of Infection  Goal: Prevent transmission of infectious organism to others  Description: Prevent the transmission of infectious organisms to other patients, staff members, and visitors. Outcome: Progressing Towards Goal     Problem: Falls - Risk of  Goal: *Absence of Falls  Description: Document Sofia Fothergill Fall Risk and appropriate interventions in the flowsheet. Outcome: Progressing Towards Goal  Note: Fall Risk Interventions:  Mobility Interventions: Bed/chair exit alarm, Communicate number of staff needed for ambulation/transfer    Mentation Interventions: Adequate sleep, hydration, pain control, Bed/chair exit alarm    Medication Interventions: Bed/chair exit alarm, Evaluate medications/consider consulting pharmacy    Elimination Interventions: Bed/chair exit alarm, Toileting schedule/hourly rounds              Problem: Patient Education: Go to Patient Education Activity  Goal: Patient/Family Education  Outcome: Progressing Towards Goal     Problem: Diabetes Self-Management  Goal: *Disease process and treatment process  Description: Define diabetes and identify own type of diabetes; list 3 options for treating diabetes.   Outcome: Progressing Towards Goal

## 2022-12-03 NOTE — PROGRESS NOTES
Left voice mail message for patient-informed I did receive her message about taking ASA 81 mg daily. Informed I do have ASA 81 mg daily listed on her medication list. Informed to continue aspirin as long as she does not have any stomach or bleeding issues. Informed to call back with any questions.   Pharmacy Antimicrobial Kinetic Dosing    Indication for Antimicrobials: Sepsis, Sacral wound infection + Osteomyelitis PTA    Current Regimen of Each Antimicrobial:  Zosyn 4.5 gm Iv x1 then 3.375 gm IV q 8h  (Start Date ; Day # 6)  Vancomycin 2500 mg IV x1 then pharmacy to dose (Start Date ; Day # 6)    Previous Antimicrobial Therapy: n/a    Goal Level: AUC: 400-600 mg/hr/Liter/day    Date Dose & Interval Measured (mcg/mL) Predicted AUC/FAUZIA   22 @0400 Inappropriately drawn > 50    22 @1207 Vanc 2,500mg load + 1,000mg x4 25.8     @ 0430 1000 mg Q36h 19.7      Date & time of next level: tbd    Dosing calculator used: [employer] calculator    Significant Positive Cultures:    Blood = Proteus sp   Urine = Providencia stuartii >100,000   Anaerobic = moderate beta-lactamase producing anaerobic sp   Wound = heavy E. coli + heavy P. mirabilis   Blood = CNStaph  bottles    Labs:  Recent Labs     22  0430 22  0330   CREA 0.59 0.69   BUN      Recent Labs     22  0430 22  1028   WBC 7.8 7.0     Temp (24hrs), Av.7 °F (36.5 °C), Min:97.3 °F (36.3 °C), Max:98.1 °F (36.7 °C)    Conditions for Dosing Consideration: extensive necrotic/infected sacral ulcer and multiple pressure injuries due to debility    Creatinine Clearance (mL/min):   CrCl (Adjusted Body Weight): 54.0 mL/min   If actual weight < IBW: CrCl (Actual Body Weight) 60.0    Impression/Plan:   Afebrile, WBCs downtrending  Continue Zosyn Vancomycin level okay, continue with 1000 mg Q36H and watch SCr. Repeat level AM labs on   Daily BMP per Vancomycin dosing protocol    Antimicrobial stop date: to be determined     Pharmacy will follow daily and adjust medications as appropriate for renal function and/or serum levels.     Thank you,  Anna Lester, PHARMD

## 2022-12-03 NOTE — PROGRESS NOTES
Hospitalist Progress Note    NAME: Molly Rausch   :  1945   MRN:  [id number]     Admit date: 2022    Today's date: 22    PCP: Abi Nayak MD    Anticipated discharge date: ? Barriers:  wound care, LTC/SNF placement with Hospice care if pt not IP hospice candidate    Assessment / Plan:    Severe sepsis POA WBC 15.6, , RR 35, lactate 3.19  Proteus bacteremia POA  Necrotic sacral ulcer with extensive osteomyelitis POA  Lactic acidosis POA  Unstageable Multiple pressure injuries POA- R heel, LHeel, L upper back, R calf, L calf, L Hip  Debility  Prior CVA with residual left-sided deficits  CT abdomen/pelvis IMPRESSION  1. Large sacral decubitus ulcer with associated extensive osteomyelitis of the  distal sacrum and coccyx with associated erosive changes, as well as gas and  fluid containing collection within the posterior subcutaneous soft tissues  communicating with the skin surface as above, right larger than left. 2. Distended gallbladder with cholelithiasis, but no convincing CT evidence of  acute cholecystitis. General surgery consulted, OR on 2022   POSTOPERATIVE DIAGNOSIS:  Extensive septic sacral deep tissue injury with abscess. PROCEDURE PERFORMED:  Debridement of sacral deep tissue injury including skin, subcutaneous tissue and muscle.   Wound culture from [facility]   4+ [address] 69   GRAM STAIN   3+ GRAM POSITIVE 1 [facility]   Culture result:  PENDING P       Continue empiric Zosyn and vancomycin for now  [facility] with proteus  Wound care consulted  Continue Walker catheter to promote wound healing  Incentive spirometry to prevent atelectasis  Nutrition consulted for supplementation recommendations to assist in wound healing  PT/OT consulted-- needs LTC placement if family unable to care at home due to extensive wound care needed  Discussed with family patient would likely require rehab facility if not long-term care, Pt not IP Hospice candidate per eval 12/2 by hospice-- discussed with daughter-- aware, will likely need placement at LTC +/- Comfort care once arranged  Speech following  Consult ID appreciated-- recommends Zosyn till 12/5 then IV Unasyn for 6 weeks if plan is not comfort care, DC Vancomycin if repeat blood Cx (12/2) remains negative       Diabetes mellitus type 2 POA   Hemoglobin A1c 6.8 this admission    Cont Decreased lantus at 14 units nightly now  Change SSI to QID  Add pre meal insulin pending on sugars     Hypomagnesemia 1.6   Hypokalemia- 3.6 now    S/p Mag IV 1 g x 1 12/1    UTI due to chronic indwelling Walker POA  UA nitrite +, > 100 WBC, 5 to 10 RBC, 2+ bacteremia  Urine culture with GNR  Walker care ordered  Continue IV antibiotics as above per ID recc     Essential hypertension POA  Paroxysmal atrial fibrillation  Paroxsymal Vtachs noted  Hyperlipidemia POA  Continue PTA amlodipine, aspirin, carvedilol, clonidine, pravastatin  Replenish Lytes-- Mag as above  Pt is DNR     Dementia  Monitor for signs of delirium       Incidental findings requiring outpatient follow up/ evaluation:  -sclerosis noted in the bilateral femoral heads, suspicious for  avascular necrosis  -Gallbladder is distended and contains multiple stones, but  without gallbladder wall thickening or surrounding fat stranding. No  intrahepatic or extrahepatic biliary ductal dilatation     Code Status: DNR-confirmed with daughter and patient  Palliative consult appreciated-- Daughter initially wanted Aggressive care but has decided to consider Hospice- IP versus  LTC placement +/- Hospice if not deemed IP Hospice candidate  IP Hospice eval noted- pt not GIP candidate yet- CM working on alternative LTC placement options now with daughter     DVT Prophylaxis: Lovenox    Body mass index is 23.91 kg/m².       Subjective:     Chief Complaint / Vita Stokes for Physician Visit  Opens eyes, not talking much. Discussed with RN events overnight. \"I am ok\"  Alert, very confused  Not able to provide history due to Dementia  BC positive for Proteus    Review of Systems:  Symptom Y/N Comments  Symptom Y/N Comments   Fever/Chills    Chest Pain     Poor Appetite    Edema     Cough    Abdominal Pain     Sputum    Joint Pain     SOB/MARTINEZ    Headache     Nausea/vomit    Tolerating PT/OT     Diarrhea    Tolerating Diet     Constipation    Other       Could NOT obtain due to: Dementia     Objective:     VITALS:   Last 24hrs VS reviewed since prior progress note. Most recent are:  Patient Vitals for the past 24 hrs:   Temp Pulse Resp BP SpO2   12/03/22 0758 -- 78 18 (!) 149/64 100 %   12/03/22 0248 97.5 °F (36.4 °C) 75 19 (!) 150/66 100 %   12/02/22 2314 98.1 °F (36.7 °C) 72 22 (!) 138/56 100 %   12/02/22 2033 97.9 °F (36.6 °C) 75 18 112/63 100 %   12/02/22 1301 -- 80 16 (!) 129/51 100 %         Intake/Output Summary (Last 24 hours) at 12/3/2022 8921  Last data filed at 12/2/2022 2033  Gross per 24 hour   Intake --   Output 1700 ml   Net -1700 ml          Wt Readings from Last 12 Encounters:   11/29/22 57.4 kg (126 lb 8.7 oz)   07/27/22 99.8 kg (220 lb)   06/01/22 99.8 kg (220 lb)   05/05/22 99.8 kg (220 lb)   03/01/22 99.8 kg (220 lb)   01/26/22 99.8 kg (220 lb)   06/09/21 99.8 kg (220 lb)   06/01/21 93 kg (205 lb)   12/03/20 96.2 kg (212 lb)   10/01/20 96.2 kg (212 lb)   03/12/20 96.2 kg (212 lb)   06/21/19 96.2 kg (212 lb)       PHYSICAL EXAM:    I had a face to face encounter and independently examined this patient on 12/03/22 as outlined below:    General: WD, WN.lethargic, cooperative, no acute distress    EENT:  PERRL. Anicteric sclerae. MMM  Neck:  No meningismus, no thyromegaly  Resp:  CTA bilaterally, no wheezing or rales. No accessory muscle use  CV:  Regular  rhythm,  No edema  GI:  Soft, Non distended, Non tender.   +Bowel sounds, no rebound  Neurologic:  Lethargic, confused, not talking much, non focal motor exam  Psych:   Not anxious nor agitated  Skin:  No rashes. No jaundice, wound in sacrum bandaged, not viewed          Reviewed most current lab test results and cultures  YES  Reviewed most current radiology test results   YES  Review and summation of old records today    NO  Reviewed patient's current orders and MAR    YES  PMH/SH reviewed - no change compared to H&P  ________________________________________________________________________  Care Plan discussed with:    Comments   Patient x    Family      RN x    Care Manager x Weekend CM   Consultant  x Dr Jailene Law (ID) yesterday                    x Multidiciplinary team rounds were held today with , nursing, pharmacist and clinical coordinator. Patient's plan of care was discussed; medications were reviewed and discharge planning was addressed. ________________________________________________________________________  Total time: 16 mins    Comments   >50% of visit spent in counseling and coordination of care x    ________________________________________________________________________  Janie Torres MD     Procedures: see electronic medical records for all procedures/Xrays and details which were not copied into this note but were reviewed prior to creation of Plan. LABS:  I reviewed today's most current labs and imaging studies.   Pertinent labs include:  Recent Labs     12/02/22 0430 12/01/22  1028   WBC 7.8 7.0   HGB 8.3* 6.9*   HCT 26.5* 23.1*    329       Recent Labs     12/02/22  0430 12/01/22  1028 12/01/22  0330   *  --  145   K 3.6  --  4.4   *  --  113*   CO2 30  --  30   *  --  148*   BUN 11  --  12   CREA 0.59  --  0.69   CA 8.8  --  8.4*   MG  --  1.6  --

## 2022-12-04 LAB
ANION GAP SERPL CALC-SCNC: 4 MMOL/L (ref 5–15)
BACTERIA SPEC CULT: ABNORMAL
BUN SERPL-MCNC: 14 MG/DL (ref 6–20)
BUN/CREAT SERPL: 18 (ref 12–20)
CALCIUM SERPL-MCNC: 8.1 MG/DL (ref 8.5–10.1)
CHLORIDE SERPL-SCNC: 109 MMOL/L (ref 97–108)
CO2 SERPL-SCNC: 30 MMOL/L (ref 21–32)
CREAT SERPL-MCNC: 0.78 MG/DL (ref 0.55–1.02)
GLUCOSE BLD STRIP.AUTO-MCNC: 109 MG/DL (ref 65–117)
GLUCOSE BLD STRIP.AUTO-MCNC: 163 MG/DL (ref 65–117)
GLUCOSE BLD STRIP.AUTO-MCNC: 190 MG/DL (ref 65–117)
GLUCOSE BLD STRIP.AUTO-MCNC: 211 MG/DL (ref 65–117)
GLUCOSE SERPL-MCNC: 202 MG/DL (ref 65–100)
POTASSIUM SERPL-SCNC: 3.8 MMOL/L (ref 3.5–5.1)
SERVICE CMNT-IMP: ABNORMAL
SERVICE CMNT-IMP: NORMAL
SODIUM SERPL-SCNC: 143 MMOL/L (ref 136–145)

## 2022-12-04 PROCEDURE — 74011250637 HC RX REV CODE- 250/637: Performed by: STUDENT IN AN ORGANIZED HEALTH CARE EDUCATION/TRAINING PROGRAM

## 2022-12-04 PROCEDURE — 74011250636 HC RX REV CODE- 250/636: Performed by: STUDENT IN AN ORGANIZED HEALTH CARE EDUCATION/TRAINING PROGRAM

## 2022-12-04 PROCEDURE — 74011636637 HC RX REV CODE- 636/637: Performed by: INTERNAL MEDICINE

## 2022-12-04 PROCEDURE — 94760 N-INVAS EAR/PLS OXIMETRY 1: CPT

## 2022-12-04 PROCEDURE — 74011250637 HC RX REV CODE- 250/637: Performed by: SURGERY

## 2022-12-04 PROCEDURE — 77010033678 HC OXYGEN DAILY

## 2022-12-04 PROCEDURE — 74011000258 HC RX REV CODE- 258: Performed by: STUDENT IN AN ORGANIZED HEALTH CARE EDUCATION/TRAINING PROGRAM

## 2022-12-04 PROCEDURE — 74011000250 HC RX REV CODE- 250: Performed by: STUDENT IN AN ORGANIZED HEALTH CARE EDUCATION/TRAINING PROGRAM

## 2022-12-04 PROCEDURE — 80048 BASIC METABOLIC PNL TOTAL CA: CPT

## 2022-12-04 PROCEDURE — 82962 GLUCOSE BLOOD TEST: CPT

## 2022-12-04 PROCEDURE — 36415 COLL VENOUS BLD VENIPUNCTURE: CPT

## 2022-12-04 PROCEDURE — 65270000029 HC RM PRIVATE

## 2022-12-04 RX ORDER — INSULIN GLARGINE 100 [IU]/ML
7 INJECTION, SOLUTION SUBCUTANEOUS
Status: DISCONTINUED | OUTPATIENT
Start: 2022-12-04 | End: 2022-12-15 | Stop reason: HOSPADM

## 2022-12-04 RX ADMIN — SODIUM CHLORIDE, PRESERVATIVE FREE 10 ML: 5 INJECTION INTRAVENOUS at 17:05

## 2022-12-04 RX ADMIN — Medication 1 AMPULE: at 10:51

## 2022-12-04 RX ADMIN — SODIUM CHLORIDE, PRESERVATIVE FREE 10 ML: 5 INJECTION INTRAVENOUS at 23:03

## 2022-12-04 RX ADMIN — PIPERACILLIN AND TAZOBACTAM 3.38 G: 3; .375 INJECTION, POWDER, FOR SOLUTION INTRAVENOUS at 02:14

## 2022-12-04 RX ADMIN — Medication 1 AMPULE: at 23:00

## 2022-12-04 RX ADMIN — AMLODIPINE BESYLATE 10 MG: 5 TABLET ORAL at 10:50

## 2022-12-04 RX ADMIN — SODIUM CHLORIDE, PRESERVATIVE FREE 10 ML: 5 INJECTION INTRAVENOUS at 04:10

## 2022-12-04 RX ADMIN — CLONIDINE HYDROCHLORIDE 0.1 MG: 0.1 TABLET ORAL at 17:03

## 2022-12-04 RX ADMIN — CARVEDILOL 12.5 MG: 12.5 TABLET, FILM COATED ORAL at 10:50

## 2022-12-04 RX ADMIN — Medication: at 11:04

## 2022-12-04 RX ADMIN — Medication 3 UNITS: at 17:12

## 2022-12-04 RX ADMIN — Medication: at 23:04

## 2022-12-04 RX ADMIN — COLLAGENASE SANTYL: 250 OINTMENT TOPICAL at 11:04

## 2022-12-04 RX ADMIN — PIPERACILLIN AND TAZOBACTAM 3.38 G: 3; .375 INJECTION, POWDER, FOR SOLUTION INTRAVENOUS at 17:03

## 2022-12-04 RX ADMIN — CLONIDINE HYDROCHLORIDE 0.1 MG: 0.1 TABLET ORAL at 10:50

## 2022-12-04 RX ADMIN — PIPERACILLIN AND TAZOBACTAM 3.38 G: 3; .375 INJECTION, POWDER, FOR SOLUTION INTRAVENOUS at 10:50

## 2022-12-04 RX ADMIN — CARVEDILOL 12.5 MG: 12.5 TABLET, FILM COATED ORAL at 17:03

## 2022-12-04 RX ADMIN — ASPIRIN 325 MG ORAL TABLET 325 MG: 325 PILL ORAL at 10:50

## 2022-12-04 NOTE — PROGRESS NOTES
End of Shift Note    Bedside shift change report given to [facility]   (oncoming nurse) by Rebecca Coleman RN (offgoing nurse). Report included the following information Kardex, MAR, and Recent Results    Shift worked:  4770-4401     Shift summary and any significant changes:    Pt. Cont with confusion. She took out her ML access overnight. Catheter was intact and site atraumatic.scant blood noted. Video sitter placed in room for safety. New 24G iv placed due to limited access and IV ABX. Will need ML replaced. Dressing change completed to sacrum. Alll other dressings intact. Walker intact and draining. IVF and abx going.      Concerns for physician to address:       Zone phone for oncoming shift:            Length of Stay:  Expected LOS: 5d 7h  Actual LOS: [address], RN

## 2022-12-04 NOTE — PROGRESS NOTES
End of Shift Note    Bedside shift change report given to Ila Leo (oncoming nurse) by Matthew Park RN (offgoing nurse). Report included the following information SBAR, Kardex, OR Summary, Procedure Summary, Intake/Output, MAR, Accordion, and Recent Results    Shift worked:  7a-7p     Shift summary and any significant changes:     IV abx and Wound care done as ordered. Otherwise uneventful shift. Concerns for physician to address:  none     Zone phone for oncoming shift:   [phone]       Activity:  Activity Level: Bed Rest  Number times ambulated in hallways past shift: 0  Number of times OOB to chair past shift: 0    Cardiac:   Cardiac Monitoring: No      Cardiac Rhythm: Sinus Rhythm, PAC    Access:  Current line(s): PIV     Genitourinary:   Urinary status: seymour    Respiratory:   O2 Device: None (Room air)  Chronic home O2 use?: NO  Incentive spirometer at bedside: NO       GI:  Last Bowel Movement Date: 12/03/22  Current diet:  ADULT DIET Dysphagia - Soft & Bite Sized; 4 carb choices (60 gm/meal)  ADULT ORAL NUTRITION SUPPLEMENT Dinner, Breakfast; Diabetic Supplement  Passing flatus: YES  Tolerating current diet: YES       Pain Management:   Patient states pain is manageable on current regimen: N/A    Skin:  Basilio Score: 12  Interventions: speciality bed    Patient Safety:  Fall Score:  Total Score: 2  Interventions: pt to call before getting OOB  High Fall Risk: Yes    Length of Stay:  Expected LOS: 5d 7h  Actual LOS: 7      Matthew Park RN

## 2022-12-04 NOTE — PROGRESS NOTES
Problem: Falls - Risk of  Goal: *Absence of Falls  Description: Document Ladonna Londono Fall Risk and appropriate interventions in the flowsheet.   Outcome: Progressing Towards Goal  Note: Fall Risk Interventions:  Mobility Interventions: Bed/chair exit alarm, Communicate number of staff needed for ambulation/transfer    Mentation Interventions: Adequate sleep, hydration, pain control, Bed/chair exit alarm    Medication Interventions: Bed/chair exit alarm, Evaluate medications/consider consulting pharmacy    Elimination Interventions: Bed/chair exit alarm, Toileting schedule/hourly rounds

## 2022-12-04 NOTE — PROGRESS NOTES
Received notification from bedside RN about patient with regards to: Lantus 14 units dose this evening, current .  Had 167 drop in BG this AM with current Lantus dose    Intervention given: Give 7 units instead of 14, offer bedtime snaks

## 2022-12-04 NOTE — PROGRESS NOTES
Hospitalist Progress Note    NAME: Trent Angulo   :  1945   MRN:  [id number]     Admit date: 2022    Today's date: 22    PCP: Beverly Whatley MD    Anticipated discharge date: ? Barriers:  wound care, LTC/SNF placement with Hospice care if pt not IP hospice candidate    Assessment / Plan:    Severe sepsis POA WBC 15.6, , RR 35, lactate 3.19  Proteus bacteremia POA  Necrotic sacral ulcer with extensive osteomyelitis POA  Lactic acidosis POA  Unstageable Multiple pressure injuries POA- R heel, LHeel, L upper back, R calf, L calf, L Hip  Debility  Prior CVA with residual left-sided deficits  CT abdomen/pelvis IMPRESSION  1. Large sacral decubitus ulcer with associated extensive osteomyelitis of the  distal sacrum and coccyx with associated erosive changes, as well as gas and  fluid containing collection within the posterior subcutaneous soft tissues  communicating with the skin surface as above, right larger than left. 2. Distended gallbladder with cholelithiasis, but no convincing CT evidence of  acute cholecystitis. General surgery consulted, OR on 2022   POSTOPERATIVE DIAGNOSIS:  Extensive septic sacral deep tissue injury with abscess. PROCEDURE PERFORMED:  Debridement of sacral deep tissue injury including skin, subcutaneous tissue and muscle.   Wound culture from [facility]   4+ [address]   GRAM STAIN   3+ GRAM POSITIVE 1 [facility]   Culture result:  PENDING P       Continue empiric Zosyn and vancomycin for [facility] with proteus  Wound care consulted  Continue Walker catheter to promote wound healing  Incentive spirometry to prevent atelectasis  Nutrition consulted for supplementation recommendations to assist in wound healing  PT/OT consulted-- needs LTC placement if family unable to care at home due to extensive wound care needed  Discussed with family patient would likely require rehab facility if not long-term care, Pt not IP Hospice candidate per eval 12/2 by hospice-- discussed with daughter-- aware, will likely need placement at LTC +/- Comfort care once arranged  Speech following  Consult ID appreciated-- recommends Zosyn till 12/5 then IV Unasyn for 6 weeks if plan is not comfort care, DC Vancomycin today as repeat blood Cx (12/2) remains negative x 2 days now       Diabetes mellitus type 2 POA   Hemoglobin A1c 6.8 this admission    Cont Decreased lantus at 7 units nightly now  Change SSI to QID  Add pre meal insulin pending on sugars     Hypomagnesemia 1.6   Hypokalemia- 3.6 now    S/p Mag IV 1 g x 1 12/1    UTI due to chronic indwelling Walker POA  UA nitrite +, > 100 WBC, 5 to 10 RBC, 2+ bacteremia  Urine culture with GNR  Walker care ordered  Continue IV antibiotics as above per ID recc     Essential hypertension POA  Paroxysmal atrial fibrillation  Paroxsymal Vtachs noted  Hyperlipidemia POA  Continue PTA amlodipine, aspirin, carvedilol, clonidine, pravastatin  Replenish Lytes-- Mag as above  Pt is DNR     Dementia  Monitor for signs of delirium       Incidental findings requiring outpatient follow up/ evaluation:  -sclerosis noted in the bilateral femoral heads, suspicious for  avascular necrosis  -Gallbladder is distended and contains multiple stones, but  without gallbladder wall thickening or surrounding fat stranding. No  intrahepatic or extrahepatic biliary ductal dilatation     Code Status: DNR-confirmed with daughter and patient  Palliative consult appreciated-- Daughter initially wanted Aggressive care but has decided to consider Hospice- IP versus  LTC placement +/- Hospice if not deemed IP Hospice candidate  IP Hospice eval noted- pt not GIP candidate yet- CM working on alternative LTC placement options now with daughter     DVT Prophylaxis: Lovenox    Body mass index is 23.91 kg/m².       Subjective:     Chief Complaint / Reason for Physician Visit  Opens eyes, not talking much. Discussed with RN events overnight. \"I am ok\"  Alert, very confused  Not able to provide history due to Dementia  BC positive for Proteus    Review of Systems:  Symptom Y/N Comments  Symptom Y/N Comments   Fever/Chills    Chest Pain     Poor Appetite    Edema     Cough    Abdominal Pain     Sputum    Joint Pain     SOB/MARTINEZ    Headache     Nausea/vomit    Tolerating PT/OT     Diarrhea    Tolerating Diet     Constipation    Other       Could NOT obtain due to: Dementia     Objective:     VITALS:   Last 24hrs VS reviewed since prior progress note. Most recent are:  Patient Vitals for the past 24 hrs:   Temp Pulse Resp BP SpO2   12/04/22 0739 97.5 °F (36.4 °C) 74 18 (!) 167/71 100 %   12/04/22 0412 -- -- -- -- 98 %   12/04/22 0339 97.9 °F (36.6 °C) 73 16 (!) 153/62 90 %   12/03/22 2241 97.2 °F (36.2 °C) 82 18 (!) 112/49 99 %   12/03/22 1945 97.2 °F (36.2 °C) 77 18 137/66 99 %   12/03/22 1504 97.5 °F (36.4 °C) 69 18 (!) 113/51 100 %   12/03/22 1112 97.5 °F (36.4 °C) (!) 109 18 (!) 138/58 95 %         Intake/Output Summary (Last 24 hours) at 12/4/2022 1108  Last data filed at 12/4/2022 0412  Gross per 24 hour   Intake 500 ml   Output 1250 ml   Net -750 ml          Wt Readings from Last 12 Encounters:   11/29/22 57.4 kg (126 lb 8.7 oz)   07/27/22 99.8 kg (220 lb)   06/01/22 99.8 kg (220 lb)   05/05/22 99.8 kg (220 lb)   03/01/22 99.8 kg (220 lb)   01/26/22 99.8 kg (220 lb)   06/09/21 99.8 kg (220 lb)   06/01/21 93 kg (205 lb)   12/03/20 96.2 kg (212 lb)   10/01/20 96.2 kg (212 lb)   03/12/20 96.2 kg (212 lb)   06/21/19 96.2 kg (212 lb)       PHYSICAL EXAM:    I had a face to face encounter and independently examined this patient on 12/04/22 as outlined below:    General: WD, WN.lethargic, cooperative, no acute distress    EENT:  PERRL. Anicteric sclerae. MMM  Neck:  No meningismus, no thyromegaly  Resp:  CTA bilaterally, no wheezing or rales.   No accessory muscle use  CV:  Regular  rhythm,  No edema  GI:  Soft, Non distended, Non tender. +Bowel sounds, no rebound  Neurologic:  Lethargic, confused, not talking much, non focal motor exam  Psych:   Not anxious nor agitated  Skin:  No rashes. No jaundice, wound in sacrum bandaged, not viewed          Reviewed most current lab test results and cultures  YES  Reviewed most current radiology test results   YES  Review and summation of old records today    NO  Reviewed patient's current orders and MAR    YES  PMH/SH reviewed - no change compared to H&P  ________________________________________________________________________  Care Plan discussed with:    Comments   Patient x    Family      RN x    Care Manager x Weekend CM   Consultant  x Dr Tracy Messina (ID) Friday                    x Multidiciplinary team rounds were held today with , nursing, pharmacist and clinical coordinator. Patient's plan of care was discussed; medications were reviewed and discharge planning was addressed. ________________________________________________________________________  Total time: 16 mins    Comments   >50% of visit spent in counseling and coordination of care x    ________________________________________________________________________  Trinh Rubalcava MD     Procedures: see electronic medical records for all procedures/Xrays and details which were not copied into this note but were reviewed prior to creation of Plan. LABS:  I reviewed today's most current labs and imaging studies.   Pertinent labs include:  Recent Labs     12/02/22  0430   WBC 7.8   HGB 8.3*   HCT 26.5*          Recent Labs     12/04/22  0138 12/02/22  0430    146*   K 3.8 3.6   * 110*   CO2 30 30   * 128*   BUN 14 11   CREA 0.78 0.59   CA 8.1* 8.8

## 2022-12-05 LAB
BACTERIA SPEC CULT: ABNORMAL
GLUCOSE BLD STRIP.AUTO-MCNC: 100 MG/DL (ref 65–117)
GLUCOSE BLD STRIP.AUTO-MCNC: 102 MG/DL (ref 65–117)
GLUCOSE BLD STRIP.AUTO-MCNC: 114 MG/DL (ref 65–117)
GLUCOSE BLD STRIP.AUTO-MCNC: 124 MG/DL (ref 65–117)
SERVICE CMNT-IMP: ABNORMAL
SERVICE CMNT-IMP: ABNORMAL
SERVICE CMNT-IMP: NORMAL

## 2022-12-05 PROCEDURE — 74011000250 HC RX REV CODE- 250: Performed by: STUDENT IN AN ORGANIZED HEALTH CARE EDUCATION/TRAINING PROGRAM

## 2022-12-05 PROCEDURE — 74011636637 HC RX REV CODE- 636/637: Performed by: INTERNAL MEDICINE

## 2022-12-05 PROCEDURE — 65270000029 HC RM PRIVATE

## 2022-12-05 PROCEDURE — 77010033678 HC OXYGEN DAILY

## 2022-12-05 PROCEDURE — 74011250637 HC RX REV CODE- 250/637: Performed by: SURGERY

## 2022-12-05 PROCEDURE — 74011250636 HC RX REV CODE- 250/636: Performed by: STUDENT IN AN ORGANIZED HEALTH CARE EDUCATION/TRAINING PROGRAM

## 2022-12-05 PROCEDURE — 74011250637 HC RX REV CODE- 250/637: Performed by: STUDENT IN AN ORGANIZED HEALTH CARE EDUCATION/TRAINING PROGRAM

## 2022-12-05 PROCEDURE — 99233 SBSQ HOSP IP/OBS HIGH 50: CPT | Performed by: INTERNAL MEDICINE

## 2022-12-05 PROCEDURE — 82962 GLUCOSE BLOOD TEST: CPT

## 2022-12-05 PROCEDURE — 94760 N-INVAS EAR/PLS OXIMETRY 1: CPT

## 2022-12-05 PROCEDURE — 74011000258 HC RX REV CODE- 258: Performed by: STUDENT IN AN ORGANIZED HEALTH CARE EDUCATION/TRAINING PROGRAM

## 2022-12-05 RX ADMIN — SODIUM CHLORIDE, PRESERVATIVE FREE 10 ML: 5 INJECTION INTRAVENOUS at 05:34

## 2022-12-05 RX ADMIN — PRAVASTATIN SODIUM 80 MG: 40 TABLET ORAL at 20:27

## 2022-12-05 RX ADMIN — PIPERACILLIN AND TAZOBACTAM 3.38 G: 3; .375 INJECTION, POWDER, FOR SOLUTION INTRAVENOUS at 03:04

## 2022-12-05 RX ADMIN — OXYCODONE 5 MG: 5 TABLET ORAL at 13:55

## 2022-12-05 RX ADMIN — CLONIDINE HYDROCHLORIDE 0.1 MG: 0.1 TABLET ORAL at 18:40

## 2022-12-05 RX ADMIN — Medication: at 21:00

## 2022-12-05 RX ADMIN — PIPERACILLIN AND TAZOBACTAM 3.38 G: 3; .375 INJECTION, POWDER, FOR SOLUTION INTRAVENOUS at 11:29

## 2022-12-05 RX ADMIN — INSULIN GLARGINE 7 UNITS: 100 INJECTION, SOLUTION SUBCUTANEOUS at 22:40

## 2022-12-05 RX ADMIN — CLONIDINE HYDROCHLORIDE 0.1 MG: 0.1 TABLET ORAL at 11:29

## 2022-12-05 RX ADMIN — SODIUM CHLORIDE, PRESERVATIVE FREE 10 ML: 5 INJECTION INTRAVENOUS at 22:42

## 2022-12-05 RX ADMIN — SODIUM CHLORIDE, PRESERVATIVE FREE 10 ML: 5 INJECTION INTRAVENOUS at 13:35

## 2022-12-05 RX ADMIN — AMLODIPINE BESYLATE 10 MG: 5 TABLET ORAL at 11:29

## 2022-12-05 RX ADMIN — Medication 1 AMPULE: at 20:28

## 2022-12-05 RX ADMIN — OXYCODONE 5 MG: 5 TABLET ORAL at 20:27

## 2022-12-05 RX ADMIN — Medication: at 13:34

## 2022-12-05 RX ADMIN — COLLAGENASE SANTYL: 250 OINTMENT TOPICAL at 13:34

## 2022-12-05 RX ADMIN — PIPERACILLIN AND TAZOBACTAM 3.38 G: 3; .375 INJECTION, POWDER, FOR SOLUTION INTRAVENOUS at 18:40

## 2022-12-05 RX ADMIN — CARVEDILOL 12.5 MG: 12.5 TABLET, FILM COATED ORAL at 18:40

## 2022-12-05 RX ADMIN — CARVEDILOL 12.5 MG: 12.5 TABLET, FILM COATED ORAL at 11:29

## 2022-12-05 RX ADMIN — ASPIRIN 325 MG ORAL TABLET 325 MG: 325 PILL ORAL at 11:29

## 2022-12-05 NOTE — PROGRESS NOTES
Infectious Disease Progress      Impression    Severe sepsis     Proteus, MRSE bacteremia   Blood cultures 11/27+ for P.mirabilis 1/2  Blood cultures 11/29+ for MRSE 1/2? Contaminant  Negative repeat cultures- 12/2      Necrotic sacral ulcer , acute osteomyelitis   CT abdomen/pelvis 11/27+ for Large sacral decubitus ulcer with associated extensive osteomyelitis of the  distal sacrum and coccyx with associated erosive changes, as well as gas and  fluid containing collection within the posterior subcutaneous soft tissues  communicating with the skin surface. S/p debridement by general surgery 11/28  Intra-Op cultures 11/28+ for heavy P mirabilis/E. Coli( pan S )  Bacteroides & Prevotella beta-lactamase+  Unstageable Multiple pressure injuries on R heel, LHeel, L upper back, R calf, L calf, L Hip      Catheter associated UTI chronic Walker  Urine culture 11/27+ for Providentia stuartii  On Zosyn IV. Diabetes mellitus  A1c 6.8    Advanced dementia      CRP-pending    Plan  Continue Zosyn IV x7 days end date 12/5( today) . This will treat organisms on intraoperative cultures and providentia on UC  Switch to Unasyn from 12/6. Patient would require total of 6 weeks of antimicrobial therapy, wound care, pressure offloading, adequate nutrition   Repeat blood cultures x 2 are negative, MRSE is a probable contaminant. S/p DC of Vancomycin  D/w Dr Schreiber, plan is for Hospice when DC. Will sign off, please reconsult if plan is for continued care. Reinaldo Russell is a 65 y.o.  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   Per H&P patient had reportedly, patient developed sacral pressure injury in August/September that ulcerated and then progressively worsened to the point that patient was hospitalized at [facility] from 10/15-11/2. During that time patient was managed with IV antibiotics but refused any surgical debridement and was ultimately discharged home without antibiotics, infectious disease follow-up, or home health services. Since that time daughter reported that family had attempted to keep her clean and provide wound care as demonstrated by PCP who provided home visits. Unfortunately, patient had continued to decline  with extension of her ulcer, obvious necrotic tissue, and development of additional pressure injuries over her body prompting them to present to our facility with request for operative debridement. ROS otherwise negative. Patient reports no other complaints or concerns and denies tobacco, alcohol, or illicit drug use. Patient has been admitted to hospitalist service. S/p debridement by general surgery 11/28. Intra-Op cultures 11/28+ for heavy P mirabilis/E. Coli( pan S ),Bacteroides & Prevotella beta-lactamase+  ID service has been consulted for antibiotic recommendations. Patient seen today.   Patient is resting, arousable nonverbal.    D/w hospitalist.    C/Marques Saleh [address] Problems    Diagnosis Date Noted    Sacral osteomyelitis ([address] 11/27/2022         Past Medical History:   Diagnosis Date    Anticoagulant long-term use 10/13/2017    Anxiety 10/13/2017    Atrial fibrillation ([address] 10/13/2017    Breast calcification, left 10/13/2017    CAD (coronary artery disease)     Cellulitis of both lower extremities 10/13/2017    Chronic kidney disease     kidney stones    Chronic pain syndrome 10/13/2017    Chronic prescription opiate use 11/15/2017    CVA (cerebral vascular accident) ([address] 10/13/2017    Diabetes ([address]     DJD (degenerative joint disease) 10/13/2017    Dyslipidemia 10/13/2017    Glaucoma 10/13/2017    Hypertension     Hyperthyroidism 10/13/2017    Long-term use of high-risk medication 10/13/2017    Stasis ulcer of lower extremity [address] 10/13/2017    Stroke ([facility]     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 ([facility] 8/18/2012    retinopathy        Past Surgical History:   Procedure Laterality Date    HX BREAST BIOPSY Left     2014    HX GYN      hysterectomty    HX ORTHOPAEDIC      back surgery    MS CARDIAC SURG PROCEDURE UNLIST      cagb       Allergies   Allergen Reactions    Betadine Prepstick Rash    Keflex [Cephalexin] Hives     Pt breaks out in hives       Social Connections: Not on file       Family Status   Relation Name Status    Father  (Not Specified)       Medication Documentation Review Audit       Reviewed by Cherelle Valentino RN (Registered Nurse) on 12/02/22 at 5031      Medication Sig Documenting Provider Last Dose Status Taking? amLODIPine (NORVASC) 10 mg tablet Take 1 Tablet by mouth daily. Tatum Roca MD 11/27/2022 Active Yes   aspirin (ASPIRIN) 325 mg tablet Take 1 Tab by mouth daily. Tatum Roca MD 11/27/2022 Active Yes   captopriL (CAPOTEN) 25 mg tablet TAKE 1 TABLET TWICE A DAY   Patient taking differently: 25 mg two (2) times a day. Tatum Roca MD 11/27/2022 Active Yes   carvediloL (COREG) 12.5 mg tablet TAKE 1 TABLET TWICE A DAY Daniel Lozano MD 11/27/2022 Active Yes   cloNIDine HCL (CATAPRES) 0.1 mg tablet TAKE 1 TABLET THREE TIMES A DAY Daniel Lozano MD 11/27/2022 Active Yes   ergocalciferol (ERGOCALCIFEROL) 1,250 mcg (50,000 unit) capsule Take 50,000 Units by mouth every seven (7) days.  Provider, Historical 11/2/2022 Active Yes   furosemide (LASIX) 40 mg tablet TAKE 1 TABLET DAILY Sonia Lozano MD 11/27/2022 Active Yes   insulin lispro (HUMALOG) 100 unit/mL injection As directed   Patient taking differently: Sliding scale    Warren Paez MD 11/27/2022 Active Yes   insulin NPH (NovoLIN N NPH U-100 Insulin) 100 unit/mL injection 5 Units by SubCUTAneous route Before breakfast and dinner. Patient taking differently: 15 Units by SubCUTAneous route Before breakfast and dinner. Warren Paez MD 11/27/2022 Active Yes   pravastatin (PRAVACHOL) 80 mg tablet TAKE 1 TABLET NIGHTLY Toño Bentley MD 11/26/2022 Active Yes   rivaroxaban (Xarelto) 20 mg tab tablet Take 1 Tablet by mouth daily. Toño Bentley MD 11/26/2022 Active Yes   sodium hypochlorite (Dakin's Solution) external solution Apply to wounds twice daily and cover with dressing Luis E Lorenz, [address] Unknown Active No                      Review of Systems -could not obtain due to patient factors      PHYSICAL EXAM:  General:          Awake, cooperative, no acute distress, nonverbalEENT:              EOMI. Anicteric sclerae. MMM  Resp:               CTA bilaterally, no wheezing or rales. No accessory muscle use  CV:                  Regular  rhythm,  No edema  GI:                   Soft, Non distended, Non tender. +Bowel sounds  Neurologic:      Alert and oriented X 3, normal speech,   Psych:             Good insight. Not anxious nor agitated  Skin:                No rashes. No jaundice.   Extremities: No cyanosis, edema    Tere Gilmore MD [address]

## 2022-12-05 NOTE — PROGRESS NOTES
Problem: Risk for Spread of Infection  Goal: Prevent transmission of infectious organism to others  Description: Prevent the transmission of infectious organisms to other patients, staff members, and visitors. Outcome: Progressing Towards Goal     Problem: Patient Education:  Go to Education Activity  Goal: Patient/Family Education  Outcome: Progressing Towards Goal     Problem: Falls - Risk of  Goal: *Absence of Falls  Description: Document Tiago Doan Fall Risk and appropriate interventions in the flowsheet. Outcome: Progressing Towards Goal  Note: Fall Risk Interventions:  Mobility Interventions: Communicate number of staff needed for ambulation/transfer    Mentation Interventions: Room close to nurse's station, More frequent rounding    Medication Interventions: Evaluate medications/consider consulting pharmacy    Elimination Interventions: Call light in reach              Problem: Patient Education: Go to Patient Education Activity  Goal: Patient/Family Education  Outcome: Progressing Towards Goal     Problem: Patient Education: Go to Patient Education Activity  Goal: Patient/Family Education  Outcome: Progressing Towards Goal     Problem: Diabetes Self-Management  Goal: *Disease process and treatment process  Description: Define diabetes and identify own type of diabetes; list 3 options for treating diabetes. Outcome: Progressing Towards Goal  Goal: *Incorporating nutritional management into lifestyle  Description: Describe effect of type, amount and timing of food on blood glucose; list 3 methods for planning meals. Outcome: Progressing Towards Goal  Goal: *Incorporating physical activity into lifestyle  Description: State effect of exercise on blood glucose levels. Outcome: Progressing Towards Goal  Goal: *Developing strategies to promote health/change behavior  Description: Define the ABC's of diabetes; identify appropriate screenings, schedule and personal plan for screenings.   Outcome: Progressing Towards Goal  Goal: *Using medications safely  Description: State effect of diabetes medications on diabetes; name diabetes medication taking, action and side effects. Outcome: Progressing Towards Goal  Goal: *Monitoring blood glucose, interpreting and using results  Description: Identify recommended blood glucose targets  and personal targets. Outcome: Progressing Towards Goal  Goal: *Prevention, detection, treatment of acute complications  Description: List symptoms of hyper- and hypoglycemia; describe how to treat low blood sugar and actions for lowering  high blood glucose level. Outcome: Progressing Towards Goal  Goal: *Prevention, detection and treatment of chronic complications  Description: Define the natural course of diabetes and describe the relationship of blood glucose levels to long term complications of diabetes. Outcome: Progressing Towards Goal  Goal: *Developing strategies to address psychosocial issues  Description: Describe feelings about living with diabetes; identify support needed and support network  Outcome: Progressing Towards Goal  Goal: *Insulin pump training  Outcome: Progressing Towards Goal  Goal: *Sick day guidelines  Outcome: Progressing Towards Goal  Goal: *Patient Specific Goal (EDIT GOAL, INSERT TEXT)  Outcome: Progressing Towards Goal     Problem: Patient Education: Go to Patient Education Activity  Goal: Patient/Family Education  Outcome: Progressing Towards Goal     Problem: Pressure Injury - Risk of  Goal: *Prevention of pressure injury  Description: Document Basilio Scale and appropriate interventions in the flowsheet.   Outcome: Progressing Towards Goal  Note: Pressure Injury Interventions:  Sensory Interventions: Minimize linen layers    Moisture Interventions: Absorbent underpads    Activity Interventions: Pressure redistribution bed/mattress(bed type)    Mobility Interventions: Pressure redistribution bed/mattress (bed type)    Nutrition Interventions: Document food/fluid/supplement intake    Friction and Shear Interventions: HOB 30 degrees or less                Problem: Patient Education: Go to Patient Education Activity  Goal: Patient/Family Education  Outcome: Progressing Towards Goal

## 2022-12-05 NOTE — DIABETES MGMT
This 65year old AA female has large sacral decubitus with osteomyelitis, treated with antibiotics. Plans for movement to hospice if family can not care for her or rehab facility not available. Signing off.     Serg Connors DNP, RN, ACNS-BC, BC-ADM, [facility]  Clinical Nurse Specialist-Diabetes & Endocrine disorders    [facility] (In-patient CNS consult service)  [phone]

## 2022-12-05 NOTE — PROGRESS NOTES
End of Shift Note  Bedside shift change report given to Brittany Dutton 44 (oncoming nurse) by Tg Fraire RN (offgoing nurse). Report included the following information SBAR, Kardex, OR Summary, Procedure Summary, Intake/Output, MAR, Accordion, and Recent Results    Shift worked:  7a-7p     Shift summary and any significant changes:     Wound care and IV abx as ordered. Pt has #24 IV, assessed by PICC team who states pt has limited vessels and we should conserve them and continue to use the #24 until it fails, PICC will be happy to place IV then. RN went in to do pts wound care, pt was having a lucid period and thanked RN for the care, and stated she did not want wound care done. RN explained that this was to help the wounds heal. Pt stated that \"the wounds will not heal\". RN asked if the wound care hurt, pt stated it did. Pt stated \"let me die\" several times. Pt medicated with 5mg oxycodone for wound care. When pt turned pt stated, \"I want to die\" several times. RN asked pt if she would like to see the , pt stated yes. Call to , message left and page Dr Rick Diamond to inform. Dr Rick Diamond states family and  are working on finding a facility with comfort care.      Concerns for physician to address:  none     Zone phone for oncoming shift:   [phone]       Activity:  Activity Level: Bed Rest  Number times ambulated in hallways past shift: 0  Number of times OOB to chair past shift: 0    Cardiac:   Cardiac Monitoring: No      Cardiac Rhythm: Sinus Rhythm    Access:  Current line(s): PIV     Genitourinary:   Urinary status: seymour    Respiratory:   O2 Device: None (Room air)  Chronic home O2 use?: NO  Incentive spirometer at bedside: NO       GI:  Last Bowel Movement Date: 12/03/22  Current diet:  ADULT DIET Dysphagia - Soft & Bite Sized; 4 carb choices (60 gm/meal)  ADULT ORAL NUTRITION SUPPLEMENT Dinner, Breakfast; Diabetic Supplement  Passing flatus: YES  Tolerating current diet: YES       Pain Management: Patient states pain is manageable on current regimen: N/A    Skin:  Basilio Score: 12  Interventions: increase time out of bed    Patient Safety:  Fall Score:  Total Score: 2  Interventions: gripper socks  High Fall Risk: Yes    Length of Stay:  Expected LOS: 5d 7h  Actual LOS: [address], RN

## 2022-12-05 NOTE — PROGRESS NOTES
Comprehensive Nutrition Assessment    Type and Reason for Visit: Reassess    Nutrition Recommendations/Plan:   Continue diet and supplements as ordered  Please document % meals and supplements consumed in flowsheet I/O's under intake   Recommend revisit GOC/Hospice consideration     Malnutrition Assessment:  Malnutrition Status:  Severe malnutrition (11/28/22 1609)    Context:  Chronic illness     Findings of the 6 clinical characteristics of malnutrition:   Energy Intake:  75% or less est energy requirements for 1 month or longer  Weight Loss:  Unable to assess     Body Fat Loss:  Mild body fat loss, Triceps   Muscle Mass Loss:  Severe muscle mass loss, Thigh (quadriceps)  Fluid Accumulation:  No significant fluid accumulation,     Strength:  Not performed     Nutrition Assessment:  Chart reviewed, pt sleeping soundly. Spoke with pt's RN who reports that pt remains poor, ate minimally over the weekend. Multiple notes indicate pt asking to be left alone. RN reports that during wound care pt had lucid moment and cried out multiple times that she 'wanted to die'. Noted family refused hospice a few days ago, RN plans to discuss revisiting this with provider given pt's recent comments while lucid. Aggressive nutrition support is likely not appropriate in this pt's case and her wounds will not heal with the minimal PO she has been consuming this admission, especially as she was admitted with significant wt loss and severe malnutrition. [facility]. Patient Vitals for the past 168 hrs:   % Diet Eaten   12/03/22 0930 1 - 25%   12/01/22 1521 1 - 25%   12/01/22 0954 26 - 50%   11/30/22 1821 26 - 50%   11/30/22 1259 1 - 25%   11/30/22 0827 1 - 25%   11/28/22 1816 1 - 25%     Patient Vitals for the past 168 hrs:   Supplement intake %   12/03/22 0930 76 - 100%   11/30/22 0827 76 - 100%          Nutrition Related Findings:    Meds: lantus, humalog, zosyn.   BM: 12/3 Wound Type: Stage II(L back), Stage IV(sacrum), Unstageable(calfs, heels, L hip)     Current Nutrition Intake & Therapies:  Average Meal Intake: 1-25%  Average Supplement Intake: %  ADULT DIET Dysphagia - Soft & Bite Sized; 4 carb choices (60 gm/meal)  ADULT ORAL NUTRITION SUPPLEMENT Dinner, Breakfast; Diabetic Supplement    Anthropometric Measures:  Height: 5' 1\" (154.9 cm)  Ideal Body Weight (IBW): 105 lbs (48 kg)     Current Body Wt:  57.4 kg (126 lb 8.7 oz), 120.5 % IBW. Bed scale  Current BMI (kg/m2): 23.9  Usual Body Weight: 99.8 kg (220 lb)  % Weight Change (Calculated): -42.5                    BMI Category: Normal weight (BMI 18.5-24. 9)    Estimated Daily Nutrient Needs:  Energy Requirements Based On: Formula  Weight Used for Energy Requirements: Current  Energy (kcal/day): MSJ 1550 (992 x 1.3 +250 for wt gain)  Weight Used for Protein Requirements: Current  Protein (g/day): 92g (1.6gPro/kg)  Method Used for Fluid Requirements: 1 ml/kcal  Fluid (ml/day): 1550mL    Nutrition Diagnosis:   Unintended weight loss related to cognitive or neurological impairment, inadequate protein-energy intake as evidenced by weight loss greater than or equal to 20% in 1 year, moderate muscle loss  Previous dx continues.      Nutrition Interventions:   Food and/or Nutrient Delivery: Continue current diet, Continue oral nutrition supplement  Nutrition Education/Counseling: No recommendations at this time  Coordination of Nutrition Care: Continue to monitor while inpatient       Goals:  Previous Goal Met: Progress towards goal(s) declining  Goals: PO intake 50% or greater, by next RD assessment, other (specify)  Specify Other Goals: vs go comfort care    Nutrition Monitoring and Evaluation:   Behavioral-Environmental Outcomes: None identified  Food/Nutrient Intake Outcomes: Food and nutrient intake, Supplement intake  Physical Signs/Symptoms Outcomes: Biochemical data, Nutrition focused physical findings, Skin, Weight    Discharge Planning:    Continue current diet, Continue oral nutrition supplement    Kate Herring RD, CNSC  Contact: ext [phone]

## 2022-12-05 NOTE — PROGRESS NOTES
2300: Notified by PCT that patient was refusing vitals to be taken. Patient reassessed and this RN attempted to take vital signs. Patient continues to state \"Please just leave me alone, please just leave me alone\". Patient also would not allow this RN to give PM lantus. Stating \"no no no please, please just leave me alone\". Vital signs not taken and lantus not given. Patient states \"no\" when asked if she in pain. 0345: Notified by PCT that patient is refusing vital signs to be taken. Stating again \"please just leave me alone\" and becoming more agitated with attempts to educate on importance of vitals. Patient refuses to be touched. Vitals signs not taken.

## 2022-12-06 LAB
ANION GAP SERPL CALC-SCNC: 5 MMOL/L (ref 5–15)
BUN SERPL-MCNC: 11 MG/DL (ref 6–20)
BUN/CREAT SERPL: 17 (ref 12–20)
CALCIUM SERPL-MCNC: 8.6 MG/DL (ref 8.5–10.1)
CHLORIDE SERPL-SCNC: 105 MMOL/L (ref 97–108)
CO2 SERPL-SCNC: 30 MMOL/L (ref 21–32)
CREAT SERPL-MCNC: 0.64 MG/DL (ref 0.55–1.02)
GLUCOSE BLD STRIP.AUTO-MCNC: 208 MG/DL (ref 65–117)
GLUCOSE BLD STRIP.AUTO-MCNC: 237 MG/DL (ref 65–117)
GLUCOSE BLD STRIP.AUTO-MCNC: 243 MG/DL (ref 65–117)
GLUCOSE BLD STRIP.AUTO-MCNC: 92 MG/DL (ref 65–117)
GLUCOSE SERPL-MCNC: 88 MG/DL (ref 65–100)
POTASSIUM SERPL-SCNC: 3.6 MMOL/L (ref 3.5–5.1)
SERVICE CMNT-IMP: ABNORMAL
SERVICE CMNT-IMP: NORMAL
SODIUM SERPL-SCNC: 140 MMOL/L (ref 136–145)

## 2022-12-06 PROCEDURE — 94760 N-INVAS EAR/PLS OXIMETRY 1: CPT

## 2022-12-06 PROCEDURE — 74011000258 HC RX REV CODE- 258: Performed by: INTERNAL MEDICINE

## 2022-12-06 PROCEDURE — 77010033678 HC OXYGEN DAILY

## 2022-12-06 PROCEDURE — 74011636637 HC RX REV CODE- 636/637: Performed by: INTERNAL MEDICINE

## 2022-12-06 PROCEDURE — 74011250637 HC RX REV CODE- 250/637: Performed by: SURGERY

## 2022-12-06 PROCEDURE — 92526 ORAL FUNCTION THERAPY: CPT

## 2022-12-06 PROCEDURE — 65270000029 HC RM PRIVATE

## 2022-12-06 PROCEDURE — 74011250636 HC RX REV CODE- 250/636: Performed by: INTERNAL MEDICINE

## 2022-12-06 PROCEDURE — 80048 BASIC METABOLIC PNL TOTAL CA: CPT

## 2022-12-06 PROCEDURE — 36415 COLL VENOUS BLD VENIPUNCTURE: CPT

## 2022-12-06 PROCEDURE — 74011250637 HC RX REV CODE- 250/637: Performed by: STUDENT IN AN ORGANIZED HEALTH CARE EDUCATION/TRAINING PROGRAM

## 2022-12-06 PROCEDURE — 82962 GLUCOSE BLOOD TEST: CPT

## 2022-12-06 PROCEDURE — 74011000250 HC RX REV CODE- 250: Performed by: STUDENT IN AN ORGANIZED HEALTH CARE EDUCATION/TRAINING PROGRAM

## 2022-12-06 RX ADMIN — CARVEDILOL 12.5 MG: 12.5 TABLET, FILM COATED ORAL at 17:47

## 2022-12-06 RX ADMIN — Medication 1 AMPULE: at 20:24

## 2022-12-06 RX ADMIN — AMPICILLIN SODIUM AND SULBACTAM SODIUM 3 G: 2; 1 INJECTION, POWDER, FOR SOLUTION INTRAMUSCULAR; INTRAVENOUS at 23:49

## 2022-12-06 RX ADMIN — PRAVASTATIN SODIUM 80 MG: 40 TABLET ORAL at 21:43

## 2022-12-06 RX ADMIN — Medication 3 UNITS: at 14:22

## 2022-12-06 RX ADMIN — CLONIDINE HYDROCHLORIDE 0.1 MG: 0.1 TABLET ORAL at 09:50

## 2022-12-06 RX ADMIN — ASPIRIN 325 MG ORAL TABLET 325 MG: 325 PILL ORAL at 09:49

## 2022-12-06 RX ADMIN — AMPICILLIN SODIUM AND SULBACTAM SODIUM 3 G: 2; 1 INJECTION, POWDER, FOR SOLUTION INTRAMUSCULAR; INTRAVENOUS at 05:18

## 2022-12-06 RX ADMIN — OXYCODONE 5 MG: 5 TABLET ORAL at 20:26

## 2022-12-06 RX ADMIN — AMPICILLIN SODIUM AND SULBACTAM SODIUM 3 G: 2; 1 INJECTION, POWDER, FOR SOLUTION INTRAMUSCULAR; INTRAVENOUS at 17:45

## 2022-12-06 RX ADMIN — Medication 2 UNITS: at 22:09

## 2022-12-06 RX ADMIN — AMLODIPINE BESYLATE 10 MG: 5 TABLET ORAL at 09:49

## 2022-12-06 RX ADMIN — OXYCODONE 5 MG: 5 TABLET ORAL at 09:49

## 2022-12-06 RX ADMIN — SODIUM CHLORIDE, PRESERVATIVE FREE 10 ML: 5 INJECTION INTRAVENOUS at 17:51

## 2022-12-06 RX ADMIN — INSULIN GLARGINE 7 UNITS: 100 INJECTION, SOLUTION SUBCUTANEOUS at 21:43

## 2022-12-06 RX ADMIN — Medication 3 UNITS: at 17:49

## 2022-12-06 RX ADMIN — SODIUM CHLORIDE, PRESERVATIVE FREE 10 ML: 5 INJECTION INTRAVENOUS at 05:19

## 2022-12-06 RX ADMIN — AMPICILLIN SODIUM AND SULBACTAM SODIUM 3 G: 2; 1 INJECTION, POWDER, FOR SOLUTION INTRAMUSCULAR; INTRAVENOUS at 12:00

## 2022-12-06 RX ADMIN — OXYCODONE 5 MG: 5 TABLET ORAL at 04:40

## 2022-12-06 RX ADMIN — OXYCODONE 5 MG: 5 TABLET ORAL at 00:41

## 2022-12-06 RX ADMIN — CARVEDILOL 12.5 MG: 12.5 TABLET, FILM COATED ORAL at 09:50

## 2022-12-06 RX ADMIN — SODIUM CHLORIDE, PRESERVATIVE FREE 10 ML: 5 INJECTION INTRAVENOUS at 22:09

## 2022-12-06 NOTE — PROGRESS NOTES
Spiritual Care Assessment/Progress Note  [facility]      NAME: Emma Powers      MRN: [id number]  AGE: 65 y.o.  SEX: female  Sabianist Affiliation: Holiness   Language: English     12/6/2022     Total Time (in minutes): 13     Spiritual Assessment begun in [facility] 3 SURG TELE through conversation with:         [x]Patient        [] Family    [] Friend(s)        Reason for Consult: Initial/Spiritual assessment, patient floor     Spiritual beliefs: (Please include comment if needed)     [x] Identifies with a briseyda tradition:         [] Supported by a briseyda community:            [] Claims no spiritual orientation:           [] Seeking spiritual identity:                [] Adheres to an individual form of spirituality:           [] Not able to assess:                           Identified resources for coping:      [x] Prayer                               [] Music                  [] Guided Imagery     [x] Family/friends                 [] Pet visits     [] Devotional reading                         [] Unknown     [] Other:                                               Interventions offered during this visit: (See comments for more details)    Patient Interventions: Initial/Spiritual assessment, patient floor, Affirmation of emotions/emotional suffering, Normalization of emotional/spiritual concerns, Prayer (assurance of), Prayer (actual), Sabianist beliefs/image of God discussed, Affirmation of briseyda           Plan of Care:     [x] Support spiritual and/or cultural needs    [] Support AMD and/or advance care planning process      [] Support grieving process   [] Coordinate Rites and/or Rituals    [] Coordination with community clergy   [] No spiritual needs identified at this time   [] Detailed Plan of Care below (See Comments)  [] Make referral to Music Therapy  [] Make referral to Pet Therapy     [] Make referral to Addiction services  [] Make referral to [facility]  [] Make referral to Spiritual Care Partner  [] No future visits requested        [x] Contact Spiritual Care for further referrals     Comments:  attempted to meet with Ms. Stacie Pappas again today. She was in bed awake. She stated she had breakfast today and coffee. She requested orange juice.  will check with her nurse in reference to her request.  inquired about her briseyda and she stated she is a [address] encouraged her to trust in God and avoid giving up. The notes in her chart and voicemail from nurse indicates she is giving up on life according to [address] [place] [address] colleague.  reminded her of God's faithfulness to her for 66 years and his promises in his word. Ms. Stacie Pappas stated she is in agreement with God's faithfulness. She shared her love for her children (son and daughter) and requested prayer.  provided a calming presence, encouragement, prayer and empathetic listening. Ms. Stacie Pappas smiled for the first time during this visit and thanked the  for the visit.  thanked her for sharing her thoughts.  services are available 24 hours a day as requested. Rev. ANTONY Chadwick  [facility]   Paging Service [phone] ([phone])

## 2022-12-06 NOTE — PROGRESS NOTES
.End of Shift Note    Bedside shift change report given to ELANA Mcmillan (oncoming nurse) by Anjel Araya LPN (offgoing nurse). Report included the following information SBAR, Kardex, ED Summary, Procedure Summary, Intake/Output, MAR, and Recent Results    Shift worked:  7am-7pm     Shift summary and any significant changes:     Continuing with plan of care. Wound care done. Patient required pain medication x one today. On CBR, turning for wound care and prn, Q 4 hours. Patient in specialty bed. Alert but confused. Patient has Tele monitor at bedside for safety. Patient does not attempt to get out of bed. Concerns for physician to address:  Discharge planning, plan of care     Zone phone for oncoming shift:   [phone]       Activity:  Activity Level: Bed Rest  Number times ambulated in hallways past shift: 0  Number of times OOB to chair past shift: 0    Cardiac:   Cardiac Monitoring: Yes      Cardiac Rhythm: Sinus Rhythm    Access:  Current line(s): PIV     Genitourinary:   Urinary status: seymour    Respiratory:   O2 Device: None (Room air)  Chronic home O2 use?: NO  Incentive spirometer at bedside: NO       GI:  Last Bowel Movement Date: 12/03/22  Current diet:  ADULT DIET Dysphagia - Soft & Bite Sized; 4 carb choices (60 gm/meal)  ADULT ORAL NUTRITION SUPPLEMENT Dinner, Breakfast; Diabetic Supplement  Passing flatus: YES  Tolerating current diet: YES       Pain Management:   Patient states pain is manageable on current regimen: YES    Skin:  Basilio Score: 12  Interventions: turn team, speciality bed, float heels, and nutritional support     Patient Safety:  Fall Score:  Total Score: 3  Interventions: stay with me (per policy)  High Fall Risk: Yes    Length of Stay:  Expected LOS: 9d 14h  Actual LOS: Danielle Handy, LPN

## 2022-12-06 NOTE — PROGRESS NOTES
Problem: Risk for Spread of Infection  Goal: Prevent transmission of infectious organism to others  Description: Prevent the transmission of infectious organisms to other patients, staff members, and visitors. Outcome: Progressing Towards Goal     Problem: Patient Education:  Go to Education Activity  Goal: Patient/Family Education  Outcome: Progressing Towards Goal     Problem: Falls - Risk of  Goal: *Absence of Falls  Description: Document Sofia Fothergill Fall Risk and appropriate interventions in the flowsheet. Outcome: Progressing Towards Goal  Note: Fall Risk Interventions:  Mobility Interventions: Communicate number of staff needed for ambulation/transfer    Mentation Interventions: Bed/chair exit alarm, Door open when patient unattended, More frequent rounding, Reorient patient, Room close to nurse's station, Toileting rounds, Update white board    Medication Interventions: Bed/chair exit alarm, Evaluate medications/consider consulting pharmacy    Elimination Interventions: Bed/chair exit alarm, Call light in reach, Patient to call for help with toileting needs, Toilet paper/wipes in reach, Toileting schedule/hourly rounds              Problem: Patient Education: Go to Patient Education Activity  Goal: Patient/Family Education  Outcome: Progressing Towards Goal     Problem: Patient Education: Go to Patient Education Activity  Goal: Patient/Family Education  Outcome: Progressing Towards Goal     Problem: Diabetes Self-Management  Goal: *Disease process and treatment process  Description: Define diabetes and identify own type of diabetes; list 3 options for treating diabetes. Outcome: Progressing Towards Goal  Goal: *Incorporating nutritional management into lifestyle  Description: Describe effect of type, amount and timing of food on blood glucose; list 3 methods for planning meals.   Outcome: Progressing Towards Goal  Goal: *Incorporating physical activity into lifestyle  Description: State effect of exercise on blood glucose levels. Outcome: Progressing Towards Goal  Goal: *Developing strategies to promote health/change behavior  Description: Define the ABC's of diabetes; identify appropriate screenings, schedule and personal plan for screenings. Outcome: Progressing Towards Goal  Goal: *Using medications safely  Description: State effect of diabetes medications on diabetes; name diabetes medication taking, action and side effects. Outcome: Progressing Towards Goal  Goal: *Monitoring blood glucose, interpreting and using results  Description: Identify recommended blood glucose targets  and personal targets. Outcome: Progressing Towards Goal  Goal: *Prevention, detection, treatment of acute complications  Description: List symptoms of hyper- and hypoglycemia; describe how to treat low blood sugar and actions for lowering  high blood glucose level. Outcome: Progressing Towards Goal  Goal: *Prevention, detection and treatment of chronic complications  Description: Define the natural course of diabetes and describe the relationship of blood glucose levels to long term complications of diabetes. Outcome: Progressing Towards Goal  Goal: *Developing strategies to address psychosocial issues  Description: Describe feelings about living with diabetes; identify support needed and support network  Outcome: Progressing Towards Goal  Goal: *Insulin pump training  Outcome: Progressing Towards Goal  Goal: *Sick day guidelines  Outcome: Progressing Towards Goal  Goal: *Patient Specific Goal (EDIT GOAL, INSERT TEXT)  Outcome: Progressing Towards Goal     Problem: Patient Education: Go to Patient Education Activity  Goal: Patient/Family Education  Outcome: Progressing Towards Goal     Problem: Pressure Injury - Risk of  Goal: *Prevention of pressure injury  Description: Document Basilio Scale and appropriate interventions in the flowsheet.   Outcome: Progressing Towards Goal  Note: Pressure Injury Interventions:  Sensory Interventions: Assess changes in LOC, Assess need for specialty bed, Avoid rigorous massage over bony prominences, Check visual cues for pain, Discuss PT/OT consult with provider, Float heels, Keep linens dry and wrinkle-free, Maintain/enhance activity level, Minimize linen layers, Monitor skin under medical devices, Pressure redistribution bed/mattress (bed type), Turn and reposition approx. every two hours (pillows and wedges if needed)    Moisture Interventions: Absorbent underpads, Apply protective barrier, creams and emollients, Check for incontinence Q2 hours and as needed, Contain wound drainage, Internal/External urinary devices, Limit adult briefs, Maintain skin hydration (lotion/cream), Minimize layers, Moisture barrier, Offer toileting Q_hr    Activity Interventions: Pressure redistribution bed/mattress(bed type)    Mobility Interventions: Assess need for specialty bed, Float heels, Pressure redistribution bed/mattress (bed type), Turn and reposition approx.  every two hours(pillow and wedges)    Nutrition Interventions: Document food/fluid/supplement intake, Discuss nutritional consult with provider, Offer support with meals,snacks and hydration    Friction and Shear Interventions: Apply protective barrier, creams and emollients, Feet elevated on foot rest, HOB 30 degrees or less, Lift team/patient mobility team, Minimize layers                Problem: Patient Education: Go to Patient Education Activity  Goal: Patient/Family Education  Outcome: Progressing Towards Goal

## 2022-12-06 NOTE — PROGRESS NOTES
Hospitalist Progress Note    NAME: Nella Reynolds   :  1945   MRN:  [id number]     Admit date: 2022    Today's date: 22    PCP: Tavares Diaz MD    Anticipated discharge date: ? Barriers:  wound care, LTC/SNF placement with Hospice care if pt not IP hospice candidate    Assessment / Plan:    Severe sepsis POA WBC 15.6, , RR 35, lactate 3.19  Proteus bacteremia POA  Necrotic sacral ulcer with extensive osteomyelitis POA  Lactic acidosis POA  Unstageable Multiple pressure injuries POA- R heel, LHeel, L upper back, R calf, L calf, L Hip  Debility  Prior CVA with residual left-sided deficits  CT abdomen/pelvis IMPRESSION  1. Large sacral decubitus ulcer with associated extensive osteomyelitis of the  distal sacrum and coccyx with associated erosive changes, as well as gas and  fluid containing collection within the posterior subcutaneous soft tissues  communicating with the skin surface as above, right larger than left. 2. Distended gallbladder with cholelithiasis, but no convincing CT evidence of  acute cholecystitis. General surgery consulted, OR on 2022   POSTOPERATIVE DIAGNOSIS:  Extensive septic sacral deep tissue injury with abscess. PROCEDURE PERFORMED:  Debridement of sacral deep tissue injury including skin, subcutaneous tissue and muscle.   Wound culture from [facility]   4+ [address]   GRAM STAIN   3+ GRAM POSITIVE [facility]   Culture result:  PENDING P       Continue empiric Zosyn and vancomycin for [facility] with proteus  Wound care consulted  Continue Walker catheter to promote wound healing  Incentive spirometry to prevent atelectasis  Nutrition consulted for supplementation recommendations to assist in wound healing  PT/OT consulted-- needs LTC placement if family unable to care at home due to extensive wound care needed  Discussed with family patient would likely require rehab facility if not long-term care, Pt not IP Hospice candidate per eval 12/2 by hospice-- discussed with daughter-- aware, will likely need placement at LTC +/- Comfort care once arranged-- CM working on LTC/NH  Speech following  Consult ID appreciated-- recommends Zosyn till 12/5 then IV Unasyn for 6 weeks if plan is not comfort care, DCd Vancomycin 12/4 as repeat blood Cx (12/2) remains negative x 4 days        Diabetes mellitus type 2 POA   Hemoglobin A1c 6.8 this admission    Cont Decreased lantus at 7 units nightly now  Change SSI to QID  Add pre meal insulin pending on sugars     Hypomagnesemia 1.6   Hypokalemia- 3.6 now    S/p Mag IV 1 g x 1 12/1    UTI due to chronic indwelling Walker POA  UA nitrite +, > 100 WBC, 5 to 10 RBC, 2+ bacteremia  Urine culture with GNR  Walker care ordered  Continue IV antibiotics as above per ID recc       Essential hypertension POA  Paroxysmal atrial fibrillation  Paroxsymal Vtachs noted  Hyperlipidemia POA  Continue PTA amlodipine, aspirin, carvedilol, clonidine, pravastatin  Replenish Lytes-- Mag as above  Pt is DNR     Dementia  Monitor for signs of delirium       Incidental findings requiring outpatient follow up/ evaluation:  -sclerosis noted in the bilateral femoral heads, suspicious for  avascular necrosis  -Gallbladder is distended and contains multiple stones, but  without gallbladder wall thickening or surrounding fat stranding.  No  intrahepatic or extrahepatic biliary ductal dilatation     Code Status: DNR-confirmed with daughter and patient  Palliative consult appreciated-- Daughter initially wanted Aggressive care but has decided to consider Hospice- IP versus  LTC placement +/- Hospice if not deemed IP Hospice candidate  IP Hospice eval noted- pt not GIP candidate yet- CM working on alternative LTC placement options now with daughter--- still no accepting NH found  Recall Palliative today as pt has in her lucid moments voices she wants to die--     DVT Prophylaxis: Lovenox    Body mass index is 23.91 kg/m². Subjective:     Chief Complaint / Reason for Physician Visit  Opens eyes, not talking much. Discussed with RN events overnight. \"I am ok\"  Alert, very confused  Not able to provide history due to Dementia  BC positive for Proteus    Review of Systems:  Symptom Y/N Comments  Symptom Y/N Comments   Fever/Chills    Chest Pain     Poor Appetite    Edema     Cough    Abdominal Pain     Sputum    Joint Pain     SOB/MARTINEZ    Headache     Nausea/vomit    Tolerating PT/OT     Diarrhea    Tolerating Diet     Constipation    Other       Could NOT obtain due to: Dementia     Objective:     VITALS:   Last 24hrs VS reviewed since prior progress note. Most recent are:  Patient Vitals for the past 24 hrs:   Temp Pulse Resp BP SpO2   12/06/22 1059 98.1 °F (36.7 °C) 78 16 (!) 149/65 100 %   12/06/22 0742 97.5 °F (36.4 °C) 71 16 (!) 151/59 100 %   12/06/22 0421 98.2 °F (36.8 °C) 74 -- (!) 152/69 --   12/05/22 2239 99 °F (37.2 °C) 68 18 (!) 123/40 97 %   12/05/22 2001 98.1 °F (36.7 °C) 68 18 (!) 111/45 98 %   12/05/22 1445 97.9 °F (36.6 °C) 66 -- (!) 116/52 100 %         Intake/Output Summary (Last 24 hours) at 12/6/2022 1340  Last data filed at 12/6/2022 0320  Gross per 24 hour   Intake 120 ml   Output 1075 ml   Net -955 ml          Wt Readings from Last 12 Encounters:   11/29/22 57.4 kg (126 lb 8.7 oz)   07/27/22 99.8 kg (220 lb)   06/01/22 99.8 kg (220 lb)   05/05/22 99.8 kg (220 lb)   03/01/22 99.8 kg (220 lb)   01/26/22 99.8 kg (220 lb)   06/09/21 99.8 kg (220 lb)   06/01/21 93 kg (205 lb)   12/03/20 96.2 kg (212 lb)   10/01/20 96.2 kg (212 lb)   03/12/20 96.2 kg (212 lb)   06/21/19 96.2 kg (212 lb)       PHYSICAL EXAM:    I had a face to face encounter and independently examined this patient on 12/06/22 as outlined below:    General: WD, WN.lethargic, cooperative, no acute distress    EENT:  PERRL. Anicteric sclerae. MMM  Neck:  No meningismus, no thyromegaly  Resp:  CTA bilaterally, no wheezing or rales. No accessory muscle use  CV:  Regular  rhythm,  No edema  GI:  Soft, Non distended, Non tender. +Bowel sounds, no rebound  Neurologic:  Lethargic, confused, not talking much, non focal motor exam  Psych:   Not anxious nor agitated  Skin:  No rashes. No jaundice, wound in sacrum bandaged, not viewed          Reviewed most current lab test results and cultures  YES  Reviewed most current radiology test results   YES  Review and summation of old records today    NO  Reviewed patient's current orders and MAR    YES  PMH/SH reviewed - no change compared to H&P  ________________________________________________________________________  Care Plan discussed with:    Comments   Patient x    Family      RN x    Care Manager x Rosalba Lank   Consultant  x Dr Rima Juarez (ID) yesterday                    x Multidiciplinary team rounds were held today with , nursing, pharmacist and clinical coordinator. Patient's plan of care was discussed; medications were reviewed and discharge planning was addressed. ________________________________________________________________________  Total time: 16 mins    Comments   >50% of visit spent in counseling and coordination of care x    ________________________________________________________________________  Ortega Hodge MD     Procedures: see electronic medical records for all procedures/Xrays and details which were not copied into this note but were reviewed prior to creation of Plan. LABS:  I reviewed today's most current labs and imaging studies. Pertinent labs include:  No results for input(s): WBC, HGB, HCT, PLT, HGBEXT, HCTEXT, PLTEXT, HGBEXT, HCTEXT, PLTEXT in the last 72 hours.     Recent Labs     12/06/22  0308 12/04/22  0138    143   K 3.6 3.8    109*   CO2 30 30   GLU 88 202*   BUN 11 14   CREA 0.64 0.78   CA 8.6 8.1*

## 2022-12-06 NOTE — PROGRESS NOTES
Problem: Dysphagia (Adult)  Goal: *Acute Goals and Plan of Care (Insert Text)  Description: 11/28/2022  Speech path goals  1. Patient will tolerate soft/bite size diet with thins with no overt s/s of aspiration. Outcome: Resolved/Met        SPEECH LANGUAGE PATHOLOGY DYSPHAGIA TREATMENT/DISCHARGE  Patient: Nicholas Ruiz (27 y.o. female)  Date: 12/6/2022  Diagnosis: Sacral osteomyelitis ([facility] [M46.28] <principal problem not specified>  Procedure(s) (LRB):  debridement sacral ulcer (N/A) 9 Days Post-Op  Precautions:       ASSESSMENT:  Assisted patient with lunch tray of soft and bite sized texture/ thin liquids. Patient with continued, prolonged mastication and delayed posterior propulsion with this texture but achieved full oral clearance. No overt s/s aspiration with successive straw sips of thins. Patient at increased aspiration risk given dependence for feeding and refusal for full upright positioning. PLAN:  -- continue on soft and bite sized diet/ thin liquids. Straws preferred  -- meds as tolerated  -- alternate liquid/solid  -- 1:1 assist with all meals    Patient will be discharged from acute skilled speech therapy at this time. Rationale for discharge:  [facility] reached    Discharge Recommendations:  None     SUBJECTIVE:   Patient stated SALT\"! OBJECTIVE:   Cognitive and Communication Status:  Neurologic State: Alert  Orientation Level: Oriented to person  Cognition: Follows commands    Perception: Appears intact    Perseveration: No perseveration noted       Dysphagia Treatment:  Oral Assessment:  Oral Assessment  Labial: No impairment  Dentition: Full; Intact  P.O. Trials:  Patient Position: up in bed  Vocal quality prior to P.O.: No impairment  Consistency Presented:  Thin liquid;Mechanical soft  How Presented: SLP-fed/presented;Spoon;Straw     Bolus Acceptance: No impairment  Bolus Formation/Control: Impaired  Type of Impairment: Delayed;Mastication  Propulsion: Delayed (# of seconds)  Oral Residue: None  Initiation of Swallow: No impairment     Aspiration Signs/Symptoms: None              Oral Phase Severity: Mild  Pharyngeal Phase Severity : No impairment                                 NOMS:   The NOMS functional outcome measure was used to quantify this patient's level of swallowing impairment. Based on the NOMS, the patient was determined to be at level 6 for swallow function     NOMS Swallowing Levels:  Level 1 (CN): NPO  Level 2 (CM): NPO but takes consistency in therapy  Level 3 (CL): Takes less than 50% of nutrition p.o. and continues with nonoral feedings; and/or safe with mod cues; and/or max diet restriction  Level 4 (CK): Safe swallow but needs mod cues; and/or mod diet restriction; and/or still requires some nonoral feeding/supplements  Level 5 (CJ): Safe swallow with min diet restriction; and/or needs min cues  Level 6 (CI): Independent with p.o.; rare cues; usually self cues; may need to avoid some foods or needs extra time  Level 7 ([address] [place] [address]): Independent for all p.o.  VALERI. (2003). National Outcomes Measurement System (NOMS): Adult Speech-Language Pathology User's Guide. Pain:  Pain Scale 1: Numeric (0 - 10)  Pain Intensity 1: 10  Pain Location 1: Generalized    After treatment:   Patient left in no apparent distress in bed, Call bell within reach, and Nursing notified    COMMUNICATION/EDUCATION:     The patient's plan of care including recommendations, planned interventions, and recommended diet changes were discussed with: Registered nurse.      Trista Hunt SLP  Time Calculation: 25 mins

## 2022-12-06 NOTE — PROGRESS NOTES
Transition of Care Plan:     RUR: 13% (low)  Disposition: Patient acceptance to SNF still pending  If SNF or IPR: Date FOC offered: 12/1/2022. Date FOC received: 12/2/2022. Date authorization started with reference number: N/A  Date authorization received and expires: N/A  Accepting facility: TBD. Follow up appointments: PCP/specialists if needed. DME needed: TBD. Transportation at Discharge: Anticipating [facility] will be needed. Keys or means to access home:  Patient has access. IM Medicare Letter: 2nd IM needed prior to discharge. Is patient a [place] and connected with the [place]? N/A            If yes, was [place] transfer form completed and VA notified? N/A  Caregiver Contact: Abel Qureshi - daughter - [phone] - [email or web address] Discharge Caregiver contacted prior to discharge? CM to contact. Care Conference needed?: No.     CM spoke to patient's daughter who states patient will not be considering hospice at the present time. She prefers to go to [facility] as first choice - CM has reached out to [facility] [address] admissions office to see if they can accept the patient - no auth needed.       Tiffanie Tobin, RN, BSN, [address] [place] [address]  Manager of Case Management  [phone]

## 2022-12-06 NOTE — PROGRESS NOTES
Attempted to do wound care with this patient , but patient refused,. She previously received pain medication in preparation for wound care.  Patient refused wound care and asked that it be done at 4pm.

## 2022-12-06 NOTE — PROGRESS NOTES
Spiritual Care Assessment/Progress Note  [facility]      NAME: Blaine Harp      MRN: [id number]  AGE: 65 y.o. SEX: female  Buddhist Affiliation: Caodaism   Language: English     12/6/2022     Total Time (in minutes): 9     Spiritual Assessment begun in [facility] 3 SURG TELE through conversation with:         [x]Patient        [] Family    [] Friend(s)        Reason for Consult: Initial/Spiritual assessment, patient floor     Spiritual beliefs: (Please include comment if needed)     [] Identifies with a briseyda tradition:         [] Supported by a briseyda community:            [] Claims no spiritual orientation:           [] Seeking spiritual identity:                [] Adheres to an individual form of spirituality:           [x] Not able to assess:                           Identified resources for coping:      [] Prayer                               [] Music                  [] Guided Imagery     [] Family/friends                 [] Pet visits     [] Devotional reading                         [x] Unknown     [] Other:                                               Interventions offered during this visit: (See comments for more details)    Patient Interventions: Initial/Spiritual assessment, patient floor           Plan of Care:     [x] Support spiritual and/or cultural needs    [] Support AMD and/or advance care planning process      [] Support grieving process   [] Coordinate Rites and/or Rituals    [] Coordination with community clergy   [] No spiritual needs identified at this time   [] Detailed Plan of Care below (See Comments)  [] Make referral to Music Therapy  [] Make referral to Pet Therapy     [] Make referral to Addiction services  [] Make referral to [facility]  [] Make referral to Spiritual Care Partner  [] No future visits requested        [x] Contact Spiritual Care for further referrals     Comments:  reviewed the patient's chart prior to the visit.  Ms. Albaro Morin is receiving medical care. [address] will attempt to follow up today.  services are available 24 hours a day as requested. Rev. BRUNO Ruffin Aas.  [facility]   Paging Service [phone]

## 2022-12-06 NOTE — HOSPICE
[facility] Help to Those in Need  [phone]    Patient Name: Emma Powers  YOB: 1945  Age: 65 y.o. [facility] RN Note:  Hospice consult noted. Chart reviewed. Patient is not GIP . She may be followed by hospice outside of the hospital setting if family agrees. We will follow at a distance. Please contact if we are needed. Thank you for the opportunity to be of service to this patient.    Corby Rios RN   [phone]

## 2022-12-07 LAB
GLUCOSE BLD STRIP.AUTO-MCNC: 174 MG/DL (ref 65–117)
GLUCOSE BLD STRIP.AUTO-MCNC: 184 MG/DL (ref 65–117)
GLUCOSE BLD STRIP.AUTO-MCNC: 228 MG/DL (ref 65–117)
GLUCOSE BLD STRIP.AUTO-MCNC: 299 MG/DL (ref 65–117)
SERVICE CMNT-IMP: ABNORMAL

## 2022-12-07 PROCEDURE — 74011000250 HC RX REV CODE- 250: Performed by: INTERNAL MEDICINE

## 2022-12-07 PROCEDURE — 74011636637 HC RX REV CODE- 636/637: Performed by: INTERNAL MEDICINE

## 2022-12-07 PROCEDURE — 74011000250 HC RX REV CODE- 250: Performed by: SURGERY

## 2022-12-07 PROCEDURE — 65270000029 HC RM PRIVATE

## 2022-12-07 PROCEDURE — 74011250636 HC RX REV CODE- 250/636: Performed by: INTERNAL MEDICINE

## 2022-12-07 PROCEDURE — 74011000250 HC RX REV CODE- 250: Performed by: STUDENT IN AN ORGANIZED HEALTH CARE EDUCATION/TRAINING PROGRAM

## 2022-12-07 PROCEDURE — 74011250637 HC RX REV CODE- 250/637: Performed by: SURGERY

## 2022-12-07 PROCEDURE — 77010033678 HC OXYGEN DAILY

## 2022-12-07 PROCEDURE — 82962 GLUCOSE BLOOD TEST: CPT

## 2022-12-07 PROCEDURE — 94760 N-INVAS EAR/PLS OXIMETRY 1: CPT

## 2022-12-07 PROCEDURE — 74011000258 HC RX REV CODE- 258: Performed by: INTERNAL MEDICINE

## 2022-12-07 PROCEDURE — 74011250637 HC RX REV CODE- 250/637: Performed by: STUDENT IN AN ORGANIZED HEALTH CARE EDUCATION/TRAINING PROGRAM

## 2022-12-07 PROCEDURE — 74011250636 HC RX REV CODE- 250/636: Performed by: STUDENT IN AN ORGANIZED HEALTH CARE EDUCATION/TRAINING PROGRAM

## 2022-12-07 RX ADMIN — Medication 2 UNITS: at 12:38

## 2022-12-07 RX ADMIN — ONDANSETRON 4 MG: 4 TABLET, ORALLY DISINTEGRATING ORAL at 20:43

## 2022-12-07 RX ADMIN — CLONIDINE HYDROCHLORIDE 0.1 MG: 0.1 TABLET ORAL at 12:32

## 2022-12-07 RX ADMIN — CARVEDILOL 12.5 MG: 12.5 TABLET, FILM COATED ORAL at 18:01

## 2022-12-07 RX ADMIN — AMPICILLIN SODIUM AND SULBACTAM SODIUM 3 G: 2; 1 INJECTION, POWDER, FOR SOLUTION INTRAMUSCULAR; INTRAVENOUS at 12:42

## 2022-12-07 RX ADMIN — INSULIN GLARGINE 7 UNITS: 100 INJECTION, SOLUTION SUBCUTANEOUS at 22:55

## 2022-12-07 RX ADMIN — SODIUM CHLORIDE, PRESERVATIVE FREE 10 ML: 5 INJECTION INTRAVENOUS at 22:54

## 2022-12-07 RX ADMIN — OXYCODONE 5 MG: 5 TABLET ORAL at 20:44

## 2022-12-07 RX ADMIN — Medication: at 21:00

## 2022-12-07 RX ADMIN — Medication 1 AMPULE: at 20:43

## 2022-12-07 RX ADMIN — COLLAGENASE SANTYL: 250 OINTMENT TOPICAL at 18:02

## 2022-12-07 RX ADMIN — AMPICILLIN SODIUM AND SULBACTAM SODIUM 3 G: 2; 1 INJECTION, POWDER, FOR SOLUTION INTRAMUSCULAR; INTRAVENOUS at 17:57

## 2022-12-07 RX ADMIN — SODIUM CHLORIDE, PRESERVATIVE FREE 10 ML: 5 INJECTION INTRAVENOUS at 18:05

## 2022-12-07 RX ADMIN — Medication: at 18:05

## 2022-12-07 RX ADMIN — ASPIRIN 325 MG ORAL TABLET 325 MG: 325 PILL ORAL at 12:32

## 2022-12-07 RX ADMIN — PRAVASTATIN SODIUM 80 MG: 40 TABLET ORAL at 22:54

## 2022-12-07 RX ADMIN — OXYCODONE 5 MG: 5 TABLET ORAL at 16:13

## 2022-12-07 RX ADMIN — Medication 3 UNITS: at 22:54

## 2022-12-07 RX ADMIN — CLONIDINE HYDROCHLORIDE 0.1 MG: 0.1 TABLET ORAL at 18:00

## 2022-12-07 RX ADMIN — AMPICILLIN SODIUM AND SULBACTAM SODIUM 3 G: 2; 1 INJECTION, POWDER, FOR SOLUTION INTRAMUSCULAR; INTRAVENOUS at 05:30

## 2022-12-07 RX ADMIN — Medication 3 UNITS: at 16:30

## 2022-12-07 RX ADMIN — CARVEDILOL 12.5 MG: 12.5 TABLET, FILM COATED ORAL at 12:32

## 2022-12-07 RX ADMIN — SODIUM CHLORIDE, PRESERVATIVE FREE 10 ML: 5 INJECTION INTRAVENOUS at 06:01

## 2022-12-07 RX ADMIN — AMLODIPINE BESYLATE 10 MG: 5 TABLET ORAL at 12:32

## 2022-12-07 RX ADMIN — OXYCODONE 5 MG: 5 TABLET ORAL at 04:59

## 2022-12-07 RX ADMIN — OXYCODONE 5 MG: 5 TABLET ORAL at 00:14

## 2022-12-07 RX ADMIN — AMPICILLIN SODIUM AND SULBACTAM SODIUM 3 G: 2; 1 INJECTION, POWDER, FOR SOLUTION INTRAMUSCULAR; INTRAVENOUS at 23:38

## 2022-12-07 NOTE — PROGRESS NOTES
Patient discussed with [place] during IDR's. Dr. Amina Sierra to speak to patient and her daughter regarding plan of care. CM has left another message with admissions office at [facility] to see if they can accept patient to their facility. [facility] by [facility] and is not currently GIP appropriate. [facility] could follow patient outside of hospital if requested. Per CM conversation with patient's daughter - hospice is not being considered at present time.      German Farmer, RN, BSN, [address] [place] [address]  Manager of Case Management  [phone]

## 2022-12-07 NOTE — PROGRESS NOTES
Hospitalist Progress Note    NAME: Junior Polanco   :  1945   MRN:  [id number]     Admit date: 2022    Today's date: 22    PCP: Neel Dennis [place]    Anticipated discharge date: ? Barriers:  wound care, LTC/SNF placement with Hospice care if pt not IP hospice candidate    Assessment / Plan:    Severe sepsis POA WBC 15.6, , RR 35, lactate 3.19  Proteus bacteremia POA  Necrotic sacral ulcer with extensive osteomyelitis POA  Lactic acidosis POA  Unstageable Multiple pressure injuries POA- R heel, LHeel, L upper back, R calf, L calf, L Hip  Debility  Prior CVA with residual left-sided deficits  CT abdomen/pelvis IMPRESSION  1. Large sacral decubitus ulcer with associated extensive osteomyelitis of the  distal sacrum and coccyx with associated erosive changes, as well as gas and  fluid containing collection within the posterior subcutaneous soft tissues  communicating with the skin surface as above, right larger than left. 2. Distended gallbladder with cholelithiasis, but no convincing CT evidence of  acute cholecystitis. General surgery consulted, OR on 2022   POSTOPERATIVE DIAGNOSIS:  Extensive septic sacral deep tissue injury with abscess. PROCEDURE PERFORMED:  Debridement of sacral deep tissue injury including skin, subcutaneous tissue and muscle.   Wound culture from [facility]   4+ [address]   GRAM STAIN   3+ GRAM POSITIVE 1 [facility]   Culture result:  PENDING P       Continue empiric Zosyn and vancomycin for [facility] with proteus  Wound care consulted  Continue Walker catheter to promote wound healing  Incentive spirometry to prevent atelectasis  Nutrition consulted for supplementation recommendations to assist in wound healing  PT/OT consulted-- needs LTC placement if family unable to care at home due to extensive wound care needed  Discussed with family patient would likely require rehab facility if not long-term care, Pt not IP Hospice candidate per eval 12/2 by hospice-- discussed with daughter-- aware, will likely need placement at LTC +/- Comfort care once arranged  Speech following  Consult ID appreciated-- recommends Zosyn till 12/5 then IV Unasyn for 6 weeks if plan is not comfort care, DCd Vancomycin 12/4 as repeat blood Cx (12/2) remains negative x 3 days        Diabetes mellitus type 2 POA   Hemoglobin A1c 6.8 this admission    Cont Decreased lantus at 7 units nightly now  Change SSI to QID  Add pre meal insulin pending on sugars     Hypomagnesemia 1.6   Hypokalemia- 3.6 now    S/p Mag IV 1 g x 1 12/1    UTI due to chronic indwelling Walker POA  UA nitrite +, > 100 WBC, 5 to 10 RBC, 2+ bacteremia  Urine culture with GNR  Walker care ordered  Continue IV antibiotics as above per ID recc     Essential hypertension POA  Paroxysmal atrial fibrillation  Paroxsymal Vtachs noted  Hyperlipidemia POA  Continue PTA amlodipine, aspirin, carvedilol, clonidine, pravastatin  Replenish Lytes-- Mag as above  Pt is DNR     Dementia  Monitor for signs of delirium       Incidental findings requiring outpatient follow up/ evaluation:  -sclerosis noted in the bilateral femoral heads, suspicious for  avascular necrosis  -Gallbladder is distended and contains multiple stones, but  without gallbladder wall thickening or surrounding fat stranding.  No  intrahepatic or extrahepatic biliary ductal dilatation     Code Status: DNR-confirmed with daughter and patient  Palliative consult appreciated-- Daughter initially wanted Aggressive care but has decided to consider Hospice- IP versus  LTC placement +/- Hospice if not deemed IP Hospice candidate  IP Hospice eval noted- pt not GIP candidate yet- CM working on alternative LTC placement options now with daughter    Attempted to call the patient's daughter today 12/07 to discuss treatment plan, multiple attempts failed. We will attempt again at a later time. DVT Prophylaxis: Lovenox    Body mass index is 23.91 kg/m². Subjective:     Chief Complaint / Reason for Physician Visit    Patient was seen and examined this morning. She is refusing care and is asking to be left alone. She is refusing labs and does not want any treatment    Review of Systems:  Symptom Y/N Comments  Symptom Y/N Comments   Fever/Chills    Chest Pain     Poor Appetite    Edema     Cough    Abdominal Pain     Sputum    Joint Pain     SOB/MARTINEZ    Headache     Nausea/vomit    Tolerating PT/OT     Diarrhea    Tolerating Diet     Constipation    Other       Could NOT obtain due to: Dementia     Objective:     VITALS:   Last 24hrs VS reviewed since prior progress note. Most recent are:  Patient Vitals for the past 24 hrs:   Temp Pulse Resp BP SpO2   12/07/22 0008 98.1 °F (36.7 °C) 67 20 130/69 96 %   12/06/22 2001 98.1 °F (36.7 °C) 65 16 (!) 115/47 100 %   12/06/22 1515 98.1 °F (36.7 °C) 66 16 (!) 103/42 100 %         Intake/Output Summary (Last 24 hours) at 12/7/2022 1120  Last data filed at 12/6/2022 1915  Gross per 24 hour   Intake 100 ml   Output 250 ml   Net -150 ml          Wt Readings from Last 12 Encounters:   11/29/22 57.4 kg (126 lb 8.7 oz)   07/27/22 99.8 kg (220 lb)   06/01/22 99.8 kg (220 lb)   05/05/22 99.8 kg (220 lb)   03/01/22 99.8 kg (220 lb)   01/26/22 99.8 kg (220 lb)   06/09/21 99.8 kg (220 lb)   06/01/21 93 kg (205 lb)   12/03/20 96.2 kg (212 lb)   10/01/20 96.2 kg (212 lb)   03/12/20 96.2 kg (212 lb)   06/21/19 96.2 kg (212 lb)       PHYSICAL EXAM:    I had a face to face encounter and independently examined this patient on 12/07/22 as outlined below:    General: WD, WN.lethargic, cooperative, no acute distress    EENT:  PERRL. Anicteric sclerae. MMM  Neck:  No meningismus, no thyromegaly  Resp:  CTA bilaterally, no wheezing or rales.   No accessory muscle use  CV:  Regular  rhythm,  No edema  GI:  Soft, Non distended, Non tender. +Bowel sounds, no rebound  Neurologic:  Lethargic, confused, not talking much, non focal motor exam  Psych:   Not anxious nor agitated  Skin:  No rashes. No jaundice, wound in sacrum bandaged, not viewed          Reviewed most current lab test results and cultures  YES  Reviewed most current radiology test results   YES  Review and summation of old records today    NO  Reviewed patient's current orders and MAR    YES  PMH/SH reviewed - no change compared to H&P  ________________________________________________________________________  Care Plan discussed with:    Comments   Patient x    Family      RN x    Care Manager x Armida Plascencia   Consultant  x Dr Lisa Blank (ID)                    x Multidiciplinary team rounds were held today with , nursing, pharmacist and clinical coordinator. Patient's plan of care was discussed; medications were reviewed and discharge planning was addressed. ________________________________________________________________________  Total time: 16 mins    Comments   >50% of visit spent in counseling and coordination of care x    ________________________________________________________________________  Wh[place]ett Lauren, DO     Procedures: see electronic medical records for all procedures/Xrays and details which were not copied into this note but were reviewed prior to creation of Plan. LABS:  I reviewed today's most current labs and imaging studies. Pertinent labs include:  No results for input(s): WBC, HGB, HCT, PLT, HGBEXT, HCTEXT, PLTEXT, HGBEXT, HCTEXT, PLTEXT in the last 72 hours.     Recent Labs     12/06/22  0308      K 3.6      CO2 30   GLU 88   BUN 11   CREA 0.64   CA 8.6

## 2022-12-07 NOTE — PROGRESS NOTES
Palliative Medicine      Code Status: DNR    Advance Care Planning:  Advance Care Planning 12/2/2022   Patient's Healthcare Decision Maker is: -spouse, Katie Walter   Confirm Advance Directive DDNR   Patient Would Like to Complete Advance Directive -Patient does not appear able at this time. Patient / Family Encounter Documentation    Participants (names): Shyam Gomez, Lou Tejeda, [place] Darleen Siemens, daughter, by phone)     Narrative: This writer reviewed chart and introduced self to patient, who was lying on her back, moaning, and when asked where she was hurting, said, \"All over. \"  Her nurse reported that she had just been given pain medicine and had just had wound care. Provided calm, caring presence. Patient said she was hungry and thirsty. [facility] offered her the cup of ice water at bedside, which she refused saying it was not water, but \"oil, o-I-l.\"  This writer reported this to her nurse. Consulted Palliative NP Ronnie Pereira. This writer called spouse and son and their # is not working. Called daughter, Su Perez, who is planning to visit 12/9 and who has no way to reach her father and brother until she is in [place]. She has tentatively agreed to bring them to [address] [facility] that afternoon. (Care manager reported that [facility] has accepted patient for admission tomorrow)    Psychosocial Issues Identified/ Resilience Factors:   Patient lives with her  Panda Rosa and son Rj Stauffer, whose phones are not working. Her daughter Su Perez lives in [place] and visits when she can. Patient has DDNR signed by her spouse on file, but no other AMDs. Caregiver [place]: high  Does the caregiver feel confident administering medication? Needs further assessmnet. Does the caregiver need any help connecting with community resources? Yes. Does the caregiver feel confident assisting with activities of daily living? No    Goals of Care / Plan:   Patient will have symptoms managed.   Family meeting is scheduled for 2p tomorrow to discuss [address], including hospice, if family is willing to consider. (Care manager reported that [facility] has accepted patient for admission tomorrow). Palliative team will provide education and support as appropriate. Thank you for including Palliative Medicine in the care of Ms. Dalia Jennings.     Onieda Sutherland, THEA  [phone] ([phone])

## 2022-12-07 NOTE — PROGRESS NOTES
.End of Shift Note    Bedside shift change report given to ELANA Mcmillan (oncoming nurse) by Patric Beth LPN (offgoing nurse). Report included the following information SBAR, Kardex, ED Summary, Intake/Output, MAR, and Recent Results    Shift worked:  7am-7pm     Shift summary and any significant changes:     Continue with plan of care, encourage to drink and eat meals, requires feeding assistance. Patient eating 25% of meals at times. Patient has been refusing care this am, refused labs, refused to eat breakfast, did eat lunch,. Patient refusing to to turn in bed. In specialty bed. Alert but disoriented to time and place, situation. Patient agitated in AM.      Concerns for physician to address:  Discharge planning     Zone phone for oncoming shift:   [phone]       Activity:  Activity Level: Bed Rest  Number times ambulated in hallways past shift: 0  Number of times OOB to chair past shift: 0    Cardiac:   Cardiac Monitoring: Yes      Cardiac Rhythm: Sinus Rhythm, Sinus Arrhythmia, Non-Sustained V Tach (7 beat run V-tachDr. sanchez)    Access:  Current line(s): PIV     Genitourinary:   Urinary status: seymour    Respiratory:   O2 Device: (P) None (Room air)  Chronic home O2 use?: NO  Incentive spirometer at bedside: NO       GI:  Last Bowel Movement Date: (P) 12/03/22  Current diet:  ADULT DIET Dysphagia - Soft & Bite Sized; 4 carb choices (60 gm/meal)  ADULT ORAL NUTRITION SUPPLEMENT Dinner, Breakfast; Diabetic Supplement  DIET ONE TIME MESSAGE  Passing flatus: YES  Tolerating current diet: YES       Pain Management:   Patient states pain is manageable on current regimen: YES    Skin:  Basilio Score: 12  Interventions: float heels and nutritional support     Patient Safety:  Fall Score:  Total Score: 3  Interventions: pt to call before getting OOB and stay with me (per policy)  High Fall Risk: Yes    Length of Stay:  Expected LOS: 9d 14h  Actual LOS: [address], LPN

## 2022-12-08 LAB
GLUCOSE BLD STRIP.AUTO-MCNC: 131 MG/DL (ref 65–117)
GLUCOSE BLD STRIP.AUTO-MCNC: 227 MG/DL (ref 65–117)
GLUCOSE BLD STRIP.AUTO-MCNC: 234 MG/DL (ref 65–117)
GLUCOSE BLD STRIP.AUTO-MCNC: 248 MG/DL (ref 65–117)
SERVICE CMNT-IMP: ABNORMAL

## 2022-12-08 PROCEDURE — 82962 GLUCOSE BLOOD TEST: CPT

## 2022-12-08 PROCEDURE — 94760 N-INVAS EAR/PLS OXIMETRY 1: CPT

## 2022-12-08 PROCEDURE — 65270000029 HC RM PRIVATE

## 2022-12-08 PROCEDURE — 74011250637 HC RX REV CODE- 250/637: Performed by: STUDENT IN AN ORGANIZED HEALTH CARE EDUCATION/TRAINING PROGRAM

## 2022-12-08 PROCEDURE — 74011636637 HC RX REV CODE- 636/637: Performed by: INTERNAL MEDICINE

## 2022-12-08 PROCEDURE — 74011250636 HC RX REV CODE- 250/636: Performed by: GENERAL ACUTE CARE HOSPITAL

## 2022-12-08 PROCEDURE — 74011250636 HC RX REV CODE- 250/636: Performed by: INTERNAL MEDICINE

## 2022-12-08 PROCEDURE — 99233 SBSQ HOSP IP/OBS HIGH 50: CPT | Performed by: INTERNAL MEDICINE

## 2022-12-08 PROCEDURE — 99232 SBSQ HOSP IP/OBS MODERATE 35: CPT | Performed by: NURSE PRACTITIONER

## 2022-12-08 PROCEDURE — 74011000258 HC RX REV CODE- 258: Performed by: INTERNAL MEDICINE

## 2022-12-08 PROCEDURE — 74011000250 HC RX REV CODE- 250: Performed by: STUDENT IN AN ORGANIZED HEALTH CARE EDUCATION/TRAINING PROGRAM

## 2022-12-08 RX ORDER — ENOXAPARIN SODIUM 100 MG/ML
30 INJECTION SUBCUTANEOUS EVERY 24 HOURS
Status: DISCONTINUED | OUTPATIENT
Start: 2022-12-08 | End: 2022-12-15 | Stop reason: HOSPADM

## 2022-12-08 RX ADMIN — CLONIDINE HYDROCHLORIDE 0.1 MG: 0.1 TABLET ORAL at 17:48

## 2022-12-08 RX ADMIN — AMPICILLIN SODIUM AND SULBACTAM SODIUM 3 G: 2; 1 INJECTION, POWDER, FOR SOLUTION INTRAMUSCULAR; INTRAVENOUS at 05:44

## 2022-12-08 RX ADMIN — CLONIDINE HYDROCHLORIDE 0.1 MG: 0.1 TABLET ORAL at 09:00

## 2022-12-08 RX ADMIN — SODIUM CHLORIDE, PRESERVATIVE FREE 10 ML: 5 INJECTION INTRAVENOUS at 23:00

## 2022-12-08 RX ADMIN — COLLAGENASE SANTYL: 250 OINTMENT TOPICAL at 09:01

## 2022-12-08 RX ADMIN — OXYCODONE 5 MG: 5 TABLET ORAL at 08:59

## 2022-12-08 RX ADMIN — AMLODIPINE BESYLATE 10 MG: 5 TABLET ORAL at 08:59

## 2022-12-08 RX ADMIN — AMPICILLIN SODIUM AND SULBACTAM SODIUM 3 G: 2; 1 INJECTION, POWDER, FOR SOLUTION INTRAMUSCULAR; INTRAVENOUS at 17:47

## 2022-12-08 RX ADMIN — OXYCODONE 5 MG: 5 TABLET ORAL at 01:20

## 2022-12-08 RX ADMIN — Medication 3 UNITS: at 09:00

## 2022-12-08 RX ADMIN — AMPICILLIN SODIUM AND SULBACTAM SODIUM 3 G: 2; 1 INJECTION, POWDER, FOR SOLUTION INTRAMUSCULAR; INTRAVENOUS at 23:15

## 2022-12-08 RX ADMIN — CARVEDILOL 12.5 MG: 12.5 TABLET, FILM COATED ORAL at 09:00

## 2022-12-08 RX ADMIN — Medication 3 UNITS: at 17:48

## 2022-12-08 RX ADMIN — Medication: at 09:02

## 2022-12-08 RX ADMIN — CARVEDILOL 12.5 MG: 12.5 TABLET, FILM COATED ORAL at 17:48

## 2022-12-08 RX ADMIN — ASPIRIN 325 MG ORAL TABLET 325 MG: 325 PILL ORAL at 09:00

## 2022-12-08 RX ADMIN — ENOXAPARIN SODIUM 30 MG: 100 INJECTION SUBCUTANEOUS at 10:45

## 2022-12-08 RX ADMIN — PRAVASTATIN SODIUM 80 MG: 40 TABLET ORAL at 23:15

## 2022-12-08 RX ADMIN — ACETAMINOPHEN 650 MG: 325 TABLET ORAL at 23:15

## 2022-12-08 RX ADMIN — AMPICILLIN SODIUM AND SULBACTAM SODIUM 3 G: 2; 1 INJECTION, POWDER, FOR SOLUTION INTRAMUSCULAR; INTRAVENOUS at 13:01

## 2022-12-08 NOTE — PROGRESS NOTES
Infectious Disease Progress      Impression    Severe sepsis     Proteus, MRSE bacteremia   Blood cultures 11/27+ for P.mirabilis 1/2  Blood cultures 11/29+ for MRSE 1/2? Contaminant  Negative repeat cultures- 12/2      Necrotic sacral ulcer , acute osteomyelitis   CT abdomen/pelvis 11/27+ for Large sacral decubitus ulcer with associated extensive osteomyelitis of the  distal sacrum and coccyx with associated erosive changes, as well as gas and  fluid containing collection within the posterior subcutaneous soft tissues  communicating with the skin surface. S/p debridement by general surgery 11/28  Intra-Op cultures 11/28+ for heavy P mirabilis/E. Coli( pan S )  Bacteroides & Prevotella beta-lactamase+  Unstageable Multiple pressure injuries on R heel, LHeel, L upper back, R calf, L calf, L Hip      Catheter associated UTI chronic Walker  Urine culture 11/27+ for Providentia stuartii  On Zosyn IV. Diabetes mellitus  A1c 6.8    Advanced dementia      CRP-pending    Plan  S/p Zosyn IV x7 days end date 12/5. This will treat organisms on intraoperative cultures and providentia on UC. Switch to Unasyn from 12/6. Patient would require total of 6 weeks of antimicrobial therapy, wound care, pressure offloading, adequate nutrition   Repeat blood cultures x 2 are negative, MRSE is a probable contaminant. S/p DC of Vancomycin. D/w Dr Alesia Sal, no plan is for Hospice.   Please contact ID with questions, concerns    Antimicrobial orders for discharge  -Unasyn 3 g IV every 6 hours end date 1/9/2023  -Pull PICC at end of therapy on/after 1/9/23   -Weekly CBC, CMP & ESR, CRP every other week-  -Fax reports to [phone], call with critical labs  at [phone]  -Encourage adequate fluids, daily probiotic/yogurt  -Wound care per wound care team recommendations  -If PICC malfunction occurs and home health cannot reposition  please send patient to ED immediately  -ID follow-up -12/27 at 1:30 PM-tanisha Duncan is a 65 y.o.  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 Per H&P patient had reportedly, patient developed sacral pressure injury in August/September that ulcerated and then progressively worsened to the point that patient was hospitalized at [address] [place] [address] from 10/15-11/2. During that time patient was managed with IV antibiotics but refused any surgical debridement and was ultimately discharged home without antibiotics, infectious disease follow-up, or home health services. Since that time daughter reported that family had attempted to keep her clean and provide wound care as demonstrated by PCP who provided home visits. Unfortunately, patient had continued to decline  with extension of her ulcer, obvious necrotic tissue, and development of additional pressure injuries over her body prompting them to present to our facility with request for operative debridement. ROS otherwise negative. Patient reports no other complaints or concerns and denies tobacco, alcohol, or illicit drug use. Patient has been admitted to hospitalist service. S/p debridement by general surgery 11/28. Intra-Op cultures 11/28+ for heavy P mirabilis/E. Coli( pan S ),Bacteroides & Prevotella beta-lactamase+  ID service has been consulted for antibiotic recommendations. Patient seen today.   Patient is resting, arousable nonverbal.    D/w hospitalist.    C/Marques Saleh 1106 Problems    Diagnosis Date Noted    Sacral osteomyelitis ([address] 11/27/2022         Past Medical History:   Diagnosis Date    Anticoagulant long-term use 10/13/2017    Anxiety 10/13/2017    Atrial fibrillation ([address] 10/13/2017    Breast calcification, left 10/13/2017 CAD (coronary artery disease)     Cellulitis of both lower extremities 10/13/2017    Chronic kidney disease     kidney stones    Chronic pain syndrome 10/13/2017    Chronic prescription opiate use 11/15/2017    CVA (cerebral vascular accident) ([address] 10/13/2017    Diabetes ([address]     DJD (degenerative joint disease) 10/13/2017    Dyslipidemia 10/13/2017    Glaucoma 10/13/2017    Hypertension     Hyperthyroidism 10/13/2017    Long-term use of high-risk medication 10/13/2017    Stasis ulcer of lower extremity ([address] 10/13/2017    Stroke ([address]     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 ([address] 8/18/2012    retinopathy        Past Surgical History:   Procedure Laterality Date    HX BREAST BIOPSY Left     2014    HX GYN      hysterectomty    HX ORTHOPAEDIC      back surgery    HI CARDIAC SURG PROCEDURE UNLIST      cagb       Allergies   Allergen Reactions    Betadine Prepstick Rash    Keflex [Cephalexin] Hives     Pt breaks out in hives       Social Connections: Not on file       Family Status   Relation Name Status    Father  (Not Specified)       Medication Documentation Review Audit       Reviewed by Simone Talavera RN (Registered Nurse) on 12/02/22 at 5402      Medication Sig Documenting Provider Last Dose Status Taking? amLODIPine (NORVASC) 10 mg tablet Take 1 Tablet by mouth daily. Deanna Fulton MD 11/27/2022 Active Yes   aspirin (ASPIRIN) 325 mg tablet Take 1 Tab by mouth daily. Deanna Fulton MD 11/27/2022 Active Yes   captopriL (CAPOTEN) 25 mg tablet TAKE 1 TABLET TWICE A DAY   Patient taking differently: 25 mg two (2) times a day.     Deanna Fulton MD 11/27/2022 Active Yes   carvediloL (COREG) 12.5 mg tablet TAKE 1 TABLET TWICE A DAY Daniel Lozano MD 11/27/2022 Active Yes   cloNIDine HCL (CATAPRES) 0.1 mg tablet TAKE 1 TABLET THREE TIMES A DAY Deanna Fulton MD 11/27/2022 Active Yes   ergocalciferol (ERGOCALCIFEROL) 1,250 mcg (50,000 unit) capsule Take 50,000 Units by mouth every seven (7) days. Provider, Historical 11/2/2022 Active Yes   furosemide (LASIX) 40 mg tablet TAKE 1 TABLET DAILY Lorenz Bloch, MD 11/27/2022 Active Yes   insulin lispro (HUMALOG) 100 unit/mL injection As directed   Patient taking differently: Sliding scale    Warren Paez MD 11/27/2022 Active Yes   insulin NPH (NovoLIN N NPH U-100 Insulin) 100 unit/mL injection 5 Units by SubCUTAneous route Before breakfast and dinner. Patient taking differently: 15 Units by SubCUTAneous route Before breakfast and dinner. Warren Paez MD 11/27/2022 Active Yes   pravastatin (PRAVACHOL) 80 mg tablet TAKE 1 TABLET NIGHTLY Lorenz Bloch, MD 11/26/2022 Active Yes   rivaroxaban (Xarelto) 20 mg tab tablet Take 1 Tablet by mouth daily. Lorenz Bloch, MD 11/26/2022 Active Yes   sodium hypochlorite (Dakin's Solution) external solution Apply to wounds twice daily and cover with dressing Sherron Phoenix, [place] Unknown Active No                      Review of Systems -could not obtain due to patient factors      PHYSICAL EXAM:  General:          Awake, cooperative, no acute distress, nonverbalEENT:              EOMI. Anicteric sclerae. MMM  Resp:               CTA bilaterally, no wheezing or rales. No accessory muscle use  CV:                  Regular  rhythm,  No edema  GI:                   Soft, Non distended, Non tender. +Bowel sounds  Neurologic:      Alert and oriented X 3, normal speech,   Psych:             Good insight. Not anxious nor agitated  Skin:                No rashes. No jaundice.   Extremities: No cyanosis, edema    Zeke Walden MD [address]

## 2022-12-08 NOTE — PROGRESS NOTES
Problem: Risk for Spread of Infection  Goal: Prevent transmission of infectious organism to others  Description: Prevent the transmission of infectious organisms to other patients, staff members, and visitors. Outcome: Progressing Towards Goal     Problem: Patient Education:  Go to Education Activity  Goal: Patient/Family Education  Outcome: Progressing Towards Goal     Problem: Falls - Risk of  Goal: *Absence of Falls  Description: Document [place] Fall Risk and appropriate interventions in the flowsheet.   Outcome: Progressing Towards Goal  Note: Fall Risk Interventions:  Mobility Interventions: Bed/chair exit alarm, Communicate number of staff needed for ambulation/transfer, Mechanical lift, Patient to call before getting OOB, PT Consult for mobility concerns, PT Consult for assist device competence, Strengthening exercises (ROM-active/passive), Utilize walker, cane, or other assistive device, Utilize gait belt for transfers/ambulation    Mentation Interventions: Adequate sleep, hydration, pain control, Bed/chair exit alarm, Door open when patient unattended, Gait belt with transfers/ambulation, More frequent rounding, Reorient patient, Room close to nurse's station, Self-releasing belt, Toileting rounds, Update white board    Medication Interventions: Bed/chair exit alarm, Evaluate medications/consider consulting pharmacy, Patient to call before getting OOB, Utilize gait belt for transfers/ambulation, Teach patient to arise slowly    Elimination Interventions: Bed/chair exit alarm, Call light in reach, Patient to call for help with toileting needs, Toilet paper/wipes in reach, Toileting schedule/hourly rounds              Problem: Patient Education: Go to Patient Education Activity  Goal: Patient/Family Education  Outcome: Progressing Towards Goal     Problem: Patient Education: Go to Patient Education Activity  Goal: Patient/Family Education  Outcome: Progressing Towards Goal     Problem: Diabetes Self-Management  Goal: *Disease process and treatment process  Description: Define diabetes and identify own type of diabetes; list 3 options for treating diabetes. Outcome: Progressing Towards Goal  Goal: *Incorporating nutritional management into lifestyle  Description: Describe effect of type, amount and timing of food on blood glucose; list 3 methods for planning meals. Outcome: Progressing Towards Goal  Goal: *Incorporating physical activity into lifestyle  Description: State effect of exercise on blood glucose levels. Outcome: Progressing Towards Goal  Goal: *Developing strategies to promote health/change behavior  Description: Define the ABC's of diabetes; identify appropriate screenings, schedule and personal plan for screenings. Outcome: Progressing Towards Goal  Goal: *Using medications safely  Description: State effect of diabetes medications on diabetes; name diabetes medication taking, action and side effects. Outcome: Progressing Towards Goal  Goal: *Monitoring blood glucose, interpreting and using results  Description: Identify recommended blood glucose targets  and personal targets. Outcome: Progressing Towards Goal  Goal: *Prevention, detection, treatment of acute complications  Description: List symptoms of hyper- and hypoglycemia; describe how to treat low blood sugar and actions for lowering  high blood glucose level. Outcome: Progressing Towards Goal  Goal: *Prevention, detection and treatment of chronic complications  Description: Define the natural course of diabetes and describe the relationship of blood glucose levels to long term complications of diabetes.   Outcome: Progressing Towards Goal  Goal: *Developing strategies to address psychosocial issues  Description: Describe feelings about living with diabetes; identify support needed and support network  Outcome: Progressing Towards Goal  Goal: *Insulin pump training  Outcome: Progressing Towards Goal  Goal: *Sick day guidelines  Outcome: Progressing Towards Goal  Goal: *Patient Specific Goal (EDIT GOAL, INSERT TEXT)  Outcome: Progressing Towards Goal     Problem: Patient Education: Go to Patient Education Activity  Goal: Patient/Family Education  Outcome: Progressing Towards Goal     Problem: Pressure Injury - Risk of  Goal: *Prevention of pressure injury  Description: Document Basilio Scale and appropriate interventions in the flowsheet. Outcome: Progressing Towards Goal  Note: Pressure Injury Interventions:  Sensory Interventions: Assess changes in LOC, Assess need for specialty bed, Avoid rigorous massage over bony prominences, Check visual cues for pain, Discuss PT/OT consult with provider, Float heels, Keep linens dry and wrinkle-free, Maintain/enhance activity level, Minimize linen layers, Monitor skin under medical devices, Pressure redistribution bed/mattress (bed type), Turn and reposition approx.  every two hours (pillows and wedges if needed)    Moisture Interventions: Absorbent underpads, Apply protective barrier, creams and emollients, Assess need for specialty bed, Check for incontinence Q2 hours and as needed, Contain wound drainage, Internal/External urinary devices, Limit adult briefs, Maintain skin hydration (lotion/cream), Minimize layers, Moisture barrier, Offer toileting Q_hr    Activity Interventions: Pressure redistribution bed/mattress(bed type), PT/OT evaluation    Mobility Interventions: Float heels, Pressure redistribution bed/mattress (bed type)    Nutrition Interventions: Document food/fluid/supplement intake, Discuss nutritional consult with provider, Offer support with meals,snacks and hydration    Friction and Shear Interventions: Apply protective barrier, creams and emollients, Lift sheet, Lift team/patient mobility team, Minimize layers, Transfer aides:transfer board/[place] lift/ceiling lift, Transferring/repositioning devices                Problem: Patient Education: Go to Patient Education Activity  Goal: Patient/Family Education  Outcome: Progressing Towards Goal

## 2022-12-08 NOTE — PROGRESS NOTES
.End of Shift Note    Bedside shift change report given to (oncoming nurse) by Adry Nielsen RN (offgoing nurse). Report included the following information SBAR, Kardex, ED Summary, Procedure Summary, Intake/Output, MAR, and Recent Results    Shift worked:  22118-118rl     Shift summary and any significant changes:     Continuing with plan of care. Wound care done. Patient required pain medication x one today. On CBR, turning for wound care and prn, Q 4 hours. Patient in specialty bed. Alert but confused. Patient has Tele monitor at bedside for safety. Patient does not attempt to get out of bed. Concerns for physician to address:  Discharge planning, plan of care     Zone phone for oncoming shift:   [phone]       Activity:  Activity Level: Bed Rest  Number times ambulated in hallways past shift: 0  Number of times OOB to chair past shift: 0    Cardiac:   Cardiac Monitoring: Yes      Cardiac Rhythm: Sinus Rhythm    Access:  Current line(s): PIV     Genitourinary:   Urinary status: seymour    Respiratory:   O2 Device: None (Room air)  Chronic home O2 use?: NO  Incentive spirometer at bedside: NO       GI:  Last Bowel Movement Date: 12/07/22  Current diet:  ADULT DIET Dysphagia - Soft & Bite Sized; 4 carb choices (60 gm/meal)  ADULT ORAL NUTRITION SUPPLEMENT Dinner, Breakfast; Diabetic Supplement  DIET ONE TIME MESSAGE  Passing flatus: YES  Tolerating current diet: YES       Pain Management:   Patient states pain is manageable on current regimen: YES    Skin:  Basilio Score: 12  Interventions: turn team, speciality bed, float heels, and nutritional support     Patient Safety:  Fall Score:  Total Score: 3  Interventions: stay with me (per policy)  High Fall Risk: Yes    Length of Stay:  Expected LOS: 9d 14h  Actual LOS: 11      Adry Nielsen, RN

## 2022-12-08 NOTE — PROGRESS NOTES
Patient has refused multiple medications. Prior to doing wound care the patient was given pain medication, and wound care was attempted during the medication's peak. The patient allow the dressings on her both of her calves to be changed and the dressing on her left heel. However, after the third dressing change the patient refused and further wound care. Patient has been informed of the risk in refusing her medication and wound care.

## 2022-12-09 LAB
BACTERIA SPEC CULT: NORMAL
GLUCOSE BLD STRIP.AUTO-MCNC: 132 MG/DL (ref 65–117)
GLUCOSE BLD STRIP.AUTO-MCNC: 142 MG/DL (ref 65–117)
GLUCOSE BLD STRIP.AUTO-MCNC: 175 MG/DL (ref 65–117)
SERVICE CMNT-IMP: ABNORMAL
SERVICE CMNT-IMP: NORMAL

## 2022-12-09 PROCEDURE — 74011250637 HC RX REV CODE- 250/637: Performed by: SURGERY

## 2022-12-09 PROCEDURE — 74011636637 HC RX REV CODE- 636/637: Performed by: INTERNAL MEDICINE

## 2022-12-09 PROCEDURE — 99233 SBSQ HOSP IP/OBS HIGH 50: CPT | Performed by: INTERNAL MEDICINE

## 2022-12-09 PROCEDURE — 82962 GLUCOSE BLOOD TEST: CPT

## 2022-12-09 PROCEDURE — 74011000258 HC RX REV CODE- 258: Performed by: INTERNAL MEDICINE

## 2022-12-09 PROCEDURE — 74011250636 HC RX REV CODE- 250/636: Performed by: GENERAL ACUTE CARE HOSPITAL

## 2022-12-09 PROCEDURE — 99233 SBSQ HOSP IP/OBS HIGH 50: CPT | Performed by: NURSE PRACTITIONER

## 2022-12-09 PROCEDURE — 65270000029 HC RM PRIVATE

## 2022-12-09 PROCEDURE — 94760 N-INVAS EAR/PLS OXIMETRY 1: CPT

## 2022-12-09 PROCEDURE — 74011000250 HC RX REV CODE- 250: Performed by: STUDENT IN AN ORGANIZED HEALTH CARE EDUCATION/TRAINING PROGRAM

## 2022-12-09 PROCEDURE — 77030040393 HC DRSG OPTIFOAM GENT MDII -B

## 2022-12-09 PROCEDURE — 74011000250 HC RX REV CODE- 250: Performed by: SURGERY

## 2022-12-09 PROCEDURE — 99356 PR PROLONGED SVC I/P OR OBS SETTING 1ST HOUR: CPT | Performed by: NURSE PRACTITIONER

## 2022-12-09 PROCEDURE — 77030040392 HC DRSG OPTIFOAM MDII -A

## 2022-12-09 PROCEDURE — 74011250636 HC RX REV CODE- 250/636: Performed by: INTERNAL MEDICINE

## 2022-12-09 RX ADMIN — Medication: at 09:22

## 2022-12-09 RX ADMIN — CLONIDINE HYDROCHLORIDE 0.1 MG: 0.1 TABLET ORAL at 09:20

## 2022-12-09 RX ADMIN — SODIUM CHLORIDE, PRESERVATIVE FREE 10 ML: 5 INJECTION INTRAVENOUS at 12:29

## 2022-12-09 RX ADMIN — Medication 1 AMPULE: at 09:20

## 2022-12-09 RX ADMIN — INSULIN GLARGINE 7 UNITS: 100 INJECTION, SOLUTION SUBCUTANEOUS at 21:52

## 2022-12-09 RX ADMIN — ASPIRIN 325 MG ORAL TABLET 325 MG: 325 PILL ORAL at 09:20

## 2022-12-09 RX ADMIN — AMPICILLIN SODIUM AND SULBACTAM SODIUM 3 G: 2; 1 INJECTION, POWDER, FOR SOLUTION INTRAMUSCULAR; INTRAVENOUS at 05:48

## 2022-12-09 RX ADMIN — CARVEDILOL 12.5 MG: 12.5 TABLET, FILM COATED ORAL at 09:20

## 2022-12-09 RX ADMIN — AMPICILLIN SODIUM AND SULBACTAM SODIUM 3 G: 2; 1 INJECTION, POWDER, FOR SOLUTION INTRAMUSCULAR; INTRAVENOUS at 12:28

## 2022-12-09 RX ADMIN — COLLAGENASE SANTYL: 250 OINTMENT TOPICAL at 09:22

## 2022-12-09 RX ADMIN — SODIUM CHLORIDE, PRESERVATIVE FREE 10 ML: 5 INJECTION INTRAVENOUS at 21:52

## 2022-12-09 RX ADMIN — Medication 1 AMPULE: at 21:40

## 2022-12-09 RX ADMIN — AMLODIPINE BESYLATE 10 MG: 5 TABLET ORAL at 09:20

## 2022-12-09 RX ADMIN — PRAVASTATIN SODIUM 80 MG: 40 TABLET ORAL at 21:40

## 2022-12-09 RX ADMIN — CARVEDILOL 12.5 MG: 12.5 TABLET, FILM COATED ORAL at 18:17

## 2022-12-09 RX ADMIN — SODIUM CHLORIDE, PRESERVATIVE FREE 10 ML: 5 INJECTION INTRAVENOUS at 05:37

## 2022-12-09 RX ADMIN — Medication 2 UNITS: at 09:20

## 2022-12-09 RX ADMIN — ENOXAPARIN SODIUM 30 MG: 100 INJECTION SUBCUTANEOUS at 09:21

## 2022-12-09 RX ADMIN — AMPICILLIN SODIUM AND SULBACTAM SODIUM 3 G: 2; 1 INJECTION, POWDER, FOR SOLUTION INTRAMUSCULAR; INTRAVENOUS at 18:17

## 2022-12-09 NOTE — PROGRESS NOTES
Problem: Falls - Risk of  Goal: *Absence of Falls  Description: Document Vernia Colder Fall Risk and appropriate interventions in the flowsheet.   Outcome: Progressing Towards Goal  Note: Fall Risk Interventions:  Mobility Interventions: Bed/chair exit alarm    Mentation Interventions: Bed/chair exit alarm    Medication Interventions: Bed/chair exit alarm    Elimination Interventions: Bed/chair exit alarm, Call light in reach

## 2022-12-09 NOTE — PROGRESS NOTES
CM received a phone call from [facility] Aletha Dunlap with follow-up information from the family meeting. It is noted to be clear that patient does not want to pursue IV ABX or PICC line. Family has requested a follow-up session with [facility] to discuss options for hospice at home. Family has requested that going home with hospice, they would like assistance with providing wound care and comfort for patient. Referral sent via CC for follow-up hospice consult.       Dee Fuller RN, BSN, [address] [place] [address]  Manager of Case Management  [phone]

## 2022-12-09 NOTE — PROGRESS NOTES
Problem: Falls - Risk of  Goal: *Absence of Falls  Description: Document Danube Scotty Fall Risk and appropriate interventions in the flowsheet.   Outcome: Progressing Towards Goal  Note: Fall Risk Interventions:  Mobility Interventions: Bed/chair exit alarm    Mentation Interventions: Bed/chair exit alarm    Medication Interventions: Bed/chair exit alarm    Elimination Interventions: Bed/chair exit alarm, Call light in reach

## 2022-12-09 NOTE — CONSULTS
Palliative medicine brief note- Full visit documentation pending      Palliative clinical SW and I met with Mrs. Brad Mix, her , daughter and son. We discussed course of hospitalization,with multiple pressure wounds, including  large necrotic sacral wound, osteomyelitis with cx + numerous organisms that will require 6 weeks IV abx. I showed picture of sacral wound taken today. It was difficult for them to see. We discussed the above in the setting of Ms. Brad Mix being bed bound and that she has poor appetite/intake and is  severely malnourished, with albumin 1.7 and weight loss of approximately 70+ lbs over the past year (this is a conservative estimate). I explained that it is unlikely wound will ever heal due to the above and because of location, will remain high risk of for infection. Mrs. Brad Mix was initially very quiet, but as we discussed goals of care with her family, she said \"I hear you talking about me, Im here and I understand what you are saying\". Daughter asked Mrs. Brad Mix what she wants to do and patient replied \"Just close my eyes and go\" . When asked to clarify where she wants to go, she replied \"Die\". We discussed option of comfort focused care, at a facility or at home. When  had questions about comfort care, Mrs. Brad Mix stated Saravanan Melton are talking about hospice\". Outcome of GOC Discussion- Mrs. Brad Mix was lucid and made it clear that she does NOT want to continue treatments and interventions, does not want PICC or antibiotics, wants to be allowed to die and patient and family (somewhat reluctantly) agreed to [facility] consult. Order for Case Management to consult hospice placed. I also spoke with Hospice liaison Eren Pandya RN about visit and she went up to see patient and family, but family had left and patient declined to answer her questions.      Mr. Brad Mix and their daughter understand what she is saying, but understandable they are struggling with the idea of not continuing to  make every effort to heal wound.  We talked about Hospice providing wound care, but that would not continue IV abx, but can do oral abx, but still with treatment or no treatment, if she does not have adequate nutrition on a regular basis, she will still decline and wound will not heal.

## 2022-12-09 NOTE — PROGRESS NOTES
CM received phone call from [facility] that they have bed available today if patient to be discharged. CM spoke to patient's daughter - Pollo Busby - [phone] - who is currently en route to the hospital to meet with Palliative Care today at 2:00PM to determine goals of care. Ms. Robert Weiner states patient's 's home phone is not working at present time. Ms. Robert Weiner does not want patient to transfer to SNF until she has seen wounds and discussed options during Palliative Care Meeting today. We will need to delay PICC placement as unsure of goals of care. Nurse, Azalia Kelley, and Dr. Charly Osborne updated. ADDENDUM: 2:43PM - phone call from palliative care - still determining course of care - family wants to view the wound to make decisions [facility] does not have a bed available today, but could have a bed available next week pending family decisions. Secondary plan would be for patient to return home with hospice - [facility] [place] [facility] had visited with patient earlier in hospitalization. Would need to re-consult.     Lu He, RN, BSN, [address] [place] [address]  Manager of Case Management  [phone]

## 2022-12-09 NOTE — PROGRESS NOTES
Hospitalist Progress Note    NAME: Lin Titus   :  1945   MRN:  [id number]     Admit date: 2022    Today's date: 22    PCP: Juliette Loaiza MD    Anticipated discharge date: ? Barriers:  wound care, LTC/SNF placement with Hospice care if pt not IP hospice candidate    Assessment / Plan:    Severe sepsis POA WBC 15.6, , RR 35, lactate 3.19  Proteus bacteremia POA  Necrotic sacral ulcer with extensive osteomyelitis POA  Lactic acidosis POA  Unstageable Multiple pressure injuries POA- R heel, LHeel, L upper back, R calf, L calf, L Hip  Debility  Prior CVA with residual left-sided deficits  CT abdomen/pelvis IMPRESSION  1. Large sacral decubitus ulcer with associated extensive osteomyelitis of the  distal sacrum and coccyx with associated erosive changes, as well as gas and  fluid containing collection within the posterior subcutaneous soft tissues  communicating with the skin surface as above, right larger than left. 2. Distended gallbladder with cholelithiasis, but no convincing CT evidence of  acute cholecystitis. General surgery consulted, OR on 2022   POSTOPERATIVE DIAGNOSIS:  Extensive septic sacral deep tissue injury with abscess. PROCEDURE PERFORMED:  Debridement of sacral deep tissue injury including skin, subcutaneous tissue and muscle.   Wound culture from [facility]   4+ [address]   GRAM STAIN   3+ GRAM POSITIVE 1 [facility]   Culture result:  PENDING P       Continue empiric Zosyn and vancomycin for now  [facility] with proteus  Wound care consulted  Continue Walker catheter to promote wound healing  Incentive spirometry to prevent atelectasis  Nutrition consulted for supplementation recommendations to assist in wound healing  PT/OT consulted-- needs LTC placement if family unable to care at home due to extensive wound care needed  Discussed with family patient would likely require rehab facility if not long-term care, Pt not IP Hospice candidate per eval 12/2 by hospice-- discussed with daughter-- aware, will likely need placement at LTC +/- Comfort care once arranged  Speech following  Consult ID appreciated-- recommends Zosyn till 12/5 then IV Unasyn for 6 weeks if plan is not comfort care, DCd Vancomycin 12/4 as repeat blood Cx (12/2) remains negative x 3 days     12/8  Daughter not interested in hospice  Will cont Abx  Needs placement  To consider APS prior to DC from rehab   Cont isolation   Wound care     Diabetes mellitus type 2 POA   Hemoglobin A1c 6.8 this admission    Cont Decreased lantus at 7 units nightly now  Change SSI to QID  Add pre meal insulin pending on sugars     Hypomagnesemia 1.6   Hypokalemia- 3.6 now    S/p Mag IV 1 g x 1 12/1    UTI due to chronic indwelling Walker POA  UA nitrite +, > 100 WBC, 5 to 10 RBC, 2+ bacteremia  Urine culture with GNR  Walker care ordered  Continue IV antibiotics as above per ID recc     Essential hypertension POA  Paroxysmal atrial fibrillation  Paroxsymal Vtachs noted  Hyperlipidemia POA  Continue PTA amlodipine, aspirin, carvedilol, clonidine, pravastatin  Replenish Lytes-- Mag as above  Pt is DNR     Dementia  Monitor for signs of delirium       Incidental findings requiring outpatient follow up/ evaluation:  -sclerosis noted in the bilateral femoral heads, suspicious for  avascular necrosis  -Gallbladder is distended and contains multiple stones, but  without gallbladder wall thickening or surrounding fat stranding.  No  intrahepatic or extrahepatic biliary ductal dilatation     Code Status: DNR-confirmed with daughter and patient  Palliative consult appreciated-- Daughter initially wanted Aggressive care but has decided to consider Hospice- IP versus  LTC placement +/- Hospice if not deemed IP Hospice candidate  IP Hospice eval noted- pt not GIP candidate yet- CM working on alternative LTC placement options now with daughter    Attempted to call the patient's daughter today 12/07 to discuss treatment plan, multiple attempts failed. We will attempt again at a later time. DVT Prophylaxis: Lovenox    Body mass index is 23.91 kg/m². Subjective:     Chief Complaint / Reason for Physician Visit    Patient was seen and examined this morning. No specific complaints     Review of Systems:  Symptom Y/N Comments  Symptom Y/N Comments   Fever/Chills    Chest Pain     Poor Appetite    Edema     Cough    Abdominal Pain     Sputum    Joint Pain     SOB/MARTINEZ    Headache     Nausea/vomit    Tolerating PT/OT     Diarrhea    Tolerating Diet     Constipation    Other       Could NOT obtain due to: Dementia     Objective:     VITALS:   Last 24hrs VS reviewed since prior progress note.  Most recent are:  Patient Vitals for the past 24 hrs:   Temp Pulse Resp BP SpO2   12/08/22 1537 97.5 °F (36.4 °C) (!) 122 18 (!) 123/40 (!) 81 %   12/08/22 1138 97.7 °F (36.5 °C) 62 16 (!) 96/50 97 %   12/08/22 0713 97.7 °F (36.5 °C) 74 18 (!) 151/56 --   12/08/22 0352 98.6 °F (37 °C) 72 -- (!) 147/49 100 %   12/07/22 2256 -- 67 -- (!) 123/51 100 %   12/07/22 2040 98.2 °F (36.8 °C) 69 -- (!) 133/50 98 %         Intake/Output Summary (Last 24 hours) at 12/8/2022 1925  Last data filed at 12/8/2022 1810  Gross per 24 hour   Intake 900 ml   Output 575 ml   Net 325 ml          Wt Readings from Last 12 Encounters:   11/29/22 57.4 kg (126 lb 8.7 oz)   07/27/22 99.8 kg (220 lb)   06/01/22 99.8 kg (220 lb)   05/05/22 99.8 kg (220 lb)   03/01/22 99.8 kg (220 lb)   01/26/22 99.8 kg (220 lb)   06/09/21 99.8 kg (220 lb)   06/01/21 93 kg (205 lb)   12/03/20 96.2 kg (212 lb)   10/01/20 96.2 kg (212 lb)   03/12/20 96.2 kg (212 lb)   06/21/19 96.2 kg (212 lb)       PHYSICAL EXAM:    I had a face to face encounter and independently examined this patient on 12/08/22 as outlined below:    General: WD, WN.lethargic, cooperative, no acute distress    EENT:  PERRL. Anicteric sclerae. MMM  Neck:  No meningismus, no thyromegaly  Resp:  CTA bilaterally, no wheezing or rales. No accessory muscle use  CV:  Regular  rhythm,  No edema  GI:  Soft, Non distended, Non tender. +Bowel sounds, no rebound  Neurologic:  awake, confused, not talking much, non focal motor exam  Psych:   Not anxious nor agitated  Skin:  No rashes. No jaundice, wound in sacrum bandaged, not viewed          Reviewed most current lab test results and cultures  YES  Reviewed most current radiology test results   YES  Review and summation of old records today    NO  Reviewed patient's current orders and MAR    YES  PMH/SH reviewed - no change compared to H&P  ________________________________________________________________________  Care Plan discussed with:    Comments   Patient x    Family      RN x    Care Manager x Rosalba Lank   Consultant  x Dr Rima Juarez (ID)                    x Multidiciplinary team rounds were held today with , nursing, pharmacist and clinical coordinator. Patient's plan of care was discussed; medications were reviewed and discharge planning was addressed. ________________________________________________________________________  Total time: 32 mins    Comments   >50% of visit spent in counseling and coordination of care x    ________________________________________________________________________  Mayank Dodson MD     Procedures: see electronic medical records for all procedures/Xrays and details which were not copied into this note but were reviewed prior to creation of Plan. LABS:  I reviewed today's most current labs and imaging studies. Pertinent labs include:  No results for input(s): WBC, HGB, HCT, PLT, HGBEXT, HCTEXT, PLTEXT, HGBEXT, HCTEXT, PLTEXT in the last 72 hours.     Recent Labs     12/06/22  0308      K 3.6      CO2 30   GLU 88   BUN 11   CREA 0.64   CA 8.6

## 2022-12-09 NOTE — PROGRESS NOTES
0715-  Bedside and Verbal shift change report given to Manish Cano RN (oncoming nurse) by Ximena Ford RN (offgoing nurse). Report included the following information SBAR, Kardex, Intake/Output, MAR, and Recent Results. 1915-  Bedside and Verbal shift change report given to Boogie Rios RN (oncoming nurse) by Manish Cano RN (offgoing nurse). Report included the following information SBAR, Kardex, Intake/Output, MAR, and Recent Results.

## 2022-12-09 NOTE — WOUND CARE
Wound Care consult for wound reassessment:  Pt. Was last seen by wound care on 11/29/22 and specific wound care has been ordered for her. Pt. Does protest sometimes but needs to be talked into getting her wound care done daily. Pt. Often says, \"I just want to die\". Her family is involved in her care and is meeting with Palliative at some point to discuss this Apparently she will need an inpatient option because her  cannot care for her at home. Apparently she has been \"refusing\" her wound care and the last dressing was dated for 12-7-22. The drainage was moderate and the dressing was completely dry. Assessment / Treatment: the sacrum was the only wound that was photographed today. The legs, heels and left hip and back still need wound care and this was done today at the same time. Pt. Did fuss some with the wound care but she allowed it anyway. Overall the wound is clarence some and the bone is now covered with granulation tissue. The edges of the wound do have some dry eschar tissue and the center has some slough with some moister eschar. There is still some undermining.       Wound Sacral/coccyx Large Pressure injury post op Surgical Debridement (Active)   Wound Image   12/09/22 1212   Wound Etiology Pressure Stage 4 12/09/22 1212   Dressing Status New dressing applied 12/09/22 1212   Cleansed Cleansed with saline 12/09/22 1212   Dressing/Treatment Silver dressing;ABD pad 12/09/22 1212   Dressing Change Due 12/07/22 12/07/22 1915   Wound Length (cm) 9.5 cm 12/09/22 1212   Wound Width (cm) 16 cm 12/09/22 1212   Wound Depth (cm) 1.2 cm 12/09/22 1212   Wound Surface Area (cm^2) 152 cm^2 12/09/22 1212   Wound Volume (cm^3) 182.4 cm^3 12/09/22 1212   Wound Assessment Granulation tissue;Eschar dry 12/09/22 1212   Drainage Amount Moderate 12/09/22 1212   Drainage Description Green;Serosanguinous 12/09/22 1212   Wound Odor None 12/09/22 1212   Ebony-Wound/Incision Assessment Other (Comment) 12/09/22 1212 Edges Epibole (rolled edges) 12/09/22 1212   Wound Thickness Description Full thickness 12/09/22 1212   Number of days: 10      Treatment: the wound care was re-written for Saline solution and discontinue the [facility] Solution. The Sacrum wound is now being packed with Optifoam Ag and then covered with the high drainage pack. All wound care was done today with the assistance of nursing.    Arabella Rg RN, BSN, [facility]

## 2022-12-09 NOTE — PROGRESS NOTES
Palliative Medicine Consult  [place]: [phone] ([phone])    Patient Name: Joi Rico  YOB: 1945    Date of Initial Consult: 11/30/22  Reason for Consult: end stage disease   Requesting Provider: Leida Camilo MD   Primary Care Physician: Sunday Reinoso MD     SUMMARY:   Joi Rico is a 65 y.o. 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 She is awaiting ID input . Current medical issues leading to Palliative Medicine involvement include: debility , sacral osteomyelitis, malnutrition , support care decisions  Social hx : lives with her  and son , her daughter To Thompson lives in [place], has been staying with her to help, she is bed bound total care .  is legal next of kin   PALLIATIVE DIAGNOSES:   Debility( late effect of CVA, multiple pressure injuries)  Malnutrition ( albumin 1.7 )  Sacral osteomyelitis   Palliative care encounter  Goals of care   DNR discussion        PLAN:   Patient is alert , confused, no meanginful conversation . No family at bedside. Husbands phone number not in service. Palliative LCSW called Daughter who lives in [place], she will be bringing her dad in on Friday   Palliative team had met with patient and family last week, discussed possibility of Hospice, however daughter declined Hospice and family had wanted Mrs. Cuello to SNF, so palliative signed off.    On 12/8, Mrs. Bianchi Sa washeard/observed to be crying out in pain 2/2 wound care, per report, Mrs. Leonarda Ceballos repeatedly stated that she didn't want to continue with wound care, indicated she didn't want to suffer any longer. I met with Mrs. Leonarda Ceballos today and she was quite quiet, minimal eye contact, denied pain. Family will be at bedside tomorrow 12/9, team will revisit [address] tomorrow. Thank you for allowing us to be part of Ms Gary Cervantes' care . Initial consult note routed to primary continuity provider and/or primary health care team members  Communicated plan of care with: Palliative IDT, [address] Team     GOALS OF CARE / TREATMENT PREFERENCES:     GOALS OF CARE:  Patient/Health Care Proxy Stated Goals: Rehabilitation    TREATMENT PREFERENCES:   Code Status: DNR    Patient and family's personal goals include: active treatment of medical issues     Important upcoming milestones or family events: The patient identifies the following as important for living well: unable to tell me because of altered mental status       Advance Care Planning:  [x] The [facility] Interdisciplinary Team has updated the ACP Navigator with [address] and Patient Capacity      Primary Decision Maker: Gissell Acosta - [phone]  Advance Care Planning 12/2/2022   Patient's Healthcare Decision Maker is: -   Confirm Advance Directive None   Patient Would Like to Complete Advance Directive -       Medical Interventions:  (DNAR)       Other:    As far as possible, the palliative care team has discussed with patient / health care proxy about goals of care / treatment preferences for patient. HISTORY:     History obtained from: chart, daughter     CHIEF COMPLAINT: some     HPI/SUBJECTIVE:    The patient is:   [] Verbal and participatory  [x] Non-participatory due to: She spoke few sentences not meaningful conversation.  Asked for OJ    12/8  poor appetite/intake, , having difficulty tolerating wound care    Clinical Pain Assessment (nonverbal scale for severity on nonverbal patients): Clinical Pain Assessment  Severity: 0     Activity (Movement): (P) Lying quietly, normal position    Duration: for how long has pt been experiencing pain (e.g., 2 days, 1 month, years)  Frequency: how often pain is an issue (e.g., several times per day, once every few days, constant)     FUNCTIONAL ASSESSMENT:     Palliative Performance Scale (PPS):  PPS: 30       PSYCHOSOCIAL/SPIRITUAL SCREENING:     Palliative IDT has assessed this patient for cultural preferences / practices and a referral made as appropriate to needs (Cultural Services, Patient Advocacy, Ethics, etc.)    Any spiritual / Sikhism concerns:  [] Yes /  [x] No   If \"Yes\" to discuss with pastoral care during IDT     Does caregiver feel burdened by caring for their loved one:   [] Yes /  [] No /  [x] No Caregiver Present/Available [] No Caregiver [] Pt Lives at Facility  If \"Yes\" to discuss with social work during IDT    Anticipatory grief assessment:   [x] Normal  / [] Maladaptive     If \"Maladaptive\" to discuss with social work during IDT    ESAS Anxiety:      ESAS Depression:          REVIEW OF SYSTEMS:     Positive and pertinent negative findings in ROS are noted above in HPI. The following systems were [] reviewed / [x] unable to be reviewed as noted in HPI  Other findings are noted below. Systems: constitutional, ears/nose/mouth/throat, respiratory, gastrointestinal, genitourinary, musculoskeletal, integumentary, neurologic, psychiatric, endocrine. Positive findings noted below. Modified ESAS Completed by: provider           Pain: 0     Nausea: 0     Dyspnea: 0           Stool Occurrence(s): 2        PHYSICAL EXAM:     From RN flowsheet:  Wt Readings from Last 3 Encounters:   11/29/22 126 lb 8.7 oz (57.4 kg)   07/27/22 220 lb (99.8 kg)   06/01/22 220 lb (99.8 kg)     Blood pressure (!) 98/45, pulse 66, temperature 97.7 °F (36.5 °C), resp. rate 18, height 5' 1\" (1.549 m), weight 126 lb 8.7 oz (57.4 kg), SpO2 96 %.     Pain Scale 1: Numeric (0 - 10)  Pain Intensity 1: 5  Pain Onset 1: Chronic  Pain Location 1: Spine, sacral  Pain Orientation 1: Other (comment) (Patient stated \"My pain is going, going, going, it doesn't stop. \")  Pain Description 1: Sore  Pain Intervention(s) 1: Medication (see MAR)  Last bowel movement, if known:     Constitutional: frail, alert, speech is incoherent, does not engages in conversation , not in any distress, spoke few sentences today .   Eyes: pupils equal, anicteric  ENMT: no nasal discharge   Cardiovascular: regular rhythm, not able to palpate due mynor lymphedema   Respiratory: breathing not labored, symmetric  Gastrointestinal: soft non-tender, +bowel sounds  Musculoskeletal: no deformity, no tenderness to palpation  Skin: warm, dry, multiple pressure injuries (per wound care documentation , I have not examined), lymph edema of both legs with thick folded skin   Neurologic: following  simple commands  Psychiatric: not able to evaluate because of patient factors   Other:       HISTORY:     Active Problems:    Sacral osteomyelitis ([address] (11/27/2022)    Past Medical History:   Diagnosis Date    Anticoagulant long-term use 10/13/2017    Anxiety 10/13/2017    Atrial fibrillation ([address] 10/13/2017    Breast calcification, left 10/13/2017    CAD (coronary artery disease)     Cellulitis of both lower extremities 10/13/2017    Chronic kidney disease     kidney stones    Chronic pain syndrome 10/13/2017    Chronic prescription opiate use 11/15/2017    CVA (cerebral vascular accident) ([address] 10/13/2017    Diabetes ([address]     DJD (degenerative joint disease) 10/13/2017    Dyslipidemia 10/13/2017    Glaucoma 10/13/2017    Hypertension     Hyperthyroidism 10/13/2017    Long-term use of high-risk medication 10/13/2017    Stasis ulcer of lower extremity ([address] 10/13/2017    Stroke ([address]     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 ([address] 8/18/2012    retinopathy       Past Surgical History:   Procedure Laterality Date    HX BREAST BIOPSY Left     2014    HX GYN      hysterectomty    HX ORTHOPAEDIC      back surgery    TX CARDIAC SURG PROCEDURE UNLIST      cagb      Family History   Problem Relation Age of Onset    Heart Disease Father       History reviewed, no pertinent family history.   Social History     Tobacco Use    Smoking status: Never    Smokeless tobacco: Never   Substance Use Topics    Alcohol use: No     Allergies   Allergen Reactions    Betadine Prepstick Rash    Keflex [Cephalexin] Hives     Pt breaks out in hives      Current Facility-Administered Medications   Medication Dose Route Frequency    enoxaparin (LOVENOX) injection 30 mg  30 mg SubCUTAneous Q24H    ampicillin-sulbactam (UNASYN) 3 g in 0.9% sodium chloride (MBP/ADV) 100 mL MBP  3 g IntraVENous Q6H    insulin glargine (LANTUS) injection 7 Units  7 Units SubCUTAneous QHS    collagenase (SANTYL) 250 unit/gram ointment   Topical DAILY    alcohol 62% (NOZIN) nasal  1 Ampule  1 Ampule Topical Q12H    sodium hypochlorite (QUARTER STRENGTH DAKIN'S) 0.125% irrigation (bottle)   Topical BID    insulin lispro (HUMALOG) injection   SubCUTAneous AC&HS    amLODIPine (NORVASC) tablet 10 mg  10 mg Oral DAILY    aspirin tablet 325 mg  325 mg Oral DAILY    carvediloL (COREG) tablet 12.5 mg  12.5 mg Oral BID    cloNIDine HCL (CATAPRES) tablet 0.1 mg  0.1 mg Oral BID    pravastatin (PRAVACHOL) tablet 80 mg  80 mg Oral QHS    sodium chloride (NS) flush 5-40 mL  5-40 mL IntraVENous Q8H    sodium chloride (NS) flush 5-40 mL  5-40 mL IntraVENous PRN    acetaminophen (TYLENOL) tablet 650 mg  650 mg Oral Q6H PRN    Or    acetaminophen (TYLENOL) suppository 650 mg  650 mg Rectal Q6H PRN    polyethylene glycol (MIRALAX) packet 17 g  17 g Oral DAILY PRN    ondansetron (ZOFRAN ODT) tablet 4 mg  4 mg Oral Q8H PRN    Or    ondansetron (ZOFRAN) injection 4 mg  4 mg IntraVENous Q6H PRN    oxyCODONE IR (ROXICODONE) tablet 5 mg  5 mg Oral Q4H PRN    glucose chewable tablet 16 g  4 Tablet Oral PRN    glucagon (GLUCAGEN) injection 1 mg  1 mg IntraMUSCular PRN    dextrose 10% infusion 0-250 mL  0-250 mL IntraVENous PRN          LAB AND IMAGING FINDINGS:     Lab Results   Component Value Date/Time    WBC 7.8 12/02/2022 04:30 AM    HGB 8.3 (L) 12/02/2022 04:30 AM    PLATELET 224 81/64/8189 04:30 AM     Lab Results   Component Value Date/Time    Sodium 140 12/06/2022 03:08 AM    Potassium 3.6 12/06/2022 03:08 AM    Chloride 105 12/06/2022 03:08 AM    CO2 30 12/06/2022 03:08 AM    BUN 11 12/06/2022 03:08 AM    Creatinine 0.64 12/06/2022 03:08 AM    Calcium 8.6 12/06/2022 03:08 AM    Magnesium 1.6 12/01/2022 10:28 AM      Lab Results   Component Value Date/Time    Alk. phosphatase 143 (H) 11/27/2022 04:08 PM    Protein, total 7.9 11/27/2022 04:08 PM    Albumin 1.7 (L) 11/27/2022 04:08 PM    Globulin 6.2 (H) 11/27/2022 04:08 PM     Lab Results   Component Value Date/Time    INR 3.2 (H) 01/02/2020 12:18 PM    Prothrombin time 30.4 (H) 01/02/2020 12:18 PM    aPTT 29.9 01/30/2019 04:39 PM      No results found for: IRON, FE, TIBC, IBCT, PSAT, FERR   No results found for: PH, PCO2, PO2  No components found for: [place]   Lab Results   Component Value Date/Time    CK 75 01/26/2022 04:10 PM    CK - MB 1.8 04/28/2015 06:29 PM                Total time: 25 mins  Counseling / coordination time, spent as noted above: 20mins  > 50% counseling / coordination?: yes     Prolonged service was provided for  []30 min   []75 min in face to face time in the presence of the patient, spent as noted above. Time Start:   Time End:   Note: this can only be billed with 13158 (initial) or 17167 (follow up). If multiple start / stop times, list each separately.

## 2022-12-09 NOTE — PROGRESS NOTES
LATE ENTRY 2100:  Patient refused to have dressing changed to sacral area. Patient educated on why she needed to have dressing changed. Patient continued to refuse.

## 2022-12-09 NOTE — PROGRESS NOTES
Nutrition Note    Chart reviewed, noted order for hospice consult. Will check back in tomorrow.     Electronically signed by Marichuy Riley RD, [address]  on 12/9/2022 at 3:50 PM    Contact: ext [phone]

## 2022-12-09 NOTE — PROGRESS NOTES
Report given to day shift Nurse Carolyn Boast. Report included SBAR, MAR,KARDEX, INTAKE/OUTPUT, CARDIAC RYHTHM. Nurse Carolyn Boast given opportunity to ask any questions.

## 2022-12-09 NOTE — PROGRESS NOTES
Infectious Disease Progress      Impression    Proteus, MRSE bacteremia   Blood cultures 11/27+ for P.mirabilis 1/2  Blood cultures 11/29+ for MRSE 1/2? Contaminant  Negative repeat cultures- 12/2      Necrotic sacral ulcer , acute osteomyelitis   CT abdomen/pelvis 11/27+ for Large sacral decubitus ulcer with associated extensive osteomyelitis of the  distal sacrum and coccyx with associated erosive changes, as well as gas and  fluid containing collection within the posterior subcutaneous soft tissues  communicating with the skin surface. S/p debridement by general surgery 11/28  Intra-Op cultures 11/28+ for heavy P mirabilis/E. Coli( pan S )  Bacteroides & Prevotella beta-lactamase+  Unstageable Multiple pressure injuries on R heel, LHeel, L upper back, R calf, L calf, L Hip  Pt wound exam done with wound care . Large area of wound involved    Catheter associated UTI chronic Walker  Urine culture 11/27+ for Providentia stuartii  On Zosyn IV. Diabetes mellitus  A1c 6.8    Advanced dementia      CRP-pending    Plan  S/p Zosyn IV x7 days end date 12/5. This will treat organisms on intraoperative cultures and providentia on UC. Switch to Unasyn from 12/6. Patient would require total of 6 weeks of antimicrobial therapy, wound care, pressure offloading, adequate nutrition   Repeat blood cultures x 2 are negative, MRSE is a probable contaminant. S/p DC of Vancomycin. D/w Dr Jerry Licona, no plan is for Hospice. Please contact ID with questions, concerns over the weekend.     Antimicrobial orders for discharge  -Unasyn 3 g IV every 6 hours end date 1/9/2023  -Pull PICC at end of therapy on/after 1/9/23   -Weekly CBC, CMP & ESR, CRP every other week-  -Fax reports to [phone], call with critical labs  at [phone]  -Encourage adequate fluids, daily probiotic/yogurt  -Wound care per wound care team recommendations  -If PICC malfunction occurs and home health cannot reposition  please send patient to ED immediately  -ID follow-up -12/27 at 1:30 PM-virtual.          Sara Sanchez is a 65 y.o.  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 Per H&P patient had reportedly, patient developed sacral pressure injury in August/September that ulcerated and then progressively worsened to the point that patient was hospitalized at [facility] from 10/15-11/2. During that time patient was managed with IV antibiotics but refused any surgical debridement and was ultimately discharged home without antibiotics, infectious disease follow-up, or home health services. Since that time daughter reported that family had attempted to keep her clean and provide wound care as demonstrated by PCP who provided home visits. Unfortunately, patient had continued to decline  with extension of her ulcer, obvious necrotic tissue, and development of additional pressure injuries over her body prompting them to present to our facility with request for operative debridement. ROS otherwise negative. Patient reports no other complaints or concerns and denies tobacco, alcohol, or illicit drug use. Patient has been admitted to hospitalist service. S/p debridement by general surgery 11/28. Intra-Op cultures 11/28+ for heavy P mirabilis/E. Coli( pan S ),Bacteroides & Prevotella beta-lactamase+  ID service has been consulted for antibiotic recommendations. Patient seen today with wound care.     Active Hospital Problems    Diagnosis Date Noted    Sacral osteomyelitis ([facility] [address] 11/27/2022         Past Medical History:   Diagnosis Date    Anticoagulant long-term use 10/13/2017    Anxiety 10/13/2017    Atrial fibrillation ([address] 10/13/2017    Breast calcification, left 10/13/2017    CAD (coronary artery disease)     Cellulitis of both lower extremities 10/13/2017    Chronic kidney disease     kidney stones    Chronic pain syndrome 10/13/2017    Chronic prescription opiate use 11/15/2017    CVA (cerebral vascular accident) ([address] 10/13/2017    Diabetes ([address]     DJD (degenerative joint disease) 10/13/2017    Dyslipidemia 10/13/2017    Glaucoma 10/13/2017    Hypertension     Hyperthyroidism 10/13/2017    Long-term use of high-risk medication 10/13/2017    Stasis ulcer of lower extremity ([address] 10/13/2017    Stroke ([address]     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 ([address] 8/18/2012    retinopathy        Past Surgical History:   Procedure Laterality Date    HX BREAST BIOPSY Left     2014    HX GYN      hysterectomty    HX ORTHOPAEDIC      back surgery    CA CARDIAC SURG PROCEDURE UNLIST      cagb       Allergies   Allergen Reactions    Betadine Prepstick Rash    Keflex [Cephalexin] Hives     Pt breaks out in hives       Social Connections: Not on file       Family Status   Relation Name Status    Father  (Not Specified)       Medication Documentation Review Audit       Reviewed by Анна Hoffmann RN (Registered Nurse) on 12/02/22 at 0083      Medication Sig Documenting Provider Last Dose Status Taking? amLODIPine (NORVASC) 10 mg tablet Take 1 Tablet by mouth daily. Tavares Diaz MD 11/27/2022 Active Yes   aspirin (ASPIRIN) 325 mg tablet Take 1 Tab by mouth daily. Tavares Diaz MD 11/27/2022 Active Yes   captopriL (CAPOTEN) 25 mg tablet TAKE 1 TABLET TWICE A DAY   Patient taking differently: 25 mg two (2) times a day.     Tavares Diaz MD 11/27/2022 Active Yes   carvediloL (COREG) 12.5 mg tablet TAKE 1 TABLET TWICE A DAY Daniel Lozano MD 11/27/2022 Active Yes   cloNIDine HCL (CATAPRES) 0.1 mg tablet TAKE 1 TABLET THREE TIMES A DAY Tavares Diaz MD 11/27/2022 Active Yes   ergocalciferol (ERGOCALCIFEROL) 1,250 mcg (50,000 unit) capsule Take 50,000 Units by mouth every seven (7) days. Provider, Historical 11/2/2022 Active Yes   furosemide (LASIX) 40 mg tablet TAKE 1 TABLET DAILY Margarita Burris MD 11/27/2022 Active Yes   insulin lispro (HUMALOG) 100 unit/mL injection As directed   Patient taking differently: Sliding scale    Warren Paez MD 11/27/2022 Active Yes   insulin NPH (NovoLIN N NPH U-100 Insulin) 100 unit/mL injection 5 Units by SubCUTAneous route Before breakfast and dinner. Patient taking differently: 15 Units by SubCUTAneous route Before breakfast and dinner. Warrne Paez MD 11/27/2022 Active Yes   pravastatin (PRAVACHOL) 80 mg tablet TAKE 1 TABLET NIGHTLY Margarita Burris MD 11/26/2022 Active Yes   rivaroxaban (Xarelto) 20 mg tab tablet Take 1 Tablet by mouth daily. Margarita Burris MD 11/26/2022 Active Yes   sodium hypochlorite (Dakin's Solution) external solution Apply to wounds twice daily and cover with dressing ClearJessica Georgema Unknown Active No                      Review of Systems -could not obtain due to patient factors      PHYSICAL EXAM:  General:          Awake, cooperative, no acute distress, nonverbalEENT:              EOMI. Anicteric sclerae. MMM  Resp:               CTA bilaterally, no wheezing or rales. No accessory muscle use  CV:                  Regular  rhythm,  No edema  GI:                   Soft, Non distended, Non tender. +Bowel sounds  Neurologic:       Not oriented ,   Psych:              Poor insight. Not anxious nor agitated  Skin:                No rashes. No jaundice.   Extremities: No cyanosis, edema                Ronny Donato MD [id number]

## 2022-12-09 NOTE — PROGRESS NOTES
Palliative Medicine      Code Status: DNR      Advance Care Planning:  Advance Care Planning 12/9/2022   Patient's Healthcare Decision Maker is: Legal Next of Kin, spouse   Confirm Advance Directive -   Patient Would Like to Complete Advance Directive -   Does the patient have other document types Durable Do Not Resuscitate     Patient / Family Encounter Documentation    Participants (names): Tal Joy, spouse, Yesenia Meier, son, Prabha Mccollum, daughter, Arnie Dakin, GURU, Kaylee Zendejas, [facility]    Narrative: After chart review and consulting nurse He Wang and Care Manager Kana gonsalez, this writer introduced self to spouse and family at bedside, encouraged patient to express her wishes and provided active, reflective listening and open communication among family members. Though her daughter and spouse expressed wanting patient to go to a SNF for skilled wound care and IV antibiotics, they were able to hear patient as she repeated her wish \"not to be poked anymore,\" when asked about getting a PICC line, and stating, \"I want to die. \"      Family was able to view a picture of the wound which was reviewed with them by Arnie Dakin, NP. Family expressed understanding that patient's lack of appetite and low food intake affects the status of her wound. They were educated on the services of hospice as a Medicare benefit that would not pay for room and board at a facility, but would provide care and support to patient and family at home. Patient expressed clearly that she wants hospice and family reluctantly agreed to have hospice consulted. Psychosocial Issues Identified/ Resilience Factors: Patient has been living with her spouse and son, with their daughter staying most of the time with them, though she lives in [place].  Marc Carranza was in a wheelchair and son was very quiet during meeting. Patient has hospital bed and overbed table in the home that she shares with her  and son.  Her daughter has been staying with them to help care for her mother, even though she lives in [place].    Family expressed understanding that patient's lack of appetite and low food intake affects the status of her wound. Patient did say she would like some tomato soup and when this writer called dietary was informed by Terrell Seay that someone else had already requested it and it was put on her dinner tray. Caregiver [place]: high  Does the caregiver feel confident administering medication? Needs further assessment. Does the caregiver need any help connecting with community resources? Yes. They agreed to have hospice referral.  Does the caregiver feel confident assisting with activities of daily living? No. Patient stated she did not want to go home because \"they can't take care of me. \"  Daughter said they have been taking care of her at home and is concerned that some kind of wound care continue. Goals of Care / Plan:   Patient's symptoms will be managed. Patient will participate in decisions about her care as able to express herself and her wishes. Patient will be assessed for hospice care. Patient and family will receive support and education from Palliative team as appropriate. Thank you for including Palliative Medicine in the care of Ms Alisha Bose.     [facility]  [phone] ([phone])

## 2022-12-09 NOTE — HOSPICE
[facility]  [phone]     Patient Name: Denise Lynn  YOB: 1945  Age: 65 y.o. [facility] RN Note:  Received message from palliative today that patient's goals are clear for hospice, after family meeting today. Patient's daughter  is currently in town from [place]. Per palliative she struggling with this decision however she is not MPOA (patient's  is Dariel Gonzalez in absence of [place]). Palliative feels patient is capable of making her own decision regarding this matter. Patient seen shortly after palliative visit  at bedside for eval and to discuss hospice DC. No family  present and patient did not know if they would be returning. Patient was alert and oriented however she refused to answer direct questions for me. She did state that she wants to be left alone. Did not appear to be in any pain or distress. At this time, patient would meet criteria for ROUTINE level of hospice care either at home or in LTC facility (family would need to pay OOP for room/board)    Attempted to reach Nicko licona at [phone] to arrange meeting  with patient,  and children to discuss home hospice services. VM left requesting a callback to schedule. Will have weekend liaison attempt follow up again tomorrow. Thank you for the opportunity to be of service to this patient.

## 2022-12-09 NOTE — PROGRESS NOTES
PICC order acknowledged. Patient is not oriented so cannot give consent. Called patient's  and son. Also called patient's daughter and left message to call us back for consent.      Leanne Albert RN, BSN, [facility]  Vascular Access Team

## 2022-12-09 NOTE — PROGRESS NOTES
Hospitalist Progress Note    NAME: Stephon Riddle   :  1945   MRN:  [id number]     Admit date: 2022    Today's date: 22    PCP: Bijan Escamilla MD    Anticipated discharge date: pending placement     Barriers:  home hospice     Assessment / Plan:    Severe sepsis POA WBC 15.6, , RR 35, lactate 3.19  Proteus bacteremia POA  Necrotic sacral ulcer with extensive osteomyelitis POA  Lactic acidosis POA  Unstageable Multiple pressure injuries POA- R heel, LHeel, L upper back, R calf, L calf, L Hip  Debility  Prior CVA with residual left-sided deficits  CT abdomen/pelvis IMPRESSION  1. Large sacral decubitus ulcer with associated extensive osteomyelitis of the  distal sacrum and coccyx with associated erosive changes, as well as gas and  fluid containing collection within the posterior subcutaneous soft tissues  communicating with the skin surface as above, right larger than left. 2. Distended gallbladder with cholelithiasis, but no convincing CT evidence of  acute cholecystitis. General surgery consulted, OR on 2022. Extensive septic sacral deep tissue injury with abscess. s/p Debridement of sacral deep tissue injury including skin, subcutaneous tissue and muscle.   BC with proteus  Wound care consulted  Continue Walker catheter to promote wound healing  Incentive spirometry to prevent atelectasis  Nutrition consulted for supplementation recommendations to assist in wound healing  PT/OT consulted-- needs LTC placement if family unable to care at home due to extensive wound care needed  Pt not IP Hospice candidate per eval  by hospice  Speech following  Consult ID appreciated-- recommends Zosyn till  then IV Unasyn for 6 weeks if plan is not comfort care, repeat blood Cx () remains negative x 3 days   , Palliative meeting, family decided to go with home hospice      Diabetes mellitus type 2 POA   Hemoglobin A1c 6.8 this admission  Cont Decreased lantus at 7 units nightly now  Change SSI to QID     Hypomagnesemia   Hypokalemia-  Replete prn    UTI due to chronic indwelling Walker POA  UA nitrite +, > 100 WBC, 5 to 10 RBC, 2+ bacteremia  Urine culture with GNR  Walker care ordered  Continue IV antibiotics as above per ID recc     Essential hypertension POA  Paroxysmal atrial fibrillation  Paroxsymal Vtachs noted  Hyperlipidemia POA  Continue PTA amlodipine, aspirin, carvedilol, clonidine, pravastatin  Replenish Lytes-- Mag as above  Pt is DNR     Dementia  Monitor for signs of delirium     Incidental findings requiring outpatient follow up/ evaluation:  -sclerosis noted in the bilateral femoral heads, suspicious for  avascular necrosis  -Gallbladder is distended and contains multiple stones, but  without gallbladder wall thickening or surrounding fat stranding. No  intrahepatic or extrahepatic biliary ductal dilatation     Code Status: DNR-confirmed with daughter and patient  DVT Prophylaxis: Lovenox  Body mass index is 23.91 kg/m². Subjective:     Chief Complaint / Reason for Physician Visit    Patient was seen and examined this morning. No specific complaints     Review of Systems:  Symptom Y/N Comments  Symptom Y/N Comments   Fever/Chills    Chest Pain     Poor Appetite    Edema     Cough    Abdominal Pain     Sputum    Joint Pain     SOB/MARTINEZ    Headache     Nausea/vomit    Tolerating PT/OT     Diarrhea    Tolerating Diet     Constipation    Other       Could NOT obtain due to: Dementia     Objective:     VITALS:   Last 24hrs VS reviewed since prior progress note.  Most recent are:  Patient Vitals for the past 24 hrs:   Temp Pulse Resp BP SpO2   12/09/22 1224 97.5 °F (36.4 °C) (!) 155 -- 97/70 --   12/09/22 0756 97.9 °F (36.6 °C) (!) 31 -- 122/75 (!) 87 %   12/09/22 0255 97.3 °F (36.3 °C) 69 -- (!) 134/55 96 %   12/09/22 0105 97.5 °F (36.4 °C) 65 -- (!) 129/54 100 %   12/08/22 2010 97.7 °F (36.5 °C) 66 18 (!) 98/45 96 %   12/08/22 1537 97.5 °F (36.4 °C) (!) 122 18 (!) 123/40 (!) 81 %         Intake/Output Summary (Last 24 hours) at 12/9/2022 1531  Last data filed at 12/9/2022 0639  Gross per 24 hour   Intake 900 ml   Output 875 ml   Net 25 ml          Wt Readings from Last 12 Encounters:   11/29/22 57.4 kg (126 lb 8.7 oz)   07/27/22 99.8 kg (220 lb)   06/01/22 99.8 kg (220 lb)   05/05/22 99.8 kg (220 lb)   03/01/22 99.8 kg (220 lb)   01/26/22 99.8 kg (220 lb)   06/09/21 99.8 kg (220 lb)   06/01/21 93 kg (205 lb)   12/03/20 96.2 kg (212 lb)   10/01/20 96.2 kg (212 lb)   03/12/20 96.2 kg (212 lb)   06/21/19 96.2 kg (212 lb)       PHYSICAL EXAM:    I had a face to face encounter and independently examined this patient on 12/09/22 as outlined below:    General: WD, WN, cooperative, no acute distress    EENT:  PERRL. Anicteric sclerae. MMM  Neck:  No meningismus, no thyromegaly  Resp:  CTA bilaterally, no wheezing or rales. No accessory muscle use  CV:  Regular  rhythm,  No edema  GI:  Soft, Non distended, Non tender. +Bowel sounds, no rebound  Neurologic:  awake, confused, not talking much, non focal motor exam  Psych:   Not anxious nor agitated  Skin:  No rashes. No jaundice, wound in sacrum bandaged, not viewed          Reviewed most current lab test results and cultures  YES  Reviewed most current radiology test results   YES  Review and summation of old records today    NO  Reviewed patient's current orders and MAR    YES  PMH/SH reviewed - no change compared to H&P  ________________________________________________________________________  Care Plan discussed with:    Comments   Patient x    Family      RN x    Care Manager x Pal Sotomayor   Consultant  x Dr Phi Ards (ID)                    x Multidiciplinary team rounds were held today with , nursing, pharmacist and clinical coordinator. Patient's plan of care was discussed; medications were reviewed and discharge planning was addressed. ________________________________________________________________________  Total time: 32 mins    Comments   >50% of visit spent in counseling and coordination of care x    ________________________________________________________________________  Andres Huizar MD     Procedures: see electronic medical records for all procedures/Xrays and details which were not copied into this note but were reviewed prior to creation of Plan. LABS:  I reviewed today's most current labs and imaging studies. Pertinent labs include:  No results for input(s): WBC, HGB, HCT, PLT, HGBEXT, HCTEXT, PLTEXT, HGBEXT, HCTEXT, PLTEXT in the last 72 hours. No results for input(s): NA, K, CL, CO2, GLU, BUN, CREA, CA, MG, PHOS, ALB, TBIL, TBILI, ALT, INR, INREXT, INREXT in the last 72 hours.     No lab exists for component: SGOT

## 2022-12-09 NOTE — PROGRESS NOTES
Code Status: DNR        Advance Care Planning:  Advance Care Planning 12/9/2022   Patient's Healthcare Decision Maker is: Legal Next of Kin, spouse   Confirm Advance Directive -   Patient Would Like to Complete Advance Directive -   Does the patient have other document types Durable Do Not Resuscitate      Patient / Family Encounter Documentation     Participants (names): Dayton Scott, spouse, Sallie Ballard, son, Giuliana Regalado, daughter, Jeferson Gabriela, NP, Aaron Peck LCSW     Narrative: After chart review and consulting nurse Trista Sultana and Care Manager Farrah Mahoney, this writer introduced self to spouse and family at bedside, encouraged patient to express her wishes and provided active, reflective listening and open communication among family members. Though her daughter and spouse expressed wanting patient to go to a SNF for skilled wound care and IV antibiotics, they were able to hear patient as she repeated her wish \"not to be poked anymore,\" when asked about getting a PICC line, and stating, \"I want to die. \"      THEA reviewed that Health care decision-maker is her spouse, Bertha Aponte, who is her legal nok, and he is her voice when she cannot make her needs or wishes known and that she needs to be encouraged to express her wishes as she is able to guide those decisions. Thank you for including Palliative Medicine in the care of Ms Sara Sanchez.      Aaron Peck LCSW

## 2022-12-09 NOTE — PROGRESS NOTES
Palliative Medicine      Code Status: DNR    Advance Care Planning:  Advance Care Planning 12/2/2022   Patient's Healthcare Decision Maker is: -Lucila Meigs, spouse   Confirm Advance Directive None   Patient Would Like to Complete Advance Directive -     Patient / Family Encounter Documentation    Participants (names): ***    Narrative: ***    Psychosocial Issues Identified/ Resilience Factors: ***    Caregiver [place]: ***  Does the caregiver feel confident administering medication? ***  Does the caregiver need any help connecting with community resources?  ***  Does the caregiver feel confident assisting with activities of daily living? ***    Goals of Care / Plan: ***    Thank you for including Palliative Medicine in the care of ***    Trupti Graham, [address] ([id number])

## 2022-12-10 LAB
GLUCOSE BLD STRIP.AUTO-MCNC: 118 MG/DL (ref 65–117)
GLUCOSE BLD STRIP.AUTO-MCNC: 156 MG/DL (ref 65–117)
GLUCOSE BLD STRIP.AUTO-MCNC: 165 MG/DL (ref 65–117)
GLUCOSE BLD STRIP.AUTO-MCNC: 93 MG/DL (ref 65–117)
SERVICE CMNT-IMP: ABNORMAL
SERVICE CMNT-IMP: NORMAL

## 2022-12-10 PROCEDURE — 94760 N-INVAS EAR/PLS OXIMETRY 1: CPT

## 2022-12-10 PROCEDURE — 77010033678 HC OXYGEN DAILY

## 2022-12-10 PROCEDURE — 74011000250 HC RX REV CODE- 250: Performed by: SURGERY

## 2022-12-10 PROCEDURE — 74011250637 HC RX REV CODE- 250/637: Performed by: SURGERY

## 2022-12-10 PROCEDURE — 74011000258 HC RX REV CODE- 258: Performed by: INTERNAL MEDICINE

## 2022-12-10 PROCEDURE — 74011636637 HC RX REV CODE- 636/637: Performed by: INTERNAL MEDICINE

## 2022-12-10 PROCEDURE — 82962 GLUCOSE BLOOD TEST: CPT

## 2022-12-10 PROCEDURE — 74011250636 HC RX REV CODE- 250/636: Performed by: INTERNAL MEDICINE

## 2022-12-10 PROCEDURE — 65270000029 HC RM PRIVATE

## 2022-12-10 PROCEDURE — 74011250636 HC RX REV CODE- 250/636: Performed by: GENERAL ACUTE CARE HOSPITAL

## 2022-12-10 RX ADMIN — AMPICILLIN SODIUM AND SULBACTAM SODIUM 3 G: 2; 1 INJECTION, POWDER, FOR SOLUTION INTRAMUSCULAR; INTRAVENOUS at 13:42

## 2022-12-10 RX ADMIN — Medication 1 AMPULE: at 09:19

## 2022-12-10 RX ADMIN — AMLODIPINE BESYLATE 10 MG: 5 TABLET ORAL at 09:19

## 2022-12-10 RX ADMIN — Medication 2 UNITS: at 13:43

## 2022-12-10 RX ADMIN — CARVEDILOL 12.5 MG: 12.5 TABLET, FILM COATED ORAL at 18:29

## 2022-12-10 RX ADMIN — OXYCODONE 5 MG: 5 TABLET ORAL at 18:47

## 2022-12-10 RX ADMIN — CLONIDINE HYDROCHLORIDE 0.1 MG: 0.1 TABLET ORAL at 09:19

## 2022-12-10 RX ADMIN — SODIUM CHLORIDE, PRESERVATIVE FREE 10 ML: 5 INJECTION INTRAVENOUS at 23:01

## 2022-12-10 RX ADMIN — PRAVASTATIN SODIUM 80 MG: 40 TABLET ORAL at 23:01

## 2022-12-10 RX ADMIN — AMPICILLIN SODIUM AND SULBACTAM SODIUM 3 G: 2; 1 INJECTION, POWDER, FOR SOLUTION INTRAMUSCULAR; INTRAVENOUS at 06:47

## 2022-12-10 RX ADMIN — SODIUM CHLORIDE, PRESERVATIVE FREE 10 ML: 5 INJECTION INTRAVENOUS at 18:32

## 2022-12-10 RX ADMIN — Medication 1 AMPULE: at 22:59

## 2022-12-10 RX ADMIN — SODIUM CHLORIDE, PRESERVATIVE FREE 10 ML: 5 INJECTION INTRAVENOUS at 06:47

## 2022-12-10 RX ADMIN — CLONIDINE HYDROCHLORIDE 0.1 MG: 0.1 TABLET ORAL at 18:29

## 2022-12-10 RX ADMIN — AMPICILLIN SODIUM AND SULBACTAM SODIUM 3 G: 2; 1 INJECTION, POWDER, FOR SOLUTION INTRAMUSCULAR; INTRAVENOUS at 02:03

## 2022-12-10 RX ADMIN — ENOXAPARIN SODIUM 30 MG: 100 INJECTION SUBCUTANEOUS at 09:19

## 2022-12-10 RX ADMIN — CARVEDILOL 12.5 MG: 12.5 TABLET, FILM COATED ORAL at 09:19

## 2022-12-10 RX ADMIN — COLLAGENASE SANTYL: 250 OINTMENT TOPICAL at 18:31

## 2022-12-10 RX ADMIN — AMPICILLIN SODIUM AND SULBACTAM SODIUM 3 G: 2; 1 INJECTION, POWDER, FOR SOLUTION INTRAMUSCULAR; INTRAVENOUS at 18:29

## 2022-12-10 RX ADMIN — ASPIRIN 325 MG ORAL TABLET 325 MG: 325 PILL ORAL at 09:19

## 2022-12-10 RX ADMIN — INSULIN GLARGINE 7 UNITS: 100 INJECTION, SOLUTION SUBCUTANEOUS at 23:00

## 2022-12-10 NOTE — HOSPICE
[facility]  [phone]    Nursing Note   Patient Name: Odin Sol  YOB: 1945  Age: 65 y.o. [address] RN Note:      Called and spoke with Dolores Cormier/daughter to arrange hospice information meeting. Plan is to meet today at 1pm.  If there are any questions, please call the main [address] number at [phone].    1300: Met with patient, [place]/spouse, Dolores/daughter and [place]/son. Provided hospice information per Medicare guidelines along with written material.  Family very appreciative of hospice information and plan to discuss and then make a decision. Provided [email or web address] contact number.

## 2022-12-10 NOTE — PROGRESS NOTES
Hospitalist Progress Note    NAME: Bob Barrera   :  1945   MRN:  [id number]     Admit date: 2022    Today's date: 12/10/22    PCP: Dustin Galindo MD    Anticipated discharge date: pending placement     Barriers:  home hospice     Assessment / Plan:    Severe sepsis POA WBC 15.6, , RR 35, lactate 3.19  Proteus bacteremia POA  Necrotic sacral ulcer with extensive osteomyelitis POA  Lactic acidosis POA  Unstageable Multiple pressure injuries POA- R heel, LHeel, L upper back, R calf, L calf, L Hip  Debility  Prior CVA with residual left-sided deficits  CT abdomen/pelvis IMPRESSION  1. Large sacral decubitus ulcer with associated extensive osteomyelitis of the  distal sacrum and coccyx with associated erosive changes, as well as gas and  fluid containing collection within the posterior subcutaneous soft tissues  communicating with the skin surface as above, right larger than left. 2. Distended gallbladder with cholelithiasis, but no convincing CT evidence of  acute cholecystitis. General surgery consulted, OR on 2022. Extensive septic sacral deep tissue injury with abscess. s/p Debridement of sacral deep tissue injury including skin, subcutaneous tissue and muscle.   BC with proteus  Wound care consulted  Continue Walker catheter to promote wound healing  Incentive spirometry to prevent atelectasis  Nutrition consulted for supplementation recommendations to assist in wound healing  Needs LTC placement if family unable to care at home due to extensive wound care needed  Pt not IP Hospice candidate per eval  by hospice  Speech following  Consult ID appreciated-- recommends Zosyn till  then IV Unasyn for 6 weeks if plan is not comfort care, repeat blood Cx () remains negative x 3 days   12/10, Hospice meeting, family decided to go with home hospice      Diabetes mellitus type 2 POA   Hemoglobin A1c 6.8 this admission  Cont Decreased lantus at 7 units nightly now  Change SSI to QID     Hypomagnesemia   Hypokalemia-  Replete prn    UTI due to chronic indwelling Walker POA  UA nitrite +, > 100 WBC, 5 to 10 RBC, 2+ bacteremia  Urine culture with GNR  Walker care ordered  Continue IV antibiotics as above per ID recc     Essential hypertension POA  Paroxysmal atrial fibrillation  Paroxsymal Vtachs noted  Hyperlipidemia POA  Continue PTA amlodipine, aspirin, carvedilol, clonidine, pravastatin  Replenish Lytes-- Mag as above  Pt is DNR     Dementia  Monitor for signs of delirium     Incidental findings requiring outpatient follow up/ evaluation:  -sclerosis noted in the bilateral femoral heads, suspicious for  avascular necrosis  -Gallbladder is distended and contains multiple stones, but  without gallbladder wall thickening or surrounding fat stranding. No  intrahepatic or extrahepatic biliary ductal dilatation     Code Status: DNR-confirmed with daughter and patient  DVT Prophylaxis: Lovenox  Body mass index is 23.91 kg/m². Subjective:     Chief Complaint / Reason for Physician Visit  Patient was seen and examined this morning. No specific complaints     Review of Systems:  Symptom Y/N Comments  Symptom Y/N Comments   Fever/Chills    Chest Pain     Poor Appetite    Edema     Cough    Abdominal Pain     Sputum    Joint Pain     SOB/MARTINEZ    Headache     Nausea/vomit    Tolerating PT/OT     Diarrhea    Tolerating Diet     Constipation    Other       Could NOT obtain due to: Dementia     Objective:     VITALS:   Last 24hrs VS reviewed since prior progress note.  Most recent are:  Patient Vitals for the past 24 hrs:   Temp Pulse Resp BP SpO2   12/10/22 1200 98.4 °F (36.9 °C) 73 20 (!) 140/61 --   12/10/22 0758 97.9 °F (36.6 °C) (!) 39 20 (!) 146/45 96 %   12/10/22 0316 97.9 °F (36.6 °C) 72 16 132/61 100 %   12/09/22 2248 97.7 °F (36.5 °C) 77 19 130/65 99 %   12/09/22 1933 97.9 °F (36.6 °C) 71 17 126/81 100 %         Intake/Output Summary (Last 24 hours) at 12/10/2022 1226  Last data filed at 12/10/2022 0647  Gross per 24 hour   Intake --   Output 1625 ml   Net -1625 ml          Wt Readings from Last 12 Encounters:   11/29/22 57.4 kg (126 lb 8.7 oz)   07/27/22 99.8 kg (220 lb)   06/01/22 99.8 kg (220 lb)   05/05/22 99.8 kg (220 lb)   03/01/22 99.8 kg (220 lb)   01/26/22 99.8 kg (220 lb)   06/09/21 99.8 kg (220 lb)   06/01/21 93 kg (205 lb)   12/03/20 96.2 kg (212 lb)   10/01/20 96.2 kg (212 lb)   03/12/20 96.2 kg (212 lb)   06/21/19 96.2 kg (212 lb)       PHYSICAL EXAM:    I had a face to face encounter and independently examined this patient on 12/10/22 as outlined below:    General: WD, WN, cooperative, no acute distress    EENT:  PERRL. Anicteric sclerae. MMM  Neck:  No meningismus, no thyromegaly  Resp:  CTA bilaterally, no wheezing or rales. No accessory muscle use  CV:  Regular  rhythm,  No edema  GI:  Soft, Non distended, Non tender. +Bowel sounds, no rebound  Neurologic:  awake, confused, not talking much, non focal motor exam  Psych:   Not anxious nor agitated  Skin:  No rashes. No jaundice, wound in sacrum bandaged, not viewed          Reviewed most current lab test results and cultures  YES  Reviewed most current radiology test results   YES  Review and summation of old records today    NO  Reviewed patient's current orders and MAR    YES  PMH/SH reviewed - no change compared to H&P  ________________________________________________________________________  Care Plan discussed with:    Comments   Patient x    Family      RN x    Care Manager     Consultant                        Multidiciplinary team rounds were held today with , nursing, pharmacist and clinical coordinator. Patient's plan of care was discussed; medications were reviewed and discharge planning was addressed.      ________________________________________________________________________  Total time: 27 mins    Comments   >50% of visit spent in counseling and coordination of care x ________________________________________________________________________  Matt Kwok MD     Procedures: see electronic medical records for all procedures/Xrays and details which were not copied into this note but were reviewed prior to creation of Plan. LABS:  I reviewed today's most current labs and imaging studies. Pertinent labs include:  No results for input(s): WBC, HGB, HCT, PLT, HGBEXT, HCTEXT, PLTEXT, HGBEXT, HCTEXT, PLTEXT in the last 72 hours. No results for input(s): NA, K, CL, CO2, GLU, BUN, CREA, CA, MG, PHOS, ALB, TBIL, TBILI, ALT, INR, INREXT, INREXT in the last 72 hours.     No lab exists for component: SGOT

## 2022-12-10 NOTE — PROGRESS NOTES
End of Shift Note    Bedside shift change report given to Juanita Kruger RN (oncoming nurse) by Alonso Frances LPN (offgoing nurse). Report included the following information SBAR, Kardex, Intake/Output, and MAR    Shift worked:  7p-730a     Shift summary and any significant changes:     Pt rested in bed through shift. Pt refused to be turned in bed. Pt had no complaints. Seymour draining. Otherwise, pt had an uneventful shift. Concerns for physician to address:       Zone phone for oncoming shift:   [phone]       Activity:  Activity Level: Bed Rest  Number times ambulated in hallways past shift: 0  Number of times OOB to chair past shift: 0    Cardiac:   Cardiac Monitoring: Yes      Cardiac Rhythm: Sinus Rhythm    Access:   Current line(s): PIV     Genitourinary:   Urinary status: seymour    Respiratory:   O2 Device: None (Room air)  Chronic home O2 use?: NO  Incentive spirometer at bedside: NO     GI:  Last Bowel Movement Date: 12/09/22  Current diet:  ADULT DIET Dysphagia - Soft & Bite Sized; 4 carb choices (60 gm/meal)  ADULT ORAL NUTRITION SUPPLEMENT Dinner, Breakfast; Diabetic Supplement  DIET ONE TIME MESSAGE  Passing flatus: YES  Tolerating current diet: YES       Pain Management:   Patient states pain is manageable on current regimen: YES    Skin:  Basilio Score: 11  Interventions: speciality bed, float heels, increase time out of bed, foam dressing, and internal/external urinary devices    Patient Safety:  Fall Score:  Total Score: 3  Interventions: bed/chair alarm, assistive device (walker, cane, etc), pt to call before getting OOB, and stay with me (per policy)  High Fall Risk: Yes    Length of Stay:  Expected LOS: 9d 14h  Actual LOS: [address], LPN

## 2022-12-11 LAB
GLUCOSE BLD STRIP.AUTO-MCNC: 128 MG/DL (ref 65–117)
GLUCOSE BLD STRIP.AUTO-MCNC: 129 MG/DL (ref 65–117)
GLUCOSE BLD STRIP.AUTO-MCNC: 79 MG/DL (ref 65–117)
GLUCOSE BLD STRIP.AUTO-MCNC: 79 MG/DL (ref 65–117)
SERVICE CMNT-IMP: ABNORMAL
SERVICE CMNT-IMP: ABNORMAL
SERVICE CMNT-IMP: NORMAL
SERVICE CMNT-IMP: NORMAL

## 2022-12-11 PROCEDURE — 74011250637 HC RX REV CODE- 250/637: Performed by: SURGERY

## 2022-12-11 PROCEDURE — 74011250636 HC RX REV CODE- 250/636: Performed by: GENERAL ACUTE CARE HOSPITAL

## 2022-12-11 PROCEDURE — 94760 N-INVAS EAR/PLS OXIMETRY 1: CPT

## 2022-12-11 PROCEDURE — 74011000258 HC RX REV CODE- 258: Performed by: INTERNAL MEDICINE

## 2022-12-11 PROCEDURE — 74011000250 HC RX REV CODE- 250: Performed by: GENERAL ACUTE CARE HOSPITAL

## 2022-12-11 PROCEDURE — 65270000029 HC RM PRIVATE

## 2022-12-11 PROCEDURE — 74011000250 HC RX REV CODE- 250: Performed by: SURGERY

## 2022-12-11 PROCEDURE — 82962 GLUCOSE BLOOD TEST: CPT

## 2022-12-11 PROCEDURE — 74011250636 HC RX REV CODE- 250/636: Performed by: INTERNAL MEDICINE

## 2022-12-11 RX ORDER — MORPHINE SULFATE ORAL SOLUTION 10 MG/5ML
2.5 SOLUTION ORAL
Status: DISCONTINUED | OUTPATIENT
Start: 2022-12-11 | End: 2022-12-15 | Stop reason: HOSPADM

## 2022-12-11 RX ORDER — DEXTROSE MONOHYDRATE 100 MG/ML
40 INJECTION, SOLUTION INTRAVENOUS CONTINUOUS
Status: DISCONTINUED | OUTPATIENT
Start: 2022-12-11 | End: 2022-12-15 | Stop reason: HOSPADM

## 2022-12-11 RX ORDER — MORPHINE SULFATE 2 MG/ML
0.5 INJECTION, SOLUTION INTRAMUSCULAR; INTRAVENOUS
Status: DISCONTINUED | OUTPATIENT
Start: 2022-12-11 | End: 2022-12-15 | Stop reason: HOSPADM

## 2022-12-11 RX ADMIN — SODIUM CHLORIDE, PRESERVATIVE FREE 10 ML: 5 INJECTION INTRAVENOUS at 07:00

## 2022-12-11 RX ADMIN — AMPICILLIN SODIUM AND SULBACTAM SODIUM 3 G: 2; 1 INJECTION, POWDER, FOR SOLUTION INTRAMUSCULAR; INTRAVENOUS at 12:41

## 2022-12-11 RX ADMIN — SODIUM CHLORIDE, PRESERVATIVE FREE 10 ML: 5 INJECTION INTRAVENOUS at 23:15

## 2022-12-11 RX ADMIN — ENOXAPARIN SODIUM 30 MG: 100 INJECTION SUBCUTANEOUS at 12:43

## 2022-12-11 RX ADMIN — DEXTROSE MONOHYDRATE 40 ML/HR: 100 INJECTION, SOLUTION INTRAVENOUS at 09:28

## 2022-12-11 RX ADMIN — COLLAGENASE SANTYL: 250 OINTMENT TOPICAL at 12:44

## 2022-12-11 RX ADMIN — AMPICILLIN SODIUM AND SULBACTAM SODIUM 3 G: 2; 1 INJECTION, POWDER, FOR SOLUTION INTRAMUSCULAR; INTRAVENOUS at 23:32

## 2022-12-11 RX ADMIN — Medication 1 AMPULE: at 20:11

## 2022-12-11 RX ADMIN — AMPICILLIN SODIUM AND SULBACTAM SODIUM 3 G: 2; 1 INJECTION, POWDER, FOR SOLUTION INTRAMUSCULAR; INTRAVENOUS at 17:44

## 2022-12-11 RX ADMIN — AMPICILLIN SODIUM AND SULBACTAM SODIUM 3 G: 2; 1 INJECTION, POWDER, FOR SOLUTION INTRAMUSCULAR; INTRAVENOUS at 00:31

## 2022-12-11 RX ADMIN — Medication 1 AMPULE: at 09:29

## 2022-12-11 RX ADMIN — AMPICILLIN SODIUM AND SULBACTAM SODIUM 3 G: 2; 1 INJECTION, POWDER, FOR SOLUTION INTRAMUSCULAR; INTRAVENOUS at 06:59

## 2022-12-11 RX ADMIN — SODIUM CHLORIDE, PRESERVATIVE FREE 10 ML: 5 INJECTION INTRAVENOUS at 17:45

## 2022-12-11 RX ADMIN — CLONIDINE HYDROCHLORIDE 0.1 MG: 0.1 TABLET ORAL at 17:44

## 2022-12-11 NOTE — PROGRESS NOTES
End of Shift Note    Bedside shift change report given to Cameron Izquierdo RN (oncoming nurse) by Elisabeth Perla LPN (offgoing nurse). Report included the following information SBAR, Kardex, Intake/Output, and MAR    Shift worked:  7p-730a     Shift summary and any significant changes:     Pt rested in bed through shift. No c/o pain last night. Pt refused turning again-was unable to complete wound care. Seymour draining. Otherwise, pt had an uneventful shift. Concerns for physician to address:       Zone phone for oncoming shift:   [phone]       Activity:  Activity Level: Bed Rest  Number times ambulated in hallways past shift: 0  Number of times OOB to chair past shift: 0    Cardiac:   Cardiac Monitoring: Yes      Cardiac Rhythm: Sinus Rhythm    Access:   Current line(s): PIV     Genitourinary:   Urinary status: seymour    Respiratory:   O2 Device: None (Room air)  Chronic home O2 use?: NO  Incentive spirometer at bedside: NO     GI:  Last Bowel Movement Date: 12/09/22  Current diet:  ADULT DIET Dysphagia - Soft & Bite Sized; 4 carb choices (60 gm/meal)  ADULT ORAL NUTRITION SUPPLEMENT Dinner, Breakfast; Diabetic Supplement  DIET ONE TIME MESSAGE  Passing flatus: YES  Tolerating current diet: YES       Pain Management:   Patient states pain is manageable on current regimen: YES    Skin:  Basilio Score: 11  Interventions: turn team, speciality bed, float heels, increase time out of bed, foam dressing, and internal/external urinary devices    Patient Safety:  Fall Score:  Total Score: 3  Interventions: bed/chair alarm  High Fall Risk: Yes    Length of Stay:  Expected LOS: 9d 14h  Actual LOS: [address], LPN

## 2022-12-11 NOTE — PROGRESS NOTES
Problem: Risk for Spread of Infection  Goal: Prevent transmission of infectious organism to others  Description: Prevent the transmission of infectious organisms to other patients, staff members, and visitors. Outcome: Progressing Towards Goal     Problem: Falls - Risk of  Goal: *Absence of Falls  Description: Document Sita Litter Fall Risk and appropriate interventions in the flowsheet.   Outcome: Progressing Towards Goal  Note: Fall Risk Interventions:  Mobility Interventions: Bed/chair exit alarm, Patient to call before getting OOB, OT consult for ADLs, Communicate number of staff needed for ambulation/transfer    Mentation Interventions: Bed/chair exit alarm, Adequate sleep, hydration, pain control, Door open when patient unattended    Medication Interventions: Bed/chair exit alarm, Patient to call before getting OOB    Elimination Interventions: Bed/chair exit alarm, Call light in reach              Problem: Patient Education: Go to Patient Education Activity  Goal: Patient/Family Education  Outcome: Progressing Towards Goal

## 2022-12-11 NOTE — PROGRESS NOTES
Palliative Medicine Consult  Chet: [phone] ([phone])    Patient Name: Shahrzad Laurent  YOB: 1945    Date of Initial Consult: 11/30/22  Reason for Consult: end stage disease   Requesting Provider: Era Ansari MD   Primary Care Physician: Toño Bentley MD     SUMMARY:   Shahrzad Laurent is a 65 y.o. 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 She is awaiting ID input . Current medical issues leading to Palliative Medicine involvement include: debility , sacral osteomyelitis, malnutrition , support care decisions  Social hx : lives with her  and son , her daughter Valerie Lerma lives in [place], has been staying with her to help, she is bed bound total care .  is legal next of kin   PALLIATIVE DIAGNOSES:   Debility( late effect of CVA, multiple pressure injuries)  Malnutrition ( albumin 1.7 )  Sacral osteomyelitis   Palliative care encounter  Goals of care   DNR discussion        PLAN:    Palliative clinical SW and I met with Mrs. Santos Xie, her , daughter and son. We discussed course of hospitalization,with multiple pressure wounds, including  large necrotic sacral wound, osteomyelitis with cx + numerous organisms that will require 6 weeks IV abx. I showed picture of sacral wound taken today. It was difficult for them to see. We discussed her multiple advanced medical issues in setting of Mrs. Cuello being bed bound , with a very  poor appetite/intake which has resulted in significant weight loss and severe malnutrition, with albumin 1.7. Per family and chart review she has lost more than  70 lbs over the past year. I explained why we do not expect this large and painful pressure wound will not heal.    Mrs. Chris Finn was initially very quiet, but as we discussed goals of care with her family, she said \"I hear you talking about me, Im here and I understand what you are saying\". Daughter asked Mrs. Chris Finn what she wants to do and patient replied \"Just close my eyes and go\" . When asked to clarify where she wants to go, she replied \"Die\". We discussed option of comfort focused care, at a facility or at home. When  had questions about comfort care, Mrs. Chris Finn stated Christa Hilaria are talking about hospice\". Outcome of [facility] Discussion- Mrs. Chris Finn was lucid and made it clear that she does NOT want to continue treatments and interventions, does not want PICC or antibiotics, wants to be allowed to die and patient and family (somewhat reluctantly) agreed to [facility] consult. Order for Case Management to consult hospice placed. I also spoke with Hospice liaison Scott Lerma RN about visit and she went up to see patient and family, but family had left and patient declined to answer her questions. Mr. Chris Finn and their daughter understand what she is saying, but understandable they are struggling with the idea of not continuing to  make every effort to heal wound. We talked about Hospice providing wound care, but that would not continue IV abx, but can do oral abx, but still with treatment or no treatment, if she does not have adequate nutrition on a regular basis, she will still decline and wound will not heal.     Thank you for allowing us to be part of Ms Suresh Plascencia' care .   Initial consult note routed to primary continuity provider and/or primary health care team members  Communicated plan of care with: Palliative IDT, [address] Team, nurse and attending GOALS OF CARE / TREATMENT PREFERENCES:     GOALS OF CARE:  Patient/Health Care Proxy Stated Goals: Comfort    TREATMENT PREFERENCES:   Code Status: DNR    Patient and family's personal goals include: active treatment of medical issues     Important upcoming milestones or family events: The patient identifies the following as important for living well: unable to tell me because of altered mental status       Advance Care Planning:  [x] [facility] Interdisciplinary Team has updated the ACP Navigator with [address] and Patient Capacity      Primary Decision Maker: Nevelyn Sacks - [phone]  Advance Care Planning 12/9/2022   Patient's Healthcare Decision Maker is: Legal Next of Kin   Confirm Advance Directive -   Patient Would Like to Complete Advance Directive -   Does the patient have other document types Do Not Resuscitate       Medical Interventions: Limited additional interventions       Other:    As far as possible, the palliative care team has discussed with patient / health care proxy about goals of care / treatment preferences for patient.      HISTORY:     History obtained from: chart, daughter,     CHIEF COMPLAINT: Let me go    HPI/SUBJECTIVE:    The patient is:   [] Verbal and participatory  [x] Non-participatory due to:   Patient with minimal participation, but was very clear that she did not want to continue tx    12/8  poor appetite/intake, , having difficulty tolerating wound care    Clinical Pain Assessment (nonverbal scale for severity on nonverbal patients):   Clinical Pain Assessment  Severity: 0     Activity (Movement): (P) Lying quietly, normal position    Duration: for how long has pt been experiencing pain (e.g., 2 days, 1 month, years)  Frequency: how often pain is an issue (e.g., several times per day, once every few days, constant)     FUNCTIONAL ASSESSMENT:     Palliative Performance Scale (PPS):  PPS: 30       PSYCHOSOCIAL/SPIRITUAL SCREENING: Palliative IDT has assessed this patient for cultural preferences / practices and a referral made as appropriate to needs (Cultural Services, Patient Advocacy, Ethics, etc.)    Any spiritual / Voodoo concerns:  [] Yes /  [x] No   If \"Yes\" to discuss with pastoral care during IDT     Does caregiver feel burdened by caring for their loved one:   [x] Yes /  [] No /  [] No Caregiver Present/Available [] No Caregiver [] Pt Lives at Facility  If \"Yes\" to discuss with social work during IDT    Anticipatory grief assessment:   [x] Normal  / [] Maladaptive     If \"Maladaptive\" to discuss with social work during IDT    ESAS Anxiety:      ESAS Depression:          REVIEW OF SYSTEMS:     Positive and pertinent negative findings in ROS are noted above in HPI. The following systems were [] reviewed / [x] unable to be reviewed as noted in HPI  Other findings are noted below. Systems: constitutional, ears/nose/mouth/throat, respiratory, gastrointestinal, genitourinary, musculoskeletal, integumentary, neurologic, psychiatric, endocrine. Positive findings noted below. Modified ESAS Completed by: provider   Fatigue: 4 Drowsiness: 1     Pain: 0     Nausea: 0   Anorexia: 8 Dyspnea: 0   Best Well-Bein       Stool Occurrence(s): 2        PHYSICAL EXAM:     From RN flowsheet:  Wt Readings from Last 3 Encounters:   22 126 lb 8.7 oz (57.4 kg)   22 220 lb (99.8 kg)   22 220 lb (99.8 kg)     Blood pressure (!) 140/61, pulse 73, temperature 98.4 °F (36.9 °C), resp. rate 20, height 5' 1\" (1.549 m), weight 126 lb 8.7 oz (57.4 kg), SpO2 98 %. Pain Scale 1: Numeric (0 - 10)  Pain Intensity 1: 7  Pain Onset 1: Chronic  Pain Location 1: Buttocks, Back  Pain Orientation 1: Other (comment) (Patient stated \"My pain is going, going, going, it doesn't stop. \")  Pain Description 1: Aching  Pain Intervention(s) 1: Medication (see MAR)  Last bowel movement, if known:     Constitutional: frail, weak, mostly quiet, irritable, but speech clear,  Eyes: pupils equal, anicteric  ENMT: no nasal discharge   Cardiovascular: regular rhythm, not able to palpate due mynor lymphedema   Respiratory: breathing not labored, symmetric  Gastrointestinal: soft non-tender, +bowel sounds  Musculoskeletal: no deformity, no tenderness to palpation  Skin: warm, dry, multiple pressure injuries (per wound care documentation , I have not examined), lymph edema of both legs with thick dry  skin   Neurologic: Alert, participating in discussion intermittently, but  made her wishes known,following  simple commands  Psychiatric: irritable  Other:       HISTORY:     Active Problems:    Sacral osteomyelitis ([address] (11/27/2022)    Past Medical History:   Diagnosis Date    Anticoagulant long-term use 10/13/2017    Anxiety 10/13/2017    Atrial fibrillation ([address] 10/13/2017    Breast calcification, left 10/13/2017    CAD (coronary artery disease)     Cellulitis of both lower extremities 10/13/2017    Chronic kidney disease     kidney stones    Chronic pain syndrome 10/13/2017    Chronic prescription opiate use 11/15/2017    CVA (cerebral vascular accident) ([address] 10/13/2017    Diabetes ([address]     DJD (degenerative joint disease) 10/13/2017    Dyslipidemia 10/13/2017    Glaucoma 10/13/2017    Hypertension     Hyperthyroidism 10/13/2017    Long-term use of high-risk medication 10/13/2017    Stasis ulcer of lower extremity ([address] 10/13/2017    Stroke ([address]     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 ([address] 8/18/2012    retinopathy       Past Surgical History:   Procedure Laterality Date    HX BREAST BIOPSY Left     2014    HX GYN      hysterectomty    HX ORTHOPAEDIC      back surgery    AL CARDIAC SURG PROCEDURE UNLIST      cagb      Family History   Problem Relation Age of Onset    Heart Disease Father       History reviewed, no pertinent family history.   Social History     Tobacco Use    Smoking status: Never    Smokeless tobacco: Never   Substance Use Topics Alcohol use: No     Allergies   Allergen Reactions    Betadine Prepstick Rash    Keflex [Cephalexin] Hives     Pt breaks out in hives      Current Facility-Administered Medications   Medication Dose Route Frequency    enoxaparin (LOVENOX) injection 30 mg  30 mg SubCUTAneous Q24H    ampicillin-sulbactam (UNASYN) 3 g in 0.9% sodium chloride (MBP/ADV) 100 mL MBP  3 g IntraVENous Q6H    insulin glargine (LANTUS) injection 7 Units  7 Units SubCUTAneous QHS    collagenase (SANTYL) 250 unit/gram ointment   Topical DAILY    alcohol 62% (NOZIN) nasal  1 Ampule  1 Ampule Topical Q12H    insulin lispro (HUMALOG) injection   SubCUTAneous AC&HS    amLODIPine (NORVASC) tablet 10 mg  10 mg Oral DAILY    aspirin tablet 325 mg  325 mg Oral DAILY    carvediloL (COREG) tablet 12.5 mg  12.5 mg Oral BID    cloNIDine HCL (CATAPRES) tablet 0.1 mg  0.1 mg Oral BID    pravastatin (PRAVACHOL) tablet 80 mg  80 mg Oral QHS    sodium chloride (NS) flush 5-40 mL  5-40 mL IntraVENous Q8H    sodium chloride (NS) flush 5-40 mL  5-40 mL IntraVENous PRN    acetaminophen (TYLENOL) tablet 650 mg  650 mg Oral Q6H PRN    Or    acetaminophen (TYLENOL) suppository 650 mg  650 mg Rectal Q6H PRN    polyethylene glycol (MIRALAX) packet 17 g  17 g Oral DAILY PRN    ondansetron (ZOFRAN ODT) tablet 4 mg  4 mg Oral Q8H PRN    Or    ondansetron (ZOFRAN) injection 4 mg  4 mg IntraVENous Q6H PRN    oxyCODONE IR (ROXICODONE) tablet 5 mg  5 mg Oral Q4H PRN    glucose chewable tablet 16 g  4 Tablet Oral PRN    glucagon (GLUCAGEN) injection 1 mg  1 mg IntraMUSCular PRN    dextrose 10% infusion 0-250 mL  0-250 mL IntraVENous PRN          LAB AND IMAGING FINDINGS:     Lab Results   Component Value Date/Time    WBC 7.8 12/02/2022 04:30 AM    HGB 8.3 (L) 12/02/2022 04:30 AM    PLATELET 581 64/71/1106 04:30 AM     Lab Results   Component Value Date/Time    Sodium 140 12/06/2022 03:08 AM    Potassium 3.6 12/06/2022 03:08 AM    Chloride 105 12/06/2022 03:08 AM    CO2 30 12/06/2022 03:08 AM    BUN 11 12/06/2022 03:08 AM    Creatinine 0.64 12/06/2022 03:08 AM    Calcium 8.6 12/06/2022 03:08 AM    Magnesium 1.6 12/01/2022 10:28 AM      Lab Results   Component Value Date/Time    Alk. phosphatase 143 (H) 11/27/2022 04:08 PM    Protein, total 7.9 11/27/2022 04:08 PM    Albumin 1.7 (L) 11/27/2022 04:08 PM    Globulin 6.2 (H) 11/27/2022 04:08 PM     Lab Results   Component Value Date/Time    INR 3.2 (H) 01/02/2020 12:18 PM    Prothrombin time 30.4 (H) 01/02/2020 12:18 PM    aPTT 29.9 01/30/2019 04:39 PM      No results found for: IRON, FE, TIBC, IBCT, PSAT, FERR   No results found for: PH, PCO2, PO2  No components found for: [place]   Lab Results   Component Value Date/Time    CK 75 01/26/2022 04:10 PM    CK - MB 1.8 04/28/2015 06:29 PM                Total time: 65min  Counseling / coordination time, spent as noted above: 50min  > 50% counseling / coordination?: yes     Prolonged service was provided for  [x]30 min   []75 min in face to face time in the presence of the patient, spent as noted above. Time Start: 1424  Time End: 1540  Note: this can only be billed with  (initial) or 04641 (follow up). If multiple start / stop times, list each separately.

## 2022-12-11 NOTE — PROGRESS NOTES
End of Shift Note    Bedside shift change report given to Lucas Robison (oncoming nurse) by Sandra Maya RN (offgoing nurse). Report included the following information SBAR, Kardex, Intake/Output, and Recent Results    Shift worked:  7am-7pm     Shift summary and any significant changes:     RR callled on pt for change in mentation, during Md exam pt began to arouse. Attempted to medicate for pain but pt would not swallow mediation. Refused wound care x2. Md notified via perfect serve      Concerns for physician to address:  none     Zone phone for oncoming shift:   0500       Activity:  Activity Level: Bed Rest  Number times ambulated in hallways past shift: 0  Number of times OOB to chair past shift: 0    Cardiac:   Cardiac Monitoring: Yes      Cardiac Rhythm: Sinus Rhythm    Access:  Current line(s): PIV     Genitourinary:   Urinary status: seymour    Respiratory:   O2 Device: Nasal cannula  Chronic home O2 use?: YES  Incentive spirometer at bedside: NO       GI:  Last Bowel Movement Date: 12/09/22  Current diet:  ADULT DIET Dysphagia - Soft & Bite Sized; 4 carb choices (60 gm/meal)  ADULT ORAL NUTRITION SUPPLEMENT Dinner, Breakfast; Diabetic Supplement  DIET ONE TIME MESSAGE  Passing flatus: YES  Tolerating current diet: YES       Pain Management:   Patient states pain is manageable on current regimen: YES    Skin:  Basilio Score: 11  Interventions: float heels and foam dressing    Patient Safety:  Fall Score:  Total Score: 3  Interventions: tele sitter   High Fall Risk: Yes    Length of Stay:  Expected LOS: 9d 14h  Actual LOS: 4520 Alexis Alejandre RN

## 2022-12-11 NOTE — PROGRESS NOTES
Hospitalist Progress Note    NAME: Madhuri Rodriguez   :  1945   MRN:  [id number]     Admit date: 2022    Today's date: 22    PCP: Ely Sanchez MD    Anticipated discharge date: pending placement     Barriers:  home hospice arrangement     Assessment / Plan:    Severe sepsis POA WBC 15.6, , RR 35, lactate 3.19  Proteus bacteremia POA  Necrotic sacral ulcer with extensive osteomyelitis POA  Lactic acidosis POA  Unstageable Multiple pressure injuries POA- R heel, LHeel, L upper back, R calf, L calf, L Hip  Debility  Prior CVA with residual left-sided deficits  CT abdomen/pelvis: Large sacral decubitus ulcer with associated extensive osteomyelitis of the distal sacrum and coccyx with associated erosive changes, as well as gas and fluid containing collection within the posterior subcutaneous soft tissues communicating with the skin surface as above, right larger than left. Distended gallbladder with cholelithiasis, but no convincing CT evidence of acute cholecystitis. General surgery consulted, OR on 2022. Extensive septic sacral deep tissue injury with abscess. s/p Debridement of sacral deep tissue injury including skin, subcutaneous tissue and muscle.   BC with proteus  Wound care consulted  Continue Walker catheter to promote wound healing  Incentive spirometry to prevent atelectasis  Nutrition consulted for supplementation recommendations to assist in wound healing  Needs LTC placement if family unable to care at home due to extensive wound care needed  Pt not IP Hospice candidate per eval  by hospice  Speech following  Consult ID appreciated-- recommends Zosyn till  then IV Unasyn for 6 weeks if plan is not comfort care, repeat blood Cx () remains negative x 3 days   12/10, Hospice meeting, family decided to go with home hospice      Diabetes mellitus type 2 POA   Hemoglobin A1c 6.8 this admission  Hold lantus given poor oral intake Hypomagnesemia   Hypokalemia-  Replete prn    UTI due to chronic indwelling Walker POA  UA nitrite +, > 100 WBC, 5 to 10 RBC, 2+ bacteremia  Urine culture with GNR  Walker care ordered  Continue IV antibiotics as above per ID recc     Essential hypertension POA  Paroxysmal atrial fibrillation  Paroxsymal Vtachs noted  Hyperlipidemia POA  Continue PTA amlodipine, aspirin, carvedilol, clonidine, pravastatin  Replenish Lytes-- Mag as above  Pt is DNR     Dementia  Monitor for signs of delirium     Incidental findings requiring outpatient follow up/ evaluation:  -sclerosis noted in the bilateral femoral heads, suspicious for  avascular necrosis  -Gallbladder is distended and contains multiple stones, but  without gallbladder wall thickening or surrounding fat stranding. No  intrahepatic or extrahepatic biliary ductal dilatation     Code Status: DNR-confirmed with daughter and patient  DVT Prophylaxis: Lovenox  Body mass index is 23.91 kg/m². Subjective:     Chief Complaint / Reason for Physician Visit  Patient was seen and examined this morning. No specific complaints     Review of Systems:  Symptom Y/N Comments  Symptom Y/N Comments   Fever/Chills    Chest Pain     Poor Appetite    Edema     Cough    Abdominal Pain     Sputum    Joint Pain     SOB/MARTINEZ    Headache     Nausea/vomit    Tolerating PT/OT     Diarrhea    Tolerating Diet     Constipation    Other       Could NOT obtain due to: Dementia     Objective:     VITALS:   Last 24hrs VS reviewed since prior progress note.  Most recent are:  Patient Vitals for the past 24 hrs:   Temp Pulse Resp BP SpO2   12/11/22 0908 -- 74 19 (!) 161/67 100 %   12/11/22 0745 98.2 °F (36.8 °C) 72 20 (!) 150/59 100 %   12/11/22 0237 98.4 °F (36.9 °C) 71 24 (!) 140/68 99 %   12/10/22 1200 98.4 °F (36.9 °C) 73 20 (!) 140/61 98 %         Intake/Output Summary (Last 24 hours) at 12/11/2022 0912  Last data filed at 12/11/2022 0659  Gross per 24 hour   Intake 200 ml   Output 725 ml   Net -525 ml          Wt Readings from Last 12 Encounters:   11/29/22 57.4 kg (126 lb 8.7 oz)   07/27/22 99.8 kg (220 lb)   06/01/22 99.8 kg (220 lb)   05/05/22 99.8 kg (220 lb)   03/01/22 99.8 kg (220 lb)   01/26/22 99.8 kg (220 lb)   06/09/21 99.8 kg (220 lb)   06/01/21 93 kg (205 lb)   12/03/20 96.2 kg (212 lb)   10/01/20 96.2 kg (212 lb)   03/12/20 96.2 kg (212 lb)   06/21/19 96.2 kg (212 lb)       PHYSICAL EXAM:    I had a face to face encounter and independently examined this patient on 12/11/22 as outlined below:    General: WD, WN, cooperative, no acute distress    EENT:  PERRL. Anicteric sclerae. MMM  Neck:  No meningismus, no thyromegaly  Resp:  CTA bilaterally, no wheezing or rales. No accessory muscle use  CV:  Regular  rhythm,  No edema  GI:  Soft, Non distended, Non tender. +Bowel sounds, no rebound  Neurologic:  awake, confused, not talking much, non focal motor exam  Psych:   Not anxious nor agitated  Skin:  No rashes. No jaundice, wound in sacrum bandaged, not viewed    Reviewed most current lab test results and cultures  YES  Reviewed most current radiology test results   YES  Review and summation of old records today    NO  Reviewed patient's current orders and MAR    YES  PMH/ reviewed - no change compared to H&P  ________________________________________________________________________  Care Plan discussed with:    Comments   Patient x    Family      RN x    Care Manager     Consultant                        Multidiciplinary team rounds were held today with , nursing, pharmacist and clinical coordinator. Patient's plan of care was discussed; medications were reviewed and discharge planning was addressed.      ________________________________________________________________________  Total time: 27 mins    Comments   >50% of visit spent in counseling and coordination of care x    ________________________________________________________________________  Hemanth Coreyer, MD Procedures: see electronic medical records for all procedures/Xrays and details which were not copied into this note but were reviewed prior to creation of Plan. LABS:  I reviewed today's most current labs and imaging studies. Pertinent labs include:  No results for input(s): WBC, HGB, HCT, PLT, HGBEXT, HCTEXT, PLTEXT, HGBEXT, HCTEXT, PLTEXT in the last 72 hours. No results for input(s): NA, K, CL, CO2, GLU, BUN, CREA, CA, MG, PHOS, ALB, TBIL, TBILI, ALT, INR, INREXT, INREXT in the last 72 hours.     No lab exists for component: SGOT

## 2022-12-11 NOTE — PROGRESS NOTES
Problem: Risk for Spread of Infection  Goal: Prevent transmission of infectious organism to others  Description: Prevent the transmission of infectious organisms to other patients, staff members, and visitors. 12/11/2022 1128 by Gerhard Simmons RN  Outcome: Progressing Towards Goal  12/11/2022 1111 by Gerhard Simmons RN  Outcome: Progressing Towards Goal     Problem: Falls - Risk of  Goal: *Absence of Falls  Description: Document Verena Garcia Fall Risk and appropriate interventions in the flowsheet. 12/11/2022 1128 by Gerhard Simmons RN  Outcome: Progressing Towards Goal  Note: Fall Risk Interventions:  Mobility Interventions: Bed/chair exit alarm, Patient to call before getting OOB, OT consult for ADLs, Communicate number of staff needed for ambulation/transfer    Mentation Interventions: Bed/chair exit alarm, Adequate sleep, hydration, pain control, Door open when patient unattended    Medication Interventions: Bed/chair exit alarm, Patient to call before getting OOB    Elimination Interventions: Bed/chair exit alarm, Call light in reach           12/11/2022 1111 by Gerhard Simmons RN  Outcome: Progressing Towards Goal  Note: Fall Risk Interventions:  Mobility Interventions: Bed/chair exit alarm, Patient to call before getting OOB, OT consult for ADLs, Communicate number of staff needed for ambulation/transfer    Mentation Interventions: Bed/chair exit alarm, Adequate sleep, hydration, pain control, Door open when patient unattended    Medication Interventions: Bed/chair exit alarm, Patient to call before getting OOB    Elimination Interventions: Bed/chair exit alarm, Call light in reach              Problem: Diabetes Self-Management  Goal: *Disease process and treatment process  Description: Define diabetes and identify own type of diabetes; list 3 options for treating diabetes.   Outcome: Progressing Towards Goal     Problem: Patient Education: Go to Patient Education Activity  Goal: Patient/Family Education  Outcome: Progressing Towards Goal

## 2022-12-11 NOTE — HOSPICE
[facility]  [phone]    Nursing Note   Patient Name: Jing Betts  YOB: 1945  Age: 65 y.o. [address] [facility] RN Note:      F/U call placed to [employer] and left voicemail requesting return call. Called and spoke with Michelesabrina High Cuello/spouse/LNOK who has agreed to hospice services for patient. Informed that hospice consents will need to be signed in-person or by [employer]. Spouse informed that he does not have email. Asked when next he will be coming to [id number] [address] to visit patient and he stated tomorrow but he stated he did not know when and to call his daughter, Gwendolyn Mcdaniel, to arrange this and this hospice liaison informed that call has been placed to daughter and message left requesting return call. Per spouse, patient already has a hospital bed and an over-the-bed table so no DMEs needed at this time. Asked spouse if it was okay to save a hospice admission slot for Tuesday and he said, \"that would be fine. \"  Anticipate d/c on Tuesday. Will follow-up with Dolores/daughter once she has returned call. If there are any questions, please call the main [address] number at [phone]:  Received return call from [employer]. Updated Gwendolyn Mcdaniel that her father has decided on hospice at home for the patient and plan is to admit patient on Tuesday. Gwendolyn Mcdaniel stated that Tuesday will not work as her father has an appointment at the [place] and she will not be able to be present until Wednesday. New plan is to have equipment delivered Wednesday morning between 8am-12pm with hospice admission at 3pm which Gwendolyn Mcdaniel stated would be fine. Will contact  on Tuesday to arrange transport for 12:30pm on Wednesday, 12/14.

## 2022-12-11 NOTE — PROGRESS NOTES
8332 Rapid response called for mentation. Pt not responding to verbal or painful stimuli. 1001  at bedside. During assessment pt began to arouse. Fluids ordered and started. [phone] Pt is refusing to have wound care done     1802 Pt continues to refuse wound care. Stated \" I am dying, leave me alone'.  Md notified via perfect serve of pts refusal.

## 2022-12-11 NOTE — PROGRESS NOTES
Problem: Risk for Spread of Infection  Goal: Prevent transmission of infectious organism to others  Description: Prevent the transmission of infectious organisms to other patients, staff members, and visitors. Outcome: Progressing Towards Goal     Problem: Falls - Risk of  Goal: *Absence of Falls  Description: Document Sita Litter Fall Risk and appropriate interventions in the flowsheet.   Note: Fall Risk Interventions:  Mobility Interventions: Bed/chair exit alarm, Communicate number of staff needed for ambulation/transfer, Patient to call before getting OOB    Mentation Interventions: Adequate sleep, hydration, pain control, Bed/chair exit alarm, Door open when patient unattended, More frequent rounding    Medication Interventions: Bed/chair exit alarm    Elimination Interventions: Bed/chair exit alarm, Call light in reach, Patient to call for help with toileting needs

## 2022-12-12 LAB
GLUCOSE BLD STRIP.AUTO-MCNC: 176 MG/DL (ref 65–117)
GLUCOSE BLD STRIP.AUTO-MCNC: 226 MG/DL (ref 65–117)
GLUCOSE BLD STRIP.AUTO-MCNC: 281 MG/DL (ref 65–117)
SERVICE CMNT-IMP: ABNORMAL

## 2022-12-12 PROCEDURE — 74011000250 HC RX REV CODE- 250: Performed by: SURGERY

## 2022-12-12 PROCEDURE — 94760 N-INVAS EAR/PLS OXIMETRY 1: CPT

## 2022-12-12 PROCEDURE — 74011636637 HC RX REV CODE- 636/637: Performed by: INTERNAL MEDICINE

## 2022-12-12 PROCEDURE — 82962 GLUCOSE BLOOD TEST: CPT

## 2022-12-12 PROCEDURE — 74011250636 HC RX REV CODE- 250/636: Performed by: GENERAL ACUTE CARE HOSPITAL

## 2022-12-12 PROCEDURE — 74011000258 HC RX REV CODE- 258: Performed by: INTERNAL MEDICINE

## 2022-12-12 PROCEDURE — 74011250637 HC RX REV CODE- 250/637: Performed by: SURGERY

## 2022-12-12 PROCEDURE — 74011250636 HC RX REV CODE- 250/636: Performed by: INTERNAL MEDICINE

## 2022-12-12 PROCEDURE — 65270000029 HC RM PRIVATE

## 2022-12-12 PROCEDURE — 77010033678 HC OXYGEN DAILY

## 2022-12-12 RX ADMIN — CARVEDILOL 12.5 MG: 12.5 TABLET, FILM COATED ORAL at 08:54

## 2022-12-12 RX ADMIN — AMPICILLIN SODIUM AND SULBACTAM SODIUM 3 G: 2; 1 INJECTION, POWDER, FOR SOLUTION INTRAMUSCULAR; INTRAVENOUS at 12:26

## 2022-12-12 RX ADMIN — SODIUM CHLORIDE, PRESERVATIVE FREE 10 ML: 5 INJECTION INTRAVENOUS at 06:24

## 2022-12-12 RX ADMIN — Medication 1 AMPULE: at 08:54

## 2022-12-12 RX ADMIN — ASPIRIN 325 MG ORAL TABLET 325 MG: 325 PILL ORAL at 08:54

## 2022-12-12 RX ADMIN — AMLODIPINE BESYLATE 10 MG: 5 TABLET ORAL at 08:54

## 2022-12-12 RX ADMIN — CLONIDINE HYDROCHLORIDE 0.1 MG: 0.1 TABLET ORAL at 08:54

## 2022-12-12 RX ADMIN — AMPICILLIN SODIUM AND SULBACTAM SODIUM 3 G: 2; 1 INJECTION, POWDER, FOR SOLUTION INTRAMUSCULAR; INTRAVENOUS at 06:24

## 2022-12-12 RX ADMIN — Medication 3 UNITS: at 08:58

## 2022-12-12 RX ADMIN — CARVEDILOL 12.5 MG: 12.5 TABLET, FILM COATED ORAL at 17:14

## 2022-12-12 RX ADMIN — SODIUM CHLORIDE, PRESERVATIVE FREE 10 ML: 5 INJECTION INTRAVENOUS at 17:14

## 2022-12-12 RX ADMIN — CLONIDINE HYDROCHLORIDE 0.1 MG: 0.1 TABLET ORAL at 17:14

## 2022-12-12 RX ADMIN — ENOXAPARIN SODIUM 30 MG: 100 INJECTION SUBCUTANEOUS at 12:26

## 2022-12-12 RX ADMIN — Medication 5 UNITS: at 12:26

## 2022-12-12 RX ADMIN — MORPHINE SULFATE 0.5 MG: 2 INJECTION, SOLUTION INTRAMUSCULAR; INTRAVENOUS at 17:58

## 2022-12-12 NOTE — PROGRESS NOTES
Problem: Risk for Spread of Infection  Goal: Prevent transmission of infectious organism to others  Description: Prevent the transmission of infectious organisms to other patients, staff members, and visitors. Outcome: Progressing Towards Goal     Problem: Falls - Risk of  Goal: *Absence of Falls  Description: Document Mahin Morin Fall Risk and appropriate interventions in the flowsheet.   Outcome: Progressing Towards Goal  Note: Fall Risk Interventions:  Mobility Interventions: Bed/chair exit alarm, Communicate number of staff needed for ambulation/transfer    Mentation Interventions: Adequate sleep, hydration, pain control, Bed/chair exit alarm, Door open when patient unattended    Medication Interventions: Bed/chair exit alarm, Evaluate medications/consider consulting pharmacy    Elimination Interventions: Bed/chair exit alarm, Call light in reach              Problem: Patient Education: Go to Patient Education Activity  Goal: Patient/Family Education  Outcome: Progressing Towards Goal     Problem: Patient Education: Go to Patient Education Activity  Goal: Patient/Family Education  Outcome: Progressing Towards Goal

## 2022-12-12 NOTE — PROGRESS NOTES
End of Shift Note    Bedside shift change report given to Sarah Castellanos (oncoming nurse) by Teresa Mulligan RN (offgoing nurse). Report included the following information SBAR, Kardex, Intake/Output, and Recent Results    Shift worked:  7am-7pm     Shift summary and any significant changes:     Pending hospice placement, Partial wound care completed, pt began to cry and yell stop before this RN could complete all wound care. MD notified     Concerns for physician to address:  none     Zone phone for oncoming shift:   [phone]       Activity:  Activity Level: Bed Rest  Number times ambulated in hallways past shift: 0  Number of times OOB to chair past shift: 0    Cardiac:   Cardiac Monitoring: No      Cardiac Rhythm: Sinus Rhythm    Access:  Current line(s): PIV     Genitourinary:   Urinary status: seymour    Respiratory:   O2 Device: Nasal cannula  Chronic home O2 use?: NO  Incentive spirometer at bedside: NO       GI:  Last Bowel Movement Date: 12/09/22  Current diet:  ADULT DIET Dysphagia - Soft & Bite Sized; 4 carb choices (60 gm/meal)  ADULT ORAL NUTRITION SUPPLEMENT Dinner, Breakfast; Diabetic Supplement  DIET ONE TIME MESSAGE  DIET ONE TIME MESSAGE  Passing flatus: YES  Tolerating current diet: YES       Pain Management:   Patient states pain is manageable on current regimen: YES    Skin:  Basilio Score: 11  Interventions: float heels    Patient Safety:  Fall Score:  Total Score: 3  Interventions: bed/chair alarm and gripper socks  High Fall Risk: Yes    Length of Stay:  Expected LOS: 9d 14h  Actual LOS: [place]

## 2022-12-12 NOTE — PROGRESS NOTES
End of Shift Note    Bedside shift change report given to Alee Apple RN (oncoming nurse) by Marjorie Kwon LPN (offgoing nurse). Report included the following information SBAR, Kardex, Intake/Output, MAR, and Recent Results    Shift worked:  7p-900a     Shift summary and any significant changes:     Pt rested in bed through shift. No complaints of pain. Seymour draining w/o issue. Increased O2 to 3L/min as O2 sats were in the 80's. Tele sitter removed last night as pt no longer needs it. After multiple attempts w/ pt, she refused both turning and wound care through shift. Concerns for physician to address:       Zone phone for oncoming shift:   [phone]       Activity:  Activity Level: Bed Rest  Number times ambulated in hallways past shift: 0  Number of times OOB to chair past shift: 0    Cardiac:   Cardiac Monitoring: Yes      Cardiac Rhythm: Sinus Rhythm    Access:   Current line(s): PIV     Genitourinary:   Urinary status: seymour    Respiratory:   O2 Device: Nasal cannula  Chronic home O2 use?: NO  Incentive spirometer at bedside: NO     GI:  Last Bowel Movement Date: 12/09/22  Current diet:  ADULT DIET Dysphagia - Soft & Bite Sized; 4 carb choices (60 gm/meal)  ADULT ORAL NUTRITION SUPPLEMENT Dinner, Breakfast; Diabetic Supplement  DIET ONE TIME MESSAGE  Passing flatus: YES  Tolerating current diet: YES       Pain Management:   Patient states pain is manageable on current regimen: YES    Skin:  Basilio Score: 11  Interventions: turn team, speciality bed, float heels, foam dressing, internal/external urinary devices, and nutritional support     Patient Safety:  Fall Score:  Total Score: 3  Interventions: bed/chair alarm, assistive device (walker, cane, etc), gripper socks, pt to call before getting OOB, and stay with me (per policy)  High Fall Risk: Yes    Length of Stay:  Expected LOS: 9d 14h  Actual LOS: [address] [facility] 68, LPN

## 2022-12-12 NOTE — PROGRESS NOTES
Hospitalist Progress Note    NAME: Itz [place]   :  1945   MRN:  [id number]     Admit date: 2022    Today's date: 22    PCP: Serena Rios MD    Anticipated discharge date: pending placement     Barriers:  home hospice arrangement     Assessment / Plan:    Severe sepsis POA WBC 15.6, , RR 35, lactate 3.19  Proteus bacteremia POA  Necrotic sacral ulcer with extensive osteomyelitis POA  Lactic acidosis POA  Unstageable Multiple pressure injuries POA- R heel, LHeel, L upper back, R calf, L calf, L Hip  Debility  Prior CVA with residual left-sided deficits  CT abdomen/pelvis: Large sacral decubitus ulcer with associated extensive osteomyelitis of the distal sacrum and coccyx with associated erosive changes, as well as gas and fluid containing collection within the posterior subcutaneous soft tissues communicating with the skin surface as above, right larger than left. Distended gallbladder with cholelithiasis, but no convincing CT evidence of acute cholecystitis. General surgery consulted, OR on 2022. Extensive septic sacral deep tissue injury with abscess. s/p Debridement of sacral deep tissue injury including skin, subcutaneous tissue and muscle.   BC with proteus  Wound care consulted  Continue Walker catheter to promote wound healing  Incentive spirometry to prevent atelectasis  Nutrition consulted for supplementation recommendations to assist in wound healing  Needs LTC placement if family unable to care at home due to extensive wound care needed  Pt not IP Hospice candidate per eval  by hospice  Speech following  Consult ID appreciated-- recommends Zosyn till  then IV Unasyn for 6 weeks if plan is not comfort care, repeat blood Cx () remains negative x 3 days   12/10, Hospice meeting, family decided to go with home hospice      Called both spouse and daughter, no answer, left VM   Will hold off Abx and non essential meds     Diabetes mellitus type 2 POA   Hemoglobin A1c 6.8 this admission  Hold lantus given poor oral intake     Hypomagnesemia   Hypokalemia-  Replete prn    UTI due to chronic indwelling Walker POA  UA nitrite +, > 100 WBC, 5 to 10 RBC, 2+ bacteremia  Urine culture with GNR  Walker care ordered  Continue IV antibiotics as above per ID recc     Essential hypertension POA  Paroxysmal atrial fibrillation  Paroxsymal Vtachs noted  Hyperlipidemia POA  Continue PTA amlodipine, aspirin, carvedilol, clonidine, pravastatin  Replenish Lytes-- Mag as above  Pt is DNR     Dementia  Monitor for signs of delirium     Incidental findings requiring outpatient follow up/ evaluation:  -sclerosis noted in the bilateral femoral heads, suspicious for  avascular necrosis  -Gallbladder is distended and contains multiple stones, but  without gallbladder wall thickening or surrounding fat stranding. No  intrahepatic or extrahepatic biliary ductal dilatation     Code Status: DNR  NOK: spouse. DVT Prophylaxis: Lovenox  Body mass index is 23.91 kg/m². Daughter, Chet Billings at [phone]       Subjective:     Chief Complaint / Reason for Physician Visit  Patient was seen and examined this morning. No specific complaints  Called both spouse and daughter, no answer, left VM     Review of Systems:  Symptom Y/N Comments  Symptom Y/N Comments   Fever/Chills    Chest Pain     Poor Appetite    Edema     Cough    Abdominal Pain     Sputum    Joint Pain     SOB/MARTINEZ    Headache     Nausea/vomit    Tolerating PT/OT     Diarrhea    Tolerating Diet     Constipation    Other       Could NOT obtain due to: Dementia     Objective:     VITALS:   Last 24hrs VS reviewed since prior progress note.  Most recent are:  Patient Vitals for the past 24 hrs:   Temp Pulse Resp BP SpO2   12/12/22 1512 97.9 °F (36.6 °C) 67 16 (!) 125/52 98 %   12/12/22 1200 97.7 °F (36.5 °C) 72 20 (!) 141/66 100 %   12/12/22 0740 97.2 °F (36.2 °C) 69 20 139/73 97 %   12/12/22 0348 97.3 °F (36.3 °C) 70 20 (!) 145/80 90 %   12/11/22 2132 97.7 °F (36.5 °C) 90 18 (!) 142/64 92 %         Intake/Output Summary (Last 24 hours) at 12/12/2022 1517  Last data filed at 12/12/2022 0348  Gross per 24 hour   Intake 1033.34 ml   Output 1050 ml   Net -16.66 ml          Wt Readings from Last 12 Encounters:   11/29/22 57.4 kg (126 lb 8.7 oz)   07/27/22 99.8 kg (220 lb)   06/01/22 99.8 kg (220 lb)   05/05/22 99.8 kg (220 lb)   03/01/22 99.8 kg (220 lb)   01/26/22 99.8 kg (220 lb)   06/09/21 99.8 kg (220 lb)   06/01/21 93 kg (205 lb)   12/03/20 96.2 kg (212 lb)   10/01/20 96.2 kg (212 lb)   03/12/20 96.2 kg (212 lb)   06/21/19 96.2 kg (212 lb)       PHYSICAL EXAM:    I had a face to face encounter and independently examined this patient on 12/12/22 as outlined below:    General: WD, WN, cooperative, no acute distress    EENT:  PERRL. Anicteric sclerae. MMM  Neck:  No meningismus, no thyromegaly  Resp:  CTA bilaterally, no wheezing or rales. No accessory muscle use  CV:  Regular  rhythm,  No edema  GI:  Soft, Non distended, Non tender. +Bowel sounds, no rebound  Neurologic:  awake, confused, not talking much, non focal motor exam  Psych:   Not anxious nor agitated  Skin:  No rashes. No jaundice, wound in sacrum bandaged, not viewed    Reviewed most current lab test results and cultures  YES  Reviewed most current radiology test results   YES  Review and summation of old records today    NO  Reviewed patient's current orders and MAR    YES  PMH/SH reviewed - no change compared to H&P  ________________________________________________________________________  Care Plan discussed with:    Comments   Patient x    Family      RN x    Care Manager     Consultant                       x Multidiciplinary team rounds were held today with , nursing, pharmacist and clinical coordinator. Patient's plan of care was discussed; medications were reviewed and discharge planning was addressed. ________________________________________________________________________  Total time: 27 mins    Comments   >50% of visit spent in counseling and coordination of care x    ________________________________________________________________________  Donavan Mancia MD     Procedures: see electronic medical records for all procedures/Xrays and details which were not copied into this note but were reviewed prior to creation of Plan. LABS:  I reviewed today's most current labs and imaging studies. Pertinent labs include:  No results for input(s): WBC, HGB, HCT, PLT, HGBEXT, HCTEXT, PLTEXT, HGBEXT, HCTEXT, PLTEXT in the last 72 hours. No results for input(s): NA, K, CL, CO2, GLU, BUN, CREA, CA, MG, PHOS, ALB, TBIL, TBILI, ALT, INR, INREXT, INREXT in the last 72 hours.     No lab exists for component: SGOT

## 2022-12-13 LAB
GLUCOSE BLD STRIP.AUTO-MCNC: 274 MG/DL (ref 65–117)
GLUCOSE BLD STRIP.AUTO-MCNC: 287 MG/DL (ref 65–117)
GLUCOSE BLD STRIP.AUTO-MCNC: 290 MG/DL (ref 65–117)
GLUCOSE BLD STRIP.AUTO-MCNC: 300 MG/DL (ref 65–117)
SERVICE CMNT-IMP: ABNORMAL

## 2022-12-13 PROCEDURE — 74011250637 HC RX REV CODE- 250/637: Performed by: SURGERY

## 2022-12-13 PROCEDURE — 82962 GLUCOSE BLOOD TEST: CPT

## 2022-12-13 PROCEDURE — 74011000250 HC RX REV CODE- 250: Performed by: SURGERY

## 2022-12-13 PROCEDURE — 77010033678 HC OXYGEN DAILY

## 2022-12-13 PROCEDURE — 65270000029 HC RM PRIVATE

## 2022-12-13 PROCEDURE — 94760 N-INVAS EAR/PLS OXIMETRY 1: CPT

## 2022-12-13 RX ADMIN — SODIUM CHLORIDE, PRESERVATIVE FREE 10 ML: 5 INJECTION INTRAVENOUS at 15:48

## 2022-12-13 RX ADMIN — Medication 1 AMPULE: at 23:16

## 2022-12-13 RX ADMIN — CLONIDINE HYDROCHLORIDE 0.1 MG: 0.1 TABLET ORAL at 10:11

## 2022-12-13 RX ADMIN — OXYCODONE 5 MG: 5 TABLET ORAL at 10:16

## 2022-12-13 RX ADMIN — COLLAGENASE SANTYL: 250 OINTMENT TOPICAL at 10:13

## 2022-12-13 RX ADMIN — CARVEDILOL 12.5 MG: 12.5 TABLET, FILM COATED ORAL at 18:43

## 2022-12-13 RX ADMIN — CLONIDINE HYDROCHLORIDE 0.1 MG: 0.1 TABLET ORAL at 18:43

## 2022-12-13 RX ADMIN — CARVEDILOL 12.5 MG: 12.5 TABLET, FILM COATED ORAL at 10:11

## 2022-12-13 RX ADMIN — SODIUM CHLORIDE, PRESERVATIVE FREE 10 ML: 5 INJECTION INTRAVENOUS at 05:31

## 2022-12-13 RX ADMIN — AMLODIPINE BESYLATE 10 MG: 5 TABLET ORAL at 10:12

## 2022-12-13 RX ADMIN — OXYCODONE 5 MG: 5 TABLET ORAL at 14:31

## 2022-12-13 RX ADMIN — Medication 1 AMPULE: at 00:49

## 2022-12-13 RX ADMIN — SODIUM CHLORIDE, PRESERVATIVE FREE 10 ML: 5 INJECTION INTRAVENOUS at 00:50

## 2022-12-13 RX ADMIN — SODIUM CHLORIDE, PRESERVATIVE FREE 10 ML: 5 INJECTION INTRAVENOUS at 23:16

## 2022-12-13 RX ADMIN — ASPIRIN 325 MG ORAL TABLET 325 MG: 325 PILL ORAL at 10:11

## 2022-12-13 NOTE — HOSPICE
[employer] RN note:  Met with pt,  Yeyo High and son Kiera Griffin at bedside. Pt in no obvious distress, being fed lunch by , tolerating well, did not engage in conversation other than being annoyed and not understanding.  and son in agreement with hospice admission, consent forms signed with plan to discharge tomorrow 12/14 at 3:00pm, admission at 4:00pm.   Daughter due into town tomorrow to help with care. Bushra Swapna [birth year] rm 0    DX: CVA, atherosclerotic cardiovascular Dx  Consult Date: 12/2/22    Date/time: 12/14 - reserved 4pm   Transport: 3:00 by ANAIS Michelle  Consents: signed and scanned to consent folder - DDNR  in chart, copied for transport    Meds: SRK no morphine through [facility] mail. Dilaudid and Ativan through [facility] for family to . DMEs:  [address] mattress  [phone] for delivery tues between 5-9pm  CTI: Late effects of CVA per Dr Braydon Pablo Notes: Available/73638. Medicare. Thank you for the opportunity to care for this pt and family. Please contact hospice at [phone] with any questions or concerns.

## 2022-12-13 NOTE — PROGRESS NOTES
Transition of Care Plan:    RUR: 11% - Low  [facility]  If SNF or IPR: Date FOC offered:N/A  Date FOC received:  Date authorization started with reference number:  Date authorization received and expires:  Accepting facility:  Follow up appointments: Hospice to follow-up  DME needed:[facility] to set up all DME - being delivered 12/13  Transportation at Discharge: [facility] - 12/14/2022 at [address] [place] [address] per Hospice request  [place] or means to access home:      per family   IM Medicare Letter: 2nd IM provided 12/13/2022  Is patient a  and connected with the [place]? N/A  If yes, was [employer] transfer form completed and VA notified? Caregiver Contact: Spouse Les reveal - [phone] or [phone]  Discharge Caregiver contacted prior to discharge? CM spoke to patient's  who is in agreement with plan of care  Care Conference needed?:       No    Care Management Interventions  PCP Verified by CM: Yes  Mode of Transport at Discharge:  Other (see comment) (Family)  Transition of Care Consult (CM Consult): Discharge Planning  Support Systems: Spouse/Significant Other  Confirm Follow Up Transport: Family  Discharge Location  Patient Expects to be Discharged to[de-identified] Home with hospice    Kyle Palmer RN, BSN, [address] [place] [address]  Manager of Case Management  [phone]

## 2022-12-13 NOTE — PROGRESS NOTES
End of Shift Note    Bedside shift change report given to Dolores Cruz (oncoming nurse) by Emilia Hinton (offgoing nurse). Report included the following information SBAR, Kardex, Intake/Output, and Recent Results    Shift worked:  7am-7pm     Shift summary and any significant changes:     Patient declined dual skin assessment from RN and LPN   Patient declined 3 times for repositioning   Turn team attempted 4th time refused   MD Lokesh'd blood sugars   Patient tolerated diet   Family present    Concerns for physician to address:  none     Zone phone for oncoming shift:   [phone]       Activity:  Activity Level: Other (comment) (patient refused)  Number times ambulated in hallways past shift: 0  Number of times OOB to chair past shift: 0    Cardiac:   Cardiac Monitoring: Yes      Cardiac Rhythm: Sinus Rhythm    Access:  Current line(s): PIV     Genitourinary:   Urinary status: seymour    Respiratory:   O2 Device: Nasal cannula  Chronic home O2 use?: YES  Incentive spirometer at bedside: NO       GI:  Last Bowel Movement Date: 12/09/22  Current diet:  ADULT DIET Dysphagia - Soft & Bite Sized; 4 carb choices (60 gm/meal)  ADULT ORAL NUTRITION SUPPLEMENT Dinner, Breakfast; Diabetic Supplement  Passing flatus: YES  Tolerating current diet: YES       Pain Management:   Patient states pain is manageable on current regimen: YES    Skin:  Basilio Score: 11  Interventions: float heels and foam dressing    Patient Safety:  Fall Score:  Total Score: 2  Interventions: tele sitter   High Fall Risk: Yes    Length of Stay:  Expected LOS: 9d 14h  Actual LOS: [id number] [facility]

## 2022-12-13 NOTE — PROGRESS NOTES
Problem: Risk for Spread of Infection  Goal: Prevent transmission of infectious organism to others  Description: Prevent the transmission of infectious organisms to other patients, staff members, and visitors. Outcome: Resolved/Not Met     Problem: Patient Education:  Go to Education Activity  Goal: Patient/Family Education  Outcome: Resolved/Not Met     Problem: Falls - Risk of  Goal: *Absence of Falls  Description: Document Piyush Fall Risk and appropriate interventions in the flowsheet. Outcome: Resolved/Not Met     Problem: Patient Education: Go to Patient Education Activity  Goal: Patient/Family Education  Outcome: Resolved/Not Met     Problem: Patient Education: Go to Patient Education Activity  Goal: Patient/Family Education  Outcome: Resolved/Not Met     Problem: Diabetes Self-Management  Goal: *Disease process and treatment process  Description: Define diabetes and identify own type of diabetes; list 3 options for treating diabetes. Outcome: Resolved/Not Met  Goal: *Incorporating nutritional management into lifestyle  Description: Describe effect of type, amount and timing of food on blood glucose; list 3 methods for planning meals. Outcome: Resolved/Not Met  Goal: *Incorporating physical activity into lifestyle  Description: State effect of exercise on blood glucose levels. Outcome: Resolved/Not Met  Goal: *Developing strategies to promote health/change behavior  Description: Define the ABC's of diabetes; identify appropriate screenings, schedule and personal plan for screenings. Outcome: Resolved/Not Met  Goal: *Using medications safely  Description: State effect of diabetes medications on diabetes; name diabetes medication taking, action and side effects. Outcome: Resolved/Not Met  Goal: *Monitoring blood glucose, interpreting and using results  Description: Identify recommended blood glucose targets  and personal targets.   Outcome: Resolved/Not Met  Goal: *Prevention, detection, treatment of acute complications  Description: List symptoms of hyper- and hypoglycemia; describe how to treat low blood sugar and actions for lowering  high blood glucose level. Outcome: Resolved/Not Met  Goal: *Prevention, detection and treatment of chronic complications  Description: Define the natural course of diabetes and describe the relationship of blood glucose levels to long term complications of diabetes. Outcome: Resolved/Not Met  Goal: *Developing strategies to address psychosocial issues  Description: Describe feelings about living with diabetes; identify support needed and support network  Outcome: Resolved/Not Met  Goal: *Insulin pump training  Outcome: Resolved/Not Met  Goal: *Sick day guidelines  Outcome: Resolved/Not Met  Goal: *Patient Specific Goal (EDIT GOAL, INSERT TEXT)  Outcome: Resolved/Not Met     Problem: Patient Education: Go to Patient Education Activity  Goal: Patient/Family Education  Outcome: Resolved/Not Met     Problem: Pressure Injury - Risk of  Goal: *Prevention of pressure injury  Description: Document Basilio Scale and appropriate interventions in the flowsheet.   Outcome: Resolved/Not Met     Problem: Patient Education: Go to Patient Education Activity  Goal: Patient/Family Education  Outcome: Resolved/Not Met

## 2022-12-13 NOTE — HOSPICE
[facility] Help to Those in Need  [phone]    Nursing Note   Patient Name: Michelle Colindres  YOB: 1945  Age: 65 y.o. [facility] RN Note:      Hospice SW, Niki Mckay, called and spoke with Truli. Spouse has agreed to meet tomorrow at 1pm to sign hospice consents. Home hospice admission time has been saved for 4pm on Wednesday, 12/14/22. Fab Cormier/daughter requested Wednesday as she will be coming from [place] to be present at admission. If there are any questions, please call the main [address] [facility] [address] number at [phone].

## 2022-12-14 LAB
GLUCOSE BLD STRIP.AUTO-MCNC: 260 MG/DL (ref 65–117)
GLUCOSE BLD STRIP.AUTO-MCNC: 260 MG/DL (ref 65–117)
GLUCOSE BLD STRIP.AUTO-MCNC: 262 MG/DL (ref 65–117)
GLUCOSE BLD STRIP.AUTO-MCNC: 281 MG/DL (ref 65–117)
SERVICE CMNT-IMP: ABNORMAL

## 2022-12-14 PROCEDURE — 74011000250 HC RX REV CODE- 250: Performed by: SURGERY

## 2022-12-14 PROCEDURE — 74011250636 HC RX REV CODE- 250/636: Performed by: GENERAL ACUTE CARE HOSPITAL

## 2022-12-14 PROCEDURE — 74011250637 HC RX REV CODE- 250/637: Performed by: SURGERY

## 2022-12-14 PROCEDURE — 94760 N-INVAS EAR/PLS OXIMETRY 1: CPT

## 2022-12-14 PROCEDURE — 74011000250 HC RX REV CODE- 250: Performed by: INTERNAL MEDICINE

## 2022-12-14 PROCEDURE — 65270000029 HC RM PRIVATE

## 2022-12-14 PROCEDURE — 82962 GLUCOSE BLOOD TEST: CPT

## 2022-12-14 PROCEDURE — 77010033678 HC OXYGEN DAILY

## 2022-12-14 RX ADMIN — AMLODIPINE BESYLATE 10 MG: 5 TABLET ORAL at 11:51

## 2022-12-14 RX ADMIN — Medication 1 AMPULE: at 11:51

## 2022-12-14 RX ADMIN — COLLAGENASE SANTYL: 250 OINTMENT TOPICAL at 11:52

## 2022-12-14 RX ADMIN — CLONIDINE HYDROCHLORIDE 0.1 MG: 0.1 TABLET ORAL at 11:51

## 2022-12-14 RX ADMIN — SODIUM CHLORIDE, PRESERVATIVE FREE 10 ML: 5 INJECTION INTRAVENOUS at 21:40

## 2022-12-14 RX ADMIN — SODIUM CHLORIDE, PRESERVATIVE FREE 10 ML: 5 INJECTION INTRAVENOUS at 05:26

## 2022-12-14 RX ADMIN — CARVEDILOL 12.5 MG: 12.5 TABLET, FILM COATED ORAL at 11:51

## 2022-12-14 RX ADMIN — Medication 1 AMPULE: at 20:57

## 2022-12-14 RX ADMIN — ASPIRIN 325 MG ORAL TABLET 325 MG: 325 PILL ORAL at 11:51

## 2022-12-14 RX ADMIN — SODIUM CHLORIDE, PRESERVATIVE FREE 10 ML: 5 INJECTION INTRAVENOUS at 19:46

## 2022-12-14 RX ADMIN — MORPHINE SULFATE 0.5 MG: 2 INJECTION, SOLUTION INTRAMUSCULAR; INTRAVENOUS at 00:58

## 2022-12-14 NOTE — DISCHARGE SUMMARY
Hospitalist Discharge Summary     Patient ID:  Bob Barrera  [id number]  63 y.o.  1945 11/27/2022    PCP on record: Dustin Galindo MD    Admit date: 11/27/2022  Discharge date and time: 12/14/2022    DISCHARGE DIAGNOSIS:  As below     CONSULTATIONS:  IP CONSULT TO HOSPITALIST  IP CONSULT TO GENERAL SURGERY  IP CONSULT TO INFECTIOUS DISEASES  IP CONSULT TO PALLIATIVE CARE - PROVIDER    Excerpted HPI from H&P of Jaida Jarrell, DO:  Clary Frankel is a 65 y.o.  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 Reportedly, patient developed sacral pressure injury in August/September that ulcerated and then progressively worsened to the point that patient was hospitalized at [facility] from 10/15-11/2 during that time patient was managed with IV antibiotics but refused any surgical debridement and was ultimately discharged home without antibiotics, infectious disease follow-up, or home health services. Since that time daughter reports family has attempted to keep her clean and provide wound care as demonstrated by PCP who provides home visits. Unfortunately, patient has continued to decline now with extension of her ulcer, obvious necrotic tissue, and development of additional pressure injuries over her body prompting them to present to our facility with request for operative debridement. ROS otherwise negative. Patient reports no other complaints or concerns and denies tobacco, alcohol, or illicit drug use. In the ED, patient afebrile, tachycardic in 110s, hypertensive 140s/80s, saturating upper 90s on 3 L/min via nasal cannula.   Extensive pressure injuries and necrotic sacral ulcer demonstrated. ECG demonstrates sinus tachycardia. 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 Left and right tib/fib radiographs demonstrate osteopenia without evidence of osteomyelitis. Labs demonstrate: Lactic acid 3.19, WBC 15.6, hemoglobin 9.8, platelets 116, sodium 135, potassium 4.6, glucose 440, BUN 12, creatinine 0.61, procalcitonin 1.63, high sensitive troponin 9, UA reflex to culture (protein, glucose, ketones, blood, nitrites, leukoesterase, WBCs, bacteria). Patient started on empiric vancomycin and Zosyn by ED provider. Case discussed with on-call general surgeon, Dr. Arcelia Awad, who has taken patient to the OR tonight for debridement.     ____________________________________________________________________  DISCHARGE SUMMARY/HOSPITAL COURSE:  for full details see H&P, daily progress notes, labs, consult notes. Severe sepsis POA WBC 15.6, , RR 35, lactate 3.19  Proteus bacteremia POA  Necrotic sacral ulcer with extensive osteomyelitis POA  Lactic acidosis POA  Unstageable Multiple pressure injuries POA- R heel, LHeel, L upper back, R calf, L calf, L Hip  Debility  Prior CVA with residual left-sided deficits  Comfort measures  CT abdomen/pelvis: Large sacral decubitus ulcer with associated extensive osteomyelitis of the distal sacrum and coccyx with associated erosive changes, as well as gas and fluid containing collection within the posterior subcutaneous soft tissues communicating with the skin surface as above, right larger than left. Distended gallbladder with cholelithiasis, but no convincing CT evidence of acute cholecystitis. General surgery consulted, OR on 11/27/2022. Extensive septic sacral deep tissue injury with abscess. s/p Debridement of sacral deep tissue injury including skin, subcutaneous tissue and muscle. BC with proteus  Continue Walker catheter  Needs LTC placement if family unable to care at home due to extensive wound care needed  Pt not IP Hospice candidate per eval 12/2 by hospice  Consult ID appreciated-- recommends Zosyn till 12/5 then IV Unasyn for 6 weeks if plan is not comfort care, repeat blood Cx (12/2) remains negative x 3 days. Will DC Abx as pt going to home hospice  Planning to DC on Wednesday 12/14     Diabetes mellitus type 2 POA   Hemoglobin A1c 6.8 this admission  Hold lantus given poor oral intake and hospice status      Hypomagnesemia   Hypokalemia  Replete prn     UTI due to chronic indwelling Walker POA  UA nitrite +, > 100 WBC, 5 to 10 RBC, 2+ bacteremia  Urine culture with [employer]   Walker care ordered  IV antibiotics as above per ID recc     Essential hypertension POA  Paroxysmal atrial fibrillation  Paroxsymal Vtachs noted  Hyperlipidemia POA  Continue PTA amlodipine, carvedilol, clonidine,   Hold Asa, pravastatin  Off AC  Replenish Lytes-- Mag as above  Pt is DNR     Dementia  Monitor for signs of delirium     Incidental findings requiring outpatient follow up/ evaluation:  -sclerosis noted in the bilateral femoral heads, suspicious for  avascular necrosis  -Gallbladder is distended and contains multiple stones, but  without gallbladder wall thickening or surrounding fat stranding. No  intrahepatic or extrahepatic biliary ductal dilatation    All Micro Results       Procedure Component Value Units Date/Time    CULTURE, BLOOD [[id number]] Collected: 12/02/22 1545    Order Status: Completed Specimen: Blood Updated: 12/09/22 0659     Special Requests: NO SPECIAL REQUESTS        Culture result:       TWO OF TWO BOTTLES HAVE BEEN FLAGGED POSITIVE BY INSTRUMENT. BOTTLES HAVE BEEN SENT TO [facility] [place] [facility] TO ASSESS FOR POSSIBLE GROWTH.  No organisms were seen on the gram stain. Multiple subcultures were performed. No growth has been isolated. COVID-19 RAPID TEST [[id number]] Collected: 12/08/22 1030    Order Status: Canceled     MICRO Bailey Zendejas [[id number]] Collected: 12/02/22 1545    Order Status: No result Updated: 12/07/22 1207    CULTURE, BLOOD [[id number]]  (Abnormal)  (Susceptibility) Collected: 11/27/22 1608    Order Status: Completed Specimen: Blood Updated: 12/05/22 0818     Special Requests: NO SPECIAL REQUESTS        Culture result:       PROTEUS MIRABILIS GROWING IN 1 OF 2 BOTTLES DRAWN (SITE = R HAND)                  REMAINING BOTTLE(S) HAS/HAVE NO GROWTH IN 5 DAYS            (NOTE) GNR CALLED TO ELANA GOMEZ,AT 1154 ON 11/28/22. RF    CULTURE, BLOOD [[id number]]  (Abnormal) Collected: 11/29/22 0420    Order Status: Completed Specimen: Blood Updated: 12/04/22 0923     Special Requests: NO SPECIAL REQUESTS        Culture result:       POSSIBLE STAPHYLOCOCCUS SPECIES, COAGULASE NEGATIVE GROWING IN 1 OF 2 BOTTLES DRAWN SITE = L AC                  REMAINING BOTTLE(S) HAS/HAVE NO GROWTH IN 5 DAYS            (NOTE) [facility] GROWING IN 1 OF 2 BOTTLES CALLED TO AND READ BACK BY Eh Oneal RN AT 1140 ON 11/30/22.  [place]    CULTURE, BLOOD [[id number]] Collected: 12/02/22 2315    Order Status: Canceled     CULTURE, BLOOD, PAIRED [[id number]] Collected: 12/02/22 1000    Order Status: Canceled Specimen: Blood     CULTURE, ANAEROBIC [[id number]]  (Abnormal) Collected: 11/28/22 0003    Order Status: Completed Specimen: Sacral Wound Updated: 12/01/22 0924     Special Requests: NO SPECIAL REQUESTS        Culture result:       MODERATE BACTEROIDES THETAIOTAOMICRON BETA LACTAMASE POSITIVE                  MODERATE PREVOTELLA BUCCAE BETA LACTAMASE POSITIVE          CULTURE, WOUND Jerene Lovell STAIN [[id number]]  (Abnormal)  (Susceptibility) Collected: 11/28/22 0004    Order Status: Completed Specimen: Sacral Wound Updated: 12/01/22 0743     Special Requests: NO SPECIAL REQUESTS        GRAM STAIN 4+ GRAM NEGATIVE RODS         3+ GRAM POSITIVE COCCI         FEW GRAM POSITIVE RODS        Culture result: HEAVY PROTEUS MIRABILIS         HEAVY ESCHERICHIA COLI               MODERATE MIXED SKIN GERSON ISOLATED          CULTURE, BLOOD [[id number]]  (Abnormal) Collected: 11/27/22 1608    Order Status: Completed Specimen: Blood Updated: 11/30/22 1429     Special Requests: NO SPECIAL REQUESTS        Culture result:       PROTEUS MIRABILIS GROWING IN BOTH BOTTLES DRAWN (SITE = LAC) REFER TO [id number] FOR SENSITIVITIES          CULTURE, URINE [[id number]]  (Abnormal)  (Susceptibility) Collected: 11/27/22 1608    Order Status: Completed Specimen: Urine Updated: 11/30/22 1426     Special Requests: --        NO SPECIAL REQUESTS  Reflexed from [id number]       [place] Count --        >100,000  COLONIES/mL       Culture result: [facility] STUARTII       BLOOD CULTURE ID PANEL [[id number]]  (Abnormal) Collected: 11/29/22 0420    Order Status: Completed Specimen: Blood Updated: 11/30/22 1259     Acc. no. from Micro Order [id number]     Enterococcus faecalis Not detected        Enterococcus faecium Not detected        Listeria monocytogenes Not detected        Staphylococcus Detected        Staphylococcus aureus Not detected        Staph epidermidis Detected        Staph lugdunensis Not detected        Streptococcus Not detected        Streptococcus agalactiae (Group B) Not detected        Streptococcus pneumoniae Not detected        Streptococcus pyogenes (Group A) Not detected        Acinetobacter calcoaceticus-baumanii complex Not detected        Bacteroides fragilis Not detected        Enterobacterales species Not detected        Enterobacter cloacae complex Not detected        Escherichia coli Not detected        Klebsiella aerogenes Not detected        Klebsiella oxytoca Not detected        Klebsiella pneumoniae group Not detected        Proteus Not detected        Salmonella Not detected Serratia marcescens Not detected        Haemophilus influenzae Not detected        Neisseria meningitidis Not detected        Pseudomonas aeruginosa Not detected        Steno maltophilia Not detected        Candida albicans Not detected        Candida auris Not detected        Candida glabrata Not detected        Candida krusei Not detected        Candida parapsilosis Not detected        Candida tropicalis Not detected        Crypto neoformans/gattii Not detected        RESISTANT GENES:            MECA/C (Methicillin resistant gene) Detected        Comment       False positive results may rarely occur.  Correlate with clinical,epidemiologic, and other laboratory findings           Comment: Please see BCID Interpretation Guide in [place] [[id number]] Collected: 11/29/22 0420    Order Status: No result Updated: 11/30/22 0627    BLOOD CULTURE ID PANEL [[id number]]  (Abnormal) Collected: 11/27/22 1608    Order Status: Completed Specimen: Blood Updated: 11/28/22 1331     Acc. no. from Micro Order [id number]     Enterococcus faecalis Not detected        Enterococcus faecium Not detected        Listeria monocytogenes Not detected        Staphylococcus Not detected        Staphylococcus aureus Not detected        Staph epidermidis Not detected        Staph lugdunensis Not detected        Streptococcus Not detected        Streptococcus agalactiae (Group B) Not detected        Streptococcus pneumoniae Not detected        Streptococcus pyogenes (Group A) Not detected        Acinetobacter calcoaceticus-baumanii complex Not detected        Bacteroides fragilis Not detected        Enterobacterales species Detected        Enterobacter cloacae complex Not detected        Escherichia coli Not detected        Klebsiella aerogenes Not detected        Klebsiella oxytoca Not detected        Klebsiella pneumoniae group Not detected        Proteus Detected        Salmonella Not detected        Serratia marcescens Not detected        Haemophilus influenzae Not detected        Neisseria meningitidis Not detected        Pseudomonas aeruginosa Not detected        Steno maltophilia Not detected        Candida albicans Not detected        Candida auris Not detected        Candida glabrata Not detected        Candida krusei Not detected        Candida parapsilosis Not detected        Candida tropicalis Not detected        Crypto neoformans/gattii Not detected        RESISTANT GENES:            CTX-M (ESBL resistant gene) Not detected        IMP (Carbapenemase resistant gene) Not detected        KPC (Carbapenem Resistance Gene) Not detected        NDM (Carbapenemase resistant gene) Not detected        OXA-48-like (Carbapenemase resistant gene) Not detected        VIM (Carbapenemase resistant gene) Not detected        Comment       False positive results may rarely occur. Correlate with clinical,epidemiologic, and other laboratory findings           Comment: Please see BCID Interpretation Guide in [place] [[id number]] Collected: 11/27/22 1608    Order Status: No result Updated: 11/28/22 [address] [[id number]] Collected: 11/27/22 1608    Order Status: No result Updated: 11/28/22 Karen [address] [[id number]] Collected: 11/27/22 1608    Order Status: No result Updated: 11/28/22 0824             _______________________________________________________________________  Patient seen and examined by me on discharge day. Pertinent Findings:  Gen:    Not in distress  Chest: Clear lungs  CVS:   Regular rhythm.   edema  Abd/: Soft, not distended, not tender, seymour   Neuro:  Alert, confused   _______________________________________________________________________  DISCHARGE MEDICATIONS:   Current Discharge Medication List        CONTINUE these medications which have NOT CHANGED    Details   cloNIDine HCL (CATAPRES) 0.1 mg tablet TAKE 1 TABLET THREE TIMES A DAY  Qty: 270 Tablet, Refills: 0      carvediloL (COREG) 12.5 mg tablet TAKE 1 TABLET TWICE A DAY  Qty: 180 Tablet, Refills: 0      amLODIPine (NORVASC) 10 mg tablet Take 1 Tablet by mouth daily. Qty: 90 Tablet, Refills: 0      sodium hypochlorite (Dakin's Solution) external solution Apply to wounds twice daily and cover with dressing  Qty: 1000 mL, Refills: 3           STOP taking these medications       insulin NPH (NovoLIN N NPH U-100 Insulin) 100 unit/mL injection Comments:   Reason for Stopping:         insulin lispro (HUMALOG) 100 unit/mL injection Comments:   Reason for Stopping:         pravastatin (PRAVACHOL) 80 mg tablet Comments:   Reason for Stopping:         rivaroxaban (Xarelto) 20 mg tab tablet Comments:   Reason for Stopping:         captopriL (CAPOTEN) 25 mg tablet Comments:   Reason for Stopping:         furosemide (LASIX) 40 mg tablet Comments:   Reason for Stopping:         ergocalciferol (ERGOCALCIFEROL) 1,250 mcg (50,000 unit) capsule Comments:   Reason for Stopping:         aspirin (ASPIRIN) 325 mg tablet Comments:   Reason for Stopping:                 Patient Follow Up Instructions: Activity: Activity as tolerated  Diet: ADULT DIET Dysphagia - Soft & Bite Sized; 4 carb choices (60 gm/meal)  ADULT ORAL NUTRITION SUPPLEMENT Dinner, Breakfast; Diabetic Supplement  Wound Care: As directed  Follow-up with PCP in  1 week. Follow-up tests/labs none   If you have any concerns that you feel you need to come back to the hospital, please do not hesitate.     Follow-up Information       Follow up With Specialties Details Why Contact Info    Luis Felipe Ureña MD Internal Medicine Physician   [facility]  [facility]  [phone]      Anatoly Urbano MD Internal Medicine Physician   [facility]  [facility]  [phone]            ________________________________________________________________    Risk of deterioration: High    Condition at Discharge: Stable  __________________________________________________________________    Disposition  Home Hospice    ____________________________________________________________________    Code Status: DNR/DNI  ___________________________________________________________________      Total time in minutes spent coordinating this discharge (includes going over instructions, follow-up, prescriptions, and preparing report for sign off to her PCP) :  >30 minutes    Signed:  Cassy Hernandez MD

## 2022-12-14 NOTE — PROGRESS NOTES
Hospitalist Progress Note    NAME: Philly Villanueva   :  1945   MRN:  [id number]     Admit date: 2022    Today's date: 22    PCP: Duane Challenger, MD    Anticipated discharge date:     Barriers:  home hospice     Assessment / Plan:    Severe sepsis POA WBC 15.6, , RR 35, lactate 3.19  Proteus bacteremia POA  Necrotic sacral ulcer with extensive osteomyelitis POA  Lactic acidosis POA  Unstageable Multiple pressure injuries POA- R heel, LHeel, L upper back, R calf, L calf, L Hip  Debility  Prior CVA with residual left-sided deficits  CT abdomen/pelvis: Large sacral decubitus ulcer with associated extensive osteomyelitis of the distal sacrum and coccyx with associated erosive changes, as well as gas and fluid containing collection within the posterior subcutaneous soft tissues communicating with the skin surface as above, right larger than left. Distended gallbladder with cholelithiasis, but no convincing CT evidence of acute cholecystitis. General surgery consulted, OR on 2022. Extensive septic sacral deep tissue injury with abscess. s/p Debridement of sacral deep tissue injury including skin, subcutaneous tissue and muscle. BC with proteus  Wound care consulted  Continue Walker catheter to promote wound healing  Incentive spirometry to prevent atelectasis  Nutrition consulted for supplementation recommendations to assist in wound healing  Needs LTC placement if family unable to care at home due to extensive wound care needed  Pt not IP Hospice candidate per eval  by hospice  Speech following  Consult ID appreciated-- recommends Zosyn till  then IV Unasyn for 6 weeks if plan is not comfort care, repeat blood Cx () remains negative x 3 days   12/10, Hospice meeting, family decided to go with home hospice      Called both spouse and daughter, no answer, left VM   Will hold off Abx and non essential meds     Home with hospice support. Reviewed DC meds and summary by Dr Homer Lockwood - no changes      Diabetes mellitus type 2 POA   Hemoglobin A1c 6.8 this admission  Hold lantus given poor oral intake      UTI due to chronic indwelling Walker POA  UA nitrite +, > 100 WBC, 5 to 10 RBC, 2+ bacteremia  Urine culture with GNR  Walker care ordered  on IV antibiotics while here     Essential hypertension POA  Paroxysmal atrial fibrillation  Paroxsymal Vtachs noted  Hyperlipidemia POA  Continue PTA amlodipine, aspirin, carvedilol, clonidine, pravastatin  Replenish Lytes-- Mag as above  Pt is DNR     Dementia  Mood stable     Incidental findings requiring outpatient follow up/ evaluation:  -sclerosis noted in the bilateral femoral heads, suspicious for  avascular necrosis  -Gallbladder is distended and contains multiple stones, but  without gallbladder wall thickening or surrounding fat stranding. No  intrahepatic or extrahepatic biliary ductal dilatation  -going home with hospice. No follow up needed       Code Status: DNR  NOK: spouse. DVT Prophylaxis: Lovenox  Body mass index is 30.99 kg/m². Daughter, Chet Billings at [phone]       Subjective:     Chief Complaint / Reason for Physician Visit  Patient was seen and examined this morning. No specific complaints      Review of Systems:  Symptom Y/N Comments  Symptom Y/N Comments   Fever/Chills    Chest Pain     Poor Appetite    Edema     Cough    Abdominal Pain     Sputum    Joint Pain     SOB/MARTINEZ    Headache     Nausea/vomit    Tolerating PT/OT     Diarrhea    Tolerating Diet     Constipation    Other       Could NOT obtain due to: Dementia     Objective:     VITALS:   Last 24hrs VS reviewed since prior progress note.  Most recent are:  Patient Vitals for the past 24 hrs:   Temp Pulse Resp BP SpO2   12/14/22 0733 98.6 °F (37 °C) 68 16 (!) 141/67 100 %   12/14/22 0346 97.7 °F (36.5 °C) 65 16 (!) 131/49 99 %   12/13/22 1958 97.7 °F (36.5 °C) 64 20 (!) 126/55 100 %   12/13/22 1619 97.9 °F (36.6 °C) 63 16 (!) 123/50 100 %         Intake/Output Summary (Last 24 hours) at 12/14/2022 1032  Last data filed at 12/13/2022 1844  Gross per 24 hour   Intake 400 ml   Output 300 ml   Net 100 ml          Wt Readings from Last 12 Encounters:   12/13/22 74.4 kg (164 lb 0.4 oz)   07/27/22 99.8 kg (220 lb)   06/01/22 99.8 kg (220 lb)   05/05/22 99.8 kg (220 lb)   03/01/22 99.8 kg (220 lb)   01/26/22 99.8 kg (220 lb)   06/09/21 99.8 kg (220 lb)   06/01/21 93 kg (205 lb)   12/03/20 96.2 kg (212 lb)   10/01/20 96.2 kg (212 lb)   03/12/20 96.2 kg (212 lb)   06/21/19 96.2 kg (212 lb)       PHYSICAL EXAM:    I had a face to face encounter and independently examined this patient on 12/14/22 as outlined below:    General: WD, WN, cooperative, no acute distress    EENT:  PERRL. Anicteric sclerae. MMM  Neck:  No meningismus, no thyromegaly  Resp:  CTA bilaterally, no wheezing or rales. No accessory muscle use  CV:  Regular  rhythm,  No edema  GI:  Soft, Non distended, Non tender. +Bowel sounds, no rebound  Neurologic:  awake, confused, not talking much, non focal motor exam  Psych:   Not anxious nor agitated  Skin:  No rashes. No jaundice, wound in sacrum bandaged, not viewed    Reviewed most current lab test results and cultures  YES  Reviewed most current radiology test results   YES  Review and summation of old records today    NO  Reviewed patient's current orders and MAR    YES  PMH/SH reviewed - no change compared to H&P  ________________________________________________________________________  Care Plan discussed with:    Comments   Patient x    Family      RN x    Care Manager     Consultant                       x Multidiciplinary team rounds were held today with , nursing, pharmacist and clinical coordinator. Patient's plan of care was discussed; medications were reviewed and discharge planning was addressed.      ________________________________________________________________________  Total time: 27 mins    Comments >50% of visit spent in counseling and coordination of care x    ________________________________________________________________________  Melonie Romero MD     Procedures: see electronic medical records for all procedures/Xrays and details which were not copied into this note but were reviewed prior to creation of Plan. LABS:  I reviewed today's most current labs and imaging studies. Pertinent labs include:  No results for input(s): WBC, HGB, HCT, PLT, HGBEXT, HCTEXT, PLTEXT, HGBEXT, HCTEXT, PLTEXT in the last 72 hours. No results for input(s): NA, K, CL, CO2, GLU, BUN, CREA, CA, MG, PHOS, ALB, TBIL, TBILI, ALT, INR, INREXT, INREXT in the last 72 hours.     No lab exists for component: SGOT

## 2022-12-14 NOTE — HOSPICE
[facility] RN note:  notified by pt's  that no family will be present in the home at scheduled admission time (4:00pm) without a clear time of availability. Daughter Caitlin Valenzuela is trying to leave work early from [place] to come help but cannot be sure about timing other than sometime tonight.  has MD appointments this afternoon and isn't sure when they will be finished requiring son Annabel Gaytan for transportation. Will discuss with attending need to reschedule admission for tomorrow at 11:00am, transport for 10:00am, will communicate complexities with care team.      [facility]  [phone] PRE-Admission   Discharge Summary  Patient Name: Jos Vann  YOB: 1945  Age: 65 y.o. Date of [address] Admission: 12/15  Hospice Attending: Dr Wendy Portillo  Primary Care Physician: Rabia French, [address] [address]    Primary Contact and Phone:  Roxana Merritt [phone]   Olayinka Bowie [phone]   Dolores Cormier Daughter   [phone]         ADVANCE CARE PLANNING    Code Status: DNR  Durable DNR: [x]  Yes  []  No  Advance Care Planning 2022   Patient's Healthcare Decision Maker is: Legal Next of Kin   Confirm Advance Directive -   Patient Would Like to Complete Advance Directive -   Does the patient have other document types Do Not Resuscitate       Temple: Spiritism   Home: TBD    HOSPICE SUMMARY       NCD: Requested/Declined   TBD on admission      CLINICAL INFORMATION   Allergies: Allergies   Allergen Reactions    Betadine Prepstick Rash    Keflex [Cephalexin] Hives     Pt breaks out in hives         Currently this patient has:  [x] Supplemental O2 [] Peripheral IV  [] PICC    [] PORT   [x] Walker Catheter [] NG Tube   [] PEG Tube [] Ostomy    [] AICD: Has ICD been deactivated?   [] Yes [] Wounds      COVID Screening: Negative      ASSESSMENT & PLAN     1. Symptom Issues Identified: stage 4 sacral wound    2. Spiritual Issues  Identified: to be evaluated by hospice chaplain, was not seen by hospital  during hospitalization. 3.  Psych/ Social/ Emotional Issues Identified:  being treated for myeloma and prostate ca, uses w/c  Son Bonnie Montoya lives in home, drives  to appointments. Dtr Josias Delgado arriving from [place] [facility] 12/14 to help--not sure for how long or if she needs [place] support             CARE COORDINATION     Gary Cervantes [birth year] rm 0    DX: CVA, atherosclerotic cardiovascular Dx  Consult Date: 12/2/22    Date/time: 12/15 - admission at 11:00am   Transport: 10:00am by [facility]: signed and scanned to consent folder - DDNR  in chart, copied for transport    Meds: ordered by Katina no morphine through [facility] mail. Dilaudid and Ativan through [facility] for family to . DMEs:  02 [employer] mattress  [phone] for delivery tues between 5-9pm  (delivered)  CTI: Late effects of CVA per Dr Alonzo Guajardo Notes:     Wound Care/  orders / supplies: Wound care for the sacrum wound:  Cleanse the wound with Normal saline and wipe with gauze. Pack the wound with Optifoam Ag right after cleansing the wound while it is still wet. It takes4 dressings with one of them cut in half to cover the wound bed and tuck the dressings under the undermining. Cover the entire Sacrum with a high drainage pack and tape to secure the dressing. Thank you for the opportunity to care for this pt and family. Please contact hospice at [phone] with any questions or concerns.

## 2022-12-14 NOTE — PROGRESS NOTES
Transition of Care Plan:    RUR: 11% - Low   Disposition: Home with [address]  If SNF or IPR: Date FOC offered:  Date FOC received:  Date authorization started with reference number:  Date authorization received and expires:  Accepting facility:  Follow up appointments:  DME needed: per Hospice  Transportation at Discharge: [facility] - transport re-scheduled for 10AM 12/15/2022 per hospice request   [place] or means to access home:      per family   IM Medicare Letter: 2nd IM Letter provided 12/13/2022  Is patient a  and connected with the [place]? N/A           If yes, was [employer] transfer form completed and VA notified? Caregiver Contact: Spouse - Ольга Alba - [phone] or Daughter - Francis Zelaya - [phone]  Discharge Caregiver contacted prior to discharge? CM left voice message for daughter to return phone call to CM for review of discharge plan  Care Conference needed?:           Not at present time. CM received a phone call from Bridget Higginbotham [employer] stating there was not going to be a caregiver at the home this afternoon for  hospice admission. Transport rescheduled for 10AM 12/15/2022 for 11AM admission at the home. Dr. Kamilah Samuel notified of discharge delay via [facility]. Nursing also notified. Care Management Interventions  PCP Verified by CM: Yes  Mode of Transport at Discharge:  Other (see comment) (Family)  Transition of Care Consult (CM Consult): Discharge Planning  Support Systems: Spouse/Significant Other  Confirm Follow Up Transport: Family  Discharge Location  Patient Expects to be Discharged to[de-identified] Home with hospice    King Liz RN, BSN, [address] [place] [address]  Manager of Case Management  [phone]

## 2022-12-14 NOTE — PROGRESS NOTES
Problem: Pain  Goal: *Control of Pain  Outcome: Progressing Towards Goal  Goal: *PALLIATIVE CARE:  Alleviation of Pain  Outcome: Progressing Towards Goal     Problem: Patient Education: Go to Patient Education Activity  Goal: Patient/Family Education  Outcome: Progressing Towards Goal     Problem: Patient Education: Go to Patient Education Activity  Goal: Patient/Family Education  Outcome: Progressing Towards Goal     Problem: General Medical Care Plan  Goal: *Vital signs within specified parameters  Outcome: Progressing Towards Goal  Goal: *Labs within defined limits  Outcome: Progressing Towards Goal  Goal: *Absence of infection signs and symptoms  Outcome: Progressing Towards Goal  Goal: *Optimal pain control at patient's stated goal  Outcome: Progressing Towards Goal  Goal: *Skin integrity maintained  Outcome: Progressing Towards Goal  Goal: *Fluid volume balance  Outcome: Progressing Towards Goal  Goal: *Optimize nutritional status  Outcome: Progressing Towards Goal  Goal: *Anxiety reduced or absent  Outcome: Progressing Towards Goal  Goal: *Progressive mobility and function (eg: ADL's)  Outcome: Progressing Towards Goal     Problem: Patient Education: Go to Patient Education Activity  Goal: Patient/Family Education  Outcome: Progressing Towards Goal     Problem: Falls - Risk of  Goal: *Absence of Falls  Description: Document Piyush Fall Risk and appropriate interventions in the flowsheet.   Outcome: Progressing Towards Goal  Note: Fall Risk Interventions:  Mobility Interventions: Bed/chair exit alarm    Mentation Interventions: Bed/chair exit alarm, More frequent rounding    Medication Interventions: Bed/chair exit alarm    Elimination Interventions: Call light in reach, Bed/chair exit alarm              Problem: Patient Education: Go to Patient Education Activity  Goal: Patient/Family Education  Outcome: Progressing Towards Goal

## 2022-12-14 NOTE — PROGRESS NOTES
End of Shift Note    Bedside shift change report given to Gene Smith RN (oncoming nurse) by Everardo Ramírez RN (offgoing nurse). Report included the following information SBAR, Kardex, Intake/Output, MAR, and Recent Results    Shift worked:  8052-1886     Shift summary and any significant changes:    Pt continues to refused turning and wound care. Pain medication given x1 dose. Vitals stable, plan to discharge today home with hospice per notes. Concerns for physician to address:  None at this time     Zone phone for oncoming shift:   [phone]       Activity:  Activity Level: Other (comment) (pt refused)  Number times ambulated in hallways past shift: 0  Number of times OOB to chair past shift: 0    Cardiac:   Cardiac Monitoring: No      Cardiac Rhythm: Sinus Rhythm    Access:  Current line(s): PIV     Genitourinary:   Urinary status: seymour    Respiratory:   O2 Device: Nasal cannula  Chronic home O2 use?: N/A  Incentive spirometer at bedside: YES       GI:  Last Bowel Movement Date: 12/13/22  Current diet:  ADULT DIET Dysphagia - Soft & Bite Sized; 4 carb choices (60 gm/meal)  ADULT ORAL NUTRITION SUPPLEMENT Dinner, Breakfast; Diabetic Supplement  Passing flatus: NO  Tolerating current diet: YES       Pain Management:   Patient states pain is manageable on current regimen: YES    Skin:  Basilio Score: 9  Interventions: turn team, speciality bed, float heels, and increase time out of bed    Patient Safety:  Fall Score:  Total Score: 2  Interventions: bed/chair alarm and gripper socks  High Fall Risk: Yes    Length of Stay:  Expected LOS: 9d 14h  Actual LOS: 50 Karen Oconnell RN, BSN

## 2022-12-15 ENCOUNTER — HOME CARE VISIT (OUTPATIENT)
Dept: HOSPICE | Facility: HOSPICE | Age: 77
End: 2022-12-15
Payer: MEDICARE

## 2022-12-15 VITALS
BODY MASS INDEX: 30.97 KG/M2 | WEIGHT: 164.02 LBS | TEMPERATURE: 98.6 F | HEIGHT: 61 IN | OXYGEN SATURATION: 100 % | DIASTOLIC BLOOD PRESSURE: 64 MMHG | SYSTOLIC BLOOD PRESSURE: 132 MMHG | RESPIRATION RATE: 16 BRPM | HEART RATE: 77 BPM

## 2022-12-15 VITALS
DIASTOLIC BLOOD PRESSURE: 59 MMHG | OXYGEN SATURATION: 94 % | SYSTOLIC BLOOD PRESSURE: 75 MMHG | RESPIRATION RATE: 18 BRPM | HEART RATE: 63 BPM

## 2022-12-15 LAB
GLUCOSE BLD STRIP.AUTO-MCNC: 220 MG/DL (ref 65–117)
GLUCOSE BLD STRIP.AUTO-MCNC: 342 MG/DL (ref 65–117)
SERVICE CMNT-IMP: ABNORMAL
SERVICE CMNT-IMP: ABNORMAL

## 2022-12-15 PROCEDURE — G0299 HHS/HOSPICE OF RN EA 15 MIN: HCPCS

## 2022-12-15 PROCEDURE — 77010033678 HC OXYGEN DAILY

## 2022-12-15 PROCEDURE — 94760 N-INVAS EAR/PLS OXIMETRY 1: CPT

## 2022-12-15 PROCEDURE — 82962 GLUCOSE BLOOD TEST: CPT

## 2022-12-15 PROCEDURE — 0651 HSPC ROUTINE HOME CARE

## 2022-12-15 PROCEDURE — 74011250637 HC RX REV CODE- 250/637: Performed by: SURGERY

## 2022-12-15 PROCEDURE — 74011000250 HC RX REV CODE- 250: Performed by: SURGERY

## 2022-12-15 RX ADMIN — ASPIRIN 325 MG ORAL TABLET 325 MG: 325 PILL ORAL at 10:35

## 2022-12-15 RX ADMIN — OXYCODONE 5 MG: 5 TABLET ORAL at 10:35

## 2022-12-15 RX ADMIN — CLONIDINE HYDROCHLORIDE 0.1 MG: 0.1 TABLET ORAL at 10:35

## 2022-12-15 RX ADMIN — COLLAGENASE SANTYL: 250 OINTMENT TOPICAL at 10:45

## 2022-12-15 RX ADMIN — CARVEDILOL 12.5 MG: 12.5 TABLET, FILM COATED ORAL at 10:35

## 2022-12-15 RX ADMIN — SODIUM CHLORIDE, PRESERVATIVE FREE 10 ML: 5 INJECTION INTRAVENOUS at 07:01

## 2022-12-15 RX ADMIN — AMLODIPINE BESYLATE 10 MG: 5 TABLET ORAL at 10:35

## 2022-12-15 RX ADMIN — Medication 1 AMPULE: at 10:35

## 2022-12-15 NOTE — PROGRESS NOTES
End of Shift Note    Bedside shift change report given to Jesus N Candido (oncoming nurse) by Serafin Dorantes RN (offgoing nurse). Report included the following information SBAR, Intake/Output, and Recent Results    Shift worked:  7P-7A       Shift summary and any significant changes:     Hospice     Concerns for physician to address:        Zone phone for oncoming shift:   [phone]       Activity:  Activity Level: Other (comment)  Number times ambulated in hallways past shift: 0  Number of times OOB to chair past shift: 0    Cardiac:   Cardiac Monitoring: No      Cardiac Rhythm: Sinus Rhythm    Access:  Current line(s): PICC     Genitourinary:   Urinary status: voiding, incontinent, and seymour    Respiratory:   O2 Device: Nasal cannula  Chronic home O2 use?: NO  Incentive spirometer at bedside: NO       GI:  Last Bowel Movement Date: 12/13/22  Current diet:  ADULT DIET Dysphagia - Soft & Bite Sized; 4 carb choices (60 gm/meal)  ADULT ORAL NUTRITION SUPPLEMENT Dinner, Breakfast; Diabetic Supplement  Passing flatus: YES  Tolerating current diet: YES       Pain Management:   Patient states pain is manageable on current regimen: YES    Skin:  Basilio Score: 9  Interventions: turn team, float heels, and internal/external urinary devices    Patient Safety:  Fall Score:  Total Score: 2  Interventions: bed/chair alarm  High Fall Risk: Yes    Length of Stay:  Expected LOS: 9d 14h  Actual LOS: [address] ELANA Roger

## 2022-12-15 NOTE — PROGRESS NOTES
12/15/2022 -   TRANSITIONS OF CARE PLAN:   RUR: 10%; LOW  DISPOSITION: Home with Hospice via [id number] [facility]: [facility] - requested for 1400  DME: as per hospice  NEEDS FOR DISCHARGE: Signatures for hospice admission  BARRIERS: BS Hospice cannot reach patient's family  ANTICIPATED FOLLOW UPS: None  ONGOING INPATIENT NEEDS: Discharge    Anticipated Discharge is: Less Than 24 Hours    0940 -   Per Hospice request, CM contacted [facility] ([facility]) phone [phone]. CM pushed transport to 1400.     CRM: Lei Ordoñez, MPH, [facility] 18.: [phone]

## 2022-12-15 NOTE — PROGRESS NOTES
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 Distended gallbladder with cholelithiasis, but no convincing CT evidence of acute cholecystitis. General surgery consulted, OR on 2022. Extensive septic sacral deep tissue injury with abscess. s/p Debridement of sacral deep tissue injury including skin, subcutaneous tissue and muscle.   BC with proteus  Wound care consulted  Continue Walker catheter to promote wound healing  Incentive spirometry to prevent atelectasis  Nutrition consulted for supplementation recommendations to assist in wound healing  Needs LTC placement if family unable to care at home due to extensive wound care needed  Pt not IP Hospice candidate per eval  by hospice  Speech following  Consult ID appreciated-- recommends Zosyn till  then IV Unasyn for 6 weeks if plan is not comfort care, repeat blood Cx () remains negative x 3 days   12/10, Hospice meeting, family decided to go with home hospice      Called both spouse and daughter, no answer, left VM   Will hold off Abx and non essential meds    Discharge cancelled as no family could be present at home. 12/15 Home with hospice support. Reviewed DC meds and summary by Dr Sabrina Warner - no changes      Diabetes mellitus type 2 POA   Hemoglobin A1c 6.8 this admission  Hold lantus given poor oral intake      UTI due to chronic indwelling [place] POA  UA nitrite +, > 100 WBC, 5 to 10 RBC, 2+ bacteremia  Urine culture with GNR  Walker care ordered  on IV antibiotics while here     Essential hypertension POA  Paroxysmal atrial fibrillation  Paroxsymal Vtachs noted  Hyperlipidemia POA  Continue PTA amlodipine, aspirin, carvedilol, clonidine, pravastatin  Replenish Lytes-- Mag as above  Pt is DNR     Dementia  Mood stable     Incidental findings requiring outpatient follow up/ evaluation:  -sclerosis noted in the bilateral femoral heads, suspicious for  avascular necrosis  -Gallbladder is distended and contains multiple stones, but  without gallbladder wall thickening or surrounding fat stranding. No  intrahepatic or extrahepatic biliary ductal dilatation  -going home with hospice. No follow up needed       Code Status: DNR  NOK: spouse. DVT Prophylaxis: Lovenox  Body mass index is 30.99 kg/m². Daughter, Ondina Hunt at [phone]       Subjective:     Chief Complaint / Reason for Physician Visit  Patient was seen and examined this morning. No specific complaints  Upset saying \"you lied to me\". Explained that discharge will happen this afternoon       Review of Systems:  Symptom Y/N Comments  Symptom Y/N Comments   Fever/Chills    Chest Pain     Poor Appetite    Edema     Cough    Abdominal Pain     Sputum    Joint Pain     SOB/MARTINEZ    Headache     Nausea/vomit    Tolerating PT/OT     Diarrhea    Tolerating Diet     Constipation    Other       Could NOT obtain due to: Dementia     Objective:     VITALS:   Last 24hrs VS reviewed since prior progress note.  Most recent are:  Patient Vitals for the past 24 hrs:   Temp Pulse Resp BP SpO2   12/15/22 0834 98.6 °F (37 °C) 77 16 132/64 100 % 12/15/22 0316 99.3 °F (37.4 °C) 80 16 (!) 143/71 100 %   12/14/22 1955 98.4 °F (36.9 °C) 71 18 (!) 157/74 100 %   12/14/22 1951 98.4 °F (36.9 °C) 67 -- (!) 129/48 100 %   12/14/22 1945 -- -- -- -- 100 %   12/14/22 1553 98.1 °F (36.7 °C) (!) 55 12 (!) 129/48 (!) 84 %         Intake/Output Summary (Last 24 hours) at 12/15/2022 1142  Last data filed at 12/15/2022 0316  Gross per 24 hour   Intake --   Output 1300 ml   Net -1300 ml          Wt Readings from Last 12 Encounters:   12/13/22 74.4 kg (164 lb 0.4 oz)   07/27/22 99.8 kg (220 lb)   06/01/22 99.8 kg (220 lb)   05/05/22 99.8 kg (220 lb)   03/01/22 99.8 kg (220 lb)   01/26/22 99.8 kg (220 lb)   06/09/21 99.8 kg (220 lb)   06/01/21 93 kg (205 lb)   12/03/20 96.2 kg (212 lb)   10/01/20 96.2 kg (212 lb)   03/12/20 96.2 kg (212 lb)   06/21/19 96.2 kg (212 lb)       PHYSICAL EXAM:    I had a face to face encounter and independently examined this patient on 12/15/22 as outlined below:    General: WD, WN, cooperative, no acute distress    EENT:  PERRL. Anicteric sclerae. MMM  Neck:  No meningismus, no thyromegaly  Resp:  CTA bilaterally, no wheezing or rales. No accessory muscle use  CV:  Regular  rhythm,  No edema  GI:  Soft, Non distended, Non tender. +Bowel sounds, no rebound  Neurologic:  awake, confused, not talking much, non focal motor exam  Psych:   Not anxious nor agitated  Skin:  No rashes.   No jaundice, wound in sacrum bandaged, not viewed    Reviewed most current lab test results and cultures  YES  Reviewed most current radiology test results   YES  Review and summation of old records today    NO  Reviewed patient's current orders and MAR    YES  PMH/ reviewed - no change compared to H&P  ________________________________________________________________________  Care Plan discussed with:    Comments   Patient x    Family      RN x    Care Manager     Consultant                       x Multidiciplinary team rounds were held today with , nursing, pharmacist and clinical coordinator. Patient's plan of care was discussed; medications were reviewed and discharge planning was addressed. ________________________________________________________________________  Total time: 27 mins    Comments   >50% of visit spent in counseling and coordination of care x    ________________________________________________________________________  Janice Mercado MD     Procedures: see electronic medical records for all procedures/Xrays and details which were not copied into this note but were reviewed prior to creation of Plan. LABS:  I reviewed today's most current labs and imaging studies. Pertinent labs include:  No results for input(s): WBC, HGB, HCT, PLT, HGBEXT, HCTEXT, PLTEXT, HGBEXT, HCTEXT, PLTEXT in the last 72 hours. No results for input(s): NA, K, CL, CO2, GLU, BUN, CREA, CA, MG, PHOS, ALB, TBIL, TBILI, ALT, INR, INREXT, INREXT in the last 72 hours.     No lab exists for component: SGOT

## 2022-12-15 NOTE — PROGRESS NOTES
Hospice RN Note:    Just spoke with daughter, Mauricio Maynard and she is leaving [place] now to come to [place]. Called house and spoke to spouse as well.  CM moving transport to 2PM for 4PM admission so daughter can be present to assist.        Stevo Padilla, RN, BSN, [place] [address] Nurse Liaison  [phone] (m)

## 2022-12-15 NOTE — PROGRESS NOTES
Ms. Franky Paz is alert but not oriented. She has no complained of pain, but has been moaning, so I gave her 5mg of oxy po. VS have been stable. Her wound dressings were done this morning. She is to discharge to home hospice today.  Supplies for wound care will be sent home with patient

## 2022-12-16 ENCOUNTER — HOME CARE VISIT (OUTPATIENT)
Dept: HOSPICE | Facility: HOSPICE | Age: 77
End: 2022-12-16
Payer: MEDICARE

## 2022-12-16 VITALS — DIASTOLIC BLOOD PRESSURE: 66 MMHG | HEART RATE: 78 BPM | TEMPERATURE: 102.7 F | SYSTOLIC BLOOD PRESSURE: 131 MMHG

## 2022-12-16 PROCEDURE — 0651 HSPC ROUTINE HOME CARE

## 2022-12-16 PROCEDURE — G0299 HHS/HOSPICE OF RN EA 15 MIN: HCPCS

## 2022-12-16 RX ADMIN — HYDROMORPHONE HYDROCHLORIDE 1 MG: 5 SOLUTION ORAL at 13:15

## 2022-12-16 NOTE — HOSPICE
Patient quietly lying in bed when this RN arrived. Alert to self. Upon assessment, patient had temp of 102.7 and room was very warm. Patient found to have multiple layers underneath her and on top of her. Patient's temp at 1508 was 100.3 and then 99.3 @ 1519 prior to departure. Received an order for oral tylenol from [facility], but was not needed after finding temperature going down. Patient has several wounds: This RN gave patient 1 mL of dilaudid prior to wound care. R leg had foul smell when removing dressing measurement 16 x 5; L leg measured 15 x 4, sacral measured 16.5 x 19; L heal 3 x 2, R heal 3 x 2, back upper left 1.5 x 1.5, L hip 1 x 1. Advised daughter Lorraine Garcia that she needed to let her father and brother know that the nurses that come will need help with changing wound dressings, all meds will need to be looked at, and that a nurse will be calling head of time to plan for arrival and to give pain meds prior to nurses arrival. Advised that a haider would be out to see patient and do wound care on Saturday and Sunday. Advised daughter to call hospice with any questions or concerns.

## 2022-12-16 NOTE — HOSPICE
Name: Nino Ormond. Glenford Mano  : 1945  Age: 65 y.o. Principle Hospice Diagnosis: Late effects of CVA   Diagnoses RELATED to the terminal prognosis:   Unrelated Diagnosis: Breast calcification, CAD, cellulitis, CKD, DM, DJD, Dyslipidemia, Glaucoma, statis ulcer to lower extremity, Anxiety, AFIB, chronic pain syndrome, HTN, Hyperthyroidism     Date of Hospice Admission: 12/15/2022  Hospice Attending Elected by Patient: Dr. Erika Alberts   Primary Care Physician: Cely Gutiérrez MD     Admitting RN: Toño Tesfaye BSN, RN  Nurse CM: Rene Connolly  : Maulik Bustamante: Cristina Marquez DNR: yes, on file and scanned in     [address] [place] [address],[address] Service: unknown      Home: TBD     Direct Observation: RN presents to home for hospice admission. RN greeted by patients' , patient's daughter, and patient's son. Patient complains of no pain or shortness of breath. No discomfort or shortness of breath observed. Patient has fatigue and weakness. Patient has periods of confusions. Patients' appetite varies but no nausea or vomiting. Patient is sleeping well, and sleeping has been increased. Patient has seymour catheter in place and is incontinent of stool. Patient is bedbound. Hospice philosophy and goals were discussed with patients' family. Physical assessment completed. Vital signs obtained. Partial skin assessment completed. Patient refused full skin assessment. Comfort pack medications reviewed with family. Seymour catheter care reviewed with family. Wound care will need to be completed on patient daily throughout the weekend. Stage 4 sacral wound. Wounds to bilateral legs. Supplies ordered. DME already in the home. On departure, RN educates family on calling hospice  with any questions or concerns and patient sleeping in the hospital bed.      Palliative Performance Scale: 30%     ER Visits/ Hospitalizations in past year: 11  Onset Date of Hospice Diagnosis: 10/2022    Summary of Disease Progression Leading to Hospice Diagnosis: Author of note Evens Jain DO  HISTORY OF PRESENT ILLNESS:   Alisha Hermosillo is a 65 y.o. 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 Reportedly, patient developed sacral pressure injury in August/September that ulcerated and then progressively worsened to the point that patient was hospitalized at [facility] from 10/15-11/2 during that time patient was managed with IV antibiotics but refused any surgical debridement and was ultimately discharged home without antibiotics, infectious disease follow-up, or home health services. Since that time daughter reports family has attempted to keep her clean and provide wound care as demonstrated by PCP who provides home visits. Unfortunately, patient has continued to decline now with extension of her ulcer, obvious necrotic tissue, and development of additional pressure injuries over her body prompting them to present to our facility with request for operative debridement. ROS otherwise negative. Patient reports no other complaints or concerns and denies tobacco, alcohol, or illicit drug use. In the ED, patient afebrile, tachycardic in 110s, hypertensive 140s/80s, saturating upper 90s on 3 L/min via nasal cannula. Extensive pressure injuries and necrotic sacral ulcer demonstrated. ECG demonstrates sinus tachycardia.  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 Left and right tib/fib radiographs demonstrate osteopenia without evidence of osteomyelitis. Labs demonstrate: Lactic acid 3.19, WBC 15.6, hemoglobin 9.8, platelets 789, sodium 135, potassium 4.6, glucose 440, BUN 12, creatinine 0.61, procalcitonin 1.63, high sensitive troponin 9, UA reflex to culture (protein, glucose, ketones, blood, nitrites, leukoesterase, WBCs, bacteria). Patient started on empiric vancomycin and Zosyn by ED provider. Case discussed with on-call general surgeon, Dr. Louisa Reed, who has taken patient to the [address] [place] [address] for debridement. Use LCD Guidelines and list features:   Stroke:  ____X____  1. Karnofsky Performance Status (KPS) or Palliative Performance Scale (PPS) of 40% or less;  ____X____  2. Inability to maintain hydration and caloric intake with one of the following:        ____X____  a. Weight loss >10% in the last 6 months or >7.5% in the last 3 months;        ________  b. Serum albumin         ________  c. Current history of pulmonary aspiration not responsive to speech language                                 pathology intervention;                       ________  d. Sequential calorie counts documenting inadequate caloric/fluid intake;                       ________  e. Dysphagia severe enough to prevent the patient from receiving food and fluids                   necessary to sustain life, in a patient who declines or does not receive artificial                                 nutrition and hydration. SPIRITUAL/Social/Emotional/psych: Patient is [employer]. Patient has a  who has myeloma and prostate cancer who is in a wheelchair. Patient has a son who lives in the home and a daughter who is coming from [place] to help patient. Dr. Catherine Arias contacted, agrees to serve as attending provider for hospice and provided verbal certification of terminal illness with prognosis of 6 months or less life expectancy.  Orders for hospice admission, medications and plan of treatment received. Medication reconciliation completed. Currently this patient has:   Supplemental O2: Patient is on O2 via nasal cannula for respiratory discomfort as needed.    Walker Catheter: yes, and draining urine         MEDS:  I have reviewed the patient's medication list with MD and identified the following:  Nonformulary medications: n/a  Unrelated medications: n/a     IDT communication to include MD, , SW, [address] [place] [address] and support team.

## 2022-12-17 ENCOUNTER — HOME CARE VISIT (OUTPATIENT)
Dept: SCHEDULING | Facility: HOME HEALTH | Age: 77
End: 2022-12-17
Payer: MEDICARE

## 2022-12-17 VITALS
HEART RATE: 85 BPM | RESPIRATION RATE: 18 BRPM | OXYGEN SATURATION: 97 % | TEMPERATURE: 101.7 F | SYSTOLIC BLOOD PRESSURE: 145 MMHG | DIASTOLIC BLOOD PRESSURE: 60 MMHG

## 2022-12-17 PROCEDURE — G0299 HHS/HOSPICE OF RN EA 15 MIN: HCPCS

## 2022-12-17 PROCEDURE — 0651 HSPC ROUTINE HOME CARE

## 2022-12-18 ENCOUNTER — HOME CARE VISIT (OUTPATIENT)
Dept: SCHEDULING | Facility: HOME HEALTH | Age: 77
End: 2022-12-18
Payer: MEDICARE

## 2022-12-18 VITALS
TEMPERATURE: 101.5 F | RESPIRATION RATE: 24 BRPM | SYSTOLIC BLOOD PRESSURE: 108 MMHG | HEART RATE: 87 BPM | DIASTOLIC BLOOD PRESSURE: 55 MMHG

## 2022-12-18 PROCEDURE — 0651 HSPC ROUTINE HOME CARE

## 2022-12-18 PROCEDURE — G0299 HHS/HOSPICE OF RN EA 15 MIN: HCPCS

## 2022-12-18 NOTE — HOSPICE
Routine visit for wound care. Patient received laying in hospital bed, daughter Valerie Lerma, son and  present. Patient was premedicated prior to RN arrival, alert and awake, responses delayed. Patient catheter drainage bag with 700 mL tea colored urine. Family unable to state when the patient's last bowel movement was, but stated that the patient ate oatmeal and scrambled eggs today. Patient lays with neck extended and is able to use her right hand. She has left sided weakness from her stroke. Patient states she is \"always\" cold, despite several blankets and the house being 78 degrees. Patient found with fever and daughter Valerie Lerma medicated with Tylenol prior to this writer's arrival. Bilateral heel dressings loose and changed with minimal discomfort using saline, alginate with AG and foam dressings. Right calf wound irrigated with normal saline. Unable to locate ordered Santyl in the home and placed honey strips, alginate with Ag, ABD and roll gauze to secure. Patient refused assessment of left calf, left hip and left back wounds. Sacral wound cleansed with wound cleanser and alginate with Ag placed in wound bed per instructions, covered with abd x2 and large 9x9 foam dressing, secured with tape. Will assess left side wounds and sacral wound at next visit, will change heel and right calf dressings if needed. Daughter in agreement to plan to reduce patient's pain during treatment.

## 2022-12-18 NOTE — HOSPICE
Clarification received from admitting nurse regarding wound care orders prior to visit. Sacral wound to be changed daily, all other wounds every 3 days Care plan updated. Patient will benefit most from alternating wound care days for other sites, heels, calves, hip, back due to pain/discomfort. Routine visit for EOL symptom assessment and daily wound care. Patient received laying in hospital bed, spouse, son and daughter present during visit. Son states that he emptied urinary drainage bag at 10AM, patient ate 1/2 cup of oatmeal for breakfast. Patient remains febrile, acetaminophen administered at 830 this morning. Patient remains in same position from yesterday and family stated she has not been turned since yesterday, educated family on need to turn patient to prevent worsening of pressure injuries, daughter verbalized understanding. Patient is alert and was premedicated with pain medication prior to RN arrival. Heel and calf dressings clean, dry and intact, last changed 12/17. Hip and back dressings clean, dry and intact, last changed 12/16. Sacral dressing intact but ready to be changed. Patient has 45 mL urine output since 10AM, patient turned and found to have incontinence of bowel. Patient cleaned and sacral wound dressing changed. Patient tolerated poorly and stated she does not want any more wound care and does not want nursing to come tomorrow. Encouraged family to administer pain medication as needed, it is available every 3 hours, family hesitant for fear of overmedication. Educated daughter on dosing for pain and increased need for pain medication during EOL transition. Daughter verbalized understanding.

## 2022-12-19 ENCOUNTER — HOME CARE VISIT (OUTPATIENT)
Dept: SCHEDULING | Facility: HOME HEALTH | Age: 77
End: 2022-12-19
Payer: MEDICARE

## 2022-12-19 ENCOUNTER — HOME CARE VISIT (OUTPATIENT)
Dept: HOSPICE | Facility: HOSPICE | Age: 77
End: 2022-12-19
Payer: MEDICARE

## 2022-12-19 VITALS — HEART RATE: 88 BPM | DIASTOLIC BLOOD PRESSURE: 64 MMHG | SYSTOLIC BLOOD PRESSURE: 137 MMHG | TEMPERATURE: 101 F

## 2022-12-19 PROCEDURE — G0299 HHS/HOSPICE OF RN EA 15 MIN: HCPCS

## 2022-12-19 PROCEDURE — HOSPICE MEDICATION HC HH HOSPICE MEDICATION

## 2022-12-19 PROCEDURE — 0651 HSPC ROUTINE HOME CARE

## 2022-12-19 NOTE — HOSPICE
Patient was given 1 mg of dilaudid prior to arrival for pain control while attending to wounds. Cy Daily assisted with extensive wound care. Patient's daughter Marshall Randolph stated that the patient had a little bit to eat yesterday and today only fluids. Again today, patient's room was extremely hot in that RN was unable to tell if the patient was hot from the room or actually had a fever as her tempature was 101. Spoke with daughter Marshall Randolph about patient and the tempature in the room. She stated that she would speak with her father and brother again about the temperature and she verbalizes understanding that the room is extremely hot. Wounds remain the same. No new concerns per daughter. Advised to call hospice with any questions or concerns.

## 2022-12-20 ENCOUNTER — HOME CARE VISIT (OUTPATIENT)
Dept: HOSPICE | Facility: HOSPICE | Age: 77
End: 2022-12-20
Payer: MEDICARE

## 2022-12-20 ENCOUNTER — HOME CARE VISIT (OUTPATIENT)
Dept: SCHEDULING | Facility: HOME HEALTH | Age: 77
End: 2022-12-20
Payer: MEDICARE

## 2022-12-20 PROCEDURE — G0156 HHCP-SVS OF AIDE,EA 15 MIN: HCPCS

## 2022-12-20 PROCEDURE — 0651 HSPC ROUTINE HOME CARE

## 2022-12-20 PROCEDURE — G0300 HHS/HOSPICE OF LPN EA 15 MIN: HCPCS

## 2022-12-21 ENCOUNTER — HOME CARE VISIT (OUTPATIENT)
Dept: SCHEDULING | Facility: HOME HEALTH | Age: 77
End: 2022-12-21
Payer: MEDICARE

## 2022-12-21 PROCEDURE — 0651 HSPC ROUTINE HOME CARE

## 2022-12-21 PROCEDURE — G0300 HHS/HOSPICE OF LPN EA 15 MIN: HCPCS

## 2022-12-21 NOTE — HOSPICE
Sacral wound dressing change, patient tolerated fair. Daughter had medicated prior to nurse visit as directed.  patient reposition with pillow for comfort

## 2022-12-22 ENCOUNTER — HOME CARE VISIT (OUTPATIENT)
Dept: SCHEDULING | Facility: HOME HEALTH | Age: 77
End: 2022-12-22
Payer: MEDICARE

## 2022-12-22 VITALS
SYSTOLIC BLOOD PRESSURE: 128 MMHG | OXYGEN SATURATION: 95 % | RESPIRATION RATE: 18 BRPM | HEART RATE: 83 BPM | DIASTOLIC BLOOD PRESSURE: 64 MMHG

## 2022-12-22 PROCEDURE — 0651 HSPC ROUTINE HOME CARE

## 2022-12-22 PROCEDURE — G0300 HHS/HOSPICE OF LPN EA 15 MIN: HCPCS

## 2022-12-23 ENCOUNTER — HOME CARE VISIT (OUTPATIENT)
Dept: SCHEDULING | Facility: HOME HEALTH | Age: 77
End: 2022-12-23
Payer: MEDICARE

## 2022-12-23 VITALS
TEMPERATURE: 98.4 F | OXYGEN SATURATION: 94 % | HEART RATE: 106 BPM | SYSTOLIC BLOOD PRESSURE: 165 MMHG | DIASTOLIC BLOOD PRESSURE: 88 MMHG | RESPIRATION RATE: 20 BRPM

## 2022-12-23 PROCEDURE — G0156 HHCP-SVS OF AIDE,EA 15 MIN: HCPCS

## 2022-12-23 PROCEDURE — 0651 HSPC ROUTINE HOME CARE

## 2022-12-23 PROCEDURE — G0300 HHS/HOSPICE OF LPN EA 15 MIN: HCPCS

## 2022-12-23 RX ADMIN — LORAZEPAM 0.5 MG: 2 CONCENTRATE ORAL at 11:40

## 2022-12-23 NOTE — HOSPICE
0-7/wound care. On arrival patient laying in bed a&o x2. Patient agitated and did not want to be bothered. Morphine given at 1030 to premedicate for wound care. Suggested using lorazepam for wound care due to patient's agitation. Lorazepam given at 1140. Wound care completed. Spoke with daughter Lorraine Garcia via phone to update. Lorraine Garcia requested calling about 30 minutes prior to arrival to have patient medicated.

## 2022-12-24 ENCOUNTER — HOME CARE VISIT (OUTPATIENT)
Dept: HOSPICE | Facility: HOSPICE | Age: 77
End: 2022-12-24
Payer: MEDICARE

## 2022-12-24 VITALS — DIASTOLIC BLOOD PRESSURE: 83 MMHG | TEMPERATURE: 97 F | HEART RATE: 105 BPM | SYSTOLIC BLOOD PRESSURE: 145 MMHG

## 2022-12-24 PROCEDURE — G0299 HHS/HOSPICE OF RN EA 15 MIN: HCPCS

## 2022-12-24 PROCEDURE — 0651 HSPC ROUTINE HOME CARE

## 2022-12-24 NOTE — HOSPICE
Called patient's home at [phone] to advise that this RN would be arriving in roughly 30 minutes and to please medicate the patient with dilaudid 1mL  and lorazepam 0.25 mL. Was advised by the son that he had already pre-medicated the patient at \"10ish\". Patient was alert but sleepy. Wounds have foul smell and drainage. R Heel 3 x 2, R calf 16 x 5, L Heel 3 x 2, L calf 16 x 5, Left Hip 1 x 1, Left shoulder 1.5 x 1.5, Sacrum 16.5 x 19. Walker patent and draining. Sending email to Dr. Sarai Gary, NP Lamberto Staff, and Caesar Doulgass for re-evaluation on wounds. Educated patient's  and son on the use of when to given dilaudid and lorazepam vs when to give for pre-medicating for wound care.  does not understand why patient is on hospice. Educated patient's  on medical condition. Will have SW and [place] follow-up next week with patient's / family. Advised to call with any questions or concerns.      chux  8x10 abd  4x4  maxorb  honey gel

## 2022-12-25 ENCOUNTER — HOME CARE VISIT (OUTPATIENT)
Dept: SCHEDULING | Facility: HOME HEALTH | Age: 77
End: 2022-12-25
Payer: MEDICARE

## 2022-12-25 VITALS — SYSTOLIC BLOOD PRESSURE: 173 MMHG | RESPIRATION RATE: 14 BRPM | HEART RATE: 110 BPM | DIASTOLIC BLOOD PRESSURE: 93 MMHG

## 2022-12-25 PROCEDURE — 0651 HSPC ROUTINE HOME CARE

## 2022-12-25 PROCEDURE — G0300 HHS/HOSPICE OF LPN EA 15 MIN: HCPCS

## 2022-12-25 NOTE — HOSPICE
0-7/wound care. Patient premedicated with dilaudid and lorazepam prior to wound care. Wound care completed with assistance of caregiver. Patient had small bm. Patient cleaned and changed. Repositioned. patient out of calcium alginate.

## 2022-12-26 ENCOUNTER — HOME CARE VISIT (OUTPATIENT)
Dept: SCHEDULING | Facility: HOME HEALTH | Age: 77
End: 2022-12-26
Payer: MEDICARE

## 2022-12-26 VITALS
TEMPERATURE: 98.6 F | RESPIRATION RATE: 18 BRPM | HEART RATE: 77 BPM | SYSTOLIC BLOOD PRESSURE: 149 MMHG | OXYGEN SATURATION: 94 % | DIASTOLIC BLOOD PRESSURE: 87 MMHG

## 2022-12-26 PROCEDURE — G0299 HHS/HOSPICE OF RN EA 15 MIN: HCPCS

## 2022-12-26 PROCEDURE — 0651 HSPC ROUTINE HOME CARE

## 2022-12-26 RX ADMIN — LORAZEPAM 1 MG: 2 CONCENTRATE ORAL at 14:00

## 2022-12-26 RX ADMIN — HYDROMORPHONE HYDROCHLORIDE 1 MG: 5 SOLUTION ORAL at 14:00

## 2022-12-26 NOTE — HOSPICE
Routine visit for 0-7 assessment and daily wound care. Patient received laying in hospital bed, son Romelnan Farris available at bedside for assistance. Patient alert and oriented, states she ate eggs for breakfast and feels ok. States son gave pain medication prior to my arrival, verified in patient log in room. Patient refusing wound care at visit. Spouse and son insistent on wound care. Patient agreed to let this RN assess wounds. Bilateral calf and heel dressings, left hip and left back dressings changed 12/25 and are CDI, due to change again on 12/27. Patient found with small bowel movement. Patient cleaned and wound care performed on sacral wound. Patient crying and moaning post wound care. Order received from Dr. Piedad Cueva for additional dose of pain and anxiety medications. Medication administered to patient. Extensive medication teaching and review with son Romelrimarcio Farris. Encouraged son to reassess and give pain medication when next available, Chris Farris verbalized understanding. Follow up call with [facility] regarding medication education and supplies. Supplies ordered 12/24 by RNCM. No medication refills ordered at this visit.

## 2022-12-27 ENCOUNTER — HOME CARE VISIT (OUTPATIENT)
Dept: SCHEDULING | Facility: HOME HEALTH | Age: 77
End: 2022-12-27
Payer: MEDICARE

## 2022-12-27 VITALS — RESPIRATION RATE: 18 BRPM

## 2022-12-27 PROCEDURE — G0300 HHS/HOSPICE OF LPN EA 15 MIN: HCPCS

## 2022-12-27 PROCEDURE — 0651 HSPC ROUTINE HOME CARE

## 2022-12-27 PROCEDURE — G0156 HHCP-SVS OF AIDE,EA 15 MIN: HCPCS

## 2022-12-27 NOTE — HOSPICE
Patient resting quietly in bed. Daughter Umer Colindres reported having medicated 30 min prior to nurse visit with dilaudid due to wound care to be done. Wound care completed. Patient tolerated well. Patient position with pillows for comfort.  Encourage caregiver to call with any change in status

## 2022-12-28 ENCOUNTER — HOME CARE VISIT (OUTPATIENT)
Dept: HOSPICE | Facility: HOSPICE | Age: 77
End: 2022-12-28
Payer: MEDICARE

## 2022-12-28 ENCOUNTER — HOME CARE VISIT (OUTPATIENT)
Dept: SCHEDULING | Facility: HOME HEALTH | Age: 77
End: 2022-12-28
Payer: MEDICARE

## 2022-12-28 VITALS
DIASTOLIC BLOOD PRESSURE: 78 MMHG | OXYGEN SATURATION: 94 % | SYSTOLIC BLOOD PRESSURE: 130 MMHG | HEART RATE: 68 BPM | RESPIRATION RATE: 16 BRPM

## 2022-12-28 VITALS — DIASTOLIC BLOOD PRESSURE: 90 MMHG | TEMPERATURE: 98.3 F | SYSTOLIC BLOOD PRESSURE: 160 MMHG | HEART RATE: 117 BPM

## 2022-12-28 PROCEDURE — G0299 HHS/HOSPICE OF RN EA 15 MIN: HCPCS

## 2022-12-28 PROCEDURE — 0651 HSPC ROUTINE HOME CARE

## 2022-12-28 NOTE — HOSPICE
Patient is resting as nurse has attended to her, patient is now(0-7) as  arrives for initial visit. Patient is supported by  and family.  share that patient was in hospital and chose to stop all care and go home.  [place] on he and his wife's briseyda. Patient remains asleep as  offer prayer to family and patient at bedside.

## 2022-12-28 NOTE — HOSPICE
Patient unresponsive it verbal and tactile stimulation. Per , patient has been more asleep than awake and when she does open her eyes there is no connection to the person with her. Patient has not had any intake of food in 3 days and sips at minimal. Advised family to give Hydromorphone 1 mL every three hours and Lorazepam 0.25 every 3 hours starting at 3:00 pm. Both son and  verbalized understanding. Infromed both  and son that wound care would be provided for break through drainage only, both verbalized understanding. Went back over 0-7 with verbal understanding from  and son. All wound dressings were changed on this visit, all wounds still measuring the same. Ordered hydromorphone. Advised family to call with any questions or concerns.

## 2022-12-28 NOTE — HOSPICE
present during visit, he reported medicating with dilaudid prior to nurse visit. Patient with eyes closed , no signs of pain or sob noted. Patient response to tactile stimuli. caregiver reports patient was not medicated last night for any issue. Wound care completed. Reviewed change in status with caregiver, reviewed prn medication for symptom mangement with  and son who both verbalize understanding.  encourage to call with any question or concerns

## 2022-12-29 ENCOUNTER — HOME CARE VISIT (OUTPATIENT)
Dept: SCHEDULING | Facility: HOME HEALTH | Age: 77
End: 2022-12-29
Payer: MEDICARE

## 2022-12-29 PROCEDURE — G0299 HHS/HOSPICE OF RN EA 15 MIN: HCPCS

## 2022-12-29 PROCEDURE — 0651 HSPC ROUTINE HOME CARE

## 2022-12-29 NOTE — HOSPICE
Routine visit made due to patient 0-7 status. Daughter, Denzel, present for visit. Denzel stated they have given lorazepam 3 times since last RN visit. Administration times written down on paper in home. Patient received in hospital bed, neutral facial expression, patient opened eyes while being turned, respirations even, RR 22, HY tachycardiac, abdomen soft, hypoactive bowel sounds, all dressings CDI. Reviewed medications with Denzel, mouth swaps left in home. No supplies or medications needed. Reminded Denzel to call hospice number with any needs or concerns.

## 2022-12-30 ENCOUNTER — HOME CARE VISIT (OUTPATIENT)
Dept: SCHEDULING | Facility: HOME HEALTH | Age: 77
End: 2022-12-30
Payer: MEDICARE

## 2022-12-30 VITALS
HEART RATE: 119 BPM | DIASTOLIC BLOOD PRESSURE: 76 MMHG | SYSTOLIC BLOOD PRESSURE: 120 MMHG | OXYGEN SATURATION: 95 % | TEMPERATURE: 97.6 F | RESPIRATION RATE: 16 BRPM

## 2022-12-30 PROCEDURE — G0156 HHCP-SVS OF AIDE,EA 15 MIN: HCPCS

## 2022-12-30 PROCEDURE — G0299 HHS/HOSPICE OF RN EA 15 MIN: HCPCS

## 2022-12-30 PROCEDURE — 0651 HSPC ROUTINE HOME CARE

## 2022-12-30 NOTE — HOSPICE
Pt is 0-7 with extensive wound care. Present are spouse, son and daughter, and Damon Rhymes. Pt in bed, no signs of acute distress, PPS 20. She was medicated 90 minutes before I arrived. minimal response to painful stimuli, respirations shallow and increased effort, but not distressed. Lungs diminished, no wet lung sounds. Skin w/d, afebrile. Complete bath and hygiene completed with Sania Outhouse. Palliative wound care completed, see LDA addendum. Spouse asked, \"those are not gonna heal?\". Discussed EOL signs and extent of wounds due to immobility and lack of nutrtition. Meds are on hand for comfort through the weekend. Supplies on hand as well. Reviewed contact numbers and asked family to call with any concerns or at time of death. Pt is palliated at this visit.

## 2022-12-31 ENCOUNTER — HOME CARE VISIT (OUTPATIENT)
Dept: SCHEDULING | Facility: HOME HEALTH | Age: 77
End: 2022-12-31
Payer: MEDICARE

## 2022-12-31 VITALS
RESPIRATION RATE: 24 BRPM | HEART RATE: 116 BPM | SYSTOLIC BLOOD PRESSURE: 154 MMHG | DIASTOLIC BLOOD PRESSURE: 61 MMHG | OXYGEN SATURATION: 97 % | TEMPERATURE: 100.8 F

## 2022-12-31 PROCEDURE — 0651 HSPC ROUTINE HOME CARE

## 2022-12-31 PROCEDURE — G0299 HHS/HOSPICE OF RN EA 15 MIN: HCPCS

## 2023-01-01 ENCOUNTER — HOME CARE VISIT (OUTPATIENT)
Dept: SCHEDULING | Facility: HOME HEALTH | Age: 78
End: 2023-01-01
Payer: MEDICARE

## 2023-01-01 ENCOUNTER — HOME CARE VISIT (OUTPATIENT)
Dept: HOSPICE | Facility: HOSPICE | Age: 78
End: 2023-01-01
Payer: MEDICARE

## 2023-01-01 VITALS
TEMPERATURE: 98.4 F | RESPIRATION RATE: 24 BRPM | DIASTOLIC BLOOD PRESSURE: 52 MMHG | SYSTOLIC BLOOD PRESSURE: 80 MMHG | HEART RATE: 69 BPM

## 2023-01-01 VITALS
TEMPERATURE: 100 F | DIASTOLIC BLOOD PRESSURE: 68 MMHG | OXYGEN SATURATION: 89 % | RESPIRATION RATE: 24 BRPM | HEART RATE: 135 BPM | SYSTOLIC BLOOD PRESSURE: 147 MMHG

## 2023-01-01 VITALS
SYSTOLIC BLOOD PRESSURE: 90 MMHG | TEMPERATURE: 98.6 F | RESPIRATION RATE: 28 BRPM | HEART RATE: 62 BPM | DIASTOLIC BLOOD PRESSURE: 48 MMHG

## 2023-01-01 PROCEDURE — G0299 HHS/HOSPICE OF RN EA 15 MIN: HCPCS

## 2023-01-01 PROCEDURE — 0651 HSPC ROUTINE HOME CARE

## 2023-01-01 PROCEDURE — G0300 HHS/HOSPICE OF LPN EA 15 MIN: HCPCS

## 2023-01-01 NOTE — HOSPICE
EOL visit for symptom management. Patient received laying in hospital bed, turned on right side. Patient is obtunded and appears to be free from pain, last medicated with anxiety and pain medications at 1230. She appears to be transitioning. Daughter Vincent Navarro, son Steven Padgett and spouse at bedside. Per son, patient is only receiving fluids via syringe, last ate bites of applesauce yesterday. Per note from 12/28 visit, wound care is for breakthrough drainage only, all wound dressings clean, dry and intact on assessment. Patient turned and urine in bedpad. Patient cleaned and repositioned on left side, acetaminophen suppository administered for fever. Urinary catheter flushed x2 with total 80 mL sterile water, 500 mL urine returned, cloudy with sediment. Education provided regarding sediment and catheter obstruction and to monitor for further leaking around catheter, daughter verbalized understanding. Spouse in room and education regarding medications for secretions, medication will not encourage patient to swallow, rather it will help decrease secretions, educated regarding positioning for optimal drainage of secretions. Spouse and daughter veralized understanding.

## 2023-01-01 NOTE — HOSPICE
EOL visit for symptom assessment. Patient is imminent. Patient received laying in hospital bed, turned on left side. Daughter, son and spouse available during visit. Patient responsive only to painful stimuli. Pain and anxiety medications given at RN arrival. Medications reviewed with son and daughter. Daughter verbalized understanding of wound care and comfort measures, and administering pain and anxiety medications every 3 hours as needed. Patient O2 sat 89% on room air. Educated availability and placement of PRN oxygen should family choose to utilize, they are undecided at time of visit. Patient catheter leaking, balloon deflated and catheter advanced, patient repositioned in bed, 150 mL drained after catheter care. Bed pad changed, gown changed and perineal area cleaned. Sacral dressing changed, dressings changed on KARLA lower extremities, hip and shoulder dressings CDI. Family states they have not chosen a  home, they are planning to make calls on Tuesday. RN educated that patient may die before Tuesday and if they wanted to contact  homes, they should be able to do today as  homes are on call for services. RN gave names and contact numbers for 3  homes in the area, will most likely choose [place] [phone] on [address]. Educated family on calling hospice at time of death. Family appreciative of hospice services. No medications or supplies needed during visit.

## 2023-01-02 NOTE — HOSPICE
0-7 visit performed. Family present and helpful. Dressing to right and left lower extremities are clean dry and intact. Wound care performed to left hip, back and sacral area. No signs of infection or drainage to left hip or back. Sacral decubitus had mod amount of break through drainage that was light red in color and a foul odor. Walker had leaked. Balloon deflated, repositioned and re-inflated with 75cc of abel color urine in return. Incontinent care performed. o2 administered at 2 liters via nasal canula. Sats 64% on room air. Patient repositioned. Family decided on [facility] home and face sheet was updated. Reinforced oxygen education. Told what to expect at end of life and to call hospice if this occurs. Told to call hospice with any questions , concerns or changes in status.

## 2023-01-04 NOTE — HOSPICE
Tayla Raul ( 1945) pronounced 2023 at 0850 am, after one minute auscultation with no apical pulse or respirations noted. Patient surrounded by  and daughter, Brant Porras. Family grieving appropriately. Offered support and condolences. Requesting  or bereavement support. [facility] ([address] location) notified of patient's passing and will retrieve patient within one hour. Post mortem care provided. Medications wasted per protocol, with daughter Brant Porras as witness. Equipment  order placed through [employer]. Dr. Luis Byers notified via email.

## 2023-01-04 NOTE — HOSPICE
visit to assess comfort at EOL and provide support to family. Patient appears comfortable. Patient with increase RR , reviewed signs of sob with caregivers and reviewed medication for nonverbal signs of discomfort, son will medicate for sob as directed with dilaudid. Active listening and support provided.  Encourage caregivers to call with any question or concerns

## 2023-01-05 NOTE — HOSPICE
Death visit: Family (  and son) requested  visit upon the death of pt. This visit occurred at the pt's house with  and son, her daughter was present in the house but declined to participate in the visit.  and son noted they are okay at this time because they have been expecting this for so long. Encouraged them to be gentle with themselves and each other - noting that new and additional feeling may arise now that she has actually . Gaby current questions  center around next steps that a  might be better able to answer. Informed them of this and passed information to my supervisor. Thanks for the opportunity to  to this  and son. Chet Duffy

## 2023-01-05 NOTE — HOSPICE
MSW called patient's home to introduce self and schedule initial visit. Family declined visit today, request visit on 12/20, after the first 5 days of service. Visit set for 12/20 in the afternoon.

## 2023-01-05 NOTE — HOSPICE
MSW made initial visit with patient, , son, and daughter. Patient observed lying in bed, HHA finishing her visit when MSW arrived. Patient agitated during visit, called out to MSW< but then did not engage with MSW. Patient did not like being touched, stated wanting her family to go away or that she didn't want things, but then would allow them to provide care or give her food. Family is very loving, attentive and caring with patient. Patient resides in the home with her  of 60+ years and son, daughter splits her time between [place] and [place] to help with patient's care. Patient's  is a  and has serious health issues that he is dealing with as well, with many appointments at the [place]. Children have managed to be able to cover going to his visits and caring for patient; may need volunteer support in the future, but are doing okay at this time. Family very appreciative of hospice care and support, and coping appropriately with patient's declining health. Family accepting that patient is declining, but hoping that she will improve. MSW reassured family that hospice team will be of support as patient's health and care needs change.

## 2023-08-16 NOTE — ADDENDUM NOTE
Addended by: Bradly Hebert on: 5/19/2021 01:39 PM     Modules accepted: Orders Called and spoke with pt's wife. Appt scheduled and confirmed.

## 2024-08-17 NOTE — HOSPICE
Patient resting quietly in bed, denies pain or sob at this time. Patient looking forward to having cabbage with her meal today. Patient coopertive during assessment. Patient reports discomfort with dressing change to wound. Daughter reported medicating with dilaudid prior to nurse visit. care completed, patient postion with pillow for comfort.  resting queilty at visit end. encourage daughter to call with any change in status [phone]

## (undated) DEVICE — SOLUTION IRRIG 1000ML 0.9% SOD CHL USP POUR PLAS BTL

## (undated) DEVICE — MAJOR LAPAROTOMY-MRMC: Brand: MEDLINE INDUSTRIES, INC.

## (undated) DEVICE — SOLUTION IRRIG 3000ML 0.9% SOD CHL USP UROMATIC PLAS CONT

## (undated) DEVICE — PAD,ABDOMINAL,5"X9",ST,LF,25/BX: Brand: MEDLINE INDUSTRIES, INC.

## (undated) DEVICE — HANDPIECE SET WITH COAXIAL HIGH FLOW TIP AND SUCTION TUBE: Brand: INTERPULSE

## (undated) DEVICE — GOWN,SIRUS,FABRNF,2XL,18/CS: Brand: MEDLINE

## (undated) DEVICE — BANDAGE,GAUZE,BULKEE II,4.5"X4.1YD,STRL: Brand: MEDLINE